# Patient Record
Sex: MALE | Race: BLACK OR AFRICAN AMERICAN | NOT HISPANIC OR LATINO | URBAN - METROPOLITAN AREA
[De-identification: names, ages, dates, MRNs, and addresses within clinical notes are randomized per-mention and may not be internally consistent; named-entity substitution may affect disease eponyms.]

---

## 2022-04-12 ENCOUNTER — INPATIENT (INPATIENT)
Facility: HOSPITAL | Age: 67
LOS: 4 days | Discharge: HOME | DRG: 871 | End: 2022-04-17
Attending: HOSPITALIST | Admitting: SURGERY
Payer: COMMERCIAL

## 2022-04-12 VITALS
HEART RATE: 141 BPM | RESPIRATION RATE: 24 BRPM | OXYGEN SATURATION: 99 % | HEIGHT: 63 IN | SYSTOLIC BLOOD PRESSURE: 88 MMHG | WEIGHT: 162.04 LBS | TEMPERATURE: 100 F | DIASTOLIC BLOOD PRESSURE: 60 MMHG

## 2022-04-12 DIAGNOSIS — K83.09 OTHER CHOLANGITIS: ICD-10-CM

## 2022-04-12 LAB
ALBUMIN SERPL ELPH-MCNC: 2.1 G/DL — LOW (ref 3.3–5)
ALBUMIN SERPL ELPH-MCNC: 2.4 G/DL — LOW (ref 3.3–5)
ALBUMIN SERPL ELPH-MCNC: 3 G/DL — LOW (ref 3.3–5)
ALP SERPL-CCNC: 124 U/L — HIGH (ref 40–120)
ALP SERPL-CCNC: 68 U/L — SIGNIFICANT CHANGE UP (ref 40–120)
ALP SERPL-CCNC: 69 U/L — SIGNIFICANT CHANGE UP (ref 40–120)
ALT FLD-CCNC: 18 U/L — SIGNIFICANT CHANGE UP (ref 10–45)
ALT FLD-CCNC: 23 U/L — SIGNIFICANT CHANGE UP (ref 10–45)
ALT FLD-CCNC: 23 U/L — SIGNIFICANT CHANGE UP (ref 10–45)
ANION GAP SERPL CALC-SCNC: 16 MMOL/L — SIGNIFICANT CHANGE UP (ref 5–17)
ANION GAP SERPL CALC-SCNC: 18 MMOL/L — HIGH (ref 5–17)
ANION GAP SERPL CALC-SCNC: 21 MMOL/L — HIGH (ref 5–17)
APPEARANCE UR: ABNORMAL
APTT BLD: 35.3 SEC — SIGNIFICANT CHANGE UP (ref 27.5–35.5)
APTT BLD: 38.1 SEC — HIGH (ref 27.5–35.5)
APTT BLD: 39 SEC — HIGH (ref 27.5–35.5)
AST SERPL-CCNC: 48 U/L — HIGH (ref 10–40)
AST SERPL-CCNC: 54 U/L — HIGH (ref 10–40)
AST SERPL-CCNC: 56 U/L — HIGH (ref 10–40)
B PERT DNA SPEC QL NAA+PROBE: SIGNIFICANT CHANGE UP
BACTERIA # UR AUTO: ABNORMAL
BASE EXCESS BLDV CALC-SCNC: -7.9 MMOL/L — LOW (ref -2–2)
BASE EXCESS BLDV CALC-SCNC: -8 MMOL/L — LOW (ref -2–2)
BASOPHILS # BLD AUTO: 0 K/UL — SIGNIFICANT CHANGE UP (ref 0–0.2)
BASOPHILS NFR BLD AUTO: 0 % — SIGNIFICANT CHANGE UP (ref 0–2)
BILIRUB DIRECT SERPL-MCNC: 1.4 MG/DL — HIGH (ref 0–0.3)
BILIRUB INDIRECT FLD-MCNC: 1.3 MG/DL — HIGH (ref 0.2–1)
BILIRUB SERPL-MCNC: 2.3 MG/DL — HIGH (ref 0.2–1.2)
BILIRUB SERPL-MCNC: 2.7 MG/DL — HIGH (ref 0.2–1.2)
BILIRUB SERPL-MCNC: 3.1 MG/DL — HIGH (ref 0.2–1.2)
BILIRUB UR-MCNC: ABNORMAL
BLD GP AB SCN SERPL QL: NEGATIVE — SIGNIFICANT CHANGE UP
BUN SERPL-MCNC: 12 MG/DL — SIGNIFICANT CHANGE UP (ref 7–23)
BUN SERPL-MCNC: 15 MG/DL — SIGNIFICANT CHANGE UP (ref 7–23)
BUN SERPL-MCNC: 18 MG/DL — SIGNIFICANT CHANGE UP (ref 7–23)
C PNEUM DNA SPEC QL NAA+PROBE: SIGNIFICANT CHANGE UP
CA-I SERPL-SCNC: 1.14 MMOL/L — LOW (ref 1.15–1.33)
CA-I SERPL-SCNC: 1.18 MMOL/L — SIGNIFICANT CHANGE UP (ref 1.15–1.33)
CALCIUM SERPL-MCNC: 8 MG/DL — LOW (ref 8.4–10.5)
CALCIUM SERPL-MCNC: 8.1 MG/DL — LOW (ref 8.4–10.5)
CALCIUM SERPL-MCNC: 8.9 MG/DL — SIGNIFICANT CHANGE UP (ref 8.4–10.5)
CHLORIDE BLDV-SCNC: 89 MMOL/L — LOW (ref 96–108)
CHLORIDE BLDV-SCNC: 92 MMOL/L — LOW (ref 96–108)
CHLORIDE SERPL-SCNC: 88 MMOL/L — LOW (ref 96–108)
CHLORIDE SERPL-SCNC: 91 MMOL/L — LOW (ref 96–108)
CHLORIDE SERPL-SCNC: 91 MMOL/L — LOW (ref 96–108)
CO2 BLDV-SCNC: 18 MMOL/L — LOW (ref 22–26)
CO2 BLDV-SCNC: 20 MMOL/L — LOW (ref 22–26)
CO2 SERPL-SCNC: 15 MMOL/L — LOW (ref 22–31)
CO2 SERPL-SCNC: 17 MMOL/L — LOW (ref 22–31)
CO2 SERPL-SCNC: 18 MMOL/L — LOW (ref 22–31)
COLOR SPEC: ABNORMAL
CREAT SERPL-MCNC: 1.73 MG/DL — HIGH (ref 0.5–1.3)
CREAT SERPL-MCNC: 1.8 MG/DL — HIGH (ref 0.5–1.3)
CREAT SERPL-MCNC: 2.12 MG/DL — HIGH (ref 0.5–1.3)
DIFF PNL FLD: ABNORMAL
EGFR: 34 ML/MIN/1.73M2 — LOW
EGFR: 42 ML/MIN/1.73M2 — LOW
EGFR: 44 ML/MIN/1.73M2 — LOW
EOSINOPHIL # BLD AUTO: 0 K/UL — SIGNIFICANT CHANGE UP (ref 0–0.5)
EOSINOPHIL NFR BLD AUTO: 0 % — SIGNIFICANT CHANGE UP (ref 0–6)
EPI CELLS # UR: 3 /HPF — SIGNIFICANT CHANGE UP
FLUAV H1 2009 PAND RNA SPEC QL NAA+PROBE: SIGNIFICANT CHANGE UP
FLUAV H1 RNA SPEC QL NAA+PROBE: SIGNIFICANT CHANGE UP
FLUAV H3 RNA SPEC QL NAA+PROBE: SIGNIFICANT CHANGE UP
FLUAV SUBTYP SPEC NAA+PROBE: SIGNIFICANT CHANGE UP
FLUBV RNA SPEC QL NAA+PROBE: SIGNIFICANT CHANGE UP
GAS PNL BLDA: SIGNIFICANT CHANGE UP
GAS PNL BLDV: 122 MMOL/L — LOW (ref 136–145)
GAS PNL BLDV: 122 MMOL/L — LOW (ref 136–145)
GAS PNL BLDV: SIGNIFICANT CHANGE UP
GLUCOSE BLDV-MCNC: 105 MG/DL — HIGH (ref 70–99)
GLUCOSE BLDV-MCNC: 98 MG/DL — SIGNIFICANT CHANGE UP (ref 70–99)
GLUCOSE SERPL-MCNC: 102 MG/DL — HIGH (ref 70–99)
GLUCOSE SERPL-MCNC: 114 MG/DL — HIGH (ref 70–99)
GLUCOSE SERPL-MCNC: 93 MG/DL — SIGNIFICANT CHANGE UP (ref 70–99)
GLUCOSE UR QL: NEGATIVE — SIGNIFICANT CHANGE UP
HADV DNA SPEC QL NAA+PROBE: SIGNIFICANT CHANGE UP
HAV IGM SER-ACNC: SIGNIFICANT CHANGE UP
HBV CORE IGM SER-ACNC: SIGNIFICANT CHANGE UP
HBV SURFACE AG SER-ACNC: SIGNIFICANT CHANGE UP
HCO3 BLDV-SCNC: 17 MMOL/L — LOW (ref 22–29)
HCO3 BLDV-SCNC: 18 MMOL/L — LOW (ref 22–29)
HCOV PNL SPEC NAA+PROBE: SIGNIFICANT CHANGE UP
HCT VFR BLD CALC: 30.1 % — LOW (ref 39–50)
HCT VFR BLD CALC: 40.9 % — SIGNIFICANT CHANGE UP (ref 39–50)
HCT VFR BLDA CALC: 34 % — LOW (ref 39–51)
HCT VFR BLDA CALC: 45 % — SIGNIFICANT CHANGE UP (ref 39–51)
HCV AB S/CO SERPL IA: 0.15 S/CO — SIGNIFICANT CHANGE UP (ref 0–0.99)
HCV AB SERPL-IMP: SIGNIFICANT CHANGE UP
HGB BLD CALC-MCNC: 11.2 G/DL — LOW (ref 12.6–17.4)
HGB BLD CALC-MCNC: 15 G/DL — SIGNIFICANT CHANGE UP (ref 12.6–17.4)
HGB BLD-MCNC: 10.7 G/DL — LOW (ref 13–17)
HGB BLD-MCNC: 14.3 G/DL — SIGNIFICANT CHANGE UP (ref 13–17)
HMPV RNA SPEC QL NAA+PROBE: SIGNIFICANT CHANGE UP
HOROWITZ INDEX BLDV+IHG-RTO: 21 — SIGNIFICANT CHANGE UP
HPIV1 RNA SPEC QL NAA+PROBE: SIGNIFICANT CHANGE UP
HPIV2 RNA SPEC QL NAA+PROBE: SIGNIFICANT CHANGE UP
HPIV3 RNA SPEC QL NAA+PROBE: SIGNIFICANT CHANGE UP
HPIV4 RNA SPEC QL NAA+PROBE: SIGNIFICANT CHANGE UP
HYALINE CASTS # UR AUTO: 64 /LPF — HIGH (ref 0–2)
INR BLD: 1.82 RATIO — HIGH (ref 0.88–1.16)
INR BLD: 1.97 RATIO — HIGH (ref 0.88–1.16)
INR BLD: 2.21 RATIO — HIGH (ref 0.88–1.16)
KETONES UR-MCNC: SIGNIFICANT CHANGE UP
LACTATE BLDV-MCNC: 10.4 MMOL/L — CRITICAL HIGH (ref 0.7–2)
LACTATE BLDV-MCNC: 7.8 MMOL/L — CRITICAL HIGH (ref 0.7–2)
LACTATE SERPL-SCNC: 9.4 MMOL/L — CRITICAL HIGH (ref 0.7–2)
LEUKOCYTE ESTERASE UR-ACNC: ABNORMAL
LYMPHOCYTES # BLD AUTO: 1.06 K/UL — SIGNIFICANT CHANGE UP (ref 1–3.3)
LYMPHOCYTES # BLD AUTO: 9 % — LOW (ref 13–44)
MAGNESIUM SERPL-MCNC: 1 MG/DL — CRITICAL LOW (ref 1.6–2.6)
MAGNESIUM SERPL-MCNC: 1.7 MG/DL — SIGNIFICANT CHANGE UP (ref 1.6–2.6)
MANUAL SMEAR VERIFICATION: SIGNIFICANT CHANGE UP
MCHC RBC-ENTMCNC: 35 GM/DL — SIGNIFICANT CHANGE UP (ref 32–36)
MCHC RBC-ENTMCNC: 35.5 GM/DL — SIGNIFICANT CHANGE UP (ref 32–36)
MCHC RBC-ENTMCNC: 35.5 PG — HIGH (ref 27–34)
MCHC RBC-ENTMCNC: 35.9 PG — HIGH (ref 27–34)
MCV RBC AUTO: 100 FL — SIGNIFICANT CHANGE UP (ref 80–100)
MCV RBC AUTO: 102.8 FL — HIGH (ref 80–100)
MONOCYTES # BLD AUTO: 0.35 K/UL — SIGNIFICANT CHANGE UP (ref 0–0.9)
MONOCYTES NFR BLD AUTO: 3 % — SIGNIFICANT CHANGE UP (ref 2–14)
MRSA PCR RESULT.: SIGNIFICANT CHANGE UP
NEUTROPHILS # BLD AUTO: 10.39 K/UL — HIGH (ref 1.8–7.4)
NEUTROPHILS NFR BLD AUTO: 79 % — HIGH (ref 43–77)
NEUTS BAND # BLD: 9 % — HIGH (ref 0–8)
NITRITE UR-MCNC: NEGATIVE — SIGNIFICANT CHANGE UP
NRBC # BLD: 0 /100 WBCS — SIGNIFICANT CHANGE UP (ref 0–0)
NRBC # BLD: 0 /100 — SIGNIFICANT CHANGE UP (ref 0–0)
PCO2 BLDV: 30 MMHG — LOW (ref 42–55)
PCO2 BLDV: 40 MMHG — LOW (ref 42–55)
PH BLDV: 7.27 — LOW (ref 7.32–7.43)
PH BLDV: 7.35 — SIGNIFICANT CHANGE UP (ref 7.32–7.43)
PH UR: 6 — SIGNIFICANT CHANGE UP (ref 5–8)
PHOSPHATE SERPL-MCNC: 3 MG/DL — SIGNIFICANT CHANGE UP (ref 2.5–4.5)
PHOSPHATE SERPL-MCNC: 4.4 MG/DL — SIGNIFICANT CHANGE UP (ref 2.5–4.5)
PLAT MORPH BLD: NORMAL — SIGNIFICANT CHANGE UP
PLATELET # BLD AUTO: 45 K/UL — LOW (ref 150–400)
PLATELET # BLD AUTO: 97 K/UL — LOW (ref 150–400)
PO2 BLDV: 23 MMHG — LOW (ref 25–45)
PO2 BLDV: 57 MMHG — HIGH (ref 25–45)
POTASSIUM BLDV-SCNC: 3.5 MMOL/L — SIGNIFICANT CHANGE UP (ref 3.5–5.1)
POTASSIUM BLDV-SCNC: 3.6 MMOL/L — SIGNIFICANT CHANGE UP (ref 3.5–5.1)
POTASSIUM SERPL-MCNC: 3.5 MMOL/L — SIGNIFICANT CHANGE UP (ref 3.5–5.3)
POTASSIUM SERPL-MCNC: 3.7 MMOL/L — SIGNIFICANT CHANGE UP (ref 3.5–5.3)
POTASSIUM SERPL-MCNC: 4.3 MMOL/L — SIGNIFICANT CHANGE UP (ref 3.5–5.3)
POTASSIUM SERPL-SCNC: 3.5 MMOL/L — SIGNIFICANT CHANGE UP (ref 3.5–5.3)
POTASSIUM SERPL-SCNC: 3.7 MMOL/L — SIGNIFICANT CHANGE UP (ref 3.5–5.3)
POTASSIUM SERPL-SCNC: 4.3 MMOL/L — SIGNIFICANT CHANGE UP (ref 3.5–5.3)
PROT SERPL-MCNC: 5.3 G/DL — LOW (ref 6–8.3)
PROT SERPL-MCNC: 5.8 G/DL — LOW (ref 6–8.3)
PROT SERPL-MCNC: 6.9 G/DL — SIGNIFICANT CHANGE UP (ref 6–8.3)
PROT UR-MCNC: ABNORMAL
PROTHROM AB SERPL-ACNC: 21.1 SEC — HIGH (ref 10.5–13.4)
PROTHROM AB SERPL-ACNC: 22.8 SEC — HIGH (ref 10.5–13.4)
PROTHROM AB SERPL-ACNC: 25.6 SEC — HIGH (ref 10.5–13.4)
RAPID RVP RESULT: SIGNIFICANT CHANGE UP
RBC # BLD: 3.01 M/UL — LOW (ref 4.2–5.8)
RBC # BLD: 3.98 M/UL — LOW (ref 4.2–5.8)
RBC # FLD: 13.8 % — SIGNIFICANT CHANGE UP (ref 10.3–14.5)
RBC # FLD: 13.9 % — SIGNIFICANT CHANGE UP (ref 10.3–14.5)
RBC BLD AUTO: SIGNIFICANT CHANGE UP
RBC CASTS # UR COMP ASSIST: 10 /HPF — HIGH (ref 0–4)
RH IG SCN BLD-IMP: POSITIVE — SIGNIFICANT CHANGE UP
RH IG SCN BLD-IMP: POSITIVE — SIGNIFICANT CHANGE UP
RSV RNA SPEC QL NAA+PROBE: SIGNIFICANT CHANGE UP
RV+EV RNA SPEC QL NAA+PROBE: SIGNIFICANT CHANGE UP
S AUREUS DNA NOSE QL NAA+PROBE: SIGNIFICANT CHANGE UP
SAO2 % BLDV: 24.2 % — LOW (ref 67–88)
SAO2 % BLDV: 88.5 % — HIGH (ref 67–88)
SARS-COV-2 RNA SPEC QL NAA+PROBE: SIGNIFICANT CHANGE UP
SODIUM SERPL-SCNC: 124 MMOL/L — LOW (ref 135–145)
SODIUM SERPL-SCNC: 124 MMOL/L — LOW (ref 135–145)
SODIUM SERPL-SCNC: 127 MMOL/L — LOW (ref 135–145)
SP GR SPEC: 1.02 — SIGNIFICANT CHANGE UP (ref 1.01–1.02)
UROBILINOGEN FLD QL: ABNORMAL
WBC # BLD: 11.81 K/UL — HIGH (ref 3.8–10.5)
WBC # BLD: 17.04 K/UL — HIGH (ref 3.8–10.5)
WBC # FLD AUTO: 11.81 K/UL — HIGH (ref 3.8–10.5)
WBC # FLD AUTO: 17.04 K/UL — HIGH (ref 3.8–10.5)
WBC UR QL: 163 /HPF — HIGH (ref 0–5)

## 2022-04-12 PROCEDURE — 93010 ELECTROCARDIOGRAM REPORT: CPT

## 2022-04-12 PROCEDURE — 93308 TTE F-UP OR LMTD: CPT | Mod: 26

## 2022-04-12 PROCEDURE — 99291 CRITICAL CARE FIRST HOUR: CPT

## 2022-04-12 PROCEDURE — 99285 EMERGENCY DEPT VISIT HI MDM: CPT

## 2022-04-12 PROCEDURE — 43274 ERCP DUCT STENT PLACEMENT: CPT | Mod: GC

## 2022-04-12 PROCEDURE — 99253 IP/OBS CNSLTJ NEW/EST LOW 45: CPT | Mod: GC,25

## 2022-04-12 PROCEDURE — 76705 ECHO EXAM OF ABDOMEN: CPT | Mod: 26

## 2022-04-12 PROCEDURE — 99223 1ST HOSP IP/OBS HIGH 75: CPT

## 2022-04-12 DEVICE — BLLN EXTRACT FUSION QUATRO 8.5 10 12 15MM: Type: IMPLANTABLE DEVICE | Status: FUNCTIONAL

## 2022-04-12 DEVICE — HYDRATOME 44: Type: IMPLANTABLE DEVICE | Status: FUNCTIONAL

## 2022-04-12 DEVICE — AUTOTOME CANNULATING SPHINCTEROTOME RX 44 20MM: Type: IMPLANTABLE DEVICE | Status: FUNCTIONAL

## 2022-04-12 DEVICE — IMPLANTABLE DEVICE: Type: IMPLANTABLE DEVICE | Status: FUNCTIONAL

## 2022-04-12 RX ORDER — PHENYLEPHRINE HYDROCHLORIDE 10 MG/ML
0.75 INJECTION INTRAVENOUS
Qty: 40 | Refills: 0 | Status: DISCONTINUED | OUTPATIENT
Start: 2022-04-12 | End: 2022-04-13

## 2022-04-12 RX ORDER — PROPOFOL 10 MG/ML
50 INJECTION, EMULSION INTRAVENOUS
Qty: 1000 | Refills: 0 | Status: DISCONTINUED | OUTPATIENT
Start: 2022-04-12 | End: 2022-04-13

## 2022-04-12 RX ORDER — VANCOMYCIN HCL 1 G
1000 VIAL (EA) INTRAVENOUS ONCE
Refills: 0 | Status: COMPLETED | OUTPATIENT
Start: 2022-04-12 | End: 2022-04-12

## 2022-04-12 RX ORDER — DEXMEDETOMIDINE HYDROCHLORIDE IN 0.9% SODIUM CHLORIDE 4 UG/ML
0.5 INJECTION INTRAVENOUS
Qty: 200 | Refills: 0 | Status: DISCONTINUED | OUTPATIENT
Start: 2022-04-12 | End: 2022-04-12

## 2022-04-12 RX ORDER — SODIUM CHLORIDE 9 MG/ML
1000 INJECTION, SOLUTION INTRAVENOUS
Refills: 0 | Status: DISCONTINUED | OUTPATIENT
Start: 2022-04-12 | End: 2022-04-13

## 2022-04-12 RX ORDER — ACETAMINOPHEN 500 MG
975 TABLET ORAL ONCE
Refills: 0 | Status: COMPLETED | OUTPATIENT
Start: 2022-04-12 | End: 2022-04-12

## 2022-04-12 RX ORDER — SODIUM CHLORIDE 9 MG/ML
2300 INJECTION, SOLUTION INTRAVENOUS ONCE
Refills: 0 | Status: COMPLETED | OUTPATIENT
Start: 2022-04-12 | End: 2022-04-12

## 2022-04-12 RX ORDER — MAGNESIUM SULFATE 500 MG/ML
2 VIAL (ML) INJECTION
Refills: 0 | Status: COMPLETED | OUTPATIENT
Start: 2022-04-12 | End: 2022-04-13

## 2022-04-12 RX ORDER — CHLORHEXIDINE GLUCONATE 213 G/1000ML
1 SOLUTION TOPICAL DAILY
Refills: 0 | Status: DISCONTINUED | OUTPATIENT
Start: 2022-04-12 | End: 2022-04-13

## 2022-04-12 RX ORDER — CHLORHEXIDINE GLUCONATE 213 G/1000ML
15 SOLUTION TOPICAL EVERY 12 HOURS
Refills: 0 | Status: DISCONTINUED | OUTPATIENT
Start: 2022-04-12 | End: 2022-04-13

## 2022-04-12 RX ORDER — SODIUM CHLORIDE 9 MG/ML
1000 INJECTION, SOLUTION INTRAVENOUS ONCE
Refills: 0 | Status: COMPLETED | OUTPATIENT
Start: 2022-04-12 | End: 2022-04-12

## 2022-04-12 RX ORDER — PIPERACILLIN AND TAZOBACTAM 4; .5 G/20ML; G/20ML
3.38 INJECTION, POWDER, LYOPHILIZED, FOR SOLUTION INTRAVENOUS ONCE
Refills: 0 | Status: COMPLETED | OUTPATIENT
Start: 2022-04-12 | End: 2022-04-12

## 2022-04-12 RX ORDER — PHYTONADIONE (VIT K1) 5 MG
10 TABLET ORAL ONCE
Refills: 0 | Status: COMPLETED | OUTPATIENT
Start: 2022-04-12 | End: 2022-04-12

## 2022-04-12 RX ORDER — PIPERACILLIN AND TAZOBACTAM 4; .5 G/20ML; G/20ML
3.38 INJECTION, POWDER, LYOPHILIZED, FOR SOLUTION INTRAVENOUS EVERY 8 HOURS
Refills: 0 | Status: DISCONTINUED | OUTPATIENT
Start: 2022-04-12 | End: 2022-04-14

## 2022-04-12 RX ADMIN — Medication 975 MILLIGRAM(S): at 12:19

## 2022-04-12 RX ADMIN — Medication 102 MILLIGRAM(S): at 17:00

## 2022-04-12 RX ADMIN — PIPERACILLIN AND TAZOBACTAM 200 GRAM(S): 4; .5 INJECTION, POWDER, LYOPHILIZED, FOR SOLUTION INTRAVENOUS at 12:19

## 2022-04-12 RX ADMIN — PIPERACILLIN AND TAZOBACTAM 25 GRAM(S): 4; .5 INJECTION, POWDER, LYOPHILIZED, FOR SOLUTION INTRAVENOUS at 23:36

## 2022-04-12 RX ADMIN — SODIUM CHLORIDE 125 MILLILITER(S): 9 INJECTION, SOLUTION INTRAVENOUS at 16:01

## 2022-04-12 RX ADMIN — SODIUM CHLORIDE 1000 MILLILITER(S): 9 INJECTION, SOLUTION INTRAVENOUS at 14:17

## 2022-04-12 RX ADMIN — Medication 25 GRAM(S): at 19:36

## 2022-04-12 RX ADMIN — SODIUM CHLORIDE 2300 MILLILITER(S): 9 INJECTION, SOLUTION INTRAVENOUS at 11:45

## 2022-04-12 RX ADMIN — SODIUM CHLORIDE 125 MILLILITER(S): 9 INJECTION, SOLUTION INTRAVENOUS at 17:01

## 2022-04-12 RX ADMIN — SODIUM CHLORIDE 1000 MILLILITER(S): 9 INJECTION, SOLUTION INTRAVENOUS at 13:32

## 2022-04-12 RX ADMIN — PROPOFOL 23.2 MICROGRAM(S)/KG/MIN: 10 INJECTION, EMULSION INTRAVENOUS at 22:32

## 2022-04-12 RX ADMIN — Medication 250 MILLIGRAM(S): at 13:31

## 2022-04-12 RX ADMIN — Medication 25 GRAM(S): at 22:32

## 2022-04-12 RX ADMIN — PHENYLEPHRINE HYDROCHLORIDE 21.7 MICROGRAM(S)/KG/MIN: 10 INJECTION INTRAVENOUS at 22:32

## 2022-04-12 RX ADMIN — PIPERACILLIN AND TAZOBACTAM 25 GRAM(S): 4; .5 INJECTION, POWDER, LYOPHILIZED, FOR SOLUTION INTRAVENOUS at 17:01

## 2022-04-12 RX ADMIN — SODIUM CHLORIDE 125 MILLILITER(S): 9 INJECTION, SOLUTION INTRAVENOUS at 22:32

## 2022-04-12 NOTE — PATIENT PROFILE ADULT - FALL HARM RISK - RISK INTERVENTIONS

## 2022-04-12 NOTE — ED ADULT NURSE REASSESSMENT NOTE - NS ED NURSE REASSESS COMMENT FT1
MD Jose Luis Farrell to confirm phytonadione IVPB order. [facility] with KELLIE SANDY .

## 2022-04-12 NOTE — H&P ADULT - NS ATTEND AMEND GEN_ALL_CORE FT
ATTENDING ATTESTATION  I have seen and examined this patient with the resident housestaff. I have reviewed all labs, imaging and reports. I have participated in formulating the plan, and have read and agree with the history, ROS, exam, assessment and plan as stated above.     For the past week has had malaise, fever and devlopement of jaundice at home.   In ED is hypotensive with lactate of 10, and epigastric pain with elevated tbili --> concern for septic shock due to colangitis.   Zosyn, LR boluses with lactate repeat, GI consult for ERCP, plan for admission to SICU.   No CT scan at this time due to hemodynamic instability.     Total time spent in the care of this patient today (excluding critical care & procedures): 75 min                Over 50% of the total time was spent on counseling and coordination of care.     Michelle Dimas M.D., M.S.  Division of Acute Care Surgery

## 2022-04-12 NOTE — ED ADULT NURSE REASSESSMENT NOTE - NS ED NURSE REASSESS COMMENT FT1
16F indwelling milian catheter inserted under sterile technique, pt tolerated procedure well, approx 35mL dark yellow urine drained, MD Law notified. Pt remains hypotensive in ED despite 3L LR boluses. MD aware, surgery at bedside.

## 2022-04-12 NOTE — ED ADULT NURSE REASSESSMENT NOTE - NS ED NURSE REASSESS COMMENT FT1
Spoke with RN Padmini Martinez from endoscopy for pending ERCP, pt added on by GI. Pending open endoscopy suite vs SICU bed. ext [phone].

## 2022-04-12 NOTE — ED PROVIDER NOTE - ATTENDING CONTRIBUTION TO CARE
Attending Statement (TONYA Law MD):    HPI: 67y/o M with h/o GERD, c/o upper abdominal pain (primarily on right side of abdomen) for the past 3 weeks, waxing/waning, but much worse since yesterday.  States had seen PCP, and had been prescribed omeprazole but reports this did not help symptoms. +fever today. few episodes of vomiting (Nb/Nb)    Review of Systems:  -General: +fever  -ENT: no congestion, no difficulty swallowing  -Pulmonary: no cough, no shortness of breath  -Cardiac: no chest pain, no palpitations  -Gastrointestinal: +abdominal pain, +nausea, +vomiting, and no diarrhea.  -Genitourinary: no blood or pain with urination  -Musculoskeletal: no back or neck pain  -Skin: no rashes  -Endocrine: No h/o diabetes  -Neurologic: No new weakness or numbness in extremities    All else negative unless otherwise specified elsewhere in this note.    PSH/PMH as noted above    On Physical Exam:  General: well appearing, in NAD, speaking clearly in full sentences and without difficulty; cooperative with exam  HEENT: anicteric, airway patent  Neck: no JVD  Cardiac: tachycardic, s1s2, no MGR  Lungs: CTABL  Abdomen: +RUQ tenderness + pruett's + hepatomegaly  : no bladder tenderness or distension  Skin: intact, no rash  Extremities: no peripheral edema, no gross deformities  Neuro: not appearing encephalopathic or meningitic, no rigidity/clonus    MDM: 67y/o M with h/o GERD, c/o upper abdominal pain (primarily on right side of abdomen) for the past 3 weeks, waxing/waning, but much worse since yesterday.  States had seen PCP, and had been prescribed omeprazole but reports this did not help symptoms. +fever today. Concern for cholecystitis/biliary source, vs hepatitis vs (less likely given no diarrhea/etc) colitis/diverticulitis; renal pathology less likely.  Will obtain screening labs: cbc (to evaluate for leukocytosis or anemia), CMP (to evaluate for electrolyte abnormalities or renal/liver dysfunction), lipase (to evaluate for pancreatitis) and pt/inr; send blood cultures; febrile/tachycardic, raising concern for sepsis: obtain vbg/lactate (screening for acidosis or elevated lactate level) and start empiric iv fluid / antibiotics.  Disposition pending review of labs/imaging, to be admitted. Please see above progress notes above for updates to medical decision making and the patient's clinical course.

## 2022-04-12 NOTE — CONSULT NOTE ADULT - SUBJECTIVE AND OBJECTIVE BOX
HISTORY OF PRESENT ILLNESS:  TESHA STEINER is a 66y Male with no significant PMH presented to ED c/o several weeks of abdominal pain that acutely worsened.  Patient states he thought he had food poisoning several weeks ago after vomiting and was started on omeprazole by his PMD.  The abdominal pain became acutely worse yesterday. He has also had urinary frequency and dysuria.  Patient was febrile to 100.4 upon arrival to the ED with HR 140s and BP in the 80s.  Lactate 10.4.  RUQ ultrasound showed acute cholecystitis, gallstones, sludge and CBD 1cm.   GI to do emergency ERCP.  SICU consulted for hemodynamic monitoring. Patient received 4.3L IVF in ED. At time of SICU evaluation, HR improved to 110s and BP in high 90s.  Patient admitted to abdominal pain, worse in RUQ; fever; dysuria.  Patient denied HA, CP, SOB, N/V, chills.     PAST MEDICAL HISTORY: No pertinent past medical history    PAST SURGICAL HISTORY: No significant past surgical history    FAMILY HISTORY:     SOCIAL HISTORY: , lives with wife    CODE STATUS: Full code     HOME MEDICATIONS: Omeprazole     ALLERGIES: No Known Allergies      VITAL SIGNS:  ICU Vital Signs Last 24 Hrs  T(C): 38 (12 Apr 2022 11:05), Max: 38 (12 Apr 2022 11:05)  T(F): 100.4 (12 Apr 2022 11:05), Max: 100.4 (12 Apr 2022 11:05)  HR: 111 (12 Apr 2022 13:00) (107 - 141)  BP: 92/58 (12 Apr 2022 13:00) (83/58 - 108/71)  BP(mean): 66 (12 Apr 2022 13:00) (63 - 81)  RR: 25 (12 Apr 2022 13:00) (21 - 27)  SpO2: 100% (12 Apr 2022 13:00) (98% - 100%)      NEURO  Exam: Awake, alert, in NAD.  A&O x 3. No focal deficits.       RESPIRATORY  Mechanical Ventilation: N/A  Exam: CTA B/L.       CARDIOVASCULAR  VBG - ( 12 Apr 2022 13:46 )  pH: 7.29  /  pCO2: 34    /  pO2: 37    / HCO3: 16    / Base Excess: -9.4  /  SaO2: 56.9   Lactate: 10.1   Exam: Tachycardic, regular rhythm. +S1, +S2  Cardiac Rhythm: Sinus tachycardia       GI/NUTRITION  Exam: Obese.  Soft.  +pain to palpation in RUQ.  No rebounding.    Diet: NPO       GENITOURINARY/RENAL  phytonadione  IVPB 10 milliGRAM(s) IV Intermittent once      Weight (kg): 73.5 (04-12 @ 11:05)  04-12    127<L>  |  88<L>  |  12  ----------------------------<  102<H>  3.7   |  18<L>  |  2.12<H>    Ca    8.9      12 Apr 2022 11:56    TPro  6.9  /  Alb  3.0<L>  /  TBili  3.1<H>  /  DBili  x   /  AST  54<H>  /  ALT  23  /  AlkPhos  124<H>  04-12    [ ] Tobin catheter, indication: N/A    HEMATOLOGIC  [ ] VTE Prophylaxis:                          14.3   11.81 )-----------( 97       ( 12 Apr 2022 11:56 )             40.9     PT/INR - ( 12 Apr 2022 11:56 )   PT: 22.8 sec;   INR: 1.97 ratio         PTT - ( 12 Apr 2022 11:56 )  PTT:35.3 sec  Transfusion: [ ] PRBC	[ ] Platelets	[ ] FFP	[ ] Cryoprecipitate      INFECTIOUS DISEASES    RECENT CULTURES:      ENDOCRINE    CAPILLARY BLOOD GLUCOSE      PATIENT CARE ACCESS DEVICES:  [x ] Peripheral IV  [ ] Central Venous Line	[ ] R	[ ] L	[ ] IJ	[ ] Fem	[ ] SC	Placed:   [ ] Arterial Line		[ ] R	[ ] L	[ ] Fem	[ ] Rad	[ ] Ax	Placed:   [ ] PICC:					[ ] Mediport  [ ] Urinary Catheter, Date Placed:   [x] Necessity of urinary, arterial, and venous catheters discussed    OTHER MEDICATIONS:     IMAGING STUDIES:   < from: POCUS ED Abdomen Limited (04.12.22 @ 13:12) >    INTERPRETATION:  Grayscale imaging of the right upper quadrant was   performed.  The gallbladder was visualized and scanned in longitudinal and transverse   planes. The gallbladder appears hydropic, and contains numerous small   stones and sludge.  The anterior gallbladder wall measured  0.6.cm.  The common bile duct measured 1.0.cm.  Gallstones were present.  Sludge was present.  Pericholecystic fluid was present.  Gallbladder wall edema was present.  Sonographic Mcallister's sign was present.    IMPRESSION:Findings concerning for acute cholecystitis and   choledocholithiasis.    < end of copied text >

## 2022-04-12 NOTE — ED ADULT NURSE NOTE - OBJECTIVE STATEMENT
65 y/o male with no reported PMH, presenting to ED for intermittent fevers/ chills x 3 weeks. pt describes some abdominal discomfort on and off, was prescribed omeprazole by PCP. pt reported having a recent abdominal US which found gallstones. Pt also reports some urinary symptoms including urgency, pain on urination, and hesitancy. Upon exam pt A&Ox3 gross neuro intact, no difficulty speaking in complete sentences, s1s2 heart sounds heard, pulses x 4, anne x4, abdomen soft nontender nondistended, skin intact, jaundice noted to sclera. Pt denies chest pain, sob, ha, n/v/d, abdominal pain,  hematuria. 18 gauge IVs placed to b/l a/c. Placed on CM, sinus tachy 120s. MD at bedside completing eval. pt provided with urinal and urine sample cups.

## 2022-04-12 NOTE — H&P ADULT - NSHPLABSRESULTS_GEN_ALL_CORE
Complete Blood Count + Automated Diff (04.12.22 @ 11:56)    WBC Count: 11.81 K/uL    RBC Count: 3.98 M/uL    Hemoglobin: 14.3 g/dL    Hematocrit: 40.9 %    Mean Cell Volume: 102.8 fl    Mean Cell Hemoglobin: 35.9 pg    Mean Cell Hemoglobin Conc: 35.0 gm/dL

## 2022-04-12 NOTE — ED PROVIDER NOTE - PROGRESS NOTE DETAILS
Ford, PGY3 - pt w/ fever, hypotension (improved after IVF), scleral icterus and elevated bili 3.1. surgery consulted for cholangitis. CT scan pending. GI fellow called no answer, will try again Attending note (Thanh): agree with above.  Concern after initial evaluation for cholangitis; fever, ? jaundice, scleral icterus and right upper quadrant pain/tenderness with ?hepatomegaly.  Less likely hepatitis given lack of significant transaminitis onlabs, but hyperbilirubinemia noted. POCUS shows markedly enlarged GB and findings concerning for CBD dilatation and cholecystitis, making cholangitis highly likely.  Have all ready treated with empiric antibiotics and iv fluids, BP improving; surgery consulted, and GI service consulted. Enrique, PGY3 - GI consulted for cholangitis, will come see pt CT scan read pending Attending note (Thanh): case discussed with GI and Surgery services, to be admitted to SICU, no need for additional imaging/CT at this time, to go for emergent ERCP.  Vitals currently still borderline HR improving; BP soft, if MAP dropping consistently below 65 will start levophed.  POCUS / cardiac performed, grossly normal cardiac function, LVOT VTI and variable IVC suggests still can tolerate IV fluid, continuing with 4th L iv fluid.

## 2022-04-12 NOTE — ED ADULT NURSE REASSESSMENT NOTE - NS ED NURSE REASSESS COMMENT FT1
MD Farrell ordered phytonadione IVPB on pt, MD Law notified, MD Law unaware of order/ need for ordered medication at this time. per MD Law, to hold medication for now, attempted to call MD Farrell on Teams, no answer, texted MD Farrell on Teams no answer. no consult note in by MD Farrell.

## 2022-04-12 NOTE — H&P ADULT - NSHPPHYSICALEXAM_GEN_ALL_CORE
PHYSICAL EXAM:    GENERAL: NAD, well-groomed, well-developed  HEAD:  Atraumatic, Normocephalic  EYES: EOMI, PERRLA, mild scleral icterus   HEART: Regular rate and rhythm; No murmurs, rubs, or gallops  RESPIRATORY: CTA B/L, No W/R/R  ABDOMEN: Soft, mildly tender to palpation in RUQ

## 2022-04-12 NOTE — PRE-ANESTHESIA EVALUATION ADULT - NSANTHPMHFT_GEN_ALL_CORE
As per Gastroenterology team, emergent procedure - proceed to OR to relieve obstruction.  Na 124, Lactate 7.8.

## 2022-04-12 NOTE — PRE PROCEDURE NOTE - PRE PROCEDURE EVALUATION
Attending Physician:   Dr. Stanley Goodson                         Procedure: ERCP    Indication for Procedure: cholangitis   ________________________________________________________  PAST MEDICAL & SURGICAL HISTORY:  No pertinent past medical history    No significant past surgical history      ALLERGIES:  No Known Allergies    HOME MEDICATIONS:    AICD/PPM: [ ] yes   [ ] no    PERTINENT LAB DATA:                        10.7   17.04 )-----------( 45       ( 12 Apr 2022 17:01 )             30.1     04-12    124<L>  |  91<L>  |  15  ----------------------------<  93  3.5   |  15<L>  |  1.80<H>    Ca    8.0<L>      12 Apr 2022 17:01  Phos  3.0     04-12  Mg     1.0     04-12    TPro  5.3<L>  /  Alb  2.1<L>  /  TBili  2.3<H>  /  DBili  x   /  AST  48<H>  /  ALT  18  /  AlkPhos  68  04-12    PT/INR - ( 12 Apr 2022 17:01 )   PT: 25.6 sec;   INR: 2.21 ratio         PTT - ( 12 Apr 2022 17:01 )  PTT:38.1 sec            PHYSICAL EXAMINATION:    Height (cm): 162.6  Weight (kg): 77.2  BMI (kg/m2): 29.2  BSA (m2): 1.83T(C): 36.9  HR: 99  BP: 94/59  RR: 18  SpO2: 93%    Constitutional: NAD  HEENT: PERRLA, EOMI,    Neck:  No JVD  Respiratory: CTAB/L  Cardiovascular: S1 and S2  Gastrointestinal: BS+, soft, NT/ND  Extremities: No peripheral edema  Neurological: A/O x 3, no focal deficits  Psychiatric: Normal mood, normal affect  Skin: No rashes    ASA Class: I [ ]  II [ ]  III [ ]  IV [ ]    COMMENTS:    The patient is a suitable candidate for the planned procedure unless box checked [ ]  No, explain:

## 2022-04-12 NOTE — H&P ADULT - HISTORY OF PRESENT ILLNESS
67 YO M no pertinent PMH presenting with 1 week of epigastric and RUQ pain. States that she went to his PCP and was told that he has food poisoning. Reports fevers of 101-102 over course of last week. Denies changed to BM, urination. Per wife, patient beginning to look more jaundiced over last few days and reporting increased fatigue- unable to make it into hospital without help. Reports episodes of emesis at beginning of last week. Last colonoscopy and endoscopy in 2019, both normal. Denies recent weight loss. Denies hx of gallstones. Reports no home medications, hx of surgery, or PMH.     Hypotensive SBP 80s-90s in ED. Lactate 10.4, s/p 3 L bolus lactate to 10.1. Tobin placed in ED with 10 cc return.

## 2022-04-12 NOTE — H&P ADULT - ASSESSMENT
67 YO M no pertinent PMH presenting with 1 week of epigastric and RUQ pain, with fevers and hypotension in ED concerning for cholangitis.     - obtain formal RUQ u/s  - ERCP by GI emergently, today PM  - SICU consulted for post procedure care and resuscitation   - IV abx  - s/p 3L fluid bolus, trend lactate 10.4-10.1  - monitor UOP  - monitor WBC curve  - admit to Dr. Michelle Dimas, [facility]     d/w Dr. Dimas, [facility]     [facility] Surgery, [id number]

## 2022-04-12 NOTE — ED PROVIDER NOTE - OBJECTIVE STATEMENT
67 y/o M PMH GERD recently started on omeprazole recently by PMD presents to ED c/o RUQ abdominal pain onset 3 weeks ago associated with nausea intermittently, pain much worse since yesterday, wife reports pt was so weak this morning that he could barely walk around the house and so brought to ED. +fever in the ED.

## 2022-04-12 NOTE — ED PROVIDER NOTE - CLINICAL SUMMARY MEDICAL DECISION MAKING FREE TEXT BOX
67 y/o M PMH GERD recently started on omeprazole recently by PMD presents to ED c/o RUQ abdominal pain onset 3 weeks ago associated with nausea intermittently, pain much worse since yesterday, wife reports pt was so weak this morning that he could barely walk around the house and so brought to ED. pt +SIRS criteria, hypotensive, tachycardic and febrile on arrival +mentating baseline. concern for cholangitis, cholecystitis, pna, gastritis, hepatitis pancreatitis. plan labs CTAP GI / surgery consult

## 2022-04-12 NOTE — CONSULT NOTE ADULT - ASSESSMENT
Impression:  # Severe sepsis ( fever, leukocytosis hypotension and tachycardia) highly suspicious for cholangitis ( RUQ pain/tenderness, dilated CBD, elevated liver enzymes)      Plan:  - Aggressive IVF resuscitation  - NPO  - Emergent ERCP today  - IV Abx      Tj Farrell MD  Gastroenterology Fellow  Pager: [phone]  Please call answering service [phone] / on-call GI fellow after 5pm and before 8am, and on weekends.

## 2022-04-12 NOTE — CONSULT NOTE ADULT - ASSESSMENT
TESHA STEINER is a 66y Male with no significant PMH presented to ED c/o several weeks of abdominal pain that acutely worsened.  Patient states he thought he had food poisoning several weeks ago after vomiting and was started on omeprazole by his PMD.  The abdominal pain became acutely worse yesterday. He has also had urinary frequency and dysuria.  Patient was febrile to 100.4 upon arrival to the ED with HR 140s and BP in the 80s.  Lactate 10.4.  RUQ ultrasound showed acute cholecystitis, gallstones, sludge and CBD 1cm.   Patient received 4.3L IVF in ED.  GI to do emergency ERCP.  SICU consulted for hemodynamic monitoring.     Neuro: pain control   - Tylenol prn     Resp:  - No acute issues  - Incentive spirometry     CV: Sepsis, vitals improved after IVF resuscitation   - Hemodynamic monitoring   - Trend lactate   - POCUS     GI: acute cholangitis  - GI to do emergency ERCP  - Trend LFTs  - NPO     /Renal: ROBBIE, hyponatremia (likely secondary to hypovolemia); +UA  - s/p 4.3L resuscitation in ED   - Trend BUN/Cr, Na  - Plasmalyte @ 125mL/hr   - Monitor UOP   - Likely UTI, f/u culture and continue Zosyn     ID: r/o cholangitis, possible UTI  - s/p Vanco and Zosyn in ED  - plan to continue Zosyn  - f/u blood and urine cultures  - trend WBC    Heme:  - Lovenox for VTE prophylaaxis post-ERCP  - Trend H/H    Endo:  - Monitor serum glucose     Lines:  - PIVs    Dispo: SICU care.  Full code.  Patient seen and case discussed with Dr. Barron.    Sheeba Vora PA-C #[id number]

## 2022-04-12 NOTE — ED PROVIDER NOTE - PRESENTATION SUGGESTIVE OF:
Call placed to patient to discuss results of CT CAP which showed:    IMPRESSION:      1. Since the most recent ultrasound comparison of 8-3-21, there has been no significant interval change in the solid component of the right adnexal mass as previously described. This does appear to have increased  in overall volume, however, when compared with the CT dated April 16, 2021.  2. There is no CT evidence of metastatic disease.  3. Interval resolution of the proximal right ureteral calculus and the majority of the nonobstructing right renal calculi. There is a single punctate nonobstructing right renal calculus present.  4. Postoperative changes of bilateral tubal ligations.  5. Collapsing left ovarian cyst/follicle.    She was pleased with these results. She reports that her daughter is a PCW (Patient Care Worker) and asked if there might be a way to obtain approval for her to be paid while helping her mother after surgery. I informed her that patients are typically able to go home after surgery without the assistance of a PCW but that I would check with our nursing staff to see if they had any additional information. Patient verbalized understanding. No further questions or concerns at this time.    Bacterial Etiology

## 2022-04-12 NOTE — ED PROVIDER NOTE - PHYSICAL EXAMINATION
Gen: well developed male conversant, appears tired   HEENT: NCAT, EOMI, no nasal discharge, mucous membranes dry +scleral icterus   CV: tachycardic +S1/S2, no M/R/G +distal pulses intact and equal b/l   Resp: CTAB, no W/R/R  GI: Abdomen soft non-distended +RUQ firm and tender to palpation   MSK: No open wounds, no bruising, no LE edema  Neuro: A&Ox3, following commands, moving all four extremities spontaneously  Psych: appropriate mood

## 2022-04-12 NOTE — CONSULT NOTE ADULT - SUBJECTIVE AND OBJECTIVE BOX
HPI:   65 y/o M PMH GERD recently started on omeprazole recently by PMD presents to ED c/o RUQ abdominal pain onset 3 weeks ago associated with nausea intermittently, pain much worse since yesterday, wife reports pt was so weak this morning that he could barely walk around the house and so brought to ED. +fever in the ED.    Patient has been having RUQ pain, intermittently for two weeks. He also reports fever, chills and weakness.    Allergies:  No Known Allergies    home medications: none    Hospital Medications:  phytonadione  IVPB 10 milliGRAM(s) IV Intermittent once      PMHX/PSHX:  No pertinent past medical history    No significant past surgical history        Family history:      Social History: no smoking    ROS:   General:  No fevers, chills or night sweats  ENT:  No sore throat or dysphagia  CV:  No pain or palpitations  Resp:  No dyspnea, cough or  wheezing  GI:  as above  Skin:  No rash or edema  Neuro: no weakness   Hematologic: no bleeding  Musculoskeletal: no muscle pain or join pain  Psych: no agitation     : no dysuria      PHYSICAL EXAM:   GENERAL:  NAD, Appears stated age  HEENT:  NC/AT,  conjunctivae clear and pink, sclera -anicteric  CHEST:  CTA B/L, Normal effort  HEART:  RRR S1/S2,  ABDOMEN:  Soft, RUQ-tender, non-distended,  no masses   EXTREMITIES:  No cyanosis or Edema  SKIN:  Warm & Dry. No rash or erythema  NEURO:  Alert, oriented, no focal deficit    Vital Signs:  Vital Signs Last 24 Hrs  T(C): 38 (2022 11:05), Max: 38 (2022 11:05)  T(F): 100.4 (2022 11:05), Max: 100.4 (2022 11:05)  HR: 111 (2022 13:00) (107 - 141)  BP: 92/58 (2022 13:00) (83/58 - 108/71)  BP(mean): 66 (2022 13:00) (63 - 81)  RR: 25 (2022 13:00) (21 - 27)  SpO2: 100% (2022 13:00) (98% - 100%)  Daily Height in cm: 160.02 (2022 11:05)    Daily     LABS:                        14.3   11.81 )-----------( 97       ( 2022 11:56 )             40.9     Mean Cell Volume: 102.8 fl (22 @ 11:56)        127<L>  |  88<L>  |  12  ----------------------------<  102<H>  3.7   |  18<L>  |  2.12<H>    Ca    8.9      2022 11:56    TPro  6.9  /  Alb  3.0<L>  /  TBili  3.1<H>  /  DBili  x   /  AST  54<H>  /  ALT  23  /  AlkPhos  124<H>      LIVER FUNCTIONS - ( 2022 11:56 )  Alb: 3.0 g/dL / Pro: 6.9 g/dL / ALK PHOS: 124 U/L / ALT: 23 U/L / AST: 54 U/L / GGT: x           PT/INR - ( 2022 11:56 )   PT: 22.8 sec;   INR: 1.97 ratio         PTT - ( 2022 11:56 )  PTT:35.3 sec  Urinalysis Basic - ( 2022 14:32 )    Color: Dark Yellow / Appearance: Turbid / S.021 / pH: x  Gluc: x / Ketone: Trace  / Bili: Small / Urobili: 2 mg/dL   Blood: x / Protein: 100 mg/dL / Nitrite: Negative   Leuk Esterase: Large / RBC: 10 /hpf /  /HPF   Sq Epi: x / Non Sq Epi: 3 /hpf / Bacteria: Many      Amylase Serum--      Lipase serum34       Ammonia--                          14.3   11.81 )-----------( 97       ( 2022 11:56 )             40.9     Imaging:    < from: POCUS ED Abdomen Limited (22 @ 13:12) >  Procedure was performed in the Emergency Department by a credentialed   Emergency Medicine Attending Physician    EXAM:  ER US ABDOMEN LTD      ORDER COMMENTS:      PROCEDURE DATE:  2022    FOCUSED ED ULTRASOUND REPORT          INTERPRETATION:  Grayscale imaging of the right upper quadrant was   performed.  The gallbladder was visualized and scanned in longitudinal and transverse   planes. The gallbladder appears hydropic, and contains numerous small   stones and sludge.  The anterior gallbladder wall measured  0.6.cm.  The common bile duct measured 1.0.cm.  Gallstones were present.  Sludge was present.  Pericholecystic fluid was present.  Gallbladder wall edema was present.  Sonographic Mcallister's sign was present.    IMPRESSION:Findings concerning for acute cholecystitis and   choledocholithiasis.    --- End of Report ---        < end of copied text >

## 2022-04-12 NOTE — CHART NOTE - NSCHARTNOTEFT_GEN_A_CORE
Patient with fever, RUQ abd pain, bilirubin 3/abnormal LFTs, improved hypotension/tachycardia/acute kidney injury after >4L IV fluids and antibiotics, worsening leukocytosis/thrombocytopenia/coagulopathy ,with ultrasound evidence of calcific cholecystitis and dilated common bile duct.  Clinical picture is consistent with acute cholecystitis/cholangitis with septic shock and DIC.   Although patient is not optimized from a hematologic and electrolyte standpoint, patient has best chance of clinical stabilization with emergent ERCP/biliary stent.

## 2022-04-12 NOTE — ED PROCEDURE NOTE - US CPT CODES
79615 US Chest (PTX, Pleural Effussion/CHF vs COPD)/10807 Echocardiography Transthoracic with Image 2D (Echo/FAST)

## 2022-04-13 DIAGNOSIS — N17.9 ACUTE KIDNEY FAILURE, UNSPECIFIED: ICD-10-CM

## 2022-04-13 DIAGNOSIS — K81.0 ACUTE CHOLECYSTITIS: ICD-10-CM

## 2022-04-13 DIAGNOSIS — A41.9 SEPSIS, UNSPECIFIED ORGANISM: ICD-10-CM

## 2022-04-13 DIAGNOSIS — N39.0 URINARY TRACT INFECTION, SITE NOT SPECIFIED: ICD-10-CM

## 2022-04-13 DIAGNOSIS — E87.1 HYPO-OSMOLALITY AND HYPONATREMIA: ICD-10-CM

## 2022-04-13 LAB
ALBUMIN SERPL ELPH-MCNC: 2.2 G/DL — LOW (ref 3.3–5)
ALBUMIN SERPL ELPH-MCNC: 2.6 G/DL — LOW (ref 3.3–5)
ALP SERPL-CCNC: 59 U/L — SIGNIFICANT CHANGE UP (ref 40–120)
ALP SERPL-CCNC: 70 U/L — SIGNIFICANT CHANGE UP (ref 40–120)
ALT FLD-CCNC: 24 U/L — SIGNIFICANT CHANGE UP (ref 10–45)
ALT FLD-CCNC: 26 U/L — SIGNIFICANT CHANGE UP (ref 10–45)
ANION GAP SERPL CALC-SCNC: 12 MMOL/L — SIGNIFICANT CHANGE UP (ref 5–17)
ANION GAP SERPL CALC-SCNC: 14 MMOL/L — SIGNIFICANT CHANGE UP (ref 5–17)
APTT BLD: 44.7 SEC — HIGH (ref 27.5–35.5)
AST SERPL-CCNC: 54 U/L — HIGH (ref 10–40)
AST SERPL-CCNC: 60 U/L — HIGH (ref 10–40)
BASE EXCESS BLDV CALC-SCNC: -3.6 MMOL/L — LOW (ref -2–2)
BASE EXCESS BLDV CALC-SCNC: -4.1 MMOL/L — LOW (ref -2–2)
BASE EXCESS BLDV CALC-SCNC: -4.9 MMOL/L — LOW (ref -2–2)
BILIRUB DIRECT SERPL-MCNC: 1.5 MG/DL — HIGH (ref 0–0.3)
BILIRUB DIRECT SERPL-MCNC: 1.6 MG/DL — HIGH (ref 0–0.3)
BILIRUB INDIRECT FLD-MCNC: 1.3 MG/DL — HIGH (ref 0.2–1)
BILIRUB INDIRECT FLD-MCNC: 1.6 MG/DL — HIGH (ref 0.2–1)
BILIRUB SERPL-MCNC: 2.8 MG/DL — HIGH (ref 0.2–1.2)
BILIRUB SERPL-MCNC: 3.2 MG/DL — HIGH (ref 0.2–1.2)
BUN SERPL-MCNC: 19 MG/DL — SIGNIFICANT CHANGE UP (ref 7–23)
BUN SERPL-MCNC: 24 MG/DL — HIGH (ref 7–23)
CA-I SERPL-SCNC: 1.14 MMOL/L — LOW (ref 1.15–1.33)
CA-I SERPL-SCNC: 1.21 MMOL/L — SIGNIFICANT CHANGE UP (ref 1.15–1.33)
CA-I SERPL-SCNC: 1.22 MMOL/L — SIGNIFICANT CHANGE UP (ref 1.15–1.33)
CALCIUM SERPL-MCNC: 8.1 MG/DL — LOW (ref 8.4–10.5)
CALCIUM SERPL-MCNC: 8.1 MG/DL — LOW (ref 8.4–10.5)
CHLORIDE BLDV-SCNC: 92 MMOL/L — LOW (ref 96–108)
CHLORIDE BLDV-SCNC: 93 MMOL/L — LOW (ref 96–108)
CHLORIDE BLDV-SCNC: 93 MMOL/L — LOW (ref 96–108)
CHLORIDE SERPL-SCNC: 91 MMOL/L — LOW (ref 96–108)
CHLORIDE SERPL-SCNC: 94 MMOL/L — LOW (ref 96–108)
CO2 BLDV-SCNC: 22 MMOL/L — SIGNIFICANT CHANGE UP (ref 22–26)
CO2 BLDV-SCNC: 24 MMOL/L — SIGNIFICANT CHANGE UP (ref 22–26)
CO2 BLDV-SCNC: 24 MMOL/L — SIGNIFICANT CHANGE UP (ref 22–26)
CO2 SERPL-SCNC: 18 MMOL/L — LOW (ref 22–31)
CO2 SERPL-SCNC: 19 MMOL/L — LOW (ref 22–31)
CREAT SERPL-MCNC: 1.67 MG/DL — HIGH (ref 0.5–1.3)
CREAT SERPL-MCNC: 1.7 MG/DL — HIGH (ref 0.5–1.3)
E COLI DNA BLD POS QL NAA+NON-PROBE: SIGNIFICANT CHANGE UP
EGFR: 44 ML/MIN/1.73M2 — LOW
EGFR: 45 ML/MIN/1.73M2 — LOW
GAS PNL BLDV: 120 MMOL/L — CRITICAL LOW (ref 136–145)
GAS PNL BLDV: 122 MMOL/L — LOW (ref 136–145)
GAS PNL BLDV: 123 MMOL/L — LOW (ref 136–145)
GAS PNL BLDV: SIGNIFICANT CHANGE UP
GLUCOSE BLDV-MCNC: 112 MG/DL — HIGH (ref 70–99)
GLUCOSE BLDV-MCNC: 113 MG/DL — HIGH (ref 70–99)
GLUCOSE BLDV-MCNC: 94 MG/DL — SIGNIFICANT CHANGE UP (ref 70–99)
GLUCOSE SERPL-MCNC: 111 MG/DL — HIGH (ref 70–99)
GLUCOSE SERPL-MCNC: 115 MG/DL — HIGH (ref 70–99)
GRAM STN FLD: SIGNIFICANT CHANGE UP
HCO3 BLDV-SCNC: 21 MMOL/L — LOW (ref 22–29)
HCO3 BLDV-SCNC: 23 MMOL/L — SIGNIFICANT CHANGE UP (ref 22–29)
HCO3 BLDV-SCNC: 23 MMOL/L — SIGNIFICANT CHANGE UP (ref 22–29)
HCT VFR BLD CALC: 30.7 % — LOW (ref 39–50)
HCT VFR BLD CALC: 31.9 % — LOW (ref 39–50)
HCT VFR BLDA CALC: 33 % — LOW (ref 39–51)
HCT VFR BLDA CALC: 35 % — LOW (ref 39–51)
HCT VFR BLDA CALC: 36 % — LOW (ref 39–51)
HGB BLD CALC-MCNC: 11.1 G/DL — LOW (ref 12.6–17.4)
HGB BLD CALC-MCNC: 11.6 G/DL — LOW (ref 12.6–17.4)
HGB BLD CALC-MCNC: 11.9 G/DL — LOW (ref 12.6–17.4)
HGB BLD-MCNC: 10.7 G/DL — LOW (ref 13–17)
HGB BLD-MCNC: 11.1 G/DL — LOW (ref 13–17)
HOROWITZ INDEX BLDV+IHG-RTO: 21 — SIGNIFICANT CHANGE UP
HOROWITZ INDEX BLDV+IHG-RTO: 24 — SIGNIFICANT CHANGE UP
HOROWITZ INDEX BLDV+IHG-RTO: 40 — SIGNIFICANT CHANGE UP
INR BLD: 1.84 RATIO — HIGH (ref 0.88–1.16)
LACTATE BLDV-MCNC: 3.9 MMOL/L — HIGH (ref 0.7–2)
LACTATE BLDV-MCNC: 4.1 MMOL/L — CRITICAL HIGH (ref 0.7–2)
LACTATE BLDV-MCNC: 5.1 MMOL/L — CRITICAL HIGH (ref 0.7–2)
MAGNESIUM SERPL-MCNC: 2.1 MG/DL — SIGNIFICANT CHANGE UP (ref 1.6–2.6)
MAGNESIUM SERPL-MCNC: 2.1 MG/DL — SIGNIFICANT CHANGE UP (ref 1.6–2.6)
MCHC RBC-ENTMCNC: 34.8 GM/DL — SIGNIFICANT CHANGE UP (ref 32–36)
MCHC RBC-ENTMCNC: 34.9 GM/DL — SIGNIFICANT CHANGE UP (ref 32–36)
MCHC RBC-ENTMCNC: 35.7 PG — HIGH (ref 27–34)
MCHC RBC-ENTMCNC: 35.8 PG — HIGH (ref 27–34)
MCV RBC AUTO: 102.6 FL — HIGH (ref 80–100)
MCV RBC AUTO: 102.7 FL — HIGH (ref 80–100)
METHOD TYPE: SIGNIFICANT CHANGE UP
NRBC # BLD: 0 /100 WBCS — SIGNIFICANT CHANGE UP (ref 0–0)
NRBC # BLD: 0 /100 WBCS — SIGNIFICANT CHANGE UP (ref 0–0)
PCO2 BLDV: 39 MMHG — LOW (ref 42–55)
PCO2 BLDV: 45 MMHG — SIGNIFICANT CHANGE UP (ref 42–55)
PCO2 BLDV: 50 MMHG — SIGNIFICANT CHANGE UP (ref 42–55)
PH BLDV: 7.27 — LOW (ref 7.32–7.43)
PH BLDV: 7.31 — LOW (ref 7.32–7.43)
PH BLDV: 7.33 — SIGNIFICANT CHANGE UP (ref 7.32–7.43)
PHOSPHATE SERPL-MCNC: 3.4 MG/DL — SIGNIFICANT CHANGE UP (ref 2.5–4.5)
PHOSPHATE SERPL-MCNC: 4.3 MG/DL — SIGNIFICANT CHANGE UP (ref 2.5–4.5)
PLATELET # BLD AUTO: 41 K/UL — LOW (ref 150–400)
PLATELET # BLD AUTO: 58 K/UL — LOW (ref 150–400)
PO2 BLDV: 35 MMHG — SIGNIFICANT CHANGE UP (ref 25–45)
PO2 BLDV: 38 MMHG — SIGNIFICANT CHANGE UP (ref 25–45)
PO2 BLDV: 54 MMHG — HIGH (ref 25–45)
POTASSIUM BLDV-SCNC: 4.6 MMOL/L — SIGNIFICANT CHANGE UP (ref 3.5–5.1)
POTASSIUM BLDV-SCNC: 4.6 MMOL/L — SIGNIFICANT CHANGE UP (ref 3.5–5.1)
POTASSIUM BLDV-SCNC: 4.9 MMOL/L — SIGNIFICANT CHANGE UP (ref 3.5–5.1)
POTASSIUM SERPL-MCNC: 4.5 MMOL/L — SIGNIFICANT CHANGE UP (ref 3.5–5.3)
POTASSIUM SERPL-MCNC: 4.7 MMOL/L — SIGNIFICANT CHANGE UP (ref 3.5–5.3)
POTASSIUM SERPL-SCNC: 4.5 MMOL/L — SIGNIFICANT CHANGE UP (ref 3.5–5.3)
POTASSIUM SERPL-SCNC: 4.7 MMOL/L — SIGNIFICANT CHANGE UP (ref 3.5–5.3)
PROT SERPL-MCNC: 5.8 G/DL — LOW (ref 6–8.3)
PROT SERPL-MCNC: 6.1 G/DL — SIGNIFICANT CHANGE UP (ref 6–8.3)
PROTHROM AB SERPL-ACNC: 21.3 SEC — HIGH (ref 10.5–13.4)
RBC # BLD: 2.99 M/UL — LOW (ref 4.2–5.8)
RBC # BLD: 3.11 M/UL — LOW (ref 4.2–5.8)
RBC # FLD: 14.2 % — SIGNIFICANT CHANGE UP (ref 10.3–14.5)
RBC # FLD: 14.4 % — SIGNIFICANT CHANGE UP (ref 10.3–14.5)
SAO2 % BLDV: 55.8 % — LOW (ref 67–88)
SAO2 % BLDV: 57.5 % — LOW (ref 67–88)
SAO2 % BLDV: 86.5 % — SIGNIFICANT CHANGE UP (ref 67–88)
SODIUM SERPL-SCNC: 123 MMOL/L — LOW (ref 135–145)
SODIUM SERPL-SCNC: 125 MMOL/L — LOW (ref 135–145)
SPECIMEN SOURCE: SIGNIFICANT CHANGE UP
SPECIMEN SOURCE: SIGNIFICANT CHANGE UP
WBC # BLD: 17.47 K/UL — HIGH (ref 3.8–10.5)
WBC # BLD: 20.76 K/UL — HIGH (ref 3.8–10.5)
WBC # FLD AUTO: 17.47 K/UL — HIGH (ref 3.8–10.5)
WBC # FLD AUTO: 20.76 K/UL — HIGH (ref 3.8–10.5)

## 2022-04-13 PROCEDURE — 99223 1ST HOSP IP/OBS HIGH 75: CPT

## 2022-04-13 PROCEDURE — 99232 SBSQ HOSP IP/OBS MODERATE 35: CPT | Mod: GC

## 2022-04-13 PROCEDURE — 74177 CT ABD & PELVIS W/CONTRAST: CPT | Mod: 26

## 2022-04-13 PROCEDURE — 99233 SBSQ HOSP IP/OBS HIGH 50: CPT

## 2022-04-13 RX ORDER — ACETAMINOPHEN 500 MG
1000 TABLET ORAL EVERY 6 HOURS
Refills: 0 | Status: DISCONTINUED | OUTPATIENT
Start: 2022-04-13 | End: 2022-04-14

## 2022-04-13 RX ORDER — SODIUM CHLORIDE 9 MG/ML
500 INJECTION INTRAMUSCULAR; INTRAVENOUS; SUBCUTANEOUS ONCE
Refills: 0 | Status: COMPLETED | OUTPATIENT
Start: 2022-04-13 | End: 2022-04-13

## 2022-04-13 RX ORDER — CALCIUM GLUCONATE 100 MG/ML
2 VIAL (ML) INTRAVENOUS ONCE
Refills: 0 | Status: COMPLETED | OUTPATIENT
Start: 2022-04-13 | End: 2022-04-13

## 2022-04-13 RX ORDER — SODIUM CHLORIDE 9 MG/ML
1000 INJECTION, SOLUTION INTRAVENOUS ONCE
Refills: 0 | Status: COMPLETED | OUTPATIENT
Start: 2022-04-13 | End: 2022-04-13

## 2022-04-13 RX ORDER — SODIUM CHLORIDE 9 MG/ML
500 INJECTION, SOLUTION INTRAVENOUS ONCE
Refills: 0 | Status: DISCONTINUED | OUTPATIENT
Start: 2022-04-13 | End: 2022-04-14

## 2022-04-13 RX ORDER — ENOXAPARIN SODIUM 100 MG/ML
40 INJECTION SUBCUTANEOUS EVERY 24 HOURS
Refills: 0 | Status: DISCONTINUED | OUTPATIENT
Start: 2022-04-13 | End: 2022-04-14

## 2022-04-13 RX ORDER — SODIUM CHLORIDE 9 MG/ML
1000 INJECTION INTRAMUSCULAR; INTRAVENOUS; SUBCUTANEOUS
Refills: 0 | Status: DISCONTINUED | OUTPATIENT
Start: 2022-04-13 | End: 2022-04-15

## 2022-04-13 RX ORDER — CHLORHEXIDINE GLUCONATE 213 G/1000ML
1 SOLUTION TOPICAL
Refills: 0 | Status: DISCONTINUED | OUTPATIENT
Start: 2022-04-13 | End: 2022-04-17

## 2022-04-13 RX ADMIN — Medication 1000 MILLIGRAM(S): at 03:47

## 2022-04-13 RX ADMIN — PIPERACILLIN AND TAZOBACTAM 25 GRAM(S): 4; .5 INJECTION, POWDER, LYOPHILIZED, FOR SOLUTION INTRAVENOUS at 06:33

## 2022-04-13 RX ADMIN — ENOXAPARIN SODIUM 40 MILLIGRAM(S): 100 INJECTION SUBCUTANEOUS at 11:29

## 2022-04-13 RX ADMIN — CHLORHEXIDINE GLUCONATE 1 APPLICATION(S): 213 SOLUTION TOPICAL at 05:43

## 2022-04-13 RX ADMIN — Medication 400 MILLIGRAM(S): at 03:32

## 2022-04-13 RX ADMIN — SODIUM CHLORIDE 125 MILLILITER(S): 9 INJECTION INTRAMUSCULAR; INTRAVENOUS; SUBCUTANEOUS at 10:57

## 2022-04-13 RX ADMIN — Medication 200 GRAM(S): at 05:49

## 2022-04-13 RX ADMIN — PIPERACILLIN AND TAZOBACTAM 25 GRAM(S): 4; .5 INJECTION, POWDER, LYOPHILIZED, FOR SOLUTION INTRAVENOUS at 16:52

## 2022-04-13 RX ADMIN — PIPERACILLIN AND TAZOBACTAM 25 GRAM(S): 4; .5 INJECTION, POWDER, LYOPHILIZED, FOR SOLUTION INTRAVENOUS at 22:00

## 2022-04-13 RX ADMIN — Medication 25 GRAM(S): at 00:21

## 2022-04-13 RX ADMIN — SODIUM CHLORIDE 1000 MILLILITER(S): 9 INJECTION, SOLUTION INTRAVENOUS at 10:57

## 2022-04-13 RX ADMIN — SODIUM CHLORIDE 500 MILLILITER(S): 9 INJECTION INTRAMUSCULAR; INTRAVENOUS; SUBCUTANEOUS at 05:42

## 2022-04-13 NOTE — CONSULT NOTE ADULT - ASSESSMENT
66M no pertinent PMH presenting with 1 week of epigastric/RUQ pain, fever, n/v. Found to be is septic shock 2/2 acute cholecystitis and cholangitis ; s/p emergent ERCP/biliary stent placement.

## 2022-04-13 NOTE — CONSULT NOTE ADULT - PROBLEM SELECTOR RECOMMENDATION 5
- Na 122 ; may be hypoosmolar in the setting of poor PO intake over the past week  - check serum osm, urine osm and urine Na  - do not correct by > 6 - 8 mEQ in 24 hrs  - monitor BMP q12

## 2022-04-13 NOTE — PROGRESS NOTE ADULT - ATTENDING COMMENTS
As above.    Pt seen/examined in the early afternoon on 4/13/22    Impression:    #1.  RUQ abd pain / resolved    #2.  Abnormal LFTs / mild and minimally improved    #3.  Dilated common bile duct to 1 cm not confirmed by ERCP, now normal by CT scan.  Gallbladder distended, possible cholecystitis    #4.  Blood and urine cultures : gram negative rods    #5.  Cirrhotic appearing liver on CT scan with evidence of portal hypertension (but no esophageal varices on EGD/ERCP)    Recommendations:    #1.  Continue antibiotics for Bacteremia (sources could be urinary/gallbladder, less likely cholangitis)  #2.  Follow CBC/CMP  #3.  Diet as tolerated.  #4.  Hepatology consult

## 2022-04-13 NOTE — PROGRESS NOTE ADULT - SUBJECTIVE AND OBJECTIVE BOX
Gastroenterology/Hepatology Progress Note      Interval Events:   - patient s/p ERCP yesterday, notable for biliary sludge, no pus, placement of plastic biliary stent   - ovenright patient extubated  - labs w/ rising bili and leukocytosis  - no abd pain this morning    Allergies:  No Known Allergies      Hospital Medications:  acetaminophen   IVPB .. 1000 milliGRAM(s) IV Intermittent every 6 hours PRN  chlorhexidine 2% Cloths 1 Application(s) Topical <User Schedule>  enoxaparin Injectable 40 milliGRAM(s) SubCutaneous every 24 hours  piperacillin/tazobactam IVPB.. 3.375 Gram(s) IV Intermittent every 8 hours  sodium chloride 0.9%. 1000 milliLiter(s) IV Continuous <Continuous>      ROS: 14 point ROS negative unless otherwise state in subjective    PHYSICAL EXAM:   Vital Signs:  Vital Signs Last 24 Hrs  T(C): 36.8 (2022 07:00), Max: 38 (2022 11:05)  T(F): 98.2 (2022 07:00), Max: 100.4 (2022 11:05)  HR: 85 (2022 07:30) (68 - 141)  BP: 102/65 (2022 07:30) (83/58 - 131/84)  BP(mean): 78 (2022 07:30) (63 - 102)  RR: 17 (2022 07:30) (12 - 28)  SpO2: 99% (2022 07:30) (93% - 100%)  Daily Height in cm: 162.56 (2022 19:33)    Daily     GENERAL:  No acute distress  HEENT:  NCAT, no scleral icterus  CHEST: no resp distress  HEART:  RRR  ABDOMEN:  Soft, non-tender, non-distended, normoactive bowel sounds, no masses  EXTREMITIES:  No cyanosis, clubbing, or edema  SKIN:  No rash/erythema/ecchymoses/petechiae/wounds/abscess/warm/dry  NEURO:  Alert and oriented x 3, no asterixis, no tremor    LABS:                        11.1   20.76 )-----------( 58       ( 2022 03:07 )             31.9     Mean Cell Volume: 102.6 fl (04-13-22 @ 03:07)    04-13    123<L>  |  91<L>  |  19  ----------------------------<  115<H>  4.7   |  18<L>  |  1.67<H>    Ca    8.1<L>      2022 03:07  Phos  4.3       Mg     2.1         TPro  6.1  /  Alb  2.6<L>  /  TBili  3.2<H>  /  DBili  1.6<H>  /  AST  60<H>  /  ALT  26  /  AlkPhos  70      LIVER FUNCTIONS - ( 2022 03:07 )  Alb: 2.6 g/dL / Pro: 6.1 g/dL / ALK PHOS: 70 U/L / ALT: 26 U/L / AST: 60 U/L / GGT: x           PT/INR - ( 2022 03:07 )   PT: 21.3 sec;   INR: 1.84 ratio         PTT - ( 2022 03:07 )  PTT:44.7 sec  Urinalysis Basic - ( 2022 14:32 )    Color: Dark Yellow / Appearance: Turbid / S.021 / pH: x  Gluc: x / Ketone: Trace  / Bili: Small / Urobili: 2 mg/dL   Blood: x / Protein: 100 mg/dL / Nitrite: Negative   Leuk Esterase: Large / RBC: 10 /hpf /  /HPF   Sq Epi: x / Non Sq Epi: 3 /hpf / Bacteria: Many      Amylase Serum--      Lipase serum34       Ammonia--        Imaging:  ERCP 22  Findings:       A standard esophagogastroduodenoscopy scope was used for the examination of the upper        gastrointestinal tract. The scope was passed under direct vision through the upper GI tract.        The examined esophagus was normal. Two small subepithelial nodules with no bleeding were        found in the gastric antrum. The duodenum to the second portion was normal. The  film of        the RUQ was normal. The esophagus was successfully intubated under direct vision without        detailed examination of the pharynx, larynx, and associated structures, and upper GI tract.        The major papilla was located partially under a duodenal fold. A 0.035 inch x 260 cm straight        Hydra Jagwire was passed into the biliary tree. The Rx44 sphincterotome was passed over the        guidewire and the bile duct was then deeply cannulated. Contrast was injected. I personally        interpreted the bile duct images. Ductal flow of contrast was adequate. Image quality was        excellent. Contrast extended to the hepatic ducts. The common bile duct was normal in size,        measuring up to 4 mm. The lower third of the main bile duct contained filling defect(s)        thought to be sludge. One 7 Fr by 5 cm plastic stent with a single external flap and a single        internal flap was placed into the common bile duct. Clear yellow bile flowed through the        stent. The stent was in good position. Final fluoroscopic images demonstrated no air in        unusual places.                                                                                                        Impression:  EGD:                       - Two subepithelial nodules found in gastric antrum                       ERCP:                       - Biliary sludge in a nondilated common bile duct. To ensure drainage, 7 Fr x                        5 cm plastic biliary stent was placed.                       - No obvious cholangitis. Differential diagnosis includes spontaneously                        passed/resolved choledocholithiasis or severe cholecystitis.  Recommendation:      - Return patient to ICU for ongoing care.                       - NPO today, may have meds.                       - Continue aggressive IV fluid resuscitation                       - Follow CBC/CMP/INR                       - Would perform additional imaging of the abdomen, such as CT scan of                        abdomen/pelvis with IV contrast and/or formal ultrasound of abdomen to                        further assess gallbladder and look for additional intraabdominal sources of                        sepsis.          - Surgery team followup for cholecystitis.                       - Followup in GI office in 2-4 weeks. Call [phone] to schedule.                       - Repeat ERCP in 4-6 weeks for stent removal and clearance of bile duct.

## 2022-04-13 NOTE — CONSULT NOTE ADULT - PROBLEM SELECTOR RECOMMENDATION 2
- likely biliary source as above  - bld cx with E. coli bacteremia  - f/up repeat bld cx  - c/w Zosyn  - pt with uptrending leukocytosis  - f/up CT abd/pelvis

## 2022-04-13 NOTE — CONSULT NOTE ADULT - PROBLEM SELECTOR RECOMMENDATION 4
Cr 2.1 on presentation ; likely ATN in setting of septic shock  Cr down to 1.67 today s/p aggressive IV fluid resuscitation  - c/t monitor BMP  - avoid nephrotoxins

## 2022-04-13 NOTE — PROGRESS NOTE ADULT - ASSESSMENT
66y Male with no significant PMH presented to ED c/o several weeks of abdominal pain that acutely worsened.  Patient states he thought he had food poisoning several weeks ago after vomiting and was started on omeprazole by his PMD.  The abdominal pain became acutely worse yesterday. He has also had urinary frequency and dysuria.  Patient was febrile to 100.4 upon arrival to the ED with HR 140s and BP in the 80s.  Lactate 10.4.  RUQ ultrasound showed acute cholecystitis, gallstones, sludge and CBD 1cm.   Patient received 4.3L IVF in ED.    -appreciate SICU care  -NPO/IVF  -trend labs  -f/u urine culture for +UA, on zosyn   -lvx/scds for VTE ppx  -pain control  -incentive spirometry  -recommend CT A/P w IV contrast       Trauma #[id number]

## 2022-04-13 NOTE — PROGRESS NOTE ADULT - ATTENDING COMMENTS
65 yo male with choledocholithiasis, acute cholecystitis. S/p ERCP, sphincterotomy, balloon sweep, stent.    - Multimodal pain management.  - 1L NC sat >90.  - Off pressors. Monitor hemodynamics.  - Lactate 4.5 from 10. Lactate at 10 am. Responding to resuscitation and source control.  - T lázaro 3.2. WBC 20.6, monitor.  - MIVF, fluid bolus if lactate up.  - E. coli bacteremia. On Zosyn, monitor cultures.  - 67 yo male with choledocholithiasis, acute cholecystitis. S/p ERCP, sphincterotomy, balloon sweep, stent.    - Multimodal pain management.  - 1L NC sat >90.  - Off pressors. Monitor hemodynamics.  - Lactate 4.5 from 10. Lactate at 10 am. Responding to resuscitation and source control.  - T lázaro 3.2. WBC 20.6, monitor.  - MIVF, fluid bolus if lactate up.  - E. coli bacteremia. On Zosyn, monitor cultures.  - CT CAP look for secondary sources of infection.

## 2022-04-13 NOTE — PROGRESS NOTE ADULT - SUBJECTIVE AND OBJECTIVE BOX
SURGERY DAILY PROGRESS NOTE:   SICU 24 HR EVENTS;   - ERCP: bile duct filling defect, stent placed in CBD. No signs of cholangitis  - Extubated    SUBJECTIVE/ROS: Patient seen and examined at bedside. Pain controlled. Denies nausea, vomiting, chest pain, shortness of breath         MEDICATIONS  (STANDING):  chlorhexidine 2% Cloths 1 Application(s) Topical <User Schedule>  enoxaparin Injectable 40 milliGRAM(s) SubCutaneous every 24 hours  piperacillin/tazobactam IVPB.. 3.375 Gram(s) IV Intermittent every 8 hours  sodium chloride 0.9%. 1000 milliLiter(s) (125 mL/Hr) IV Continuous <Continuous>    MEDICATIONS  (PRN):  acetaminophen   IVPB .. 1000 milliGRAM(s) IV Intermittent every 6 hours PRN Mild Pain (1 - 3)      OBJECTIVE:    Vital Signs Last 24 Hrs  T(C): 36.8 (2022 07:00), Max: 38 (2022 11:05)  T(F): 98.2 (2022 07:00), Max: 100.4 (2022 11:05)  HR: 85 (2022 07:30) (68 - 141)  BP: 102/65 (2022 07:30) (83/58 - 131/84)  BP(mean): 78 (2022 07:30) (63 - 102)  RR: 17 (2022 07:30) (12 - 28)  SpO2: 99% (2022 07:30) (93% - 100%)        I&O's Detail    2022 07:01  -  2022 07:00  --------------------------------------------------------  IN:    IV PiggyBack: 125 mL    IV PiggyBack: 325 mL    Lactated Ringers: 1875 mL    Phenylephrine: 89.8 mL    Plasma: 300 mL    Propofol: 115.9 mL    Sodium Chloride 0.9% Bolus: 500 mL  Total IN: 3330.7 mL    OUT:    Indwelling Catheter - Urethral (mL): 590 mL    Oral Fluid: 0 mL  Total OUT: 590 mL    Total NET: 2740.7 mL      2022 07:01  -  2022 08:31  --------------------------------------------------------  IN:    sodium chloride 0.9%: 250 mL  Total IN: 250 mL    OUT:    Indwelling Catheter - Urethral (mL): 40 mL  Total OUT: 40 mL    Total NET: 210 mL          Daily Height in cm: 162.56 (2022 19:33)    Daily     LABS:                        11.1   20.76 )-----------( 58       ( 2022 03:07 )             31.9     04-13    123<L>  |  91<L>  |  19  ----------------------------<  115<H>  4.7   |  18<L>  |  1.67<H>    Ca    8.1<L>      2022 03:07  Phos  4.3     04-13  Mg     2.1     04-13    TPro  6.1  /  Alb  2.6<L>  /  TBili  3.2<H>  /  DBili  1.6<H>  /  AST  60<H>  /  ALT  26  /  AlkPhos  70  04-13    PT/INR - ( 2022 03:07 )   PT: 21.3 sec;   INR: 1.84 ratio         PTT - ( 2022 03:07 )  PTT:44.7 sec  Urinalysis Basic - ( 2022 14:32 )    Color: Dark Yellow / Appearance: Turbid / S.021 / pH: x  Gluc: x / Ketone: Trace  / Bili: Small / Urobili: 2 mg/dL   Blood: x / Protein: 100 mg/dL / Nitrite: Negative   Leuk Esterase: Large / RBC: 10 /hpf /  /HPF   Sq Epi: x / Non Sq Epi: 3 /hpf / Bacteria: Many      Physical Exam  Gen: NAD, A&Ox3  Pulm: No respiratory distress, no subcostal retractions  CV: RRR, no JVD  Abd: Soft, TTP, ND

## 2022-04-13 NOTE — PROGRESS NOTE ADULT - ASSESSMENT
66y Male with no significant PMH presented to ED c/o several weeks of abdominal pain that acutely worsened.  Patient states he thought he had food poisoning several weeks ago after vomiting and was started on omeprazole by his PMD.  The abdominal pain became acutely worse yesterday. He has also had urinary frequency and dysuria.  Patient was febrile to 100.4 upon arrival to the ED with HR 140s and BP in the 80s.  Lactate 10.4.  RUQ ultrasound showed acute cholecystitis, gallstones, sludge and CBD 1cm.   Patient received 4.3L IVF in ED.  GI to do emergency ERCP.  SICU consulted for hemodynamic monitoring.     Neuro: pain control   - Tylenol prn     Resp:  - No acute issues  - Incentive spirometry     CV: Sepsis, vitals improved after IVF resuscitation   - Hemodynamic monitoring   - Trend lactate   - POCUS     GI: acute cholangitis  - GI to do emergency ERCP  - Trend LFTs  - NPO     /Renal: ROBBIE, hyponatremia (likely secondary to hypovolemia); +UA  - s/p 4.3L resuscitation in ED   - Trend BUN/Cr, Na  - LR @ 125mL/hr   - Monitor UOP   - Likely UTI, f/u culture and continue Zosyn     ID: r/o cholangitis, possible UTI  - Zosyn in ED  - f/u blood and urine cultures  - trend WBC    Heme:  - Lovenox for VTE prophylaxis  - Trend H/H    Endo:  - Monitor serum glucose     Lines:  - PIVs    Dispo: SICU care.  Full code.

## 2022-04-13 NOTE — PROGRESS NOTE ADULT - ASSESSMENT
Impression:  # Severe sepsis ( fever, leukocytosis hypotension and tachycardia) highly suspicious for cholangitis ( RUQ pain/tenderness, dilated CBD, elevated liver enzymes), s/p emergent ERCP w/ biliary sludge, however no obstruction or pus seen, raising possibility for alternate source of sepsis (such as cholecystitis). Now w/ improved abd pain and fever, however worsening liver enzymes today    Recommendations:    - Continue aggressive IV fluid resuscitation  - trend liver enzymes  - Would perform additional imaging of the abdomen, such as CT scan of  abdomen/pelvis with IV contrast and/or formal ultrasound of abdomen to  further assess gallbladder and look for additional intraabdominal sources of  sepsis.  - Surgery team followup for cholecystitis.  - Followup in GI office in 2-4 weeks. Call [phone] to schedule.   - Repeat ERCP in 4-6 weeks for stent removal and clearance of bile duct.    GI will continue to follow.     All recommendations are tentative until note is attested by attending.     Wilson Nichole, PGY5  Gastroenterology/Hepatology Fellow  Available on [employer] Teams  [phone] ([facility] Short Range Pager)  [phone] (Long Range Pager)    After 5pm, please contact the on-call GI fellow. [phone]

## 2022-04-13 NOTE — PROGRESS NOTE ADULT - SUBJECTIVE AND OBJECTIVE BOX
SICU Daily Progress Note  =====================================================  Interval/Overnight Events:     - ERCP: bile duct filling defect, stent placed in CBD. No signs of cholangitis  - Extubated!    HPI: 66y Male with no significant PMH presented to ED c/o several weeks of abdominal pain that acutely worsened.  Patient states he thought he had food poisoning several weeks ago after vomiting and was started on omeprazole by his PMD.  The abdominal pain became acutely worse yesterday. He has also had urinary frequency and dysuria.  Patient was febrile to 100.4 upon arrival to the ED with HR 140s and BP in the 80s.  Lactate 10.4.  RUQ ultrasound showed acute cholecystitis, gallstones, sludge and CBD 1cm.   GI to do emergency ERCP.  SICU consulted for hemodynamic monitoring. Patient received 4.3L IVF in ED. At time of SICU evaluation, HR improved to 110s and BP in high 90s.  Patient admitted to abdominal pain, worse in RUQ; fever; dysuria.  Patient denied HA, CP, SOB, N/V, chills.     Allergies:   No Known Allergies    MEDICATIONS:   --------------------------------------------------------------------------------------  Neurologic Medications  acetaminophen   IVPB .. 1000 milliGRAM(s) IV Intermittent every 6 hours PRN Mild Pain (1 - 3)    Respiratory Medications    Cardiovascular Medications    Gastrointestinal Medications  lactated ringers. 1000 milliLiter(s) IV Continuous <Continuous>    Genitourinary Medications    Hematologic/Oncologic Medications  enoxaparin Injectable 40 milliGRAM(s) SubCutaneous every 24 hours    Antimicrobial/Immunologic Medications  piperacillin/tazobactam IVPB.. 3.375 Gram(s) IV Intermittent every 8 hours    Endocrine/Metabolic Medications    Topical/Other Medications  chlorhexidine 2% Cloths 1 Application(s) Topical <User Schedule>    --------------------------------------------------------------------------------------  VITAL SIGNS, INS/OUTS (last 24 hours):  --------------------------------------------------------------------------------------  T(C): 35.3 (22 @ 03:00), Max: 38 (22 @ 11:05)  HR: 84 (22 @ 05:45) (68 - 141)  BP: 97/65 (22 @ 05:45) (83/58 - 131/84)  RR: 20 (22 @ 05:45) (12 - 28)  SpO2: 99% (22 @ 05:45) (93% - 100%)    22 @ 07:01  -  22 @ 05:55  --------------------------------------------------------  IN: 2455.7 mL / OUT: 520 mL / NET: 1935.7 mL    --------------------------------------------------------------------------------------  EXAM  NEUROLOGY  Exam: Normal, NAD, alert, oriented x3, no focal deficits.    HEENT  Exam: Normocephalic, atraumatic, EOMI.     RESPIRATORY  Exam: Normal expansion/effort.  Mechanical Ventilation: Mode: CPAP with PS, FiO2: 30, PEEP: 5, PS: 5, MAP: 7, PIP: 11    CARDIOVASCULAR  Exam: Regular rate and rhythm.       GI/NUTRITION  Exam: Abdomen soft, Non-tender, Non-distended.     VASCULAR  Exam: Extremities warm, pink, well-perfused.     MUSCULOSKELETAL  Exam: All extremities moving spontaneously without limitations.     SKIN  Exam: Good skin turgor, no skin breakdown.       LABS  --------------------------------------------------------------------------------------                        11.1   20.76 )-----------( 58       ( 2022 03:07 )             31.9   04-13    123<L>  |  91<L>  |  19  ----------------------------<  115<H>  4.7   |  18<L>  |  1.67<H>    Ca    8.1<L>      2022 03:07  Phos  4.3     04-13  Mg     2.1         TPro  6.1  /  Alb  2.6<L>  /  TBili  3.2<H>  /  DBili  1.6<H>  /  AST  60<H>  /  ALT  26  /  AlkPhos  70  04-13  ABG - ( 2022 22:13 )  pH, Arterial: 7.27  pH, Blood: x     /  pCO2: 41    /  pO2: 141   / HCO3: 19    / Base Excess: -7.7  /  SaO2: 99.8      Urinalysis Basic - ( 2022 14:32 )    Color: Dark Yellow / Appearance: Turbid / S.021 / pH: x  Gluc: x / Ketone: Trace  / Bili: Small / Urobili: 2 mg/dL   Blood: x / Protein: 100 mg/dL / Nitrite: Negative   Leuk Esterase: Large / RBC: 10 /hpf /  /HPF   Sq Epi: x / Non Sq Epi: 3 /hpf / Bacteria: Many    PT/INR - ( 2022 03:07 )   PT: 21.3 sec;   INR: 1.84 ratio    PTT - ( 2022 03:07 )  PTT:44.7 sec  --------------------------------------------------------------------------------------     SICU Daily Progress Note  =====================================================  Interval/Overnight Events:     - ERCP: bile duct filling defect, stent placed in CBD. No signs of cholangitis      HPI: 66y Male with no significant PMH presented to ED c/o several weeks of abdominal pain that acutely worsened.  Patient states he thought he had food poisoning several weeks ago after vomiting and was started on omeprazole by his PMD.  The abdominal pain became acutely worse yesterday. He has also had urinary frequency and dysuria.  Patient was febrile to 100.4 upon arrival to the ED with HR 140s and BP in the 80s.  Lactate 10.4.  RUQ ultrasound showed acute cholecystitis, gallstones, sludge and CBD 1cm.   GI to do emergency ERCP.  SICU consulted for hemodynamic monitoring. Patient received 4.3L IVF in ED. At time of SICU evaluation, HR improved to 110s and BP in high 90s.  Patient admitted to abdominal pain, worse in RUQ; fever; dysuria.  Patient denied HA, CP, SOB, N/V, chills.     Allergies:   No Known Allergies    MEDICATIONS:   --------------------------------------------------------------------------------------  Neurologic Medications  acetaminophen   IVPB .. 1000 milliGRAM(s) IV Intermittent every 6 hours PRN Mild Pain (1 - 3)    Respiratory Medications    Cardiovascular Medications    Gastrointestinal Medications  lactated ringers. 1000 milliLiter(s) IV Continuous <Continuous>    Genitourinary Medications    Hematologic/Oncologic Medications  enoxaparin Injectable 40 milliGRAM(s) SubCutaneous every 24 hours    Antimicrobial/Immunologic Medications  piperacillin/tazobactam IVPB.. 3.375 Gram(s) IV Intermittent every 8 hours    Endocrine/Metabolic Medications    Topical/Other Medications  chlorhexidine 2% Cloths 1 Application(s) Topical <User Schedule>    --------------------------------------------------------------------------------------  VITAL SIGNS, INS/OUTS (last 24 hours):  --------------------------------------------------------------------------------------  T(C): 35.3 (22 @ 03:00), Max: 38 (22 @ 11:05)  HR: 84 (22 @ 05:45) (68 - 141)  BP: 97/65 (22 @ 05:45) (83/58 - 131/84)  RR: 20 (22 @ 05:45) (12 - 28)  SpO2: 99% (22 @ 05:45) (93% - 100%)    22 @ 07:01  -  22 @ 05:55  --------------------------------------------------------  IN: 2455.7 mL / OUT: 520 mL / NET: 1935.7 mL    --------------------------------------------------------------------------------------  EXAM  NEUROLOGY  Exam: Normal, NAD, alert, oriented x3, no focal deficits.    HEENT  Exam: Normocephalic, atraumatic, EOMI.     RESPIRATORY  Exam: Normal expansion/effort.  Mechanical Ventilation: Mode: CPAP with PS, FiO2: 30, PEEP: 5, PS: 5, MAP: 7, PIP: 11    CARDIOVASCULAR  Exam: Regular rate and rhythm.       GI/NUTRITION  Exam: Abdomen soft, Non-tender, Non-distended.     VASCULAR  Exam: Extremities warm, pink, well-perfused.     MUSCULOSKELETAL  Exam: All extremities moving spontaneously without limitations.     SKIN  Exam: Good skin turgor, no skin breakdown.       LABS  --------------------------------------------------------------------------------------                        11.1   20.76 )-----------( 58       ( 2022 03:07 )             31.9   04-13    123<L>  |  91<L>  |  19  ----------------------------<  115<H>  4.7   |  18<L>  |  1.67<H>    Ca    8.1<L>      2022 03:07  Phos  4.3     -  Mg     2.1         TPro  6.1  /  Alb  2.6<L>  /  TBili  3.2<H>  /  DBili  1.6<H>  /  AST  60<H>  /  ALT  26  /  AlkPhos  70  04-13  ABG - ( 2022 22:13 )  pH, Arterial: 7.27  pH, Blood: x     /  pCO2: 41    /  pO2: 141   / HCO3: 19    / Base Excess: -7.7  /  SaO2: 99.8      Urinalysis Basic - ( 2022 14:32 )    Color: Dark Yellow / Appearance: Turbid / S.021 / pH: x  Gluc: x / Ketone: Trace  / Bili: Small / Urobili: 2 mg/dL   Blood: x / Protein: 100 mg/dL / Nitrite: Negative   Leuk Esterase: Large / RBC: 10 /hpf /  /HPF   Sq Epi: x / Non Sq Epi: 3 /hpf / Bacteria: Many    PT/INR - ( 2022 03:07 )   PT: 21.3 sec;   INR: 1.84 ratio    PTT - ( 2022 03:07 )  PTT:44.7 sec  --------------------------------------------------------------------------------------

## 2022-04-13 NOTE — CONSULT NOTE ADULT - SUBJECTIVE AND OBJECTIVE BOX
TRAUMA/ACUTE CARE SURGERY - HOSPITAL MEDICINE CO-MANAGEMENT INITIAL VISIT NOTE    CHIEF COMPLAINT: Patient is a 66y old  Male who presents with a chief complaint of Cholangitis (2022 08:31)      HPI: 66M no pertinent PMH presenting with 1 week of epigastric and RUQ pain.  Reports fevers of 101-102 over course of last week. Denies changes to BM, urination. Per wife, patient began to look more jaundiced over the last few days and reported increased fatigue. Also had episodes of emesis. Denies recent weight loss. Denies hx of gallstones. Reports no home medications or hx of surgery.  In the ED pt was noted to be hypotensive to SBP 80s with lactate of 10. He was given aggressive IV fluid resuscitation with minimal improvement in lactate level.  Labs on presentation notable for elevated LFT and bilirubin, marked leukocytosis, thrombocytopenia , coagulopathy, ROBBIE and hyponatremia.  ED POCUS Abd US with findings concerning for acute cholecystitis, choledocholithiasis and dilated CBD.   Given clinical picture consistent with acute cholecystitis/cholangitis with septic shock and DIC ; GI reccommended clinical stabilization with emergent ERCP/biliary stent. Pt was intubated and procedure was done.   He was subsequently admitted to SICU for further mgt and monitoring.  Pt currently denies RUQ pain ; feels better overall. Denies cp, sob.    Allergies    No Known Allergies    Intolerances        HOME MEDICATIONS: [ ] Reviewed  Home Medications:      MEDICATIONS  (STANDING):  chlorhexidine 2% Cloths 1 Application(s) Topical <User Schedule>  enoxaparin Injectable 40 milliGRAM(s) SubCutaneous every 24 hours  piperacillin/tazobactam IVPB.. 3.375 Gram(s) IV Intermittent every 8 hours  sodium chloride 0.9%. 1000 milliLiter(s) (125 mL/Hr) IV Continuous <Continuous>    MEDICATIONS  (PRN):  acetaminophen   IVPB .. 1000 milliGRAM(s) IV Intermittent every 6 hours PRN Mild Pain (1 - 3)      PAST MEDICAL & SURGICAL HISTORY:  No pertinent past medical history    No significant past surgical history    [ ] Reviewed     Functional Assessment: [ x] Independent  [ ] Assistance  [ ] Total care  [ ] Non-ambulatory    SOCIAL HISTORY:  Residence: [ ] [facility]  [ ] SNF  [x ] Community  Denies smoking/etoh/ivdu    FAMILY HISTORY:  [x ] No pertinent family history in first degree relatives     REVIEW OF SYSTEMS:    CONSTITUTIONAL: No fever, weight loss, or fatigue  RESPIRATORY: No cough, wheezing, chills or hemoptysis; No shortness of breath  CARDIOVASCULAR: No chest pain, palpitations, dizziness, or leg swelling  GASTROINTESTINAL: No abdominal or epigastric pain. No nausea, vomiting, or hematemesis; No diarrhea or constipation. No melena or hematochezia.  GENITOURINARY: No dysuria, frequency, hematuria, or incontinence  NEUROLOGICAL: No headaches, memory loss, loss of strength, numbness, or tremors  SKIN: No itching, burning, rashes, or lesions   ENDOCRINE: No heat or cold intolerance; No hair loss  MUSCULOSKELETAL: No muscle or back pain  PSYCHIATRIC: No depression, anxiety, mood swings, or difficulty sleeping  HEME/LYMPH: No easy bruising, or bleeding gums    [ x ] All other ROS negative      PHYSICAL EXAM:    Vital Signs Last 24 Hrs  T(C): 36.6 (2022 11:00), Max: 37 (2022 20:00)  T(F): 97.9 (2022 11:00), Max: 98.6 (2022 20:00)  HR: 92 (2022 13:00) (68 - 111)  BP: 110/75 (2022 13:00) (91/57 - 131/84)  BP(mean): 89 (2022 13:00) (63 - 102)  RR: 21 (2022 13:00) (12 - 28)  SpO2: 96% (2022 13:00) (93% - 100%)    GENERAL: NAD, well-groomed, well-developed  HEAD:  Atraumatic, Normocephalic  EYES: EOMI, PERRLA, conjunctiva and sclera clear  ENMT: Moist mucous membranes  NECK: Supple, No JVD  RESPIRATORY: Clear to auscultation bilaterally; No rales, rhonchi, wheezing, or rubs  CARDIOVASCULAR: Regular rate and rhythm; No murmurs, rubs, or gallops  GASTROINTESTINAL: Soft, Nontender, Nondistended; Bowel sounds present  GENITOURINARY: Not examined  EXTREMITIES:  2+ Peripheral Pulses, No clubbing, cyanosis, or edema  NERVOUS SYSTEM:  Alert & Oriented X3; Moving all 4 extremities; No gross sensory deficits  HEME/LYMPH: No lymphadenopathy noted  SKIN: No rashes or lesions; Incisions C/D/I    LABS:                        11.1   20.76 )-----------( 58       ( 2022 03:07 )             31.9     Hemoglobin: 11.1 g/dL ( @ 03:07)  Hemoglobin: 10.7 g/dL ( @ 17:01)  Hemoglobin: 14.3 g/dL ( @ 11:56)        123<L>  |  91<L>  |  19  ----------------------------<  115<H>  4.7   |  18<L>  |  1.67<H>    Ca    8.1<L>      2022 03:07  Phos  4.3       Mg     2.1         TPro  6.1  /  Alb  2.6<L>  /  TBili  3.2<H>  /  DBili  1.6<H>  /  AST  60<H>  /  ALT  26  /  AlkPhos  70      PT/INR - ( 2022 03:07 )   PT: 21.3 sec;   INR: 1.84 ratio         PTT - ( 2022 03:07 )  PTT:44.7 sec  Urinalysis Basic - ( 2022 14:32 )    Color: Dark Yellow / Appearance: Turbid / S.021 / pH: x  Gluc: x / Ketone: Trace  / Bili: Small / Urobili: 2 mg/dL   Blood: x / Protein: 100 mg/dL / Nitrite: Negative   Leuk Esterase: Large / RBC: 10 /hpf /  /HPF   Sq Epi: x / Non Sq Epi: 3 /hpf / Bacteria: Many      CAPILLARY BLOOD GLUCOSE          Culture - Urine (collected 2022 17:09)  Source: Clean Catch Clean Catch (Midstream)  Preliminary Report (2022 13:23):    50,000 - 99,000 CFU/mL Escherichia coli    Culture - Blood (collected 2022 14:38)  Source: .Blood Blood-Peripheral  Gram Stain (2022 02:41):    Growth in anaerobic bottle: Gram Negative Rods    Growth in aerobic bottle: Gram Negative Rods  Preliminary Report (2022 02:41):    Growth in anaerobic bottle: Gram Negative Rods    Growth in aerobic bottle: Gram Negative Rods    Culture - Blood (collected 2022 14:36)  Source: .Blood Blood-Peripheral  Gram Stain (2022 02:40):    Growth in aerobic bottle: Gram Negative Rods    Growth in anaerobic bottle: Gram Negative Rods  Preliminary Report (2022 02:40):    Growth in aerobic bottle: Gram Negative Rods    Growth in anaerobic bottle: Gram Negative Rods    ***Blood Panel PCR results on this specimen are available    approximately 3 hours after the Gram stain result.***    Gram stain, PCR, and/or culture results may not always    correspond due to difference in methodologies.    ************************************************************    This PCR assay was performed by multiplex PCR. This    Assay tests for 66 bacterial and resistance gene targets.    Please refer to the [facility] test directory    at [email or web address] for details.  Organism: Blood Culture PCR (2022 03:39)  Organism: Blood Culture PCR (2022 03:39)        RADIOLOGY & ADDITIONAL STUDIES:    Imaging:   Personally Reviewed:  [x ] YES               [x] Care Discussed with Consultants/Other Providers: Trauma Team               TRAUMA/ACUTE CARE SURGERY - HOSPITAL MEDICINE CO-MANAGEMENT INITIAL VISIT NOTE    CHIEF COMPLAINT: Patient is a 66y old  Male who presents with a chief complaint of Cholangitis (2022 08:31)      HPI: 66M no pertinent PMH presenting with 1 week of epigastric and RUQ pain.  Reports fevers of 101-102 over course of last week. Denies changes to BM, urination. Per wife, patient began to look more jaundiced over the last few days and reported increased fatigue. Also had episodes of emesis. Denies recent weight loss. Denies hx of gallstones. Reports no home medications or hx of surgery.  In the ED pt was noted to be hypotensive to SBP 80s with lactate of 10. He was given aggressive IV fluid resuscitation with minimal improvement in lactate level.  Labs on presentation notable for elevated LFT and bilirubin, marked leukocytosis, thrombocytopenia , coagulopathy, ROBBIE and hyponatremia.  ED POCUS Abd US with findings concerning for acute cholecystitis, choledocholithiasis and dilated CBD.   Given clinical picture consistent with acute cholecystitis/cholangitis with septic shock and DIC ; GI reccommended clinical stabilization with emergent ERCP/biliary stent. Pt was intubated and procedure was done.   He was subsequently admitted to SICU for further mgt and monitoring.  Pt currently denies RUQ pain ; feels better overall. Denies cp, sob.    Allergies    No Known Allergies    Intolerances        HOME MEDICATIONS: [ ] Reviewed  Home Medications:      MEDICATIONS  (STANDING):  chlorhexidine 2% Cloths 1 Application(s) Topical <User Schedule>  enoxaparin Injectable 40 milliGRAM(s) SubCutaneous every 24 hours  piperacillin/tazobactam IVPB.. 3.375 Gram(s) IV Intermittent every 8 hours  sodium chloride 0.9%. 1000 milliLiter(s) (125 mL/Hr) IV Continuous <Continuous>    MEDICATIONS  (PRN):  acetaminophen   IVPB .. 1000 milliGRAM(s) IV Intermittent every 6 hours PRN Mild Pain (1 - 3)      PAST MEDICAL & SURGICAL HISTORY:  No pertinent past medical history    No significant past surgical history    [ ] Reviewed     Functional Assessment: [ x] Independent  [ ] Assistance  [ ] Total care  [ ] Non-ambulatory    SOCIAL HISTORY:  Residence: [ ] [facility]  [ ] SNF  [x ] Community  Denies smoking/ivdu  Occasional EToh    FAMILY HISTORY:  [x ] No pertinent family history in first degree relatives     REVIEW OF SYSTEMS:    CONSTITUTIONAL: No fever, weight loss, or fatigue  RESPIRATORY: No cough, wheezing, chills or hemoptysis; No shortness of breath  CARDIOVASCULAR: No chest pain, palpitations, dizziness, or leg swelling  GASTROINTESTINAL: No abdominal or epigastric pain. No nausea, vomiting, or hematemesis; No diarrhea or constipation. No melena or hematochezia.  GENITOURINARY: No dysuria, frequency, hematuria, or incontinence  NEUROLOGICAL: No headaches, memory loss, loss of strength, numbness, or tremors  SKIN: No itching, burning, rashes, or lesions   ENDOCRINE: No heat or cold intolerance; No hair loss  MUSCULOSKELETAL: No muscle or back pain  PSYCHIATRIC: No depression, anxiety, mood swings, or difficulty sleeping  HEME/LYMPH: No easy bruising, or bleeding gums    [ x ] All other ROS negative      PHYSICAL EXAM:    Vital Signs Last 24 Hrs  T(C): 36.6 (2022 11:00), Max: 37 (2022 20:00)  T(F): 97.9 (2022 11:00), Max: 98.6 (2022 20:00)  HR: 92 (2022 13:00) (68 - 111)  BP: 110/75 (2022 13:00) (91/57 - 131/84)  BP(mean): 89 (2022 13:00) (63 - 102)  RR: 21 (2022 13:00) (12 - 28)  SpO2: 96% (2022 13:00) (93% - 100%)    GENERAL: NAD, well-groomed, well-developed  HEAD:  Atraumatic, Normocephalic  EYES: EOMI, PERRLA, conjunctiva and sclera clear  ENMT: Moist mucous membranes  NECK: Supple, No JVD  RESPIRATORY: Clear to auscultation bilaterally; No rales, rhonchi, wheezing, or rubs  CARDIOVASCULAR: Regular rate and rhythm; No murmurs, rubs, or gallops  GASTROINTESTINAL: Soft, Nontender, Nondistended; Bowel sounds present  GENITOURINARY: Not examined  EXTREMITIES:  2+ Peripheral Pulses, No clubbing, cyanosis, or edema  NERVOUS SYSTEM:  Alert & Oriented X3; Moving all 4 extremities; No gross sensory deficits  HEME/LYMPH: No lymphadenopathy noted  SKIN: No rashes or lesions; Incisions C/D/I    LABS:                        11.1   20.76 )-----------( 58       ( 2022 03:07 )             31.9     Hemoglobin: 11.1 g/dL ( @ 03:07)  Hemoglobin: 10.7 g/dL ( @ 17:01)  Hemoglobin: 14.3 g/dL ( @ 11:56)        123<L>  |  91<L>  |  19  ----------------------------<  115<H>  4.7   |  18<L>  |  1.67<H>    Ca    8.1<L>      2022 03:07  Phos  4.3       Mg     2.1         TPro  6.1  /  Alb  2.6<L>  /  TBili  3.2<H>  /  DBili  1.6<H>  /  AST  60<H>  /  ALT  26  /  AlkPhos  70      PT/INR - ( 2022 03:07 )   PT: 21.3 sec;   INR: 1.84 ratio         PTT - ( 2022 03:07 )  PTT:44.7 sec  Urinalysis Basic - ( 2022 14:32 )    Color: Dark Yellow / Appearance: Turbid / S.021 / pH: x  Gluc: x / Ketone: Trace  / Bili: Small / Urobili: 2 mg/dL   Blood: x / Protein: 100 mg/dL / Nitrite: Negative   Leuk Esterase: Large / RBC: 10 /hpf /  /HPF   Sq Epi: x / Non Sq Epi: 3 /hpf / Bacteria: Many      CAPILLARY BLOOD GLUCOSE          Culture - Urine (collected 2022 17:09)  Source: Clean Catch Clean Catch (Midstream)  Preliminary Report (2022 13:23):    50,000 - 99,000 CFU/mL Escherichia coli    Culture - Blood (collected 2022 14:38)  Source: .Blood Blood-Peripheral  Gram Stain (2022 02:41):    Growth in anaerobic bottle: Gram Negative Rods    Growth in aerobic bottle: Gram Negative Rods  Preliminary Report (2022 02:41):    Growth in anaerobic bottle: Gram Negative Rods    Growth in aerobic bottle: Gram Negative Rods    Culture - Blood (collected 2022 14:36)  Source: .Blood Blood-Peripheral  Gram Stain (2022 02:40):    Growth in aerobic bottle: Gram Negative Rods    Growth in anaerobic bottle: Gram Negative Rods  Preliminary Report (2022 02:40):    Growth in aerobic bottle: Gram Negative Rods    Growth in anaerobic bottle: Gram Negative Rods    ***Blood Panel PCR results on this specimen are available    approximately 3 hours after the Gram stain result.***    Gram stain, PCR, and/or culture results may not always    correspond due to difference in methodologies.    ************************************************************    This PCR assay was performed by multiplex PCR. This    Assay tests for 66 bacterial and resistance gene targets.    Please refer to the [facility] test directory    at [email or web address] for details.  Organism: Blood Culture PCR (2022 03:39)  Organism: Blood Culture PCR (2022 03:39)        RADIOLOGY & ADDITIONAL STUDIES:    Imaging:   Personally Reviewed:  [x ] YES               [x] Care Discussed with Consultants/Other Providers: Trauma Team

## 2022-04-13 NOTE — CONSULT NOTE ADULT - PROBLEM SELECTOR RECOMMENDATION 9
- s/p emergent ERCP/biliary stent placement.  - on Zosyn  - bld cx w/E.coli ; f/up sensitivities  - mgt per primary team

## 2022-04-13 NOTE — AIRWAY REMOVAL NOTE  ADULT & PEDS - ARTIFICAL AIRWAY REMOVAL COMMENTS
Written order for extubation verified. The patient was identified by full name and birth date compared to the identification band. Present during the procedure was RN Imtiaz

## 2022-04-14 DIAGNOSIS — K74.60 UNSPECIFIED CIRRHOSIS OF LIVER: ICD-10-CM

## 2022-04-14 LAB
-  AMIKACIN: SIGNIFICANT CHANGE UP
-  AMIKACIN: SIGNIFICANT CHANGE UP
-  AMOXICILLIN/CLAVULANIC ACID: SIGNIFICANT CHANGE UP
-  AMPICILLIN/SULBACTAM: SIGNIFICANT CHANGE UP
-  AMPICILLIN/SULBACTAM: SIGNIFICANT CHANGE UP
-  AMPICILLIN: SIGNIFICANT CHANGE UP
-  AMPICILLIN: SIGNIFICANT CHANGE UP
-  AZTREONAM: SIGNIFICANT CHANGE UP
-  AZTREONAM: SIGNIFICANT CHANGE UP
-  CEFAZOLIN: SIGNIFICANT CHANGE UP
-  CEFAZOLIN: SIGNIFICANT CHANGE UP
-  CEFEPIME: SIGNIFICANT CHANGE UP
-  CEFEPIME: SIGNIFICANT CHANGE UP
-  CEFOXITIN: SIGNIFICANT CHANGE UP
-  CEFOXITIN: SIGNIFICANT CHANGE UP
-  CEFTRIAXONE: SIGNIFICANT CHANGE UP
-  CEFTRIAXONE: SIGNIFICANT CHANGE UP
-  CIPROFLOXACIN: SIGNIFICANT CHANGE UP
-  CIPROFLOXACIN: SIGNIFICANT CHANGE UP
-  ERTAPENEM: SIGNIFICANT CHANGE UP
-  ERTAPENEM: SIGNIFICANT CHANGE UP
-  GENTAMICIN: SIGNIFICANT CHANGE UP
-  GENTAMICIN: SIGNIFICANT CHANGE UP
-  IMIPENEM: SIGNIFICANT CHANGE UP
-  IMIPENEM: SIGNIFICANT CHANGE UP
-  LEVOFLOXACIN: SIGNIFICANT CHANGE UP
-  LEVOFLOXACIN: SIGNIFICANT CHANGE UP
-  MEROPENEM: SIGNIFICANT CHANGE UP
-  MEROPENEM: SIGNIFICANT CHANGE UP
-  NITROFURANTOIN: SIGNIFICANT CHANGE UP
-  PIPERACILLIN/TAZOBACTAM: SIGNIFICANT CHANGE UP
-  PIPERACILLIN/TAZOBACTAM: SIGNIFICANT CHANGE UP
-  TIGECYCLINE: SIGNIFICANT CHANGE UP
-  TOBRAMYCIN: SIGNIFICANT CHANGE UP
-  TOBRAMYCIN: SIGNIFICANT CHANGE UP
-  TRIMETHOPRIM/SULFAMETHOXAZOLE: SIGNIFICANT CHANGE UP
-  TRIMETHOPRIM/SULFAMETHOXAZOLE: SIGNIFICANT CHANGE UP
ALBUMIN SERPL ELPH-MCNC: 2.1 G/DL — LOW (ref 3.3–5)
ALP SERPL-CCNC: 60 U/L — SIGNIFICANT CHANGE UP (ref 40–120)
ALT FLD-CCNC: 22 U/L — SIGNIFICANT CHANGE UP (ref 10–45)
ANION GAP SERPL CALC-SCNC: 9 MMOL/L — SIGNIFICANT CHANGE UP (ref 5–17)
APTT BLD: 41.2 SEC — HIGH (ref 27.5–35.5)
AST SERPL-CCNC: 41 U/L — HIGH (ref 10–40)
BILIRUB DIRECT SERPL-MCNC: 1.2 MG/DL — HIGH (ref 0–0.3)
BILIRUB INDIRECT FLD-MCNC: 1.1 MG/DL — HIGH (ref 0.2–1)
BILIRUB SERPL-MCNC: 2.3 MG/DL — HIGH (ref 0.2–1.2)
BUN SERPL-MCNC: 27 MG/DL — HIGH (ref 7–23)
CALCIUM SERPL-MCNC: 8 MG/DL — LOW (ref 8.4–10.5)
CHLORIDE SERPL-SCNC: 96 MMOL/L — SIGNIFICANT CHANGE UP (ref 96–108)
CO2 SERPL-SCNC: 20 MMOL/L — LOW (ref 22–31)
CREAT ?TM UR-MCNC: 105 MG/DL — SIGNIFICANT CHANGE UP
CREAT SERPL-MCNC: 1.48 MG/DL — HIGH (ref 0.5–1.3)
CULTURE RESULTS: SIGNIFICANT CHANGE UP
EGFR: 53 ML/MIN/1.73M2 — LOW
GAS PNL BLDV: SIGNIFICANT CHANGE UP
GLUCOSE SERPL-MCNC: 119 MG/DL — HIGH (ref 70–99)
HCT VFR BLD CALC: 30 % — LOW (ref 39–50)
HCV AB S/CO SERPL IA: 0.17 S/CO — SIGNIFICANT CHANGE UP (ref 0–0.99)
HCV AB SERPL-IMP: SIGNIFICANT CHANGE UP
HGB BLD-MCNC: 10.4 G/DL — LOW (ref 13–17)
INR BLD: 2.19 RATIO — HIGH (ref 0.88–1.16)
MAGNESIUM SERPL-MCNC: 2.1 MG/DL — SIGNIFICANT CHANGE UP (ref 1.6–2.6)
MCHC RBC-ENTMCNC: 34.7 GM/DL — SIGNIFICANT CHANGE UP (ref 32–36)
MCHC RBC-ENTMCNC: 34.9 PG — HIGH (ref 27–34)
MCV RBC AUTO: 100.7 FL — HIGH (ref 80–100)
METHOD TYPE: SIGNIFICANT CHANGE UP
METHOD TYPE: SIGNIFICANT CHANGE UP
NRBC # BLD: 0 /100 WBCS — SIGNIFICANT CHANGE UP (ref 0–0)
ORGANISM # SPEC MICROSCOPIC CNT: SIGNIFICANT CHANGE UP
OSMOLALITY SERPL: 270 MOSMOL/KG — LOW (ref 280–301)
PHOSPHATE SERPL-MCNC: 3 MG/DL — SIGNIFICANT CHANGE UP (ref 2.5–4.5)
PLATELET # BLD AUTO: 42 K/UL — LOW (ref 150–400)
POTASSIUM SERPL-MCNC: 4.3 MMOL/L — SIGNIFICANT CHANGE UP (ref 3.5–5.3)
POTASSIUM SERPL-SCNC: 4.3 MMOL/L — SIGNIFICANT CHANGE UP (ref 3.5–5.3)
PROCALCITONIN SERPL-MCNC: 55.15 NG/ML — HIGH (ref 0.02–0.1)
PROT SERPL-MCNC: 5.6 G/DL — LOW (ref 6–8.3)
PROTHROM AB SERPL-ACNC: 25.6 SEC — HIGH (ref 10.5–13.4)
RBC # BLD: 2.98 M/UL — LOW (ref 4.2–5.8)
RBC # FLD: 13.9 % — SIGNIFICANT CHANGE UP (ref 10.3–14.5)
SODIUM SERPL-SCNC: 125 MMOL/L — LOW (ref 135–145)
SODIUM UR-SCNC: 11 MMOL/L — SIGNIFICANT CHANGE UP
SPECIMEN SOURCE: SIGNIFICANT CHANGE UP
WBC # BLD: 16.73 K/UL — HIGH (ref 3.8–10.5)
WBC # FLD AUTO: 16.73 K/UL — HIGH (ref 3.8–10.5)

## 2022-04-14 PROCEDURE — 99232 SBSQ HOSP IP/OBS MODERATE 35: CPT

## 2022-04-14 PROCEDURE — 99233 SBSQ HOSP IP/OBS HIGH 50: CPT

## 2022-04-14 PROCEDURE — 99232 SBSQ HOSP IP/OBS MODERATE 35: CPT | Mod: GC

## 2022-04-14 RX ORDER — ACETAMINOPHEN 500 MG
500 TABLET ORAL EVERY 6 HOURS
Refills: 0 | Status: DISCONTINUED | OUTPATIENT
Start: 2022-04-14 | End: 2022-04-17

## 2022-04-14 RX ORDER — CEFTRIAXONE 500 MG/1
2000 INJECTION, POWDER, FOR SOLUTION INTRAMUSCULAR; INTRAVENOUS EVERY 24 HOURS
Refills: 0 | Status: DISCONTINUED | OUTPATIENT
Start: 2022-04-14 | End: 2022-04-15

## 2022-04-14 RX ADMIN — ENOXAPARIN SODIUM 40 MILLIGRAM(S): 100 INJECTION SUBCUTANEOUS at 11:05

## 2022-04-14 RX ADMIN — PIPERACILLIN AND TAZOBACTAM 25 GRAM(S): 4; .5 INJECTION, POWDER, LYOPHILIZED, FOR SOLUTION INTRAVENOUS at 15:01

## 2022-04-14 RX ADMIN — SODIUM CHLORIDE 50 MILLILITER(S): 9 INJECTION INTRAMUSCULAR; INTRAVENOUS; SUBCUTANEOUS at 15:01

## 2022-04-14 RX ADMIN — CHLORHEXIDINE GLUCONATE 1 APPLICATION(S): 213 SOLUTION TOPICAL at 03:00

## 2022-04-14 RX ADMIN — SODIUM CHLORIDE 100 MILLILITER(S): 9 INJECTION INTRAMUSCULAR; INTRAVENOUS; SUBCUTANEOUS at 21:45

## 2022-04-14 RX ADMIN — PIPERACILLIN AND TAZOBACTAM 25 GRAM(S): 4; .5 INJECTION, POWDER, LYOPHILIZED, FOR SOLUTION INTRAVENOUS at 07:02

## 2022-04-14 RX ADMIN — CEFTRIAXONE 100 MILLIGRAM(S): 500 INJECTION, POWDER, FOR SOLUTION INTRAMUSCULAR; INTRAVENOUS at 18:48

## 2022-04-14 NOTE — PROGRESS NOTE ADULT - SUBJECTIVE AND OBJECTIVE BOX
Patient is a 66y old  Male who presents with a chief complaint of Cholangitis (14 Apr 2022 11:39)      SUBJECTIVE / OVERNIGHT EVENTS:  Pt seen and examined. No acute events overnight. He denies CP, SOB, abd pain, fever/chills. Tolerating CLD.    MEDICATIONS  (STANDING):  chlorhexidine 2% Cloths 1 Application(s) Topical <User Schedule>  enoxaparin Injectable 40 milliGRAM(s) SubCutaneous every 24 hours  piperacillin/tazobactam IVPB.. 3.375 Gram(s) IV Intermittent every 8 hours  sodium chloride 0.9%. 1000 milliLiter(s) (50 mL/Hr) IV Continuous <Continuous>    MEDICATIONS  (PRN):  acetaminophen     Tablet .. 500 milliGRAM(s) Oral every 6 hours PRN Mild Pain (1 - 3)      Vital Signs Last 24 Hrs  T(C): 36.6 (14 Apr 2022 14:40), Max: 37.3 (13 Apr 2022 19:00)  T(F): 97.8 (14 Apr 2022 14:40), Max: 99.2 (13 Apr 2022 19:00)  HR: 82 (14 Apr 2022 14:40) (70 - 90)  BP: 123/74 (14 Apr 2022 14:40) (109/72 - 131/76)  BP(mean): 97 (14 Apr 2022 13:00) (82 - 97)  RR: 16 (14 Apr 2022 14:40) (12 - 23)  SpO2: 96% (14 Apr 2022 14:40) (95% - 98%)  CAPILLARY BLOOD GLUCOSE        I&O's Summary    13 Apr 2022 07:01  -  14 Apr 2022 07:00  --------------------------------------------------------  IN: 4425 mL / OUT: 1070 mL / NET: 3355 mL    14 Apr 2022 07:01  -  14 Apr 2022 15:20  --------------------------------------------------------  IN: 300 mL / OUT: 935 mL / NET: -635 mL        PHYSICAL EXAM:  GENERAL: NAD, afebrile  HEAD:  Atraumatic, Normocephalic  EYES: Econjunctiva and sclera clear  ENMT: Moist mucous membranes  NECK: Supple, No JVD  RESPIRATORY: Clear to auscultation bilaterally; No rales, rhonchi, wheezing, or rubs  CARDIOVASCULAR: Regular rate and rhythm; No murmurs, rubs, or gallops  GASTROINTESTINAL: Soft, Nontender, Nondistended; Bowel sounds present  EXTREMITIES:  2+ Peripheral Pulses, No clubbing, cyanosis, or edema  NERVOUS SYSTEM:  Alert & Oriented X3; Moving all 4 extremities; No gross sensory deficits  SKIN: No rashes or lesions    LABS:                        10.4   16.73 )-----------( 42       ( 14 Apr 2022 06:12 )             30.0     04-14    125<L>  |  96  |  27<H>  ----------------------------<  119<H>  4.3   |  20<L>  |  1.48<H>    Ca    8.0<L>      14 Apr 2022 06:12  Phos  3.0     04-14  Mg     2.1     04-14    TPro  5.6<L>  /  Alb  2.1<L>  /  TBili  2.3<H>  /  DBili  1.2<H>  /  AST  41<H>  /  ALT  22  /  AlkPhos  60  04-14    PT/INR - ( 14 Apr 2022 06:12 )   PT: 25.6 sec;   INR: 2.19 ratio         PTT - ( 14 Apr 2022 06:12 )  PTT:41.2 sec          RADIOLOGY & ADDITIONAL TESTS:    Imaging Personally Reviewed:    Cholelithiasis with suspicion of acute cholecystitis.    Hepatic cirrhosis with portal hypertension.

## 2022-04-14 NOTE — PROGRESS NOTE ADULT - ASSESSMENT
65 y/o male presenting septic from E. coli bacteremia secondary to UTI vs. acute cholecystitis vs. cholangitis s/p ERCP w/ CBD placement & drainage of sludge but no pus or stones c/b persistent elevated lactate w/ evidence of cirrhosis on CT scan    PLAN:    Neuro: no acute issues  - Pain control if needed w/ PRN acetaminophen    Resp: no acute issues  - Out of bed to chair, ambulate as tolerated, and incentive spirometry to prevent atelectasis    CV: hypotension secondary to sepsis  - Monitor vital signs    GI: elevated tbili w/ mild transaminitis w/ concern for acute cholecystitis vs. cholangitis  - CLD as tolerated; will advance diet as per primary team  - ERCP w/ no evidence of cholangitis but still w/ concern for acute cholecystitis so need to consider surgical vs. IR intervention    Renal: elevated Cr concerning for ROBBIE w/ unknown baseline Cr  - NS at 50 mL/hr but can IV lock if patient has adequate PO intake    Heme: no acute issues  - Lovenox for VTE prophylaxis    ID: E. coli bacteremia secondary to UTI vs. acute cholecystitis  - Monitor WBC, temperature, and procalcitonin  - Empiric antibiotics w/ Zosyn for E. coli bacteremia  - Urine culture on 4/12 w/ 50-99k CFU/mL of E. coli and blood cultures from 4/12 w/ E. coli; will f/u sensitivities    Endo: no acute issues  - Monitor glucose on BMP    Code Status: Full code    Disposition: Stable for transfer to floors    Mona Holden PA-C     [id number] 67 y/o male presenting septic from E. coli bacteremia secondary to UTI vs. acute cholecystitis vs. cholangitis s/p ERCP w/ CBD placement & drainage of sludge but no pus or stones c/b persistent elevated lactate w/ evidence of cirrhosis on CT scan    PLAN:    Neuro: no acute issues  - Pain control if needed w/ PRN acetaminophen    Resp: no acute issues  - Out of bed to chair, ambulate as tolerated, and incentive spirometry to prevent atelectasis    CV: hypotension secondary to sepsis (resolved)  - Monitor vital signs  - Appears clinically well but has delayed clearance of lactate likely secondary to cirrhosis instead of an infectious process    GI: elevated tbili w/ mild transaminitis w/ concern for acute cholecystitis vs. cholangitis, concern for cirrhosis  - CLD as tolerated; will advance diet as per primary team  - ERCP w/ no evidence of cholangitis but still w/ concern for acute cholecystitis so need to consider IR intervention  - Evidence of cirrhosis on CT scan w/ thrombocytopenia, elevated tbili, coagulopathy, and delayed clearance of lactate despite appearing well clinically so will obtain hepatology consult    Renal: elevated Cr concerning for ROBBIE w/ unknown baseline Cr, hyponatremia  - NS at 50 mL/hr but can IV lock if patient has adequate PO intake  - Hyponatremic but appears adequately resuscitated; will obtain urine electrolytes and serum osmolality    Heme: no acute issues  - Lovenox for VTE prophylaxis    ID: E. coli bacteremia secondary to UTI vs. acute cholecystitis  - Monitor WBC, temperature, and procalcitonin  - Empiric antibiotics w/ Zosyn for E. coli bacteremia  - Urine culture on 4/12 w/ 50-99k CFU/mL of E. coli and blood cultures from 4/12 w/ E. coli; will f/u sensitivities    Endo: no acute issues  - Monitor glucose on BMP    Code Status: Full code    Disposition: Stable for transfer to floors    Mona Holden PA-C     [id number]

## 2022-04-14 NOTE — PROGRESS NOTE ADULT - ASSESSMENT
Impression:  # Severe sepsis ( fever, leukocytosis hypotension and tachycardia) highly suspicious for cholangitis ( RUQ pain/tenderness, dilated CBD, elevated liver enzymes), s/p emergent ERCP w/ biliary sludge, however no obstruction or pus seen, raising possibility for alternate source of sepsis (such as cholecystitis). Now w/ improved abd pain and fever, and repeat CT (4/13/22) showing acute cholecystitis.   # hepatic cirrhosis w/ portal HTN - recommend outpatient hepatology follow up    Recommendations:  - trend liver enzymes  - Surgery team followup for cholecystitis.  - Followup in GI office in 2-4 weeks. Call [phone] to schedule.   - Repeat ERCP in 4-6 weeks for stent removal and clearance of bile duct.    All recommendations are tentative until note is attested by attending.     Wilson Nichole, PGY5  Gastroenterology/Hepatology Fellow  Available on [employer] Teams  [phone] ([employer] Short Range Pager)  [phone] (Long Range Pager)    After 5pm, please contact the on-call GI fellow. [phone]

## 2022-04-14 NOTE — PROGRESS NOTE ADULT - PROBLEM SELECTOR PLAN 1
- s/p emergent ERCP/biliary stent placement.  - on Zosyn  - bld cx w/E.coli ; f/up sensitivities  - mgt per primary team.

## 2022-04-14 NOTE — PROGRESS NOTE ADULT - ASSESSMENT
66y Male with no significant PMH presented to ED c/o several weeks of abdominal pain that acutely worsened.  Patient states he thought he had food poisoning several weeks ago after vomiting and was started on omeprazole by his PMD.  The abdominal pain became acutely worse yesterday. He has also had urinary frequency and dysuria.  Patient was febrile to 100.4 upon arrival to the ED with HR 140s and BP in the 80s.  Lactate 10.4.  RUQ ultrasound showed acute cholecystitis, gallstones, sludge and CBD 1cm.   Patient received 4.3L IVF in ED.      PLAN  -appreciate SICU care  -NPO/IVF  -trend labs  -f/u urine culture for +UA, on zosyn   -lvx/scds for VTE ppx  -pain control  -incentive spirometry  -recommend CT A/P w IV contrast       TRAUMA  [id number]  66y Male with no significant PMH presented to ED c/o several weeks of abdominal pain that acutely worsened.  Patient states he thought he had food poisoning several weeks ago after vomiting and was started on omeprazole by his PMD.  The abdominal pain became acutely worse yesterday. He has also had urinary frequency and dysuria.  Patient was febrile to 100.4 upon arrival to the ED with HR 140s and BP in the 80s.  Lactate 10.4.  RUQ ultrasound showed acute cholecystitis, gallstones, sludge and CBD 1cm.   Patient received 4.3L IVF in ED.      PLAN  - hepatology consult for new found cirrhosis   - appreciate SICU care  - CLD  - trend labs  - f/u urine culture for +UA, on zosyn   -lvx/scds for VTE ppx  -pain control  -incentive spirometry        ACS/Trauma  [id number]        TRAUMA  [id number]

## 2022-04-14 NOTE — PROGRESS NOTE ADULT - PROBLEM SELECTOR PLAN 4
Cr 2.1 on presentation ; likely ATN in setting of septic shock  Cr down to 1.48 today s/p aggressive IV fluid resuscitation  - c/t monitor BMP  - avoid nephrotoxins.

## 2022-04-14 NOTE — PROGRESS NOTE ADULT - SUBJECTIVE AND OBJECTIVE BOX
SURGERY PROGRESS NOTE  Hospital Day #2      SUBJECTIVE  Pt seen and examined at bedside. No complaints.    24 HOUR EVENTS:  - Lactate still elevated to 4.5 so given 1 L of LR w/ improvement in lactate to 3.9 but trended back up to 4.1 so given additional 500 mL of LR  - CT scan obtained to rule out other sources of sepsis, which demonstrated cholelithiasis w/ concern for acute cholecystitis and cirrhosis w/ portal HTN so no further resuscitation continued as lactate clearance likely delayed in the setting of cirrhosis  - Advanced to CLD and reduced NS to 50 mL/hr  - Hospitalist consulted & following      OBJECTIVE:    PHYSICAL EXAM   General Appearance: Appears well, NAD  Resp: Patent airway, non-labored breathing  CV: RRR  Abdomen: Soft, TTP, Nondistended      Vital Signs Last 24 Hrs  T(C): 37.3 (2022 19:00), Max: 37.3 (2022 19:00)  T(F): 99.2 (2022 19:00), Max: 99.2 (2022 19:00)  HR: 80 (2022 22:00) (75 - 92)  BP: 119/72 (2022 22:00) (94/61 - 119/72)  BP(mean): 91 (2022 22:00) (73 - 92)  RR: 23 (2022 22:00) (14 - 25)  SpO2: 98% (:00) (96% - 100%)    I's & O's    22 @ 07:22 @ 07:00  --------------------------------------------------------  IN:    IV PiggyBack: 125 mL    IV PiggyBack: 325 mL    Lactated Ringers: 1875 mL    Phenylephrine: 89.8 mL    Plasma: 300 mL    Propofol: 115.9 mL    Sodium Chloride 0.9% Bolus: 500 mL  Total IN: 3330.7 mL    OUT:    Indwelling Catheter - Urethral (mL): 590 mL    Oral Fluid: 0 mL  Total OUT: 590 mL    Total NET: 2740.7 mL      22 @ 07:01  -  22 @ 04:36  --------------------------------------------------------  IN:    IV PiggyBack: 150 mL    Lactated Ringers Bolus: 1500 mL    sodium chloride 0.9%: 1875 mL  Total IN: 3525 mL    OUT:    Indwelling Catheter - Urethral (mL): 740 mL  Total OUT: 740 mL    Total NET: 2785 mL      MEDICATIONS:  ·	DVT PROPHYLAXIS: enoxaparin Injectable 40 milliGRAM(s)  ·	ANTIBIOTICS: piperacillin/tazobactam IVPB.. 3.375 Gram(s)      LAB/STUDIES:                        10.7   17.47 )-----------( 41       ( 2022 16:20 )             30.7     125<L>  |  94<L>  |  24<H>  ----------------------------<  111<H>  4.5   |  19<L>  |  1.70<H>    Ca    8.1<L>      2022 16:20  Phos  3.4     04-13  Mg     2.1     04-13    TPro  5.8<L>  /  Alb  2.2<L>  /  TBili  2.8<H>  /  DBili  1.5<H>  /  AST  54<H>  /  ALT  24  /  AlkPhos  59  04-13    PT/INR - ( 2022 03:07 )   PT: 21.3 sec;   INR: 1.84 ratio    PTT - ( 2022 03:07 )  PTT:44.7 sec    LIVER FUNCTIONS - ( 2022 16:20 )  Alb: 2.2 g/dL / Pro: 5.8 g/dL / ALK PHOS: 59 U/L / ALT: 24 U/L / AST: 54 U/L / GGT: x           Urinalysis Basic - ( 2022 14:32 )    Color: Dark Yellow / Appearance: Turbid / S.021 / pH: x  Gluc: x / Ketone: Trace  / Bili: Small / Urobili: 2 mg/dL   Blood: x / Protein: 100 mg/dL / Nitrite: Negative   Leuk Esterase: Large / RBC: 10 /hpf /  /HPF   Sq Epi: x / Non Sq Epi: 3 /hpf / Bacteria: Many      ABG - ( 2022 22:13 )  pH, Arterial: 7.27  pH, Blood: x     /  pCO2: 41    /  pO2: 141   / HCO3: 19    / Base Excess: -7.7  /  SaO2: 99.8          Culture - Urine (collected 2022 17:09)  Source: Clean Catch Clean Catch (Midstream)  Preliminary Report (2022 13:23):    50,000 - 99,000 CFU/mL Escherichia coli    Culture - Blood (collected 2022 14:38)  Source: .Blood Blood-Peripheral  Gram Stain (2022 02:41):    Growth in anaerobic bottle: Gram Negative Rods    Growth in aerobic bottle: Gram Negative Rods  Preliminary Report (2022 20:42):    Growth in aerobic and anaerobic bottles: Escherichia coli    See previous culture [id number]    Culture - Blood (collected 2022 14:36)  Source: .Blood Blood-Peripheral  Gram Stain (2022 02:40):    Growth in aerobic bottle: Gram Negative Rods    Growth in anaerobic bottle: Gram Negative Rods  Preliminary Report (2022 20:39):    Growth in aerobic and anaerobic bottles: Escherichia coli    ***Blood Panel PCR results on this specimen are available    approximately 3 hours after the Gram stain result.***    Gram stain, PCR, and/or culture results may not always    correspond due to difference in methodologies.    ************************************************************    This PCR assay was performed by multiplex PCR. This    Assay tests for 66 bacterial and resistance gene targets.    Please refer to the [facility] test directory    at [email or web address] for details.  Organism: Blood Culture PCR (2022 03:39)  Organism: Blood Culture PCR (2022 03:39)     SURGERY PROGRESS NOTE  Hospital Day #2      SUBJECTIVE  Pt seen and examined at bedside. No complaints. Patient knew his cirrhosis which was told by his PCP and had an appointment with hepatologist last week which he did not go. Has a chronic alcohol history.     24 HOUR EVENTS:  - Lactate still elevated to 4.5 so given 1 L of LR w/ improvement in lactate to 3.9 but trended back up to 4.1 so given additional 500 mL of LR  - CT scan obtained to rule out other sources of sepsis, which demonstrated cholelithiasis w/ concern for acute cholecystitis and cirrhosis w/ portal HTN so no further resuscitation continued as lactate clearance likely delayed in the setting of cirrhosis  - Advanced to CLD and reduced NS to 50 mL/hr  - Hospitalist consulted & following      OBJECTIVE:    PHYSICAL EXAM   General Appearance: Appears well, NAD  Resp: Patent airway, non-labored breathing  CV: RRR  Abdomen: Soft, TTP, Nondistended      Vital Signs Last 24 Hrs  T(C): 37.3 (2022 19:00), Max: 37.3 (2022 19:00)  T(F): 99.2 (2022 19:00), Max: 99.2 (2022 19:00)  HR: 80 (2022 22:00) (75 - 92)  BP: 119/72 (2022 22:00) (94/61 - 119/72)  BP(mean): 91 (2022 22:00) (73 - 92)  RR: 23 (2022 22:00) (14 - 25)  SpO2: 98% (:00) (96% - 100%)    I's & O's    22 @ 07:01  -  22 @ 07:00  --------------------------------------------------------  IN:    IV PiggyBack: 125 mL    IV PiggyBack: 325 mL    Lactated Ringers: 1875 mL    Phenylephrine: 89.8 mL    Plasma: 300 mL    Propofol: 115.9 mL    Sodium Chloride 0.9% Bolus: 500 mL  Total IN: 3330.7 mL    OUT:    Indwelling Catheter - Urethral (mL): 590 mL    Oral Fluid: 0 mL  Total OUT: 590 mL    Total NET: 2740.7 mL      22 @ 07:01  -  22 @ 04:36  --------------------------------------------------------  IN:    IV PiggyBack: 150 mL    Lactated Ringers Bolus: 1500 mL    sodium chloride 0.9%: 1875 mL  Total IN: 3525 mL    OUT:    Indwelling Catheter - Urethral (mL): 740 mL  Total OUT: 740 mL    Total NET: 2785 mL      MEDICATIONS:  ·	DVT PROPHYLAXIS: enoxaparin Injectable 40 milliGRAM(s)  ·	ANTIBIOTICS: piperacillin/tazobactam IVPB.. 3.375 Gram(s)      LAB/STUDIES:                        10.7   17.47 )-----------( 41       ( 2022 16:20 )             30.7     125<L>  |  94<L>  |  24<H>  ----------------------------<  111<H>  4.5   |  19<L>  |  1.70<H>    Ca    8.1<L>      2022 16:20  Phos  3.4     04-13  Mg     2.1     04-13    TPro  5.8<L>  /  Alb  2.2<L>  /  TBili  2.8<H>  /  DBili  1.5<H>  /  AST  54<H>  /  ALT  24  /  AlkPhos  59  04-13    PT/INR - ( 2022 03:07 )   PT: 21.3 sec;   INR: 1.84 ratio    PTT - ( 2022 03:07 )  PTT:44.7 sec    LIVER FUNCTIONS - ( 2022 16:20 )  Alb: 2.2 g/dL / Pro: 5.8 g/dL / ALK PHOS: 59 U/L / ALT: 24 U/L / AST: 54 U/L / GGT: x           Urinalysis Basic - ( 2022 14:32 )    Color: Dark Yellow / Appearance: Turbid / S.021 / pH: x  Gluc: x / Ketone: Trace  / Bili: Small / Urobili: 2 mg/dL   Blood: x / Protein: 100 mg/dL / Nitrite: Negative   Leuk Esterase: Large / RBC: 10 /hpf /  /HPF   Sq Epi: x / Non Sq Epi: 3 /hpf / Bacteria: Many      ABG - ( 2022 22:13 )  pH, Arterial: 7.27  pH, Blood: x     /  pCO2: 41    /  pO2: 141   / HCO3: 19    / Base Excess: -7.7  /  SaO2: 99.8          Culture - Urine (collected 2022 17:09)  Source: Clean Catch Clean Catch (Midstream)  Preliminary Report (2022 13:23):    50,000 - 99,000 CFU/mL Escherichia coli    Culture - Blood (collected 2022 14:38)  Source: .Blood Blood-Peripheral  Gram Stain (2022 02:41):    Growth in anaerobic bottle: Gram Negative Rods    Growth in aerobic bottle: Gram Negative Rods  Preliminary Report (2022 20:42):    Growth in aerobic and anaerobic bottles: Escherichia coli    See previous culture [id number]    Culture - Blood (collected 2022 14:36)  Source: .Blood Blood-Peripheral  Gram Stain (2022 02:40):    Growth in aerobic bottle: Gram Negative Rods    Growth in anaerobic bottle: Gram Negative Rods  Preliminary Report (2022 20:39):    Growth in aerobic and anaerobic bottles: Escherichia coli    ***Blood Panel PCR results on this specimen are available    approximately 3 hours after the Gram stain result.***    Gram stain, PCR, and/or culture results may not always    correspond due to difference in methodologies.    ************************************************************    This PCR assay was performed by multiplex PCR. This    Assay tests for 66 bacterial and resistance gene targets.    Please refer to the [facility] test directory    at [email or web address] for details.  Organism: Blood Culture PCR (2022 03:39)  Organism: Blood Culture PCR (2022 03:39)

## 2022-04-14 NOTE — PROGRESS NOTE ADULT - SUBJECTIVE AND OBJECTIVE BOX
Gastroenterology/Hepatology Progress Note      Interval Events:   - no acute events overnight  - CT yesterday showing possible acute rodney  - no more abd pain  - improving liver enzymes; downtrending t bili 2.8->2.3    Allergies:  No Known Allergies      Hospital Medications:  acetaminophen     Tablet .. 500 milliGRAM(s) Oral every 6 hours PRN  chlorhexidine 2% Cloths 1 Application(s) Topical <User Schedule>  enoxaparin Injectable 40 milliGRAM(s) SubCutaneous every 24 hours  piperacillin/tazobactam IVPB.. 3.375 Gram(s) IV Intermittent every 8 hours  sodium chloride 0.9%. 1000 milliLiter(s) IV Continuous <Continuous>      ROS: 14 point ROS negative unless otherwise state in subjective    PHYSICAL EXAM:   Vital Signs:  Vital Signs Last 24 Hrs  T(C): 36.6 (2022 07:00), Max: 37.3 (2022 19:00)  T(F): 97.9 (2022 07:00), Max: 99.2 (2022 19:00)  HR: 82 (2022 09:00) (71 - 92)  BP: 118/81 (2022 09:00) (109/72 - 123/71)  BP(mean): 96 (2022 09:00) (82 - 96)  RR: 17 (2022 09:00) (12 - 25)  SpO2: 98% (2022 09:00) (95% - 99%)  Daily     Daily Weight in k.9 (2022 06:00)    GENERAL:  No acute distress  HEENT:  NCAT, no scleral icterus  CHEST: no resp distress  HEART:  RRR  ABDOMEN:  Soft, non-tender, non-distended, normoactive bowel sounds, no masses  EXTREMITIES:  No cyanosis, clubbing, or edema  SKIN:  No rash/erythema/ecchymoses/petechiae/wounds/abscess/warm/dry  NEURO:  Alert and oriented x 3, no asterixis, no tremor    LABS:                        10.4   16.73 )-----------( 42       ( 2022 06:12 )             30.0     Mean Cell Volume: 100.7 fl (04-14-22 @ 06:12)    04-14    125<L>  |  96  |  27<H>  ----------------------------<  119<H>  4.3   |  20<L>  |  1.48<H>    Ca    8.0<L>      2022 06:12  Phos  3.0     04-14  Mg     2.1     04-14    TPro  5.6<L>  /  Alb  2.1<L>  /  TBili  2.3<H>  /  DBili  1.2<H>  /  AST  41<H>  /  ALT  22  /  AlkPhos  60  04-14    LIVER FUNCTIONS - ( 2022 06:12 )  Alb: 2.1 g/dL / Pro: 5.6 g/dL / ALK PHOS: 60 U/L / ALT: 22 U/L / AST: 41 U/L / GGT: x           PT/INR - ( 2022 06:12 )   PT: 25.6 sec;   INR: 2.19 ratio         PTT - ( 2022 06:12 )  PTT:41.2 sec  Urinalysis Basic - ( 2022 14:32 )    Color: Dark Yellow / Appearance: Turbid / S.021 / pH: x  Gluc: x / Ketone: Trace  / Bili: Small / Urobili: 2 mg/dL   Blood: x / Protein: 100 mg/dL / Nitrite: Negative   Leuk Esterase: Large / RBC: 10 /hpf /  /HPF   Sq Epi: x / Non Sq Epi: 3 /hpf / Bacteria: Many            Imaging:  CT A/P 22  FINDINGS:  LOWER CHEST: Patchy opacities at both lung bases, likely atelectasis.    LIVER: Cirrhotic morphology. No liver mass.  BILE DUCTS: CBD stent in good position. No intrahepatic biliary ductal   dilatation.  GALLBLADDER: Mild distention. Small gallstones. Mild mural thickening and   heterogeneous enhancement.  SPLEEN: Within normal limits.  PANCREAS: Within normal limits.  ADRENALS: Within normal limits.  KIDNEYS/URETERS: Within normal limits.    BLADDER: Tobin catheter. Air within bladder lumen.  REPRODUCTIVE ORGANS: Prostate within normal limits.    BOWEL: No bowel obstruction. Colonic diverticula. Appendix is not   visualized. No evidence of inflammation in the pericecal region.  PERITONEUM: Mild ascites and mesentericedema.  VESSELS: Portal and hepatic veins are patent. Splenorenal shunt. Gastric   varices.  RETROPERITONEUM/LYMPH NODES: No lymphadenopathy.  ABDOMINAL WALL: Bilateral flank edema.  BONES: Within normal limits.    IMPRESSION:  Cholelithiasis with suspicion of acute cholecystitis.    Hepatic cirrhosis with portal hypertension.

## 2022-04-14 NOTE — PROGRESS NOTE ADULT - PROBLEM SELECTOR PLAN 2
- likely biliary source as above  - bld cx with E. coli bacteremia  - f/up repeat bld cx  - c/w Zosyn  - leukocytosis downtrending  - CT abd/pelvis showing acute cholecystitis, hepatic cirrhosis with portal htn

## 2022-04-14 NOTE — PROGRESS NOTE ADULT - PROBLEM SELECTOR PLAN 6
Na 125 ; may be hypoosmolar in the setting of poor PO intake over the past week  - may also be 2/2 cirrhosis  - check serum osm, urine osm and urine Na  - do not correct by > 6 - 8 mEQ in 24 hrs  - monitor BMP q12.

## 2022-04-14 NOTE — CHART NOTE - NSCHARTNOTEFT_GEN_A_CORE
Medicine HS Team 1 will assume care for the pt.    Hospital course:  66M no pertinent PMH presenting with 1 week of epigastric/RUQ pain, fever, n/v. Found to be in septic shock 2/2 acute cholecystitis and cholangitis requiring pressors ; s/p emergent ERCP/biliary stent placement on 4/12. Found to have E. coli bacteremia and E. Coli UTI, pan-sensitive. Started on IV Zosyn 4/12 with downtrending leukocytosis and lactate. Weaned off pressors and transferred to medicine for further management. Also with newly diagnosed cirrhosis given transaminitis (now downtrending) with CT a/p showing cirrhotic morphology, thrombocytopenia to Plt 42, INR 2.19, Na 125. Also with ROBBIE Cr 2.12 iso septic shock now downtrending to 1.48.    Will discontinue lovenox given thrombocytopenia. Medicine HS Team 1 will assume care for the pt.    Hospital course:  66M no pertinent PMH presenting with 1 week of epigastric/RUQ pain, fever, n/v. Found to be in septic shock 2/2 acute cholecystitis and cholangitis improved on fluid resuscitation alone; s/p emergent ERCP/biliary stent placement on 4/12. Found to have E. coli bacteremia and E. Coli UTI, pan-sensitive. Started on IV Zosyn 4/12 with downtrending leukocytosis and lactate. Transferred to medicine for further management. Also with newly diagnosed cirrhosis given transaminitis (now downtrending) with CT a/p showing cirrhotic morphology, thrombocytopenia to Plt 42, INR 2.19, Na 125. Also with ROBBIE Cr 2.12 iso septic shock now downtrending to 1.48.    Will discontinue lovenox given thrombocytopenia.

## 2022-04-14 NOTE — PROGRESS NOTE ADULT - ASSESSMENT
66M no pertinent PMH presenting with 1 week of epigastric/RUQ pain, fever, n/v. Found to be is septic shock 2/2 acute cholecystitis and cholangitis ; s/p emergent ERCP/biliary stent placement. 66M no pertinent PMH presenting with 1 week of epigastric/RUQ pain, fever, n/v. Found to be in septic shock 2/2 acute cholecystitis and cholangitis ; s/p emergent ERCP/biliary stent placement.

## 2022-04-14 NOTE — PROGRESS NOTE ADULT - ATTENDING COMMENTS
As above  Cholangitis/ cholecystitis with severe sepsis (but improving)  S/p ERCP with stent  Follow up surgical plan for CCY vs IR for PTC  Cont antibiotics    Thank you for this interesting consult.  Please call the advanced GI service with any questions or concerns.

## 2022-04-14 NOTE — PROGRESS NOTE ADULT - SUBJECTIVE AND OBJECTIVE BOX
HISTORY:  65 y/o male w/ no known PMHx who presented on 4/12 w/ several weeks of RUQ abdominal pain that acutely worsened the day prior to presentation. Additionally, patient had urinary frequency and dysuria. In the ED, patient was febrile, tachycardic, and hypotensive. Labs were significant for leukocytosis, coagulopathy, elevated Cr, elevated tbili, mild transaminitis, and lactic acidosis. RUQ ultrasound demonstrated cholelithiasis/gallbladder sludge w/ concern for acute cholecystitis and a 1 cm CBD. Patient's vitals improved w/ fluid resuscitation. He underwent ERCP emergently w/ CBD placement and drainage of sludge but no pus or stones were seen. Patient was left intubated at the end of the case but quickly extubated the same day. Blood cultures are positive for E. coli. He reports intermittent episodes of RUQ abdominal pain but otherwise denies headache, fevers, chills, dizziness, weakness, shortness of breath, chest pain, or nausea/vomiting.    24 HOUR EVENTS:  - Lactate still elevated to 4.5 so given 1 L of LR w/ improvement in lactate to 3.9 but trended back up to 4.1 so given additional 500 mL of LR  - CT scan obtained to rule out other sources of sepsis, which demonstrated cholelithiasis w/ concern for acute cholecystitis and cirrhosis w/ portal HTN so no further resuscitation continued as lactate clearance likely delayed in the setting of cirrhosis  - Advanced to CLD and reduced NS to 50 mL/hr  - Hospitalist consulted & following    NEURO  Exam: awake, alert, oriented x4, no acute distress, no acute focal deficits  Meds: acetaminophen     Tablet .. 500 milliGRAM(s) Oral every 6 hours PRN Mild Pain (1 - 3)    RESPIRATORY  RR: 23 (04-13-22 @ 22:00) (14 - 25)  SpO2: 98% (04-13-22 @ 22:00) (96% - 100%)  Exam: clear to auscultation bilaterally    CARDIOVASCULAR  HR: 80 (04-13-22 @ 22:00) (75 - 92)  BP: 119/72 (04-13-22 @ 22:00) (94/61 - 119/72)  BP(mean): 91 (04-13-22 @ 22:00) (73 - 92)  VBG - ( 13 Apr 2022 16:19 )  pH: 7.27  /  pCO2: 50    /  pO2: 35    / HCO3: 23    / Base Excess: -4.1  /  SaO2: 55.8  /  Lactate: 4.1    Exam: regular rate and rhythm, S1S2  Cardiac Rhythm: sinus  Perfusion    [x]Adequate    [ ]Inadequate  Mentation   [x]Normal       [ ]Reduced  Extremities  [x]Warm         [ ]Cool  Volume Status [ ]Hypervolemic [x]Euvolemic [ ]Hypovolemic    GI/NUTRITION  Exam: softly distended, mild RUQ tenderness to palpation  Diet: CLD    GENITOURINARY  I&O's Detail  04-13 @ 07:01  -  04-14 @ 02:48  --------------------------------------------------------  IN:    IV PiggyBack: 150 mL    Lactated Ringers Bolus: 1500 mL    sodium chloride 0.9%: 1875 mL  Total IN: 3525 mL    OUT:    Indwelling Catheter - Urethral (mL): 740 mL  Total OUT: 740 mL    Total NET: 2785 mL    125<L>  |  94<L>  |  24<H>  ----------------------------<  111<H>  4.5   |  19<L>  |  1.70<H>    Ca    8.1<L>      13 Apr 2022 16:20  Phos  3.4  Mg     2.1  TPro  5.8<L>  /  Alb  2.2<L>  /  TBili  2.8<H>  /  DBili  1.5<H>  /  AST  54<H>  /  ALT  24  /  AlkPhos  59    Meds: sodium chloride 0.9% infuse at 50 mL/hr    HEMATOLOGIC  Meds: enoxaparin Injectable 40 milliGRAM(s) SubCutaneous every 24 hours                        10.7   17.47 )-----------( 41       ( 13 Apr 2022 16:20 )             30.7     PT/INR - ( 13 Apr 2022 03:07 )   PT: 21.3 sec;   INR: 1.84 ratio    PTT - ( 13 Apr 2022 03:07 )  PTT:44.7 sec    INFECTIOUS DISEASES  T(C): 37.3 (04-13-22 @ 19:00), Max: 37.3 (04-13-22 @ 19:00)  WBC Count:  - 17.47 K/uL (04-13 @ 16:20)  - 20.76 K/uL (04-13 @ 03:07)    Recent Cultures:  - Specimen Source: Clean Catch Clean Catch (Midstream), 04-12 @ 17:09  Results: 50,000 - 99,000 CFU/mL Escherichia coli  Gram Stain: --  Organism: --  - Specimen Source: .Blood Blood-Peripheral, 04-12 @ 14:38  Results: Growth in aerobic and anaerobic bottles: Escherichia coli  Gram Stain: Growth in aerobic & anaerobic bottle: Gram Negative Rods  Organism: --  - Specimen Source: .Blood Blood-Peripheral, 04-12 @ 14:36  Results: Growth in aerobic and anaerobic bottles: Escherichia coli  Gram Stain: Growth in aerobic & anaerobic bottle: Gram Negative Rods  Organism: Blood Culture PCR    Meds: piperacillin/tazobactam IVPB.. 3.375 Gram(s) IV Intermittent every 8 hours    ENDOCRINE  Capillary Blood Glucose:  Meds:     ACCESS DEVICES:  [ ] Peripheral IV  [ ] Central Venous Line		[ ] R	[ ] L	[ ] IJ	[ ] Fem	[ ] SC	Placed:   [ ] Arterial Line			[ ] R	[ ] L	[ ] Fem	[ ] Rad	[ ] Ax	Placed:   [ ] PICC:					[ ] Mediport  [ ] Urinary Catheter, Date Placed:   [ ] Necessity of urinary, arterial, and venous catheters discussed    OTHER MEDICATIONS:  chlorhexidine 2% Cloths 1 Application(s) Topical <User Schedule>      IMAGING: HISTORY:  67 y/o male w/ no known PMHx who presented on 4/12 w/ several weeks of RUQ abdominal pain that acutely worsened the day prior to presentation. Additionally, patient had urinary frequency and dysuria. In the ED, patient was febrile, tachycardic, and hypotensive. Labs were significant for leukocytosis, coagulopathy, elevated Cr, elevated tbili, mild transaminitis, and lactic acidosis. RUQ ultrasound demonstrated cholelithiasis/gallbladder sludge w/ concern for acute cholecystitis and a 1 cm CBD. Patient's vitals improved w/ fluid resuscitation. He underwent ERCP emergently w/ CBD placement and drainage of sludge but no pus or stones were seen. Patient was left intubated at the end of the case but quickly extubated the same day. Blood cultures are positive for E. coli. He reports intermittent episodes of RUQ abdominal pain but otherwise denies headache, fevers, chills, dizziness, weakness, shortness of breath, chest pain, or nausea/vomiting.    24 HOUR EVENTS:  - Lactate still elevated to 4.5 so given 1 L of LR w/ improvement in lactate to 3.9 but trended back up to 4.1 so given additional 500 mL of LR  - CT scan obtained to rule out other sources of sepsis, which demonstrated cholelithiasis w/ concern for acute cholecystitis and cirrhosis w/ portal HTN so no further resuscitation continued as lactate clearance likely delayed in the setting of cirrhosis  - Advanced to CLD and reduced NS to 50 mL/hr  - Hospitalist consulted & following    NEURO  Exam: awake, alert, oriented x4, no acute distress, no acute focal deficits  Meds: acetaminophen     Tablet .. 500 milliGRAM(s) Oral every 6 hours PRN Mild Pain (1 - 3)    RESPIRATORY  RR: 23 (04-13-22 @ 22:00) (14 - 25)  SpO2: 98% (04-13-22 @ 22:00) (96% - 100%)  Exam: clear to auscultation bilaterally    CARDIOVASCULAR  HR: 80 (04-13-22 @ 22:00) (75 - 92)  BP: 119/72 (04-13-22 @ 22:00) (94/61 - 119/72)  BP(mean): 91 (04-13-22 @ 22:00) (73 - 92)  VBG - ( 13 Apr 2022 16:19 )  pH: 7.27  /  pCO2: 50    /  pO2: 35    / HCO3: 23    / Base Excess: -4.1  /  SaO2: 55.8  /  Lactate: 4.1    Exam: regular rate and rhythm, S1S2  Cardiac Rhythm: sinus  Perfusion    [x]Adequate    [ ]Inadequate  Mentation   [x]Normal       [ ]Reduced  Extremities  [x]Warm         [ ]Cool  Volume Status [ ]Hypervolemic [x]Euvolemic [ ]Hypovolemic    GI/NUTRITION  Exam: softly distended, mild RUQ tenderness to palpation  Diet: CLD    GENITOURINARY  I&O's Detail  04-13 @ 07:01  -  04-14 @ 02:48  --------------------------------------------------------  IN:    IV PiggyBack: 150 mL    Lactated Ringers Bolus: 1500 mL    sodium chloride 0.9%: 1875 mL  Total IN: 3525 mL    OUT:    Indwelling Catheter - Urethral (mL): 740 mL  Total OUT: 740 mL    Total NET: 2785 mL    125<L>  |  94<L>  |  24<H>  ----------------------------<  111<H>  4.5   |  19<L>  |  1.70<H>    Ca    8.1<L>      13 Apr 2022 16:20  Phos  3.4  Mg     2.1  TPro  5.8<L>  /  Alb  2.2<L>  /  TBili  2.8<H>  /  DBili  1.5<H>  /  AST  54<H>  /  ALT  24  /  AlkPhos  59    Meds: sodium chloride 0.9% infuse at 50 mL/hr    HEMATOLOGIC  Meds: enoxaparin Injectable 40 milliGRAM(s) SubCutaneous every 24 hours                        10.7   17.47 )-----------( 41       ( 13 Apr 2022 16:20 )             30.7     PT/INR - ( 13 Apr 2022 03:07 )   PT: 21.3 sec;   INR: 1.84 ratio    PTT - ( 13 Apr 2022 03:07 )  PTT:44.7 sec    INFECTIOUS DISEASES  T(C): 37.3 (04-13-22 @ 19:00), Max: 37.3 (04-13-22 @ 19:00)  WBC Count:  - 17.47 K/uL (04-13 @ 16:20)  - 20.76 K/uL (04-13 @ 03:07)    Recent Cultures:  - Specimen Source: Clean Catch Clean Catch (Midstream), 04-12 @ 17:09  Results: 50,000 - 99,000 CFU/mL Escherichia coli  Gram Stain: --  Organism: --  - Specimen Source: .Blood Blood-Peripheral, 04-12 @ 14:38  Results: Growth in aerobic and anaerobic bottles: Escherichia coli  Gram Stain: Growth in aerobic & anaerobic bottle: Gram Negative Rods  Organism: --  - Specimen Source: .Blood Blood-Peripheral, 04-12 @ 14:36  Results: Growth in aerobic and anaerobic bottles: Escherichia coli  Gram Stain: Growth in aerobic & anaerobic bottle: Gram Negative Rods  Organism: Blood Culture PCR    Meds: piperacillin/tazobactam IVPB.. 3.375 Gram(s) IV Intermittent every 8 hours    ENDOCRINE  Capillary Blood Glucose: 111 mg/dL (04-13-22 @ 16:20)    ACCESS DEVICES:  [x] Peripheral IV  [ ] Central Venous Line		[ ] R	[ ] L	[ ] IJ	[ ] Fem	[ ] SC	Placed:   [ ] Arterial Line			[ ] R	[ ] L	[ ] Fem	[ ] Rad	[ ] Ax	Placed:   [ ] PICC:					[ ] Mediport  [x] Urinary Catheter, Date Placed: 4/12  [x] Necessity of urinary, arterial, and venous catheters discussed    OTHER MEDICATIONS: chlorhexidine 2% Cloths 1 Application(s) Topical <User Schedule>    IMAGING:

## 2022-04-15 LAB
ALBUMIN SERPL ELPH-MCNC: 2 G/DL — LOW (ref 3.3–5)
ALP SERPL-CCNC: 83 U/L — SIGNIFICANT CHANGE UP (ref 40–120)
ALT FLD-CCNC: 23 U/L — SIGNIFICANT CHANGE UP (ref 10–45)
ANION GAP SERPL CALC-SCNC: 9 MMOL/L — SIGNIFICANT CHANGE UP (ref 5–17)
APTT BLD: 31.5 SEC — SIGNIFICANT CHANGE UP (ref 27.5–35.5)
AST SERPL-CCNC: 38 U/L — SIGNIFICANT CHANGE UP (ref 10–40)
BILIRUB SERPL-MCNC: 1.6 MG/DL — HIGH (ref 0.2–1.2)
BUN SERPL-MCNC: 25 MG/DL — HIGH (ref 7–23)
CALCIUM SERPL-MCNC: 8.3 MG/DL — LOW (ref 8.4–10.5)
CHLORIDE SERPL-SCNC: 102 MMOL/L — SIGNIFICANT CHANGE UP (ref 96–108)
CO2 SERPL-SCNC: 21 MMOL/L — LOW (ref 22–31)
CREAT SERPL-MCNC: 1.37 MG/DL — HIGH (ref 0.5–1.3)
EGFR: 58 ML/MIN/1.73M2 — LOW
FOLATE SERPL-MCNC: 2.4 NG/ML — LOW
GLUCOSE SERPL-MCNC: 93 MG/DL — SIGNIFICANT CHANGE UP (ref 70–99)
HCT VFR BLD CALC: 33.2 % — LOW (ref 39–50)
HGB BLD-MCNC: 11.5 G/DL — LOW (ref 13–17)
INR BLD: 1.55 RATIO — HIGH (ref 0.88–1.16)
LACTATE SERPL-SCNC: 1.6 MMOL/L — SIGNIFICANT CHANGE UP (ref 0.7–2)
MAGNESIUM SERPL-MCNC: 2.6 MG/DL — SIGNIFICANT CHANGE UP (ref 1.6–2.6)
MCHC RBC-ENTMCNC: 34.6 GM/DL — SIGNIFICANT CHANGE UP (ref 32–36)
MCHC RBC-ENTMCNC: 35.4 PG — HIGH (ref 27–34)
MCV RBC AUTO: 102.2 FL — HIGH (ref 80–100)
MELD SCORE WITH DIALYSIS: 28 POINTS — SIGNIFICANT CHANGE UP
MELD SCORE WITHOUT DIALYSIS: 20 POINTS — SIGNIFICANT CHANGE UP
NRBC # BLD: 0 /100 WBCS — SIGNIFICANT CHANGE UP (ref 0–0)
PHOSPHATE SERPL-MCNC: 2.1 MG/DL — LOW (ref 2.5–4.5)
PLATELET # BLD AUTO: 59 K/UL — LOW (ref 150–400)
POTASSIUM SERPL-MCNC: 4.6 MMOL/L — SIGNIFICANT CHANGE UP (ref 3.5–5.3)
POTASSIUM SERPL-SCNC: 4.6 MMOL/L — SIGNIFICANT CHANGE UP (ref 3.5–5.3)
PROT SERPL-MCNC: 5.6 G/DL — LOW (ref 6–8.3)
PROTHROM AB SERPL-ACNC: 17.9 SEC — HIGH (ref 10.5–13.4)
RBC # BLD: 3.25 M/UL — LOW (ref 4.2–5.8)
RBC # FLD: 13.7 % — SIGNIFICANT CHANGE UP (ref 10.3–14.5)
SODIUM SERPL-SCNC: 132 MMOL/L — LOW (ref 135–145)
VIT B12 SERPL-MCNC: 1333 PG/ML — HIGH (ref 232–1245)
WBC # BLD: 16.34 K/UL — HIGH (ref 3.8–10.5)
WBC # FLD AUTO: 16.34 K/UL — HIGH (ref 3.8–10.5)

## 2022-04-15 PROCEDURE — 99232 SBSQ HOSP IP/OBS MODERATE 35: CPT | Mod: GC

## 2022-04-15 PROCEDURE — 76705 ECHO EXAM OF ABDOMEN: CPT | Mod: 26

## 2022-04-15 PROCEDURE — 99223 1ST HOSP IP/OBS HIGH 75: CPT | Mod: GC

## 2022-04-15 RX ORDER — AMPICILLIN SODIUM AND SULBACTAM SODIUM 250; 125 MG/ML; MG/ML
3 INJECTION, POWDER, FOR SUSPENSION INTRAMUSCULAR; INTRAVENOUS EVERY 6 HOURS
Refills: 0 | Status: DISCONTINUED | OUTPATIENT
Start: 2022-04-15 | End: 2022-04-15

## 2022-04-15 RX ORDER — AMPICILLIN SODIUM AND SULBACTAM SODIUM 250; 125 MG/ML; MG/ML
INJECTION, POWDER, FOR SUSPENSION INTRAMUSCULAR; INTRAVENOUS
Refills: 0 | Status: DISCONTINUED | OUTPATIENT
Start: 2022-04-15 | End: 2022-04-15

## 2022-04-15 RX ORDER — FOLIC ACID 0.8 MG
1 TABLET ORAL DAILY
Refills: 0 | Status: DISCONTINUED | OUTPATIENT
Start: 2022-04-15 | End: 2022-04-17

## 2022-04-15 RX ORDER — AMPICILLIN SODIUM AND SULBACTAM SODIUM 250; 125 MG/ML; MG/ML
3 INJECTION, POWDER, FOR SUSPENSION INTRAMUSCULAR; INTRAVENOUS EVERY 6 HOURS
Refills: 0 | Status: DISCONTINUED | OUTPATIENT
Start: 2022-04-15 | End: 2022-04-17

## 2022-04-15 RX ORDER — AMPICILLIN SODIUM AND SULBACTAM SODIUM 250; 125 MG/ML; MG/ML
3 INJECTION, POWDER, FOR SUSPENSION INTRAMUSCULAR; INTRAVENOUS ONCE
Refills: 0 | Status: COMPLETED | OUTPATIENT
Start: 2022-04-15 | End: 2022-04-15

## 2022-04-15 RX ORDER — SODIUM,POTASSIUM PHOSPHATES 278-250MG
1 POWDER IN PACKET (EA) ORAL ONCE
Refills: 0 | Status: COMPLETED | OUTPATIENT
Start: 2022-04-15 | End: 2022-04-15

## 2022-04-15 RX ADMIN — Medication 1 PACKET(S): at 11:38

## 2022-04-15 RX ADMIN — Medication 1 MILLIGRAM(S): at 11:37

## 2022-04-15 RX ADMIN — Medication 500 MILLIGRAM(S): at 05:38

## 2022-04-15 RX ADMIN — AMPICILLIN SODIUM AND SULBACTAM SODIUM 200 GRAM(S): 250; 125 INJECTION, POWDER, FOR SUSPENSION INTRAMUSCULAR; INTRAVENOUS at 23:12

## 2022-04-15 RX ADMIN — Medication 500 MILLIGRAM(S): at 05:10

## 2022-04-15 RX ADMIN — AMPICILLIN SODIUM AND SULBACTAM SODIUM 200 GRAM(S): 250; 125 INJECTION, POWDER, FOR SUSPENSION INTRAMUSCULAR; INTRAVENOUS at 17:15

## 2022-04-15 RX ADMIN — AMPICILLIN SODIUM AND SULBACTAM SODIUM 100 GRAM(S): 250; 125 INJECTION, POWDER, FOR SUSPENSION INTRAMUSCULAR; INTRAVENOUS at 11:39

## 2022-04-15 NOTE — PROGRESS NOTE ADULT - SUBJECTIVE AND OBJECTIVE BOX
ACS SURGERY DAILY PROGRESS NOTE:       SUBJECTIVE/ROS: Patient feels well.  No events overnight. States pain controlled with analgesia.   Denies nausea, vomiting, chest pain, shortness of breath.  Tolerating liquid diet .         MEDICATIONS  (STANDING):  ampicillin/sulbactam  IVPB 3 Gram(s) IV Intermittent every 6 hours  chlorhexidine 2% Cloths 1 Application(s) Topical <User Schedule>  folic acid 1 milliGRAM(s) Oral daily    MEDICATIONS  (PRN):  acetaminophen     Tablet .. 500 milliGRAM(s) Oral every 6 hours PRN Mild Pain (1 - 3)      OBJECTIVE:    Vital Signs Last 24 Hrs  T(C): 36.6 (15 Apr 2022 11:04), Max: 36.8 (14 Apr 2022 17:55)  T(F): 97.8 (15 Apr 2022 11:04), Max: 98.3 (14 Apr 2022 17:55)  HR: 80 (15 Apr 2022 11:04) (63 - 82)  BP: 133/78 (15 Apr 2022 11:04) (123/74 - 138/76)  BP(mean): --  RR: 18 (15 Apr 2022 11:04) (16 - 18)  SpO2: 98% (15 Apr 2022 11:04) (96% - 98%)        I&O's Detail    14 Apr 2022 07:01  -  15 Apr 2022 07:00  --------------------------------------------------------  IN:    Oral Fluid: 480 mL    sodium chloride 0.9%: 1800 mL  Total IN: 2280 mL    OUT:    Indwelling Catheter - Urethral (mL): 2535 mL  Total OUT: 2535 mL    Total NET: -255 mL                              11.5   16.34 )-----------( 59       ( 15 Apr 2022 06:50 )             33.2     04-15    132<L>  |  102  |  25<H>  ----------------------------<  93  4.6   |  21<L>  |  1.37<H>    Ca    8.3<L>      15 Apr 2022 07:00  Phos  2.1     04-15  Mg     2.6     04-15    TPro  5.6<L>  /  Alb  2.0<L>  /  TBili  1.6<H>  /  DBili  x   /  AST  38  /  ALT  23  /  AlkPhos  83  04-15    PT/INR - ( 15 Apr 2022 07:00 )   PT: 17.9 sec;   INR: 1.55 ratio         PTT - ( 15 Apr 2022 07:12 )  PTT:31.5 sec      PHYSICAL EXAM   General Appearance: Appears well, NAD  Resp: Patent airway, non-labored breathing  CV: RRR  Abdomen: Soft, TTP, Nondistended

## 2022-04-15 NOTE — CONSULT NOTE ADULT - ATTENDING COMMENTS
As above  3 weeks RUQ pain, fevers, chills, weakness  Here with septic shock source presumed to be secondary to cholangitis   Elevated liver tests, ED US with dilated CBD and stones in duct  Also cholecystitis  Will plan for urgent ERCP today for biliary decompression after patient undergoes resuscitation by ED/ICU  Keep NPO  Cont abx    Thank you for this interesting consult.  Please call the advanced GI service with any questions or concerns.
new diagnosis etoh cirrhosis  appears to be first decompensation  told to not drink  will set up outpatient f/u

## 2022-04-15 NOTE — PROGRESS NOTE ADULT - ASSESSMENT
66M no pertinent PMH presenting with 1 week of epigastric/RUQ pain, fever, n/v. Found to be in septic shock 2/2 acute cholecystitis and cholangitis ; s/p emergent ERCP/biliary stent placement. 66M no pertinent PMH presenting with 1 week of epigastric/RUQ pain, fever, n/v. Found to be in septic shock with E. coli bacteremia 2/2 acute cholecystitis and UTI ; s/p emergent ERCP/biliary stent placement. Improving on IV unasyn.

## 2022-04-15 NOTE — CONSULT NOTE ADULT - ASSESSMENT
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 vs PARNELL   - MELDNa: 21 4/15/21  - EV: unknown; gastric varices and splenorenal shunt on CT 4/13/22  - Ascites: mild  - HCC: none CT 4/13/22  - SBP: none  - PSE: patient reporting episode of confusion just prior to admission  - Nonreactive: HBsAg, HBcAb, HCV Ab, HAV IgM    # ROBBIE, improving  # Severe sepsis c/b E coli bacteremia, UTI, acute cholecystitis - initially suspicious for cholangitis now s/p ERCP with biliary sludge w/o obstruction/pus, stent placed    Recommendations:  - Check HBsAb, HAV IgG  - Check MICHELLE, ASMA, AMA, A1AT, anti-LKM, ceruloplasmin  - Check ferritin, iron panel, B12 and folate levels  - Diagnostic para if able: cell count, cytology, protein, albumin, LDH, glucose levels  - Rest of care/Abx per primary/surgery  - Follow up outpatient Hepatology  - EGD for EV screening outpatient  - [place] provide patient with Hepatology Clinic outpatient follow up number to confirm appointment; [phone] ([facility]) or [phone] ([facility] at [address] [place] [address]).    *Preliminary note until signed by Attending.    Thank you for involving us in this patient's care.    Kary Tena MD  Gastroenterology/Hepatology Fellow, PGY-V    NON-URGENT CONSULTS:  Please email [email or web address]  [email or web address]  AT NIGHT AND ON WEEKENDS:  Contact on-call GI fellow via answering service ([phone]) from 5pm-8am and on weekends/holidays  MONDAY-FRIDAY 8AM-5PM:  Pager# [phone] ([facility])  GI Phone# [phone] ([facility])          TESHA STEINER is a 66y Male no pertinent PMH who initially presented with 1 week of epigastric/RUQ pain, fever, n/v. Found to be in septic shock 2/2 acute cholecystitis with suspicion for cholangitis s/p emergent ERCP/biliary stent placement on 4/12, with E. coli bacteremia and E. Coli UTI, on IV Zosyn 4/12 with downtrending leukocytosis and lactate; noted to have cirrhosis on CTAP with mild ascites, bili 1.6 otherwise liver chemistries are wnl, though previously elevated at roughly 2:1 AST/ALT ratio (54/23) with  and 3.1 bili on admission. Patient reports drinking about half a pint of vodka on the weekends for the past 25 years. Denies fhx of GI disease, malignancy or liver disease. Currently HDS with minimal RUQ abdominal pain, no jaundice, mild scleral icterus.    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 vs PARNELL   - MELDNa: 21 4/15/21  - EV: unknown; gastric varices and splenorenal shunt on CT 4/13/22  - Ascites: mild  - HCC: none CT 4/13/22  - SBP: none  - PSE: patient reporting episode of confusion just prior to admission  - Nonreactive: HBsAg, HBcAb, HCV Ab, HAV IgM    # ROBBIE, improving  # Severe sepsis c/b E coli bacteremia, UTI, acute cholecystitis - initially suspicious for cholangitis now s/p ERCP with biliary sludge w/o obstruction/pus, stent placed    Recommendations:  - Check HBsAb, HAV IgG  - Check MICHELLE, ASMA, AMA, A1AT, anti-LKM, ceruloplasmin  - Check ferritin, iron panel, B12 and folate levels  - Diagnostic para if able: cell count, cytology, protein, albumin, LDH, glucose levels  - Echo  - Rest of care/Abx per primary/surgery  - Follow up outpatient Hepatology  - EGD for EV screening outpatient  - [place] provide patient with Hepatology Clinic outpatient follow up number to confirm appointment; [phone] ([facility]) or [phone] ([facility] at [address] [place] [address]).    *Preliminary note until signed by Attending.    Thank you for involving us in this patient's care.    Kary Tena MD  Gastroenterology/Hepatology Fellow, PGY-V    NON-URGENT CONSULTS:  Please email [email or web address]  [email or web address]  AT NIGHT AND ON WEEKENDS:  Contact on-call GI fellow via answering service ([phone]) from 5pm-8am and on weekends/holidays  MONDAY-FRIDAY 8AM-5PM:  Pager# [phone] ([facility])  GI Phone# [phone] ([facility])          TESHA STEIENR is a 66y Male no pertinent PMH who initially presented with 1 week of epigastric/RUQ pain, fever, n/v. Found to be in septic shock 2/2 acute cholecystitis with suspicion for cholangitis s/p emergent ERCP/biliary stent placement on 4/12, with E. coli bacteremia and E. Coli UTI, on IV Zosyn 4/12 with downtrending leukocytosis and lactate; noted to have cirrhosis on CTAP with mild ascites, bili 1.6 otherwise liver chemistries are wnl, though previously elevated at roughly 2:1 AST/ALT ratio (54/23) with  and 3.1 bili on admission. Patient reports drinking about half a pint of vodka on the weekends for the past 25 years. Denies fhx of GI disease, malignancy or liver disease. Currently HDS with minimal RUQ abdominal pain, no jaundice, mild scleral icterus.    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 vs PARNELL   - MELDNa: 21 4/15/21  - EV: unknown; gastric varices and splenorenal shunt on CT 4/13/22  - Ascites: mild  - HCC: none CT 4/13/22  - SBP: none  - PSE: patient reporting episode of confusion just prior to admission  - Nonreactive: HBsAg, HBcAb, HCV Ab, HAV IgM    # ROBBIE, improving  # Severe sepsis c/b E coli bacteremia, UTI, acute cholecystitis - initially suspicious for cholangitis now s/p ERCP with biliary sludge w/o obstruction/pus, stent placed    Recommendations:  - Check HBsAb, HAV IgG  - Check MICHELLE, ASMA, AMA, A1AT, anti-LKM, ceruloplasmin  - Check ferritin, iron panel, B12 and folate levels  - Diagnostic para if able: cell count, cytology, protein, albumin, LDH, glucose levels  - Echo  - Rest of care/Abx per primary/surgery  - Follow up outpatient Hepatology  - EGD for EV screening outpatient  - [place] provide patient with Hepatology Clinic outpatient follow up number to confirm appointment; [phone] ([facility]) or [phone] ([facility] at [address] [facility]).    *Preliminary note until signed by Attending.    Thank you for involving us in this patient's care. Please reach out to Hepatology if any further questions or concerns. Signing off.    Kary Tena MD  Gastroenterology/Hepatology Fellow, PGY-V    NON-URGENT CONSULTS:  Please email [email or web address]  [email or web address]  AT NIGHT AND ON WEEKENDS:  Contact on-call GI fellow via answering service ([phone]) from 5pm-8am and on weekends/holidays  MONDAY-FRIDAY 8AM-5PM:  Pager# [phone] ([facility])  GI Phone# [phone] ([facility])

## 2022-04-15 NOTE — PROGRESS NOTE ADULT - PROBLEM SELECTOR PLAN 1
- s/p emergent ERCP/biliary stent placement.  - on Zosyn  - bld cx w/E.coli ; f/up sensitivities  - mgt per primary team. - likely biliary vs urinary source, BCx and UCx with E coli  - leukocytosis ~16 and lactate 1.1 downtrending  - CT abd/pelvis showing acute cholecystitis, hepatic cirrhosis with portal htn    Plan:  - started on Zosyn (4/12) then narrowed to Unasyn 4/15  -c/w Unasyn, plan for 10 day course and can dc  on Po augmentin (last day will be 4/21)

## 2022-04-15 NOTE — PROGRESS NOTE ADULT - ASSESSMENT
Impression:  # Severe sepsis ( fever, leukocytosis hypotension and tachycardia) highly suspicious for cholangitis ( RUQ pain/tenderness, dilated CBD, elevated liver enzymes), s/p emergent ERCP w/ biliary sludge, however no obstruction or pus seen, raising possibility for alternate source of sepsis (such as cholecystitis). Now w/ improved abd pain and fever, and repeat CT (4/13/22) showing acute cholecystitis.   # hepatic cirrhosis w/ portal HTN - recommend outpatient hepatology follow up    Recommendations:  - trend liver enzymes  - Surgery team followup for cholecystitis.  - Followup in GI office in 2-4 weeks. Call [phone] to schedule.   - Repeat ERCP in 4-6 weeks for stent removal and clearance of bile duct.    All recommendations are tentative until note is attested by attending.     Wilson Nichole, PGY5  Gastroenterology/Hepatology Fellow  Available on [employer] Teams  [phone] ([employer] Short Range Pager)  [phone] (Long Range Pager)    After 5pm, please contact the on-call GI fellow. [phone]

## 2022-04-15 NOTE — PROGRESS NOTE ADULT - ATTENDING COMMENTS
65 y/o male who found to have septic shock 2/2 UTI vs cholangitis s/p ERCP with stent placement, ROBBIE, hyponatremia and new dx of cirrhosis  - Cultures reviewed. E coli in blood is same organism as that found 65 y/o male who found to have septic shock 2/2 UTI vs cholangitis s/p ERCP with stent placement, ROBBIE, hyponatremia and new dx of cirrhosis. Septic shock now resolved  - Cultures reviewed. E coli in blood is same organism as that found in urine. To transition to Unasyn given better biliary penetration. Can transition to Augmentin on d/c to complete course  - Hypovolemic hyponatremia improving. Will d/c IVF. Trend BMP  - ROBBIE improving.   - Advance diet  - To consult Hepatology given new dx of cirrhosis

## 2022-04-15 NOTE — PROGRESS NOTE ADULT - PROBLEM SELECTOR PLAN 3
UA positive for UTI  - pt is asymptomatic  - on Zosyn as above  - f/up urine cx. E. coli UTI, +dysuria  -c/w Unasyn

## 2022-04-15 NOTE — CONSULT NOTE ADULT - SUBJECTIVE AND OBJECTIVE BOX
Chief Complaint:  Patient is a 66y old  Male who presents with a chief complaint of Cholangitis (15 Apr 2022 10:03)      HPI:TESHA STEINER is a 66y Male    PMHX/PSHX:  No pertinent past medical history    No significant past surgical history      Allergies:  No Known Allergies      Home Medications: reviewed  Hospital Medications:  acetaminophen     Tablet .. 500 milliGRAM(s) Oral every 6 hours PRN  ampicillin/sulbactam  IVPB      ampicillin/sulbactam  IVPB 3 Gram(s) IV Intermittent once  ampicillin/sulbactam  IVPB 3 Gram(s) IV Intermittent every 6 hours  chlorhexidine 2% Cloths 1 Application(s) Topical <User Schedule>  folic acid 1 milliGRAM(s) Oral daily  potassium phosphate / sodium phosphate Powder (PHOS-NaK) 1 Packet(s) Oral once      Social History:   Tob: Denies  EtOH: Denies  Illicit Drugs: Denies    Family history:    Denies family history of colon cancer/polyps, stomach cancer/polyps, pancreatic cancer/masses, liver cancer/disease, ovarian cancer and endometrial cancer.    ROS:   General:  No  fevers, chills, night sweats, fatigue  Eyes:  Good vision, no reported pain  ENT:  No sore throat, pain, runny nose  CV:  No pain, palpitations  Pulm:  No dyspnea, cough  GI:  See HPI, otherwise negative  :  No  incontinence, nocturia  Muscle:  No pain, weakness  Neuro:  No memory problems  Psych:  No insomnia, mood problems, depression  Endocrine:  No polyuria, polydipsia, cold/heat intolerance  Heme:  No petechiae, ecchymosis, easy bruisability  Skin:  No rash    PHYSICAL EXAM:   Vital Signs:  Vital Signs Last 24 Hrs  T(C): 36.4 (15 Apr 2022 05:26), Max: 36.8 (14 Apr 2022 17:55)  T(F): 97.6 (15 Apr 2022 05:26), Max: 98.3 (14 Apr 2022 17:55)  HR: 71 (15 Apr 2022 05:26) (63 - 82)  BP: 127/78 (15 Apr 2022 05:26) (120/67 - 138/76)  BP(mean): 97 (14 Apr 2022 13:00) (88 - 97)  RR: 18 (15 Apr 2022 05:26) (13 - 21)  SpO2: 98% (15 Apr 2022 05:26) (96% - 98%)  Daily     Daily     GENERAL: no acute distress  NEURO: alert  HEENT: anicteric sclera, no conjunctival pallor appreciated  CHEST: no respiratory distress, no accessory muscle use  CARDIAC: regular rate, rhythm  ABDOMEN: soft, non-tender, non-distended, no rebound or guarding  EXTREMITIES: warm, well perfused, no edema  SKIN: no lesions noted    LABS: reviewed                        11.5   16.34 )-----------( 59       ( 15 Apr 2022 06:50 )             33.2     04-15    132<L>  |  102  |  25<H>  ----------------------------<  93  4.6   |  21<L>  |  1.37<H>    Ca    8.3<L>      15 Apr 2022 07:00  Phos  2.1     04-15  Mg     2.6     04-15    TPro  5.6<L>  /  Alb  2.0<L>  /  TBili  1.6<H>  /  DBili  x   /  AST  38  /  ALT  23  /  AlkPhos  83  04-15    LIVER FUNCTIONS - ( 15 Apr 2022 07:00 )  Alb: 2.0 g/dL / Pro: 5.6 g/dL / ALK PHOS: 83 U/L / ALT: 23 U/L / AST: 38 U/L / GGT: x             Culture - Urine (collected 12 Apr 2022 17:09)  Source: Clean Catch Clean Catch (Midstream)  Final Report (14 Apr 2022 16:16):    50,000 - 99,000 CFU/mL Escherichia coli  Organism: Escherichia coli (14 Apr 2022 16:16)  Organism: Escherichia coli (14 Apr 2022 16:16)    Culture - Blood (collected 12 Apr 2022 14:38)  Source: .Blood Blood-Peripheral  Gram Stain (13 Apr 2022 02:41):    Growth in anaerobic bottle: Gram Negative Rods    Growth in aerobic bottle: Gram Negative Rods  Final Report (14 Apr 2022 17:29):    Growth in aerobic and anaerobic bottles: Escherichia coli    See previous culture [id number]    Culture - Blood (collected 12 Apr 2022 14:36)  Source: .Blood Blood-Peripheral  Gram Stain (13 Apr 2022 02:40):    Growth in aerobic bottle: Gram Negative Rods    Growth in anaerobic bottle: Gram Negative Rods  Final Report (14 Apr 2022 17:28):    Growth in aerobic and anaerobic bottles: Escherichia coli    ***Blood Panel PCR results on this specimen are available    approximately 3 hours after the Gram stain result.***    Gram stain, PCR, and/or culture results may not always    correspond due to difference in methodologies.    ************************************************************    This PCR assay was performed by multiplex PCR. This    Assay tests for 66 bacterial and resistance gene targets.    Please refer to the [facility] Labs test directory    at [email or web address] for details.  Organism: Blood Culture PCR  Escherichia coli (14 Apr 2022 17:28)  Organism: Escherichia coli (14 Apr 2022 17:28)  Organism: Blood Culture PCR (14 Apr 2022 17:28)        Diagnostic Studies: see sunrise for full report         Chief Complaint:  Patient is a 66y old  Male who presents with a chief complaint of Cholangitis (15 Apr 2022 10:03)      HPI:TESHA STEINER is a 66y Male no pertinent PMH who initially presented with 1 week of epigastric/RUQ pain, fever, n/v. Found to be in septic shock 2/2 acute cholecystitis with suspicion for cholangitis s/p emergent ERCP/biliary stent placement on 4/12, with E. coli bacteremia and E. Coli UTI, on IV Zosyn 4/12 with downtrending leukocytosis and lactate; noted to have cirrhosis on CTAP with mild ascites, bili 1.6 otherwise liver chemistries are wnl, though previously elevated at roughly 2:1 AST/ALT ratio (54/23) with  and 3.1 bili on admission. Patient reports drinking about half a pint of vodka on the weekends for the past 25 years. No IVDU. Denies fhx of GI disease, malignancy or liver disease. Currently HDS with minimal RUQ abdominal pain, no jaundice, mild scleral icterus.      PMHX/PSHX:  No pertinent past medical history    No significant past surgical history      Allergies:  No Known Allergies      Home Medications: reviewed  Hospital Medications:  acetaminophen     Tablet .. 500 milliGRAM(s) Oral every 6 hours PRN  ampicillin/sulbactam  IVPB      ampicillin/sulbactam  IVPB 3 Gram(s) IV Intermittent once  ampicillin/sulbactam  IVPB 3 Gram(s) IV Intermittent every 6 hours  chlorhexidine 2% Cloths 1 Application(s) Topical <User Schedule>  folic acid 1 milliGRAM(s) Oral daily  potassium phosphate / sodium phosphate Powder (PHOS-NaK) 1 Packet(s) Oral once      Social History:   Tob: Denies  EtOH: active  Illicit Drugs: Denies    Family history:    Denies family history of colon cancer/polyps, stomach cancer/polyps, pancreatic cancer/masses, liver cancer/disease, ovarian cancer and endometrial cancer.    ROS:   Complete and normal except as mentioned above.    PHYSICAL EXAM:   Vital Signs:  Vital Signs Last 24 Hrs  T(C): 36.4 (15 Apr 2022 05:26), Max: 36.8 (14 Apr 2022 17:55)  T(F): 97.6 (15 Apr 2022 05:26), Max: 98.3 (14 Apr 2022 17:55)  HR: 71 (15 Apr 2022 05:26) (63 - 82)  BP: 127/78 (15 Apr 2022 05:26) (120/67 - 138/76)  BP(mean): 97 (14 Apr 2022 13:00) (88 - 97)  RR: 18 (15 Apr 2022 05:26) (13 - 21)  SpO2: 98% (15 Apr 2022 05:26) (96% - 98%)  Daily     Daily     GENERAL: no acute distress  NEURO: alert  HEENT: scleral icterus  CHEST: no respiratory distress, no accessory muscle use  CARDIAC: regular rate,    ABDOMEN: soft, RUQ tender, non-distended, no rebound or guarding  EXTREMITIES: warm, well perfused   SKIN: no lesions noted    LABS: reviewed                        11.5   16.34 )-----------( 59       ( 15 Apr 2022 06:50 )             33.2     04-15    132<L>  |  102  |  25<H>  ----------------------------<  93  4.6   |  21<L>  |  1.37<H>    Ca    8.3<L>      15 Apr 2022 07:00  Phos  2.1     04-15  Mg     2.6     04-15    TPro  5.6<L>  /  Alb  2.0<L>  /  TBili  1.6<H>  /  DBili  x   /  AST  38  /  ALT  23  /  AlkPhos  83  04-15    LIVER FUNCTIONS - ( 15 Apr 2022 07:00 )  Alb: 2.0 g/dL / Pro: 5.6 g/dL / ALK PHOS: 83 U/L / ALT: 23 U/L / AST: 38 U/L / GGT: x             Culture - Urine (collected 12 Apr 2022 17:09)  Source: Clean Catch Clean Catch (Midstream)  Final Report (14 Apr 2022 16:16):    50,000 - 99,000 CFU/mL Escherichia coli  Organism: Escherichia coli (14 Apr 2022 16:16)  Organism: Escherichia coli (14 Apr 2022 16:16)    Culture - Blood (collected 12 Apr 2022 14:38)  Source: .Blood Blood-Peripheral  Gram Stain (13 Apr 2022 02:41):    Growth in anaerobic bottle: Gram Negative Rods    Growth in aerobic bottle: Gram Negative Rods  Final Report (14 Apr 2022 17:29):    Growth in aerobic and anaerobic bottles: Escherichia coli    See previous culture [id number]    Culture - Blood (collected 12 Apr 2022 14:36)  Source: .Blood Blood-Peripheral  Gram Stain (13 Apr 2022 02:40):    Growth in aerobic bottle: Gram Negative Rods    Growth in anaerobic bottle: Gram Negative Rods  Final Report (14 Apr 2022 17:28):    Growth in aerobic and anaerobic bottles: Escherichia coli    ***Blood Panel PCR results on this specimen are available    approximately 3 hours after the Gram stain result.***    Gram stain, PCR, and/or culture results may not always    correspond due to difference in methodologies.    ************************************************************    This PCR assay was performed by multiplex PCR. This    Assay tests for 66 bacterial and resistance gene targets.    Please refer to the [facility] Labs test directory    at [email or web address] for details.  Organism: Blood Culture PCR  Escherichia coli (14 Apr 2022 17:28)  Organism: Escherichia coli (14 Apr 2022 17:28)  Organism: Blood Culture PCR (14 Apr 2022 17:28)        Diagnostic Studies: see sunrise for full report

## 2022-04-15 NOTE — PROGRESS NOTE ADULT - SUBJECTIVE AND OBJECTIVE BOX
Gastroenterology/Hepatology Progress Note      Interval Events:   - no acute events overnight  - improved abd pain    Allergies:  No Known Allergies      Hospital Medications:  acetaminophen     Tablet .. 500 milliGRAM(s) Oral every 6 hours PRN  ampicillin/sulbactam  IVPB      ampicillin/sulbactam  IVPB 3 Gram(s) IV Intermittent once  ampicillin/sulbactam  IVPB 3 Gram(s) IV Intermittent every 6 hours  chlorhexidine 2% Cloths 1 Application(s) Topical <User Schedule>  folic acid 1 milliGRAM(s) Oral daily  potassium phosphate / sodium phosphate Powder (PHOS-NaK) 1 Packet(s) Oral once      ROS: 14 point ROS negative unless otherwise state in subjective    PHYSICAL EXAM:   Vital Signs:  Vital Signs Last 24 Hrs  T(C): 36.4 (15 Apr 2022 05:26), Max: 36.8 (14 Apr 2022 17:55)  T(F): 97.6 (15 Apr 2022 05:26), Max: 98.3 (14 Apr 2022 17:55)  HR: 71 (15 Apr 2022 05:26) (63 - 82)  BP: 127/78 (15 Apr 2022 05:26) (120/67 - 138/76)  BP(mean): 97 (14 Apr 2022 13:00) (88 - 97)  RR: 18 (15 Apr 2022 05:26) (13 - 21)  SpO2: 98% (15 Apr 2022 05:26) (96% - 98%)  Daily     Daily     GENERAL:  No acute distress  HEENT:  NCAT, no scleral icterus  CHEST: no resp distress  HEART:  RRR  ABDOMEN:  Soft, non-tender, non-distended, normoactive bowel sounds, no masses  EXTREMITIES:  No cyanosis, clubbing, or edema  SKIN:  No rash/erythema/ecchymoses/petechiae/wounds/abscess/warm/dry  NEURO:  Alert and oriented x 3, no asterixis, no tremor    LABS:                        11.5   16.34 )-----------( 59       ( 15 Apr 2022 06:50 )             33.2     Mean Cell Volume: 102.2 fl (04-15-22 @ 06:50)    04-15    132<L>  |  102  |  25<H>  ----------------------------<  93  4.6   |  21<L>  |  1.37<H>    Ca    8.3<L>      15 Apr 2022 07:00  Phos  2.1     04-15  Mg     2.6     04-15    TPro  5.6<L>  /  Alb  2.0<L>  /  TBili  1.6<H>  /  DBili  x   /  AST  38  /  ALT  23  /  AlkPhos  83  04-15    LIVER FUNCTIONS - ( 15 Apr 2022 07:00 )  Alb: 2.0 g/dL / Pro: 5.6 g/dL / ALK PHOS: 83 U/L / ALT: 23 U/L / AST: 38 U/L / GGT: x           PT/INR - ( 15 Apr 2022 07:00 )   PT: 17.9 sec;   INR: 1.55 ratio         PTT - ( 15 Apr 2022 07:12 )  PTT:31.5 sec          Imaging:           Gastroenterology/Hepatology Progress Note    Interval Events:   - no acute events overnight  - improved abd pain    Allergies:  No Known Allergies      Hospital Medications:  acetaminophen     Tablet .. 500 milliGRAM(s) Oral every 6 hours PRN  ampicillin/sulbactam  IVPB      ampicillin/sulbactam  IVPB 3 Gram(s) IV Intermittent once  ampicillin/sulbactam  IVPB 3 Gram(s) IV Intermittent every 6 hours  chlorhexidine 2% Cloths 1 Application(s) Topical <User Schedule>  folic acid 1 milliGRAM(s) Oral daily  potassium phosphate / sodium phosphate Powder (PHOS-NaK) 1 Packet(s) Oral once      ROS: 14 point ROS negative unless otherwise state in subjective    PHYSICAL EXAM:   Vital Signs:  Vital Signs Last 24 Hrs  T(C): 36.4 (15 Apr 2022 05:26), Max: 36.8 (14 Apr 2022 17:55)  T(F): 97.6 (15 Apr 2022 05:26), Max: 98.3 (14 Apr 2022 17:55)  HR: 71 (15 Apr 2022 05:26) (63 - 82)  BP: 127/78 (15 Apr 2022 05:26) (120/67 - 138/76)  BP(mean): 97 (14 Apr 2022 13:00) (88 - 97)  RR: 18 (15 Apr 2022 05:26) (13 - 21)  SpO2: 98% (15 Apr 2022 05:26) (96% - 98%)  Daily     Daily     GENERAL:  No acute distress  HEENT:  NCAT, no scleral icterus  CHEST: no resp distress  HEART:  RRR  ABDOMEN:  Soft, non-tender, non-distended, normoactive bowel sounds, no masses  EXTREMITIES:  No cyanosis, clubbing, or edema  SKIN:  No rash/erythema/ecchymoses/petechiae/wounds/abscess/warm/dry  NEURO:  Alert and oriented x 3, no asterixis, no tremor    LABS:                        11.5   16.34 )-----------( 59       ( 15 Apr 2022 06:50 )             33.2     Mean Cell Volume: 102.2 fl (04-15-22 @ 06:50)    04-15    132<L>  |  102  |  25<H>  ----------------------------<  93  4.6   |  21<L>  |  1.37<H>    Ca    8.3<L>      15 Apr 2022 07:00  Phos  2.1     04-15  Mg     2.6     04-15    TPro  5.6<L>  /  Alb  2.0<L>  /  TBili  1.6<H>  /  DBili  x   /  AST  38  /  ALT  23  /  AlkPhos  83  04-15    LIVER FUNCTIONS - ( 15 Apr 2022 07:00 )  Alb: 2.0 g/dL / Pro: 5.6 g/dL / ALK PHOS: 83 U/L / ALT: 23 U/L / AST: 38 U/L / GGT: x           PT/INR - ( 15 Apr 2022 07:00 )   PT: 17.9 sec;   INR: 1.55 ratio         PTT - ( 15 Apr 2022 07:12 )  PTT:31.5 sec          Imaging:

## 2022-04-15 NOTE — PROGRESS NOTE ADULT - ASSESSMENT
66y Male with no significant PMH presented to ED c/o several weeks of abdominal pain that acutely worsened.  Patient states he thought he had food poisoning several weeks ago after vomiting and was started on omeprazole by his PMD.  The abdominal pain became acutely worse yesterday. He has also had urinary frequency and dysuria.  Patient was febrile to 100.4 upon arrival to the ED with HR 140s and BP in the 80s.  Lactate 10.4.  RUQ ultrasound showed acute cholecystitis, gallstones, sludge and CBD 1cm.   Patient received 4.3L IVF in ED.      PLAN  - hepatology consult for new found cirrhosis   - appreciate SICU care  - advance diet as tolerated -- please page team if patient experience pain/nausea/vomiting with eating  - trend labs  -lvx/scds for VTE ppx  -pain control  -incentive spirometry        ACS/Trauma  [id number]

## 2022-04-15 NOTE — PROGRESS NOTE ADULT - PROBLEM SELECTOR PLAN 6
Na 125 ; may be hypoosmolar in the setting of poor PO intake over the past week  - may also be 2/2 cirrhosis  - check serum osm, urine osm and urine Na  - do not correct by > 6 - 8 mEQ in 24 hrs  - monitor BMP q12. Na improved to 132 from 125 after fluids, urine lytes showing pre-renal  - do not correct by > 6 - 8 mEQ in 24 hrs  - DC fluids

## 2022-04-15 NOTE — PROGRESS NOTE ADULT - ATTENDING COMMENTS
As above.    Pt seen/examined in the early evening of 4/15/22    Impression:    #1.  RUQ abd pain / resolved    #2.  Abnormal LFTs / mild and minimally improved    #3.  Dilated common bile duct to 1 cm not confirmed by ERCP, now normal by CT scan.  Gallbladder distended, possible cholecystitis    #4.  Blood and urine cultures : gram negative rods    #5.  Cirrhotic appearing liver on CT scan with evidence of portal hypertension (but no esophageal varices on EGD/ERCP)    Recommendations:    #1.  Continue antibiotics for Bacteremia (sources could be urinary/gallbladder, less likely cholangitis)  #2.  Follow CBC/CMP  #3.  Diet as tolerated.

## 2022-04-15 NOTE — PHARMACOTHERAPY INTERVENTION NOTE - COMMENTS
TESHA STEINER, 66y Male with E. coli bacteremia, likely secondary to biliary source (cholecystitis), initially was on zosyn, then switched to ceftriaxone monotherapy based on sensitivities.    Recommendation(s):  1) Would consider switching ceftriaxone to unasyn 3 grams IV Q6H, the E. coli was sensitive to unasyn and since concern for biliary tract infection, anaerobic coverage is likely warranted.    With kind regards,  Kiara WernerD  Infectious Diseases Clinical Pharmacist  .

## 2022-04-15 NOTE — PROGRESS NOTE ADULT - PROBLEM SELECTOR PLAN 2
- likely biliary source as above  - bld cx with E. coli bacteremia  - f/up repeat bld cx  - c/w Zosyn  - leukocytosis downtrending  - CT abd/pelvis showing acute cholecystitis, hepatic cirrhosis with portal htn GI, surgery consulted  - s/p emergent ERCP/biliary stent placement.  - managed nonoperatively, on IV Unasyn  -advanced to regular diet  - Followup in GI office in 2-4 weeks. Call [phone] to schedule.  [ ] Repeat ERCP in 4-6 weeks for stent removal and clearance of bile duct.

## 2022-04-15 NOTE — PROGRESS NOTE ADULT - SUBJECTIVE AND OBJECTIVE BOX
***************************************************************  Luz Liu, PGY1  Internal Medicine   pager: [facility]: [phone], [facility]: [phone]  ***************************************************************    TESHA STEINER  66y  MRN: [id number]    Patient is a 66y old  Male who presents with a chief complaint of Cholangitis (14 Apr 2022 11:39)      Subjective: no events ON. Denies fever, CP, SOB, abn pain, N/V, dysuria. Tolerating diet.      MEDICATIONS  (STANDING):  cefTRIAXone   IVPB 2000 milliGRAM(s) IV Intermittent every 24 hours  chlorhexidine 2% Cloths 1 Application(s) Topical <User Schedule>  potassium phosphate / sodium phosphate Powder (PHOS-NaK) 1 Packet(s) Oral once    MEDICATIONS  (PRN):  acetaminophen     Tablet .. 500 milliGRAM(s) Oral every 6 hours PRN Mild Pain (1 - 3)      Objective:    Vitals: Vital Signs Last 24 Hrs  T(C): 36.4 (04-15-22 @ 05:26), Max: 36.8 (04-14-22 @ 17:55)  T(F): 97.6 (04-15-22 @ 05:26), Max: 98.3 (04-14-22 @ 17:55)  HR: 71 (04-15-22 @ 05:26) (63 - 82)  BP: 127/78 (04-15-22 @ 05:26) (111/66 - 138/76)  BP(mean): 97 (04-14-22 @ 13:00) (84 - 97)  RR: 18 (04-15-22 @ 05:26) (13 - 21)  SpO2: 98% (04-15-22 @ 05:26) (96% - 98%)            I&O's Summary    14 Apr 2022 07:01  -  15 Apr 2022 07:00  --------------------------------------------------------  IN: 2280 mL / OUT: 2535 mL / NET: -255 mL        PHYSICAL EXAM:  GENERAL: NAD  HEAD:  Atraumatic, Normocephalic  EYES: EOMI, conjunctiva and sclera clear  CHEST/LUNG: Clear to percussion bilaterally; No rales, rhonchi, wheezing, or rubs  HEART: Regular rate and rhythm; No murmurs, rubs, or gallops  ABDOMEN: Soft, Nontender, Nondistended;   SKIN: No rashes or lesions  NERVOUS SYSTEM:  Alert & Oriented X3, no focal deficits    LABS:  04-15    132<L>  |  102  |  25<H>  ----------------------------<  93  4.6   |  21<L>  |  1.37<H>  04-14    125<L>  |  96  |  27<H>  ----------------------------<  119<H>  4.3   |  20<L>  |  1.48<H>  04-13    125<L>  |  94<L>  |  24<H>  ----------------------------<  111<H>  4.5   |  19<L>  |  1.70<H>    Ca    8.3<L>      15 Apr 2022 07:00  Ca    8.0<L>      14 Apr 2022 06:12  Ca    8.1<L>      13 Apr 2022 16:20  Phos  2.1     04-15  Mg     2.6     04-15    TPro  5.6<L>  /  Alb  2.0<L>  /  TBili  1.6<H>  /  DBili  x   /  AST  38  /  ALT  23  /  AlkPhos  83  04-15  TPro  5.6<L>  /  Alb  2.1<L>  /  TBili  2.3<H>  /  DBili  1.2<H>  /  AST  41<H>  /  ALT  22  /  AlkPhos  60  04-14  TPro  5.8<L>  /  Alb  2.2<L>  /  TBili  2.8<H>  /  DBili  1.5<H>  /  AST  54<H>  /  ALT  24  /  AlkPhos  59  04-13      PT/INR - ( 15 Apr 2022 07:00 )   PT: 17.9 sec;   INR: 1.55 ratio         PTT - ( 15 Apr 2022 07:12 )  PTT:31.5 sec                                        11.5   16.34 )-----------( 59       ( 15 Apr 2022 06:50 )             33.2                         10.4   16.73 )-----------( 42       ( 14 Apr 2022 06:12 )             30.0                         10.7   17.47 )-----------( 41       ( 13 Apr 2022 16:20 )             30.7     CAPILLARY BLOOD GLUCOSE          RADIOLOGY & ADDITIONAL TESTS:    Imaging Personally Reviewed:  [x ] YES  [ ] NO    Consultants involved in case:   Consultant(s) Notes Reviewed:  [ x] YES  [ ] NO:   Care Discussed with Consultants/Other Providers [x ] YES  [ ] NO         ***************************************************************  Luz Liu, PGY1  Internal Medicine   pager: [facility]: [phone], [facility]: [phone]  ***************************************************************    TESHA STEINER  66y  MRN: [id number]    Patient is a 66y old  Male who presents with a chief complaint of Cholangitis (14 Apr 2022 11:39)      Subjective: no events ON. No abdominal pain, still has some dysuria. Tolerated clear liquid diet well, having BMs.    MEDICATIONS  (STANDING):  cefTRIAXone   IVPB 2000 milliGRAM(s) IV Intermittent every 24 hours  chlorhexidine 2% Cloths 1 Application(s) Topical <User Schedule>  potassium phosphate / sodium phosphate Powder (PHOS-NaK) 1 Packet(s) Oral once    MEDICATIONS  (PRN):  acetaminophen     Tablet .. 500 milliGRAM(s) Oral every 6 hours PRN Mild Pain (1 - 3)      Objective:    Vitals: Vital Signs Last 24 Hrs  T(C): 36.4 (04-15-22 @ 05:26), Max: 36.8 (04-14-22 @ 17:55)  T(F): 97.6 (04-15-22 @ 05:26), Max: 98.3 (04-14-22 @ 17:55)  HR: 71 (04-15-22 @ 05:26) (63 - 82)  BP: 127/78 (04-15-22 @ 05:26) (111/66 - 138/76)  BP(mean): 97 (04-14-22 @ 13:00) (84 - 97)  RR: 18 (04-15-22 @ 05:26) (13 - 21)  SpO2: 98% (04-15-22 @ 05:26) (96% - 98%)            I&O's Summary    14 Apr 2022 07:01  -  15 Apr 2022 07:00  --------------------------------------------------------  IN: 2280 mL / OUT: 2535 mL / NET: -255 mL        PHYSICAL EXAM:  GENERAL: NAD  HEAD:  Atraumatic, Normocephalic  EYES: EOMI, conjunctiva and sclera clear  CHEST/LUNG: Clear to percussion bilaterally; No rales, rhonchi, wheezing, or rubs  HEART: Regular rate and rhythm; No murmurs, rubs, or gallops  ABDOMEN: Soft, Nontender, Nondistended;   SKIN: No rashes or lesions  NERVOUS SYSTEM:  Alert & Oriented X3, no focal deficits    LABS:  04-15    132<L>  |  102  |  25<H>  ----------------------------<  93  4.6   |  21<L>  |  1.37<H>  04-14    125<L>  |  96  |  27<H>  ----------------------------<  119<H>  4.3   |  20<L>  |  1.48<H>  04-13    125<L>  |  94<L>  |  24<H>  ----------------------------<  111<H>  4.5   |  19<L>  |  1.70<H>    Ca    8.3<L>      15 Apr 2022 07:00  Ca    8.0<L>      14 Apr 2022 06:12  Ca    8.1<L>      13 Apr 2022 16:20  Phos  2.1     04-15  Mg     2.6     04-15    TPro  5.6<L>  /  Alb  2.0<L>  /  TBili  1.6<H>  /  DBili  x   /  AST  38  /  ALT  23  /  AlkPhos  83  04-15  TPro  5.6<L>  /  Alb  2.1<L>  /  TBili  2.3<H>  /  DBili  1.2<H>  /  AST  41<H>  /  ALT  22  /  AlkPhos  60  04-14  TPro  5.8<L>  /  Alb  2.2<L>  /  TBili  2.8<H>  /  DBili  1.5<H>  /  AST  54<H>  /  ALT  24  /  AlkPhos  59  04-13      PT/INR - ( 15 Apr 2022 07:00 )   PT: 17.9 sec;   INR: 1.55 ratio         PTT - ( 15 Apr 2022 07:12 )  PTT:31.5 sec                                        11.5   16.34 )-----------( 59       ( 15 Apr 2022 06:50 )             33.2                         10.4   16.73 )-----------( 42       ( 14 Apr 2022 06:12 )             30.0                         10.7   17.47 )-----------( 41       ( 13 Apr 2022 16:20 )             30.7     CAPILLARY BLOOD GLUCOSE          RADIOLOGY & ADDITIONAL TESTS:    Imaging Personally Reviewed:  [x ] YES  [ ] NO    Consultants involved in case:   Consultant(s) Notes Reviewed:  [ x] YES  [ ] NO:   Care Discussed with Consultants/Other Providers [x ] YES  [ ] NO

## 2022-04-15 NOTE — PROGRESS NOTE ADULT - PROBLEM SELECTOR PLAN 4
Cr 2.1 on presentation ; likely ATN in setting of septic shock  Cr down to 1.48 today s/p aggressive IV fluid resuscitation  - c/t monitor BMP  - avoid nephrotoxins. Cr 2.1 on presentation ; likely ATN in setting of septic shock  Cr down to 1.37 today s/p aggressive IV fluid resuscitation  - c/t monitor BMP  - avoid nephrotoxins.

## 2022-04-16 ENCOUNTER — TRANSCRIPTION ENCOUNTER (OUTPATIENT)
Age: 67
End: 2022-04-16

## 2022-04-16 DIAGNOSIS — Z29.9 ENCOUNTER FOR PROPHYLACTIC MEASURES, UNSPECIFIED: ICD-10-CM

## 2022-04-16 LAB
A1AT SERPL-MCNC: 152 MG/DL — SIGNIFICANT CHANGE UP (ref 90–200)
ALBUMIN SERPL ELPH-MCNC: 2.1 G/DL — LOW (ref 3.3–5)
ALP SERPL-CCNC: 101 U/L — SIGNIFICANT CHANGE UP (ref 40–120)
ALT FLD-CCNC: 24 U/L — SIGNIFICANT CHANGE UP (ref 10–45)
ANION GAP SERPL CALC-SCNC: 9 MMOL/L — SIGNIFICANT CHANGE UP (ref 5–17)
AST SERPL-CCNC: 43 U/L — HIGH (ref 10–40)
BILIRUB SERPL-MCNC: 1.6 MG/DL — HIGH (ref 0.2–1.2)
BUN SERPL-MCNC: 20 MG/DL — SIGNIFICANT CHANGE UP (ref 7–23)
CALCIUM SERPL-MCNC: 8.4 MG/DL — SIGNIFICANT CHANGE UP (ref 8.4–10.5)
CERULOPLASMIN SERPL-MCNC: 19 MG/DL — SIGNIFICANT CHANGE UP (ref 15–30)
CHLORIDE SERPL-SCNC: 103 MMOL/L — SIGNIFICANT CHANGE UP (ref 96–108)
CO2 SERPL-SCNC: 22 MMOL/L — SIGNIFICANT CHANGE UP (ref 22–31)
CREAT SERPL-MCNC: 1.15 MG/DL — SIGNIFICANT CHANGE UP (ref 0.5–1.3)
EGFR: 71 ML/MIN/1.73M2 — SIGNIFICANT CHANGE UP
FERRITIN SERPL-MCNC: 1554 NG/ML — HIGH (ref 30–400)
GLUCOSE SERPL-MCNC: 88 MG/DL — SIGNIFICANT CHANGE UP (ref 70–99)
HCT VFR BLD CALC: 33.6 % — LOW (ref 39–50)
HGB BLD-MCNC: 11.8 G/DL — LOW (ref 13–17)
IRON SATN MFR SERPL: 125 UG/DL — SIGNIFICANT CHANGE UP (ref 45–165)
IRON SATN MFR SERPL: SIGNIFICANT CHANGE UP % (ref 16–55)
MAGNESIUM SERPL-MCNC: 2.4 MG/DL — SIGNIFICANT CHANGE UP (ref 1.6–2.6)
MCHC RBC-ENTMCNC: 35.1 GM/DL — SIGNIFICANT CHANGE UP (ref 32–36)
MCHC RBC-ENTMCNC: 35.3 PG — HIGH (ref 27–34)
MCV RBC AUTO: 100.6 FL — HIGH (ref 80–100)
NRBC # BLD: 0 /100 WBCS — SIGNIFICANT CHANGE UP (ref 0–0)
PHOSPHATE SERPL-MCNC: 1.9 MG/DL — LOW (ref 2.5–4.5)
PLATELET # BLD AUTO: 63 K/UL — LOW (ref 150–400)
POTASSIUM SERPL-MCNC: 4 MMOL/L — SIGNIFICANT CHANGE UP (ref 3.5–5.3)
POTASSIUM SERPL-SCNC: 4 MMOL/L — SIGNIFICANT CHANGE UP (ref 3.5–5.3)
PROT SERPL-MCNC: 5.6 G/DL — LOW (ref 6–8.3)
RBC # BLD: 3.34 M/UL — LOW (ref 4.2–5.8)
RBC # FLD: 14 % — SIGNIFICANT CHANGE UP (ref 10.3–14.5)
SODIUM SERPL-SCNC: 134 MMOL/L — LOW (ref 135–145)
TIBC SERPL-MCNC: SIGNIFICANT CHANGE UP UG/DL (ref 220–430)
UIBC SERPL-MCNC: <20 UG/DL — LOW (ref 110–370)
WBC # BLD: 13.79 K/UL — HIGH (ref 3.8–10.5)
WBC # FLD AUTO: 13.79 K/UL — HIGH (ref 3.8–10.5)

## 2022-04-16 PROCEDURE — 99233 SBSQ HOSP IP/OBS HIGH 50: CPT | Mod: GC

## 2022-04-16 RX ORDER — ENOXAPARIN SODIUM 100 MG/ML
40 INJECTION SUBCUTANEOUS EVERY 24 HOURS
Refills: 0 | Status: DISCONTINUED | OUTPATIENT
Start: 2022-04-16 | End: 2022-04-17

## 2022-04-16 RX ORDER — FOLIC ACID 0.8 MG
1 TABLET ORAL
Qty: 30 | Refills: 0
Start: 2022-04-16 | End: 2022-05-15

## 2022-04-16 RX ADMIN — AMPICILLIN SODIUM AND SULBACTAM SODIUM 200 GRAM(S): 250; 125 INJECTION, POWDER, FOR SUSPENSION INTRAMUSCULAR; INTRAVENOUS at 11:02

## 2022-04-16 RX ADMIN — Medication 1 MILLIGRAM(S): at 11:02

## 2022-04-16 RX ADMIN — AMPICILLIN SODIUM AND SULBACTAM SODIUM 200 GRAM(S): 250; 125 INJECTION, POWDER, FOR SUSPENSION INTRAMUSCULAR; INTRAVENOUS at 17:19

## 2022-04-16 RX ADMIN — ENOXAPARIN SODIUM 40 MILLIGRAM(S): 100 INJECTION SUBCUTANEOUS at 11:06

## 2022-04-16 RX ADMIN — AMPICILLIN SODIUM AND SULBACTAM SODIUM 200 GRAM(S): 250; 125 INJECTION, POWDER, FOR SUSPENSION INTRAMUSCULAR; INTRAVENOUS at 06:29

## 2022-04-16 NOTE — DISCHARGE NOTE PROVIDER - HOSPITAL COURSE
HPI:  65 YO M no pertinent PMH presenting with 1 week of epigastric and RUQ pain. States that she went to his PCP and was told that he has food poisoning. Reports fevers of 101-102 over course of last week. Denies changed to BM, urination. Per wife, patient beginning to look more jaundiced over last few days and reporting increased fatigue- unable to make it into hospital without help. Reports episodes of emesis at beginning of last week. Last colonoscopy and endoscopy in 2019, both normal. Denies recent weight loss. Denies hx of gallstones. Reports no home medications, hx of surgery, or PMH.     Hypotensive SBP 80s-90s in ED. Lactate 10.4, s/p 3 L bolus lactate to 10.1. Tobin placed in ED with 10 cc return.  (12 Apr 2022 15:31)    Hospital course:  Pt was found to be in septic shock with E. coli bacteremia 2/2 E. coli UTI and acute cholecystitis. GI and surgery were consulted. Pt underwent ERCP with stent placement. Pt was recommended for no inpatient surgical intervention. He was started on IV Zosyn 4/12 then narrowed to Unasyn on 4/15 after sensitivities resulted. Follow up blood cultures on 4/14 were NGTD. Pt was also found to have cirrhotic morphology on CT a/p with thrombocytopenia. US abdomen was with trace to mild ascites without a tappable pocket. Hepatology was recommended and recommended drawing hepatitis labs and outpatient followup. Pt was seen by PT and recommended for home without needs. Pt will discharge home with followup with hepatology, GI for repeat ERCP for stent removal in 6-8 weeks, and surgery for elective outpatient cholecystectomy. He will continue taking Po augmentin to complete a 10 day course of antibiotics from first day of clear blood cultures (4/14 - 4/23, expected). HPI:  67 YO M no pertinent PMH presenting with 1 week of epigastric and RUQ pain. States that she went to his PCP and was told that he has food poisoning. Reports fevers of 101-102 over course of last week. Denies changed to BM, urination. Per wife, patient beginning to look more jaundiced over last few days and reporting increased fatigue- unable to make it into hospital without help. Reports episodes of emesis at beginning of last week. Last colonoscopy and endoscopy in 2019, both normal. Denies recent weight loss. Denies hx of gallstones. Reports no home medications, hx of surgery, or PMH.     Hypotensive SBP 80s-90s in ED. Lactate 10.4, s/p 3 L bolus lactate to 10.1. Tobin placed in ED with 10 cc return.  (12 Apr 2022 15:31)    Hospital course:  Pt was found to be in septic shock with E. coli bacteremia 2/2 E. coli UTI and acute cholecystitis. GI and surgery were consulted. Pt underwent ERCP with stent placement. Pt was recommended for no inpatient surgical intervention. He was started on IV Zosyn 4/12 then narrowed to Unasyn on 4/15 after sensitivities resulted. Follow up blood cultures on 4/14 were NGTD. Pt was also found to have cirrhotic morphology on CT a/p with thrombocytopenia. US abdomen was with trace to mild ascites without a tappable pocket. Hepatology was recommended and recommended drawing hepatitis labs and outpatient followup. Pt was seen by PT and recommended for home without needs. Pt will discharge home with followup with hepatology, GI for repeat ERCP for stent removal in 6-8 weeks, and surgery for elective outpatient cholecystectomy. He will continue taking Po augmentin to complete a 10 day course of antibiotics from first day of clear blood cultures (4/14 - 4/23).

## 2022-04-16 NOTE — PHYSICAL THERAPY INITIAL EVALUATION ADULT - PERTINENT HX OF CURRENT PROBLEM, REHAB EVAL
66M no pertinent PMH presenting with 1 week of epigastric/RUQ pain, fever, n/v. Found to be in septic shock with E. coli bacteremia 2/2 acute cholecystitis and UTI ; s/p emergent ERCP/biliary stent placement. Improving on IV unasyn.

## 2022-04-16 NOTE — PROGRESS NOTE ADULT - PROBLEM SELECTOR PLAN 4
Cr 2.1 on presentation ; likely ATN in setting of septic shock  Cr down to 1.37 today s/p aggressive IV fluid resuscitation  - c/t monitor BMP  - avoid nephrotoxins. E. coli UTI, +dysuria  -c/w Unasyn, dc on augmentin as outpatient  -milian placed while in SICU given urinary retention  [ ] TOV 4/16

## 2022-04-16 NOTE — PROGRESS NOTE ADULT - PROBLEM SELECTOR PLAN 1
- likely biliary vs urinary source, BCx and UCx with E coli  - leukocytosis ~16 and lactate 1.1 downtrending  - CT abd/pelvis showing acute cholecystitis, hepatic cirrhosis with portal htn    Plan:  - started on Zosyn (4/12) then narrowed to Unasyn 4/15  -c/w Unasyn, plan for 10 day course and can dc on Po augmentin (last day will be 4/21) - likely biliary vs urinary source, BCx and UCx with E coli  - leukocytosis ~16 and lactate 1.1 downtrending  - CT abd/pelvis showing acute cholecystitis, hepatic cirrhosis with portal htn    Plan:  - started on Zosyn (4/12) then narrowed to Unasyn 4/15  -c/w Unasyn, plan for 10 day course from first day of clear cultures (4/14) and can dc on Po Augmentin (last day will be 4/23)

## 2022-04-16 NOTE — DISCHARGE NOTE PROVIDER - NSDCMRMEDTOKEN_GEN_ALL_CORE_FT
amoxicillin-clavulanate 875 mg-125 mg oral tablet: 1 tab(s) orally 2 times a day (last day 4/23)  folic acid 1 mg oral tablet: 1 tab(s) orally once a day

## 2022-04-16 NOTE — PROGRESS NOTE ADULT - SUBJECTIVE AND OBJECTIVE BOX
***************************************************************  Luz Liu, PGY1  Internal Medicine   pager: LIKATARINA: [phone], [facility]: [phone]  ***************************************************************    TESHA STEINER  66y  MRN: [id number]    Patient is a 66y old  Male who presents with a chief complaint of Cholangitis (15 Apr 2022 13:18)      Subjective: no events ON. Denies fever, CP, SOB, abn pain, N/V, dysuria. Tolerating diet.      MEDICATIONS  (STANDING):  ampicillin/sulbactam  IVPB 3 Gram(s) IV Intermittent every 6 hours  chlorhexidine 2% Cloths 1 Application(s) Topical <User Schedule>  folic acid 1 milliGRAM(s) Oral daily    MEDICATIONS  (PRN):  acetaminophen     Tablet .. 500 milliGRAM(s) Oral every 6 hours PRN Mild Pain (1 - 3)      Objective:    Vitals: Vital Signs Last 24 Hrs  T(C): 36.9 (04-16-22 @ 05:28), Max: 36.9 (04-16-22 @ 05:28)  T(F): 98.4 (04-16-22 @ 05:28), Max: 98.4 (04-16-22 @ 05:28)  HR: 65 (04-16-22 @ 05:28) (65 - 80)  BP: 140/85 (04-16-22 @ 05:28) (125/80 - 140/86)  BP(mean): --  RR: 18 (04-16-22 @ 05:28) (18 - 18)  SpO2: 97% (04-16-22 @ 05:28) (95% - 98%)            I&O's Summary    15 Apr 2022 07:01  -  16 Apr 2022 07:00  --------------------------------------------------------  IN: 720 mL / OUT: 2950 mL / NET: -2230 mL        PHYSICAL EXAM:  GENERAL: NAD  HEAD:  Atraumatic, Normocephalic  EYES: EOMI, conjunctiva and sclera clear  CHEST/LUNG: Clear to percussion bilaterally; No rales, rhonchi, wheezing, or rubs  HEART: Regular rate and rhythm; No murmurs, rubs, or gallops  ABDOMEN: Soft, Nontender, Nondistended;   SKIN: No rashes or lesions  NERVOUS SYSTEM:  Alert & Oriented X3, no focal deficits    LABS:  04-15    132<L>  |  102  |  25<H>  ----------------------------<  93  4.6   |  21<L>  |  1.37<H>  04-14    125<L>  |  96  |  27<H>  ----------------------------<  119<H>  4.3   |  20<L>  |  1.48<H>  04-13    125<L>  |  94<L>  |  24<H>  ----------------------------<  111<H>  4.5   |  19<L>  |  1.70<H>    Ca    8.3<L>      15 Apr 2022 07:00  Ca    8.0<L>      14 Apr 2022 06:12  Ca    8.1<L>      13 Apr 2022 16:20  Phos  2.1     04-15  Mg     2.6     04-15    TPro  5.6<L>  /  Alb  2.0<L>  /  TBili  1.6<H>  /  DBili  x   /  AST  38  /  ALT  23  /  AlkPhos  83  04-15  TPro  5.6<L>  /  Alb  2.1<L>  /  TBili  2.3<H>  /  DBili  1.2<H>  /  AST  41<H>  /  ALT  22  /  AlkPhos  60  04-14  TPro  5.8<L>  /  Alb  2.2<L>  /  TBili  2.8<H>  /  DBili  1.5<H>  /  AST  54<H>  /  ALT  24  /  AlkPhos  59  04-13      PT/INR - ( 15 Apr 2022 07:00 )   PT: 17.9 sec;   INR: 1.55 ratio         PTT - ( 15 Apr 2022 07:12 )  PTT:31.5 sec                                        11.8   13.79 )-----------( 63       ( 16 Apr 2022 07:33 )             33.6                         11.5   16.34 )-----------( 59       ( 15 Apr 2022 06:50 )             33.2                         10.4   16.73 )-----------( 42       ( 14 Apr 2022 06:12 )             30.0     CAPILLARY BLOOD GLUCOSE          RADIOLOGY & ADDITIONAL TESTS:    Imaging Personally Reviewed:  [x ] YES  [ ] NO    Consultants involved in case:   Consultant(s) Notes Reviewed:  [ x] YES  [ ] NO:   Care Discussed with Consultants/Other Providers [x ] YES  [ ] NO         ***************************************************************  Luz Liu, PGY1  Internal Medicine   pager: LIKATARINA: [phone], [facility]: [phone]  ***************************************************************    TESHA STEINER  66y  MRN: [id number]    Patient is a 66y old  Male who presents with a chief complaint of Cholangitis (15 Apr 2022 13:18)      Subjective: no events ON. Tolerating regular diet.  No more abdominal pain or dysuria. Has been walking independently. Tobin still in.     MEDICATIONS  (STANDING):  ampicillin/sulbactam  IVPB 3 Gram(s) IV Intermittent every 6 hours  chlorhexidine 2% Cloths 1 Application(s) Topical <User Schedule>  folic acid 1 milliGRAM(s) Oral daily    MEDICATIONS  (PRN):  acetaminophen     Tablet .. 500 milliGRAM(s) Oral every 6 hours PRN Mild Pain (1 - 3)      Objective:    Vitals: Vital Signs Last 24 Hrs  T(C): 36.9 (04-16-22 @ 05:28), Max: 36.9 (04-16-22 @ 05:28)  T(F): 98.4 (04-16-22 @ 05:28), Max: 98.4 (04-16-22 @ 05:28)  HR: 65 (04-16-22 @ 05:28) (65 - 80)  BP: 140/85 (04-16-22 @ 05:28) (125/80 - 140/86)  BP(mean): --  RR: 18 (04-16-22 @ 05:28) (18 - 18)  SpO2: 97% (04-16-22 @ 05:28) (95% - 98%)            I&O's Summary    15 Apr 2022 07:01  -  16 Apr 2022 07:00  --------------------------------------------------------  IN: 720 mL / OUT: 2950 mL / NET: -2230 mL        PHYSICAL EXAM:  GENERAL: NAD  HEAD:  Atraumatic, Normocephalic  EYES: EOMI, conjunctiva and sclera clear  CHEST/LUNG: Clear to percussion bilaterally; No rales, rhonchi, wheezing, or rubs  HEART: Regular rate and rhythm; No murmurs, rubs, or gallops  ABDOMEN: Soft, Nontender, Nondistended;   SKIN: No rashes or lesions  NERVOUS SYSTEM:  Alert & Oriented X3, no focal deficits    LABS:  04-15    132<L>  |  102  |  25<H>  ----------------------------<  93  4.6   |  21<L>  |  1.37<H>  04-14    125<L>  |  96  |  27<H>  ----------------------------<  119<H>  4.3   |  20<L>  |  1.48<H>  04-13    125<L>  |  94<L>  |  24<H>  ----------------------------<  111<H>  4.5   |  19<L>  |  1.70<H>    Ca    8.3<L>      15 Apr 2022 07:00  Ca    8.0<L>      14 Apr 2022 06:12  Ca    8.1<L>      13 Apr 2022 16:20  Phos  2.1     04-15  Mg     2.6     04-15    TPro  5.6<L>  /  Alb  2.0<L>  /  TBili  1.6<H>  /  DBili  x   /  AST  38  /  ALT  23  /  AlkPhos  83  04-15  TPro  5.6<L>  /  Alb  2.1<L>  /  TBili  2.3<H>  /  DBili  1.2<H>  /  AST  41<H>  /  ALT  22  /  AlkPhos  60  04-14  TPro  5.8<L>  /  Alb  2.2<L>  /  TBili  2.8<H>  /  DBili  1.5<H>  /  AST  54<H>  /  ALT  24  /  AlkPhos  59  04-13      PT/INR - ( 15 Apr 2022 07:00 )   PT: 17.9 sec;   INR: 1.55 ratio         PTT - ( 15 Apr 2022 07:12 )  PTT:31.5 sec                                        11.8   13.79 )-----------( 63       ( 16 Apr 2022 07:33 )             33.6                         11.5   16.34 )-----------( 59       ( 15 Apr 2022 06:50 )             33.2                         10.4   16.73 )-----------( 42       ( 14 Apr 2022 06:12 )             30.0     CAPILLARY BLOOD GLUCOSE          RADIOLOGY & ADDITIONAL TESTS:    Imaging Personally Reviewed:  [x ] YES  [ ] NO    Consultants involved in case:   Consultant(s) Notes Reviewed:  [ x] YES  [ ] NO:   Care Discussed with Consultants/Other Providers [x ] YES  [ ] NO

## 2022-04-16 NOTE — PROGRESS NOTE ADULT - PROBLEM SELECTOR PLAN 5
- found to have hepatic cirrhosis w/portal htn  - likely 2/2 etoh  - Hepatology consulted - found to have hepatic cirrhosis w/portal htn, likely some synthetic dysfunction given thrombocytopenia and elevated INR  - likely 2/2 etoh  - Hepatology consulted  - sent HBsAb, HAV IgG, MICHELLE, ASMA, AMA, A1AT, anti-LKM, ceruloplasmin, ferritin, iron panel, B12 and folate levels  - US abdomen with trace to mild ascites, not tappable   [ ] outpatient hepatology followup:  [phone] ([facility]) or [phone] ([facility] at [address] [place] [address]). ****resolved  -Cr down to 1.13 from ~2 after aggressive fluid repletion

## 2022-04-16 NOTE — PHYSICAL THERAPY INITIAL EVALUATION ADULT - THERAPY FREQUENCY, PT EVAL
Met with Preeti to discuss treatment change to Ramucirumab. Provided patient with [facility] printouts. Reviewed administration, side effects and ongoing symptom management by APPs in clinic. Provided phone numbers for triage and after hours care. Answered all questions to Preeti stated satisfaction and understanding.     2-3x/week

## 2022-04-16 NOTE — DISCHARGE NOTE PROVIDER - CARE PROVIDER_API CALL
Hepatology,   [phone] ([facility]) or [phone] ([facility] at [address] [place] [address]).  Phone: (   )    -  Fax: (   )    -  Follow Up Time: 2 weeks    GI,   Phone: [phone]  Fax: (   )    -  Follow Up Time: Routine    [facility] surgery,   Phone: [phone]  Fax: (   )    -  Follow Up Time:     TANISHA HARDIN  Internal Medicine  [address] [place] [address]  Phone: [phone]  Fax: [phone]  Established Patient  Follow Up Time: 2 weeks

## 2022-04-16 NOTE — PROGRESS NOTE ADULT - PROBLEM SELECTOR PLAN 2
GI, surgery consulted  - s/p emergent ERCP/biliary stent placement.  - managed nonoperatively, on IV Unasyn  -advanced to regular diet  - Followup in GI office in 2-4 weeks. Call [phone] to schedule.  [ ] Repeat ERCP in 4-6 weeks for stent removal and clearance of bile duct. GI, surgery consulted  - s/p emergent ERCP/biliary stent placement.  - managed nonoperatively, on IV Unasyn  -advanced to regular diet  - Per discussion with Surgery 4/16, no plan for inpatient cholecystectomy. Follow up in 2 weeks with surgery outpatient for elective cholecystectomy if desired.  - Followup in GI office in 2-4 weeks. Call [phone] to schedule.  [ ] Repeat ERCP in 4-6 weeks for stent removal and clearance of bile duct.

## 2022-04-16 NOTE — PROGRESS NOTE ADULT - ATTENDING COMMENTS
Patient seen and examined at bedside. No acute events overnight. Patient without GI symptoms and tolerating diet. LFTs are likely at his baseline. He wants us to help him fill out a form for work. Patient performing TOV today and tentatively has passed. He has cleared his bacteremia likely from cholangitis or cholecystitis and now on antibiotics and s/p ERCP and stent. He will need follow up with GI for stent removal in approximately 6 weeks. He will need general surgery in several weeks for possible cholecystectomy. He has newly found cirrhosis and thus far work up unremarkable (slightly elevated ferritin, but not at the level I would expect for hemochromatosis). Patient seen by hepatology and recommended for outpatient follow up. Likely discharge tomorrow.

## 2022-04-16 NOTE — DISCHARGE NOTE PROVIDER - NSDCCPCAREPLAN_GEN_ALL_CORE_FT
PRINCIPAL DISCHARGE DIAGNOSIS  Diagnosis: Septic shock  Assessment and Plan of Treatment: You came into the hospital because you were having severe abdominal pain and pain with urination. We found that you had a blood infection with a bacteria called E. coli, a urine infection, and gallbladder inflammation. You were seen by the GI doctor who did an ERCP procedure. We gave you antibiotics, and you improved. Please continue to take the antibiotic (augmentin) twice a day, last day will be April 23. Please see the GI doctor for repeat ERCP and stent removal in 6-8 weeks. Call [phone] to schedule. Please also see the general surgeons if you would like to have your gallbladder removed to prevent future episodes.      SECONDARY DISCHARGE DIAGNOSES  Diagnosis: Cirrhosis  Assessment and Plan of Treatment: We also found that you have new liver disease on your blood tests and imaging. Please stop drinking alcohol. Please follow up with the liver doctor.    Diagnosis: Folate deficiency  Assessment and Plan of Treatment: We found that the level of folate (a vitamin) in your blood was low. This can cause low blood counts. Please continue to take a folate supplement.

## 2022-04-16 NOTE — DISCHARGE NOTE PROVIDER - PROVIDER TOKENS
lvm for pt to call back for earlier appt with Dr Iesha Solis in [place] - availability for 8/7 for 8:15, 9:30 or 2:10 - please assist if available 
FREE:[LAST:[Hepatology],PHONE:[(   )    -],FAX:[(   )    -],ADDRESS:[[phone] ([facility]) or [phone] ([facility] at [address]).],FOLLOWUP:[2 weeks]],FREE:[LAST:[GI],PHONE:[phone]],FAX:[(   )    -],FOLLOWUP:[Routine]],FREE:[LAST:[[facility]],PHONE:[phone]],FAX:[(   )    -]],PROVIDER:[TOKEN:[[id number]],FOLLOWUP:[2 weeks],ESTABLISHEDPATIENT:[T]]

## 2022-04-16 NOTE — PROGRESS NOTE ADULT - PROBLEM SELECTOR PLAN 3
E. coli UTI, +dysuria  -c/w Unasyn E. coli UTI, +dysuria  -c/w Unasyn, dc on augmentin as outpatient  -milian placed while in SICU given urinary retention  [ ] TOV 4/16 - found to have hepatic cirrhosis w/portal htn, likely some synthetic dysfunction given thrombocytopenia and elevated INR  - likely 2/2 etoh  - Hepatology consulted  - sent HBsAb, HAV IgG, MICHELLE, ASMA, AMA, A1AT, anti-LKM, ceruloplasmin, ferritin, iron panel, B12 and folate levels  - US abdomen with trace to mild ascites, not tappable   [ ] outpatient hepatology followup:  [phone] ([facility]) or [phone] ([facility] at [address] [place] [address]).

## 2022-04-16 NOTE — PHYSICAL THERAPY INITIAL EVALUATION ADULT - ADDITIONAL COMMENTS
Pt lives with his wife in a pvt house w/ 10 steps to neg. Pt amb I PTA. Pt was independent w/ ADL's.

## 2022-04-16 NOTE — DISCHARGE NOTE PROVIDER - NSDCFUADDAPPT_GEN_ALL_CORE_FT
APPTS ARE READY TO BE MADE: [X ] YES    Best Family or Patient Contact (if needed):    Additional Information about above appointments (if needed):    1: GI (for ERCP and stent removal in 6-8 weeks, call [phone] to schedule)  2: Hepatology [phone] ([facility]) or [phone] ([facility] at [address] [place] [address]).  3: Surgery (for elective cholecystectomy)  4: Primary care doctor  Other comments or requests:    APPTS ARE READY TO BE MADE: [X ] YES    Best Family or Patient Contact (if needed):    Additional Information about above appointments (if needed):    1: GI (for ERCP and stent removal in 6-8 weeks, call [phone] to schedule)  2: Hepatology [phone] ([facility]) or [phone] ([facility] at [address] [place] [address]).  3: Surgery (for elective cholecystectomy)  4: Primary care doctor    Other comments or requests:   Patient was provided with information for Hepatology, GI, [facility] Surgery, and [facility], and was advised to call to schedule follow up within specified time frame. At this time patient declined scheduling assistance.

## 2022-04-16 NOTE — PROGRESS NOTE ADULT - PROBLEM SELECTOR PLAN 6
Na improved to 132 from 125 after fluids, urine lytes showing pre-renal  - do not correct by > 6 - 8 mEQ in 24 hrs  - DC fluids Na improved to 134 after fluids, urine lytes showing pre-renal  - do not correct by > 6 - 8 mEQ in 24 hrs  - DC fluids

## 2022-04-16 NOTE — DISCHARGE NOTE PROVIDER - NSDCCPTREATMENT_GEN_ALL_CORE_FT
PRINCIPAL PROCEDURE  Procedure: CT abdomen  Findings and Treatment: LOWER CHEST: Patchy opacities at both lung bases, likely atelectasis.  LIVER: Cirrhotic morphology. No liver mass.  BILE DUCTS: CBD stent in good position. No intrahepatic biliary ductal   dilatation.  GALLBLADDER: Mild distention. Small gallstones. Mild mural thickening and   heterogeneous enhancement.  SPLEEN: Within normal limits.  PANCREAS: Within normal limits.  ADRENALS: Within normal limits.  KIDNEYS/URETERS: Within normal limits.  BLADDER: Tobin catheter. Air within bladder lumen.  REPRODUCTIVE ORGANS: Prostate within normal limits.  BOWEL: No bowel obstruction. Colonic diverticula. Appendix is not   visualized. No evidence of inflammation in the pericecal region.  PERITONEUM: Mild ascites and mesentericedema.  VESSELS: Portal and hepatic veins are patent. Splenorenal shunt. Gastric   varices.  RETROPERITONEUM/LYMPH NODES: No lymphadenopathy.  ABDOMINAL WALL: Bilateral flank edema.  BONES: Within normal limits.  IMPRESSION:  Cholelithiasis with suspicion of acute cholecystitis.  Hepatic cirrhosis with portal hypertension.        SECONDARY PROCEDURE  Procedure: ERCP  Findings and Treatment: EGD:    - Two subepithelial nodules found in gastric antrum  ERCP:   - Biliary sludge in a nondilated common bile duct. To ensure drainage, 7 Fr x 5 cm plastic biliary stent was placed.  - No obvious cholangitis. Differential diagnosis includes spontaneously passed/resolved choledocholithiasis or severe cholecystitis.    Procedure: US abdomen limited  Findings and Treatment:   Cirrhotic liver partially redemonstrated. Trace to mild ascites of the 4   quadrants of the abdomen.

## 2022-04-16 NOTE — PROGRESS NOTE ADULT - PROBLEM SELECTOR PLAN 7
DVT ppx: lovenox  Diet: regular    PT consulted    Dispo: pending clinical improvement DVT ppx: lovenox  Diet: regular    PT consulted, recommending home without needs    Dispo: Home 4/17 after TOV

## 2022-04-17 ENCOUNTER — TRANSCRIPTION ENCOUNTER (OUTPATIENT)
Age: 67
End: 2022-04-17

## 2022-04-17 VITALS
RESPIRATION RATE: 20 BRPM | OXYGEN SATURATION: 95 % | HEART RATE: 87 BPM | TEMPERATURE: 99 F | SYSTOLIC BLOOD PRESSURE: 150 MMHG | DIASTOLIC BLOOD PRESSURE: 70 MMHG

## 2022-04-17 LAB
ALBUMIN SERPL ELPH-MCNC: 2.1 G/DL — LOW (ref 3.3–5)
ALP SERPL-CCNC: 106 U/L — SIGNIFICANT CHANGE UP (ref 40–120)
ALT FLD-CCNC: 23 U/L — SIGNIFICANT CHANGE UP (ref 10–45)
ANION GAP SERPL CALC-SCNC: 11 MMOL/L — SIGNIFICANT CHANGE UP (ref 5–17)
AST SERPL-CCNC: 46 U/L — HIGH (ref 10–40)
BILIRUB SERPL-MCNC: 1.8 MG/DL — HIGH (ref 0.2–1.2)
BUN SERPL-MCNC: 18 MG/DL — SIGNIFICANT CHANGE UP (ref 7–23)
CALCIUM SERPL-MCNC: 8.2 MG/DL — LOW (ref 8.4–10.5)
CHLORIDE SERPL-SCNC: 100 MMOL/L — SIGNIFICANT CHANGE UP (ref 96–108)
CO2 SERPL-SCNC: 22 MMOL/L — SIGNIFICANT CHANGE UP (ref 22–31)
CREAT SERPL-MCNC: 1.19 MG/DL — SIGNIFICANT CHANGE UP (ref 0.5–1.3)
EGFR: 68 ML/MIN/1.73M2 — SIGNIFICANT CHANGE UP
GLUCOSE SERPL-MCNC: 63 MG/DL — LOW (ref 70–99)
HCT VFR BLD CALC: 34 % — LOW (ref 39–50)
HGB BLD-MCNC: 11.7 G/DL — LOW (ref 13–17)
MAGNESIUM SERPL-MCNC: 2 MG/DL — SIGNIFICANT CHANGE UP (ref 1.6–2.6)
MCHC RBC-ENTMCNC: 34.4 GM/DL — SIGNIFICANT CHANGE UP (ref 32–36)
MCHC RBC-ENTMCNC: 34.6 PG — HIGH (ref 27–34)
MCV RBC AUTO: 100.6 FL — HIGH (ref 80–100)
NRBC # BLD: 0 /100 WBCS — SIGNIFICANT CHANGE UP (ref 0–0)
PHOSPHATE SERPL-MCNC: 2.3 MG/DL — LOW (ref 2.5–4.5)
PLATELET # BLD AUTO: 57 K/UL — LOW (ref 150–400)
POTASSIUM SERPL-MCNC: 3.4 MMOL/L — LOW (ref 3.5–5.3)
POTASSIUM SERPL-SCNC: 3.4 MMOL/L — LOW (ref 3.5–5.3)
PROT SERPL-MCNC: 5.6 G/DL — LOW (ref 6–8.3)
RBC # BLD: 3.38 M/UL — LOW (ref 4.2–5.8)
RBC # FLD: 14.3 % — SIGNIFICANT CHANGE UP (ref 10.3–14.5)
SODIUM SERPL-SCNC: 133 MMOL/L — LOW (ref 135–145)
WBC # BLD: 10.99 K/UL — HIGH (ref 3.8–10.5)
WBC # FLD AUTO: 10.99 K/UL — HIGH (ref 3.8–10.5)

## 2022-04-17 PROCEDURE — 99239 HOSP IP/OBS DSCHRG MGMT >30: CPT | Mod: GC

## 2022-04-17 RX ORDER — SODIUM,POTASSIUM PHOSPHATES 278-250MG
1 POWDER IN PACKET (EA) ORAL ONCE
Refills: 0 | Status: COMPLETED | OUTPATIENT
Start: 2022-04-17 | End: 2022-04-17

## 2022-04-17 RX ORDER — POTASSIUM CHLORIDE 20 MEQ
40 PACKET (EA) ORAL ONCE
Refills: 0 | Status: COMPLETED | OUTPATIENT
Start: 2022-04-17 | End: 2022-04-17

## 2022-04-17 RX ADMIN — AMPICILLIN SODIUM AND SULBACTAM SODIUM 200 GRAM(S): 250; 125 INJECTION, POWDER, FOR SUSPENSION INTRAMUSCULAR; INTRAVENOUS at 12:10

## 2022-04-17 RX ADMIN — Medication 40 MILLIEQUIVALENT(S): at 09:28

## 2022-04-17 RX ADMIN — Medication 1 PACKET(S): at 09:28

## 2022-04-17 RX ADMIN — Medication 1 MILLIGRAM(S): at 12:14

## 2022-04-17 RX ADMIN — AMPICILLIN SODIUM AND SULBACTAM SODIUM 200 GRAM(S): 250; 125 INJECTION, POWDER, FOR SUSPENSION INTRAMUSCULAR; INTRAVENOUS at 00:10

## 2022-04-17 RX ADMIN — AMPICILLIN SODIUM AND SULBACTAM SODIUM 200 GRAM(S): 250; 125 INJECTION, POWDER, FOR SUSPENSION INTRAMUSCULAR; INTRAVENOUS at 05:21

## 2022-04-17 RX ADMIN — CHLORHEXIDINE GLUCONATE 1 APPLICATION(S): 213 SOLUTION TOPICAL at 12:14

## 2022-04-17 RX ADMIN — ENOXAPARIN SODIUM 40 MILLIGRAM(S): 100 INJECTION SUBCUTANEOUS at 12:10

## 2022-04-17 NOTE — DISCHARGE NOTE NURSING/CASE MANAGEMENT/SOCIAL WORK - NSDCPEFALRISK_GEN_ALL_CORE
For information on Fall & Injury Prevention, visit: [email or web address]/[email or web address] OR  [email or web address]/[email or web address]-tips-[email or web address] OR  https://www.cdc.gov/steadi/patient.html

## 2022-04-17 NOTE — PROGRESS NOTE ADULT - SUBJECTIVE AND OBJECTIVE BOX
**********************************************  Esther Tellez, PGY-2  Internal Medicine   **********************************************     Patient is a 66y old  Male who presents with a chief complaint of Cholangitis (16 Apr 2022 10:58)    SUBJECTIVE / OVERNIGHT EVENTS: No acute events overnight. Patient examined at bedside this AM, with no subjective complaints. Denies CP, palpitations, SOB, n/v/d.     OBJECTIVE:  Vital Signs Last 24 Hrs  T(C): 36.6 (17 Apr 2022 09:46), Max: 37.3 (16 Apr 2022 20:48)  T(F): 97.8 (17 Apr 2022 09:46), Max: 99.1 (16 Apr 2022 20:48)  HR: 82 (17 Apr 2022 09:46) (82 - 100)  BP: 159/89 (17 Apr 2022 09:46) (126/77 - 159/89)  BP(mean): --  RR: 18 (17 Apr 2022 09:46) (18 - 18)  SpO2: 95% (17 Apr 2022 09:46) (93% - 96%)    I&O's Summary    16 Apr 2022 07:01  -  17 Apr 2022 07:00  --------------------------------------------------------  IN: 1460 mL / OUT: 1750 mL / NET: -290 mL    Physical Exam:     General: No acute distress, well-appearing    Eyes: PERRL, EOMI     ENT: MMM, no oropharyngeal lesions or erythema appreciated     Pulm: No increased WOB. CTAB. No wheezing.     CV: RRR. S1&S2+. No M/R/G appreciated.     Abdomen: +BS. Soft, NTND. No organomegaly.     MSK: Nml ROM    Extremities: No peripheral edema or cyanosis.     Neuro: A&Ox3, no focal deficits     Skin: Warm and dry. No visible rash.     Labs:  CAPILLARY BLOOD GLUCOSE                              11.7   10.99 )-----------( 57       ( 17 Apr 2022 07:30 )             34.0     04-17    133<L>  |  100  |  18  ----------------------------<  63<L>  3.4<L>   |  22  |  1.19    Ca    8.2<L>      17 Apr 2022 07:37  Phos  2.3     04-17  Mg     2.0     04-17    TPro  5.6<L>  /  Alb  2.1<L>  /  TBili  1.8<H>  /  DBili  x   /  AST  46<H>  /  ALT  23  /  AlkPhos  106  04-17            Culture - Blood (collected 14 Apr 2022 11:20)  Source: .Blood Blood-Peripheral  Preliminary Report (15 Apr 2022 12:01):    No growth to date.    Culture - Blood (collected 14 Apr 2022 11:16)  Source: .Blood Blood-Peripheral  Preliminary Report (15 Apr 2022 12:01):    No growth to date.        Imaging Personally Reviewed:      MEDICATIONS  (STANDING):  ampicillin/sulbactam  IVPB 3 Gram(s) IV Intermittent every 6 hours  chlorhexidine 2% Cloths 1 Application(s) Topical <User Schedule>  enoxaparin Injectable 40 milliGRAM(s) SubCutaneous every 24 hours  folic acid 1 milliGRAM(s) Oral daily    MEDICATIONS  (PRN):  acetaminophen     Tablet .. 500 milliGRAM(s) Oral every 6 hours PRN Mild Pain (1 - 3)

## 2022-04-17 NOTE — PROGRESS NOTE ADULT - PROBLEM SELECTOR PLAN 6
****resolved  Na improved to 134 after fluids, urine lytes showing pre-renal  - do not correct by > 6 - 8 mEQ in 24 hrs

## 2022-04-17 NOTE — PROGRESS NOTE ADULT - REASON FOR ADMISSION
Cholangitis

## 2022-04-17 NOTE — PROGRESS NOTE ADULT - PROBLEM SELECTOR PLAN 7
DVT ppx: lovenox  Diet: regular    PT consulted, recommending home without needs    Dispo: Home 4/17

## 2022-04-17 NOTE — PROGRESS NOTE ADULT - PROBLEM SELECTOR PLAN 1
- likely biliary vs urinary source, BCx and UCx with E coli  - leukocytosis ~16 and lactate 1.1 downtrending  - CT abd/pelvis showing acute cholecystitis, hepatic cirrhosis with portal htn    Plan:  - started on Zosyn (4/12) then narrowed to Unasyn 4/15  -c/w Unasyn, plan for 10 day course from first day of clear cultures (4/14) and can dc on Po Augmentin (last day will be 4/23)

## 2022-04-17 NOTE — PROGRESS NOTE ADULT - PROBLEM SELECTOR PLAN 3
- found to have hepatic cirrhosis w/portal htn, likely some synthetic dysfunction given thrombocytopenia and elevated INR  - likely 2/2 etoh  - Hepatology consulted  - sent HBsAb, HAV IgG, MICHELLE, ASMA, AMA, A1AT, anti-LKM, ceruloplasmin, ferritin, iron panel, B12 and folate levels  - US abdomen with trace to mild ascites, not tappable   [ ] outpatient hepatology followup:  [phone] (Faculty Practice in [facility]) or [phone] ([facility] at [address] [place] [address]).

## 2022-04-17 NOTE — PROGRESS NOTE ADULT - ATTENDING COMMENTS
Patient seen and examined at bedside. No acute events overnight. Patient tolerating diet without symptoms right now. Patient is stable for discharge and to finish antibiotic course 10 days after first cleared culture. He will need hepatology follow up for new cirrhosis. He will need surgery follow up for elective cholecystitis. He will need GI follow up for eventual stent removal. Patient understood the importance of these follow ups.     Discharge Time: 40 minutes

## 2022-04-17 NOTE — DISCHARGE NOTE NURSING/CASE MANAGEMENT/SOCIAL WORK - PATIENT PORTAL LINK FT
You can access the FollowMyHealth Patient Portal offered by [facility] by registering at the following website: [email or web address]. By joining [employer]’s FollowMyHealth portal, you will also be able to view your health information using other applications (apps) compatible with our system.

## 2022-04-17 NOTE — PROGRESS NOTE ADULT - PROVIDER SPECIALTY LIST ADULT
Hospitalist
Gastroenterology
SICU
Surgery
Gastroenterology
Trauma Surgery
Trauma Surgery
Gastroenterology
SICU
Internal Medicine

## 2022-04-17 NOTE — DISCHARGE NOTE NURSING/CASE MANAGEMENT/SOCIAL WORK - NSDCFUADDAPPT_GEN_ALL_CORE_FT
APPTS ARE READY TO BE MADE: [X ] YES    Best Family or Patient Contact (if needed):    Additional Information about above appointments (if needed):    1: GI (for ERCP and stent removal in 6-8 weeks, call [phone] to schedule)  2: Hepatology [phone] (Faculty Practice in [facility]) or [phone] ([facility] at [address] [place] [address]).  3: Surgery (for elective cholecystectomy)  4: Primary care doctor  Other comments or requests:

## 2022-04-17 NOTE — PROGRESS NOTE ADULT - PROBLEM SELECTOR PLAN 4
E. coli UTI, +dysuria  -c/w Unasyn, dc on augmentin as outpatient  -milian placed while in SICU given urinary retention - s/p TOV

## 2022-04-18 LAB
HAV IGG SER QL IA: REACTIVE
HBV SURFACE AB SER-ACNC: SIGNIFICANT CHANGE UP
LKM AB SER-ACNC: <20.1 UNITS — SIGNIFICANT CHANGE UP (ref 0–20)
MITOCHONDRIA AB SER-ACNC: SIGNIFICANT CHANGE UP
SMOOTH MUSCLE AB SER-ACNC: SIGNIFICANT CHANGE UP

## 2022-04-19 LAB
ANA PAT FLD IF-IMP: ABNORMAL
ANA TITR SER: ABNORMAL
CULTURE RESULTS: SIGNIFICANT CHANGE UP
CULTURE RESULTS: SIGNIFICANT CHANGE UP
SPECIMEN SOURCE: SIGNIFICANT CHANGE UP
SPECIMEN SOURCE: SIGNIFICANT CHANGE UP

## 2022-04-29 PROBLEM — Z00.00 ENCOUNTER FOR PREVENTIVE HEALTH EXAMINATION: Status: ACTIVE | Noted: 2022-04-29

## 2022-05-04 DIAGNOSIS — Z46.89 ENCOUNTER FOR FITTING AND ADJUSTMENT OF OTHER SPECIFIED DEVICES: ICD-10-CM

## 2022-05-11 ENCOUNTER — APPOINTMENT (OUTPATIENT)
Dept: HEPATOLOGY | Facility: CLINIC | Age: 67
End: 2022-05-11

## 2022-07-11 ENCOUNTER — OUTPATIENT (OUTPATIENT)
Dept: OUTPATIENT SERVICES | Facility: HOSPITAL | Age: 67
LOS: 1 days | End: 2022-07-11
Payer: COMMERCIAL

## 2022-07-11 VITALS
TEMPERATURE: 98 F | OXYGEN SATURATION: 98 % | WEIGHT: 162.92 LBS | SYSTOLIC BLOOD PRESSURE: 142 MMHG | HEIGHT: 65 IN | DIASTOLIC BLOOD PRESSURE: 85 MMHG | RESPIRATION RATE: 16 BRPM | HEART RATE: 83 BPM

## 2022-07-11 DIAGNOSIS — Z46.89 ENCOUNTER FOR FITTING AND ADJUSTMENT OF OTHER SPECIFIED DEVICES: ICD-10-CM

## 2022-07-11 DIAGNOSIS — Z98.890 OTHER SPECIFIED POSTPROCEDURAL STATES: Chronic | ICD-10-CM

## 2022-07-11 DIAGNOSIS — Z01.818 ENCOUNTER FOR OTHER PREPROCEDURAL EXAMINATION: ICD-10-CM

## 2022-07-11 LAB
ALBUMIN SERPL ELPH-MCNC: 3.1 G/DL — LOW (ref 3.3–5)
ALP SERPL-CCNC: 212 U/L — HIGH (ref 40–120)
ALT FLD-CCNC: 17 U/L — SIGNIFICANT CHANGE UP (ref 10–45)
ANION GAP SERPL CALC-SCNC: 9 MMOL/L — SIGNIFICANT CHANGE UP (ref 5–17)
AST SERPL-CCNC: 45 U/L — HIGH (ref 10–40)
BILIRUB SERPL-MCNC: 1.7 MG/DL — HIGH (ref 0.2–1.2)
BUN SERPL-MCNC: 6 MG/DL — LOW (ref 7–23)
CALCIUM SERPL-MCNC: 8.9 MG/DL — SIGNIFICANT CHANGE UP (ref 8.4–10.5)
CHLORIDE SERPL-SCNC: 105 MMOL/L — SIGNIFICANT CHANGE UP (ref 96–108)
CO2 SERPL-SCNC: 23 MMOL/L — SIGNIFICANT CHANGE UP (ref 22–31)
CREAT SERPL-MCNC: 0.72 MG/DL — SIGNIFICANT CHANGE UP (ref 0.5–1.3)
EGFR: 102 ML/MIN/1.73M2 — SIGNIFICANT CHANGE UP
GLUCOSE SERPL-MCNC: 156 MG/DL — HIGH (ref 70–99)
HCT VFR BLD CALC: 34.7 % — LOW (ref 39–50)
HGB BLD-MCNC: 11.8 G/DL — LOW (ref 13–17)
MCHC RBC-ENTMCNC: 33.3 PG — SIGNIFICANT CHANGE UP (ref 27–34)
MCHC RBC-ENTMCNC: 34 GM/DL — SIGNIFICANT CHANGE UP (ref 32–36)
MCV RBC AUTO: 98 FL — SIGNIFICANT CHANGE UP (ref 80–100)
NRBC # BLD: 0 /100 WBCS — SIGNIFICANT CHANGE UP (ref 0–0)
PLATELET # BLD AUTO: 64 K/UL — LOW (ref 150–400)
POTASSIUM SERPL-MCNC: 4 MMOL/L — SIGNIFICANT CHANGE UP (ref 3.5–5.3)
POTASSIUM SERPL-SCNC: 4 MMOL/L — SIGNIFICANT CHANGE UP (ref 3.5–5.3)
PROT SERPL-MCNC: 6.8 G/DL — SIGNIFICANT CHANGE UP (ref 6–8.3)
RBC # BLD: 3.54 M/UL — LOW (ref 4.2–5.8)
RBC # FLD: 14.5 % — SIGNIFICANT CHANGE UP (ref 10.3–14.5)
SODIUM SERPL-SCNC: 137 MMOL/L — SIGNIFICANT CHANGE UP (ref 135–145)
WBC # BLD: 4.12 K/UL — SIGNIFICANT CHANGE UP (ref 3.8–10.5)
WBC # FLD AUTO: 4.12 K/UL — SIGNIFICANT CHANGE UP (ref 3.8–10.5)

## 2022-07-11 PROCEDURE — 80053 COMPREHEN METABOLIC PANEL: CPT

## 2022-07-11 PROCEDURE — 85027 COMPLETE CBC AUTOMATED: CPT

## 2022-07-11 PROCEDURE — G0463: CPT

## 2022-07-11 NOTE — H&P PST ADULT - HISTORY OF PRESENT ILLNESS
65 YO M   ***Patient to schedule Covid test appointment at [place] location for 7/18/2022.     65 YO M no pertinent PMH presenting with 1 week of epigastric and RUQ pain. States that she went to his PCP and was told that he has food poisoning. Reports fevers of 101-102 over course of last week. Denies changed to BM, urination. Per wife, patient beginning to look more jaundiced over last few days and reporting increased fatigue- unable to make it into hospital without help. Reports episodes of emesis at beginning of last week. Last colonoscopy and endoscopy in 2019, both normal. Denies recent weight loss. Denies hx of gallstones. Reports no home medications, hx of surgery, or PMH.     Hypotensive SBP 80s-90s in ED. Lactate 10.4, s/p 3 L bolus lactate to 10.1. Tobin placed in ED with 10 cc return.    67 YO M with recent hospitalization 4/12-4/17/2022 @ [facility] for septic shock with E. coli bacteremia 2/2 E. coli UTI and acute cholecystitis s/p ERCP with stent placement, presents to PST for ERCP ANES 7/20/2022. Denies any palpitations, SOB, N/V, Covid symptoms (fever, chills, cough) or exposure.     ***Patient to schedule Covid test appointment at [place] location for 7/18/2022.

## 2022-07-11 NOTE — H&P PST ADULT - NSANTHOSAYNRD_GEN_A_CORE
No. JUANITO screening performed.  STOP BANG Legend: 0-2 = LOW Risk; 3-4 = INTERMEDIATE Risk; 5-8 = HIGH Risk

## 2022-07-11 NOTE — H&P PST ADULT - FALL HARM RISK - PT AGE POPULATION HIDDEN
Adult [EDC: ___] : EDC: [unfilled] [Gestational Age: ___] : Gestational Age: [unfilled] [Chronological Age: ___] : Chronological Age: [unfilled] [Corrected Age: ___] : Corrected Age: [unfilled] [Date of D/C: ___] : Date of D/C: [unfilled] [___Formula] : [unfilled] [___ ounces/feeding] : ~NATY smith/feeding [___ Times/day] : [unfilled] times/day [Every ___ hours] : every [unfilled] hours [_____ Times Per] : Stool frequency occurs [unfilled] times per  [Variable amount] : variable  [Hard] : hard [Soft] : soft [Developmental Pediatrics: ___] : Developmental Pediatrics: [unfilled] [Weight Gain Since Last Visit (oz/days) ___] : weight gain since last visit: [unfilled] (oz/days)  [Bloody] : not bloody [Mucousy] : no mucous [de-identified] : In car bed     - Rescreen  today [de-identified] : Follow with [facility] High Risk  [de-identified] : none [de-identified] : done [de-identified] : spits up after feeds [de-identified] : restless sleep between feeds

## 2022-07-11 NOTE — H&P PST ADULT - FUNCTIONAL ASSESSMENT - DAILY ACTIVITY PT AGE POP HIDDEN
[facility]     History and Physical - Hospitalist Service         Name: Ravi Shah    MRN: [id number]  YOB: 1971    Age: 48 year old  Date of Admission:  3/14/2020  Physician: Abi Kearney MD  Text Page        Assessment & Plan   Ravi Shah is a 48 year old male with PMH alcohol abuse with previous admissions for withdrawal who presents on 03/14/20  with alcohol withdrawal. Denies ever having seizure or requiring ICU cares due to etoh w/d.    Acute alcohol withdrawal:   - CIWA protocol with 10mg valium q1h prn  - aggressive IV rehydration  - seizure precautions  - thiamine, folate, MVI  - check potassium, mag in am  - zofran for nausea, supportive cares    Possible hematemesis  - no further emesis  - BP stable  - recheck Hgb in am. Check INR as well  - recheck Hgb sooner if further bleeding  - protonix IV BID until pt can tolerate po    Acute kidney injury, mild: likely 2/2 dehydration  - IV hydration  - recheck in am    Alcohol abuse disorder:  - pt did not follow up on recs for rehab from last admission  - he is interested in going to rehab and would like to discuss with chem dep  - chem dep consulted  - has completed a 2 week chem dep treatment in the past      Diet: Orders Placed This Encounter      Advance Diet as Tolerated: Clear Liquid Diet     DVT Prophylaxis: Low Risk/Ambulatory with no VTE prophylaxis indicated  Code Status: Full Code  Power Catheter: not present        Disposition Plan    Expected discharge: 2 - 3 days, recommended to prior living arrangement once mental status at baseline and stable hemodynamics, etoh w/d resolved, tolerating po.    Abi Kearney MD  03/14/2020 11:51 PM       Primary Care Physician   *Joe Sandra, [phone]    Chief Complaint   Alcohol withdrawal    History is obtained from the patient.  I also spoke with the ER provider about the history.       History of Present Illness   Ravi Shah is a 48 year old male who  presents with alcohol withdrawal. He has had several admissions this year for the same, most recently just earlier this month. He denies ever seizing with withdrawal. He had been sober since his last admission, but binge drank Thursday and Friday. Last drink was Friday late evening/early morning. He woke up the next morning with nausea and vomiting and has been unable to tolerate any po, even sips of water since the morning. Estimates he threw up about 40 times. Emesis did have small streaks of blood but no significant bleeding. C/o epigastric pain that is improving with hydration in the ED.    Previous EGD with Dr Mcrae showed gastric erosions but no varices.    Patient denies any other acute symptoms - fevers, chills, recent illness. He is self employed.    Past Medical History    Past Medical History:   Diagnosis Date     Anxiety      Back pain      Depressive disorder      Hypertension      Substance abuse (H)          Past Surgical History   Past Surgical History:   Procedure Laterality Date     ENT SURGERY       ESOPHAGOSCOPY, GASTROSCOPY, DUODENOSCOPY (EGD), COMBINED N/A 9/12/2019    Procedure: ESOPHAGOGASTRODUODENOSCOPY (EGD);  Surgeon: Scott Mcrae MD;  Location:  GI     NECK SURGERY          Prior to Admission Medications   Prior to Admission Medications   Prescriptions Last Dose Informant Patient Reported? Taking?   folic acid (FOLVITE) 1 MG tablet  Self No No   Sig: Take 1 tablet (1 mg) by mouth daily . Future refills by PCP Dr. Joe Sandra with phone number [phone].   gabapentin (NEURONTIN) 100 MG capsule   No No   Sig: Take 3 capsules (300 mg) by mouth 3 times daily for 3 days, THEN 1 capsule (100 mg) 3 times daily for 3 days.   metoprolol succinate ER (TOPROL-XL) 50 MG 24 hr tablet  Self Yes No   Sig: Take 50 mg by mouth daily    naltrexone (DEPADE/REVIA) 50 MG tablet   No No   Sig: Take 1 tablet (50 mg) by mouth daily   pantoprazole (PROTONIX) 40 MG EC tablet  Self Yes No   Sig:  Take 40 mg by mouth every morning   sertraline (ZOLOFT) 100 MG tablet   No No   Sig: Take 1 tablet (100 mg) by mouth every morning   tetrahydrozoline (VISINE) 0.05 % ophthalmic solution  Self Yes No   Sig: Place 1 drop into both eyes daily   vitamin B-Complex  Self Yes No   Sig: Take 1 tablet by mouth daily   vitamin B1 (THIAMINE) 100 MG tablet  Self No No   Sig: Take 1 tablet (100 mg) by mouth daily . Future refills by PCP Dr. Joe Sandra with phone number [phone].      Facility-Administered Medications: None     Allergies   Allergies   Allergen Reactions     Morphine Nausea and Vomiting       Social History   Social History     Tobacco Use     Smoking status: Former Smoker     Smokeless tobacco: Current User   Substance Use Topics     Alcohol use: Yes     Comment: daily, none since yesterday     Social History     Social History Narrative     Not on file       Family History   I have reviewed this patient's family history and updated it with pertinent information if needed.   Family History   Problem Relation Age of Onset     No Known Problems Maternal Grandmother      No Known Problems Maternal Grandfather      No Known Problems Paternal Grandmother      Substance Abuse Paternal Grandfather        Review of Systems   A Comprehensive greater than 10 system review of systems was carried out.  Pertinent positives and negatives are noted above.  Otherwise negative for contributory information.    Physical Exam   Temp: 96.9  F (36.1  C) Temp src: Temporal BP: (!) 181/130   Heart Rate: 143 Resp: 20 SpO2: 96 % O2 Device: None (Room air)    Vital Signs with Ranges  Temp:  [96.9  F (36.1  C)] 96.9  F (36.1  C)  Heart Rate:  [143] 143  Resp:  [20] 20  BP: (181)/(130) 181/130  SpO2:  [96 %] 96 %  185 lbs 0 oz    GEN:  Alert, oriented x 3, tremulous, tongue fasciculations, anxious  HEENT:  Normocephalic/atraumatic, no scleral icterus, dry mucous membranes  CV:  tachycardic  LUNGS:  Clear to auscultation bilaterally  without rales/rhonchi/wheezing/retractions.  Symmetric chest rise on inhalation noted.  ABD:  Active bowel sounds, soft, non-tender/non-distended.  No rebound/guarding/rigidity.  EXT:  No edema.  No cyanosis.  No joint synovitis noted.  SKIN:  Dry to touch, no exanthems noted in the visualized areas.  NEURO:  Symmetric muscle strength, sensation to touch grossly intact.  Tremulous. Moving all extremities.    Data   Data reviewed today:  I personally reviewed no images or EKG's today.    Lab Results   Component Value Date    WBC 11.6 (H) 03/14/2020    HGB 16.2 03/14/2020    HCT 48.6 03/14/2020     03/14/2020     03/14/2020    POTASSIUM 4.0 03/14/2020    CHLORIDE 96 03/14/2020    CO2 16 (L) 03/14/2020    BUN 16 03/14/2020    CR 1.12 03/14/2020     (H) 03/14/2020    TROPI <0.015 02/12/2019    AST 43 03/14/2020    ALT 54 03/14/2020    ALKPHOS 68 03/14/2020    BILITOTAL 2.1 (H) 03/14/2020    INR 1.06 03/14/2020          No results found for this or any previous visit (from the past 24 hour(s)).     Adult

## 2022-07-11 NOTE — H&P PST ADULT - NSALCOHOLUSECOMMENT_GEN_ALL_CORE_FT
Non alcoholic post 4/2022 hospitalization Non alcoholic beverages post 4/2022 hospitalization per pt

## 2022-07-11 NOTE — H&P PST ADULT - NSICDXPASTMEDICALHX_GEN_ALL_CORE_FT
PAST MEDICAL HISTORY:  2019 novel coronavirus disease (COVID-19) 12/24/2021  no hospitalization, no treatment    H/O acute cholangitis

## 2022-07-18 LAB — SARS-COV-2 N GENE NPH QL NAA+PROBE: NOT DETECTED

## 2022-07-20 ENCOUNTER — APPOINTMENT (OUTPATIENT)
Dept: GASTROENTEROLOGY | Facility: HOSPITAL | Age: 67
End: 2022-07-20

## 2022-07-20 ENCOUNTER — TRANSCRIPTION ENCOUNTER (OUTPATIENT)
Age: 67
End: 2022-07-20

## 2022-07-20 ENCOUNTER — OUTPATIENT (OUTPATIENT)
Dept: OUTPATIENT SERVICES | Facility: HOSPITAL | Age: 67
LOS: 1 days | End: 2022-07-20
Payer: COMMERCIAL

## 2022-07-20 VITALS
DIASTOLIC BLOOD PRESSURE: 75 MMHG | HEART RATE: 77 BPM | HEIGHT: 64 IN | SYSTOLIC BLOOD PRESSURE: 149 MMHG | WEIGHT: 162.04 LBS | TEMPERATURE: 99 F | RESPIRATION RATE: 12 BRPM | OXYGEN SATURATION: 98 %

## 2022-07-20 VITALS
RESPIRATION RATE: 14 BRPM | SYSTOLIC BLOOD PRESSURE: 119 MMHG | OXYGEN SATURATION: 94 % | HEART RATE: 64 BPM | DIASTOLIC BLOOD PRESSURE: 72 MMHG

## 2022-07-20 DIAGNOSIS — Z46.89 ENCOUNTER FOR FITTING AND ADJUSTMENT OF OTHER SPECIFIED DEVICES: ICD-10-CM

## 2022-07-20 DIAGNOSIS — Z98.890 OTHER SPECIFIED POSTPROCEDURAL STATES: Chronic | ICD-10-CM

## 2022-07-20 PROCEDURE — 74330 X-RAY BILE/PANC ENDOSCOPY: CPT

## 2022-07-20 PROCEDURE — 74328 X-RAY BILE DUCT ENDOSCOPY: CPT | Mod: 26

## 2022-07-20 PROCEDURE — 43275 ERCP REMOVE FORGN BODY DUCT: CPT

## 2022-07-20 PROCEDURE — 43262 ENDO CHOLANGIOPANCREATOGRAPH: CPT

## 2022-07-20 PROCEDURE — 43264 ERCP REMOVE DUCT CALCULI: CPT

## 2022-07-20 PROCEDURE — C1769: CPT

## 2022-07-20 DEVICE — HYDRATOME 44: Type: IMPLANTABLE DEVICE | Status: FUNCTIONAL

## 2022-07-20 DEVICE — CATH BLLN EXTRACT DIST GUIDE WIRE 15MM 3LUM: Type: IMPLANTABLE DEVICE | Status: FUNCTIONAL

## 2022-07-20 DEVICE — AUTOTOME CANNULATING SPHINCTEROTOME RX 44 20MM: Type: IMPLANTABLE DEVICE | Status: FUNCTIONAL

## 2022-07-20 DEVICE — BLLN EXTRACT FUSION QUATRO 8.5 10 12 15MM: Type: IMPLANTABLE DEVICE | Status: FUNCTIONAL

## 2022-07-20 NOTE — PRE PROCEDURE NOTE - PRE PROCEDURE EVALUATION
Attending Physician:   Hamzah                         Procedure:  EGD/ERCP    Indication for Procedure:  Biliary stent, history of cholangitis  ________________________________________________________  PAST MEDICAL & SURGICAL HISTORY:  H/O acute cholangitis      2019 novel coronavirus disease (COVID-19)  12/24/2021  no hospitalization, no treatment      S/P ERCP  4/2022      H/O colonoscopy        ALLERGIES:  No Known Allergies    HOME MEDICATIONS:  See medicine reconciliation form    AICD/PPM: [ ] yes   [x ] no    PERTINENT LAB DATA:  reviewed, plts 70's                      PHYSICAL EXAMINATION:    Height (cm): 162.6  Weight (kg): 73.5  BMI (kg/m2): 27.8  BSA (m2): 1.79T(C): 37.1  HR: 77  BP: 149/75  RR: 12  SpO2: 98%    Constitutional: NAD  HEENT: anicteric  Neck:  supple  Respiratory: CTAB/L  Cardiovascular: RRR  Gastrointestinal: , soft, NT/ND  Extremities: No peripheral edema  Neurological: No confusion  Psychiatric: Normal mood, normal affect  Skin: No jaundice    ASA Class: I [ ]  II [ ]  III [x ]  IV [ ]    COMMENTS:    The patient is a suitable candidate for the planned procedure unless box checked [ ]  No, explain:

## 2022-07-20 NOTE — ASU DISCHARGE PLAN (ADULT/PEDIATRIC) - NS MD DC FALL RISK RISK
For information on Fall & Injury Prevention, visit: [email or web address] OR  [email or web address] OR  https://www.cdc.gov/steadi/patient.html

## 2022-07-20 NOTE — ASU DISCHARGE PLAN (ADULT/PEDIATRIC) - ASU DC SPECIAL INSTRUCTIONSFT
Call the liver specialists at  [phone] to make an appointment for treatment of your liver disease (cirrhosis)

## 2022-07-20 NOTE — ASU PATIENT PROFILE, ADULT - FALL HARM RISK - UNIVERSAL INTERVENTIONS
Bed in lowest position, wheels locked, appropriate side rails in place/Call bell, personal items and telephone in reach/Instruct patient to call for assistance before getting out of bed or chair/Non-slip footwear when patient is out of bed/[place] to call system/Physically safe environment - no spills, clutter or unnecessary equipment/Purposeful Proactive Rounding/Room/bathroom lighting operational, light cord in reach

## 2022-07-26 ENCOUNTER — EMERGENCY (EMERGENCY)
Facility: HOSPITAL | Age: 67
LOS: 0 days | Discharge: HOME | End: 2022-07-26
Attending: EMERGENCY MEDICINE

## 2022-07-26 VITALS
TEMPERATURE: 98 F | OXYGEN SATURATION: 99 % | HEART RATE: 87 BPM | DIASTOLIC BLOOD PRESSURE: 80 MMHG | SYSTOLIC BLOOD PRESSURE: 150 MMHG | RESPIRATION RATE: 16 BRPM

## 2022-07-26 VITALS
HEIGHT: 64 IN | RESPIRATION RATE: 16 BRPM | OXYGEN SATURATION: 98 % | SYSTOLIC BLOOD PRESSURE: 171 MMHG | TEMPERATURE: 98 F | DIASTOLIC BLOOD PRESSURE: 91 MMHG | WEIGHT: 156.97 LBS | HEART RATE: 61 BPM

## 2022-07-26 DIAGNOSIS — Z87.19 PERSONAL HISTORY OF OTHER DISEASES OF THE DIGESTIVE SYSTEM: ICD-10-CM

## 2022-07-26 DIAGNOSIS — K80.20 CALCULUS OF GALLBLADDER WITHOUT CHOLECYSTITIS WITHOUT OBSTRUCTION: ICD-10-CM

## 2022-07-26 DIAGNOSIS — R10.9 UNSPECIFIED ABDOMINAL PAIN: ICD-10-CM

## 2022-07-26 DIAGNOSIS — Z98.890 OTHER SPECIFIED POSTPROCEDURAL STATES: ICD-10-CM

## 2022-07-26 DIAGNOSIS — Z98.890 OTHER SPECIFIED POSTPROCEDURAL STATES: Chronic | ICD-10-CM

## 2022-07-26 DIAGNOSIS — Z86.16 PERSONAL HISTORY OF COVID-19: ICD-10-CM

## 2022-07-26 DIAGNOSIS — Z20.822 CONTACT WITH AND (SUSPECTED) EXPOSURE TO COVID-19: ICD-10-CM

## 2022-07-26 PROBLEM — U07.1 COVID-19: Chronic | Status: ACTIVE | Noted: 2022-07-11

## 2022-07-26 LAB
ALBUMIN SERPL ELPH-MCNC: 2.8 G/DL — LOW (ref 3.3–5)
ALP SERPL-CCNC: 220 U/L — HIGH (ref 40–120)
ALT FLD-CCNC: 29 U/L — SIGNIFICANT CHANGE UP (ref 12–78)
ANION GAP SERPL CALC-SCNC: 6 MMOL/L — SIGNIFICANT CHANGE UP (ref 5–17)
AST SERPL-CCNC: 53 U/L — HIGH (ref 15–37)
BASOPHILS # BLD AUTO: 0.01 K/UL — SIGNIFICANT CHANGE UP (ref 0–0.2)
BASOPHILS NFR BLD AUTO: 0.2 % — SIGNIFICANT CHANGE UP (ref 0–2)
BILIRUB SERPL-MCNC: 2.1 MG/DL — HIGH (ref 0.2–1.2)
BUN SERPL-MCNC: 8 MG/DL — SIGNIFICANT CHANGE UP (ref 7–23)
CALCIUM SERPL-MCNC: 9 MG/DL — SIGNIFICANT CHANGE UP (ref 8.5–10.1)
CHLORIDE SERPL-SCNC: 99 MMOL/L — SIGNIFICANT CHANGE UP (ref 96–108)
CO2 SERPL-SCNC: 26 MMOL/L — SIGNIFICANT CHANGE UP (ref 22–31)
CREAT SERPL-MCNC: 0.74 MG/DL — SIGNIFICANT CHANGE UP (ref 0.5–1.3)
EGFR: 101 ML/MIN/1.73M2 — SIGNIFICANT CHANGE UP
EOSINOPHIL # BLD AUTO: 0.02 K/UL — SIGNIFICANT CHANGE UP (ref 0–0.5)
EOSINOPHIL NFR BLD AUTO: 0.4 % — SIGNIFICANT CHANGE UP (ref 0–6)
FLUAV AG NPH QL: SIGNIFICANT CHANGE UP
FLUBV AG NPH QL: SIGNIFICANT CHANGE UP
GLUCOSE SERPL-MCNC: 145 MG/DL — HIGH (ref 70–99)
HCT VFR BLD CALC: 40 % — SIGNIFICANT CHANGE UP (ref 39–50)
HGB BLD-MCNC: 14.3 G/DL — SIGNIFICANT CHANGE UP (ref 13–17)
IMM GRANULOCYTES NFR BLD AUTO: 0.4 % — SIGNIFICANT CHANGE UP (ref 0–1.5)
LIDOCAIN IGE QN: 138 U/L — SIGNIFICANT CHANGE UP (ref 73–393)
LYMPHOCYTES # BLD AUTO: 0.7 K/UL — LOW (ref 1–3.3)
LYMPHOCYTES # BLD AUTO: 13.4 % — SIGNIFICANT CHANGE UP (ref 13–44)
MAGNESIUM SERPL-MCNC: 2.1 MG/DL — SIGNIFICANT CHANGE UP (ref 1.6–2.6)
MCHC RBC-ENTMCNC: 33.7 PG — SIGNIFICANT CHANGE UP (ref 27–34)
MCHC RBC-ENTMCNC: 35.8 G/DL — SIGNIFICANT CHANGE UP (ref 32–36)
MCV RBC AUTO: 94.3 FL — SIGNIFICANT CHANGE UP (ref 80–100)
MONOCYTES # BLD AUTO: 0.49 K/UL — SIGNIFICANT CHANGE UP (ref 0–0.9)
MONOCYTES NFR BLD AUTO: 9.4 % — SIGNIFICANT CHANGE UP (ref 2–14)
NEUTROPHILS # BLD AUTO: 3.98 K/UL — SIGNIFICANT CHANGE UP (ref 1.8–7.4)
NEUTROPHILS NFR BLD AUTO: 76.2 % — SIGNIFICANT CHANGE UP (ref 43–77)
NRBC # BLD: 0 /100 WBCS — SIGNIFICANT CHANGE UP (ref 0–0)
PLATELET # BLD AUTO: 56 K/UL — LOW (ref 150–400)
POTASSIUM SERPL-MCNC: 3.8 MMOL/L — SIGNIFICANT CHANGE UP (ref 3.5–5.3)
POTASSIUM SERPL-SCNC: 3.8 MMOL/L — SIGNIFICANT CHANGE UP (ref 3.5–5.3)
PROT SERPL-MCNC: 7.8 GM/DL — SIGNIFICANT CHANGE UP (ref 6–8.3)
RBC # BLD: 4.24 M/UL — SIGNIFICANT CHANGE UP (ref 4.2–5.8)
RBC # FLD: 14.6 % — HIGH (ref 10.3–14.5)
SARS-COV-2 RNA SPEC QL NAA+PROBE: SIGNIFICANT CHANGE UP
SODIUM SERPL-SCNC: 131 MMOL/L — LOW (ref 135–145)
WBC # BLD: 5.22 K/UL — SIGNIFICANT CHANGE UP (ref 3.8–10.5)
WBC # FLD AUTO: 5.22 K/UL — SIGNIFICANT CHANGE UP (ref 3.8–10.5)

## 2022-07-26 PROCEDURE — 99285 EMERGENCY DEPT VISIT HI MDM: CPT

## 2022-07-26 PROCEDURE — 76705 ECHO EXAM OF ABDOMEN: CPT | Mod: 26

## 2022-07-26 RX ORDER — SODIUM CHLORIDE 9 MG/ML
1000 INJECTION INTRAMUSCULAR; INTRAVENOUS; SUBCUTANEOUS ONCE
Refills: 0 | Status: COMPLETED | OUTPATIENT
Start: 2022-07-26 | End: 2022-07-26

## 2022-07-26 RX ORDER — ACETAMINOPHEN 500 MG
1000 TABLET ORAL ONCE
Refills: 0 | Status: COMPLETED | OUTPATIENT
Start: 2022-07-26 | End: 2022-07-26

## 2022-07-26 RX ADMIN — Medication 400 MILLIGRAM(S): at 07:35

## 2022-07-26 RX ADMIN — SODIUM CHLORIDE 2000 MILLILITER(S): 9 INJECTION INTRAMUSCULAR; INTRAVENOUS; SUBCUTANEOUS at 07:35

## 2022-07-26 NOTE — ED PROVIDER NOTE - NSFOLLOWUPINSTRUCTIONS_ED_ALL_ED_FT
Your Ultrasound shows that you have Gallstones.     Advance activity as tolerated.  Continue all previously prescribed medications as directed unless otherwise instructed.     Follow up with your Surgeon in 48-72 hours- bring copies of your results.      Return to the ER for worsening or persistent symptoms, and/or ANY NEW OR CONCERNING SYMPTOMS. If you have issues obtaining follow up, please call: [phone] to obtain a doctor or specialist who takes your insurance in your area.  You may call [phone] to make an appointment with the internal medicine clinic.     Take Tylenol 650mg (Two 325 mg pills) every 4-6 hours as needed for pain

## 2022-07-26 NOTE — ED ADULT NURSE REASSESSMENT NOTE - NS ED NURSE REASSESS COMMENT FT1
Patient's case discussed with surgical team.  Instructions for follow up given.  Patient expresses understanding.

## 2022-07-26 NOTE — ED PROVIDER NOTE - PROGRESS NOTE DETAILS
NP Saeid: US results:Markedly distended gallbladder with stones. If there is concern for   cholecystitis recommend HIDA scan. No biliary dilatation.  Cirrhosis..    Patient hemodynamically stable. spoke with surgery PA who states that patient can follow-up with his surgeon outpatient and will likely need his gallbladder removed. discussed this with the patient who feels comfortable with the plan. stable for discharge.

## 2022-07-26 NOTE — ED PROVIDER NOTE - PHYSICAL EXAMINATION
Gen: Awake, Alert, uncomfortable   Head:  NC/AT  Eyes:  PERRL, EOMI, Conjunctiva pink, lids normal, no scleral icterus  ENT: OP clear, no exudates, moist mucus membranes  Neck: supple, nontender, no meningismus, no JVD, trachea midline  Cardiac/CV:  S1 S2, RRR, no M/G/R  Respiratory/Pulm:  CTAB, good air movement, normal resp effort, no wheezes/stridor/retractions/rales/rhonchi  Gastrointestinal/Abdomen:  Soft, tender RUQ, nondistended, +BS, no rebound/guarding  Back:  no CVAT, no MLT  Ext:  warm, well perfused, moving all extremities spontaneously, no peripheral edema, distal pulses intact  Skin: intact, no rash  Neuro:  AAOx3, sensation intact, motor 5/5 x 4 extremities, normal gait, speech clear

## 2022-07-26 NOTE — ED ADULT NURSE NOTE - OBJECTIVE STATEMENT
Patient c/o right sided abdominal pain.  Hx of gall stones.  Had stent placed in April and removed last week.  States pain onset last night, constant deep pain, without fever, chills, nausea, vomiting.

## 2022-07-26 NOTE — ED ADULT NURSE NOTE - NSIMPLEMENTINTERV_GEN_ALL_ED
Implemented All Universal Safety Interventions:  [place] to call system. Call bell, personal items and telephone within reach. Instruct patient to call for assistance. Room bathroom lighting operational. Non-slip footwear when patient is off stretcher. Physically safe environment: no spills, clutter or unnecessary equipment. Stretcher in lowest position, wheels locked, appropriate side rails in place.

## 2022-07-26 NOTE — ED PROVIDER NOTE - OBJECTIVE STATEMENT
65 y/o M pmhx gallstones with recent stent removal 1 week ago. he states that in april he was septic from the gallstones and had the stent placed at [facility]. patient states that last night around 7pm he felt sharp abdominal pain similar in nature to his prior pain. the pain waxed and waned all night. he took tylenol last night with minimal relief. denies chest pain, sob, n/v/d, fever, chills.

## 2022-07-26 NOTE — ED PROVIDER NOTE - CLINICAL SUMMARY MEDICAL DECISION MAKING FREE TEXT BOX
67 y/o M pmhx gallstones with recent stent removal 1 week ago. he states that in april he was septic from the gallstones and had the stent placed at [facility]. patient states that last night around 7pm he felt sharp abdominal pain similar in nature to his prior pain. the pain waxed and waned all night. he took tylenol last night with minimal relief. denies chest pain, sob, n/v/d, fever, chills. -- mild tenderness to RUQ negative murphys sign otherwise non-toxic appearing-- will check albs, lipase, US RUQ r/o stones

## 2022-07-26 NOTE — ED PROVIDER NOTE - ATTENDING APP SHARED VISIT CONTRIBUTION OF CARE
66 year old male with history of gallstones, came in with RUQ abd pain, no fever, no chills, no vomiting. Labs and US noted, pt improved, tolerating po, will dc to follow up with his surgeon.

## 2022-07-26 NOTE — ED PROVIDER NOTE - PATIENT PORTAL LINK FT
You can access the FollowMyHealth Patient Portal offered by [facility] by registering at the following website: [email or web address]. By joining [employer]’s FollowMyHealth portal, you will also be able to view your health information using other applications (apps) compatible with our system.

## 2022-08-07 ENCOUNTER — INPATIENT (INPATIENT)
Facility: HOSPITAL | Age: 67
LOS: 25 days | Discharge: HOME HEALTH | DRG: 871 | End: 2022-09-02
Attending: INTERNAL MEDICINE | Admitting: STUDENT IN AN ORGANIZED HEALTH CARE EDUCATION/TRAINING PROGRAM
Payer: COMMERCIAL

## 2022-08-07 DIAGNOSIS — Z98.890 OTHER SPECIFIED POSTPROCEDURAL STATES: Chronic | ICD-10-CM

## 2022-08-07 PROCEDURE — 93010 ELECTROCARDIOGRAM REPORT: CPT

## 2022-08-07 PROCEDURE — 99285 EMERGENCY DEPT VISIT HI MDM: CPT | Mod: 25

## 2022-08-08 VITALS
TEMPERATURE: 99 F | DIASTOLIC BLOOD PRESSURE: 73 MMHG | SYSTOLIC BLOOD PRESSURE: 108 MMHG | HEIGHT: 64 IN | HEART RATE: 106 BPM | RESPIRATION RATE: 18 BRPM | OXYGEN SATURATION: 95 % | WEIGHT: 160.06 LBS

## 2022-08-08 DIAGNOSIS — K81.0 ACUTE CHOLECYSTITIS: ICD-10-CM

## 2022-08-08 LAB
ALBUMIN SERPL ELPH-MCNC: 1.9 G/DL — LOW (ref 3.3–5)
ALBUMIN SERPL ELPH-MCNC: 2 G/DL — LOW (ref 3.3–5)
ALBUMIN SERPL ELPH-MCNC: 2.3 G/DL — LOW (ref 3.3–5)
ALBUMIN SERPL ELPH-MCNC: 2.3 G/DL — LOW (ref 3.3–5)
ALP SERPL-CCNC: 101 U/L — SIGNIFICANT CHANGE UP (ref 40–120)
ALP SERPL-CCNC: 113 U/L — SIGNIFICANT CHANGE UP (ref 40–120)
ALP SERPL-CCNC: 127 U/L — HIGH (ref 40–120)
ALP SERPL-CCNC: 95 U/L — SIGNIFICANT CHANGE UP (ref 40–120)
ALT FLD-CCNC: 18 U/L — SIGNIFICANT CHANGE UP (ref 10–45)
ALT FLD-CCNC: 20 U/L — SIGNIFICANT CHANGE UP (ref 10–45)
ALT FLD-CCNC: 22 U/L — SIGNIFICANT CHANGE UP (ref 10–45)
ALT FLD-CCNC: 22 U/L — SIGNIFICANT CHANGE UP (ref 10–45)
ANION GAP SERPL CALC-SCNC: 16 MMOL/L — SIGNIFICANT CHANGE UP (ref 5–17)
ANION GAP SERPL CALC-SCNC: 18 MMOL/L — HIGH (ref 5–17)
ANION GAP SERPL CALC-SCNC: 19 MMOL/L — HIGH (ref 5–17)
ANION GAP SERPL CALC-SCNC: 20 MMOL/L — HIGH (ref 5–17)
ANION GAP SERPL CALC-SCNC: 20 MMOL/L — HIGH (ref 5–17)
ANISOCYTOSIS BLD QL: SIGNIFICANT CHANGE UP
APPEARANCE UR: ABNORMAL
APTT BLD: 37.1 SEC — HIGH (ref 27.5–35.5)
APTT BLD: 37.2 SEC — HIGH (ref 27.5–35.5)
APTT BLD: 41.7 SEC — HIGH (ref 27.5–35.5)
APTT BLD: 50.9 SEC — HIGH (ref 27.5–35.5)
AST SERPL-CCNC: 35 U/L — SIGNIFICANT CHANGE UP (ref 10–40)
AST SERPL-CCNC: 38 U/L — SIGNIFICANT CHANGE UP (ref 10–40)
AST SERPL-CCNC: 40 U/L — SIGNIFICANT CHANGE UP (ref 10–40)
AST SERPL-CCNC: 42 U/L — HIGH (ref 10–40)
BACTERIA # UR AUTO: ABNORMAL
BASE EXCESS BLDV CALC-SCNC: -14.5 MMOL/L — LOW (ref -2–2)
BASE EXCESS BLDV CALC-SCNC: -4.8 MMOL/L — LOW (ref -2–2)
BASE EXCESS BLDV CALC-SCNC: 4.8 MMOL/L — HIGH (ref -2–2)
BASOPHILS # BLD AUTO: 0 K/UL — SIGNIFICANT CHANGE UP (ref 0–0.2)
BASOPHILS NFR BLD AUTO: 0 % — SIGNIFICANT CHANGE UP (ref 0–2)
BILIRUB DIRECT SERPL-MCNC: 1.5 MG/DL — HIGH (ref 0–0.3)
BILIRUB DIRECT SERPL-MCNC: 2.1 MG/DL — HIGH (ref 0–0.3)
BILIRUB DIRECT SERPL-MCNC: 2.2 MG/DL — HIGH (ref 0–0.3)
BILIRUB INDIRECT FLD-MCNC: 1.1 MG/DL — HIGH (ref 0.2–1)
BILIRUB INDIRECT FLD-MCNC: 1.2 MG/DL — HIGH (ref 0.2–1)
BILIRUB INDIRECT FLD-MCNC: 1.6 MG/DL — HIGH (ref 0.2–1)
BILIRUB SERPL-MCNC: 3.1 MG/DL — HIGH (ref 0.2–1.2)
BILIRUB SERPL-MCNC: 3.3 MG/DL — HIGH (ref 0.2–1.2)
BILIRUB SERPL-MCNC: 3.3 MG/DL — HIGH (ref 0.2–1.2)
BILIRUB SERPL-MCNC: 4.7 MG/DL — HIGH (ref 0.2–1.2)
BILIRUB UR-MCNC: ABNORMAL
BLD GP AB SCN SERPL QL: NEGATIVE — SIGNIFICANT CHANGE UP
BUN SERPL-MCNC: 49 MG/DL — HIGH (ref 7–23)
BUN SERPL-MCNC: 50 MG/DL — HIGH (ref 7–23)
BUN SERPL-MCNC: 50 MG/DL — HIGH (ref 7–23)
BUN SERPL-MCNC: 54 MG/DL — HIGH (ref 7–23)
BUN SERPL-MCNC: 57 MG/DL — HIGH (ref 7–23)
BURR CELLS BLD QL SMEAR: SIGNIFICANT CHANGE UP
CA-I SERPL-SCNC: 0.96 MMOL/L — LOW (ref 1.15–1.33)
CA-I SERPL-SCNC: 1.05 MMOL/L — LOW (ref 1.15–1.33)
CA-I SERPL-SCNC: 1.07 MMOL/L — LOW (ref 1.15–1.33)
CALCIUM SERPL-MCNC: 7.5 MG/DL — LOW (ref 8.4–10.5)
CALCIUM SERPL-MCNC: 7.6 MG/DL — LOW (ref 8.4–10.5)
CALCIUM SERPL-MCNC: 8 MG/DL — LOW (ref 8.4–10.5)
CALCIUM SERPL-MCNC: 8.4 MG/DL — SIGNIFICANT CHANGE UP (ref 8.4–10.5)
CALCIUM SERPL-MCNC: 8.5 MG/DL — SIGNIFICANT CHANGE UP (ref 8.4–10.5)
CHLORIDE BLDV-SCNC: 83 MMOL/L — LOW (ref 96–108)
CHLORIDE BLDV-SCNC: 90 MMOL/L — LOW (ref 96–108)
CHLORIDE BLDV-SCNC: 92 MMOL/L — LOW (ref 96–108)
CHLORIDE SERPL-SCNC: 84 MMOL/L — LOW (ref 96–108)
CHLORIDE SERPL-SCNC: 86 MMOL/L — LOW (ref 96–108)
CHLORIDE SERPL-SCNC: 87 MMOL/L — LOW (ref 96–108)
CHLORIDE SERPL-SCNC: 89 MMOL/L — LOW (ref 96–108)
CHLORIDE SERPL-SCNC: 90 MMOL/L — LOW (ref 96–108)
CHLORIDE UR-SCNC: <20 MMOL/L — SIGNIFICANT CHANGE UP
CK MB CFR SERPL CALC: 1.3 NG/ML — SIGNIFICANT CHANGE UP (ref 0–6.7)
CK SERPL-CCNC: 21 U/L — LOW (ref 30–200)
CO2 BLDV-SCNC: 14 MMOL/L — LOW (ref 22–26)
CO2 BLDV-SCNC: 22 MMOL/L — SIGNIFICANT CHANGE UP (ref 22–26)
CO2 BLDV-SCNC: 35 MMOL/L — HIGH (ref 22–26)
CO2 SERPL-SCNC: 13 MMOL/L — LOW (ref 22–31)
CO2 SERPL-SCNC: 14 MMOL/L — LOW (ref 22–31)
CO2 SERPL-SCNC: 17 MMOL/L — LOW (ref 22–31)
CO2 SERPL-SCNC: 18 MMOL/L — LOW (ref 22–31)
CO2 SERPL-SCNC: 22 MMOL/L — SIGNIFICANT CHANGE UP (ref 22–31)
COLOR SPEC: ABNORMAL
CREAT SERPL-MCNC: 1.95 MG/DL — HIGH (ref 0.5–1.3)
CREAT SERPL-MCNC: 2 MG/DL — HIGH (ref 0.5–1.3)
CREAT SERPL-MCNC: 2.1 MG/DL — HIGH (ref 0.5–1.3)
CREAT SERPL-MCNC: 2.39 MG/DL — HIGH (ref 0.5–1.3)
CREAT SERPL-MCNC: 2.45 MG/DL — HIGH (ref 0.5–1.3)
DACRYOCYTES BLD QL SMEAR: SLIGHT — SIGNIFICANT CHANGE UP
DIFF PNL FLD: NEGATIVE — SIGNIFICANT CHANGE UP
EGFR: 29 ML/MIN/1.73M2 — LOW
EGFR: 30 ML/MIN/1.73M2 — LOW
EGFR: 34 ML/MIN/1.73M2 — LOW
EGFR: 37 ML/MIN/1.73M2 — LOW
EGFR: 38 ML/MIN/1.73M2 — LOW
ELLIPTOCYTES BLD QL SMEAR: SLIGHT — SIGNIFICANT CHANGE UP
EOSINOPHIL # BLD AUTO: 0 K/UL — SIGNIFICANT CHANGE UP (ref 0–0.5)
EOSINOPHIL NFR BLD AUTO: 0 % — SIGNIFICANT CHANGE UP (ref 0–6)
EPI CELLS # UR: 3 /HPF — SIGNIFICANT CHANGE UP
GAS PNL BLDA: SIGNIFICANT CHANGE UP
GAS PNL BLDV: 118 MMOL/L — CRITICAL LOW (ref 136–145)
GAS PNL BLDV: 118 MMOL/L — CRITICAL LOW (ref 136–145)
GAS PNL BLDV: 122 MMOL/L — LOW (ref 136–145)
GAS PNL BLDV: SIGNIFICANT CHANGE UP
GIANT PLATELETS BLD QL SMEAR: PRESENT — SIGNIFICANT CHANGE UP
GLUCOSE BLDC GLUCOMTR-MCNC: 118 MG/DL — HIGH (ref 70–99)
GLUCOSE BLDV-MCNC: 105 MG/DL — HIGH (ref 70–99)
GLUCOSE BLDV-MCNC: 145 MG/DL — HIGH (ref 70–99)
GLUCOSE BLDV-MCNC: >660 MG/DL — CRITICAL HIGH (ref 70–99)
GLUCOSE SERPL-MCNC: 115 MG/DL — HIGH (ref 70–99)
GLUCOSE SERPL-MCNC: 152 MG/DL — HIGH (ref 70–99)
GLUCOSE SERPL-MCNC: 158 MG/DL — HIGH (ref 70–99)
GLUCOSE SERPL-MCNC: 474 MG/DL — CRITICAL HIGH (ref 70–99)
GLUCOSE SERPL-MCNC: 94 MG/DL — SIGNIFICANT CHANGE UP (ref 70–99)
GLUCOSE UR QL: NEGATIVE — SIGNIFICANT CHANGE UP
HCO3 BLDV-SCNC: 13 MMOL/L — LOW (ref 22–29)
HCO3 BLDV-SCNC: 20 MMOL/L — LOW (ref 22–29)
HCO3 BLDV-SCNC: 33 MMOL/L — HIGH (ref 22–29)
HCT VFR BLD CALC: 30.1 % — LOW (ref 39–50)
HCT VFR BLD CALC: 33.9 % — LOW (ref 39–50)
HCT VFR BLD CALC: 37.8 % — LOW (ref 39–50)
HCT VFR BLD CALC: 42.6 % — SIGNIFICANT CHANGE UP (ref 39–50)
HCT VFR BLDA CALC: 33 % — LOW (ref 39–51)
HCT VFR BLDA CALC: 34 % — LOW (ref 39–51)
HCT VFR BLDA CALC: 37 % — LOW (ref 39–51)
HGB BLD CALC-MCNC: 11.1 G/DL — LOW (ref 12.6–17.4)
HGB BLD CALC-MCNC: 11.4 G/DL — LOW (ref 12.6–17.4)
HGB BLD CALC-MCNC: 12.2 G/DL — LOW (ref 12.6–17.4)
HGB BLD-MCNC: 10.6 G/DL — LOW (ref 13–17)
HGB BLD-MCNC: 11.7 G/DL — LOW (ref 13–17)
HGB BLD-MCNC: 12.9 G/DL — LOW (ref 13–17)
HGB BLD-MCNC: 14.9 G/DL — SIGNIFICANT CHANGE UP (ref 13–17)
HOROWITZ INDEX BLDV+IHG-RTO: 21 — SIGNIFICANT CHANGE UP
HOROWITZ INDEX BLDV+IHG-RTO: 21 — SIGNIFICANT CHANGE UP
HYALINE CASTS # UR AUTO: 30 /LPF — HIGH (ref 0–2)
INR BLD: 1.92 RATIO — HIGH (ref 0.88–1.16)
INR BLD: 1.96 RATIO — HIGH (ref 0.88–1.16)
INR BLD: 2.71 RATIO — HIGH (ref 0.88–1.16)
INR BLD: 3.17 RATIO — HIGH (ref 0.88–1.16)
KETONES UR-MCNC: SIGNIFICANT CHANGE UP
LACTATE BLDV-MCNC: 5.1 MMOL/L — CRITICAL HIGH (ref 0.7–2)
LACTATE BLDV-MCNC: 6.5 MMOL/L — CRITICAL HIGH (ref 0.7–2)
LACTATE BLDV-MCNC: 9.4 MMOL/L — CRITICAL HIGH (ref 0.7–2)
LACTATE SERPL-SCNC: 7.6 MMOL/L — CRITICAL HIGH (ref 0.7–2)
LACTATE SERPL-SCNC: 9.8 MMOL/L — CRITICAL HIGH (ref 0.7–2)
LEUKOCYTE ESTERASE UR-ACNC: ABNORMAL
LIDOCAIN IGE QN: 19 U/L — SIGNIFICANT CHANGE UP (ref 7–60)
LYMPHOCYTES # BLD AUTO: 0.13 K/UL — LOW (ref 1–3.3)
LYMPHOCYTES # BLD AUTO: 0.9 % — LOW (ref 13–44)
MACROCYTES BLD QL: SIGNIFICANT CHANGE UP
MAGNESIUM SERPL-MCNC: 1.8 MG/DL — SIGNIFICANT CHANGE UP (ref 1.6–2.6)
MAGNESIUM SERPL-MCNC: 1.9 MG/DL — SIGNIFICANT CHANGE UP (ref 1.6–2.6)
MAGNESIUM SERPL-MCNC: 1.9 MG/DL — SIGNIFICANT CHANGE UP (ref 1.6–2.6)
MANUAL SMEAR VERIFICATION: SIGNIFICANT CHANGE UP
MCHC RBC-ENTMCNC: 32.9 PG — SIGNIFICANT CHANGE UP (ref 27–34)
MCHC RBC-ENTMCNC: 33.1 PG — SIGNIFICANT CHANGE UP (ref 27–34)
MCHC RBC-ENTMCNC: 33.1 PG — SIGNIFICANT CHANGE UP (ref 27–34)
MCHC RBC-ENTMCNC: 33.2 PG — SIGNIFICANT CHANGE UP (ref 27–34)
MCHC RBC-ENTMCNC: 34.1 GM/DL — SIGNIFICANT CHANGE UP (ref 32–36)
MCHC RBC-ENTMCNC: 34.5 GM/DL — SIGNIFICANT CHANGE UP (ref 32–36)
MCHC RBC-ENTMCNC: 35 GM/DL — SIGNIFICANT CHANGE UP (ref 32–36)
MCHC RBC-ENTMCNC: 35.2 GM/DL — SIGNIFICANT CHANGE UP (ref 32–36)
MCV RBC AUTO: 94 FL — SIGNIFICANT CHANGE UP (ref 80–100)
MCV RBC AUTO: 94.4 FL — SIGNIFICANT CHANGE UP (ref 80–100)
MCV RBC AUTO: 96 FL — SIGNIFICANT CHANGE UP (ref 80–100)
MCV RBC AUTO: 96.9 FL — SIGNIFICANT CHANGE UP (ref 80–100)
METAMYELOCYTES # FLD: 1.7 % — HIGH (ref 0–0)
MONOCYTES # BLD AUTO: 0.75 K/UL — SIGNIFICANT CHANGE UP (ref 0–0.9)
MONOCYTES NFR BLD AUTO: 5.2 % — SIGNIFICANT CHANGE UP (ref 2–14)
NEUTROPHILS # BLD AUTO: 13.15 K/UL — HIGH (ref 1.8–7.4)
NEUTROPHILS NFR BLD AUTO: 84.3 % — HIGH (ref 43–77)
NEUTS BAND # BLD: 7 % — SIGNIFICANT CHANGE UP (ref 0–8)
NITRITE UR-MCNC: NEGATIVE — SIGNIFICANT CHANGE UP
NRBC # BLD: 0 /100 WBCS — SIGNIFICANT CHANGE UP (ref 0–0)
OSMOLALITY UR: 335 MOS/KG — SIGNIFICANT CHANGE UP (ref 300–900)
OVALOCYTES BLD QL SMEAR: SLIGHT — SIGNIFICANT CHANGE UP
PCO2 BLDV: 36 MMHG — LOW (ref 42–55)
PCO2 BLDV: 38 MMHG — LOW (ref 42–55)
PCO2 BLDV: 65 MMHG — HIGH (ref 42–55)
PH BLDV: 7.17 — CRITICAL LOW (ref 7.32–7.43)
PH BLDV: 7.31 — LOW (ref 7.32–7.43)
PH BLDV: 7.34 — SIGNIFICANT CHANGE UP (ref 7.32–7.43)
PH UR: 5.5 — SIGNIFICANT CHANGE UP (ref 5–8)
PHOSPHATE SERPL-MCNC: 5.2 MG/DL — HIGH (ref 2.5–4.5)
PHOSPHATE SERPL-MCNC: 5.2 MG/DL — HIGH (ref 2.5–4.5)
PHOSPHATE SERPL-MCNC: 5.3 MG/DL — HIGH (ref 2.5–4.5)
PLAT MORPH BLD: NORMAL — SIGNIFICANT CHANGE UP
PLATELET # BLD AUTO: 168 K/UL — SIGNIFICANT CHANGE UP (ref 150–400)
PLATELET # BLD AUTO: 207 K/UL — SIGNIFICANT CHANGE UP (ref 150–400)
PLATELET # BLD AUTO: 241 K/UL — SIGNIFICANT CHANGE UP (ref 150–400)
PLATELET # BLD AUTO: 273 K/UL — SIGNIFICANT CHANGE UP (ref 150–400)
PO2 BLDV: 35 MMHG — SIGNIFICANT CHANGE UP (ref 25–45)
PO2 BLDV: 43 MMHG — SIGNIFICANT CHANGE UP (ref 25–45)
PO2 BLDV: 53 MMHG — HIGH (ref 25–45)
POIKILOCYTOSIS BLD QL AUTO: SIGNIFICANT CHANGE UP
POLYCHROMASIA BLD QL SMEAR: SLIGHT — SIGNIFICANT CHANGE UP
POTASSIUM BLDV-SCNC: 4.1 MMOL/L — SIGNIFICANT CHANGE UP (ref 3.5–5.1)
POTASSIUM BLDV-SCNC: 5.2 MMOL/L — HIGH (ref 3.5–5.1)
POTASSIUM BLDV-SCNC: 5.6 MMOL/L — HIGH (ref 3.5–5.1)
POTASSIUM SERPL-MCNC: 4.7 MMOL/L — SIGNIFICANT CHANGE UP (ref 3.5–5.3)
POTASSIUM SERPL-MCNC: 4.9 MMOL/L — SIGNIFICANT CHANGE UP (ref 3.5–5.3)
POTASSIUM SERPL-MCNC: 5.1 MMOL/L — SIGNIFICANT CHANGE UP (ref 3.5–5.3)
POTASSIUM SERPL-MCNC: 5.2 MMOL/L — SIGNIFICANT CHANGE UP (ref 3.5–5.3)
POTASSIUM SERPL-MCNC: 6.1 MMOL/L — HIGH (ref 3.5–5.3)
POTASSIUM SERPL-SCNC: 4.7 MMOL/L — SIGNIFICANT CHANGE UP (ref 3.5–5.3)
POTASSIUM SERPL-SCNC: 4.9 MMOL/L — SIGNIFICANT CHANGE UP (ref 3.5–5.3)
POTASSIUM SERPL-SCNC: 5.1 MMOL/L — SIGNIFICANT CHANGE UP (ref 3.5–5.3)
POTASSIUM SERPL-SCNC: 5.2 MMOL/L — SIGNIFICANT CHANGE UP (ref 3.5–5.3)
POTASSIUM SERPL-SCNC: 6.1 MMOL/L — HIGH (ref 3.5–5.3)
PROCALCITONIN SERPL-MCNC: 36.01 NG/ML — HIGH (ref 0.02–0.1)
PROT SERPL-MCNC: 5.4 G/DL — LOW (ref 6–8.3)
PROT SERPL-MCNC: 5.4 G/DL — LOW (ref 6–8.3)
PROT SERPL-MCNC: 5.8 G/DL — LOW (ref 6–8.3)
PROT SERPL-MCNC: 6.8 G/DL — SIGNIFICANT CHANGE UP (ref 6–8.3)
PROT UR-MCNC: ABNORMAL
PROTHROM AB SERPL-ACNC: 22.2 SEC — HIGH (ref 10.5–13.4)
PROTHROM AB SERPL-ACNC: 22.7 SEC — HIGH (ref 10.5–13.4)
PROTHROM AB SERPL-ACNC: 31.8 SEC — HIGH (ref 10.5–13.4)
PROTHROM AB SERPL-ACNC: 36.9 SEC — HIGH (ref 10.5–13.4)
RAPIDTEG MAXIMUM AMPLITUDE: 61.1 MM — SIGNIFICANT CHANGE UP (ref 52–70)
RBC # BLD: 3.19 M/UL — LOW (ref 4.2–5.8)
RBC # BLD: 3.53 M/UL — LOW (ref 4.2–5.8)
RBC # BLD: 3.9 M/UL — LOW (ref 4.2–5.8)
RBC # BLD: 4.53 M/UL — SIGNIFICANT CHANGE UP (ref 4.2–5.8)
RBC # FLD: 14.5 % — SIGNIFICANT CHANGE UP (ref 10.3–14.5)
RBC # FLD: 14.6 % — HIGH (ref 10.3–14.5)
RBC # FLD: 14.6 % — HIGH (ref 10.3–14.5)
RBC # FLD: 14.8 % — HIGH (ref 10.3–14.5)
RBC BLD AUTO: ABNORMAL
RBC CASTS # UR COMP ASSIST: 2 /HPF — SIGNIFICANT CHANGE UP (ref 0–4)
RH IG SCN BLD-IMP: POSITIVE — SIGNIFICANT CHANGE UP
SAO2 % BLDV: 51.6 % — LOW (ref 67–88)
SAO2 % BLDV: 72.2 % — SIGNIFICANT CHANGE UP (ref 67–88)
SAO2 % BLDV: 79 % — SIGNIFICANT CHANGE UP (ref 67–88)
SARS-COV-2 RNA SPEC QL NAA+PROBE: SIGNIFICANT CHANGE UP
SODIUM SERPL-SCNC: 120 MMOL/L — CRITICAL LOW (ref 135–145)
SODIUM SERPL-SCNC: 123 MMOL/L — LOW (ref 135–145)
SODIUM SERPL-SCNC: 124 MMOL/L — LOW (ref 135–145)
SODIUM UR-SCNC: 9 MMOL/L — SIGNIFICANT CHANGE UP
SP GR SPEC: >1.05 (ref 1.01–1.02)
TEG FUNCTIONAL FIBRINOGEN: 18.3 MM — SIGNIFICANT CHANGE UP (ref 15–32)
TEG LY30 (LYSIS): 1.3 % — SIGNIFICANT CHANGE UP (ref 0–2.6)
TEG REACTION TIME: 7.3 MIN — SIGNIFICANT CHANGE UP (ref 4.6–9.1)
TROPONIN T, HIGH SENSITIVITY RESULT: 10 NG/L — SIGNIFICANT CHANGE UP (ref 0–51)
TROPONIN T, HIGH SENSITIVITY RESULT: 12 NG/L — SIGNIFICANT CHANGE UP (ref 0–51)
UROBILINOGEN FLD QL: NEGATIVE — SIGNIFICANT CHANGE UP
VARIANT LYMPHS # BLD: 0.9 % — SIGNIFICANT CHANGE UP (ref 0–6)
WBC # BLD: 14.4 K/UL — HIGH (ref 3.8–10.5)
WBC # BLD: 16.23 K/UL — HIGH (ref 3.8–10.5)
WBC # BLD: 17.38 K/UL — HIGH (ref 3.8–10.5)
WBC # BLD: 19.92 K/UL — HIGH (ref 3.8–10.5)
WBC # FLD AUTO: 14.4 K/UL — HIGH (ref 3.8–10.5)
WBC # FLD AUTO: 16.23 K/UL — HIGH (ref 3.8–10.5)
WBC # FLD AUTO: 17.38 K/UL — HIGH (ref 3.8–10.5)
WBC # FLD AUTO: 19.92 K/UL — HIGH (ref 3.8–10.5)
WBC UR QL: 18 /HPF — HIGH (ref 0–5)

## 2022-08-08 PROCEDURE — 99222 1ST HOSP IP/OBS MODERATE 55: CPT

## 2022-08-08 PROCEDURE — 93306 TTE W/DOPPLER COMPLETE: CPT | Mod: 26

## 2022-08-08 PROCEDURE — 51702 INSERT TEMP BLADDER CATH: CPT

## 2022-08-08 PROCEDURE — 99291 CRITICAL CARE FIRST HOUR: CPT

## 2022-08-08 PROCEDURE — 71045 X-RAY EXAM CHEST 1 VIEW: CPT | Mod: 26

## 2022-08-08 PROCEDURE — 99254 IP/OBS CNSLTJ NEW/EST MOD 60: CPT | Mod: GC

## 2022-08-08 PROCEDURE — 74177 CT ABD & PELVIS W/CONTRAST: CPT | Mod: 26,MA

## 2022-08-08 PROCEDURE — 99255 IP/OBS CONSLTJ NEW/EST HI 80: CPT | Mod: GC

## 2022-08-08 RX ORDER — PHYTONADIONE (VIT K1) 5 MG
10 TABLET ORAL ONCE
Refills: 0 | Status: COMPLETED | OUTPATIENT
Start: 2022-08-08 | End: 2022-08-08

## 2022-08-08 RX ORDER — ALBUMIN HUMAN 25 %
250 VIAL (ML) INTRAVENOUS ONCE
Refills: 0 | Status: COMPLETED | OUTPATIENT
Start: 2022-08-08 | End: 2022-08-08

## 2022-08-08 RX ORDER — SODIUM ZIRCONIUM CYCLOSILICATE 10 G/10G
10 POWDER, FOR SUSPENSION ORAL ONCE
Refills: 0 | Status: COMPLETED | OUTPATIENT
Start: 2022-08-08 | End: 2022-08-08

## 2022-08-08 RX ORDER — ALBUTEROL 90 UG/1
10 AEROSOL, METERED ORAL ONCE
Refills: 0 | Status: COMPLETED | OUTPATIENT
Start: 2022-08-08 | End: 2022-08-08

## 2022-08-08 RX ORDER — SODIUM CHLORIDE 9 MG/ML
1000 INJECTION INTRAMUSCULAR; INTRAVENOUS; SUBCUTANEOUS
Refills: 0 | Status: DISCONTINUED | OUTPATIENT
Start: 2022-08-08 | End: 2022-08-08

## 2022-08-08 RX ORDER — CHLORHEXIDINE GLUCONATE 213 G/1000ML
1 SOLUTION TOPICAL
Refills: 0 | Status: DISCONTINUED | OUTPATIENT
Start: 2022-08-09 | End: 2022-09-02

## 2022-08-08 RX ORDER — PIPERACILLIN AND TAZOBACTAM 4; .5 G/20ML; G/20ML
3.38 INJECTION, POWDER, LYOPHILIZED, FOR SOLUTION INTRAVENOUS EVERY 8 HOURS
Refills: 0 | Status: DISCONTINUED | OUTPATIENT
Start: 2022-08-08 | End: 2022-08-09

## 2022-08-08 RX ORDER — LIDOCAINE HCL 20 MG/ML
10 VIAL (ML) INJECTION ONCE
Refills: 0 | Status: DISCONTINUED | OUTPATIENT
Start: 2022-08-08 | End: 2022-08-08

## 2022-08-08 RX ORDER — SODIUM BICARBONATE 1 MEQ/ML
0.21 SYRINGE (ML) INTRAVENOUS
Qty: 150 | Refills: 0 | Status: DISCONTINUED | OUTPATIENT
Start: 2022-08-08 | End: 2022-08-09

## 2022-08-08 RX ORDER — ACETAMINOPHEN 500 MG
650 TABLET ORAL ONCE
Refills: 0 | Status: COMPLETED | OUTPATIENT
Start: 2022-08-08 | End: 2022-08-08

## 2022-08-08 RX ORDER — CALCIUM GLUCONATE 100 MG/ML
2 VIAL (ML) INTRAVENOUS ONCE
Refills: 0 | Status: COMPLETED | OUTPATIENT
Start: 2022-08-08 | End: 2022-08-08

## 2022-08-08 RX ORDER — ALBUMIN HUMAN 25 %
100 VIAL (ML) INTRAVENOUS ONCE
Refills: 0 | Status: COMPLETED | OUTPATIENT
Start: 2022-08-08 | End: 2022-08-08

## 2022-08-08 RX ORDER — IBUPROFEN 200 MG
400 TABLET ORAL ONCE
Refills: 0 | Status: COMPLETED | OUTPATIENT
Start: 2022-08-08 | End: 2022-08-08

## 2022-08-08 RX ORDER — ALBUMIN HUMAN 25 %
50 VIAL (ML) INTRAVENOUS EVERY 6 HOURS
Refills: 0 | Status: COMPLETED | OUTPATIENT
Start: 2022-08-08 | End: 2022-08-09

## 2022-08-08 RX ORDER — MORPHINE SULFATE 50 MG/1
4 CAPSULE, EXTENDED RELEASE ORAL ONCE
Refills: 0 | Status: DISCONTINUED | OUTPATIENT
Start: 2022-08-08 | End: 2022-08-08

## 2022-08-08 RX ORDER — INSULIN HUMAN 100 [IU]/ML
10 INJECTION, SOLUTION SUBCUTANEOUS ONCE
Refills: 0 | Status: COMPLETED | OUTPATIENT
Start: 2022-08-08 | End: 2022-08-08

## 2022-08-08 RX ORDER — PIPERACILLIN AND TAZOBACTAM 4; .5 G/20ML; G/20ML
3.38 INJECTION, POWDER, LYOPHILIZED, FOR SOLUTION INTRAVENOUS ONCE
Refills: 0 | Status: COMPLETED | OUTPATIENT
Start: 2022-08-08 | End: 2022-08-08

## 2022-08-08 RX ORDER — SODIUM CHLORIDE 9 MG/ML
1000 INJECTION INTRAMUSCULAR; INTRAVENOUS; SUBCUTANEOUS ONCE
Refills: 0 | Status: COMPLETED | OUTPATIENT
Start: 2022-08-08 | End: 2022-08-08

## 2022-08-08 RX ORDER — DEXTROSE 50 % IN WATER 50 %
50 SYRINGE (ML) INTRAVENOUS ONCE
Refills: 0 | Status: COMPLETED | OUTPATIENT
Start: 2022-08-08 | End: 2022-08-08

## 2022-08-08 RX ORDER — CEFTRIAXONE 500 MG/1
2000 INJECTION, POWDER, FOR SOLUTION INTRAMUSCULAR; INTRAVENOUS ONCE
Refills: 0 | Status: COMPLETED | OUTPATIENT
Start: 2022-08-08 | End: 2022-08-08

## 2022-08-08 RX ORDER — SODIUM CHLORIDE 9 MG/ML
1000 INJECTION INTRAMUSCULAR; INTRAVENOUS; SUBCUTANEOUS ONCE
Refills: 0 | Status: DISCONTINUED | OUTPATIENT
Start: 2022-08-08 | End: 2022-08-08

## 2022-08-08 RX ADMIN — PIPERACILLIN AND TAZOBACTAM 200 GRAM(S): 4; .5 INJECTION, POWDER, LYOPHILIZED, FOR SOLUTION INTRAVENOUS at 05:11

## 2022-08-08 RX ADMIN — INSULIN HUMAN 10 UNIT(S): 100 INJECTION, SOLUTION SUBCUTANEOUS at 04:33

## 2022-08-08 RX ADMIN — Medication 200 GRAM(S): at 23:32

## 2022-08-08 RX ADMIN — Medication 50 MILLILITER(S): at 10:48

## 2022-08-08 RX ADMIN — PIPERACILLIN AND TAZOBACTAM 25 GRAM(S): 4; .5 INJECTION, POWDER, LYOPHILIZED, FOR SOLUTION INTRAVENOUS at 18:07

## 2022-08-08 RX ADMIN — SODIUM CHLORIDE 1000 MILLILITER(S): 9 INJECTION INTRAMUSCULAR; INTRAVENOUS; SUBCUTANEOUS at 05:44

## 2022-08-08 RX ADMIN — Medication 100 MEQ/KG/HR: at 10:15

## 2022-08-08 RX ADMIN — Medication 50 MILLILITER(S): at 04:32

## 2022-08-08 RX ADMIN — Medication 650 MILLIGRAM(S): at 02:30

## 2022-08-08 RX ADMIN — Medication 50 MILLILITER(S): at 18:28

## 2022-08-08 RX ADMIN — MORPHINE SULFATE 4 MILLIGRAM(S): 50 CAPSULE, EXTENDED RELEASE ORAL at 05:44

## 2022-08-08 RX ADMIN — Medication 400 MILLIGRAM(S): at 02:30

## 2022-08-08 RX ADMIN — ALBUTEROL 10 MILLIGRAM(S): 90 AEROSOL, METERED ORAL at 04:30

## 2022-08-08 RX ADMIN — Medication 1500 MILLILITER(S): at 23:31

## 2022-08-08 RX ADMIN — Medication 100 MEQ/KG/HR: at 20:26

## 2022-08-08 RX ADMIN — MORPHINE SULFATE 4 MILLIGRAM(S): 50 CAPSULE, EXTENDED RELEASE ORAL at 06:06

## 2022-08-08 RX ADMIN — CEFTRIAXONE 100 MILLIGRAM(S): 500 INJECTION, POWDER, FOR SOLUTION INTRAMUSCULAR; INTRAVENOUS at 04:32

## 2022-08-08 RX ADMIN — Medication 102 MILLIGRAM(S): at 10:16

## 2022-08-08 RX ADMIN — SODIUM ZIRCONIUM CYCLOSILICATE 10 GRAM(S): 10 POWDER, FOR SUSPENSION ORAL at 04:31

## 2022-08-08 RX ADMIN — Medication 200 GRAM(S): at 09:30

## 2022-08-08 RX ADMIN — Medication 400 MILLIGRAM(S): at 01:08

## 2022-08-08 RX ADMIN — PIPERACILLIN AND TAZOBACTAM 25 GRAM(S): 4; .5 INJECTION, POWDER, LYOPHILIZED, FOR SOLUTION INTRAVENOUS at 10:37

## 2022-08-08 RX ADMIN — SODIUM CHLORIDE 1000 MILLILITER(S): 9 INJECTION INTRAMUSCULAR; INTRAVENOUS; SUBCUTANEOUS at 04:31

## 2022-08-08 RX ADMIN — Medication 650 MILLIGRAM(S): at 01:08

## 2022-08-08 RX ADMIN — Medication 1500 MILLILITER(S): at 23:44

## 2022-08-08 NOTE — CONSULT NOTE ADULT - SUBJECTIVE AND OBJECTIVE BOX
Vascular & Interventional Radiology Brief Consult Note    Evaluate for Procedure: Cholecystostomy tube and paracentesis.    HPI: 67yo M with history of recently diagnosed cirrhosis (MELD 30) and cholangitis s/p ERCP with stent placement (4/2022) s/p stent removal on 7/20 presenting for one week of RUQ abdominal pain with clinical exam, labs and radiographic findings meeting [place] Criteria grade III for severe acute cholangitis. CT imaging demonstrating wall thickened GB with pericholecystic fluid and cystic duct stone.    Allergies:   Medications (Abx/Cardiac/Anticoagulation/Blood Products)  cefTRIAXone   IVPB: 100 mL/Hr IV Intermittent (08-08 @ 04:32)  piperacillin/tazobactam IVPB...: 200 mL/Hr IV Intermittent (08-08 @ 05:11)    Data:  162.6  72.6  T(C): 36.8  HR: 116  BP: 107/58  RR: 18  SpO2: 92%    -WBC 19.92 / HgB 12.9 / Hct 37.8 / Plt 241  -Na 123 / Cl 90 / BUN 50 / Glucose 115  -K 4.9 / CO2 13 / Cr 2.00  -ALT -- / Alk Phos -- / T.Bili --  -INR2.71    Imaging: Reviewed    Assessment:    66M with cholangitis s/p ERCP and stent placement s/p stent removal now presents with 1 week of RUQ pain and findings concerning for acute cholecystitis. Increased ascites with peritoneal enhancement concerning for SBP. Elevated INR and signs of hypoperfusion (Lactate 9.8). High risk of bleeding given vessels anterior to the GB on CT and elevated INR.     Plan:  - Please correct INR as best as possible with goal < 2.5. FFP vs Cryo / Kcentra.  - Please place order for IR Procedure, approving attending Dr. Adan  - hold therapeutic and prophylactic anticoagulants  - maintain active type and screen x 2  - Can attempt procedures at bedside when patient optimized.   Vascular & Interventional Radiology Brief Consult Note    Evaluate for Procedure: Cholecystostomy tube and paracentesis.    HPI: 65yo M with history of recently diagnosed cirrhosis (MELD 30) and cholangitis s/p ERCP with stent placement (4/2022) s/p stent removal on 7/20 presenting for one week of RUQ abdominal pain with clinical exam, labs and radiographic findings meeting [place] Criteria grade III for severe acute cholangitis. CT imaging demonstrating wall thickened GB with pericholecystic fluid and cystic duct stone.    Allergies:   Medications (Abx/Cardiac/Anticoagulation/Blood Products)  cefTRIAXone   IVPB: 100 mL/Hr IV Intermittent (08-08 @ 04:32)  piperacillin/tazobactam IVPB...: 200 mL/Hr IV Intermittent (08-08 @ 05:11)    Data:  162.6  72.6  T(C): 36.8  HR: 116  BP: 107/58  RR: 18  SpO2: 92%    -WBC 19.92 / HgB 12.9 / Hct 37.8 / Plt 241  -Na 123 / Cl 90 / BUN 50 / Glucose 115  -K 4.9 / CO2 13 / Cr 2.00  -ALT -- / Alk Phos -- / T.Bili --  -INR2.71    Imaging: Reviewed    Assessment:    66M with cholangitis s/p ERCP and stent placement s/p stent removal now presents with 1 week of RUQ pain and findings concerning for acute cholecystitis. Increased ascites with peritoneal enhancement concerning for SBP. Elevated INR and signs of hypoperfusion (Lactate 9.8). High risk of bleeding given vessels anterior to the GB on CT and elevated INR.     Plan:  - Please correct INR as best as possible with goal < 2.5. FFP / Kcentra.  - Please place order for IR Procedure, approving attending Dr. Adan  - hold therapeutic and prophylactic anticoagulants  - maintain active type and screen x 2  - Can attempt procedures at bedside when patient optimized.

## 2022-08-08 NOTE — CONSULT NOTE ADULT - SUBJECTIVE AND OBJECTIVE BOX
HPI:  67yo M with decompensated cirrhosis and cholangitis s/p ERCP with stent placement (4/2022) s/p stent removal on 7/20 (along sphincterotomy and stone extraction) presenting for one week of RUQ abdominal pain associated with new abdominal distension and decreased appetite. Denies nausea or vomiting, fevers or chills. Endorses regular normal bowel movements. Of note, patient presented to the ED on 7/26 for the pain where he had an US performed which showed a distended gallbladder with stones. CBD 5mm. Patient was discharged home. Patient currently in the ICU, with work up revealing CT with distended gallbladder with stones in the cystic duct concerning for chronic cholecystitis, interval increase in ascites, diffuse peritoneal enhancement concerning for possible SBP. Bile duct within normal limits, [facility] called for further recs and concern for ascending cholangitis.       Allergies:  No Known Allergies        Hospital Medications:  albumin human 25% IVPB 100 milliLiter(s) IV Intermittent once  calcium gluconate IVPB 2 Gram(s) IV Intermittent once  phytonadione  IVPB 10 milliGRAM(s) IV Intermittent once  piperacillin/tazobactam IVPB.. 3.375 Gram(s) IV Intermittent every 8 hours  sodium bicarbonate  Infusion 0.207 mEq/kG/Hr IV Continuous <Continuous>      PMHX/PSHX:  No pertinent past medical history    H/O acute cholangitis    2019 novel coronavirus disease (COVID-19)    No significant past surgical history    S/P ERCP    H/O colonoscopy        Family history:      Social History: no smoking    ROS:   General:  No fevers, chills or night sweats  ENT:  No sore throat or dysphagia  CV:  No pain or palpitations  Resp:  No dyspnea, cough or  wheezing  GI:  as above  Skin:  No rash or edema  Neuro: no weakness   Hematologic: no bleeding  Musculoskeletal: no muscle pain or join pain  Psych: no agitation     : no dysuria      PHYSICAL EXAM:   GENERAL:  NAD, Appears stated age  HEENT:  NC/AT,  conjunctivae clear and pink, sclera -anicteric  CHEST:  CTA B/L, Normal effort  HEART:  RRR S1/S2,  ABDOMEN:  Soft, distended, tender to palpation in RUQ  EXTREMITIES:  No cyanosis or Edema  SKIN:  Warm & Dry. No rash or erythema  NEURO:  Alert, oriented, no focal deficit, no asterixis     Vital Signs:  Vital Signs Last 24 Hrs  T(C): 36.8 (08 Aug 2022 08:30), Max: 37.1 (08 Aug 2022 00:00)  T(F): 98.2 (08 Aug 2022 08:30), Max: 98.7 (08 Aug 2022 00:00)  HR: 116 (08 Aug 2022 09:00) (106 - 124)  BP: 107/58 (08 Aug 2022 09:00) (104/63 - 137/71)  BP(mean): 78 (08 Aug 2022 09:00) (78 - 94)  RR: 18 (08 Aug 2022 09:00) (18 - 24)  SpO2: 92% (08 Aug 2022 09:00) (92% - 95%)    Parameters below as of 08 Aug 2022 08:30  Patient On (Oxygen Delivery Method): room air      Daily Height in cm: 162.56 (08 Aug 2022 00:00)    Daily     LABS:                        12.9   19.92 )-----------( 241      ( 08 Aug 2022 08:45 )             37.8     Mean Cell Volume: 96.9 fl (08-08-22 @ 08:45)    08-08    123<L>  |  90<L>  |  50<H>  ----------------------------<  115<H>  4.9   |  13<L>  |  2.00<H>    Ca    7.5<L>      08 Aug 2022 07:14    TPro  6.8  /  Alb  2.3<L>  /  TBili  4.7<H>  /  DBili  x   /  AST  38  /  ALT  22  /  AlkPhos  127<H>  08-08    LIVER FUNCTIONS - ( 08 Aug 2022 01:02 )  Alb: 2.3 g/dL / Pro: 6.8 g/dL / ALK PHOS: 127 U/L / ALT: 22 U/L / AST: 38 U/L / GGT: x           PT/INR - ( 08 Aug 2022 07:14 )   PT: 31.8 sec;   INR: 2.71 ratio         PTT - ( 08 Aug 2022 07:14 )  PTT:41.7 sec    Amylase Serum--      Lipase serum19       Ammonia--                          12.9   19.92 )-----------( 241      ( 08 Aug 2022 08:45 )             37.8                         14.9   14.40 )-----------( 273      ( 08 Aug 2022 01:02 )             42.6     Imaging:  < from: CT Abdomen and Pelvis w/ IV Cont (08.08.22 @ 01:25) >  LIVER: Cirrhosis.  BILE DUCTS: Interval removal of CBD stent. No biliary dilatation. No   radiodense gallstone in the common duct or common bile duct.    GALLBLADDER: Moderately distended gallbladder with cholelithiasis and   gallbladder wall thickening.  There is a 4 mm stone in the cystic duct.  SPLEEN: Within normal limits.  PANCREAS: Within normal limits.  ADRENALS: Within normal limits.  KIDNEYS/URETERS: Within normal limits.    BLADDER: Within normal limits.  REPRODUCTIVE ORGANS: Prostate within normal limits.    BOWEL: No bowel obstruction. Appendix is not visualized. No evidence of   inflammation in the pericecal region. Wall thickening of the small bowel   loops and large bowel loops.  PERITONEUM: Moderate volume ascites, including moderate fluid in the   subhepatic space. Interval development of diffuse peritoneal   hyperenhancement. Correlate clinically for spontaneous arterial   peritonitis.  VESSELS: Atherosclerotic changes.  RETROPERITONEUM/LYMPH NODES: No lymphadenopathy.  ABDOMINAL WALL: Within normal limits.  BONES: Within normal limits.    IMPRESSION:    Compared to the recent CT of April 13, 2022, interval increase in   abdominopelvic ascites, from mild to moderate. Moderate fluid in the   subhepatic space. Diffuse peritoneal enhancement. Correlate clinically   for spontaneous bacterial peritonitis (SBP).    Interval removal of biliary stent.  Mildly distended gallbladder with   cholelithiasis and gallbladder wall thickening. A 4 cm stone in this   cystic duct. Findings suggest chronic cholecystitis. Recommend right   upper quadrant ultrasound or HIDA scan for complete evaluation for acute   cholecystitis.    Small and large bowel wall thickening suggesting hepatic enterocolopathy.  Cirrhosis with evidence of portal hypertension. Placement that in   cirrhotic patients, annual surveillance MRI is recommended to evaluate   for hepatocellular carcinoma.    Bilateral lower lobe subsegmental atelectasis.    < end of copied text >

## 2022-08-08 NOTE — ED PROCEDURE NOTE - NS_EDPROVIDERDISPOUSERTYPE_ED_A_ED
2235 Placed call to ED to verify pt ready for  by ICU RN    2245 Bedside report received from Yasmin DILLON. Pt transported with all belongings via gurney, on the monitor to [address]. Pts mother along for transportation.     2255 Pt arrived to [address] and transferred to [place] ICU bed. CHG bath provided. Attached to in room monitor. Assessment completed. 2 RN skin check completed.     Weight: 79.6kg via bed scale  Temp: 98.6f temporal   Attending Attestation (For Attendings USE Only)...

## 2022-08-08 NOTE — CONSULT NOTE ADULT - SUBJECTIVE AND OBJECTIVE BOX
HISTORY OF PRESENT ILLNESS:  TESHA STEINER is a 66y Male with history of recently diagnosed cirrhosis and cholangitis s/p ERCP with stent placement (4/2022) s/p stent removal on 7/20/22 presenting for one week of RUQ abdominal pain. States that one week after his stent was removed, he started experiencing RUQ pain, associated with decreased appetite. Denies nausea or vomiting, fevers or chills. Endorses regular normal bowel movements. Presented to the ED on 7/26 for the pain where he had an US performed which showed a distended gallbladder with stones. CBD 5mm. Patient was discharged home.    In the ED, patient tachycardic to 120s, normotensive, satting 94% on RA.  CT showed a distended gallbladder with stones in the cystic duct and gallbladder wall thickening, suggesting chronic cholecystitis; cirrhosis with portal hypertension, moderate ascites with diffuse peritoneal enhancement concerning for SBP, small and large bowel thickening suggesting hepatic enterocolopathy. In ED, pt noted to have ROBBIE with hyponatremia and hyperkalemia which was shifted with D50 and insulin, and given 10g Lokelma. He received 2L NS bolus for tachycardia and elevated lactate to 9, and severe metabolic acidosis/  He was transferred to SICU for further management.          PAST MEDICAL HISTORY: Cirrhosis  H/O acute cholangitis  2019 novel coronavirus disease (COVID-19)      PAST SURGICAL HISTORY: S/P ERCP  H/O colonoscopy    FAMILY HISTORY: non-contributory    SOCIAL HISTORY :Denies tobacco use, drug use. Pt states he used to drink alcohol socially on weekends, however had not drank since his diagnosis of cirrhosis in April.    CODE STATUS: Full code    HOME MEDICATIONS: * No Current Medications as of 11-Jul-2022 12:08 documented in Structured Notes      ALLERGIES: No Known Allergies      VITAL SIGNS:  ICU Vital Signs Last 24 Hrs  T(C): 36.8 (08 Aug 2022 08:30), Max: 37.1 (08 Aug 2022 00:00)  T(F): 98.2 (08 Aug 2022 08:30), Max: 98.7 (08 Aug 2022 00:00)  HR: 120 (08 Aug 2022 10:00) (106 - 124)  BP: 113/65 (08 Aug 2022 10:00) (104/63 - 137/71)  BP(mean): 83 (08 Aug 2022 10:00) (78 - 94)  RR: 26 (08 Aug 2022 10:00) (18 - 26)  SpO2: 95% (08 Aug 2022 10:00) (92% - 95%)    O2 Parameters below as of 08 Aug 2022 08:30  Patient On (Oxygen Delivery Method): room air      NEURO  Exam: awake, alert and oriented x3  Meds: x    RESPIRATORY  ABG - ( 08 Aug 2022 08:35 )  pH: 7.25  /  pCO2: 24    /  pO2: 75    / HCO3: 10    / Base Excess: -14.9 /  SaO2: 95.1    Blood Gas Arterial, Lactate: 9.1 mmol/L (08.08.22 @ 08:35)    Exam: diminished at bases. clear to auscultation bilaterally  Meds: x    CARDIOVASCULAR  VBG - ( 08 Aug 2022 07:13 )  pH: 7.17  /  pCO2: 36    /  pO2: 53    / HCO3: 13    / Base Excess: -14.5 /  SaO2: 79.0   Lactate: 9.4      Exam: regular rhythm, tachycardic  Cardiac Rhythm: sinus tachycardia  Meds: x    GI/NUTRITION  Exam: distended, tender to palpation in RUQ, no rebound tenderness, no guarding  Diet: NPO  Meds: x    GENITOURINARY/RENAL  Meds:sodium bicarbonate  Infusion 0.207 mEq/kG/Hr IV Continuous <Continuous>      08-08 @ 07:01  -  08-08 @ 11:54  --------------------------------------------------------  IN:    IV PiggyBack: 100 mL    IV PiggyBack: 25 mL    IV PiggyBack: 100 mL    Sodium Bicarbonate: 100 mL    sodium chloride 0.9%: 125 mL  Total IN: 450 mL    OUT:    Indwelling Catheter - Urethral (mL): 215 mL  Total OUT: 215 mL    Total NET: 235 mL        Weight (kg): 72.6 (08-08 @ 00:00)  08-08    123<L>  |  90<L>  |  50<H>  ----------------------------<  115<H>  4.9   |  13<L>  |  2.00<H>    Ca    7.5<L>      08 Aug 2022 07:14    TPro  6.8  /  Alb  2.3<L>  /  TBili  4.7<H>  /  DBili  x   /  AST  38  /  ALT  22  /  AlkPhos  127<H>  08-08    [ x] Tobin catheter, indication: urine output monitoring in critically ill patient    HEMATOLOGIC  [n/a ] VTE Prophylaxis:                          12.9   19.92 )-----------( 241      ( 08 Aug 2022 08:45 )             37.8     PT/INR - ( 08 Aug 2022 07:14 )   PT: 31.8 sec;   INR: 2.71 ratio         PTT - ( 08 Aug 2022 07:14 )  PTT:41.7 sec  Transfusion: [ ] PRBC	[ ] Platelets	[ ] FFP	[ ] Cryoprecipitate      INFECTIOUS DISEASES  Meds:piperacillin/tazobactam IVPB.. 3.375 Gram(s) IV Intermittent every 8 hours    RECENT CULTURES: x      ENDOCRINE  Meds: x  CAPILLARY BLOOD GLUCOSE  POCT Blood Glucose.: 130 mg/dL (08 Aug 2022 05:18)  POCT Blood Glucose.: 89 mg/dL (08 Aug 2022 04:21)      PATIENT CARE ACCESS DEVICES:  [x ] Peripheral IV  [ ] Central Venous Line	[ ] R	[ ] L	[ ] IJ	[ ] Fem	[ ] SC	Placed:   [ ] Arterial Line		[ ] R	[ ] L	[ ] Fem	[ ] Rad	[ ] Ax	Placed:   [ ] PICC:					[ ] Mediport  [ x] Urinary Catheter, Date Placed: 8/8  [x] Necessity of urinary, arterial, and venous catheters discussed    OTHER MEDICATIONS:  x    IMAGING STUDIES: < from: CT Abdomen and Pelvis w/ IV Cont (08.08.22 @ 01:25) >    IMPRESSION:    Compared to the recent CT of April 13, 2022, interval increase in   abdominopelvic ascites, from mild to moderate. Moderate fluid in the   subhepatic space. Diffuse peritoneal enhancement. Correlate clinically   for spontaneous bacterial peritonitis (SBP).    Interval removal of biliary stent.  Mildly distended gallbladder with   cholelithiasis and gallbladder wall thickening. A 4 cm stone in this   cystic duct. Findings suggest chronic cholecystitis. Recommend right   upper quadrant ultrasound or HIDA scan for complete evaluation for acute   cholecystitis.    Small and large bowel wall thickening suggesting hepatic enterocolopathy.  Cirrhosis with evidence of portal hypertension. Placement that in   cirrhotic patients, annual surveillance MRI is recommended to evaluate   for hepatocellular carcinoma.    Bilateral lower lobe subsegmental atelectasis.

## 2022-08-08 NOTE — PATIENT PROFILE ADULT - FALL HARM RISK - UNIVERSAL INTERVENTIONS
Bed in lowest position, wheels locked, appropriate side rails in place/Call bell, personal items and telephone in reach/Instruct patient to call for assistance before getting out of bed or chair/Non-slip footwear when patient is out of bed/[place] to call system/Physically safe environment - no spills, clutter or unnecessary equipment/Purposeful Proactive Rounding/Room/bathroom lighting operational, light cord in reach

## 2022-08-08 NOTE — ED ADULT NURSE REASSESSMENT NOTE - NS ED NURSE REASSESS COMMENT FT1
Pt resting is reports no pain or discomfort. Reviewed plan of care. VS documented. IV intact. Will continue to monitor.

## 2022-08-08 NOTE — CONSULT NOTE ADULT - NS ATTEND AMEND GEN_ALL_CORE FT
66yMale with cirrhosis and recent cholangitis s/p ERCP stent (4/22) and stent removal (7/20/22) and concern for SBP, cholecystitis cholangitis again    Awake and alert, will check ammonia level. Pain control with opoid  Saturating well  Tachycardia sec to SIRS, not on pressure  NPO for now. PPI  IR contacted for perc cholecystostomy tube placement, sampling of ascitic fluid  Change IVF to bicarb drip sec to severe metabolic acidosis. Monitor hyponatremia  Monitor Lactic acidosis  ROBBIE, work up for hepato renal syndrome  Correct severe coagulopathy with Vit K, FFP. TEG sent for further guidance  Monitor and correct hyperglycemia  Hepatology/Intervention GI/IR consulted

## 2022-08-08 NOTE — H&P ADULT - NSHPPHYSICALEXAM_GEN_ALL_CORE
VITALS:  Vital Signs Last 24 Hrs  T(C): 37.1 (08 Aug 2022 06:00), Max: 37.1 (08 Aug 2022 00:00)  T(F): 98.7 (08 Aug 2022 06:00), Max: 98.7 (08 Aug 2022 00:00)  HR: 120 (08 Aug 2022 06:00) (106 - 120)  BP: 137/71 (08 Aug 2022 06:00) (108/73 - 137/71)  BP(mean): 89 (08 Aug 2022 06:00) (89 - 89)  RR: 22 (08 Aug 2022 06:00) (18 - 22)  SpO2: 95% (08 Aug 2022 06:00) (95% - 95%)    Parameters below as of 08 Aug 2022 06:00  Patient On (Oxygen Delivery Method): room air        PHYSICAL EXAM:  Gen: No acute distress, jaundiced  Resp: no increased WOB or wheezing  Abd: Soft, moderately distended, moderate RUQ tenderness, no rebound, no guarding.  Ext: Warm and well-perfused

## 2022-08-08 NOTE — ED PROVIDER NOTE - PHYSICAL EXAMINATION
CONSTITUTIONAL: Well-developed; well-nourished  SKIN: warm, dry  HEAD: Normocephalic; atraumatic.  EYES: no conjunctival injection. PERRL.   ENT: No nasal discharge; airway clear.  NECK: Supple; good ROM  CARD: S1, S2 normal; no murmurs, gallops, or rubs. Regular rate and rhythm.   RESP: No wheezes, rales or rhonchi. Good air movement bilaterally.   ABD: Distended, diffusely tender though more TTP at RUQ   EXT: No cyanosis or edema.   NEURO: Alert, oriented, grossly unremarkable  PSYCH: Cooperative, appropriate.

## 2022-08-08 NOTE — ED ADULT NURSE NOTE - NSIMPLEMENTINTERV_GEN_ALL_ED
Implemented All Universal Safety Interventions:  [place] to call system. Call bell, personal items and telephone within reach. Instruct patient to call for assistance. Room bathroom lighting operational. Non-slip footwear when patient is off stretcher. Physically safe environment: no spills, clutter or unnecessary equipment. Stretcher in lowest position, wheels locked, appropriate side rails in place.

## 2022-08-08 NOTE — CONSULT NOTE ADULT - ASSESSMENT
67yo M with decompensated cirrhosis and cholangitis s/p ERCP with stent placement (4/2022) s/p stent removal on 7/20 (along sphincterotomy and stone extraction) presenting for one week of RUQ abdominal pain associated with new abdominal distension and decreased appetite. Patient currently in the ICU, with work up revealing CT with distended gallbladder with stones in the cystic duct concerning for chronic cholecystitis, interval increase in ascites, diffuse peritoneal enhancement concerning for possible SBP. Bile duct within normal limits, [facility] called for further recs and concern for ascending cholangitis.     #Decompensated Cirrhosis, thought to be ETOH vs PARNELL  #Hx of cholangitis stent placement (4/2022) s/p stent removal on 7/20 (along sphincterotomy and stone extraction)       67yo M with decompensated cirrhosis and cholangitis s/p ERCP with stent placement (4/2022) s/p stent removal on 7/20 (along sphincterotomy and stone extraction) presenting for one week of RUQ abdominal pain associated with new abdominal distension and decreased appetite. Patient currently in the ICU, with work up revealing CT with distended gallbladder with stones in the cystic duct concerning for chronic cholecystitis, interval increase in ascites, diffuse peritoneal enhancement concerning for possible SBP. Bile duct within normal limits, [facility] called for further recs and concern for ascending cholangitis.     #Decompensated Cirrhosis, thought to be ETOH vs PARNELL  #Hx of cholangitis stent placement (4/2022) s/p stent removal on 7/20 (along sphincterotomy and stone extraction)  #distended gallbladder with stones in the cystic duct concerning for chronic cholecystitis  -    #diffuse peritoneal enhancement concerning for possible SBP       67yo M with decompensated cirrhosis and cholangitis s/p ERCP with stent placement (4/2022) s/p stent removal on 7/20 (along sphincterotomy and stone extraction) presenting for one week of RUQ abdominal pain associated with new abdominal distension and decreased appetite. Patient currently in the ICU, with work up revealing CT with distended gallbladder with stones in the cystic duct concerning for chronic cholecystitis, interval increase in ascites, diffuse peritoneal enhancement concerning for possible SBP. Bile duct within normal limits, [facility] called for further recs and concern for ascending cholangitis.     #Decompensated Cirrhosis, thought to be ETOH vs PARNELL  #Hx of cholangitis stent placement (4/2022) s/p stent removal on 7/20 (along sphincterotomy and stone extraction)  #distended gallbladder with stones in the cystic duct concerning for chronic cholecystitis  Suspect patient worsening Bilirubin in setting of combination of decompensated cirrhosis and Cholecystitis as noted on imaging. Given bile ducts none dilated on CT scan and recent ERCP couple of weeks ago with sphincterotomy + stone extraction, less concerned for ascending Cholangitis as cause of patient presentation.     #Hyponatremia  #ROBBIE stage 2    #diffuse peritoneal enhancement concerning for possible SBP    Recommendations:  -Agree with IR consult for evaluation of Perc rodney  -Diagnostic paracentesis with IR if good pocket available given concern for peritonitis on imaging.   -Continue broad spectrum abx  -Trend liver enzymes and INR  -Follow up with Hepatology recs  -Follow up MRCP    GI will continue to monitor     Recommendations preliminary until signed by attending.     Mario Olsen MD  Gastroenterology/Hepatology Fellow  1st option: [phone] (text or call), ONLY available from 7:00 am to 5:00 pm.   **Contact on-call GI fellow via answering service ([phone]) from 5pm-7am AND on weekends/holidays**  2nd option: Available via [employer] Teams  3rd option: Pager: [phone]

## 2022-08-08 NOTE — ED ADULT NURSE NOTE - OBJECTIVE STATEMENT
67 y/o male PMHx gallstones, cholangitis arrives to [facility] ED by EMS from home with c/o abdominal pain. Pt states had gallstones that went septic in april, had stent placed, stent removed one week prior to being [facility] for constant worsening RUQ pain[facility]. Patient with GI f/u 8/11/22 but unable to wait until appointment due to severe pain. Patient is A&Ox4. Respirations spontaneous and unlabored. Denies SOB, dyspnea, cough, chest pain, palpitations. RUQ abdominal pain, TTP. No n/v/d. Denies urinary symptoms. Denies fever/chills. No sick contacts. Skin is warm/dry and normal for race. Ambulates independently at baseline.

## 2022-08-08 NOTE — ED PROVIDER NOTE - CLINICAL SUMMARY MEDICAL DECISION MAKING FREE TEXT BOX
Patient is a 67 yo M with PMH gallstones s/p stent 4/2022 removed 7/2022 presenting with RUQ pain consistent with prior biliary symptoms, concerning for new acute cholelithiasis/cholecystitis vs more distal stone representing cholangitis or gallstone pancreatitis. Will eval with CT abd pelvis and labs. Abdominal distension concerning for ascites with prior cirrhosis. Currently tachycardic, afebrile, possibly due to pain vs. infection.

## 2022-08-08 NOTE — H&P ADULT - TIME BILLING
I saw and evaluated patient and agree with above note  tachycardic on presentation  primary focus is to evaluate for ascending cholangitis and will contact G.I. team regarding ERCP  severe underlying liver disease - coordinating care to fully evaluate  arranged for admission to SICU under ACS service

## 2022-08-08 NOTE — ED PROVIDER NOTE - IV ALTEPLASE ADMIN OUTSIDE HIDDEN
Infusion Nursing Note:  Odalys Nathan presents today for Taxol, Magnesium and Potassium.    Patient seen by provider today: No   present during visit today: Not Applicable.    Note: Has c/o fatigue and some indigestion.  Indicates it seems like things are slow to digest.  This has improved slightly since starting on treatment.  Potassium and Magnesium level from today low.  Replaced IV Magnesium and oral potassium.    Intravenous Access:  Lab draw site port, Needle type port, Gauge 20 3/4in.  Labs drawn without difficulty.  Implanted Port.    Treatment Conditions:  Lab Results   Component Value Date    HGB 11.8 04/17/2019     Lab Results   Component Value Date    WBC 4.5 04/17/2019      Lab Results   Component Value Date    ANEU 1.9 04/17/2019     Lab Results   Component Value Date     04/17/2019      Results reviewed, labs MET treatment parameters, ok to proceed with treatment.      Post Infusion Assessment:  Patient tolerated infusion without incident.  Blood return noted pre and post infusion.  Site patent and intact, free from redness, edema or discomfort.  No evidence of extravasations.    Discharge Plan:   Discharge instructions reviewed with: Patient.  Patient discharged in stable condition accompanied by: .  Departure Mode: Ambulatory.  Next infusion scheduled for 4/23/19.    ARNOLD GILLETTE RN                       show

## 2022-08-08 NOTE — ED PROVIDER NOTE - ATTENDING CONTRIBUTION TO CARE
I, Corinna Hercules, performed a history and physical exam of the patient and discussed their management with the resident and /or advanced care provider. I reviewed the resident and /or ACP's note and agree with the documented findings and plan of care. I was present and available for all procedures.

## 2022-08-08 NOTE — ED PROVIDER NOTE - OBJECTIVE STATEMENT
Patient is a 67 yo M with PMH gallstones s/p stent 4/2022 removed 7/2022 presenting with RUQ pain. Pain is similar in quality to prior biliary colic pain, began 1 week ago and was initially intermittent, now constant, non radiating, worse with deep inspiration. Espouses constipation and dark urine in this time. Decreased appetite, tolerates fluids not solids. Denies fevers, chills, bloody stool, urine changes, SOB, CP. After discharge following biliary stent removal, planned for follow up with surgery for cholecystectomy in late August.

## 2022-08-08 NOTE — ED PROVIDER NOTE - PROGRESS NOTE DETAILS
Luis Felipe North MD PGY1: Labs with hyponatremia, hyperkalemia, hypochloremia, ROBBIE. Will address with IVF and insulin/d50/lokelma/albuterol. ROBBIE likely prerenal given ratio BUN/Cr >20 and worsening ascities on CT. New Leukocytosis. CT with cirrhosis and ascites, peritoneal enhancement, 4mm stone in cystic duct and gallbladder wall thickening consistent with acute cholecystitis. Given tenderness and ascities, though no fever, concern for SBP vs. infected stone as source of leukocytosis and tachycardia. Will perform diagnostic paracentesis, will need admission for treatment cholecystitis. Luis Felipe North MD PGY1: Labs with hyponatremia, hyperkalemia, hypochloremia, ROBBIE. Will address with IVF and insulin/d50/lokelma/albuterol. ROBBIE likely prerenal given ratio BUN/Cr >20 and worsening ascites on CT. New Leukocytosis. CT with cirrhosis and ascites, peritoneal enhancement, 4mm stone in cystic duct and gallbladder wall thickening consistent with acute cholecystitis. Given tenderness and ascites, though no fever, concern for SBP vs. infected stone as source of leukocytosis and tachycardia. Will perform diagnostic paracentesis, will need admission for treatment cholecystitis. Corinna Hercules MD (Attending Physician):    66 year old male with PMHx of gallstones s/p stent placement on 4/2/22 with removal on 7/2/22 presents for worsening RUQ abdominal pain. Denies fever, chills, urinary complaints, chest pain, SOB. He has tenderness to palpation of RUQ with (-)pruett's sign. VSS. Concern for ascending cholangitis, cholecystitis, cholelithiasis, choledocholithiasis, colitis, reflux. Plan: CT abd, cardiac workup, labs       CT significant for signs of cholecystitis. WBC elevated and +ascites seen. On bedside ultrasound, there is ascitic fluid about 1-1.5cm in depth with bowel in the way and therefore unable to safely remove fluid. GIven antibiotics. Although SBP is possible, likely cholecystitis based on CT findings. Will discuss case with Dr. Goodson, surgeon, since patient scheduled for appointment with him on 8/11/22 for possible cholecystectomy.     Plan to admit for cholecystitis Luis Felipe North MD PGY1: Attempted diagnostic paracentesis, but unable to find suitable ascites pocket on US. Consulted surgery for findings c/w acute cholecystitis on CT with stone in cystic duct and leukocytosis concerning for infected stone, agreed to see patient. Will admit patient to medicine for further workup and management. Corinna Hercules MD (Attending Physician):    66 year old male with PMHx of gallstones s/p stent placement on 4/2/22 with removal on 7/2/22 presents for worsening RUQ abdominal pain. Denies fever, chills, urinary complaints, chest pain, SOB. He has tenderness to palpation of RUQ with (-)pruett's sign. VSS. Concern for ascending cholangitis, cholecystitis, cholelithiasis, choledocholithiasis, colitis, reflux. Plan: CT abd, cardiac workup, labs       CT significant for signs of cholecystitis. WBC elevated and +ascites seen. On bedside ultrasound, there is ascitic fluid about 0.5 - 1cm in depth with bowel in the way and therefore unable to safely remove fluid. Given antibiotics. Although SBP is possible, likely cholecystitis based on CT findings. Will discuss case with Dr. Goodson, surgeon, since patient scheduled for appointment with him on 8/11/22 for possible cholecystectomy.     Plan to admit for cholecystitis Luis Felipe North MD PGY1: Patient with tachycardia to 120s, complains of pain. Ordered 2nd liter of NS and morphine for pain control. Admitted to medicine with Lety Cruz Luis Felipe North MD PGY1: surgery reached out, is going to admit to SICU instead of medicine. Advised patient should not go to [facility], nursing made aware.

## 2022-08-08 NOTE — PROCEDURE NOTE - ADDITIONAL PROCEDURE DETAILS
Called by primary team for difficult milian placement. Milian indication for strict I&Os. RN attempts although meeting resistance distally. Patient was prepped and draped with sterile technique. 14F silicone milian inserted down to the hub with immediate return of dark yellow urine. ~250cc urine drained.  Balloon inflated with 10cc sterile water and milian was secured to leg. Patient tolerated procedure well. Milian management per primary team

## 2022-08-08 NOTE — ED PROVIDER NOTE - NS ED ROS FT
CONSTITUTIONAL - No fever, No diaphoresis  SKIN - No rash  HEMATOLOGIC - No abnormal bleeding or bruising  EYES - No eye pain, No blurred vision  ENT - No change in hearing, No sore throat, No neck pain, No rhinorrhea  RESPIRATORY - No shortness of breath, No cough  CARDIAC -No chest pain, No palpitations  GI - as per HPI  - No dysuria, no frequency, no hematuria.   MUSCULOSKELETAL - No joint pain, No swelling, No back pain  NEUROLOGIC - No numbness, No focal weakness, No headache, No dizziness

## 2022-08-08 NOTE — CONSULT NOTE ADULT - ASSESSMENT
ASSESSMENT: 66yMale with cirrhosis and recent cholangitis s/p ERCP stent (4/22) and stent removal (7/20/22) and concern for SBP, cholecystitis    PLAN:   Neurologic: acute pain  - Dilaudid as needed after abdominal exam    Respiratory: atelectasis  - CXR  - Incentive spirometry, chest PT    Cardiovascular: sinus tachycardia likely 2/2 sepsis  - Fluid resuscitate as needed  - Trend lactate    Gastrointestinal/Nutrition: cirrhosis (MELD 34), concern for cholecystitis and/or SBP   - NPO  - GI - not concerned for cholangitis due to no dilation of bile ducts  - IR consult for perc rodney and diagnostic paracentesis  - Hepatology and transplant consults placed    Genitourinary/Renal: ROBBIE and hyperkalemia s/p shifting& Lokelma, urethral stricture, severe metabolic acidosis  - Urology for Tobin placement, strict I&Os  - 150 mEq bicarb in D5 @ 100ml/hr  - q4 BMP to check K+  - 100ml 25% albumin    Hematologic: coagulopathy of liver failure/ sepsis  - Send type & screen x 2  - INR 2.71 --> will give 10mg IV vitamin K and 2u FFP  - Send TEG  - Hold chemical VTE ppx  - Venodynes    Infectious Disease: sepsis  - Blood cultures sent  - +UA, send Urine culture  - Empiric Zosyn    Endocrine: no acute issues  - Monitor glucose on BMP    Disposition: SICU

## 2022-08-08 NOTE — CONSULT NOTE ADULT - ASSESSMENT
67 yo M with decompensated cirrhosis and cholangitis s/p ERCP with stent placement (4/2022) s/p stent removal on 7/20 (along sphincterotomy and stone extraction) presenting for one week of RUQ abdominal pain associated with new abdominal distension and decreased appetite.     Impression  #Cirrhosis  EV: normal esophagus on ERCP from 4/2022  Ascites: increase in ascites from mild to moderate on current imaging, concern for peritonitis. Likely 2/2 in setting of recent ERCP for stent removal  HE: none currently  #Cholecystitis: Pt septic w/ evidence of cholecystitis on imaging, currently on zosyn    Recommendations  - c/w zosyn  - IR for diagnostic paracentesis given peritonitis on CT imaging  - please trend CMP and INR  - follow up advanced GI team recommendations     All recommendations are tentative until note is attested by attending.     Deanna Guadarrama, PGY5  Gastroenterology/Hepatology Fellow  Available on [employer] Teams  [phone] ([employer] Short Range Pager)  [phone] (Long Range Pager)    After 5pm, please contact the on-call GI fellow. [phone] 65 yo M with decompensated cirrhosis and cholangitis s/p ERCP with stent placement (4/2022) s/p stent removal on 7/20 (along sphincterotomy and stone extraction) presenting for one week of RUQ abdominal pain associated with new abdominal distension and decreased appetite.     Impression  #Cirrhosis  EV: normal esophagus on ERCP from 4/2022  Ascites: increase in ascites from mild to moderate on current imaging, concern for peritonitis. Likely 2/2 in setting of recent ERCP for stent removal  HE: none currently  #Cholecystitis: Pt septic w/ evidence of cholecystitis on imaging, currently on zosyn    Recommendations  - c/w norepinephrine for hemodynamic support and possible HRS, continue IV albumin  - may need initiation of RRT if worsening  - c/w zosyn  - IR for diagnostic paracentesis given peritonitis on CT imaging  - agree with percutaneous cholecystostomy tube given severe sepsis likely secondary to acute cholecystitis and need for source control  - please trend CMP and INR  - follow up advanced GI team recommendations re: possible biliary decompression though no biliary dilatation on US or CT    All recommendations are tentative until note is attested by attending.     Deanna Guadarrama, PGY5  Gastroenterology/Hepatology Fellow  Available on [employer] Teams  [phone] ([employer] Short Range Pager)  [phone] (Long Range Pager)    After 5pm, please contact the on-call GI fellow. [phone]

## 2022-08-08 NOTE — H&P ADULT - ASSESSMENT
67yo M with history of recently diagnosed cirrhosis (MELD 30) and cholangitis s/p ERCP with stent placement (4/2022) s/p stent removal on 7/20 presenting for one week of RUQ abdominal pain with clinical exam, labs and radiographic findings meeting [place] Criteria grade III for severe acute cholangitis.    PLAN:  - Transfer from medicine to Acute Care Surgery under Dr. Bermudez and transfer to SICU  - NPO/IVF  - Zosyn  - GI consulted, will see this AM. Recommend obtaining MRCP.  - Blood cultures to be collected (though received zosyn in ED)  - Trend electrolytes, f/u potassium    Seen and discussed with Dr. Bermudez. Discussed transfer with medicine Team 1.    JESSICA Bravo, PGY3  Acute Care Surgery [id number]

## 2022-08-08 NOTE — H&P ADULT - HISTORY OF PRESENT ILLNESS
65yo M with history of recently diagnosed cirrhosis (MELD 30) and cholangitis s/p ERCP with stent placement (4/2022) s/p stent removal on 7/20 presenting for one week of RUQ abdominal pain. States that one week after his stent was removed, he started experiencing RUQ pain, associated with decreased appetite. Denies nausea or vomiting, fevers or chills. Endorses regular normal bowel movements. Presented to the ED on 7/26 for the pain where he had an US performed which showed a distended gallbladder with stones. CBD 5mm. Patient was discharged home.    In the ED, patient tachycardic to 120s, normotensive. Satting 94% on RA. Labs showed WBC 14, Na 120, K 6.1, HCO3 14, Cr 1.95, tbili 4.7, Alk phos 127, ALT/AST 38/22. CT showed a distended gallbladder with stones in the cystic duct, cirrhosis, ascites, small and large bowel thickening.

## 2022-08-08 NOTE — H&P ADULT - NSHPLABSRESULTS_GEN_ALL_CORE
LABS:                          14.9   14.40 )-----------( 273      ( 08 Aug 2022 01:02 )             42.6       08-08    120<LL>  |  86<L>  |  49<H>  ----------------------------<  94  6.1<H>   |  14<L>  |  1.95<H>    Ca    8.5      08 Aug 2022 01:02    TPro  6.8  /  Alb  2.3<L>  /  TBili  4.7<H>  /  DBili  x   /  AST  38  /  ALT  22  /  AlkPhos  127<H>  08-08          CAPILLARY BLOOD GLUCOSE  POCT Blood Glucose.: 130 mg/dL (08 Aug 2022 05:18)    _______________________________________  RADIOLOGY & ADDITIONAL STUDIES:    IMPRESSION:    Compared to the recent CT of April 13, 2022, interval increase in   abdominopelvic ascites, from mild to moderate. Moderate fluid in the   subhepatic space. Diffuse peritoneal enhancement. Correlate clinically   for spontaneous bacterial peritonitis (SBP).    Interval removal of biliary stent.  Mildly distended gallbladder with   cholelithiasis and gallbladder wall thickening. A 4 cm stone in this   cystic duct. Findings suggest chronic cholecystitis. Recommend right   upper quadrant ultrasound or HIDA scan for complete evaluation for acute   cholecystitis.    Small and large bowel wall thickening suggesting hepatic enterocolopathy.  Cirrhosis with evidence of portal hypertension. Placement that in   cirrhotic patients, annual surveillance MRI is recommended to evaluate   for hepatocellular carcinoma.    Bilateral lower lobe subsegmental atelectasis.

## 2022-08-09 ENCOUNTER — RESULT REVIEW (OUTPATIENT)
Age: 67
End: 2022-08-09

## 2022-08-09 DIAGNOSIS — E87.1 HYPO-OSMOLALITY AND HYPONATREMIA: ICD-10-CM

## 2022-08-09 DIAGNOSIS — E87.2 ACIDOSIS: ICD-10-CM

## 2022-08-09 DIAGNOSIS — N17.9 ACUTE KIDNEY FAILURE, UNSPECIFIED: ICD-10-CM

## 2022-08-09 LAB
ALBUMIN FLD-MCNC: 1.1 G/DL — SIGNIFICANT CHANGE UP
ALBUMIN SERPL ELPH-MCNC: 2.8 G/DL — LOW (ref 3.3–5)
ALBUMIN SERPL ELPH-MCNC: 2.8 G/DL — LOW (ref 3.3–5)
ALBUMIN SERPL ELPH-MCNC: 3 G/DL — LOW (ref 3.3–5)
ALBUMIN SERPL ELPH-MCNC: 3.2 G/DL — LOW (ref 3.3–5)
ALP SERPL-CCNC: 76 U/L — SIGNIFICANT CHANGE UP (ref 40–120)
ALP SERPL-CCNC: 80 U/L — SIGNIFICANT CHANGE UP (ref 40–120)
ALP SERPL-CCNC: 81 U/L — SIGNIFICANT CHANGE UP (ref 40–120)
ALP SERPL-CCNC: 95 U/L — SIGNIFICANT CHANGE UP (ref 40–120)
ALT FLD-CCNC: 18 U/L — SIGNIFICANT CHANGE UP (ref 10–45)
ALT FLD-CCNC: 20 U/L — SIGNIFICANT CHANGE UP (ref 10–45)
ALT FLD-CCNC: 21 U/L — SIGNIFICANT CHANGE UP (ref 10–45)
ALT FLD-CCNC: 22 U/L — SIGNIFICANT CHANGE UP (ref 10–45)
AMMONIA BLD-MCNC: 44 UMOL/L — SIGNIFICANT CHANGE UP (ref 11–55)
ANION GAP SERPL CALC-SCNC: 12 MMOL/L — SIGNIFICANT CHANGE UP (ref 5–17)
ANION GAP SERPL CALC-SCNC: 14 MMOL/L — SIGNIFICANT CHANGE UP (ref 5–17)
ANION GAP SERPL CALC-SCNC: 15 MMOL/L — SIGNIFICANT CHANGE UP (ref 5–17)
ANION GAP SERPL CALC-SCNC: 15 MMOL/L — SIGNIFICANT CHANGE UP (ref 5–17)
APTT BLD: 39.2 SEC — HIGH (ref 27.5–35.5)
APTT BLD: 42.6 SEC — HIGH (ref 27.5–35.5)
APTT BLD: 43 SEC — HIGH (ref 27.5–35.5)
APTT BLD: 46.6 SEC — HIGH (ref 27.5–35.5)
AST SERPL-CCNC: 29 U/L — SIGNIFICANT CHANGE UP (ref 10–40)
AST SERPL-CCNC: 30 U/L — SIGNIFICANT CHANGE UP (ref 10–40)
AST SERPL-CCNC: 33 U/L — SIGNIFICANT CHANGE UP (ref 10–40)
AST SERPL-CCNC: 37 U/L — SIGNIFICANT CHANGE UP (ref 10–40)
B PERT IGG+IGM PNL SER: ABNORMAL
BASE EXCESS BLDV CALC-SCNC: -0.4 MMOL/L — SIGNIFICANT CHANGE UP (ref -2–2)
BASE EXCESS BLDV CALC-SCNC: -1.5 MMOL/L — SIGNIFICANT CHANGE UP (ref -2–2)
BASE EXCESS BLDV CALC-SCNC: 0.5 MMOL/L — SIGNIFICANT CHANGE UP (ref -2–2)
BASE EXCESS BLDV CALC-SCNC: 4.4 MMOL/L — HIGH (ref -2–2)
BILIRUB DIRECT SERPL-MCNC: 1.8 MG/DL — HIGH (ref 0–0.3)
BILIRUB DIRECT SERPL-MCNC: 1.9 MG/DL — HIGH (ref 0–0.3)
BILIRUB INDIRECT FLD-MCNC: 1.1 MG/DL — HIGH (ref 0.2–1)
BILIRUB INDIRECT FLD-MCNC: 1.1 MG/DL — HIGH (ref 0.2–1)
BILIRUB INDIRECT FLD-MCNC: 1.2 MG/DL — HIGH (ref 0.2–1)
BILIRUB INDIRECT FLD-MCNC: 1.3 MG/DL — HIGH (ref 0.2–1)
BILIRUB SERPL-MCNC: 2.9 MG/DL — HIGH (ref 0.2–1.2)
BILIRUB SERPL-MCNC: 2.9 MG/DL — HIGH (ref 0.2–1.2)
BILIRUB SERPL-MCNC: 3.1 MG/DL — HIGH (ref 0.2–1.2)
BILIRUB SERPL-MCNC: 3.1 MG/DL — HIGH (ref 0.2–1.2)
BLOOD GAS VENOUS - CREATININE: SIGNIFICANT CHANGE UP MG/DL (ref 0.5–1.3)
BUN SERPL-MCNC: 60 MG/DL — HIGH (ref 7–23)
BUN SERPL-MCNC: 60 MG/DL — HIGH (ref 7–23)
BUN SERPL-MCNC: 65 MG/DL — HIGH (ref 7–23)
BUN SERPL-MCNC: 66 MG/DL — HIGH (ref 7–23)
CA-I SERPL-SCNC: 1.08 MMOL/L — LOW (ref 1.15–1.33)
CA-I SERPL-SCNC: 1.14 MMOL/L — LOW (ref 1.15–1.33)
CA-I SERPL-SCNC: 1.14 MMOL/L — LOW (ref 1.15–1.33)
CA-I SERPL-SCNC: 1.19 MMOL/L — SIGNIFICANT CHANGE UP (ref 1.15–1.33)
CALCIUM SERPL-MCNC: 7.9 MG/DL — LOW (ref 8.4–10.5)
CALCIUM SERPL-MCNC: 8.5 MG/DL — SIGNIFICANT CHANGE UP (ref 8.4–10.5)
CALCIUM SERPL-MCNC: 8.6 MG/DL — SIGNIFICANT CHANGE UP (ref 8.4–10.5)
CALCIUM SERPL-MCNC: 8.6 MG/DL — SIGNIFICANT CHANGE UP (ref 8.4–10.5)
CHLORIDE BLDV-SCNC: 87 MMOL/L — LOW (ref 96–108)
CHLORIDE BLDV-SCNC: 88 MMOL/L — LOW (ref 96–108)
CHLORIDE BLDV-SCNC: 89 MMOL/L — LOW (ref 96–108)
CHLORIDE BLDV-SCNC: 90 MMOL/L — LOW (ref 96–108)
CHLORIDE SERPL-SCNC: 86 MMOL/L — LOW (ref 96–108)
CHLORIDE SERPL-SCNC: 88 MMOL/L — LOW (ref 96–108)
CO2 BLDV-SCNC: 25 MMOL/L — SIGNIFICANT CHANGE UP (ref 22–26)
CO2 BLDV-SCNC: 26 MMOL/L — SIGNIFICANT CHANGE UP (ref 22–26)
CO2 BLDV-SCNC: 27 MMOL/L — HIGH (ref 22–26)
CO2 BLDV-SCNC: 31 MMOL/L — HIGH (ref 22–26)
CO2 SERPL-SCNC: 22 MMOL/L — SIGNIFICANT CHANGE UP (ref 22–31)
CO2 SERPL-SCNC: 23 MMOL/L — SIGNIFICANT CHANGE UP (ref 22–31)
CO2 SERPL-SCNC: 24 MMOL/L — SIGNIFICANT CHANGE UP (ref 22–31)
CO2 SERPL-SCNC: 27 MMOL/L — SIGNIFICANT CHANGE UP (ref 22–31)
COLOR FLD: YELLOW — SIGNIFICANT CHANGE UP
CREAT ?TM UR-MCNC: 155 MG/DL — SIGNIFICANT CHANGE UP
CREAT SERPL-MCNC: 2.81 MG/DL — HIGH (ref 0.5–1.3)
CREAT SERPL-MCNC: 3 MG/DL — HIGH (ref 0.5–1.3)
CREAT SERPL-MCNC: 3.51 MG/DL — HIGH (ref 0.5–1.3)
CREAT SERPL-MCNC: 3.78 MG/DL — HIGH (ref 0.5–1.3)
EGFR: 17 ML/MIN/1.73M2 — LOW
EGFR: 19 ML/MIN/1.73M2 — LOW
EGFR: 22 ML/MIN/1.73M2 — LOW
EGFR: 24 ML/MIN/1.73M2 — LOW
EOSINOPHIL # FLD: 1 % — SIGNIFICANT CHANGE UP
FLUID INTAKE SUBSTANCE CLASS: SIGNIFICANT CHANGE UP
GAS PNL BLDV: 120 MMOL/L — CRITICAL LOW (ref 136–145)
GAS PNL BLDV: 121 MMOL/L — LOW (ref 136–145)
GAS PNL BLDV: 121 MMOL/L — LOW (ref 136–145)
GAS PNL BLDV: 122 MMOL/L — LOW (ref 136–145)
GAS PNL BLDV: SIGNIFICANT CHANGE UP
GLUCOSE BLDC GLUCOMTR-MCNC: 106 MG/DL — HIGH (ref 70–99)
GLUCOSE BLDV-MCNC: 121 MG/DL — HIGH (ref 70–99)
GLUCOSE BLDV-MCNC: 247 MG/DL — HIGH (ref 70–99)
GLUCOSE BLDV-MCNC: 66 MG/DL — LOW (ref 70–99)
GLUCOSE BLDV-MCNC: 87 MG/DL — SIGNIFICANT CHANGE UP (ref 70–99)
GLUCOSE FLD-MCNC: <6 MG/DL — SIGNIFICANT CHANGE UP
GLUCOSE SERPL-MCNC: 130 MG/DL — HIGH (ref 70–99)
GLUCOSE SERPL-MCNC: 260 MG/DL — HIGH (ref 70–99)
GLUCOSE SERPL-MCNC: 71 MG/DL — SIGNIFICANT CHANGE UP (ref 70–99)
GLUCOSE SERPL-MCNC: 92 MG/DL — SIGNIFICANT CHANGE UP (ref 70–99)
HCO3 BLDV-SCNC: 24 MMOL/L — SIGNIFICANT CHANGE UP (ref 22–29)
HCO3 BLDV-SCNC: 25 MMOL/L — SIGNIFICANT CHANGE UP (ref 22–29)
HCO3 BLDV-SCNC: 25 MMOL/L — SIGNIFICANT CHANGE UP (ref 22–29)
HCO3 BLDV-SCNC: 29 MMOL/L — SIGNIFICANT CHANGE UP (ref 22–29)
HCT VFR BLD CALC: 25.6 % — LOW (ref 39–50)
HCT VFR BLD CALC: 26.6 % — LOW (ref 39–50)
HCT VFR BLD CALC: 27.2 % — LOW (ref 39–50)
HCT VFR BLD CALC: 28.3 % — LOW (ref 39–50)
HCT VFR BLDA CALC: 29 % — LOW (ref 39–51)
HCT VFR BLDA CALC: 29 % — LOW (ref 39–51)
HCT VFR BLDA CALC: 30 % — LOW (ref 39–51)
HCT VFR BLDA CALC: 31 % — LOW (ref 39–51)
HGB BLD CALC-MCNC: 10 G/DL — LOW (ref 12.6–17.4)
HGB BLD CALC-MCNC: 10.3 G/DL — LOW (ref 12.6–17.4)
HGB BLD CALC-MCNC: 9.6 G/DL — LOW (ref 12.6–17.4)
HGB BLD CALC-MCNC: 9.7 G/DL — LOW (ref 12.6–17.4)
HGB BLD-MCNC: 10 G/DL — LOW (ref 13–17)
HGB BLD-MCNC: 9.2 G/DL — LOW (ref 13–17)
HGB BLD-MCNC: 9.2 G/DL — LOW (ref 13–17)
HGB BLD-MCNC: 9.8 G/DL — LOW (ref 13–17)
HOROWITZ INDEX BLDV+IHG-RTO: 21 — SIGNIFICANT CHANGE UP
INR BLD: 2.05 RATIO — HIGH (ref 0.88–1.16)
INR BLD: 2.06 RATIO — HIGH (ref 0.88–1.16)
INR BLD: 2.24 RATIO — HIGH (ref 0.88–1.16)
INR BLD: 2.34 RATIO — HIGH (ref 0.88–1.16)
LACTATE BLDV-MCNC: 3 MMOL/L — HIGH (ref 0.7–2)
LACTATE BLDV-MCNC: 3.2 MMOL/L — HIGH (ref 0.7–2)
LACTATE BLDV-MCNC: 3.3 MMOL/L — HIGH (ref 0.7–2)
LACTATE BLDV-MCNC: 3.8 MMOL/L — HIGH (ref 0.7–2)
LDH SERPL L TO P-CCNC: 7299 U/L — SIGNIFICANT CHANGE UP
LYMPHOCYTES # FLD: 8 % — SIGNIFICANT CHANGE UP
MAGNESIUM SERPL-MCNC: 1.9 MG/DL — SIGNIFICANT CHANGE UP (ref 1.6–2.6)
MAGNESIUM SERPL-MCNC: 1.9 MG/DL — SIGNIFICANT CHANGE UP (ref 1.6–2.6)
MAGNESIUM SERPL-MCNC: 2.4 MG/DL — SIGNIFICANT CHANGE UP (ref 1.6–2.6)
MAGNESIUM SERPL-MCNC: 2.4 MG/DL — SIGNIFICANT CHANGE UP (ref 1.6–2.6)
MCHC RBC-ENTMCNC: 32.4 PG — SIGNIFICANT CHANGE UP (ref 27–34)
MCHC RBC-ENTMCNC: 32.5 PG — SIGNIFICANT CHANGE UP (ref 27–34)
MCHC RBC-ENTMCNC: 32.9 PG — SIGNIFICANT CHANGE UP (ref 27–34)
MCHC RBC-ENTMCNC: 33.3 PG — SIGNIFICANT CHANGE UP (ref 27–34)
MCHC RBC-ENTMCNC: 34.6 GM/DL — SIGNIFICANT CHANGE UP (ref 32–36)
MCHC RBC-ENTMCNC: 35.3 GM/DL — SIGNIFICANT CHANGE UP (ref 32–36)
MCHC RBC-ENTMCNC: 35.9 GM/DL — SIGNIFICANT CHANGE UP (ref 32–36)
MCHC RBC-ENTMCNC: 36 GM/DL — SIGNIFICANT CHANGE UP (ref 32–36)
MCV RBC AUTO: 91.4 FL — SIGNIFICANT CHANGE UP (ref 80–100)
MCV RBC AUTO: 91.6 FL — SIGNIFICANT CHANGE UP (ref 80–100)
MCV RBC AUTO: 92.5 FL — SIGNIFICANT CHANGE UP (ref 80–100)
MCV RBC AUTO: 94 FL — SIGNIFICANT CHANGE UP (ref 80–100)
MONOS+MACROS # FLD: 29 % — SIGNIFICANT CHANGE UP
NEUTROPHILS-BODY FLUID: 62 % — SIGNIFICANT CHANGE UP
NRBC # BLD: 0 /100 WBCS — SIGNIFICANT CHANGE UP (ref 0–0)
OTHER CELLS CSF MANUAL: 11.2 ML/DL — LOW (ref 18–22)
PCO2 BLDV: 41 MMHG — LOW (ref 42–55)
PCO2 BLDV: 41 MMHG — LOW (ref 42–55)
PCO2 BLDV: 42 MMHG — SIGNIFICANT CHANGE UP (ref 42–55)
PCO2 BLDV: 44 MMHG — SIGNIFICANT CHANGE UP (ref 42–55)
PH BLDV: 7.37 — SIGNIFICANT CHANGE UP (ref 7.32–7.43)
PH BLDV: 7.38 — SIGNIFICANT CHANGE UP (ref 7.32–7.43)
PH BLDV: 7.4 — SIGNIFICANT CHANGE UP (ref 7.32–7.43)
PH BLDV: 7.43 — SIGNIFICANT CHANGE UP (ref 7.32–7.43)
PHOSPHATE SERPL-MCNC: 4.2 MG/DL — SIGNIFICANT CHANGE UP (ref 2.5–4.5)
PHOSPHATE SERPL-MCNC: 4.2 MG/DL — SIGNIFICANT CHANGE UP (ref 2.5–4.5)
PHOSPHATE SERPL-MCNC: 4.5 MG/DL — SIGNIFICANT CHANGE UP (ref 2.5–4.5)
PHOSPHATE SERPL-MCNC: 4.7 MG/DL — HIGH (ref 2.5–4.5)
PLATELET # BLD AUTO: 101 K/UL — LOW (ref 150–400)
PLATELET # BLD AUTO: 105 K/UL — LOW (ref 150–400)
PLATELET # BLD AUTO: 129 K/UL — LOW (ref 150–400)
PLATELET # BLD AUTO: 144 K/UL — LOW (ref 150–400)
PO2 BLDV: 43 MMHG — SIGNIFICANT CHANGE UP (ref 25–45)
PO2 BLDV: 45 MMHG — SIGNIFICANT CHANGE UP (ref 25–45)
PO2 BLDV: 46 MMHG — HIGH (ref 25–45)
PO2 BLDV: 53 MMHG — HIGH (ref 25–45)
POTASSIUM BLDV-SCNC: 4.3 MMOL/L — SIGNIFICANT CHANGE UP (ref 3.5–5.1)
POTASSIUM BLDV-SCNC: 4.4 MMOL/L — SIGNIFICANT CHANGE UP (ref 3.5–5.1)
POTASSIUM BLDV-SCNC: 4.7 MMOL/L — SIGNIFICANT CHANGE UP (ref 3.5–5.1)
POTASSIUM BLDV-SCNC: 5.1 MMOL/L — SIGNIFICANT CHANGE UP (ref 3.5–5.1)
POTASSIUM SERPL-MCNC: 4.4 MMOL/L — SIGNIFICANT CHANGE UP (ref 3.5–5.3)
POTASSIUM SERPL-MCNC: 4.5 MMOL/L — SIGNIFICANT CHANGE UP (ref 3.5–5.3)
POTASSIUM SERPL-MCNC: 4.9 MMOL/L — SIGNIFICANT CHANGE UP (ref 3.5–5.3)
POTASSIUM SERPL-MCNC: 5.2 MMOL/L — SIGNIFICANT CHANGE UP (ref 3.5–5.3)
POTASSIUM SERPL-SCNC: 4.4 MMOL/L — SIGNIFICANT CHANGE UP (ref 3.5–5.3)
POTASSIUM SERPL-SCNC: 4.5 MMOL/L — SIGNIFICANT CHANGE UP (ref 3.5–5.3)
POTASSIUM SERPL-SCNC: 4.9 MMOL/L — SIGNIFICANT CHANGE UP (ref 3.5–5.3)
POTASSIUM SERPL-SCNC: 5.2 MMOL/L — SIGNIFICANT CHANGE UP (ref 3.5–5.3)
PROT ?TM UR-MCNC: 124 MG/DL — HIGH (ref 0–12)
PROT FLD-MCNC: 2.8 G/DL — SIGNIFICANT CHANGE UP
PROT SERPL-MCNC: 5.6 G/DL — LOW (ref 6–8.3)
PROT SERPL-MCNC: 5.7 G/DL — LOW (ref 6–8.3)
PROT SERPL-MCNC: 5.7 G/DL — LOW (ref 6–8.3)
PROT SERPL-MCNC: 5.8 G/DL — LOW (ref 6–8.3)
PROT/CREAT UR-RTO: 0.8 RATIO — HIGH (ref 0–0.2)
PROTHROM AB SERPL-ACNC: 23.8 SEC — HIGH (ref 10.5–13.4)
PROTHROM AB SERPL-ACNC: 24.1 SEC — HIGH (ref 10.5–13.4)
PROTHROM AB SERPL-ACNC: 26 SEC — HIGH (ref 10.5–13.4)
PROTHROM AB SERPL-ACNC: 27.4 SEC — HIGH (ref 10.5–13.4)
RBC # BLD: 2.8 M/UL — LOW (ref 4.2–5.8)
RBC # BLD: 2.83 M/UL — LOW (ref 4.2–5.8)
RBC # BLD: 2.94 M/UL — LOW (ref 4.2–5.8)
RBC # BLD: 3.09 M/UL — LOW (ref 4.2–5.8)
RBC # FLD: 14.6 % — HIGH (ref 10.3–14.5)
RBC # FLD: 14.6 % — HIGH (ref 10.3–14.5)
RBC # FLD: 14.8 % — HIGH (ref 10.3–14.5)
RBC # FLD: 14.8 % — HIGH (ref 10.3–14.5)
RCV VOL RI: HIGH /UL (ref 0–0)
SAO2 % BLDV: 70.4 % — SIGNIFICANT CHANGE UP (ref 67–88)
SAO2 % BLDV: 72.4 % — SIGNIFICANT CHANGE UP (ref 67–88)
SAO2 % BLDV: 77.7 % — SIGNIFICANT CHANGE UP (ref 67–88)
SAO2 % BLDV: 88.1 % — HIGH (ref 67–88)
SODIUM SERPL-SCNC: 124 MMOL/L — LOW (ref 135–145)
SODIUM SERPL-SCNC: 124 MMOL/L — LOW (ref 135–145)
SODIUM SERPL-SCNC: 125 MMOL/L — LOW (ref 135–145)
SODIUM SERPL-SCNC: 125 MMOL/L — LOW (ref 135–145)
TOTAL NUCLEATED CELL COUNT, BODY FLUID: SIGNIFICANT CHANGE UP /UL
TUBE TYPE: SIGNIFICANT CHANGE UP
WBC # BLD: 12.87 K/UL — HIGH (ref 3.8–10.5)
WBC # BLD: 14.9 K/UL — HIGH (ref 3.8–10.5)
WBC # BLD: 16.28 K/UL — HIGH (ref 3.8–10.5)
WBC # BLD: 18.21 K/UL — HIGH (ref 3.8–10.5)
WBC # FLD AUTO: 12.87 K/UL — HIGH (ref 3.8–10.5)
WBC # FLD AUTO: 14.9 K/UL — HIGH (ref 3.8–10.5)
WBC # FLD AUTO: 16.28 K/UL — HIGH (ref 3.8–10.5)
WBC # FLD AUTO: 18.21 K/UL — HIGH (ref 3.8–10.5)

## 2022-08-09 PROCEDURE — 99255 IP/OBS CONSLTJ NEW/EST HI 80: CPT

## 2022-08-09 PROCEDURE — 99233 SBSQ HOSP IP/OBS HIGH 50: CPT

## 2022-08-09 PROCEDURE — 99223 1ST HOSP IP/OBS HIGH 75: CPT | Mod: GC

## 2022-08-09 PROCEDURE — 88112 CYTOPATH CELL ENHANCE TECH: CPT | Mod: 26

## 2022-08-09 PROCEDURE — 88305 TISSUE EXAM BY PATHOLOGIST: CPT | Mod: 26

## 2022-08-09 PROCEDURE — 99232 SBSQ HOSP IP/OBS MODERATE 35: CPT | Mod: GC

## 2022-08-09 PROCEDURE — 76770 US EXAM ABDO BACK WALL COMP: CPT | Mod: 26

## 2022-08-09 PROCEDURE — 49083 ABD PARACENTESIS W/IMAGING: CPT

## 2022-08-09 PROCEDURE — 47490 INCISION OF GALLBLADDER: CPT

## 2022-08-09 RX ORDER — BUMETANIDE 0.25 MG/ML
0.5 INJECTION INTRAMUSCULAR; INTRAVENOUS
Qty: 20 | Refills: 0 | Status: DISCONTINUED | OUTPATIENT
Start: 2022-08-09 | End: 2022-08-09

## 2022-08-09 RX ORDER — CALCIUM GLUCONATE 100 MG/ML
2 VIAL (ML) INTRAVENOUS ONCE
Refills: 0 | Status: COMPLETED | OUTPATIENT
Start: 2022-08-09 | End: 2022-08-09

## 2022-08-09 RX ORDER — MAGNESIUM SULFATE 500 MG/ML
2 VIAL (ML) INJECTION ONCE
Refills: 0 | Status: COMPLETED | OUTPATIENT
Start: 2022-08-09 | End: 2022-08-09

## 2022-08-09 RX ORDER — SODIUM CHLORIDE 9 MG/ML
1000 INJECTION INTRAMUSCULAR; INTRAVENOUS; SUBCUTANEOUS
Refills: 0 | Status: DISCONTINUED | OUTPATIENT
Start: 2022-08-09 | End: 2022-08-09

## 2022-08-09 RX ORDER — PIPERACILLIN AND TAZOBACTAM 4; .5 G/20ML; G/20ML
3.38 INJECTION, POWDER, LYOPHILIZED, FOR SOLUTION INTRAVENOUS EVERY 12 HOURS
Refills: 0 | Status: DISCONTINUED | OUTPATIENT
Start: 2022-08-09 | End: 2022-08-16

## 2022-08-09 RX ORDER — MAGNESIUM SULFATE 500 MG/ML
2 VIAL (ML) INJECTION ONCE
Refills: 0 | Status: DISCONTINUED | OUTPATIENT
Start: 2022-08-09 | End: 2022-08-09

## 2022-08-09 RX ORDER — ALBUMIN HUMAN 25 %
250 VIAL (ML) INTRAVENOUS ONCE
Refills: 0 | Status: COMPLETED | OUTPATIENT
Start: 2022-08-09 | End: 2022-08-09

## 2022-08-09 RX ORDER — ALBUMIN HUMAN 25 %
100 VIAL (ML) INTRAVENOUS EVERY 8 HOURS
Refills: 0 | Status: COMPLETED | OUTPATIENT
Start: 2022-08-09 | End: 2022-08-11

## 2022-08-09 RX ORDER — ALBUMIN HUMAN 25 %
250 VIAL (ML) INTRAVENOUS ONCE
Refills: 0 | Status: DISCONTINUED | OUTPATIENT
Start: 2022-08-09 | End: 2022-08-09

## 2022-08-09 RX ORDER — INSULIN LISPRO 100/ML
VIAL (ML) SUBCUTANEOUS EVERY 4 HOURS
Refills: 0 | Status: DISCONTINUED | OUTPATIENT
Start: 2022-08-09 | End: 2022-08-09

## 2022-08-09 RX ORDER — BUMETANIDE 0.25 MG/ML
2 INJECTION INTRAMUSCULAR; INTRAVENOUS ONCE
Refills: 0 | Status: COMPLETED | OUTPATIENT
Start: 2022-08-09 | End: 2022-08-09

## 2022-08-09 RX ORDER — SODIUM CHLORIDE 5 G/100ML
1000 INJECTION, SOLUTION INTRAVENOUS
Refills: 0 | Status: DISCONTINUED | OUTPATIENT
Start: 2022-08-09 | End: 2022-08-09

## 2022-08-09 RX ORDER — SODIUM CHLORIDE 9 MG/ML
1000 INJECTION INTRAMUSCULAR; INTRAVENOUS; SUBCUTANEOUS
Refills: 0 | Status: DISCONTINUED | OUTPATIENT
Start: 2022-08-09 | End: 2022-08-10

## 2022-08-09 RX ORDER — ACETAMINOPHEN 500 MG
1000 TABLET ORAL ONCE
Refills: 0 | Status: COMPLETED | OUTPATIENT
Start: 2022-08-09 | End: 2022-08-09

## 2022-08-09 RX ORDER — ALBUMIN HUMAN 25 %
250 VIAL (ML) INTRAVENOUS EVERY 6 HOURS
Refills: 0 | Status: DISCONTINUED | OUTPATIENT
Start: 2022-08-09 | End: 2022-08-09

## 2022-08-09 RX ORDER — PHYTONADIONE (VIT K1) 5 MG
10 TABLET ORAL ONCE
Refills: 0 | Status: COMPLETED | OUTPATIENT
Start: 2022-08-09 | End: 2022-08-09

## 2022-08-09 RX ADMIN — PIPERACILLIN AND TAZOBACTAM 25 GRAM(S): 4; .5 INJECTION, POWDER, LYOPHILIZED, FOR SOLUTION INTRAVENOUS at 02:05

## 2022-08-09 RX ADMIN — Medication 200 GRAM(S): at 06:53

## 2022-08-09 RX ADMIN — Medication 50 MILLILITER(S): at 23:53

## 2022-08-09 RX ADMIN — Medication 25 GRAM(S): at 05:59

## 2022-08-09 RX ADMIN — Medication 102 MILLIGRAM(S): at 09:53

## 2022-08-09 RX ADMIN — PIPERACILLIN AND TAZOBACTAM 25 GRAM(S): 4; .5 INJECTION, POWDER, LYOPHILIZED, FOR SOLUTION INTRAVENOUS at 09:53

## 2022-08-09 RX ADMIN — Medication 1500 MILLILITER(S): at 04:08

## 2022-08-09 RX ADMIN — Medication 50 MILLILITER(S): at 12:26

## 2022-08-09 RX ADMIN — PIPERACILLIN AND TAZOBACTAM 25 GRAM(S): 4; .5 INJECTION, POWDER, LYOPHILIZED, FOR SOLUTION INTRAVENOUS at 21:10

## 2022-08-09 RX ADMIN — Medication 50 MILLILITER(S): at 00:22

## 2022-08-09 RX ADMIN — Medication 50 MILLILITER(S): at 05:08

## 2022-08-09 RX ADMIN — CHLORHEXIDINE GLUCONATE 1 APPLICATION(S): 213 SOLUTION TOPICAL at 05:08

## 2022-08-09 RX ADMIN — SODIUM CHLORIDE 30 MILLILITER(S): 9 INJECTION INTRAMUSCULAR; INTRAVENOUS; SUBCUTANEOUS at 21:10

## 2022-08-09 RX ADMIN — Medication 1000 MILLIGRAM(S): at 21:40

## 2022-08-09 RX ADMIN — Medication 400 MILLIGRAM(S): at 21:10

## 2022-08-09 RX ADMIN — SODIUM CHLORIDE 50 MILLILITER(S): 9 INJECTION INTRAMUSCULAR; INTRAVENOUS; SUBCUTANEOUS at 09:53

## 2022-08-09 RX ADMIN — BUMETANIDE 2 MILLIGRAM(S): 0.25 INJECTION INTRAMUSCULAR; INTRAVENOUS at 14:43

## 2022-08-09 NOTE — PHYSICAL THERAPY INITIAL EVALUATION ADULT - GAIT TRAINING, PT EVAL
GOAL: Patient will ambulate 300 feet independently with least restrictive assistive device in 2 weeks.

## 2022-08-09 NOTE — PROGRESS NOTE ADULT - ASSESSMENT
Assessment:  · Assessment	  65yo M with history of recently diagnosed cirrhosis (MELD 30) and cholangitis s/p ERCP with stent placement (4/2022) s/p stent removal on 7/20 presenting for one week of RUQ abdominal pain with clinical exam, labs and radiographic findings meeting [place] Criteria grade III for severe acute cholangitis.    PLAN:  -MRCP obtained  - NPO/IVF  - Zosyn  -IR consulted for perc rodney and diagnostic paracentesis   - Trend electrolytes, f/u sodium    -f/u blood and urine cultures   -continue milian placed by    -care and management per SICU     acs [id number]

## 2022-08-09 NOTE — CONSULT NOTE ADULT - SUBJECTIVE AND OBJECTIVE BOX
Patient is a 66y old  Male who presents with a chief complaint of Ascending cholangitis (09 Aug 2022 07:41)    HPI:  65 y/o M with history of recently diagnosed cirrhosis (MELD 30) and cholangitis s/p ERCP with stent placement (4/2022) s/p stent removal on 7/20 presenting for one week of RUQ abdominal pain. States that one week after his stent was removed, he started experiencing RUQ pain, associated with decreased appetite. Denies nausea or vomiting, fevers or chills. Endorses regular normal bowel movements. Presented to the ED on 7/26 for the pain where he had an US performed which showed a distended gallbladder with stones. CBD 5mm. Patient was discharged home.  In the ED, patient tachycardic to 120s, normotensive. Satting 94% on RA. Labs showed WBC 14, Na 120, K 6.1, HCO3 14, Cr 1.95, tbili 4.7, Alk phos 127, ALT/AST 38/22. CT showed a distended gallbladder with stones in the cystic duct, cirrhosis, ascites, small and large bowel thickening.  (08 Aug 2022 06:20)    ID consulted for abx management in setting of cholecystitis.     REVIEW OF SYSTEMS  [  ] ROS unobtainable because:    [  ] All other systems negative except as noted below    Constitutional:  [ ] fever [ ] chills  [ ] weight loss  [ ]night sweat  [ ]poor appetite/PO intake [ ]fatigue   Skin:  [ ] rash [ ] phlebitis	  Eyes: [ ] icterus [ ] pain  [ ] discharge	  ENMT: [ ] sore throat  [ ] thrush [ ] ulcers [ ] exudates [ ]anosmia  Respiratory: [ ] dyspnea [ ] hemoptysis [ ] cough [ ] sputum	  Cardiovascular:  [ ] chest pain [ ] palpitations [ ] edema	  Gastrointestinal:  [ ] nausea [ ] vomiting [ ] diarrhea [ ] constipation [ ] pain	  Genitourinary:  [ ] dysuria [ ] frequency [ ] hematuria [ ] discharge [ ] flank pain  [ ] incontinence  Musculoskeletal:  [ ] myalgias [ ] arthralgias [ ] arthritis  [ ] back pain  Neurological:  [ ] headache [ ] weakness [ ] seizures  [ ] confusion/altered mental status    prior hospital charts reviewed [V]  primary team notes reviewed [V]  other consultant notes reviewed [V]    PAST MEDICAL & SURGICAL HISTORY:  H/O acute cholangitis  2019 novel coronavirus disease (COVID-19), 12/24/2021 no hospitalization, no treatment  S/P ERCP 4/2022  H/O colonoscopy    FAMILY HISTORY:    SOCIAL HISTORY:    Allergies  No Known Allergies    ANTIMICROBIALS:  piperacillin/tazobactam IVPB.. 3.375 every 12 hours    ANTIMICROBIALS (past 90 days):   MEDICATIONS  (STANDING):  cefTRIAXone   IVPB   100 mL/Hr IV Intermittent (08-08-22 @ 04:32)    piperacillin/tazobactam IVPB..   25 mL/Hr IV Intermittent (08-09-22 @ 09:53)    piperacillin/tazobactam IVPB..   25 mL/Hr IV Intermittent (08-09-22 @ 02:05)   25 mL/Hr IV Intermittent (08-08-22 @ 18:07)   25 mL/Hr IV Intermittent (08-08-22 @ 10:37)    piperacillin/tazobactam IVPB...   200 mL/Hr IV Intermittent (08-08-22 @ 05:11)    MEDICATIONS  (STANDING):  insulin lispro (ADMELOG) corrective regimen sliding scale  every 4 hours      VITALS:  Vital Signs Last 24 Hrs  T(F): 96.8 (08-09-22 @ 11:00), Max: 98.7 (08-08-22 @ 00:00)  Vital Signs Last 24 Hrs  HR: 93 (08-09-22 @ 11:00) (87 - 109)  BP: 98/58 (08-09-22 @ 11:00) (86/51 - 125/59)  RR: 13 (08-09-22 @ 11:00)  SpO2: 93% (08-09-22 @ 11:00) (91% - 96%)  Wt(kg): --    PHYSICAL EXAM:      Labs:                        9.2    12.87 )-----------( 105      ( 09 Aug 2022 06:03 )             25.6     08-09    125<L>  |  86<L>  |  60<H>  ----------------------------<  260<H>  4.4   |  27  |  3.00<H>    Ca    7.9<L>      09 Aug 2022 06:03  Phos  4.2     08-09  Mg     1.9     08-09    TPro  5.6<L>  /  Alb  2.8<L>  /  TBili  2.9<H>  /  DBili  1.8<H>  /  AST  29  /  ALT  18  /  AlkPhos  76  08-09    WBC Trend:  WBC Count: 12.87 (08-09-22 @ 06:03)  WBC Count: 14.90 (08-09-22 @ 00:28)  WBC Count: 16.23 (08-08-22 @ 18:14)  WBC Count: 17.38 (08-08-22 @ 12:30)    Auto Neutrophil #: 13.15 K/uL (08-08-22 @ 01:02)  Band Neutrophils %: 7.0 % (08-08-22 @ 01:02)  Auto Neutrophil #: 3.98 K/uL (07-26-22 @ 07:30)  Auto Neutrophil #: 10.39 K/uL (04-12-22 @ 11:56)  Band Neutrophils %: 9.0 % (04-12-22 @ 11:56)    Auto Eosinophil %: 0.0 % (08-08-22 @ 01:02)    Urinalysis Basic - ( 08 Aug 2022 10:19 )    Color: Dark Yellow / Appearance: Slightly Turbid / SG: >1.050 / pH: x  Gluc: x / Ketone: Trace  / Bili: Small / Urobili: Negative   Blood: x / Protein: 30 mg/dL / Nitrite: Negative   Leuk Esterase: Small / RBC: 2 /hpf / WBC 18 /HPF   Sq Epi: x / Non Sq Epi: 3 /hpf / Bacteria: Many    MICROBIOLOGY:  MRSA PCR Result.: NotDete (04-12-22 @ 16:38)    Culture - Blood (collected 14 Apr 2022 11:20)  Source: .Blood Blood-Peripheral  Final Report:    No Growth Final    Culture - Blood (collected 14 Apr 2022 11:16)  Source: .Blood Blood-Peripheral  Final Report:    No Growth Final    Culture - Urine (collected 12 Apr 2022 17:09)  Source: Clean Catch Clean Catch (Midstream)  Final Report:    50,000 - 99,000 CFU/mL Escherichia coli  Organism: Escherichia coli  Organism: Escherichia coli    Sensitivities:      -  Amikacin: S <=16      -  Amoxicillin/Clavulanic Acid: S <=8/4      -  Ampicillin: S <=8 These ampicillin results predict results for amoxicillin      -  Ampicillin/Sulbactam: S <=4/2 Enterobacter, Klebsiella aerogenes, Citrobacter, and Serratia may develop resistance during prolonged therapy (3-4 days)      -  Aztreonam: S <=4      -  Cefazolin: S <=2 (MIC_CL_COM_ENTERIC_CEFAZU) For uncomplicated UTI with K. pneumoniae, E. coli, or P. mirablis: ELANA <=16 is sensitive and ELANA >=32 is resistant. This also predicts results for oral agents cefaclor, cefdinir, cefpodoxime, cefprozil, cefuroxime axetil, cephalexin and locarbef for uncomplicated UTI. Note that some isolates may be susceptible to these agents while testing resistant to cefazolin.      -  Cefepime: S <=2      -  Cefoxitin: S <=8      -  Ceftriaxone: S <=1 Enterobacter, Klebsiella aerogenes, Citrobacter, and Serratia may develop resistance during prolonged therapy      -  Ciprofloxacin: R >2      -  Ertapenem: S <=0.5      -  Gentamicin: S <=2      -  Imipenem: S <=1      -  Levofloxacin: R >4      -  Meropenem: S <=1      -  Nitrofurantoin: S <=32 Should not be used to treat pyelonephritis      -  Piperacillin/Tazobactam: S <=8      -  Tigecycline: S <=2      -  Tobramycin: S <=2      -  Trimethoprim/Sulfamethoxazole: S <=0.5/9.5      Method Type: ELANA    Culture - Blood (collected 12 Apr 2022 14:38)  Source: .Blood Blood-Peripheral  Final Report:    Growth in aerobic and anaerobic bottles: Escherichia coli    See previous culture [id number]    Culture - Blood (collected 12 Apr 2022 14:36)  Source: .Blood Blood-Peripheral  Final Report:    Growth in aerobic and anaerobic bottles: Escherichia coli    ***Blood Panel PCR results on this specimen are available    approximately 3 hours after the Gram stain result.***    Gram stain, PCR, and/or culture results may not always    correspond due to difference in methodologies.    ************************************************************    This PCR assay was performed by multiplex PCR. This    Assay tests for 66 bacterial and resistance gene targets.    Please refer to the [facility] test directory    at [email or web address] for details.  Organism: Blood Culture PCR  Escherichia coli  Organism: Escherichia coli    Sensitivities:      -  Amikacin: S <=16      -  Ampicillin: S <=8 These ampicillin results predict results for amoxicillin      -  Ampicillin/Sulbactam: S <=4/2 Enterobacter, Klebsiella aerogenes, Citrobacter, and Serratia may develop resistance during prolonged therapy (3-4 days)      -  Aztreonam: S <=4      -  Cefazolin: S <=2 Enterobacter, Klebsiella aerogenes, Citrobacter, and Serratia may develop resistance during prolonged therapy (3-4 days)      -  Cefepime: S <=2      -  Cefoxitin: S <=8      -  Ceftriaxone: S <=1 Enterobacter, Klebsiella aerogenes, Citrobacter, and Serratia may develop resistance during prolonged therapy      -  Ciprofloxacin: R >2      -  Ertapenem: S <=0.5      -  Gentamicin: S <=2      -  Imipenem: S <=1      -  Levofloxacin: R >4      -  Meropenem: S <=1      -  Piperacillin/Tazobactam: S <=8      -  Tobramycin: S <=2      -  Trimethoprim/Sulfamethoxazole: S <=0.5/9.5      Method Type: ELANA  Organism: Blood Culture PCR    Sensitivities:      -  Escherichia coli: Detec      Method Type: PCR    COVID-19 PCR: NotDetec (08-08-22 @ 01:02)    RADIOLOGY:  imaging below personally reviewed    < from: CT Abdomen and Pelvis w/ IV Cont (08.08.22 @ 01:25) >  IMPRESSION:    Compared to the recent CT of April 13, 2022, interval increase in   abdominopelvic ascites, from mild to moderate. Moderate fluid in the   subhepatic space. Diffuse peritoneal enhancement. Correlate clinically   for spontaneous bacterial peritonitis (SBP).    Interval removal of biliary stent.  Mildly distended gallbladder with   cholelithiasis and gallbladder wall thickening. A 4 cm stone in this   cystic duct. Findings suggest chronic cholecystitis. Recommend right   upper quadrant ultrasound or HIDA scan for complete evaluation for acute   cholecystitis.    Small and large bowel wall thickening suggesting hepatic enterocolopathy.  Cirrhosis with evidence of portal hypertension. Placement that in   cirrhotic patients, annual surveillance MRI is recommended to evaluate   for hepatocellular carcinoma.    Bilateral lower lobe subsegmental atelectasis.    < end of copied text >   Patient is a 66y old  Male who presents with a chief complaint of Ascending cholangitis (09 Aug 2022 07:41)    HPI:  65 y/o M with history of recently diagnosed cirrhosis (MELD 30) and cholangitis s/p ERCP with stent placement (4/2022) s/p stent removal on 7/20 presenting for one week of RUQ abdominal pain. States that one week after his stent was removed, he started experiencing RUQ pain, associated with decreased appetite. Denies nausea or vomiting, fevers or chills. Endorses regular normal bowel movements. Presented to the ED on 7/26 for the pain where he had an US performed which showed a distended gallbladder with stones. CBD 5mm. Patient was discharged home. In the ED, patient tachycardic to 120s, normotensive. Satting 94% on RA. Labs showed WBC 14, Na 120, K 6.1, HCO3 14, Cr 1.95, tbili 4.7, Alk phos 127, ALT/AST 38/22. CT showed a distended gallbladder with stones in the cystic duct, cirrhosis, ascites, small and large bowel thickening.  (08 Aug 2022 06:20)    ID consulted for abx management in setting of cholecystitis.     REVIEW OF SYSTEMS  [  ] ROS unobtainable because:    [ x ] All other systems negative except as noted below    Constitutional:  [ ] fever [ ] chills  [ ] weight loss  [ ]night sweat  [ ]poor appetite/PO intake [ ]fatigue   Skin:  [ ] rash [ ] phlebitis	  Eyes: [ ] icterus [ ] pain  [ ] discharge	  ENMT: [ ] sore throat  [ ] thrush [ ] ulcers [ ] exudates [ ]anosmia  Respiratory: [ ] dyspnea [ ] hemoptysis [ ] cough [ ] sputum	  Cardiovascular:  [ ] chest pain [ ] palpitations [ ] edema	  Gastrointestinal:  [ ] nausea [ ] vomiting [ ] diarrhea [ ] constipation [ ] pain	  Genitourinary:  [ ] dysuria [ ] frequency [ ] hematuria [ ] discharge [ ] flank pain  [ ] incontinence  Musculoskeletal:  [ ] myalgias [ ] arthralgias [ ] arthritis  [ ] back pain  Neurological:  [ ] headache [ ] weakness [ ] seizures  [ ] confusion/altered mental status    prior hospital charts reviewed [V]  primary team notes reviewed [V]  other consultant notes reviewed [V]    PAST MEDICAL & SURGICAL HISTORY:  H/O acute cholangitis  2019 novel coronavirus disease (COVID-19), 12/24/2021 no hospitalization, no treatment  S/P ERCP 4/2022  H/O colonoscopy    FAMILY HISTORY:  No pertinent family history in first degree relatives    SOCIAL HISTORY:  History of etoh use    Allergies  No Known Allergies    ANTIMICROBIALS:  piperacillin/tazobactam IVPB.. 3.375 every 12 hours    ANTIMICROBIALS (past 90 days):   MEDICATIONS  (STANDING):  cefTRIAXone   IVPB   100 mL/Hr IV Intermittent (08-08-22 @ 04:32)    piperacillin/tazobactam IVPB..   25 mL/Hr IV Intermittent (08-09-22 @ 09:53)    piperacillin/tazobactam IVPB..   25 mL/Hr IV Intermittent (08-09-22 @ 02:05)   25 mL/Hr IV Intermittent (08-08-22 @ 18:07)   25 mL/Hr IV Intermittent (08-08-22 @ 10:37)    piperacillin/tazobactam IVPB...   200 mL/Hr IV Intermittent (08-08-22 @ 05:11)    MEDICATIONS  (STANDING):  insulin lispro (ADMELOG) corrective regimen sliding scale  every 4 hours      VITALS:  Vital Signs Last 24 Hrs  T(F): 96.8 (08-09-22 @ 11:00), Max: 98.7 (08-08-22 @ 00:00)  Vital Signs Last 24 Hrs  HR: 93 (08-09-22 @ 11:00) (87 - 109)  BP: 98/58 (08-09-22 @ 11:00) (86/51 - 125/59)  RR: 13 (08-09-22 @ 11:00)  SpO2: 93% (08-09-22 @ 11:00) (91% - 96%)  Wt(kg): --    PHYSICAL EXAM:  Constitutional: non-toxic, no distress  HEAD/EYES: +mild scleral icterus  ENT:  supple, no mucositis  Cardiovascular:   +S1, S2  Respiratory:  clear BS bilaterally  GI:  distended, +RUQ tenderness  :  no milian  Musculoskeletal:  no joint swelling  Neurologic:  awake and alert, BAGLEY, no asterixes  Skin:  no rash, no ulcers  Heme/Onc:   no cervical lymphadenopathy  Psychiatric:  calm, cooperative     Labs:                        9.2    12.87 )-----------( 105      ( 09 Aug 2022 06:03 )             25.6     08-09    125<L>  |  86<L>  |  60<H>  ----------------------------<  260<H>  4.4   |  27  |  3.00<H>    Ca    7.9<L>      09 Aug 2022 06:03  Phos  4.2     08-09  Mg     1.9     08-09    TPro  5.6<L>  /  Alb  2.8<L>  /  TBili  2.9<H>  /  DBili  1.8<H>  /  AST  29  /  ALT  18  /  AlkPhos  76  08-09    WBC Trend:  WBC Count: 12.87 (08-09-22 @ 06:03)  WBC Count: 14.90 (08-09-22 @ 00:28)  WBC Count: 16.23 (08-08-22 @ 18:14)  WBC Count: 17.38 (08-08-22 @ 12:30)    Auto Neutrophil #: 13.15 K/uL (08-08-22 @ 01:02)  Band Neutrophils %: 7.0 % (08-08-22 @ 01:02)  Auto Neutrophil #: 3.98 K/uL (07-26-22 @ 07:30)  Auto Neutrophil #: 10.39 K/uL (04-12-22 @ 11:56)  Band Neutrophils %: 9.0 % (04-12-22 @ 11:56)    Auto Eosinophil %: 0.0 % (08-08-22 @ 01:02)    Urinalysis Basic - ( 08 Aug 2022 10:19 )    Color: Dark Yellow / Appearance: Slightly Turbid / SG: >1.050 / pH: x  Gluc: x / Ketone: Trace  / Bili: Small / Urobili: Negative   Blood: x / Protein: 30 mg/dL / Nitrite: Negative   Leuk Esterase: Small / RBC: 2 /hpf / WBC 18 /HPF   Sq Epi: x / Non Sq Epi: 3 /hpf / Bacteria: Many    MICROBIOLOGY:  MRSA PCR Result.: Bradley (04-12-22 @ 16:38)    Culture - Blood (collected 14 Apr 2022 11:20)  Source: .Blood Blood-Peripheral  Final Report:    No Growth Final    Culture - Blood (collected 14 Apr 2022 11:16)  Source: .Blood Blood-Peripheral  Final Report:    No Growth Final    Culture - Urine (collected 12 Apr 2022 17:09)  Source: Clean Catch Clean Catch (Midstream)  Final Report:    50,000 - 99,000 CFU/mL Escherichia coli  Organism: Escherichia coli  Organism: Escherichia coli    Sensitivities:      -  Amikacin: S <=16      -  Amoxicillin/Clavulanic Acid: S <=8/4      -  Ampicillin: S <=8 These ampicillin results predict results for amoxicillin      -  Ampicillin/Sulbactam: S <=4/2 Enterobacter, Klebsiella aerogenes, Citrobacter, and Serratia may develop resistance during prolonged therapy (3-4 days)      -  Aztreonam: S <=4      -  Cefazolin: S <=2 (MIC_CL_COM_ENTERIC_CEFAZU) For uncomplicated UTI with K. pneumoniae, E. coli, or P. mirablis: ELANA <=16 is sensitive and ELANA >=32 is resistant. This also predicts results for oral agents cefaclor, cefdinir, cefpodoxime, cefprozil, cefuroxime axetil, cephalexin and locarbef for uncomplicated UTI. Note that some isolates may be susceptible to these agents while testing resistant to cefazolin.      -  Cefepime: S <=2      -  Cefoxitin: S <=8      -  Ceftriaxone: S <=1 Enterobacter, Klebsiella aerogenes, Citrobacter, and Serratia may develop resistance during prolonged therapy      -  Ciprofloxacin: R >2      -  Ertapenem: S <=0.5      -  Gentamicin: S <=2      -  Imipenem: S <=1      -  Levofloxacin: R >4      -  Meropenem: S <=1      -  Nitrofurantoin: S <=32 Should not be used to treat pyelonephritis      -  Piperacillin/Tazobactam: S <=8      -  Tigecycline: S <=2      -  Tobramycin: S <=2      -  Trimethoprim/Sulfamethoxazole: S <=0.5/9.5      Method Type: ELANA    Culture - Blood (collected 12 Apr 2022 14:38)  Source: .Blood Blood-Peripheral  Final Report:    Growth in aerobic and anaerobic bottles: Escherichia coli    See previous culture [id number]    Culture - Blood (collected 12 Apr 2022 14:36)  Source: .Blood Blood-Peripheral  Final Report:    Growth in aerobic and anaerobic bottles: Escherichia coli    ***Blood Panel PCR results on this specimen are available    approximately 3 hours after the Gram stain result.***    Gram stain, PCR, and/or culture results may not always    correspond due to difference in methodologies.    ************************************************************    This PCR assay was performed by multiplex PCR. This    Assay tests for 66 bacterial and resistance gene targets.    Please refer to the [facility] Labs test directory    at [email or web address] for details.  Organism: Blood Culture PCR  Escherichia coli  Organism: Escherichia coli    Sensitivities:      -  Amikacin: S <=16      -  Ampicillin: S <=8 These ampicillin results predict results for amoxicillin      -  Ampicillin/Sulbactam: S <=4/2 Enterobacter, Klebsiella aerogenes, Citrobacter, and Serratia may develop resistance during prolonged therapy (3-4 days)      -  Aztreonam: S <=4      -  Cefazolin: S <=2 Enterobacter, Klebsiella aerogenes, Citrobacter, and Serratia may develop resistance during prolonged therapy (3-4 days)      -  Cefepime: S <=2      -  Cefoxitin: S <=8      -  Ceftriaxone: S <=1 Enterobacter, Klebsiella aerogenes, Citrobacter, and Serratia may develop resistance during prolonged therapy      -  Ciprofloxacin: R >2      -  Ertapenem: S <=0.5      -  Gentamicin: S <=2      -  Imipenem: S <=1      -  Levofloxacin: R >4      -  Meropenem: S <=1      -  Piperacillin/Tazobactam: S <=8      -  Tobramycin: S <=2      -  Trimethoprim/Sulfamethoxazole: S <=0.5/9.5      Method Type: ELANA  Organism: Blood Culture PCR    Sensitivities:      -  Escherichia coli: Detec      Method Type: PCR    COVID-19 PCR: NotDetec (08-08-22 @ 01:02)    RADIOLOGY:  imaging below personally reviewed    < from: CT Abdomen and Pelvis w/ IV Cont (08.08.22 @ 01:25) >  IMPRESSION:    Compared to the recent CT of April 13, 2022, interval increase in   abdominopelvic ascites, from mild to moderate. Moderate fluid in the   subhepatic space. Diffuse peritoneal enhancement. Correlate clinically   for spontaneous bacterial peritonitis (SBP).    Interval removal of biliary stent.  Mildly distended gallbladder with   cholelithiasis and gallbladder wall thickening. A 4 cm stone in this   cystic duct. Findings suggest chronic cholecystitis. Recommend right   upper quadrant ultrasound or HIDA scan for complete evaluation for acute   cholecystitis.    Small and large bowel wall thickening suggesting hepatic enterocolopathy.  Cirrhosis with evidence of portal hypertension. Placement that in   cirrhotic patients, annual surveillance MRI is recommended to evaluate   for hepatocellular carcinoma.    Bilateral lower lobe subsegmental atelectasis.    < end of copied text >

## 2022-08-09 NOTE — PROGRESS NOTE ADULT - ATTENDING COMMENTS
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 No biliary dilation on US or CT to suggest recurrent cholangitis and hyperbilirubinemia has partially improved, also suggesting against cholangitis or recurrent choledocholithiasis. Imaging consistent with acute cholecystitis as well as possible peritonitis. S/p ceftriaxone (8/8) and now continuing Zosyn (8/8- ) as per Transplant ID recommendations. Appreciate Nephrology input re: ROBBIE and agree with IV albumin and pressor support as needed given possible HRS versus ATN. He may need initiation of RRT if worsening. S/p 300 mL paracentesis today by IR with cell count pending but with ascitic fluid chemistries including very high LDH concerning for likely secondary peritonitis and possibly gallbladder perforation. He may need repeat CT for re-evaluation pending his clinical course. S/p percutaneous cholecystostomy today by IR. Mentating normally. Current MELD-Na 34 (ABO O). He is a potential liver transplant candidate and was seen at bedside today in conjunction with the Transplant Surgery team. We anticipate initiating a liver transplant evaluation this admission once his sepsis is controlled. His plan of care was also discussed with the SICU team.    Laron Harper M.D., Ph.D.  Transplant Hepatology

## 2022-08-09 NOTE — OCCUPATIONAL THERAPY INITIAL EVALUATION ADULT - PERTINENT HX OF CURRENT PROBLEM, REHAB EVAL
66y Male with cirrhosis and recent cholangitis s/p ERCP stent (4/22) and stent removal (7/20/22) and concern for SBP, cholecystitis. CT Abdomen/Pelvis 8/8: Compared to the recent CT of April 13, 2022, interval increase in abdominopelvic ascites, from mild to moderate. Moderate fluid in the subhepatic space. Diffuse peritoneal enhancement. Correlate clinically for spontaneous bacterial peritonitis (SBP). Interval removal of biliary stent.  Mildly distended gallbladder with cholelithiasis and gallbladder wall thickening. A 4 cm stone in this cystic duct. Findings suggest chronic cholecystitis. Recommend right upper quadrant ultrasound or HIDA scan for complete evaluation for acute cholecystitis. Small and large bowel wall thickening suggesting hepatic enterocolopathy. Cirrhosis with evidence of portal hypertension. Placement that in cirrhotic patients, annual surveillance MRI is recommended to evaluate for hepatocellular carcinoma. Bilateral lower lobe subsegmental atelectasis.

## 2022-08-09 NOTE — CONSULT NOTE ADULT - ASSESSMENT
67 y/o M PMHx cholangitis/cholecystitis (s/p ERCP w/ biliary stent placement 04/2022) and recently diagnosed etOH cirrhosis, presents with RUQ pain following biliary stent removal on 7/20/22. Found to have distended GB with wall thickening and stone in cystic duct, concerning for cholecystitis. Admitted to SICU for monitoring, pending perc rodney by IR.     Afebrile, HD stable  WBC 13K today    Impression:  #Cholecystitis - clinical findings and CT compatible with cholecystitis. No CBD dilation on CT to suggest cholangitis  #Leukocytosis - in setting of above  #Ascites -  r/o SBP     Recs:  - c/w Zosyn 3.375G IV q12H for cholecystitis   - monitor renal function, will adjust abx dosing as needed  - f/u blood cultures  - pending IR perc rodney  - pending paracentesis to r/o SBP  - trend leukocytosis      Oliver Knight MD  Infectious Disease Fellow  Available on [employer] Teams  Before 9AM or after 5PM: [phone]

## 2022-08-09 NOTE — CONSULT NOTE ADULT - PROBLEM SELECTOR RECOMMENDATION 2
Hypo-osmolar Hyponatremia ADH mediated likely from liver failure & cirrhosis Hypo-osmolar Hyponatremia ADH mediated likely from liver failure & fluid retention from ROBBIE Hypo-osmolar Hyponatremia ADH mediated likely from liver failure & fluid retention from ROBBIE      Pt. with chronic asymptomatic hyponatremia. SNa at baseline is ~123-133 since April. SNa 137 on 7/11 (one time). SNa 120 on arrival & SNa 125 today. Nina 9, UOsm 335, SOsm low at 270.  Continue with free water restrict to <1L/day for now. Continue Iv albumin. Avoid over-correction by >6mEQ in 24 hours. Liberalize salt intake. Increase PO solute (protein) intake. Avoid isotonic or hypotonic fluids. Monitor SNa. Hypo-osmolar Hyponatremia ADH mediated likely from liver failure & fluid retention from ROBBIE      Pt. with chronic asymptomatic hyponatremia. SNa at baseline is ~123-133 since April. SNa 137 on 7/11 (one time). SNa 120 on arrival & SNa 125 today. Nina 9, UOsm 335, SOsm low at 270.  Continue with free water restrict to <1L/day for now.  If SNa goes down further can do a trial of HTS with IV bumex. Avoid over-correction by >6mEQ in 24 hours. Liberalize salt intake. Increase PO solute (protein) intake. Avoid isotonic or hypotonic fluids. Monitor SNa.

## 2022-08-09 NOTE — PROGRESS NOTE ADULT - ASSESSMENT
67 yo M with decompensated cirrhosis and cholangitis s/p ERCP with stent placement (4/2022) s/p stent removal on 7/20 (along sphincterotomy and stone extraction) presenting for one week of RUQ abdominal pain associated with new abdominal distension and decreased appetite.     #Septic shock 2/2 acute cholecystitis vs ascending cholangitis  #ROBBIE: urine sodium 9, likely indicating prerenal ROBBIE vs less likely HRS  #History of cholangitis s/p biliary stent placement April 2022 and removal 7/20/2022  #Decompensated ALD/PARNELL cirrhosis c/b ascites  EV: normal esophagus on ERCP from 4/2022  Ascites: increase in ascites from mild to moderate on current imaging  HE: none currently    Recommendations:  -trend clinical symptoms, exam findings, vital signs, CBC, CMP, INR  -weaned off pressors  -diagnostic and therapeutic paracentesis when able; no diuretics at this time given septic shock and ROBBIE  -agree with percutaneous cholecystostomy for source control given lack of endoscopic options per discussion with advanced endoscopy team  -antibiotics and albumin for intravascular repletion   -appreciate recommendations from transplant surgery team    Note incomplete until finalized by attending signature/attestation.    Fadi Churchill  GI/Hepatology Fellow    MONDAY-FRIDAY 8AM-5PM:  Pager# [phone] ([facility]) or [phone] ([facility])    NON-URGENT CONSULTS:  Please email [email or web address]  AT NIGHT AND ON WEEKENDS:  Contact on-call GI fellow via answering service ([phone]) from 5pm-8am and on weekends/holidays         65 yo M with decompensated cirrhosis and cholangitis s/p ERCP with stent placement (4/2022) s/p stent removal on 7/20 (along sphincterotomy and stone extraction) presenting for one week of RUQ abdominal pain associated with new abdominal distension and decreased appetite.     #Septic shock 2/2 acute cholecystitis vs ascending cholangitis  #ROBBIE: urine sodium 9, likely indicating prerenal ROBBIE vs less likely HRS  #History of cholangitis s/p biliary stent placement April 2022 and removal 7/20/2022  #Decompensated ALD/PARNELL cirrhosis c/b ascites  EV: normal esophagus on ERCP from 4/2022  Ascites: increase in ascites from mild to moderate on current imaging  HE: none currently    Recommendations:  -trend clinical symptoms, exam findings, vital signs, CBC, CMP, INR  -weaned off pressors  -diagnostic and therapeutic paracentesis when able; no diuretics at this time given septic shock and ROBBIE  -agree with percutaneous cholecystostomy for source control given lack of endoscopic options at least in next 48h per discussion with advanced endoscopy team  -antibiotics and albumin for intravascular repletion   -appreciate recommendations from transplant surgery team    Note incomplete until finalized by attending signature/attestation.    Fadi Churchill  GI/Hepatology Fellow    MONDAY-FRIDAY 8AM-5PM:  Pager# [phone] ([facility]) or [phone] ([facility])    NON-URGENT CONSULTS:  Please email [email or web address] OR [email or web address]  AT NIGHT AND ON WEEKENDS:  Contact on-call GI fellow via answering service ([phone]) from 5pm-8am and on weekends/holidays

## 2022-08-09 NOTE — CONSULT NOTE ADULT - ASSESSMENT
66y Male with history of recently diagnosed cirrhosis and cholangitis s/p ERCP with stent placement (4/2022) s/p stent removal on 7/20/22 presenting for one week of RUQ abdominal pain found to have chronic cholecystitis; cirrhosis with portal hypertension, moderate ascites with diffuse peritoneal enhancement concerning for SBP, small and large bowel thickening suggesting hepatic enterocolopathy. Nephrology called for ROBBIE, severe metabolic acidosis, hyponatremia and hyperkalemia.

## 2022-08-09 NOTE — PROGRESS NOTE ADULT - ASSESSMENT
ASSESSMENT: 66yMale with cirrhosis and recent cholangitis s/p ERCP stent (4/22) and stent removal (7/20/22) and concern for SBP, cholecystitis    PLAN:   Neurologic: AAO3, acute pain  - Dilaudid as needed after abdominal exam  - Follow up ammonia levels in the AM    Respiratory: atelectasis  - CXR  - Incentive spirometry, chest PT    Cardiovascular: sinus tachycardia likely 2/2 sepsis  - Fluid resuscitate as needed  - Trend lactate; Lactate improving form onset of 9->3.3    Gastrointestinal/Nutrition: cirrhosis (MELD 34), concern for cholecystitis and/or SBP   - NPO  - GI - not concerned for cholangitis due to no dilation of bile ducts  - IR consult for perc rodney and diagnostic paracentesis. NPO for intervention in the AM.  - Hepatology and transplant consults placed    Genitourinary/Renal: ROBBIE and hyperkalemia s/p shifting& Lokelma, urethral stricture, severe metabolic acidosis  - Urology for Tobin placement, strict I&Os-> Anuric despite albumin administration. Will POCUS->  - 150 mEq bicarb in D5 @ 100ml/hr for hyponatremia  - q4 BMP to check K+  - 100ml 25% albumin    Hematologic: coagulopathy of liver failure/ sepsis  - Send type & screen x 2  - INR 2.71 --> will give 10mg IV vitamin K and 2u FFP  - Send TEG-> WNL  - Hold chemical VTE ppx  - Venodynes    Infectious Disease: sepsis  - Blood cultures sent  - +UA, send Urine culture  - Empiric Zosyn  - Will send paracentesis cultures s/p intervention to assess for SBP.    Endocrine: no acute issues  - Monitor glucose on BMP    Disposition: SICU

## 2022-08-09 NOTE — PHYSICAL THERAPY INITIAL EVALUATION ADULT - PERTINENT HX OF CURRENT PROBLEM, REHAB EVAL
66yoM PMH recently diagnosed cirrhosis (MELD 30) and cholangitis s/p ERCP with stent placement (4/2022) s/p stent removal on 7/20 presenting for one week of RUQ abdominal pain. States that one week after his stent was removed, he started experiencing RUQ pain, associated with decreased appetite. Presented to the ED on 7/26 for the pain where he had an US performed which showed a distended gallbladder with stones. CBD 5mm. In the ED, patient tachycardic to 120s, normotensive. Satting 94% on RA. Labs showed WBC 14, Na 120, K 6.1, HCO3 14, Cr 1.95, tbili 4.7, Alk phos 127, ALT/AST 38/22. CT showed a distended gallbladder with stones in the cystic duct, cirrhosis, ascites, small and large bowel thickening.

## 2022-08-09 NOTE — CONSULT NOTE ADULT - PROBLEM SELECTOR RECOMMENDATION 9
ROBBIE likely ATN from severe sepsis & hypotension vs HRS. Contrast administration likely contributed     Baseline SCr is 0.7 on 7/26. SCr on arrival is 1.9 on 8/8 & trending up to 3 today.  SBP in 90's since 8/8. Of note, pt had an episode of ROBBIE back in April attributed to septic shock with E. coli bacteremia 2/2 acute cholecystitis and UTI when SCr peaked to 2.2 & trended down to 1.1 on discharge 4/17/22 with IVF & treating sepsis.  cc in 24 hrs with UOP dropping to 5-15 cc/hr in the last few hours.  Anuric despite albumin administration & 2L NS. Now on 150 mEq bicarb in D5 for metab acidosis.     UA with high sp gravity (from IV contrast), 30 mg /dl of proteinuria, pyuria, +LE. Check Ucx & spot urine TP/CR. Urine electrolytes suggesting pre renal etiology such as HRS. Check Renal US when stable. Agree with IV albumin for now. Recommend possible octreotide & midodrine/vasopressin for splanchnic vasoconstriction.  Will need to consider HD if renal failure continues to worsen. Monitor labs and urine output. Avoid NSAIDs, ACEI/ARBS, RCA and nephrotoxins. Dose medications as per eGFR. ROBBIE likely ATN from severe sepsis & hypotension vs HRS. Contrast administration likely contributed     Baseline SCr is 0.7 on 7/26. SCr on arrival is 1.9 on 8/8 & trending up to 3 today.  SBP in 90's since 8/8. Of note, pt had an episode of ROBBIE back in April attributed to septic shock with E. coli bacteremia 2/2 acute cholecystitis and UTI when SCr peaked to 2.2 & trended down to 1.1 on discharge 4/17/22 with IVF & treating sepsis.  cc in 24 hrs with UOP dropping to 5-15 cc/hr in the last few hours.  Anuric despite albumin administration & 2L NS. Now on 150 mEq bicarb in D5 for metab acidosis.     UA with high sp gravity (from IV contrast), 30 mg /dl of proteinuria, pyuria, +LE. Check Ucx & spot urine TP/CR. Urine electrolytes suggesting pre renal etiology such as HRS. Check Renal US when stable. Await paracentesis by IR. Agree with IV albumin for now. Recommend possible octreotide & midodrine/vasopressin for splanchnic vasoconstriction, although cannot give vaso incase of tachycardia.  Will need to consider HD if renal failure continues to worsen. Monitor labs and urine output. Avoid NSAIDs, ACEI/ARBS, RCA and nephrotoxins. Dose medications as per eGFR.

## 2022-08-09 NOTE — CONSULT NOTE ADULT - PROBLEM SELECTOR RECOMMENDATION 3
likely from lactic acidosis & renal failure Initially with AGMA likely from lactic acidosis & renal failure    Anion gap lower due to hypoalbuminemia, initially elevated  Lactic acid trending down. Suggest to continue crystalloids, trend lactate & treat sepsis   ROBBIE management as above  Now pH 7.43 & serum bicarb is 27, currently with primary metabolic alkalosis due to bicarb supplementation.  Would hold bicarb gtt        ###INCOMPLETE####    If you have any questions, please feel free to contact me  Brunilda Manning  Nephrology Fellow  Pager NS: [phone]/ [facility]: [phone]    (After 5 pm or on weekends please page the on-call fellow, can check [email or web address] for schedule. Login is amy augustine, schedule under [facility] medicine, psych, derm) Initially with AGMA likely from lactic acidosis & renal failure    Anion gap lower that it should be due to hypoalbuminemia  Lactic acid trending down. Suggest to continue crystalloids, trend lactate & treat sepsis   ROBBIE management as above  Now pH 7.43 & serum bicarb is 27, currently with primary metabolic alkalosis due to bicarb supplementation.  Would hold bicarb gtt        ###INCOMPLETE####    If you have any questions, please feel free to contact me  Brunilda Manning  Nephrology Fellow  Pager NS: [phone]/ [facility]: [phone]    (After 5 pm or on weekends please page the on-call fellow, can check [email or web address] for schedule. Login is amy augustine, schedule under [facility] medicine, psych, derm) Initially with AGMA likely from lactic acidosis & renal failure    Anion gap lower that it should be due to hypoalbuminemia  Lactic acid trending down. Suggest to continue crystalloids, trend lactate & treat sepsis   ROBBIE management as above  Now pH 7.43 & serum bicarb is 27, currently with primary metabolic alkalosis due to bicarb supplementation.  Would hold bicarb gtt          If you have any questions, please feel free to contact me  Brunilda Manning  Nephrology Fellow  Pager NS: [phone]/ [facility]: [phone]    (After 5 pm or on weekends please page the on-call fellow, can check [email or web address] for schedule. Login is amy augustine, schedule under [facility] medicine, psych, derm)

## 2022-08-09 NOTE — PROGRESS NOTE ADULT - SUBJECTIVE AND OBJECTIVE BOX
ACS DAILY PROGRESS NOTE:       SUBJECTIVE/ROS: Patient seen and examined at bedside. States he feels ok. +milian.          MEDICATIONS  (STANDING):  albumin human 25% IVPB 50 milliLiter(s) IV Intermittent every 6 hours  chlorhexidine 2% Cloths 1 Application(s) Topical <User Schedule>  piperacillin/tazobactam IVPB.. 3.375 Gram(s) IV Intermittent every 8 hours  sodium bicarbonate  Infusion 0.207 mEq/kG/Hr (100 mL/Hr) IV Continuous <Continuous>    MEDICATIONS  (PRN):      OBJECTIVE:    Vital Signs Last 24 Hrs  T(C): 36.3 (08 Aug 2022 23:00), Max: 36.8 (08 Aug 2022 08:30)  T(F): 97.3 (08 Aug 2022 23:00), Max: 98.2 (08 Aug 2022 08:30)  HR: 101 (09 Aug 2022 07:00) (87 - 124)  BP: 107/62 (09 Aug 2022 07:00) (86/51 - 125/59)  BP(mean): 76 (09 Aug 2022 07:00) (63 - 94)  RR: 22 (09 Aug 2022 07:00) (13 - 35)  SpO2: 91% (09 Aug 2022 07:00) (91% - 96%)    Parameters below as of 09 Aug 2022 03:00  Patient On (Oxygen Delivery Method): room air            I&O's Detail    08 Aug 2022 07:01  -  09 Aug 2022 07:00  --------------------------------------------------------  IN:    IV PiggyBack: 200 mL    IV PiggyBack: 950 mL    IV PiggyBack: 350 mL    Plasma: 600 mL    Sodium Bicarbonate: 2200 mL    sodium chloride 0.9%: 125 mL  Total IN: 4425 mL    OUT:    Indwelling Catheter - Urethral (mL): 350 mL  Total OUT: 350 mL    Total NET: 4075 mL          Daily     Daily     LABS:                        9.2    12.87 )-----------( 105      ( 09 Aug 2022 06:03 )             25.6     08-09    125<L>  |  86<L>  |  60<H>  ----------------------------<  260<H>  4.4   |  27  |  3.00<H>    Ca    7.9<L>      09 Aug 2022 06:03  Phos  4.2     08-09  Mg     1.9     08-09    TPro  5.6<L>  /  Alb  2.8<L>  /  TBili  2.9<H>  /  DBili  1.8<H>  /  AST  29  /  ALT  18  /  AlkPhos  76  08-09    PT/INR - ( 09 Aug 2022 06:03 )   PT: 26.0 sec;   INR: 2.24 ratio         PTT - ( 09 Aug 2022 06:03 )  PTT:42.6 sec  Urinalysis Basic - ( 08 Aug 2022 10:19 )    Color: Dark Yellow / Appearance: Slightly Turbid / SG: >1.050 / pH: x  Gluc: x / Ketone: Trace  / Bili: Small / Urobili: Negative   Blood: x / Protein: 30 mg/dL / Nitrite: Negative   Leuk Esterase: Small / RBC: 2 /hpf / WBC 18 /HPF   Sq Epi: x / Non Sq Epi: 3 /hpf / Bacteria: Many      PHYSICAL EXAM:  Gen: No acute distress, jaundiced  Resp: no increased WOB or wheezing  Abd: Soft, moderately distended, moderate RUQ tenderness, no rebound, no guarding.  Ext: Warm and well-perfused

## 2022-08-09 NOTE — CONSULT NOTE ADULT - ATTENDING COMMENTS
Cirrhotic patient presenting with cholecystitis  Agree with Zosyn for coverage  Agree with plans for per rodney drainage  Would pursue paracentesis if/when able  Will tailor antimicrobials to culture data from blood and perc rodney cultures    I will continue to follow. Please feel free to contact me with any further questions.    Bladimir Nix M.D.  [facility] Division of Infectious Disease  8AM-5PM Monday - Friday: Available on [employer] Teams  After Hours and Holidays (or if no response on [employer] Teams): Please contact the Infectious Diseases Office at [phone]    The above assessment and plan were discussed with 8ICU Team
65 yo M with decompensated cirrhosis possibly secondary to ALD/PARNELL (with last reported alcohol in 4/2022) and history of cholangitis s/p ERCP with stent placement (4/2022) with subsequent repeat ERCP with stent removal, sphincterotomy, and balloon sweep removal of sludge/stones (7/20/22), admitted with severe sepsis with associated oligoanuric ROBBIE and lactic acidosis. No biliary dilation on US or CT to suggest recurrent cholangitis and uptrending hyperbilirubinemia likely secondary to acute cholecystitis based on imaging findings including gallbladder wall thickening and 4 mm cystic duct stone. Planned for percutaneous cholecystostomy tube placement for source control by IR, discussed with SICU team today. Differential diagnosis for his sepsis also includes peritonitis and recommend diagnostic paracentesis as soon as possible. Continue broad-spectrum antibiotics as per SICU team and Transplant ID (Dr. Nix) will consult tomorrow. ROBBIE may be secondary to ATN but low urine Na also concerning for pre-renal azotemia versus HRS and recommend continuing IV albumin and norepinephrine. Mentating normally. Current MELD-Na 32. He is a potential liver transplant candidate but will defer initiating transplant evaluation this admission until his sepsis is controlled.
Mr Lobo is a 66 year old man w/ decompensated cirrhosis w/ cholangitis in 4/2022 s/p ERCP w/ stent placement (removed 7/20/2022) presenting with RUQ abdominal pain and abdominal distention. Pt found to have a distended GB w/ a stone in the cystic duct concerning for cholecystitis.  Bile ducts appear normal without biliary ductal dilation but increased bilirubin.  Suspect his current presentation is likely secondary to acute cholecystitis and if he is not improving with antibiotics would consider percutaneous drainage of the gallbladder considering he is not stable for an ERCP at this moment.
66y Male with history of recently diagnosed cirrhosis and cholangitis s/p ERCP with stent placement (4/2022) s/p stent removal on 7/20/22 presenting for one week of RUQ abdominal pain found to have chronic cholecystitis; cirrhosis with portal hypertension, moderate ascites with diffuse peritoneal enhancement concerning for SBP, small and large bowel thickening suggesting hepatic enterocolopathy. Nephrology called for ROBBIE, severe metabolic acidosis, hyponatremia and hyperkalemia.     1. ARF--likely ATN multifactorial including sepsis, radiocontrast.  Persistently elevated lactate although improving.  OLIGURIC.  Consent obtained from pt fully oriented and conversant.  If unable to consent at time RRT required we will review with wife.  Overall volume restrict.  Colloid> crystalloid  2.  Lactic acidosis--may benefit from low dose pressor vasopressin vs phenylephrine given tachycardia.  Echo reviewed and is appropriately hyperdynamic but NO reduced EF.    3.  Hyponatremia--minimal free water clearance with 1.  If trends <120 would initiate 3% @30cc/hr.  for 10 hrs with goal no >6mEq/24hrsremoved  4.  Ascites--for IR.  Continue albumin infusion and increase if >2L     discussed with team, intensivist    Cristhian Jacob MD  contact via teams

## 2022-08-09 NOTE — PROGRESS NOTE ADULT - SUBJECTIVE AND OBJECTIVE BOX
HISTORY:  TESHA STEINER is a 66y Male with history of recently diagnosed cirrhosis and cholangitis s/p ERCP with stent placement (4/2022) s/p stent removal on 7/20/22 presenting for one week of RUQ abdominal pain. States that one week after his stent was removed, he started experiencing RUQ pain, associated with decreased appetite. Denies nausea or vomiting, fevers or chills. Endorses regular normal bowel movements. Presented to the ED on 7/26 for the pain where he had an US performed which showed a distended gallbladder with stones. CBD 5mm. Patient was discharged home.    In the ED, patient tachycardic to 120s, normotensive, satting 94% on RA.  CT showed a distended gallbladder with stones in the cystic duct and gallbladder wall thickening, suggesting chronic cholecystitis; cirrhosis with portal hypertension, moderate ascites with diffuse peritoneal enhancement concerning for SBP, small and large bowel thickening suggesting hepatic enterocolopathy. In ED, pt noted to have ROBBIE with hyponatremia and hyperkalemia which was shifted with D50 and insulin, and given 10g Lokelma. He received 2L NS bolus for tachycardia and elevated lactate to 9, and severe metabolic acidosis/  He was transferred to SICU for further management.    24 HOUR EVENTS:  - Poor urine output ON.       NEURO  Exam: AAO3  Meds:     RESPIRATORY  RR: 24 (08-09-22 @ 03:00) (13 - 35)  SpO2: 94% (08-09-22 @ 03:00) (92% - 96%)  Wt(kg): --  Exam:  Mechanical Ventilation:   ABG - ( 08 Aug 2022 15:59 )  pH: x     /  pCO2: x     /  pO2: x     / HCO3: x     / Base Excess: x     /  SaO2: x       Lactate: 7.6              Meds:     CARDIOVASCULAR  HR: 87 (08-09-22 @ 03:00) (87 - 124)  BP: 106/60 (08-09-22 @ 03:00) (86/51 - 137/71)  BP(mean): 79 (08-09-22 @ 03:00) (63 - 94)  ABP: --  ABP(mean): --  Wt(kg): --  CVP(cm H2O): --  VBG - ( 09 Aug 2022 00:15 )  pH: 7.37  /  pCO2: 41    /  pO2: 43    / HCO3: 24    / Base Excess: -1.5  /  SaO2: 70.4   Lactate: 3.3              Lactate, Blood: 7.6 mmol/L (08-08 @ 15:59)    Exam: RRR, well perfused  Perfusion     [x]Adequate   [ ]Inadequate  Mentation   [x]Normal       [ ]Reduced  Extremities  [x]Warm         [ ]Cool  Volume Status [ ]Hypervolemic [x]Euvolemic [ ]Hypovolemic  Meds:     GI/NUTRITION  Exam: soft, mild ttp over RUQ  Diet: NPO  Meds:     GENITOURINARY  I&O's Detail    08-08 @ 07:01  -  08-09 @ 03:07  --------------------------------------------------------  IN:    IV PiggyBack: 225 mL    IV PiggyBack: 200 mL    IV PiggyBack: 700 mL    Plasma: 600 mL    Sodium Bicarbonate: 1800 mL    sodium chloride 0.9%: 125 mL  Total IN: 3650 mL    OUT:    Indwelling Catheter - Urethral (mL): 325 mL  Total OUT: 325 mL    Total NET: 3325 mL          08-09    124<L>  |  88<L>  |  60<H>  ----------------------------<  130<H>  4.5   |  22  |  2.81<H>    Ca    8.5      09 Aug 2022 00:28  Phos  4.7     08-09  Mg     1.9     08-09    TPro  5.7<L>  /  Alb  2.8<L>  /  TBili  2.9<H>  /  DBili  1.8<H>  /  AST  30  /  ALT  21  /  AlkPhos  80  08-09    Meds: albumin human 25% IVPB 50 milliLiter(s) IV Intermittent every 6 hours  magnesium sulfate  IVPB 2 Gram(s) IV Intermittent once  sodium bicarbonate  Infusion 0.207 mEq/kG/Hr IV Continuous <Continuous>      HEMATOLOGIC  Meds:                         9.8    14.90 )-----------( 129      ( 09 Aug 2022 00:28 )             27.2     PT/INR - ( 09 Aug 2022 00:28 )   PT: 23.8 sec;   INR: 2.05 ratio         PTT - ( 09 Aug 2022 00:28 )  PTT:39.2 sec    INFECTIOUS DISEASES  T(C): 36.3 (08-08-22 @ 23:00), Max: 37.1 (08-08-22 @ 06:00)  Wt(kg): --  WBC Count: 14.90 K/uL (08-09 @ 00:28)  WBC Count: 16.23 K/uL (08-08 @ 18:14)  WBC Count: 17.38 K/uL (08-08 @ 12:30)  WBC Count: 19.92 K/uL (08-08 @ 08:45)    Recent Cultures:    Meds: piperacillin/tazobactam IVPB.. 3.375 Gram(s) IV Intermittent every 8 hours      ENDOCRINE  Capillary Blood Glucose    Meds:     ACCESS DEVICES:  [x] Peripheral IV  [ ] Central Venous Line		[ ] R	[ ] L	[ ] IJ	[ ] Fem	[ ] SC	Placed:   [ ] Arterial Line			[ ] R	[ ] L	[ ] Fem	[ ] Rad	[ ] Ax	Placed:   [ ] PICC:					[ ] Mediport  [ ] Urinary Catheter, Date Placed:   [x] Necessity of urinary, arterial, and venous catheters discussed    OTHER MEDICATIONS:  chlorhexidine 2% Cloths 1 Application(s) Topical <User Schedule>      IMAGING:

## 2022-08-09 NOTE — PROGRESS NOTE ADULT - SUBJECTIVE AND OBJECTIVE BOX
Interval Events:   Resuscitated in the SICU, weaned off pressors, patient without complaints this morning.    ROS:   12 point review of systems performed and negative except otherwise noted in HPI.    Hospital Medications:  albumin human 25% IVPB 50 milliLiter(s) IV Intermittent every 6 hours  chlorhexidine 2% Cloths 1 Application(s) Topical <User Schedule>  insulin lispro (ADMELOG) corrective regimen sliding scale   SubCutaneous every 4 hours  piperacillin/tazobactam IVPB.. 3.375 Gram(s) IV Intermittent every 12 hours  sodium chloride 0.9%. 1000 milliLiter(s) (50 mL/Hr) IV Continuous <Continuous>    MAR over past 24 hours:    albumin human  5% IVPB   1500 mL/Hr IV Intermittent (08-08-22 @ 23:31)    albumin human  5% IVPB   1500 mL/Hr IV Intermittent (08-08-22 @ 23:44)    albumin human  5% IVPB   1500 mL/Hr IV Intermittent (08-09-22 @ 04:08)    albumin human 25% IVPB   50 mL/Hr IV Intermittent (08-09-22 @ 05:08)   50 mL/Hr IV Intermittent (08-09-22 @ 00:22)   50 mL/Hr IV Intermittent (08-08-22 @ 18:28)    calcium gluconate IVPB   200 mL/Hr IV Intermittent (08-08-22 @ 23:32)    calcium gluconate IVPB   200 mL/Hr IV Intermittent (08-09-22 @ 06:53)    chlorhexidine 2% Cloths   1 Application(s) Topical (08-09-22 @ 05:08)    magnesium sulfate  IVPB   25 mL/Hr IV Intermittent (08-09-22 @ 05:59)    phytonadione  IVPB   102 mL/Hr IV Intermittent (08-09-22 @ 09:53)    piperacillin/tazobactam IVPB..   25 mL/Hr IV Intermittent (08-09-22 @ 09:53)    piperacillin/tazobactam IVPB..   25 mL/Hr IV Intermittent (08-09-22 @ 02:05)   25 mL/Hr IV Intermittent (08-08-22 @ 18:07)    sodium chloride 0.9%.   50 mL/Hr IV Continuous (08-09-22 @ 09:24)      Home Medications:  Last Order Reconciliation Date: 08-08-22 @ 06:30 (Admission Reconciliation)    PHYSICAL EXAM:   Vital Signs last 24 hours:  T(F): 96.8 (08-09-22 @ 11:00), Max: 98.2 (08-08-22 @ 12:00)  HR: 93 (08-09-22 @ 11:00) (87 - 109)  BP: 98/58 (08-09-22 @ 11:00) (86/51 - 125/59)  BP(mean): 72 (08-09-22 @ 11:00) (63 - 89)  ABP: --  ABP(mean): --  RR: 13 (08-09-22 @ 11:00) (13 - 35)  SpO2: 93% (08-09-22 @ 11:00) (91% - 96%)    I&Os:    08-08-22 @ 07:01  -  08-09-22 @ 07:00  --------------------------------------------------------  IN:    IV PiggyBack: 200 mL    IV PiggyBack: 950 mL    IV PiggyBack: 350 mL    Plasma: 600 mL    Sodium Bicarbonate: 2200 mL    sodium chloride 0.9%: 125 mL  Total IN: 4425 mL    OUT:    Indwelling Catheter - Urethral (mL): 350 mL  Total OUT: 350 mL    Total NET: 4075 mL      08-09-22 @ 07:01  -  08-09-22 @ 11:52  --------------------------------------------------------  IN:    IV PiggyBack: 25 mL    IV PiggyBack: 100 mL    Sodium Bicarbonate: 200 mL    sodium chloride 0.9%: 100 mL  Total IN: 425 mL    OUT:    Indwelling Catheter - Urethral (mL): 15 mL  Total OUT: 15 mL    Total NET: 410 mL        BMI (kg/m2): 27.5 (08-08-22 @ 00:00)  GENERAL: no acute distress  NEURO: alert  HEENT: NCAT, no conjunctival pallor appreciated  CHEST: no respiratory distress, no accessory muscle use  CARDIAC: regular rate, +S1/S2  ABDOMEN: soft, distended, nontender, no rebound or guarding  EXTREMITIES: warm, well perfused, peripheral edema present  SKIN: no lesions noted    DIAGNOSTICS:  WBC      Hg       PLT      Na       K        CO2     BUN      Cr       ALT      AST      TB       ALP  12.87    9.2      105      125      4.4      27       60       3.00     18       29       2.9      76       08-09-22 @ 06:03  14.90    9.8      129      124      4.5      22       60       2.81     21       30       2.9      80       08-09-22 @ 00:28  16.23    10.6     168      123      5.1      18       57       2.39     22       42       3.1      95       08-08-22 @ 18:14  ------   ------   ------   123      5.2      17       54       2.45     ------   ------   ------   ------   08-08-22 @ 15:59  17.38    11.7     207      124      4.7      22       50       2.10     20       35       3.3      101      08-08-22 @ 12:30    PT             INR            MELDwith  MELDw/o  26.0           2.24           --             --             08-09-22 @ 06:03  23.8           2.05           --             --             08-09-22 @ 00:28  22.7           1.96           --             --             08-08-22 @ 18:14  22.2           1.92           --             --             08-08-22 @ 15:59  36.9           3.17           --             --             08-08-22 @ 12:30

## 2022-08-09 NOTE — CONSULT NOTE ADULT - ASSESSMENT
67 yo M with decompensated ALD/PARNELL cirrhosis (last ETOH use 4/20/22) and cholangitis s/p ERCP with stent placement (4/2022) s/p stent removal on 7/20 (along with sphincterotomy and stone extraction) presenting for one week of RUQ abdominal pain associated with new abdominal distension and decreased appetite 2/2 biliary sepsis, requiring SICU admission.    Consulted by Hepatology/SICU team for management of cholecystitis.      Decompensated PARNELL Cirrhosis c/b Biliary Sepsis with associated ROBBIE   -agree with IR percutaneous cholecystostomy tube.  Pt high risk for further decompensation and mortality from operative intervention with a MELD of 32.  Discussed plan of care at length with patient.  Will defer initiating liver transplant evaluation until sepsis has resolved.  -continue excellent SICU/Hepatology care

## 2022-08-09 NOTE — PROGRESS NOTE ADULT - ATTENDING COMMENTS
66yMale with cirrhosis and recent cholangitis s/p ERCP stent (4/22) and stent removal (7/20/22) and concern for SBP, cholecystitis cholangitis again    Awake and alert, ammonia level not high. Pain control with opoid  Saturating well  Tachycardia sec to SIRS resolved  NPO for now. PPI  S/p correction of coagulopathy with FFP Vit K, TEG grossly wnl  Possible perc cholecystostomy tube placement, sampling of ascitic fluid by IR today  Metabolic acidosis resolved, DC bicarb drip to NS at lower rate  Mild improvement in Lactic acidosis  Worsening of ROBBIE, oliguria: will try one dose Lasix. likely nwill need HD  Hyperglycemia should improved with IVF without dextrose

## 2022-08-09 NOTE — CONSULT NOTE ADULT - SUBJECTIVE AND OBJECTIVE BOX
[facility] DIVISION OF KIDNEY DISEASES AND HYPERTENSION -- [phone]  -- INITIAL CONSULT NOTE  --------------------------------------------------------------------------------  HPI: 66y Male with history of recently diagnosed cirrhosis and cholangitis s/p ERCP with stent placement (4/2022) s/p stent removal on 7/20/22 presenting for one week of RUQ abdominal pain. CT showed a distended gallbladder with stones in the cystic duct and gallbladder wall thickening, suggesting chronic cholecystitis; cirrhosis with portal hypertension, moderate ascites with diffuse peritoneal enhancement concerning for SBP, small and large bowel thickening suggesting hepatic enterocolopathy. Nephrology called for ROBBIE, severe metabolic acidosis, hyponatremia and hyperkalemia.  K was shifted with D50 and insulin, and given 10g Lokelma. He received 2L NS bolus for tachycardia and elevated lactate to 9.  He was transferred to SICU for further management. IR consult for perc rodney and diagnostic paracentesis.  Anuric despite albumin administration & 2L NS. Now on 150 mEq bicarb in D5. Baseline SCr is 0.7 on 7/26. SCr on arrival is 1.9 on 8/8 & trending up to 3 today.  SBP in 90's since 8/8. Of note, pt had an episode of ROBBIE back in April attributed to septic shock with E. coli bacteremia 2/2 acute cholecystitis and UTI when SCr peaked to 2.2 & trended down to 1.1 on discharge 4/17/22 with IVF & treating sepsis.  cc in 24 hrs with UOP dropping to 5-15 cc/hr in the last few hours.             PAST HISTORY  --------------------------------------------------------------------------------  PAST MEDICAL & SURGICAL HISTORY:  H/O acute cholangitis      2019 novel coronavirus disease (COVID-19)  12/24/2021  no hospitalization, no treatment      S/P ERCP  4/2022      H/O colonoscopy        FAMILY HISTORY:    PAST SOCIAL HISTORY:    ALLERGIES & MEDICATIONS  --------------------------------------------------------------------------------  Allergies    No Known Allergies    Intolerances      Standing Inpatient Medications  albumin human 25% IVPB 50 milliLiter(s) IV Intermittent every 6 hours  chlorhexidine 2% Cloths 1 Application(s) Topical <User Schedule>  insulin lispro (ADMELOG) corrective regimen sliding scale   SubCutaneous every 4 hours  piperacillin/tazobactam IVPB.. 3.375 Gram(s) IV Intermittent every 12 hours  sodium chloride 0.9%. 1000 milliLiter(s) IV Continuous <Continuous>    PRN Inpatient Medications      REVIEW OF SYSTEMS  --------------------------------------------------------------------------------  Gen: No chills  Respiratory: No dyspnea, cough  CV: No chest pain  GI: No abdominal pain, diarrhea,  nausea, vomiting  : No increased frequency, dysuria, hematuria  MSK:  no edema  Neuro: No dizziness/lightheadedness    All other systems were reviewed and are negative, except as noted.    VITALS/PHYSICAL EXAM  --------------------------------------------------------------------------------  T(C): 36 (08-09-22 @ 11:00), Max: 36.8 (08-08-22 @ 12:00)  HR: 93 (08-09-22 @ 11:00) (87 - 109)  BP: 98/58 (08-09-22 @ 11:00) (86/51 - 125/59)  RR: 13 (08-09-22 @ 11:00) (13 - 35)  SpO2: 93% (08-09-22 @ 11:00) (91% - 96%)  Wt(kg): --  Height (cm): 162.6 (08-08-22 @ 00:00)  Weight (kg): 72.6 (08-08-22 @ 00:00)  BMI (kg/m2): 27.5 (08-08-22 @ 00:00)  BSA (m2): 1.78 (08-08-22 @ 00:00)      08-08-22 @ 07:01  -  08-09-22 @ 07:00  --------------------------------------------------------  IN: 4425 mL / OUT: 350 mL / NET: 4075 mL    08-09-22 @ 07:01  -  08-09-22 @ 11:38  --------------------------------------------------------  IN: 425 mL / OUT: 15 mL / NET: 410 mL      Physical Exam:  	Gen: elderly, NAD  	HEENT: Anicteric, MMM, no JVD  	Pulm: CTA B/L  	CV: S1S2, no rub  	Abd: Soft, +BS, NT, ND           No presacral edema  	Ext: No LE edema B/L, no asterixis  	Neuro: Awake, alert  	Skin: Warm and dry  	Vascular access:    LABS/STUDIES  --------------------------------------------------------------------------------              9.2    12.87 >-----------<  105      [08-09-22 @ 06:03]              25.6     125  |  86  |  60  ----------------------------<  260      [08-09-22 @ 06:03]  4.4   |  27  |  3.00        Ca     7.9     [08-09-22 @ 06:03]      Mg     1.9     [08-09-22 @ 06:03]      Phos  4.2     [08-09-22 @ 06:03]    TPro  5.6  /  Alb  2.8  /  TBili  2.9  /  DBili  1.8  /  AST  29  /  ALT  18  /  AlkPhos  76  [08-09-22 @ 06:03]    PT/INR: PT 26.0 , INR 2.24       [08-09-22 @ 06:03]  PTT: 42.6       [08-09-22 @ 06:03]    CK 21      [08-08-22 @ 07:14]    Creatinine Trend:  SCr 3.00 [08-09 @ 06:03]  SCr 2.81 [08-09 @ 00:28]  SCr 2.39 [08-08 @ 18:14]  SCr 2.45 [08-08 @ 15:59]  SCr 2.10 [08-08 @ 12:30]    Urinalysis - [08-08-22 @ 10:19]      Color Dark Yellow / Appearance Slightly Turbid / SG >1.050 / pH 5.5      Gluc Negative / Ketone Trace  / Bili Small / Urobili Negative       Blood Negative / Protein 30 mg/dL / Leuk Est Small / Nitrite Negative      RBC 2 / WBC 18 / Hyaline 30 / Gran  / Sq Epi  / Non Sq Epi 3 / Bacteria Many    Urine Sodium 9      [08-08-22 @ 10:19]  Urine Chloride <20      [08-08-22 @ 10:19]  Urine Osmolality 335      [08-08-22 @ 10:19]    Iron 125, TIBC Unable to calculate Test Repeated, %sat Unable to calculate Test Repeated      [04-16-22 @ 16:00]  Ferritin 1554      [04-16-22 @ 10:17]    HBsAb Nonreact      [04-18-22 @ 05:17]  HBsAg Nonreact      [04-12-22 @ 18:27]  HCV 0.17, Nonreact      [04-14-22 @ 09:48]    MICHELLE: titer 1:160, pattern Speckled      [04-16-22 @ 09:05]   [facility] DIVISION OF KIDNEY DISEASES AND HYPERTENSION -- [phone]  -- INITIAL CONSULT NOTE  --------------------------------------------------------------------------------  HPI: 66y Male with history of recently diagnosed cirrhosis and cholangitis with septic shock with E. coli bacteremia 2/2 acute cholecystitis  s/p ERCP with stent placement (4/2022) s/p stent removal on 7/20/22 presenting for one week of RUQ abdominal pain. CT showed a distended gallbladder with stones in the cystic duct and gallbladder wall thickening, suggesting chronic cholecystitis; cirrhosis with portal hypertension, moderate ascites with diffuse peritoneal enhancement concerning for SBP, small and large bowel thickening suggesting hepatic enterocolopathy. Nephrology called for ROBBIE, severe metabolic acidosis, hyponatremia and hyperkalemia.  K was shifted with D50 and insulin, and given 10g Lokelma. He received 2L NS bolus for tachycardia and elevated lactate to 9.  He was transferred to SICU for further management. IR consult for perc rodney and diagnostic paracentesis.  Anuric despite albumin administration & 2L NS. Now on 150 mEq bicarb in D5. Baseline SCr is 0.7 on 7/26. SCr on arrival is 1.9 on 8/8 & trending up to 3 today.  SBP in 90's since 8/8. Of note, pt had an episode of ROBBIE back in April attributed to septic shock with E. coli bacteremia 2/2 acute cholecystitis and UTI when SCr peaked to 2.2 & trended down to 1.1 on discharge 4/17/22 with IVF & treating sepsis.  cc in 24 hrs with UOP dropping to 5-15 cc/hr in the last few hours.             PAST HISTORY  --------------------------------------------------------------------------------  PAST MEDICAL & SURGICAL HISTORY:  H/O acute cholangitis      2019 novel coronavirus disease (COVID-19)  12/24/2021  no hospitalization, no treatment      S/P ERCP  4/2022      H/O colonoscopy        FAMILY HISTORY:    PAST SOCIAL HISTORY:    ALLERGIES & MEDICATIONS  --------------------------------------------------------------------------------  Allergies    No Known Allergies    Intolerances      Standing Inpatient Medications  albumin human 25% IVPB 50 milliLiter(s) IV Intermittent every 6 hours  chlorhexidine 2% Cloths 1 Application(s) Topical <User Schedule>  insulin lispro (ADMELOG) corrective regimen sliding scale   SubCutaneous every 4 hours  piperacillin/tazobactam IVPB.. 3.375 Gram(s) IV Intermittent every 12 hours  sodium chloride 0.9%. 1000 milliLiter(s) IV Continuous <Continuous>    PRN Inpatient Medications      REVIEW OF SYSTEMS  --------------------------------------------------------------------------------  Gen: No chills  Respiratory: No dyspnea, cough  CV: No chest pain  GI: No abdominal pain, diarrhea,  nausea, vomiting  : No increased frequency, dysuria, hematuria  MSK:  no edema  Neuro: No dizziness/lightheadedness    All other systems were reviewed and are negative, except as noted.    VITALS/PHYSICAL EXAM  --------------------------------------------------------------------------------  T(C): 36 (08-09-22 @ 11:00), Max: 36.8 (08-08-22 @ 12:00)  HR: 93 (08-09-22 @ 11:00) (87 - 109)  BP: 98/58 (08-09-22 @ 11:00) (86/51 - 125/59)  RR: 13 (08-09-22 @ 11:00) (13 - 35)  SpO2: 93% (08-09-22 @ 11:00) (91% - 96%)  Wt(kg): --  Height (cm): 162.6 (08-08-22 @ 00:00)  Weight (kg): 72.6 (08-08-22 @ 00:00)  BMI (kg/m2): 27.5 (08-08-22 @ 00:00)  BSA (m2): 1.78 (08-08-22 @ 00:00)      08-08-22 @ 07:01  -  08-09-22 @ 07:00  --------------------------------------------------------  IN: 4425 mL / OUT: 350 mL / NET: 4075 mL    08-09-22 @ 07:01  -  08-09-22 @ 11:38  --------------------------------------------------------  IN: 425 mL / OUT: 15 mL / NET: 410 mL      Physical Exam:  	Gen: elderly, NAD  	HEENT: Anicteric, MMM, no JVD  	Pulm: CTA B/L  	CV: S1S2, no rub  	Abd: Soft, +BS, NT, ND           No presacral edema  	Ext: No LE edema B/L, no asterixis  	Neuro: Awake, alert  	Skin: Warm and dry  	Vascular access:    LABS/STUDIES  --------------------------------------------------------------------------------              9.2    12.87 >-----------<  105      [08-09-22 @ 06:03]              25.6     125  |  86  |  60  ----------------------------<  260      [08-09-22 @ 06:03]  4.4   |  27  |  3.00        Ca     7.9     [08-09-22 @ 06:03]      Mg     1.9     [08-09-22 @ 06:03]      Phos  4.2     [08-09-22 @ 06:03]    TPro  5.6  /  Alb  2.8  /  TBili  2.9  /  DBili  1.8  /  AST  29  /  ALT  18  /  AlkPhos  76  [08-09-22 @ 06:03]    PT/INR: PT 26.0 , INR 2.24       [08-09-22 @ 06:03]  PTT: 42.6       [08-09-22 @ 06:03]    CK 21      [08-08-22 @ 07:14]    Creatinine Trend:  SCr 3.00 [08-09 @ 06:03]  SCr 2.81 [08-09 @ 00:28]  SCr 2.39 [08-08 @ 18:14]  SCr 2.45 [08-08 @ 15:59]  SCr 2.10 [08-08 @ 12:30]    Urinalysis - [08-08-22 @ 10:19]      Color Dark Yellow / Appearance Slightly Turbid / SG >1.050 / pH 5.5      Gluc Negative / Ketone Trace  / Bili Small / Urobili Negative       Blood Negative / Protein 30 mg/dL / Leuk Est Small / Nitrite Negative      RBC 2 / WBC 18 / Hyaline 30 / Gran  / Sq Epi  / Non Sq Epi 3 / Bacteria Many    Urine Sodium 9      [08-08-22 @ 10:19]  Urine Chloride <20      [08-08-22 @ 10:19]  Urine Osmolality 335      [08-08-22 @ 10:19]    Iron 125, TIBC Unable to calculate Test Repeated, %sat Unable to calculate Test Repeated      [04-16-22 @ 16:00]  Ferritin 1554      [04-16-22 @ 10:17]    HBsAb Nonreact      [04-18-22 @ 05:17]  HBsAg Nonreact      [04-12-22 @ 18:27]  HCV 0.17, Nonreact      [04-14-22 @ 09:48]    MICHELLE: titer 1:160, pattern Speckled      [04-16-22 @ 09:05]   [facility] DIVISION OF KIDNEY DISEASES AND HYPERTENSION -- [phone]  -- INITIAL CONSULT NOTE  --------------------------------------------------------------------------------  HPI: 66y Male with history of recently diagnosed cirrhosis and cholangitis with septic shock with E. coli bacteremia 2/2 acute cholecystitis  s/p ERCP with stent placement (4/2022) s/p stent removal on 7/20/22 presenting for one week of RUQ abdominal pain. CT showed a distended gallbladder with stones in the cystic duct and gallbladder wall thickening, suggesting chronic cholecystitis; cirrhosis with portal hypertension, moderate ascites with diffuse peritoneal enhancement concerning for SBP, small and large bowel thickening suggesting hepatic enterocolopathy. Nephrology called for ROBBIE, severe metabolic acidosis, hyponatremia and hyperkalemia.  K was shifted with D50 and insulin, and given 10g Lokelma. He received 2L NS bolus for tachycardia and elevated lactate to 9.  He was transferred to SICU for further management. IR consult for perc rodney and diagnostic paracentesis.  Anuric despite albumin administration & 2L NS. Now on 150 mEq bicarb in D5. Baseline SCr is 0.7 on 7/26. SCr on arrival is 1.9 on 8/8 & trending up to 3 today.  SBP in 90's since 8/8. Of note, pt had an episode of ROBBIE back in April attributed to septic shock with E. coli bacteremia 2/2 acute cholecystitis and UTI when SCr peaked to 2.2 & trended down to 1.1 on discharge 4/17/22 with IVF & treating sepsis.  cc in 24 hrs with UOP dropping to 5-15 cc/hr in the last few hours. c/o RUQ pain. No sob, light headedness/dizziness, difficulty breathing/cough, n/v, diarrhea, dysuria or hematuria              PAST HISTORY  --------------------------------------------------------------------------------  PAST MEDICAL & SURGICAL HISTORY:  H/O acute cholangitis      2019 novel coronavirus disease (COVID-19)  12/24/2021  no hospitalization, no treatment      S/P ERCP  4/2022      H/O colonoscopy        FAMILY HISTORY:    PAST SOCIAL HISTORY:    ALLERGIES & MEDICATIONS  --------------------------------------------------------------------------------  Allergies    No Known Allergies    Intolerances      Standing Inpatient Medications  albumin human 25% IVPB 50 milliLiter(s) IV Intermittent every 6 hours  chlorhexidine 2% Cloths 1 Application(s) Topical <User Schedule>  insulin lispro (ADMELOG) corrective regimen sliding scale   SubCutaneous every 4 hours  piperacillin/tazobactam IVPB.. 3.375 Gram(s) IV Intermittent every 12 hours  sodium chloride 0.9%. 1000 milliLiter(s) IV Continuous <Continuous>    PRN Inpatient Medications      REVIEW OF SYSTEMS  --------------------------------------------------------------------------------      All other systems were reviewed and are negative, except as noted.    VITALS/PHYSICAL EXAM  --------------------------------------------------------------------------------  T(C): 36 (08-09-22 @ 11:00), Max: 36.8 (08-08-22 @ 12:00)  HR: 93 (08-09-22 @ 11:00) (87 - 109)  BP: 98/58 (08-09-22 @ 11:00) (86/51 - 125/59)  RR: 13 (08-09-22 @ 11:00) (13 - 35)  SpO2: 93% (08-09-22 @ 11:00) (91% - 96%)  Wt(kg): --  Height (cm): 162.6 (08-08-22 @ 00:00)  Weight (kg): 72.6 (08-08-22 @ 00:00)  BMI (kg/m2): 27.5 (08-08-22 @ 00:00)  BSA (m2): 1.78 (08-08-22 @ 00:00)      08-08-22 @ 07:01  -  08-09-22 @ 07:00  --------------------------------------------------------  IN: 4425 mL / OUT: 350 mL / NET: 4075 mL    08-09-22 @ 07:01  -  08-09-22 @ 11:38  --------------------------------------------------------  IN: 425 mL / OUT: 15 mL / NET: 410 mL      Physical Exam:  	Gen: NAD  	HEENT: Anicteric, MMM, no JVD  	Pulm: CTA B/L  	CV: S1S2, no rub  	Abd: Soft, +BS, RUQ tenderness, +fluid wave          No presacral edema  	Ext: No LE edema B/L, no asterixis  	Neuro: Awake, alert  	Skin: Warm and dry  	Vascular access:              +Tobin with minimal urine    LABS/STUDIES  --------------------------------------------------------------------------------              9.2    12.87 >-----------<  105      [08-09-22 @ 06:03]              25.6     125  |  86  |  60  ----------------------------<  260      [08-09-22 @ 06:03]  4.4   |  27  |  3.00        Ca     7.9     [08-09-22 @ 06:03]      Mg     1.9     [08-09-22 @ 06:03]      Phos  4.2     [08-09-22 @ 06:03]    TPro  5.6  /  Alb  2.8  /  TBili  2.9  /  DBili  1.8  /  AST  29  /  ALT  18  /  AlkPhos  76  [08-09-22 @ 06:03]    PT/INR: PT 26.0 , INR 2.24       [08-09-22 @ 06:03]  PTT: 42.6       [08-09-22 @ 06:03]    CK 21      [08-08-22 @ 07:14]    Creatinine Trend:  SCr 3.00 [08-09 @ 06:03]  SCr 2.81 [08-09 @ 00:28]  SCr 2.39 [08-08 @ 18:14]  SCr 2.45 [08-08 @ 15:59]  SCr 2.10 [08-08 @ 12:30]    Urinalysis - [08-08-22 @ 10:19]      Color Dark Yellow / Appearance Slightly Turbid / SG >1.050 / pH 5.5      Gluc Negative / Ketone Trace  / Bili Small / Urobili Negative       Blood Negative / Protein 30 mg/dL / Leuk Est Small / Nitrite Negative      RBC 2 / WBC 18 / Hyaline 30 / Gran  / Sq Epi  / Non Sq Epi 3 / Bacteria Many    Urine Sodium 9      [08-08-22 @ 10:19]  Urine Chloride <20      [08-08-22 @ 10:19]  Urine Osmolality 335      [08-08-22 @ 10:19]    Iron 125, TIBC Unable to calculate Test Repeated, %sat Unable to calculate Test Repeated      [04-16-22 @ 16:00]  Ferritin 1554      [04-16-22 @ 10:17]    HBsAb Nonreact      [04-18-22 @ 05:17]  HBsAg Nonreact      [04-12-22 @ 18:27]  HCV 0.17, Nonreact      [04-14-22 @ 09:48]    MICHELLE: titer 1:160, pattern Speckled      [04-16-22 @ 09:05]

## 2022-08-09 NOTE — PHYSICAL THERAPY INITIAL EVALUATION ADULT - ADDITIONAL COMMENTS
Pt lives in a private home  Pt performed ADL/IADLs  Ambulates  Owns DME: Pt lives in a private home with wife with 5 steps to enter, bedroom/bathroom on first floor. Pt performed ADL/IADLs independently. Ambulates with no assistive device.

## 2022-08-09 NOTE — CONSULT NOTE ADULT - SUBJECTIVE AND OBJECTIVE BOX
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    Consulted by Hepatology/SICU team for management of cholecystitis.      Imaging reviewed:  cholecystitis with 4mm cystic duct stone.  Has been stabilized.  Seen this AM, feeling better. Vague RUQ abdominal pain.      MEDICATIONS  (STANDING):  acetaminophen   IVPB .. 1000 milliGRAM(s) IV Intermittent once  chlorhexidine 2% Cloths 1 Application(s) Topical <User Schedule>  piperacillin/tazobactam IVPB.. 3.375 Gram(s) IV Intermittent every 12 hours  sodium chloride 0.9%. 1000 milliLiter(s) (30 mL/Hr) IV Continuous <Continuous>    MEDICATIONS  (PRN):      PAST MEDICAL & SURGICAL HISTORY:  H/O acute cholangitis      2019 novel coronavirus disease (COVID-19)  12/24/2021  no hospitalization, no treatment      S/P ERCP  4/2022      H/O colonoscopy          Vital Signs Last 24 Hrs  T(C): 36 (09 Aug 2022 19:00), Max: 36.3 (08 Aug 2022 23:00)  T(F): 96.8 (09 Aug 2022 19:00), Max: 97.3 (08 Aug 2022 23:00)  HR: 89 (09 Aug 2022 20:00) (87 - 104)  BP: 100/59 (09 Aug 2022 20:00) (86/51 - 122/67)  BP(mean): 76 (09 Aug 2022 20:00) (63 - 89)  RR: 13 (09 Aug 2022 20:00) (12 - 25)  SpO2: 95% (09 Aug 2022 20:00) (91% - 95%)    Parameters below as of 09 Aug 2022 19:00  Patient On (Oxygen Delivery Method): room air        I&O's Summary    08 Aug 2022 07:01  -  09 Aug 2022 07:00  --------------------------------------------------------  IN: 4425 mL / OUT: 350 mL / NET: 4075 mL    09 Aug 2022 07:01  -  09 Aug 2022 20:58  --------------------------------------------------------  IN: 820 mL / OUT: 45 mL / NET: 775 mL                              9.2    16.28 )-----------( 101      ( 09 Aug 2022 18:49 )             26.6     08-09    124<L>  |  86<L>  |  66<H>  ----------------------------<  71  5.2   |  23  |  3.78<H>    Ca    8.6      09 Aug 2022 18:49  Phos  4.5     08-09  Mg     2.4     08-09    TPro  5.8<L>  /  Alb  3.2<L>  /  TBili  3.1<H>  /  DBili  1.8<H>  /  AST  33  /  ALT  20  /  AlkPhos  81  08-09        Review of systems  Gen: No weight changes, fatigue, fevers/chills, weakness  Skin: No rashes  Head/Eyes/Ears/Mouth: No headache; Normal hearing; Normal vision w/o blurriness; No sinus pain/discomfort, sore throat  Respiratory: No dyspnea, cough, wheezing, hemoptysis  CV: No chest pain, PND, orthopnea  GI: C/O mild abdominal pain. no diarrhea, constipation, nausea, vomiting, melena, hematochezia  : No increased frequency, dysuria, hematuria, nocturia  MSK: No joint pain/swelling; no back pain; no edema  Neuro: No dizziness/lightheadedness, weakness, seizures, numbness, tingling  Heme: No easy bruising or bleeding  Endo: No heat/cold intolerance  Psych: No significant nervousness, anxiety, stress, depression  All other systems were reviewed and are negative, except as noted.      PHYSICAL EXAM:  Constitutional: Well developed / well nourished  Eyes: anicteric, PERRLA  ENMT: nc/at, no thrush  Neck: supple  Respiratory: CTA B/L  Cardiovascular: RRR  Gastrointestinal: Soft abdomen, distended, vague RUQ TTP  Genitourinary: Urinary catheter in place, anuric  Extremities: SCD's in place and working bilaterally  Vascular: Palpable dp pulses bilaterally.   Neurological: A&O x3  Skin: no rashes, ulcerations, lesions  Musculoskeletal: Moving all extremities  Psychiatric: Responsive

## 2022-08-10 LAB
-  AMIKACIN: SIGNIFICANT CHANGE UP
-  AMOXICILLIN/CLAVULANIC ACID: SIGNIFICANT CHANGE UP
-  AMPICILLIN/SULBACTAM: SIGNIFICANT CHANGE UP
-  AMPICILLIN: SIGNIFICANT CHANGE UP
-  AZTREONAM: SIGNIFICANT CHANGE UP
-  CEFAZOLIN: SIGNIFICANT CHANGE UP
-  CEFEPIME: SIGNIFICANT CHANGE UP
-  CEFOXITIN: SIGNIFICANT CHANGE UP
-  CEFTRIAXONE: SIGNIFICANT CHANGE UP
-  CIPROFLOXACIN: SIGNIFICANT CHANGE UP
-  ERTAPENEM: SIGNIFICANT CHANGE UP
-  GENTAMICIN: SIGNIFICANT CHANGE UP
-  IMIPENEM: SIGNIFICANT CHANGE UP
-  LEVOFLOXACIN: SIGNIFICANT CHANGE UP
-  MEROPENEM: SIGNIFICANT CHANGE UP
-  NITROFURANTOIN: SIGNIFICANT CHANGE UP
-  PIPERACILLIN/TAZOBACTAM: SIGNIFICANT CHANGE UP
-  TIGECYCLINE: SIGNIFICANT CHANGE UP
-  TOBRAMYCIN: SIGNIFICANT CHANGE UP
-  TRIMETHOPRIM/SULFAMETHOXAZOLE: SIGNIFICANT CHANGE UP
ALBUMIN SERPL ELPH-MCNC: 3 G/DL — LOW (ref 3.3–5)
ALBUMIN SERPL ELPH-MCNC: 3.4 G/DL — SIGNIFICANT CHANGE UP (ref 3.3–5)
ALBUMIN SERPL ELPH-MCNC: 3.5 G/DL — SIGNIFICANT CHANGE UP (ref 3.3–5)
ALBUMIN SERPL ELPH-MCNC: 3.6 G/DL — SIGNIFICANT CHANGE UP (ref 3.3–5)
ALBUMIN SERPL ELPH-MCNC: 3.6 G/DL — SIGNIFICANT CHANGE UP (ref 3.3–5)
ALP SERPL-CCNC: 101 U/L — SIGNIFICANT CHANGE UP (ref 40–120)
ALP SERPL-CCNC: 104 U/L — SIGNIFICANT CHANGE UP (ref 40–120)
ALP SERPL-CCNC: 108 U/L — SIGNIFICANT CHANGE UP (ref 40–120)
ALP SERPL-CCNC: 112 U/L — SIGNIFICANT CHANGE UP (ref 40–120)
ALP SERPL-CCNC: 83 U/L — SIGNIFICANT CHANGE UP (ref 40–120)
ALT FLD-CCNC: 18 U/L — SIGNIFICANT CHANGE UP (ref 10–45)
ALT FLD-CCNC: 19 U/L — SIGNIFICANT CHANGE UP (ref 10–45)
ALT FLD-CCNC: 19 U/L — SIGNIFICANT CHANGE UP (ref 10–45)
ALT FLD-CCNC: 21 U/L — SIGNIFICANT CHANGE UP (ref 10–45)
ALT FLD-CCNC: 22 U/L — SIGNIFICANT CHANGE UP (ref 10–45)
AMMONIA BLD-MCNC: 34 UMOL/L — SIGNIFICANT CHANGE UP (ref 11–55)
AMMONIA BLD-MCNC: 37 UMOL/L — SIGNIFICANT CHANGE UP (ref 11–55)
ANION GAP SERPL CALC-SCNC: 14 MMOL/L — SIGNIFICANT CHANGE UP (ref 5–17)
ANION GAP SERPL CALC-SCNC: 15 MMOL/L — SIGNIFICANT CHANGE UP (ref 5–17)
ANION GAP SERPL CALC-SCNC: 15 MMOL/L — SIGNIFICANT CHANGE UP (ref 5–17)
ANION GAP SERPL CALC-SCNC: 16 MMOL/L — SIGNIFICANT CHANGE UP (ref 5–17)
ANION GAP SERPL CALC-SCNC: 16 MMOL/L — SIGNIFICANT CHANGE UP (ref 5–17)
APTT BLD: 46.5 SEC — HIGH (ref 27.5–35.5)
APTT BLD: 46.7 SEC — HIGH (ref 27.5–35.5)
APTT BLD: 47.3 SEC — HIGH (ref 27.5–35.5)
APTT BLD: 49.2 SEC — HIGH (ref 27.5–35.5)
APTT BLD: 49.4 SEC — HIGH (ref 27.5–35.5)
AST SERPL-CCNC: 33 U/L — SIGNIFICANT CHANGE UP (ref 10–40)
AST SERPL-CCNC: 36 U/L — SIGNIFICANT CHANGE UP (ref 10–40)
AST SERPL-CCNC: 36 U/L — SIGNIFICANT CHANGE UP (ref 10–40)
AST SERPL-CCNC: 39 U/L — SIGNIFICANT CHANGE UP (ref 10–40)
AST SERPL-CCNC: 40 U/L — SIGNIFICANT CHANGE UP (ref 10–40)
BASE EXCESS BLDV CALC-SCNC: -0.1 MMOL/L — SIGNIFICANT CHANGE UP (ref -2–2)
BASE EXCESS BLDV CALC-SCNC: -0.7 MMOL/L — SIGNIFICANT CHANGE UP (ref -2–2)
BASE EXCESS BLDV CALC-SCNC: -2.2 MMOL/L — LOW (ref -2–2)
BASE EXCESS BLDV CALC-SCNC: -2.9 MMOL/L — LOW (ref -2–2)
BASE EXCESS BLDV CALC-SCNC: -3.4 MMOL/L — LOW (ref -2–2)
BILIRUB DIRECT SERPL-MCNC: 2 MG/DL — HIGH (ref 0–0.3)
BILIRUB DIRECT SERPL-MCNC: 2.1 MG/DL — HIGH (ref 0–0.3)
BILIRUB DIRECT SERPL-MCNC: 2.2 MG/DL — HIGH (ref 0–0.3)
BILIRUB DIRECT SERPL-MCNC: 2.3 MG/DL — HIGH (ref 0–0.3)
BILIRUB DIRECT SERPL-MCNC: 2.4 MG/DL — HIGH (ref 0–0.3)
BILIRUB INDIRECT FLD-MCNC: 1.1 MG/DL — HIGH (ref 0.2–1)
BILIRUB INDIRECT FLD-MCNC: 1.2 MG/DL — HIGH (ref 0.2–1)
BILIRUB INDIRECT FLD-MCNC: 1.5 MG/DL — HIGH (ref 0.2–1)
BILIRUB INDIRECT FLD-MCNC: 1.6 MG/DL — HIGH (ref 0.2–1)
BILIRUB INDIRECT FLD-MCNC: 1.6 MG/DL — HIGH (ref 0.2–1)
BILIRUB SERPL-MCNC: 3.1 MG/DL — HIGH (ref 0.2–1.2)
BILIRUB SERPL-MCNC: 3.4 MG/DL — HIGH (ref 0.2–1.2)
BILIRUB SERPL-MCNC: 3.6 MG/DL — HIGH (ref 0.2–1.2)
BILIRUB SERPL-MCNC: 3.9 MG/DL — HIGH (ref 0.2–1.2)
BILIRUB SERPL-MCNC: 4 MG/DL — HIGH (ref 0.2–1.2)
BUN SERPL-MCNC: 65 MG/DL — HIGH (ref 7–23)
BUN SERPL-MCNC: 73 MG/DL — HIGH (ref 7–23)
BUN SERPL-MCNC: 73 MG/DL — HIGH (ref 7–23)
BUN SERPL-MCNC: 76 MG/DL — HIGH (ref 7–23)
BUN SERPL-MCNC: 77 MG/DL — HIGH (ref 7–23)
CA-I SERPL-SCNC: 1.05 MMOL/L — LOW (ref 1.15–1.33)
CA-I SERPL-SCNC: 1.15 MMOL/L — SIGNIFICANT CHANGE UP (ref 1.15–1.33)
CA-I SERPL-SCNC: 1.19 MMOL/L — SIGNIFICANT CHANGE UP (ref 1.15–1.33)
CALCIUM SERPL-MCNC: 7.8 MG/DL — LOW (ref 8.4–10.5)
CALCIUM SERPL-MCNC: 8.7 MG/DL — SIGNIFICANT CHANGE UP (ref 8.4–10.5)
CALCIUM SERPL-MCNC: 8.8 MG/DL — SIGNIFICANT CHANGE UP (ref 8.4–10.5)
CALCIUM SERPL-MCNC: 8.9 MG/DL — SIGNIFICANT CHANGE UP (ref 8.4–10.5)
CALCIUM SERPL-MCNC: 8.9 MG/DL — SIGNIFICANT CHANGE UP (ref 8.4–10.5)
CHLORIDE BLDV-SCNC: 87 MMOL/L — LOW (ref 96–108)
CHLORIDE BLDV-SCNC: 88 MMOL/L — LOW (ref 96–108)
CHLORIDE BLDV-SCNC: 88 MMOL/L — LOW (ref 96–108)
CHLORIDE BLDV-SCNC: 90 MMOL/L — LOW (ref 96–108)
CHLORIDE BLDV-SCNC: 95 MMOL/L — LOW (ref 96–108)
CHLORIDE SERPL-SCNC: 83 MMOL/L — LOW (ref 96–108)
CHLORIDE SERPL-SCNC: 85 MMOL/L — LOW (ref 96–108)
CHLORIDE SERPL-SCNC: 86 MMOL/L — LOW (ref 96–108)
CHLORIDE SERPL-SCNC: 86 MMOL/L — LOW (ref 96–108)
CHLORIDE SERPL-SCNC: 90 MMOL/L — LOW (ref 96–108)
CHLORIDE UR-SCNC: 80 MMOL/L — SIGNIFICANT CHANGE UP
CO2 BLDV-SCNC: 22 MMOL/L — SIGNIFICANT CHANGE UP (ref 22–26)
CO2 BLDV-SCNC: 25 MMOL/L — SIGNIFICANT CHANGE UP (ref 22–26)
CO2 BLDV-SCNC: 25 MMOL/L — SIGNIFICANT CHANGE UP (ref 22–26)
CO2 BLDV-SCNC: 26 MMOL/L — SIGNIFICANT CHANGE UP (ref 22–26)
CO2 BLDV-SCNC: 28 MMOL/L — HIGH (ref 22–26)
CO2 SERPL-SCNC: 22 MMOL/L — SIGNIFICANT CHANGE UP (ref 22–31)
CO2 SERPL-SCNC: 22 MMOL/L — SIGNIFICANT CHANGE UP (ref 22–31)
CO2 SERPL-SCNC: 23 MMOL/L — SIGNIFICANT CHANGE UP (ref 22–31)
CO2 SERPL-SCNC: 23 MMOL/L — SIGNIFICANT CHANGE UP (ref 22–31)
CO2 SERPL-SCNC: 25 MMOL/L — SIGNIFICANT CHANGE UP (ref 22–31)
CREAT ?TM UR-MCNC: 69 MG/DL — SIGNIFICANT CHANGE UP
CREAT SERPL-MCNC: 3.81 MG/DL — HIGH (ref 0.5–1.3)
CREAT SERPL-MCNC: 4.43 MG/DL — HIGH (ref 0.5–1.3)
CREAT SERPL-MCNC: 4.69 MG/DL — HIGH (ref 0.5–1.3)
CREAT SERPL-MCNC: 4.75 MG/DL — HIGH (ref 0.5–1.3)
CREAT SERPL-MCNC: 4.92 MG/DL — HIGH (ref 0.5–1.3)
CULTURE RESULTS: SIGNIFICANT CHANGE UP
EGFR: 12 ML/MIN/1.73M2 — LOW
EGFR: 13 ML/MIN/1.73M2 — LOW
EGFR: 13 ML/MIN/1.73M2 — LOW
EGFR: 14 ML/MIN/1.73M2 — LOW
EGFR: 17 ML/MIN/1.73M2 — LOW
GAS PNL BLDV: 119 MMOL/L — CRITICAL LOW (ref 136–145)
GAS PNL BLDV: 120 MMOL/L — CRITICAL LOW (ref 136–145)
GAS PNL BLDV: 121 MMOL/L — LOW (ref 136–145)
GAS PNL BLDV: 122 MMOL/L — LOW (ref 136–145)
GAS PNL BLDV: 124 MMOL/L — LOW (ref 136–145)
GAS PNL BLDV: SIGNIFICANT CHANGE UP
GLUCOSE BLDC GLUCOMTR-MCNC: 144 MG/DL — HIGH (ref 70–99)
GLUCOSE BLDC GLUCOMTR-MCNC: 150 MG/DL — HIGH (ref 70–99)
GLUCOSE BLDC GLUCOMTR-MCNC: 177 MG/DL — HIGH (ref 70–99)
GLUCOSE BLDC GLUCOMTR-MCNC: 197 MG/DL — HIGH (ref 70–99)
GLUCOSE BLDC GLUCOMTR-MCNC: 55 MG/DL — LOW (ref 70–99)
GLUCOSE BLDV-MCNC: 139 MG/DL — HIGH (ref 70–99)
GLUCOSE BLDV-MCNC: 162 MG/DL — HIGH (ref 70–99)
GLUCOSE BLDV-MCNC: 164 MG/DL — HIGH (ref 70–99)
GLUCOSE BLDV-MCNC: 57 MG/DL — LOW (ref 70–99)
GLUCOSE BLDV-MCNC: 61 MG/DL — LOW (ref 70–99)
GLUCOSE SERPL-MCNC: 154 MG/DL — HIGH (ref 70–99)
GLUCOSE SERPL-MCNC: 171 MG/DL — HIGH (ref 70–99)
GLUCOSE SERPL-MCNC: 174 MG/DL — HIGH (ref 70–99)
GLUCOSE SERPL-MCNC: 60 MG/DL — LOW (ref 70–99)
GLUCOSE SERPL-MCNC: 66 MG/DL — LOW (ref 70–99)
GRAM STN FLD: SIGNIFICANT CHANGE UP
GRAM STN FLD: SIGNIFICANT CHANGE UP
HCO3 BLDV-SCNC: 21 MMOL/L — LOW (ref 22–29)
HCO3 BLDV-SCNC: 24 MMOL/L — SIGNIFICANT CHANGE UP (ref 22–29)
HCO3 BLDV-SCNC: 24 MMOL/L — SIGNIFICANT CHANGE UP (ref 22–29)
HCO3 BLDV-SCNC: 25 MMOL/L — SIGNIFICANT CHANGE UP (ref 22–29)
HCO3 BLDV-SCNC: 27 MMOL/L — SIGNIFICANT CHANGE UP (ref 22–29)
HCT VFR BLD CALC: 26.3 % — LOW (ref 39–50)
HCT VFR BLD CALC: 27.2 % — LOW (ref 39–50)
HCT VFR BLD CALC: 27.7 % — LOW (ref 39–50)
HCT VFR BLD CALC: 28.7 % — LOW (ref 39–50)
HCT VFR BLD CALC: 29 % — LOW (ref 39–50)
HCT VFR BLDA CALC: 27 % — LOW (ref 39–51)
HCT VFR BLDA CALC: 29 % — LOW (ref 39–51)
HCT VFR BLDA CALC: 30 % — LOW (ref 39–51)
HCT VFR BLDA CALC: 31 % — LOW (ref 39–51)
HCT VFR BLDA CALC: 31 % — LOW (ref 39–51)
HGB BLD CALC-MCNC: 10.1 G/DL — LOW (ref 12.6–17.4)
HGB BLD CALC-MCNC: 10.3 G/DL — LOW (ref 12.6–17.4)
HGB BLD CALC-MCNC: 10.4 G/DL — LOW (ref 12.6–17.4)
HGB BLD CALC-MCNC: 9.1 G/DL — LOW (ref 12.6–17.4)
HGB BLD CALC-MCNC: 9.7 G/DL — LOW (ref 12.6–17.4)
HGB BLD-MCNC: 10.1 G/DL — LOW (ref 13–17)
HGB BLD-MCNC: 9.4 G/DL — LOW (ref 13–17)
HGB BLD-MCNC: 9.5 G/DL — LOW (ref 13–17)
HGB BLD-MCNC: 9.7 G/DL — LOW (ref 13–17)
HGB BLD-MCNC: 9.8 G/DL — LOW (ref 13–17)
HOROWITZ INDEX BLDV+IHG-RTO: 21 — SIGNIFICANT CHANGE UP
HOROWITZ INDEX BLDV+IHG-RTO: 21 — SIGNIFICANT CHANGE UP
HOROWITZ INDEX BLDV+IHG-RTO: 24 — SIGNIFICANT CHANGE UP
HOROWITZ INDEX BLDV+IHG-RTO: 32 — SIGNIFICANT CHANGE UP
INR BLD: 2.17 RATIO — HIGH (ref 0.88–1.16)
INR BLD: 2.22 RATIO — HIGH (ref 0.88–1.16)
INR BLD: 2.22 RATIO — HIGH (ref 0.88–1.16)
INR BLD: 2.23 RATIO — HIGH (ref 0.88–1.16)
INR BLD: 2.25 RATIO — HIGH (ref 0.88–1.16)
LACTATE BLDV-MCNC: 2.1 MMOL/L — HIGH (ref 0.7–2)
LACTATE BLDV-MCNC: 2.5 MMOL/L — HIGH (ref 0.7–2)
LACTATE BLDV-MCNC: 2.6 MMOL/L — HIGH (ref 0.7–2)
LACTATE BLDV-MCNC: 2.7 MMOL/L — HIGH (ref 0.7–2)
LACTATE BLDV-MCNC: 3.4 MMOL/L — HIGH (ref 0.7–2)
MAGNESIUM SERPL-MCNC: 2.2 MG/DL — SIGNIFICANT CHANGE UP (ref 1.6–2.6)
MAGNESIUM SERPL-MCNC: 2.5 MG/DL — SIGNIFICANT CHANGE UP (ref 1.6–2.6)
MAGNESIUM SERPL-MCNC: 2.6 MG/DL — SIGNIFICANT CHANGE UP (ref 1.6–2.6)
MCHC RBC-ENTMCNC: 32.3 PG — SIGNIFICANT CHANGE UP (ref 27–34)
MCHC RBC-ENTMCNC: 32.8 PG — SIGNIFICANT CHANGE UP (ref 27–34)
MCHC RBC-ENTMCNC: 33.1 PG — SIGNIFICANT CHANGE UP (ref 27–34)
MCHC RBC-ENTMCNC: 33.3 PG — SIGNIFICANT CHANGE UP (ref 27–34)
MCHC RBC-ENTMCNC: 33.5 PG — SIGNIFICANT CHANGE UP (ref 27–34)
MCHC RBC-ENTMCNC: 34.1 GM/DL — SIGNIFICANT CHANGE UP (ref 32–36)
MCHC RBC-ENTMCNC: 34.8 GM/DL — SIGNIFICANT CHANGE UP (ref 32–36)
MCHC RBC-ENTMCNC: 34.9 GM/DL — SIGNIFICANT CHANGE UP (ref 32–36)
MCHC RBC-ENTMCNC: 35 GM/DL — SIGNIFICANT CHANGE UP (ref 32–36)
MCHC RBC-ENTMCNC: 35.7 GM/DL — SIGNIFICANT CHANGE UP (ref 32–36)
MCV RBC AUTO: 92.6 FL — SIGNIFICANT CHANGE UP (ref 80–100)
MCV RBC AUTO: 93.6 FL — SIGNIFICANT CHANGE UP (ref 80–100)
MCV RBC AUTO: 94.7 FL — SIGNIFICANT CHANGE UP (ref 80–100)
MCV RBC AUTO: 95.7 FL — SIGNIFICANT CHANGE UP (ref 80–100)
MCV RBC AUTO: 95.8 FL — SIGNIFICANT CHANGE UP (ref 80–100)
METHOD TYPE: SIGNIFICANT CHANGE UP
NRBC # BLD: 0 /100 WBCS — SIGNIFICANT CHANGE UP (ref 0–0)
ORGANISM # SPEC MICROSCOPIC CNT: SIGNIFICANT CHANGE UP
ORGANISM # SPEC MICROSCOPIC CNT: SIGNIFICANT CHANGE UP
OSMOLALITY UR: 295 MOS/KG — LOW (ref 300–900)
PCO2 BLDV: 36 MMHG — LOW (ref 42–55)
PCO2 BLDV: 40 MMHG — LOW (ref 42–55)
PCO2 BLDV: 48 MMHG — SIGNIFICANT CHANGE UP (ref 42–55)
PCO2 BLDV: 53 MMHG — SIGNIFICANT CHANGE UP (ref 42–55)
PCO2 BLDV: 53 MMHG — SIGNIFICANT CHANGE UP (ref 42–55)
PH BLDV: 7.28 — LOW (ref 7.32–7.43)
PH BLDV: 7.3 — LOW (ref 7.32–7.43)
PH BLDV: 7.31 — LOW (ref 7.32–7.43)
PH BLDV: 7.38 — SIGNIFICANT CHANGE UP (ref 7.32–7.43)
PH BLDV: 7.39 — SIGNIFICANT CHANGE UP (ref 7.32–7.43)
PHOSPHATE SERPL-MCNC: 4.4 MG/DL — SIGNIFICANT CHANGE UP (ref 2.5–4.5)
PHOSPHATE SERPL-MCNC: 5.5 MG/DL — HIGH (ref 2.5–4.5)
PHOSPHATE SERPL-MCNC: 5.5 MG/DL — HIGH (ref 2.5–4.5)
PHOSPHATE SERPL-MCNC: 5.8 MG/DL — HIGH (ref 2.5–4.5)
PHOSPHATE SERPL-MCNC: 6.1 MG/DL — HIGH (ref 2.5–4.5)
PLATELET # BLD AUTO: 103 K/UL — LOW (ref 150–400)
PLATELET # BLD AUTO: 112 K/UL — LOW (ref 150–400)
PLATELET # BLD AUTO: 115 K/UL — LOW (ref 150–400)
PLATELET # BLD AUTO: 124 K/UL — LOW (ref 150–400)
PLATELET # BLD AUTO: 132 K/UL — LOW (ref 150–400)
PO2 BLDV: 27 MMHG — SIGNIFICANT CHANGE UP (ref 25–45)
PO2 BLDV: 41 MMHG — SIGNIFICANT CHANGE UP (ref 25–45)
PO2 BLDV: 46 MMHG — HIGH (ref 25–45)
PO2 BLDV: 48 MMHG — HIGH (ref 25–45)
PO2 BLDV: 52 MMHG — HIGH (ref 25–45)
POTASSIUM BLDV-SCNC: 4.5 MMOL/L — SIGNIFICANT CHANGE UP (ref 3.5–5.1)
POTASSIUM BLDV-SCNC: 5 MMOL/L — SIGNIFICANT CHANGE UP (ref 3.5–5.1)
POTASSIUM BLDV-SCNC: 5.3 MMOL/L — HIGH (ref 3.5–5.1)
POTASSIUM BLDV-SCNC: 5.5 MMOL/L — HIGH (ref 3.5–5.1)
POTASSIUM BLDV-SCNC: 5.7 MMOL/L — HIGH (ref 3.5–5.1)
POTASSIUM SERPL-MCNC: 4.9 MMOL/L — SIGNIFICANT CHANGE UP (ref 3.5–5.3)
POTASSIUM SERPL-MCNC: 5.2 MMOL/L — SIGNIFICANT CHANGE UP (ref 3.5–5.3)
POTASSIUM SERPL-MCNC: 5.4 MMOL/L — HIGH (ref 3.5–5.3)
POTASSIUM SERPL-SCNC: 4.9 MMOL/L — SIGNIFICANT CHANGE UP (ref 3.5–5.3)
POTASSIUM SERPL-SCNC: 5.2 MMOL/L — SIGNIFICANT CHANGE UP (ref 3.5–5.3)
POTASSIUM SERPL-SCNC: 5.4 MMOL/L — HIGH (ref 3.5–5.3)
POTASSIUM UR-SCNC: 33 MMOL/L — SIGNIFICANT CHANGE UP
PROT SERPL-MCNC: 5.3 G/DL — LOW (ref 6–8.3)
PROT SERPL-MCNC: 5.9 G/DL — LOW (ref 6–8.3)
PROT SERPL-MCNC: 6.2 G/DL — SIGNIFICANT CHANGE UP (ref 6–8.3)
PROT SERPL-MCNC: 6.2 G/DL — SIGNIFICANT CHANGE UP (ref 6–8.3)
PROT SERPL-MCNC: 6.4 G/DL — SIGNIFICANT CHANGE UP (ref 6–8.3)
PROTHROM AB SERPL-ACNC: 25.1 SEC — HIGH (ref 10.5–13.4)
PROTHROM AB SERPL-ACNC: 25.7 SEC — HIGH (ref 10.5–13.4)
PROTHROM AB SERPL-ACNC: 25.9 SEC — HIGH (ref 10.5–13.4)
PROTHROM AB SERPL-ACNC: 26 SEC — HIGH (ref 10.5–13.4)
PROTHROM AB SERPL-ACNC: 26.1 SEC — HIGH (ref 10.5–13.4)
RBC # BLD: 2.84 M/UL — LOW (ref 4.2–5.8)
RBC # BLD: 2.84 M/UL — LOW (ref 4.2–5.8)
RBC # BLD: 2.96 M/UL — LOW (ref 4.2–5.8)
RBC # BLD: 3.03 M/UL — LOW (ref 4.2–5.8)
RBC # BLD: 3.03 M/UL — LOW (ref 4.2–5.8)
RBC # FLD: 14.8 % — HIGH (ref 10.3–14.5)
RBC # FLD: 14.8 % — HIGH (ref 10.3–14.5)
RBC # FLD: 14.9 % — HIGH (ref 10.3–14.5)
RBC # FLD: 14.9 % — HIGH (ref 10.3–14.5)
RBC # FLD: 15 % — HIGH (ref 10.3–14.5)
SAO2 % BLDV: 43.1 % — LOW (ref 67–88)
SAO2 % BLDV: 67.3 % — SIGNIFICANT CHANGE UP (ref 67–88)
SAO2 % BLDV: 73.1 % — SIGNIFICANT CHANGE UP (ref 67–88)
SAO2 % BLDV: 75.7 % — SIGNIFICANT CHANGE UP (ref 67–88)
SAO2 % BLDV: 78.3 % — SIGNIFICANT CHANGE UP (ref 67–88)
SODIUM SERPL-SCNC: 122 MMOL/L — LOW (ref 135–145)
SODIUM SERPL-SCNC: 123 MMOL/L — LOW (ref 135–145)
SODIUM SERPL-SCNC: 124 MMOL/L — LOW (ref 135–145)
SODIUM SERPL-SCNC: 125 MMOL/L — LOW (ref 135–145)
SODIUM SERPL-SCNC: 127 MMOL/L — LOW (ref 135–145)
SODIUM UR-SCNC: 92 MMOL/L — SIGNIFICANT CHANGE UP
SPECIMEN SOURCE: SIGNIFICANT CHANGE UP
UUN UR-MCNC: 62 MG/DL — SIGNIFICANT CHANGE UP
WBC # BLD: 19.74 K/UL — HIGH (ref 3.8–10.5)
WBC # BLD: 24.17 K/UL — HIGH (ref 3.8–10.5)
WBC # BLD: 25.14 K/UL — HIGH (ref 3.8–10.5)
WBC # BLD: 26.21 K/UL — HIGH (ref 3.8–10.5)
WBC # BLD: 26.32 K/UL — HIGH (ref 3.8–10.5)
WBC # FLD AUTO: 19.74 K/UL — HIGH (ref 3.8–10.5)
WBC # FLD AUTO: 24.17 K/UL — HIGH (ref 3.8–10.5)
WBC # FLD AUTO: 25.14 K/UL — HIGH (ref 3.8–10.5)
WBC # FLD AUTO: 26.21 K/UL — HIGH (ref 3.8–10.5)
WBC # FLD AUTO: 26.32 K/UL — HIGH (ref 3.8–10.5)

## 2022-08-10 PROCEDURE — 74176 CT ABD & PELVIS W/O CONTRAST: CPT | Mod: 26

## 2022-08-10 PROCEDURE — 99232 SBSQ HOSP IP/OBS MODERATE 35: CPT | Mod: GC

## 2022-08-10 PROCEDURE — 99232 SBSQ HOSP IP/OBS MODERATE 35: CPT

## 2022-08-10 PROCEDURE — 71045 X-RAY EXAM CHEST 1 VIEW: CPT | Mod: 26

## 2022-08-10 PROCEDURE — 76705 ECHO EXAM OF ABDOMEN: CPT | Mod: 26

## 2022-08-10 PROCEDURE — 99291 CRITICAL CARE FIRST HOUR: CPT

## 2022-08-10 PROCEDURE — 99233 SBSQ HOSP IP/OBS HIGH 50: CPT | Mod: GC

## 2022-08-10 RX ORDER — HYDROMORPHONE HYDROCHLORIDE 2 MG/ML
0.5 INJECTION INTRAMUSCULAR; INTRAVENOUS; SUBCUTANEOUS ONCE
Refills: 0 | Status: DISCONTINUED | OUTPATIENT
Start: 2022-08-10 | End: 2022-08-10

## 2022-08-10 RX ORDER — BUMETANIDE 0.25 MG/ML
0.5 INJECTION INTRAMUSCULAR; INTRAVENOUS
Qty: 20 | Refills: 0 | Status: DISCONTINUED | OUTPATIENT
Start: 2022-08-10 | End: 2022-08-11

## 2022-08-10 RX ORDER — INSULIN HUMAN 100 [IU]/ML
10 INJECTION, SOLUTION SUBCUTANEOUS ONCE
Refills: 0 | Status: DISCONTINUED | OUTPATIENT
Start: 2022-08-10 | End: 2022-08-10

## 2022-08-10 RX ORDER — SODIUM ZIRCONIUM CYCLOSILICATE 10 G/10G
10 POWDER, FOR SUSPENSION ORAL ONCE
Refills: 0 | Status: COMPLETED | OUTPATIENT
Start: 2022-08-10 | End: 2022-08-10

## 2022-08-10 RX ORDER — VANCOMYCIN HCL 1 G
1000 VIAL (EA) INTRAVENOUS EVERY 12 HOURS
Refills: 0 | Status: DISCONTINUED | OUTPATIENT
Start: 2022-08-10 | End: 2022-08-10

## 2022-08-10 RX ORDER — DEXTROSE 50 % IN WATER 50 %
25 SYRINGE (ML) INTRAVENOUS ONCE
Refills: 0 | Status: COMPLETED | OUTPATIENT
Start: 2022-08-10 | End: 2022-08-10

## 2022-08-10 RX ORDER — MIDODRINE HYDROCHLORIDE 2.5 MG/1
10 TABLET ORAL
Refills: 0 | Status: DISCONTINUED | OUTPATIENT
Start: 2022-08-10 | End: 2022-08-12

## 2022-08-10 RX ORDER — SODIUM BICARBONATE 1 MEQ/ML
50 SYRINGE (ML) INTRAVENOUS ONCE
Refills: 0 | Status: COMPLETED | OUTPATIENT
Start: 2022-08-10 | End: 2022-08-10

## 2022-08-10 RX ORDER — VANCOMYCIN HCL 1 G
1000 VIAL (EA) INTRAVENOUS ONCE
Refills: 0 | Status: DISCONTINUED | OUTPATIENT
Start: 2022-08-10 | End: 2022-08-10

## 2022-08-10 RX ORDER — OCTREOTIDE ACETATE 200 UG/ML
100 INJECTION, SOLUTION INTRAVENOUS; SUBCUTANEOUS EVERY 8 HOURS
Refills: 0 | Status: DISCONTINUED | OUTPATIENT
Start: 2022-08-10 | End: 2022-08-20

## 2022-08-10 RX ORDER — POLYETHYLENE GLYCOL 3350 17 G/17G
17 POWDER, FOR SOLUTION ORAL DAILY
Refills: 0 | Status: DISCONTINUED | OUTPATIENT
Start: 2022-08-10 | End: 2022-08-11

## 2022-08-10 RX ORDER — SENNA PLUS 8.6 MG/1
2 TABLET ORAL AT BEDTIME
Refills: 0 | Status: DISCONTINUED | OUTPATIENT
Start: 2022-08-10 | End: 2022-09-02

## 2022-08-10 RX ORDER — BUMETANIDE 0.25 MG/ML
0.5 INJECTION INTRAMUSCULAR; INTRAVENOUS
Qty: 20 | Refills: 0 | Status: DISCONTINUED | OUTPATIENT
Start: 2022-08-10 | End: 2022-08-10

## 2022-08-10 RX ORDER — CALCIUM GLUCONATE 100 MG/ML
2 VIAL (ML) INTRAVENOUS ONCE
Refills: 0 | Status: COMPLETED | OUTPATIENT
Start: 2022-08-10 | End: 2022-08-10

## 2022-08-10 RX ORDER — SODIUM CHLORIDE 9 MG/ML
1000 INJECTION, SOLUTION INTRAVENOUS
Refills: 0 | Status: DISCONTINUED | OUTPATIENT
Start: 2022-08-10 | End: 2022-08-10

## 2022-08-10 RX ORDER — VANCOMYCIN HCL 1 G
1000 VIAL (EA) INTRAVENOUS ONCE
Refills: 0 | Status: COMPLETED | OUTPATIENT
Start: 2022-08-10 | End: 2022-08-10

## 2022-08-10 RX ORDER — CALCIUM GLUCONATE 100 MG/ML
1 VIAL (ML) INTRAVENOUS ONCE
Refills: 0 | Status: COMPLETED | OUTPATIENT
Start: 2022-08-10 | End: 2022-08-10

## 2022-08-10 RX ORDER — BUMETANIDE 0.25 MG/ML
2 INJECTION INTRAMUSCULAR; INTRAVENOUS ONCE
Refills: 0 | Status: COMPLETED | OUTPATIENT
Start: 2022-08-10 | End: 2022-08-10

## 2022-08-10 RX ORDER — DEXTROSE 50 % IN WATER 50 %
50 SYRINGE (ML) INTRAVENOUS ONCE
Refills: 0 | Status: DISCONTINUED | OUTPATIENT
Start: 2022-08-10 | End: 2022-08-10

## 2022-08-10 RX ADMIN — SODIUM CHLORIDE 30 MILLILITER(S): 9 INJECTION INTRAMUSCULAR; INTRAVENOUS; SUBCUTANEOUS at 07:34

## 2022-08-10 RX ADMIN — BUMETANIDE 2 MILLIGRAM(S): 0.25 INJECTION INTRAMUSCULAR; INTRAVENOUS at 10:03

## 2022-08-10 RX ADMIN — BUMETANIDE 2.5 MG/HR: 0.25 INJECTION INTRAMUSCULAR; INTRAVENOUS at 11:10

## 2022-08-10 RX ADMIN — PIPERACILLIN AND TAZOBACTAM 25 GRAM(S): 4; .5 INJECTION, POWDER, LYOPHILIZED, FOR SOLUTION INTRAVENOUS at 22:24

## 2022-08-10 RX ADMIN — OCTREOTIDE ACETATE 100 MICROGRAM(S): 200 INJECTION, SOLUTION INTRAVENOUS; SUBCUTANEOUS at 17:00

## 2022-08-10 RX ADMIN — Medication 50 MILLILITER(S): at 23:59

## 2022-08-10 RX ADMIN — Medication 250 MILLIGRAM(S): at 10:06

## 2022-08-10 RX ADMIN — HYDROMORPHONE HYDROCHLORIDE 0.5 MILLIGRAM(S): 2 INJECTION INTRAMUSCULAR; INTRAVENOUS; SUBCUTANEOUS at 10:45

## 2022-08-10 RX ADMIN — MIDODRINE HYDROCHLORIDE 10 MILLIGRAM(S): 2.5 TABLET ORAL at 14:31

## 2022-08-10 RX ADMIN — Medication 50 MILLIEQUIVALENT(S): at 09:41

## 2022-08-10 RX ADMIN — OCTREOTIDE ACETATE 100 MICROGRAM(S): 200 INJECTION, SOLUTION INTRAVENOUS; SUBCUTANEOUS at 10:16

## 2022-08-10 RX ADMIN — SODIUM ZIRCONIUM CYCLOSILICATE 10 GRAM(S): 10 POWDER, FOR SUSPENSION ORAL at 10:19

## 2022-08-10 RX ADMIN — Medication 25 GRAM(S): at 07:34

## 2022-08-10 RX ADMIN — SODIUM ZIRCONIUM CYCLOSILICATE 10 GRAM(S): 10 POWDER, FOR SUSPENSION ORAL at 16:42

## 2022-08-10 RX ADMIN — Medication 50 MILLILITER(S): at 15:05

## 2022-08-10 RX ADMIN — Medication 200 GRAM(S): at 02:17

## 2022-08-10 RX ADMIN — MIDODRINE HYDROCHLORIDE 10 MILLIGRAM(S): 2.5 TABLET ORAL at 10:09

## 2022-08-10 RX ADMIN — CHLORHEXIDINE GLUCONATE 1 APPLICATION(S): 213 SOLUTION TOPICAL at 07:03

## 2022-08-10 RX ADMIN — SENNA PLUS 2 TABLET(S): 8.6 TABLET ORAL at 22:24

## 2022-08-10 RX ADMIN — BUMETANIDE 2.5 MG/HR: 0.25 INJECTION INTRAMUSCULAR; INTRAVENOUS at 10:52

## 2022-08-10 RX ADMIN — MIDODRINE HYDROCHLORIDE 10 MILLIGRAM(S): 2.5 TABLET ORAL at 22:24

## 2022-08-10 RX ADMIN — HYDROMORPHONE HYDROCHLORIDE 0.5 MILLIGRAM(S): 2 INJECTION INTRAMUSCULAR; INTRAVENOUS; SUBCUTANEOUS at 10:30

## 2022-08-10 RX ADMIN — Medication 100 GRAM(S): at 10:04

## 2022-08-10 RX ADMIN — PIPERACILLIN AND TAZOBACTAM 25 GRAM(S): 4; .5 INJECTION, POWDER, LYOPHILIZED, FOR SOLUTION INTRAVENOUS at 10:10

## 2022-08-10 RX ADMIN — Medication 50 MILLILITER(S): at 07:46

## 2022-08-10 NOTE — PROGRESS NOTE ADULT - ASSESSMENT
67 y/o M PMHx cholangitis/cholecystitis (s/p ERCP w/ biliary stent placement 04/2022) and recently diagnosed etOH cirrhosis, presents with RUQ pain following biliary stent removal on 7/20/22. Found to have distended GB with wall thickening and stone in cystic duct, concerning for cholecystitis. Admitted to SICU for monitoring, s/p perc rodney and paracentesis by IR on 8/9. Paracentesis results consistent with SBP. Course now c/b ARF.     Impression:  #Cholecystitis - clinical findings and CT compatible with cholecystitis. No CBD dilation on CT to suggest cholangitis  #SBP - ascitic fluid consistent with SBP  #Leukocytosis - in setting of above    Recs:  - c/w Zosyn 3.375G IV q12H for cholecystitis   - monitor renal function, will adjust abx dosing as needed  - f/u bile culture, ascites culture, blood cultures  - trend leukocytosis      Oliver Knight MD  Infectious Disease Fellow  Available on [employer] Teams  Before 9AM or after 5PM: [phone]  67 y/o M PMHx cholangitis/cholecystitis (s/p ERCP w/ biliary stent placement 04/2022) and recently diagnosed etOH cirrhosis, presents with RUQ pain following biliary stent removal on 7/20/22. Found to have distended GB with wall thickening and stone in cystic duct, concerning for cholecystitis. Admitted to SICU for monitoring, s/p perc rodney and paracentesis by IR on 8/9. Paracentesis results consistent with SBP. Course now c/b ARF.     Impression:  #Peritonitis   #Cholecystitis - clinical findings and CT compatible with cholecystitis. No CBD dilation on CT to suggest cholangitis  #SBP - ascitic fluid consistent with SBP  #Leukocytosis - in setting of above    Recs:  - c/w Zosyn 3.375G IV q12H for cholecystitis   - monitor renal function, will adjust abx dosing as needed  - f/u bile culture, ascites culture, blood cultures  - trend leukocytosis      Oliver Knight MD  Infectious Disease Fellow  Available on [employer] Teams  Before 9AM or after 5PM: [phone]

## 2022-08-10 NOTE — PROGRESS NOTE ADULT - SUBJECTIVE AND OBJECTIVE BOX
[facility] Division of Kidney Diseases & Hypertension  FOLLOW UP NOTE  [phone]--------------------------------------------------------------------------------  Chief Complaint:Acute cholecystitis      HPI: 66y Male with history of recently diagnosed cirrhosis and cholangitis with septic shock with E. coli bacteremia 2/2 acute cholecystitis  s/p ERCP with stent placement (4/2022) s/p stent removal on 7/20/22 presenting for one week of RUQ abdominal pain. CT showed a distended gallbladder with stones in the cystic duct and gallbladder wall thickening, suggesting chronic cholecystitis; cirrhosis with portal hypertension, moderate ascites with diffuse peritoneal enhancement concerning for SBP, small and large bowel thickening suggesting hepatic enterocolopathy. Nephrology called for ROBBIE, severe metabolic acidosis, hyponatremia and hyperkalemia.  K was shifted with D50 and insulin, and given 10g Lokelma. He received 2L NS bolus for tachycardia and elevated lactate to 9.  He was transferred to SICU for further management. IR consult for perc rodney and diagnostic paracentesis.  Anuric despite albumin administration & 2L NS. Now on 150 mEq bicarb in D5. Baseline SCr is 0.7 on 7/26. SCr on arrival is 1.9 on 8/8 & trending up to 3 today.  SBP in 90's since 8/8. Of note, pt had an episode of ROBBIE back in April attributed to septic shock with E. coli bacteremia 2/2 acute cholecystitis and UTI when SCr peaked to 2.2 & trended down to 1.1 on discharge 4/17/22 with IVF & treating sepsis.  cc in 24 hrs with UOP dropping to 5-15 cc/hr in the last few hours. c/o RUQ pain. No sob, light headedness/dizziness, difficulty breathing/cough, n/v, diarrhea, dysuria or hematuria      Pt seen & examined. IR performed percutaneous cholecystectomy and paracentesis with purulent fluid drained. IV albumin & bumex but with minor response. UOP 75cc in 24 hrs. Started on bumex gtt 0.5 mg/hr. Offers no uremic symptoms.           PAST HISTORY  --------------------------------------------------------------------------------  No significant changes to PMH, PSH, FHx, SHx, unless otherwise noted    ALLERGIES & MEDICATIONS  --------------------------------------------------------------------------------  Allergies    No Known Allergies    Intolerances      Standing Inpatient Medications  albumin human 25% IVPB 100 milliLiter(s) IV Intermittent every 8 hours  buMETAnide Infusion 0.5 mG/Hr IV Continuous <Continuous>  buMETAnide Injectable 2 milliGRAM(s) IV Push once  calcium gluconate IVPB 1 Gram(s) IV Intermittent once  chlorhexidine 2% Cloths 1 Application(s) Topical <User Schedule>  dextrose 10% + sodium chloride 0.9%. 1000 milliLiter(s) IV Continuous <Continuous>  midodrine. 10 milliGRAM(s) Oral <User Schedule>  octreotide  Injectable 100 MICROGram(s) IV Push every 8 hours  piperacillin/tazobactam IVPB.. 3.375 Gram(s) IV Intermittent every 12 hours  sodium bicarbonate  Injectable 50 milliEquivalent(s) IV Push once  sodium zirconium cyclosilicate 10 Gram(s) Oral once  vancomycin  IVPB 1000 milliGRAM(s) IV Intermittent every 12 hours    PRN Inpatient Medications      REVIEW OF SYSTEMS  --------------------------------------------------------------------------------  Gen: No chills  Respiratory: No dyspnea, cough  CV: No chest pain  GI: + abdominal pain, diarrhea,  nausea, vomiting  : No increased frequency, dysuria, hematuria  MSK:  no edema  Neuro: No dizziness/lightheadedness      All other systems were reviewed and are negative, except as noted.    VITALS/PHYSICAL EXAM  --------------------------------------------------------------------------------  T(C): 36.1 (08-10-22 @ 07:00), Max: 36.7 (08-10-22 @ 03:00)  HR: 99 (08-10-22 @ 09:00) (82 - 104)  BP: 111/63 (08-10-22 @ 09:00) (97/53 - 119/59)  RR: 22 (08-10-22 @ 09:00) (12 - 25)  SpO2: 95% (08-10-22 @ 09:00) (91% - 95%)  Wt(kg): --  Height (cm): 157.5 (08-09-22 @ 14:59)  Weight (kg): 72.6 (08-09-22 @ 14:59)  BMI (kg/m2): 29.3 (08-09-22 @ 14:59)  BSA (m2): 1.74 (08-09-22 @ 14:59)      08-09-22 @ 07:01  -  08-10-22 @ 07:00  --------------------------------------------------------  IN: 1660 mL / OUT: 130 mL / NET: 1530 mL    08-10-22 @ 07:01  -  08-10-22 @ 09:24  --------------------------------------------------------  IN: 160 mL / OUT: 5 mL / NET: 155 mL      Physical Exam:  	Gen: NAD  	HEENT: Anicteric, MMM, no JVD  	Pulm: CTA B/L  	CV: S1S2, no rub  	Abd: Soft, +BS, RUQ drain with SS fluid,  +fluid wave          No presacral edema  	Ext: No LE edema B/L, no asterixis  	Neuro: Awake, alert  	Skin: Warm and dry  	Vascular access:              +Tobin with minimal urine        LABS/STUDIES  --------------------------------------------------------------------------------              9.8    26.21 >-----------<  132      [08-10-22 @ 07:29]              28.7     124  |  86  |  73  ----------------------------<  60      [08-10-22 @ 07:25]  5.4   |  22  |  4.43        Ca     8.9     [08-10-22 @ 07:25]      Mg     2.5     [08-10-22 @ 07:25]      Phos  5.5     [08-10-22 @ 07:25]    TPro  6.2  /  Alb  3.4  /  TBili  4.0  /  DBili  2.4  /  AST  40  /  ALT  19  /  AlkPhos  104  [08-10-22 @ 07:25]    PT/INR: PT 25.1 , INR 2.17       [08-10-22 @ 07:29]  PTT: 46.7       [08-10-22 @ 07:29]      Creatinine Trend:  SCr 4.43 [08-10 @ 07:25]  SCr 3.81 [08-10 @ 01:18]  SCr 3.78 [08-09 @ 18:49]  SCr 3.51 [08-09 @ 13:16]  SCr 3.00 [08-09 @ 06:03]    Urinalysis - [08-08-22 @ 10:19]      Color Dark Yellow / Appearance Slightly Turbid / SG >1.050 / pH 5.5      Gluc Negative / Ketone Trace  / Bili Small / Urobili Negative       Blood Negative / Protein 30 mg/dL / Leuk Est Small / Nitrite Negative      RBC 2 / WBC 18 / Hyaline 30 / Gran  / Sq Epi  / Non Sq Epi 3 / Bacteria Many    Urine Creatinine 155      [08-09-22 @ 18:53]  Urine Protein 124      [08-09-22 @ 18:53]  Urine Sodium 9      [08-08-22 @ 10:19]  Urine Chloride <20      [08-08-22 @ 10:19]  Urine Osmolality 335      [08-08-22 @ 10:19]

## 2022-08-10 NOTE — PROGRESS NOTE ADULT - ATTENDING COMMENTS
66y Male with history of recently diagnosed cirrhosis and cholangitis s/p ERCP with stent placement (4/2022) s/p stent removal on 7/20/22 presenting for one week of RUQ abdominal pain found to have chronic cholecystitis; cirrhosis with portal hypertension, moderate ascites with diffuse peritoneal enhancement concerning for SBP, small and large bowel thickening suggesting hepatic enterocolopathy. Nephrology called for ROBBIE, severe metabolic acidosis, hyponatremia and hyperkalemia. Remains oliguric, so SOB, no uremic symptoms.  OOB in chaire alert and conversant today    1. ARF--likely ATN multifactorial including sepsis, radiocontrast.  Persistently elevated lactate although improving.  OLIGURIC.  Consent obtained from pt fully oriented and conversant.  If unable to consent at time RRT required we will review with wife.  Overall volume restrict.  Colloid> crystalloid.  He has NO absolute renal replacement rx.  If needed would probably opt for CRRT>HD but improved hemodynamics may over time change this calculsus.  Agree with diuretic trial.  Absolute RRT indfications include respiratory distress severe enough to require intubation  2.  Lactic acidosis--hemodynamic optimization, no aggressive volume repletion.  Echo reviewed and is appropriately hyperdynamic but NO reduced EF.    3.  Hyponatremia--minimal free water clearance with 1. LOOP diuretic, modest 3% repletion If trends <120 would initiate 3% @30cc/hr.  for 10 hrs with goal no >6mEq/24hrsremoved  4.  Ascites--for IR.  Continue albumin infusion and increase if >2L     discussed with team, intensivist    Cristhian Jacob MD  contact via teams

## 2022-08-10 NOTE — PROGRESS NOTE ADULT - SUBJECTIVE AND OBJECTIVE BOX
SURGERY DAILY PROGRESS NOTE    --------------- OVERNIGHT ---------------  - During the day IR performed percutaneous cholecystectomy and paracentesis.   - Poor urine output despite albumin doses in the day. Received a dose on bumex and mildly responded. ON given more albumin.   - Received Vitamin K during the day.   - Started on CLD ON    --------------- SUBJECTIVE ---------------    - Denies CP, SOB, n/v, abdomina pain   - Patient seen and examined at bedside with surgical team.      --------------- OBJECTIVE ---------------    -----VITALS-----  T(C): 36.4, Max: 36.4 (08-09)  HR: 86 (82 - 104)  BP: 108/56 (97/53 - 119/59)  RR: 15 (12 - 25)  SpO2: 95% (91% - 95%) room air        -----PHYSICAL EXAM-----  GENERAL: NAD, lying in bed   NEURO: AOx3, awake alert appropriate  HEENT: NCAT, trachea midline  PULM: Respirations non-labored  ABD: Soft, non-tender, non-distended, no peritonitis/rebound tenderness  EXT: Warm, well perfused    -----DRAINS-----  GRICELDA:      Chest Tube:      NG Tube:       -----INs & OUTs-----    08-08 - 08-09  --------------------------------------------------------  IN:    IV PiggyBack: 200 mL    IV PiggyBack: 950 mL    IV PiggyBack: 350 mL    Plasma: 600 mL    Sodium Bicarbonate: 2200 mL    sodium chloride 0.9%: 125 mL  Total IN: 4425 mL    OUT:    Indwelling Catheter - Urethral (mL): 350 mL  Total OUT: 350 mL      08-09  -  08-10  --------------------------------------------------------  IN:    IV PiggyBack: 100 mL    IV PiggyBack: 200 mL    IV PiggyBack: 150 mL    IV PiggyBack: 200 mL    Sodium Bicarbonate: 200 mL    sodium chloride 0.9%: 250 mL    sodium chloride 0.9%: 360 mL  Total IN: 1460 mL    OUT:    Bumetanide: 0 mL    Indwelling Catheter - Urethral (mL): 75 mL    sodium chloride 2%: 0 mL  Total OUT: 75 mL          -----MEDICATIONS-----  STANDING  albumin human 25% IVPB 100 milliLiter(s) IV Intermittent every 8 hours  chlorhexidine 2% Cloths 1 Application(s) Topical <User Schedule>  piperacillin/tazobactam IVPB.. 3.375 Gram(s) IV Intermittent every 12 hours  sodium chloride 0.9%. 1000 milliLiter(s) (30 mL/Hr) IV Continuous <Continuous>    PRN      -----LABS-----  127<L>  |  90<L>  |  65<H>  ----------------------------<  66<L>    (08-10)  4.9   |  22  |  3.81<H>            Ca    7.8<L>      08-10  Mg    2.2  Phos  4.4          PT: 26.0 sec     08-10  aPTT: 47.3 sec   INR: 2.22 ratio             Culture - Body Fluid with Gram Stain (collected 08-09)  Source: Bile Bile Fluid  Gram Stain (08-10):    polymorphonuclear leukocytes seen    Gram Negative Rods seen    Gram positive cocci in pairs seen    by cytocentrifuge        --------------- ASSESSMENT ---------------  66M PMH cirrhosis (MELD 30), cholangitis s/p ERCP with stent placement (4/2022) s/p stent removal on 7/20 presenting for one week of RUQ abdominal pain with clinical exam, labs and radiographic findings meeting [place] Criteria grade III for severe acute cholangitis. s/p perc rodney and diagnostic paracentesis    --------------- PLAN ---------------  - NPO/IVF  - Zosyn  - Trend electrolytes, f/u sodium    -f/u blood and urine cultures   -continue milian placed by    -care and management per SICU     ACS/Trauma Surgery  [id number]   SURGERY DAILY PROGRESS NOTE    --------------- OVERNIGHT ---------------  - During the day IR performed percutaneous cholecystectomy and paracentesis.   - Poor urine output despite albumin doses in the day. Received a dose on bumex and mildly responded. ON given more albumin.   - Received Vitamin K during the day.   - Started on CLD ON    --------------- SUBJECTIVE ---------------    - Denies CP, SOB, n/v, abdomina pain   - Patient seen and examined at bedside with surgical team.      --------------- OBJECTIVE ---------------    -----VITALS-----  T(C): 36.4, Max: 36.4 (08-09)  HR: 86 (82 - 104)  BP: 108/56 (97/53 - 119/59)  RR: 15 (12 - 25)  SpO2: 95% (91% - 95%) room air        -----PHYSICAL EXAM-----  GENERAL: NAD, lying in bed   NEURO: AOx3, awake alert appropriate  HEENT: NCAT, trachea midline  PULM: Respirations non-labored  ABD: Soft, non-tender, non-distended, no peritonitis/rebound tenderness, R perc rodney (bile producing)  EXT: Warm, well perfused    -----DRAINS-----  GRICELDA:      Chest Tube:      NG Tube:       -----INs & OUTs-----    08-08 - 08-09  --------------------------------------------------------  IN:    IV PiggyBack: 200 mL    IV PiggyBack: 950 mL    IV PiggyBack: 350 mL    Plasma: 600 mL    Sodium Bicarbonate: 2200 mL    sodium chloride 0.9%: 125 mL  Total IN: 4425 mL    OUT:    Indwelling Catheter - Urethral (mL): 350 mL  Total OUT: 350 mL      08-09  -  08-10  --------------------------------------------------------  IN:    IV PiggyBack: 100 mL    IV PiggyBack: 200 mL    IV PiggyBack: 150 mL    IV PiggyBack: 200 mL    Sodium Bicarbonate: 200 mL    sodium chloride 0.9%: 250 mL    sodium chloride 0.9%: 360 mL  Total IN: 1460 mL    OUT:    Bumetanide: 0 mL    Indwelling Catheter - Urethral (mL): 75 mL    sodium chloride 2%: 0 mL  Total OUT: 75 mL          -----MEDICATIONS-----  STANDING  albumin human 25% IVPB 100 milliLiter(s) IV Intermittent every 8 hours  chlorhexidine 2% Cloths 1 Application(s) Topical <User Schedule>  piperacillin/tazobactam IVPB.. 3.375 Gram(s) IV Intermittent every 12 hours  sodium chloride 0.9%. 1000 milliLiter(s) (30 mL/Hr) IV Continuous <Continuous>    PRN      -----LABS-----  127<L>  |  90<L>  |  65<H>  ----------------------------<  66<L>    (08-10)  4.9   |  22  |  3.81<H>            Ca    7.8<L>      08-10  Mg    2.2  Phos  4.4          PT: 26.0 sec     08-10  aPTT: 47.3 sec   INR: 2.22 ratio             Culture - Body Fluid with Gram Stain (collected 08-09)  Source: Bile Bile Fluid  Gram Stain (08-10):    polymorphonuclear leukocytes seen    Gram Negative Rods seen    Gram positive cocci in pairs seen    by cytocentrifuge        --------------- ASSESSMENT ---------------  66M PMH cirrhosis (MELD 30), cholangitis s/p ERCP with stent placement (4/2022) s/p stent removal on 7/20 presenting for one week of RUQ abdominal pain with clinical exam, labs and radiographic findings meeting [place] Criteria grade III for severe acute cholangitis. s/p perc rodney and diagnostic paracentesis    --------------- PLAN ---------------  - NPO/IVF  - Zosyn  - Trend electrolytes, f/u sodium    -f/u blood and urine cultures   -continue milian placed by    -care and management per SICU     ACS/Trauma Surgery  [id number]

## 2022-08-10 NOTE — PROGRESS NOTE ADULT - ASSESSMENT
66y Male pt with cirrhosis and recent cholangitis s/p ERCP stent (4/22) and stent removal (7/20/22) and concern for SBP, cholecystitis. Underwent Percutaneous cholecystectomy and paracentesis by IR. Purulent drainage from perc rodney and paracentesis. Patient with persistent poor urine output since SICU stay    PLAN:   Neurologic: AAO3  - Tylenol as needed for pain  - Follow up ammonia levels in the AM    Respiratory: atelectasis  - CXR  - Incentive spirometry, chest PT    Cardiovascular: sinus tachycardia likely 2/2 sepsis  - Fluid resuscitate as needed  - Trend lactate; Lactate improving    Gastrointestinal/Nutrition: cirrhosis (MELD 34), concern for cholecystitis and/or SBP   - CLD  - GI - not concerned for cholangitis due to no dilation of bile ducts  - IR performed perc rodney and diagnostic paracentesis. Found with purulent drainage from perc rodney and paracentesis. Sent to lab for cultures. Ascites most likely exudative.   - Hepatology and transplant consults placed    Genitourinary/Renal: ROBBIE and hyperkalemia s/p shifting& Lokelma, urethral stricture, severe metabolic acidosis. Most likely pre renal ROBBIE from sepsis vs hepatorenal  - Urology for Tobin placement, strict I&Os-> persistent poor output and worsening prerenal robbie. ON given more albumin.    - q4 BMP to check K+  - Renal US performed, unremarkable      Hematologic: coagulopathy of liver failure/ sepsis  - Send type & screen x 2  - Worsening INR in the day. Received a dose of vitamin k.   - Send TEG-> WNL  - Hold chemical VTE ppx  - Venodynes    Infectious Disease: sepsis  - Blood cultures sent  - +UA, send Urine culture  - Empiric Zosyn  - Will send paracentesis cultures s/p intervention to assess for SBP.    Endocrine: no acute issues  - Monitor glucose on BMP    Disposition: SICU   66y Male pt with cirrhosis and recent cholangitis s/p ERCP stent (4/22) and stent removal (7/20/22) and concern for SBP, cholecystitis. Underwent Percutaneous cholecystectomy and paracentesis by IR. Purulent drainage from perc rodney and paracentesis. Patient with persistent poor urine output since SICU stay    PLAN:   Neurologic: AAO3  - PRN pain control    Respiratory: atelectasis  - CXR  - Incentive spirometry, chest PT    Cardiovascular: sinus tachycardia likely 2/2 sepsis  - Fluid resuscitate as needed  - Trend lactate; Lactate improving    Gastrointestinal/Nutrition: cirrhosis (MELD 34), concern for cholecystitis and/or SBP   - CLD  - GI - not concerned for cholangitis due to no dilation of bile ducts  - IR performed perc rodney and diagnostic paracentesis. Found with purulent drainage from perc rodney and paracentesis. Sent to lab for cultures. Ascites most likely exudative.   - Hepatology and transplant consults placed  -CT A/P non contrast to evaluate for possible gallbladder perforation    Genitourinary/Renal: ROBBIE and hyperkalemia s/p shifting& Lokelma, urethral stricture, severe metabolic acidosis. Most likely pre renal ROBBIE from sepsis vs hepatorenal  -started on D510 w/ 2% NS at 30cc/hr  - Urology for Tobin placement, strict I&Os-> persistent poor output and worsening prerenal robbie. ON given more albumin.    -2mg Bumex IV push; start Bumex gtt 0.5mg/hour for 10hours  -start 100 mg octreotide Q8, 10mg midrodine Q8  -urine lytes to eval FeNa for prerenal vs intrarenal failure  - q4 BMP to check K+  -one dose lokelma for hyperkalemia   - Renal US performed, unremarkable      Hematologic: coagulopathy of liver failure/ sepsis  - Send type & screen x 2  - Worsening INR in the day. Received a dose of vitamin k.   - Send TEG-> WNL  - Hold chemical VTE ppx  - Venodynes    Infectious Disease: sepsis  - Blood cultures sent  - +UA, send Urine culture  - Empiric Zosyn  -one dose vancomycin   - Will send paracentesis cultures s/p intervention to assess for SBP.    Endocrine: no acute issues  - Monitor glucose on BMP    Disposition: SICU

## 2022-08-10 NOTE — PROGRESS NOTE ADULT - PROBLEM SELECTOR PLAN 1
ROBBIE likely ATN from severe sepsis & hypotension vs HRS. Contrast administration likely contributed     Baseline SCr is 0.7 on 7/26. SCr on arrival is 1.9 on 8/8 & trending up to 3 today.  SBP in 90's since 8/8. Of note, pt had an episode of ROBBIE back in April attributed to septic shock with E. coli bacteremia 2/2 acute cholecystitis and UTI when SCr peaked to 2.2 & trended down to 1.1 on discharge 4/17/22 with IVF & treating sepsis. UOP dropped from 350cc to 100cc in 24 hrs with UOP dropping to 5 cc/hr in the last few hours.  Anuric despite albumin & bumex IVP administration.      UA with high sp gravity (from IV contrast), 30 mg /dl of proteinuria, pyuria, +LE. Check Ucx & spot urine TP/CR. Urine electrolytes suggesting pre renal etiology such as HRS. Check Renal US when stable. Await paracentesis by IR. Agree with IV albumin for now. Recommend possible octreotide & midodrine/vasopressin for splanchnic vasoconstriction, although cannot give vaso incase of tachycardia.  Will need to consider HD if renal failure continues to worsen. Monitor labs and urine output. Avoid NSAIDs, ACEI/ARBS, RCA and nephrotoxins. Dose medications as per eGFR. ROBBIE likely ATN from severe sepsis & hypotension vs HRS. Contrast administration likely contributed     Baseline SCr is 0.7 on 7/26. SCr on arrival is 1.9 on 8/8 & trending up to 3 today.  SBP in 90's since 8/8. Of note, pt had an episode of ROBBIE back in April attributed to septic shock with E. coli bacteremia 2/2 acute cholecystitis and UTI when SCr peaked to 2.2 & trended down to 1.1 on discharge 4/17/22 with IVF & treating sepsis. UOP dropped from 350cc to 100cc in 24 hrs with UOP dropping to 5 cc/hr in the last few hours.  Anuric despite albumin & bumex IVP administration. Now started on bumex gtt 0.5 mg/hr    s/p percutaneous cholecystectomy and paracentesis. f/u cultures. UA with high sp gravity (from IV contrast), 30 mg /dl of proteinuria, pyuria, +LE. Check Ucx. spot urine TP/CR 0.8 g/g non nephrotic. Urine electrolytes suggesting pre renal etiology such as seen in volume depletion vs HRS. Check Renal US when stable.  Agree with IV albumin & diuretics for now. Recommend possible octreotide & midodrine/vasopressin for splanchnic vasoconstriction, although cannot give vaso incase of tachycardia. Hyperkalemia & hyponatremia 2/2 worsening kidney function.  Currently remains anuric. Will start CVVHDF today if hyperkalemia worsens. Lokelma & bumex gtt ordered by SICU. Consent obtained. Abx per primary team. Monitor labs and urine output. Avoid NSAIDs, ACEI/ARBS, RCA and nephrotoxins. Dose medications as per eGFR.

## 2022-08-10 NOTE — PROGRESS NOTE ADULT - PROBLEM SELECTOR PLAN 3
Initially with AGMA likely from lactic acidosis & renal failure    Anion gap lower that it should be due to hypoalbuminemia  Lactic acid trending down. Suggest to continue crystalloids, trend lactate & treat sepsis   ROBBIE management as above  Now pH 7.43 & serum bicarb is 27, currently with primary metabolic alkalosis due to bicarb supplementation.  Would hold bicarb gtt          If you have any questions, please feel free to contact me  Brunilda Manning  Nephrology Fellow  Pager [place]: [phone]/ [facility]: [phone]    (After 5 pm or on weekends please page the on-call fellow, can check [email or web address] for schedule. Login is amy augustine, schedule under [facility] medicine, psych, derm). likely from lactic acidosis & renal failure    Anion gap is lower than it should be due to hypoalbuminemia  Lactic acid trending down. Suggest to continue colloids, trend lactate & treat sepsis   ROBBIE management as above            If you have any questions, please feel free to contact me  Brunilda Manning  Nephrology Fellow  Pager NS: [phone]/ [facility]: [phone]    (After 5 pm or on weekends please page the on-call fellow, can check [email or web address] for schedule. Login is amy augustine, schedule under [facility] medicine, psych, derm).

## 2022-08-10 NOTE — PROGRESS NOTE ADULT - ASSESSMENT
66y Male s/p perc rodney and paracentesis 8/9, doing well    -f/u cultures  -monitor vitals  -c/w abx  -care per SICU    Thang Gomez MD  PGY-V, Interventional Radiology    For EMERGENT inquiries/questions:  [facility]-[phone]  [facility]-p.[phone] ([phone])    Available on [employer] TEAMS for all non-urgent questions  Non-emergent consults: Please place a sunrise order "Consult-Interventional Radiology" with an appropriate callback number.    For questions about scheduling during appropriate work hours, call IR :  [facility]: [phone]  [facility]: [phone]    For outpatient IR booking:  [facility]: [phone]  [facility]: [phone]

## 2022-08-10 NOTE — PROGRESS NOTE ADULT - SUBJECTIVE AND OBJECTIVE BOX
HISTORY:  TESHA STEINER is a 66y Male with history of recently diagnosed cirrhosis and cholangitis s/p ERCP with stent placement (4/2022) s/p stent removal on 7/20/22 presenting for one week of RUQ abdominal pain. States that one week after his stent was removed, he started experiencing RUQ pain, associated with decreased appetite. Denies nausea or vomiting, fevers or chills. Endorses regular normal bowel movements. Presented to the ED on 7/26 for the pain where he had an US performed which showed a distended gallbladder with stones. CBD 5mm. Patient was discharged home.    In the ED, patient tachycardic to 120s, normotensive, satting 94% on RA.  CT showed a distended gallbladder with stones in the cystic duct and gallbladder wall thickening, suggesting chronic cholecystitis; cirrhosis with portal hypertension, moderate ascites with diffuse peritoneal enhancement concerning for SBP, small and large bowel thickening suggesting hepatic enterocolopathy. In ED, pt noted to have ROBBIE with hyponatremia and hyperkalemia which was shifted with D50 and insulin, and given 10g Lokelma. He received 2L NS bolus for tachycardia and elevated lactate to 9, and severe metabolic acidosis/  He was transferred to SICU for further management.    24 HOUR EVENTS:  - During the day IR performed percutaneous cholecystectomy and paracentesis.   - Poor urine output despite albumin doses in the day. Received a dose on bumex and mildly responded. ON given more albumin.   - Received Vitamin K during the day.   - Started on CLD ON    NEURO  Exam: AAO3  Meds:     RESPIRATORY  RR: 25 (08-10-22 @ 02:00) (12 - 25)  SpO2: 93% (08-10-22 @ 02:00) (91% - 95%)  Wt(kg): --  Exam:  Mechanical Ventilation:   ABG - ( 08 Aug 2022 15:59 )  pH: x     /  pCO2: x     /  pO2: x     / HCO3: x     / Base Excess: x     /  SaO2: x       Lactate: 7.6              Meds:     CARDIOVASCULAR  HR: 82 (08-10-22 @ 02:00) (82 - 104)  BP: 100/59 (08-10-22 @ 02:00) (97/53 - 122/67)  BP(mean): 76 (08-10-22 @ 02:00) (70 - 89)  ABP: --  ABP(mean): --  Wt(kg): --  CVP(cm H2O): --  VBG - ( 10 Aug 2022 00:52 )  pH: 7.38  /  pCO2: 36    /  pO2: 46    / HCO3: 21    / Base Excess: -3.4  /  SaO2: 75.7   Lactate: 2.6                Exam:   Cardiac Rhythm: RRR  Perfusion     []Adequate   [x]Inadequate  Mentation   [x]Normal       [ ]Reduced  Extremities  [x]Warm         [ ]Cool  Volume Status [ ]Hypervolemic [ ]Euvolemic [x]Hypovolemic  Meds:     GI/NUTRITION  Exam: soft, nonttp. perc tube in place  Diet: CLD  Meds:     GENITOURINARY  I&O's Detail    08-08 @ 07:01  -  08-09 @ 07:00  --------------------------------------------------------  IN:    IV PiggyBack: 200 mL    IV PiggyBack: 950 mL    IV PiggyBack: 350 mL    Plasma: 600 mL    Sodium Bicarbonate: 2200 mL    sodium chloride 0.9%: 125 mL  Total IN: 4425 mL    OUT:    Indwelling Catheter - Urethral (mL): 350 mL  Total OUT: 350 mL    Total NET: 4075 mL      08-09 @ 07:01  -  08-10 @ 03:43  --------------------------------------------------------  IN:    IV PiggyBack: 100 mL    IV PiggyBack: 100 mL    IV PiggyBack: 150 mL    IV PiggyBack: 200 mL    Sodium Bicarbonate: 200 mL    sodium chloride 0.9%: 300 mL    sodium chloride 0.9%: 250 mL  Total IN: 1300 mL    OUT:    Bumetanide: 0 mL    Indwelling Catheter - Urethral (mL): 75 mL    sodium chloride 2%: 0 mL  Total OUT: 75 mL    Total NET: 1225 mL        Weight (kg): 72.6 (08-09 @ 14:59)  08-10    127<L>  |  90<L>  |  65<H>  ----------------------------<  66<L>  4.9   |  22  |  3.81<H>    Ca    7.8<L>      10 Aug 2022 01:18  Phos  4.4     08-10  Mg     2.2     08-10    TPro  5.3<L>  /  Alb  3.0<L>  /  TBili  3.1<H>  /  DBili  2.0<H>  /  AST  33  /  ALT  18  /  AlkPhos  83  08-10    Meds: albumin human 25% IVPB 100 milliLiter(s) IV Intermittent every 8 hours  sodium chloride 0.9%. 1000 milliLiter(s) IV Continuous <Continuous>      HEMATOLOGIC  Meds:                         9.4    19.74 )-----------( 112      ( 10 Aug 2022 01:18 )             26.3     PT/INR - ( 10 Aug 2022 01:18 )   PT: 26.0 sec;   INR: 2.22 ratio         PTT - ( 10 Aug 2022 01:18 )  PTT:47.3 sec    INFECTIOUS DISEASES  T(C): 36.4 (08-09-22 @ 23:00), Max: 36.4 (08-09-22 @ 23:00)  Wt(kg): --  WBC Count: 19.74 K/uL (08-10 @ 01:18)  WBC Count: 16.28 K/uL (08-09 @ 18:49)  WBC Count: 18.21 K/uL (08-09 @ 13:16)  WBC Count: 12.87 K/uL (08-09 @ 06:03)    Recent Cultures:  Specimen Source: Bile Bile Fluid, 08-09 @ 19:01; Results --; Gram Stain:   polymorphonuclear leukocytes seen  Gram Negative Rods seen  Gram positive cocci in pairs seen  by cytocentrifuge; Organism: --  Specimen Source: Catheterized Catheterized, 08-08 @ 12:30; Results   10,000 - 49,000 CFU/mL Escherichia coli; Gram Stain: --; Organism: --  Specimen Source: .Blood Blood-Peripheral, 08-08 @ 07:31; Results   No growth to date.; Gram Stain: --; Organism: --  Specimen Source: .Blood Blood-Peripheral, 08-08 @ 07:15; Results   No growth to date.; Gram Stain: --; Organism: --    Meds: piperacillin/tazobactam IVPB.. 3.375 Gram(s) IV Intermittent every 12 hours      ENDOCRINE  Capillary Blood Glucose    Meds:     ACCESS DEVICES:  [x] Peripheral IV  [ ] Central Venous Line		[ ] R	[ ] L	[ ] IJ	[ ] Fem	[ ] SC	Placed:   [ ] Arterial Line			[ ] R	[ ] L	[ ] Fem	[ ] Rad	[ ] Ax	Placed:   [ ] PICC:					[ ] Mediport  [ ] Urinary Catheter, Date Placed:   [x] Necessity of urinary, arterial, and venous catheters discussed    OTHER MEDICATIONS:  chlorhexidine 2% Cloths 1 Application(s) Topical <User Schedule>

## 2022-08-10 NOTE — PROGRESS NOTE ADULT - ASSESSMENT
66 year old male with decompensated cirrhosis and cholangitis s/p ERCP with stent placement (4/2022) s/p stent removal on 7/20 (along sphincterotomy and stone extraction) presenting for one week of RUQ abdominal pain associated with new abdominal distension and decreased appetite.     #Septic shock 2/2 acute cholecystitis vs ascending cholangitis  #ROBBIE: urine sodium 9, likely indicating prerenal ROBBIE vs less likely HRS  #History of cholangitis s/p biliary stent placement April 2022 and removal 7/20/2022, now s/p perc rodney tube  #Decompensated ALD/PARNELL cirrhosis c/b ascites  EV: normal esophagus on ERCP from 4/2022  Ascites: increase in ascites from mild to moderate on current imaging  SBP: paracentesis 8/9/2022 c/w peritonitis given 99K PMNs however elevated LDH more indicative of secondary bacterial peritonitis  HE: none currently    Recommendations:  -trend clinical symptoms, exam findings, vital signs, CBC, CMP, INR  -weaned off pressors  -s/p percutaneous cholecystostomy as above  -likely secondary bacterial peritonitis based on paracentesis and clinical findings, appreciate recommendations from transplant surgery team  -would favor ATN as likely etiology, however given worsening ROBBIE not unreasonable to initiate midodrine/albumin/octreotide for empiric treatment for HRS-ROBBIE  -would recommend PPI daily for stress prophylaxis    Note incomplete until finalized by attending signature/attestation.    Fadi Churchill  GI/Hepatology Fellow    MONDAY-FRIDAY 8AM-5PM:  Pager# [phone] ([facility]) or [phone] ([facility])    NON-URGENT CONSULTS:  Please email [email or web address]  AT NIGHT AND ON WEEKENDS:  Contact on-call GI fellow via answering service ([phone]) from 5pm-8am and on weekends/holidays

## 2022-08-10 NOTE — PROGRESS NOTE ADULT - ATTENDING COMMENTS
67 yo M with decompensated cirrhosis possibly secondary to ALD/PARNELL (with last reported alcohol in 4/2022) and history of cholangitis s/p ERCP with stent placement (4/2022) with subsequent repeat ERCP with stent removal, sphincterotomy, and balloon sweep removal of sludge/stones (7/20/22), admitted with severe sepsis with associated oliguric ROBBIE and lactic acidosis secondary to acute cholecystitis and peritonitis, now s/p percutaneous cholecystostomy tube placement (8/9) with culture now growing E. coli. Ascitic fluid studies from paracentesis (8/9) most consistent with secondary peritonitis with concern for possible perforated viscus (likely gallbladder) given markedly elevated ascitic fluid LDH. S/p ceftriaxone (8/8) and continuing Zosyn (8/8- ) as per Transplant ID recommendations. Appreciate Nephrology input re: ROBBIE (ATN vs HRS) and hyponatremia. He may need initiation of RRT if worsening but no acute indication today. Mentating normally. Current MELD-Na 35 (ABO O). We will plan to initiate liver transplant evaluation.    Laron Harper M.D., Ph.D.  Transplant Hepatology

## 2022-08-10 NOTE — PROGRESS NOTE ADULT - ATTENDING COMMENTS
Cirrhotic patient presenting with cholecystitis  s/p Perc Ordney Tube and Paracentesis  Paracentesis cell counts suggestive of Peritonitis  Cultures thus far with E coli on Perc Rodney Drainage and Ascites drainage  Agree with Zochristenn for coverage  Will tailor antimicrobials to culture data from blood and perc rodney cultures    I will continue to follow. Please feel free to contact me with any further questions.    Bladimir Nix M.D.  [facility] Division of Infectious Disease  8AM-5PM Monday - Friday: Available on [employer] Teams  After Hours and Holidays (or if no response on Microsoft Teams): Please contact the Infectious Diseases Office at [phone]

## 2022-08-10 NOTE — PROGRESS NOTE ADULT - ATTENDING COMMENTS
66yMale with cirrhosis and recent cholangitis s/p ERCP stent (4/22) and stent removal (7/20/22) and concern for SBP, cholecystitis cholangitis again    Awake and alert  Saturating well on RA, CXR not in overt fluid overload  Tachycardia sec to SIRS resolved  Change diet to renal clears without K  S/P perc cholecystostomy tube placement, ascitic fluid grew GPC in chains possible VRE. SAAg 2.5. Pt on Zosyn. Will give one dose Vanco and follow specification sensitivity  Complex electrolytes imbalance with worsening hyponatremia and hypoglycemia: fluid changed to 2% saline with Dextrose 10%. Serial electrolytes monitoring  Shift hyperkalemia again,  Worsening of ROBBIE, hepatorenal syndrome. Will start Octreotide and Midodrine.  Bumex push and drip for aneuria. Pt is heading towards RRT  Mild improvement in Lactic acidosis  Stable coagulopathy, monitor Hb    Condition critical, prognosis guarded

## 2022-08-10 NOTE — PROGRESS NOTE ADULT - SUBJECTIVE AND OBJECTIVE BOX
Follow Up:  cholecystitis    Interval History/ROS:  Remains anuric  S/p perc rodney and paracentesis yesterday  Ascites fluid with 160k cells (62% neutrophils)    Allergies  No Known Allergies    ANTIMICROBIALS:  piperacillin/tazobactam IVPB.. 3.375 every 12 hours      OTHER MEDS:  MEDICATIONS  (STANDING):  buMETAnide Infusion 0.5 <Continuous>  midodrine. 10 <User Schedule>  octreotide  Injectable 100 every 8 hours      Vital Signs Last 24 Hrs  T(C): 36.1 (10 Aug 2022 11:00), Max: 36.7 (10 Aug 2022 03:00)  T(F): 97 (10 Aug 2022 11:00), Max: 98 (10 Aug 2022 03:00)  HR: 104 (10 Aug 2022 11:00) (82 - 104)  BP: 113/59 (10 Aug 2022 11:00) (97/53 - 119/59)  BP(mean): 80 (10 Aug 2022 11:00) (70 - 83)  RR: 19 (10 Aug 2022 11:00) (12 - 25)  SpO2: 98% (10 Aug 2022 11:00) (91% - 98%)    Parameters below as of 10 Aug 2022 10:00  Patient On (Oxygen Delivery Method): nasal cannula  O2 Flow (L/min): 2      PHYSICAL EXAM:  Constitutional: non-toxic, no distress  HEAD/EYES: anicteric, no conjunctival injection  ENT:  supple, no thrush  Cardiovascular:   +S1, S2  Respiratory:  +BS bilaterally  GI:  RUQ tenderness, distended abdomen  :  no CVA tenderness  Neurologic: awake and alert  Skin:   no erythema, no phlebitis                            9.8    26.21 )-----------( 132      ( 10 Aug 2022 07:29 )             28.7       08-10    124<L>  |  86<L>  |  73<H>  ----------------------------<  60<L>  5.4<H>   |  22  |  4.43<H>    Ca    8.9      10 Aug 2022 07:25  Phos  5.5     08-10  Mg     2.5     08-10    TPro  6.2  /  Alb  3.4  /  TBili  4.0<H>  /  DBili  2.4<H>  /  AST  40  /  ALT  19  /  AlkPhos  104  08-10    MICROBIOLOGY:  Culture - Body Fluid with Gram Stain (collected 09 Aug 2022 19:01)  Source: Bile Bile Fluid  Gram Stain (10 Aug 2022 01:31):    polymorphonuclear leukocytes seen    Gram Negative Rods seen    Gram positive cocci in pairs seen    by cytocentrifuge    Culture - Fungal, Body Fluid (collected 09 Aug 2022 19:01)  Source: Peritoneal Peritoneal Fluid  Preliminary Report (10 Aug 2022 06:49):    Testing in progress    Culture - Body Fluid with Gram Stain (collected 09 Aug 2022 19:01)  Source: Peritoneal Peritoneal Fluid  Gram Stain (10 Aug 2022 05:58):    polymorphonuclear leukocytes seen    Moderate Gram positive cocci in pairs seen    Limited volume of specimen received, Unable to centrifuge/concentrate    specimen. Culture results may be compromised.    Culture - Urine (collected 08 Aug 2022 12:30)  Source: Catheterized Catheterized  Preliminary Report (09 Aug 2022 14:05):    10,000 - 49,000 CFU/mL Escherichia coli    Culture - Blood (collected 08 Aug 2022 07:31)  Source: .Blood Blood-Peripheral  Preliminary Report (09 Aug 2022 12:01):    No growth to date.    Culture - Blood (collected 08 Aug 2022 07:15)  Source: .Blood Blood-Peripheral  Preliminary Report (09 Aug 2022 12:01):    No growth to date.    RADIOLOGY:  < from: US Kidney and Bladder (08.09.22 @ 15:37) >  IMPRESSION:  No evidence of hydronephrosis.  < end of copied text >    < from: CT Abdomen and Pelvis w/ IV Cont (08.08.22 @ 01:25) >  IMPRESSION:  Compared to the recent CT of April 13, 2022, interval increase in   abdominopelvic ascites, from mild to moderate. Moderate fluid in the   subhepatic space. Diffuse peritoneal enhancement. Correlate clinically   for spontaneous bacterial peritonitis (SBP).  Interval removal of biliary stent.  Mildly distended gallbladder with   cholelithiasis and gallbladder wall thickening. A 4 cm stone in this   cystic duct. Findings suggest chronic cholecystitis. Recommend right   upper quadrant ultrasound or HIDA scan for complete evaluation for acute   cholecystitis.  Small and large bowel wall thickening suggesting hepatic enterocolopathy.  Cirrhosis with evidence of portal hypertension. Placement that in   cirrhotic patients, annual surveillance MRI is recommended to evaluate   for hepatocellular carcinoma.  Bilateral lower lobe subsegmental atelectasis.  < end of copied text >   Follow Up:  cholecystitis, peritonitis    Interval History/ROS:  Remains anuric  S/p perc rodney and paracentesis yesterday  Ascites fluid with 160k cells (62% neutrophils)    Allergies  No Known Allergies    ANTIMICROBIALS:  piperacillin/tazobactam IVPB.. 3.375 every 12 hours      OTHER MEDS:  MEDICATIONS  (STANDING):  buMETAnide Infusion 0.5 <Continuous>  midodrine. 10 <User Schedule>  octreotide  Injectable 100 every 8 hours      Vital Signs Last 24 Hrs  T(C): 36.1 (10 Aug 2022 11:00), Max: 36.7 (10 Aug 2022 03:00)  T(F): 97 (10 Aug 2022 11:00), Max: 98 (10 Aug 2022 03:00)  HR: 104 (10 Aug 2022 11:00) (82 - 104)  BP: 113/59 (10 Aug 2022 11:00) (97/53 - 119/59)  BP(mean): 80 (10 Aug 2022 11:00) (70 - 83)  RR: 19 (10 Aug 2022 11:00) (12 - 25)  SpO2: 98% (10 Aug 2022 11:00) (91% - 98%)    Parameters below as of 10 Aug 2022 10:00  Patient On (Oxygen Delivery Method): nasal cannula  O2 Flow (L/min): 2      PHYSICAL EXAM:  Constitutional: non-toxic, no distress  HEAD/EYES: anicteric, no conjunctival injection  ENT:  supple, no thrush  Cardiovascular:   +S1, S2  Respiratory:  +BS bilaterally  GI:  RUQ tenderness, distended abdomen  :  no CVA tenderness  Neurologic: awake and alert  Skin:   no erythema, no phlebitis                            9.8    26.21 )-----------( 132      ( 10 Aug 2022 07:29 )             28.7       08-10    124<L>  |  86<L>  |  73<H>  ----------------------------<  60<L>  5.4<H>   |  22  |  4.43<H>    Ca    8.9      10 Aug 2022 07:25  Phos  5.5     08-10  Mg     2.5     08-10    TPro  6.2  /  Alb  3.4  /  TBili  4.0<H>  /  DBili  2.4<H>  /  AST  40  /  ALT  19  /  AlkPhos  104  08-10    MICROBIOLOGY:  Culture - Body Fluid with Gram Stain (collected 09 Aug 2022 19:01)  Source: Bile Bile Fluid  Gram Stain (10 Aug 2022 01:31):    polymorphonuclear leukocytes seen    Gram Negative Rods seen    Gram positive cocci in pairs seen    by cytocentrifuge    Culture - Fungal, Body Fluid (collected 09 Aug 2022 19:01)  Source: Peritoneal Peritoneal Fluid  Preliminary Report (10 Aug 2022 06:49):    Testing in progress    Culture - Body Fluid with Gram Stain (collected 09 Aug 2022 19:01)  Source: Peritoneal Peritoneal Fluid  Gram Stain (10 Aug 2022 05:58):    polymorphonuclear leukocytes seen    Moderate Gram positive cocci in pairs seen    Limited volume of specimen received, Unable to centrifuge/concentrate    specimen. Culture results may be compromised.    Culture - Urine (collected 08 Aug 2022 12:30)  Source: Catheterized Catheterized  Preliminary Report (09 Aug 2022 14:05):    10,000 - 49,000 CFU/mL Escherichia coli    Culture - Blood (collected 08 Aug 2022 07:31)  Source: .Blood Blood-Peripheral  Preliminary Report (09 Aug 2022 12:01):    No growth to date.    Culture - Blood (collected 08 Aug 2022 07:15)  Source: .Blood Blood-Peripheral  Preliminary Report (09 Aug 2022 12:01):    No growth to date.    RADIOLOGY:    <The imaging below has been reviewed and visualized by me independently. Findings as detailed in report below>    < from: US Kidney and Bladder (08.09.22 @ 15:37) >  IMPRESSION:  No evidence of hydronephrosis.  < end of copied text >    < from: CT Abdomen and Pelvis w/ IV Cont (08.08.22 @ 01:25) >  IMPRESSION:  Compared to the recent CT of April 13, 2022, interval increase in   abdominopelvic ascites, from mild to moderate. Moderate fluid in the   subhepatic space. Diffuse peritoneal enhancement. Correlate clinically   for spontaneous bacterial peritonitis (SBP).  Interval removal of biliary stent.  Mildly distended gallbladder with   cholelithiasis and gallbladder wall thickening. A 4 cm stone in this   cystic duct. Findings suggest chronic cholecystitis. Recommend right   upper quadrant ultrasound or HIDA scan for complete evaluation for acute   cholecystitis.  Small and large bowel wall thickening suggesting hepatic enterocolopathy.  Cirrhosis with evidence of portal hypertension. Placement that in   cirrhotic patients, annual surveillance MRI is recommended to evaluate   for hepatocellular carcinoma.  Bilateral lower lobe subsegmental atelectasis.  < end of copied text >

## 2022-08-10 NOTE — PROGRESS NOTE ADULT - SUBJECTIVE AND OBJECTIVE BOX
Interventional Radiology Progress Note    S: 66y Male with history of recently diagnosed cirrhosis (MELD 30) and cholangitis s/p ERCP with stent placement (4/2022) s/p stent removal on 7/20 presenting for one week of RUQ abdominal pain with clinical exam, labs and radiographic findings meeting [place] Criteria grade III for severe acute cholangitis. Patient is now s/p image guided paracentesis and image guided percutaneous cholecystostomy tube placement 8/9. patient seen and examined at bedside. patient reports feeling well without complaints. patient remains HD stable.     O:  Vital Signs Last 24 Hrs  T(C): 36.1 (10 Aug 2022 07:00), Max: 36.7 (10 Aug 2022 03:00)  T(F): 97 (10 Aug 2022 07:00), Max: 98 (10 Aug 2022 03:00)  HR: 99 (10 Aug 2022 08:00) (82 - 104)  BP: 116/62 (10 Aug 2022 08:00) (97/53 - 119/59)  BP(mean): 83 (10 Aug 2022 08:00) (70 - 83)  RR: 19 (10 Aug 2022 08:00) (12 - 25)  SpO2: 95% (10 Aug 2022 08:00) (91% - 95%)    Parameters below as of 10 Aug 2022 07:00  Patient On (Oxygen Delivery Method): room air        I&O's Detail    09 Aug 2022 07:01  -  10 Aug 2022 07:00  --------------------------------------------------------  IN:    Albumin 25%  -  50 mL: 50 mL    IV PiggyBack: 100 mL    IV PiggyBack: 150 mL    IV PiggyBack: 200 mL    IV PiggyBack: 200 mL    Oral Fluid: 120 mL    Sodium Bicarbonate: 200 mL    sodium chloride 0.9%: 390 mL    sodium chloride 0.9%: 250 mL  Total IN: 1660 mL    OUT:    Bumetanide: 0 mL    Indwelling Catheter - Urethral (mL): 100 mL    sodium chloride 2%: 0 mL  Total OUT: 100 mL    Total NET: 1560 mL      10 Aug 2022 07:01  -  10 Aug 2022 08:46  --------------------------------------------------------  IN:    Albumin 25%  -  50 mL: 50 mL    sodium chloride 0.9%: 30 mL  Total IN: 80 mL    OUT:    Indwelling Catheter - Urethral (mL): 5 mL    T-Tube (mL): 30 mL  Total OUT: 35 mL    Total NET: 45 mL          MEDICATIONS  (STANDING):  albumin human 25% IVPB 100 milliLiter(s) IV Intermittent every 8 hours  chlorhexidine 2% Cloths 1 Application(s) Topical <User Schedule>  piperacillin/tazobactam IVPB.. 3.375 Gram(s) IV Intermittent every 12 hours  sodium chloride 0.9%. 1000 milliLiter(s) (30 mL/Hr) IV Continuous <Continuous>    MEDICATIONS  (PRN):                            9.8    26.21 )-----------( 132      ( 10 Aug 2022 07:29 )             28.7       08-10    124<L>  |  86<L>  |  73<H>  ----------------------------<  60<L>  5.4<H>   |  22  |  4.43<H>    Ca    8.9      10 Aug 2022 07:25  Phos  5.5     08-10  Mg     2.5     08-10    TPro  6.2  /  Alb  3.4  /  TBili  4.0<H>  /  DBili  2.4<H>  /  AST  40  /  ALT  19  /  AlkPhos  104  08-10      PHYSICAL EXAM:  Gen: NAD, AAOx3  Chest: non-labored breathing  Abd: RLQ paracentesis puncture site with minimal strike through on dressing, no TTP, perc rodney with biliosanguinous output, no TTP

## 2022-08-10 NOTE — PROGRESS NOTE ADULT - PROBLEM SELECTOR PLAN 2
Hypo-osmolar Hyponatremia ADH mediated likely from liver failure & fluid retention from ROBBIE      Pt. with chronic asymptomatic hyponatremia. SNa at baseline is ~123-133 since April. SNa 137 on 7/11 (one time). SNa 120 on arrival & SNa 125 today. Nina 9, UOsm 335, SOsm low at 270.  Continue with free water restrict to <1L/day for now.  If SNa goes down further can do a trial of HTS with IV bumex. Avoid over-correction by >6mEQ in 24 hours. Liberalize salt intake. Increase PO solute (protein) intake. Avoid isotonic or hypotonic fluids. Monitor SNa. Hypo-osmolar Hyponatremia ADH mediated likely from liver failure & fluid retention from ROBBIE      Pt. with chronic asymptomatic hyponatremia. SNa at baseline is ~123-133 since April. SNa 137 on 7/11 (one time). SNa 120 on arrival; trended up to 127 on 10/10 today & SNa dropping to 124 now. Nina 9, UOsm 335, SOsm low at 270.  Continue with free water restrict to <1L/day for now.  Loops for now.  Will start CVVHDF today if diuretic challenge fails. If trends <120 would initiate 3% @30cc/hr. for 10 hrs with goal no >4-6mEq/24hrs

## 2022-08-10 NOTE — PROGRESS NOTE ADULT - SUBJECTIVE AND OBJECTIVE BOX
Interval Events:   No acute events overnight however worsening ROBBIE and paracentesis yesterday c/w peritonitis.    ROS:   12 point review of systems performed and negative except otherwise noted in HPI.    Hospital Medications:  albumin human 25% IVPB 100 milliLiter(s) IV Intermittent every 8 hours  buMETAnide Infusion 0.5 mG/Hr (2.5 mL/Hr) IV Continuous <Continuous>  chlorhexidine 2% Cloths 1 Application(s) Topical <User Schedule>  Dextrose 10 % + 2% Sodium Chloride 1000 milliLiter(s) 1000 milliLiter(s) (30 mL/Hr) IV Continuous <Continuous>  midodrine. 10 milliGRAM(s) Oral <User Schedule>  octreotide  Injectable 100 MICROGram(s) IV Push every 8 hours  piperacillin/tazobactam IVPB.. 3.375 Gram(s) IV Intermittent every 12 hours    MAR over past 24 hours:    acetaminophen   IVPB ..   400 mL/Hr IV Intermittent (08-09-22 @ 21:10)    albumin human 25% IVPB   50 mL/Hr IV Intermittent (08-10-22 @ 07:46)   50 mL/Hr IV Intermittent (08-09-22 @ 23:53)    albumin human 25% IVPB   50 mL/Hr IV Intermittent (08-09-22 @ 12:26)    buMETAnide Infusion   2.5 mL/Hr IV Continuous (08-10-22 @ 09:11)    buMETAnide Infusion   2.5 mL/Hr IV Continuous (08-10-22 @ 11:02)    buMETAnide Injectable   2 milliGRAM(s) IV Push (08-10-22 @ 10:03)    buMETAnide Injectable   2 milliGRAM(s) IV Push (08-09-22 @ 14:43)    calcium gluconate IVPB   200 mL/Hr IV Intermittent (08-10-22 @ 02:17)    calcium gluconate IVPB   100 mL/Hr IV Intermittent (08-10-22 @ 10:04)    chlorhexidine 2% Cloths   1 Application(s) Topical (08-10-22 @ 07:03)    Dextrose 10 % + 2% Sodium Chloride 1000 milliLiter(s)   30 mL/Hr IV Continuous (08-10-22 @ 09:30)    dextrose 50% Injectable   25 Gram(s) IV Push (08-10-22 @ 07:34)    HYDROmorphone  Injectable   0.5 milliGRAM(s) IV Push (08-10-22 @ 10:30)    midodrine.   10 milliGRAM(s) Oral (08-10-22 @ 10:09)    octreotide  Injectable   100 MICROGram(s) IV Push (08-10-22 @ 10:16)    piperacillin/tazobactam IVPB..   25 mL/Hr IV Intermittent (08-10-22 @ 10:10)   25 mL/Hr IV Intermittent (08-09-22 @ 21:10)    sodium bicarbonate  Injectable   50 milliEquivalent(s) IV Push (08-10-22 @ 09:41)    sodium chloride 0.9%.   30 mL/Hr IV Continuous (08-09-22 @ 21:11)   30 mL/Hr IV Continuous (08-09-22 @ 15:03)    sodium zirconium cyclosilicate   10 Gram(s) Oral (08-10-22 @ 10:19)    vancomycin  IVPB   250 mL/Hr IV Intermittent (08-10-22 @ 10:06)      Home Medications:  Last Order Reconciliation Date: 08-08-22 @ 06:30 (Admission Reconciliation)    PHYSICAL EXAM:   Vital Signs last 24 hours:  T(F): 97 (08-10-22 @ 11:00), Max: 98 (08-10-22 @ 03:00)  HR: 104 (08-10-22 @ 11:00) (82 - 104)  BP: 113/59 (08-10-22 @ 11:00) (97/53 - 119/59)  BP(mean): 80 (08-10-22 @ 11:00) (70 - 83)  ABP: --  ABP(mean): --  RR: 19 (08-10-22 @ 11:00) (12 - 25)  SpO2: 98% (08-10-22 @ 11:00) (92% - 98%)    I&Os:    08-09-22 @ 07:01  -  08-10-22 @ 07:00  --------------------------------------------------------  IN:    Albumin 25%  -  50 mL: 50 mL    IV PiggyBack: 100 mL    IV PiggyBack: 150 mL    IV PiggyBack: 200 mL    IV PiggyBack: 200 mL    Oral Fluid: 120 mL    Sodium Bicarbonate: 200 mL    sodium chloride 0.9%: 250 mL    sodium chloride 0.9%: 390 mL  Total IN: 1660 mL    OUT:    Bumetanide: 0 mL    Indwelling Catheter - Urethral (mL): 100 mL    sodium chloride 2%: 0 mL    T-Tube (mL): 30 mL  Total OUT: 130 mL    Total NET: 1530 mL      08-10-22 @ 07:01  -  08-10-22 @ 12:09  --------------------------------------------------------  IN:    Albumin 25%  -  50 mL: 50 mL    freetext medication - Infusion: 60 mL    IV PiggyBack: 250 mL    IV PiggyBack: 50 mL    IV PiggyBack: 75 mL    Oral Fluid: 250 mL    sodium chloride 0.9%: 60 mL  Total IN: 795 mL    OUT:    Indwelling Catheter - Urethral (mL): 13 mL    T-Tube (mL): 50 mL  Total OUT: 63 mL    Total NET: 732 mL        BMI (kg/m2): 29.3 (08-09-22 @ 14:59)  GENERAL: no acute distress  NEURO: alert  HEENT: NCAT, no conjunctival pallor appreciated  CHEST: no respiratory distress, no accessory muscle use  CARDIAC: regular rate, +S1/S2  ABDOMEN: soft, nontender, no rebound or guarding  EXTREMITIES: warm, well perfused  SKIN: no lesions noted    DIAGNOSTICS:  WBC      Hg       PLT      Na       K        CO2     BUN      Cr       ALT      AST      TB       ALP  26.21    9.8      132      ------   ------   ------   ------   ------   ------   ------   ------   ------   08-10-22 @ 07:29  ------   ------   ------   124      5.4      22       73       4.43     19       40       4.0      104      08-10-22 @ 07:25  19.74    9.4      112      127      4.9      22       65       3.81     18       33       3.1      83       08-10-22 @ 01:18  16.28    9.2      101      124      5.2      23       66       3.78     20       33       3.1      81       08-09-22 @ 18:49  18.21    10.0     144      125      4.9      24       65       3.51     22       37       3.1      95       08-09-22 @ 13:16    PT             INR            MELDwith  MELDw/o  25.1           2.17           --             --             08-10-22 @ 07:29  26.0           2.22           --             --             08-10-22 @ 01:18  27.4           2.34           --             --             08-09-22 @ 18:49  24.1           2.06           --             --             08-09-22 @ 13:16  26.0           2.24           --             --             08-09-22 @ 06:03   Interval Events:   No acute events overnight however worsening ROBBIE and paracentesis yesterday c/w peritonitis. Off pressors. Oliguric with only ~100 mL urine output/24h.     ROS:   12 point review of systems performed and negative except otherwise noted in HPI.    Hospital Medications:  albumin human 25% IVPB 100 milliLiter(s) IV Intermittent every 8 hours  buMETAnide Infusion 0.5 mG/Hr (2.5 mL/Hr) IV Continuous <Continuous>  chlorhexidine 2% Cloths 1 Application(s) Topical <User Schedule>  Dextrose 10 % + 2% Sodium Chloride 1000 milliLiter(s) 1000 milliLiter(s) (30 mL/Hr) IV Continuous <Continuous>  midodrine. 10 milliGRAM(s) Oral <User Schedule>  octreotide  Injectable 100 MICROGram(s) IV Push every 8 hours  piperacillin/tazobactam IVPB.. 3.375 Gram(s) IV Intermittent every 12 hours    MAR over past 24 hours:    acetaminophen   IVPB ..   400 mL/Hr IV Intermittent (08-09-22 @ 21:10)    albumin human 25% IVPB   50 mL/Hr IV Intermittent (08-10-22 @ 07:46)   50 mL/Hr IV Intermittent (08-09-22 @ 23:53)    albumin human 25% IVPB   50 mL/Hr IV Intermittent (08-09-22 @ 12:26)    buMETAnide Infusion   2.5 mL/Hr IV Continuous (08-10-22 @ 09:11)    buMETAnide Infusion   2.5 mL/Hr IV Continuous (08-10-22 @ 11:02)    buMETAnide Injectable   2 milliGRAM(s) IV Push (08-10-22 @ 10:03)    buMETAnide Injectable   2 milliGRAM(s) IV Push (08-09-22 @ 14:43)    calcium gluconate IVPB   200 mL/Hr IV Intermittent (08-10-22 @ 02:17)    calcium gluconate IVPB   100 mL/Hr IV Intermittent (08-10-22 @ 10:04)    chlorhexidine 2% Cloths   1 Application(s) Topical (08-10-22 @ 07:03)    Dextrose 10 % + 2% Sodium Chloride 1000 milliLiter(s)   30 mL/Hr IV Continuous (08-10-22 @ 09:30)    dextrose 50% Injectable   25 Gram(s) IV Push (08-10-22 @ 07:34)    HYDROmorphone  Injectable   0.5 milliGRAM(s) IV Push (08-10-22 @ 10:30)    midodrine.   10 milliGRAM(s) Oral (08-10-22 @ 10:09)    octreotide  Injectable   100 MICROGram(s) IV Push (08-10-22 @ 10:16)    piperacillin/tazobactam IVPB..   25 mL/Hr IV Intermittent (08-10-22 @ 10:10)   25 mL/Hr IV Intermittent (08-09-22 @ 21:10)    sodium bicarbonate  Injectable   50 milliEquivalent(s) IV Push (08-10-22 @ 09:41)    sodium chloride 0.9%.   30 mL/Hr IV Continuous (08-09-22 @ 21:11)   30 mL/Hr IV Continuous (08-09-22 @ 15:03)    sodium zirconium cyclosilicate   10 Gram(s) Oral (08-10-22 @ 10:19)    vancomycin  IVPB   250 mL/Hr IV Intermittent (08-10-22 @ 10:06)      Home Medications:  Last Order Reconciliation Date: 08-08-22 @ 06:30 (Admission Reconciliation)    PHYSICAL EXAM:   Vital Signs last 24 hours:  T(F): 97 (08-10-22 @ 11:00), Max: 98 (08-10-22 @ 03:00)  HR: 104 (08-10-22 @ 11:00) (82 - 104)  BP: 113/59 (08-10-22 @ 11:00) (97/53 - 119/59)  BP(mean): 80 (08-10-22 @ 11:00) (70 - 83)  ABP: --  ABP(mean): --  RR: 19 (08-10-22 @ 11:00) (12 - 25)  SpO2: 98% (08-10-22 @ 11:00) (92% - 98%)    I&Os:    08-09-22 @ 07:01  -  08-10-22 @ 07:00  --------------------------------------------------------  IN:    Albumin 25%  -  50 mL: 50 mL    IV PiggyBack: 100 mL    IV PiggyBack: 150 mL    IV PiggyBack: 200 mL    IV PiggyBack: 200 mL    Oral Fluid: 120 mL    Sodium Bicarbonate: 200 mL    sodium chloride 0.9%: 250 mL    sodium chloride 0.9%: 390 mL  Total IN: 1660 mL    OUT:    Bumetanide: 0 mL    Indwelling Catheter - Urethral (mL): 100 mL    sodium chloride 2%: 0 mL    T-Tube (mL): 30 mL  Total OUT: 130 mL    Total NET: 1530 mL      08-10-22 @ 07:01  -  08-10-22 @ 12:09  --------------------------------------------------------  IN:    Albumin 25%  -  50 mL: 50 mL    freetext medication - Infusion: 60 mL    IV PiggyBack: 250 mL    IV PiggyBack: 50 mL    IV PiggyBack: 75 mL    Oral Fluid: 250 mL    sodium chloride 0.9%: 60 mL  Total IN: 795 mL    OUT:    Indwelling Catheter - Urethral (mL): 13 mL    T-Tube (mL): 50 mL  Total OUT: 63 mL    Total NET: 732 mL        BMI (kg/m2): 29.3 (08-09-22 @ 14:59)  GENERAL: no acute distress  NEURO: alert, oriented x3, no asterixis  HEENT: +sclera icteric  CHEST: no respiratory distress, no accessory muscle use, CTAB  CARDIAC: regular rate, +S1/S2  ABDOMEN: +BS, +distended, soft, +TTP diffusely worst in RUQ, +rebound tenderness, no guarding, cholecystostomy drain output sanguinous  EXTREMITIES: warm, well perfused  SKIN: +jaundiced    DIAGNOSTICS:  WBC      Hg       PLT      Na       K        CO2     BUN      Cr       ALT      AST      TB       ALP  26.21    9.8      132      ------   ------   ------   ------   ------   ------   ------   ------   ------   08-10-22 @ 07:29  ------   ------   ------   124      5.4      22       73       4.43     19       40       4.0      104      08-10-22 @ 07:25  19.74    9.4      112      127      4.9      22       65       3.81     18       33       3.1      83       08-10-22 @ 01:18  16.28    9.2      101      124      5.2      23       66       3.78     20       33       3.1      81       08-09-22 @ 18:49  18.21    10.0     144      125      4.9      24       65       3.51     22       37       3.1      95       08-09-22 @ 13:16    PT             INR            MELDwith  MELDw/o  25.1           2.17           --             --             08-10-22 @ 07:29  26.0           2.22           --             --             08-10-22 @ 01:18  27.4           2.34           --             --             08-09-22 @ 18:49  24.1           2.06           --             --             08-09-22 @ 13:16  26.0           2.24           --             --             08-09-22 @ 06:03

## 2022-08-11 ENCOUNTER — APPOINTMENT (OUTPATIENT)
Dept: HEPATOLOGY | Facility: CLINIC | Age: 67
End: 2022-08-11

## 2022-08-11 LAB
-  AMIKACIN: SIGNIFICANT CHANGE UP
-  AMIKACIN: SIGNIFICANT CHANGE UP
-  AMOXICILLIN/CLAVULANIC ACID: SIGNIFICANT CHANGE UP
-  AMOXICILLIN/CLAVULANIC ACID: SIGNIFICANT CHANGE UP
-  AMPICILLIN/SULBACTAM: SIGNIFICANT CHANGE UP
-  AMPICILLIN/SULBACTAM: SIGNIFICANT CHANGE UP
-  AMPICILLIN: SIGNIFICANT CHANGE UP
-  AMPICILLIN: SIGNIFICANT CHANGE UP
-  AZTREONAM: SIGNIFICANT CHANGE UP
-  AZTREONAM: SIGNIFICANT CHANGE UP
-  CEFAZOLIN: SIGNIFICANT CHANGE UP
-  CEFAZOLIN: SIGNIFICANT CHANGE UP
-  CEFEPIME: SIGNIFICANT CHANGE UP
-  CEFEPIME: SIGNIFICANT CHANGE UP
-  CEFOXITIN: SIGNIFICANT CHANGE UP
-  CEFOXITIN: SIGNIFICANT CHANGE UP
-  CEFTRIAXONE: SIGNIFICANT CHANGE UP
-  CEFTRIAXONE: SIGNIFICANT CHANGE UP
-  CIPROFLOXACIN: SIGNIFICANT CHANGE UP
-  CIPROFLOXACIN: SIGNIFICANT CHANGE UP
-  ERTAPENEM: SIGNIFICANT CHANGE UP
-  ERTAPENEM: SIGNIFICANT CHANGE UP
-  GENTAMICIN: SIGNIFICANT CHANGE UP
-  GENTAMICIN: SIGNIFICANT CHANGE UP
-  IMIPENEM: SIGNIFICANT CHANGE UP
-  IMIPENEM: SIGNIFICANT CHANGE UP
-  LEVOFLOXACIN: SIGNIFICANT CHANGE UP
-  LEVOFLOXACIN: SIGNIFICANT CHANGE UP
-  MEROPENEM: SIGNIFICANT CHANGE UP
-  MEROPENEM: SIGNIFICANT CHANGE UP
-  PIPERACILLIN/TAZOBACTAM: SIGNIFICANT CHANGE UP
-  PIPERACILLIN/TAZOBACTAM: SIGNIFICANT CHANGE UP
-  TOBRAMYCIN: SIGNIFICANT CHANGE UP
-  TOBRAMYCIN: SIGNIFICANT CHANGE UP
-  TRIMETHOPRIM/SULFAMETHOXAZOLE: SIGNIFICANT CHANGE UP
-  TRIMETHOPRIM/SULFAMETHOXAZOLE: SIGNIFICANT CHANGE UP
ALBUMIN SERPL ELPH-MCNC: 3.5 G/DL — SIGNIFICANT CHANGE UP (ref 3.3–5)
ALBUMIN SERPL ELPH-MCNC: 3.6 G/DL — SIGNIFICANT CHANGE UP (ref 3.3–5)
ALBUMIN SERPL ELPH-MCNC: 3.8 G/DL — SIGNIFICANT CHANGE UP (ref 3.3–5)
ALP SERPL-CCNC: 104 U/L — SIGNIFICANT CHANGE UP (ref 40–120)
ALP SERPL-CCNC: 106 U/L — SIGNIFICANT CHANGE UP (ref 40–120)
ALP SERPL-CCNC: 106 U/L — SIGNIFICANT CHANGE UP (ref 40–120)
ALT FLD-CCNC: 19 U/L — SIGNIFICANT CHANGE UP (ref 10–45)
ALT FLD-CCNC: 19 U/L — SIGNIFICANT CHANGE UP (ref 10–45)
ALT FLD-CCNC: 20 U/L — SIGNIFICANT CHANGE UP (ref 10–45)
AMMONIA BLD-MCNC: 49 UMOL/L — SIGNIFICANT CHANGE UP (ref 11–55)
ANION GAP SERPL CALC-SCNC: 16 MMOL/L — SIGNIFICANT CHANGE UP (ref 5–17)
ANION GAP SERPL CALC-SCNC: 17 MMOL/L — SIGNIFICANT CHANGE UP (ref 5–17)
ANION GAP SERPL CALC-SCNC: 17 MMOL/L — SIGNIFICANT CHANGE UP (ref 5–17)
APTT BLD: 45.5 SEC — HIGH (ref 27.5–35.5)
APTT BLD: 49.7 SEC — HIGH (ref 27.5–35.5)
APTT BLD: 51.5 SEC — HIGH (ref 27.5–35.5)
AST SERPL-CCNC: 34 U/L — SIGNIFICANT CHANGE UP (ref 10–40)
AST SERPL-CCNC: 34 U/L — SIGNIFICANT CHANGE UP (ref 10–40)
AST SERPL-CCNC: 35 U/L — SIGNIFICANT CHANGE UP (ref 10–40)
BASE EXCESS BLDV CALC-SCNC: -1.3 MMOL/L — SIGNIFICANT CHANGE UP (ref -2–2)
BASE EXCESS BLDV CALC-SCNC: -2.3 MMOL/L — LOW (ref -2–2)
BASE EXCESS BLDV CALC-SCNC: -2.3 MMOL/L — LOW (ref -2–2)
BASE EXCESS BLDV CALC-SCNC: 0.1 MMOL/L — SIGNIFICANT CHANGE UP (ref -2–2)
BILIRUB DIRECT SERPL-MCNC: 1.8 MG/DL — HIGH (ref 0–0.3)
BILIRUB DIRECT SERPL-MCNC: 1.9 MG/DL — HIGH (ref 0–0.3)
BILIRUB DIRECT SERPL-MCNC: 1.9 MG/DL — HIGH (ref 0–0.3)
BILIRUB INDIRECT FLD-MCNC: 1.4 MG/DL — HIGH (ref 0.2–1)
BILIRUB INDIRECT FLD-MCNC: 1.4 MG/DL — HIGH (ref 0.2–1)
BILIRUB INDIRECT FLD-MCNC: 1.5 MG/DL — HIGH (ref 0.2–1)
BILIRUB SERPL-MCNC: 3.2 MG/DL — HIGH (ref 0.2–1.2)
BILIRUB SERPL-MCNC: 3.3 MG/DL — HIGH (ref 0.2–1.2)
BILIRUB SERPL-MCNC: 3.4 MG/DL — HIGH (ref 0.2–1.2)
BUN SERPL-MCNC: 76 MG/DL — HIGH (ref 7–23)
BUN SERPL-MCNC: 77 MG/DL — HIGH (ref 7–23)
BUN SERPL-MCNC: 82 MG/DL — HIGH (ref 7–23)
CA-I SERPL-SCNC: 1.17 MMOL/L — SIGNIFICANT CHANGE UP (ref 1.15–1.33)
CA-I SERPL-SCNC: 1.17 MMOL/L — SIGNIFICANT CHANGE UP (ref 1.15–1.33)
CA-I SERPL-SCNC: 1.18 MMOL/L — SIGNIFICANT CHANGE UP (ref 1.15–1.33)
CA-I SERPL-SCNC: 1.19 MMOL/L — SIGNIFICANT CHANGE UP (ref 1.15–1.33)
CALCIUM SERPL-MCNC: 8.6 MG/DL — SIGNIFICANT CHANGE UP (ref 8.4–10.5)
CALCIUM SERPL-MCNC: 8.7 MG/DL — SIGNIFICANT CHANGE UP (ref 8.4–10.5)
CALCIUM SERPL-MCNC: 9 MG/DL — SIGNIFICANT CHANGE UP (ref 8.4–10.5)
CHLORIDE BLDV-SCNC: 90 MMOL/L — LOW (ref 96–108)
CHLORIDE BLDV-SCNC: 90 MMOL/L — LOW (ref 96–108)
CHLORIDE BLDV-SCNC: 91 MMOL/L — LOW (ref 96–108)
CHLORIDE BLDV-SCNC: 93 MMOL/L — LOW (ref 96–108)
CHLORIDE SERPL-SCNC: 86 MMOL/L — LOW (ref 96–108)
CHLORIDE SERPL-SCNC: 87 MMOL/L — LOW (ref 96–108)
CHLORIDE SERPL-SCNC: 88 MMOL/L — LOW (ref 96–108)
CO2 BLDV-SCNC: 25 MMOL/L — SIGNIFICANT CHANGE UP (ref 22–26)
CO2 BLDV-SCNC: 25 MMOL/L — SIGNIFICANT CHANGE UP (ref 22–26)
CO2 BLDV-SCNC: 26 MMOL/L — SIGNIFICANT CHANGE UP (ref 22–26)
CO2 BLDV-SCNC: 27 MMOL/L — HIGH (ref 22–26)
CO2 SERPL-SCNC: 22 MMOL/L — SIGNIFICANT CHANGE UP (ref 22–31)
CREAT SERPL-MCNC: 5.11 MG/DL — HIGH (ref 0.5–1.3)
CREAT SERPL-MCNC: 5.13 MG/DL — HIGH (ref 0.5–1.3)
CREAT SERPL-MCNC: 5.13 MG/DL — HIGH (ref 0.5–1.3)
EGFR: 12 ML/MIN/1.73M2 — LOW
GAS PNL BLDV: 122 MMOL/L — LOW (ref 136–145)
GAS PNL BLDV: 122 MMOL/L — LOW (ref 136–145)
GAS PNL BLDV: 124 MMOL/L — LOW (ref 136–145)
GAS PNL BLDV: 127 MMOL/L — LOW (ref 136–145)
GAS PNL BLDV: SIGNIFICANT CHANGE UP
GLUCOSE BLDV-MCNC: 130 MG/DL — HIGH (ref 70–99)
GLUCOSE BLDV-MCNC: 130 MG/DL — HIGH (ref 70–99)
GLUCOSE BLDV-MCNC: 135 MG/DL — HIGH (ref 70–99)
GLUCOSE BLDV-MCNC: 136 MG/DL — HIGH (ref 70–99)
GLUCOSE SERPL-MCNC: 141 MG/DL — HIGH (ref 70–99)
GLUCOSE SERPL-MCNC: 143 MG/DL — HIGH (ref 70–99)
GLUCOSE SERPL-MCNC: 146 MG/DL — HIGH (ref 70–99)
HCO3 BLDV-SCNC: 24 MMOL/L — SIGNIFICANT CHANGE UP (ref 22–29)
HCO3 BLDV-SCNC: 24 MMOL/L — SIGNIFICANT CHANGE UP (ref 22–29)
HCO3 BLDV-SCNC: 25 MMOL/L — SIGNIFICANT CHANGE UP (ref 22–29)
HCO3 BLDV-SCNC: 26 MMOL/L — SIGNIFICANT CHANGE UP (ref 22–29)
HCT VFR BLD CALC: 26.9 % — LOW (ref 39–50)
HCT VFR BLD CALC: 27.7 % — LOW (ref 39–50)
HCT VFR BLD CALC: 27.8 % — LOW (ref 39–50)
HCT VFR BLDA CALC: 26 % — LOW (ref 39–51)
HCT VFR BLDA CALC: 29 % — LOW (ref 39–51)
HCT VFR BLDA CALC: 30 % — LOW (ref 39–51)
HCT VFR BLDA CALC: 33 % — LOW (ref 39–51)
HGB BLD CALC-MCNC: 10.9 G/DL — LOW (ref 12.6–17.4)
HGB BLD CALC-MCNC: 8.8 G/DL — LOW (ref 12.6–17.4)
HGB BLD CALC-MCNC: 9.7 G/DL — LOW (ref 12.6–17.4)
HGB BLD CALC-MCNC: 9.9 G/DL — LOW (ref 12.6–17.4)
HGB BLD-MCNC: 9.3 G/DL — LOW (ref 13–17)
HGB BLD-MCNC: 9.6 G/DL — LOW (ref 13–17)
HGB BLD-MCNC: 9.6 G/DL — LOW (ref 13–17)
HOROWITZ INDEX BLDV+IHG-RTO: 24 — SIGNIFICANT CHANGE UP
INR BLD: 2.09 RATIO — HIGH (ref 0.88–1.16)
INR BLD: 2.26 RATIO — HIGH (ref 0.88–1.16)
INR BLD: 2.33 RATIO — HIGH (ref 0.88–1.16)
LACTATE BLDV-MCNC: 2 MMOL/L — SIGNIFICANT CHANGE UP (ref 0.7–2)
LACTATE BLDV-MCNC: 2 MMOL/L — SIGNIFICANT CHANGE UP (ref 0.7–2)
LACTATE BLDV-MCNC: 2.3 MMOL/L — HIGH (ref 0.7–2)
LACTATE BLDV-MCNC: 2.5 MMOL/L — HIGH (ref 0.7–2)
MAGNESIUM SERPL-MCNC: 2.5 MG/DL — SIGNIFICANT CHANGE UP (ref 1.6–2.6)
MAGNESIUM SERPL-MCNC: 2.6 MG/DL — SIGNIFICANT CHANGE UP (ref 1.6–2.6)
MAGNESIUM SERPL-MCNC: 2.6 MG/DL — SIGNIFICANT CHANGE UP (ref 1.6–2.6)
MCHC RBC-ENTMCNC: 32.5 PG — SIGNIFICANT CHANGE UP (ref 27–34)
MCHC RBC-ENTMCNC: 32.8 PG — SIGNIFICANT CHANGE UP (ref 27–34)
MCHC RBC-ENTMCNC: 32.9 PG — SIGNIFICANT CHANGE UP (ref 27–34)
MCHC RBC-ENTMCNC: 34.5 GM/DL — SIGNIFICANT CHANGE UP (ref 32–36)
MCHC RBC-ENTMCNC: 34.6 GM/DL — SIGNIFICANT CHANGE UP (ref 32–36)
MCHC RBC-ENTMCNC: 34.7 GM/DL — SIGNIFICANT CHANGE UP (ref 32–36)
MCV RBC AUTO: 94.2 FL — SIGNIFICANT CHANGE UP (ref 80–100)
MCV RBC AUTO: 94.5 FL — SIGNIFICANT CHANGE UP (ref 80–100)
MCV RBC AUTO: 95.1 FL — SIGNIFICANT CHANGE UP (ref 80–100)
METHOD TYPE: SIGNIFICANT CHANGE UP
METHOD TYPE: SIGNIFICANT CHANGE UP
NRBC # BLD: 0 /100 WBCS — SIGNIFICANT CHANGE UP (ref 0–0)
PCO2 BLDV: 45 MMHG — SIGNIFICANT CHANGE UP (ref 42–55)
PCO2 BLDV: 45 MMHG — SIGNIFICANT CHANGE UP (ref 42–55)
PCO2 BLDV: 47 MMHG — SIGNIFICANT CHANGE UP (ref 42–55)
PCO2 BLDV: 47 MMHG — SIGNIFICANT CHANGE UP (ref 42–55)
PH BLDV: 7.33 — SIGNIFICANT CHANGE UP (ref 7.32–7.43)
PH BLDV: 7.35 — SIGNIFICANT CHANGE UP (ref 7.32–7.43)
PHOSPHATE SERPL-MCNC: 5.6 MG/DL — HIGH (ref 2.5–4.5)
PHOSPHATE SERPL-MCNC: 5.9 MG/DL — HIGH (ref 2.5–4.5)
PHOSPHATE SERPL-MCNC: 6 MG/DL — HIGH (ref 2.5–4.5)
PLATELET # BLD AUTO: 103 K/UL — LOW (ref 150–400)
PLATELET # BLD AUTO: 112 K/UL — LOW (ref 150–400)
PLATELET # BLD AUTO: 95 K/UL — LOW (ref 150–400)
PO2 BLDV: 40 MMHG — SIGNIFICANT CHANGE UP (ref 25–45)
PO2 BLDV: 53 MMHG — HIGH (ref 25–45)
PO2 BLDV: 54 MMHG — HIGH (ref 25–45)
PO2 BLDV: 57 MMHG — HIGH (ref 25–45)
POTASSIUM BLDV-SCNC: 4.3 MMOL/L — SIGNIFICANT CHANGE UP (ref 3.5–5.1)
POTASSIUM BLDV-SCNC: 4.5 MMOL/L — SIGNIFICANT CHANGE UP (ref 3.5–5.1)
POTASSIUM BLDV-SCNC: 4.8 MMOL/L — SIGNIFICANT CHANGE UP (ref 3.5–5.1)
POTASSIUM BLDV-SCNC: 4.8 MMOL/L — SIGNIFICANT CHANGE UP (ref 3.5–5.1)
POTASSIUM SERPL-MCNC: 4.6 MMOL/L — SIGNIFICANT CHANGE UP (ref 3.5–5.3)
POTASSIUM SERPL-MCNC: 4.9 MMOL/L — SIGNIFICANT CHANGE UP (ref 3.5–5.3)
POTASSIUM SERPL-MCNC: 4.9 MMOL/L — SIGNIFICANT CHANGE UP (ref 3.5–5.3)
POTASSIUM SERPL-SCNC: 4.6 MMOL/L — SIGNIFICANT CHANGE UP (ref 3.5–5.3)
POTASSIUM SERPL-SCNC: 4.9 MMOL/L — SIGNIFICANT CHANGE UP (ref 3.5–5.3)
POTASSIUM SERPL-SCNC: 4.9 MMOL/L — SIGNIFICANT CHANGE UP (ref 3.5–5.3)
PROT SERPL-MCNC: 6.1 G/DL — SIGNIFICANT CHANGE UP (ref 6–8.3)
PROT SERPL-MCNC: 6.2 G/DL — SIGNIFICANT CHANGE UP (ref 6–8.3)
PROT SERPL-MCNC: 6.3 G/DL — SIGNIFICANT CHANGE UP (ref 6–8.3)
PROTHROM AB SERPL-ACNC: 24.4 SEC — HIGH (ref 10.5–13.4)
PROTHROM AB SERPL-ACNC: 26.2 SEC — HIGH (ref 10.5–13.4)
PROTHROM AB SERPL-ACNC: 27.3 SEC — HIGH (ref 10.5–13.4)
RBC # BLD: 2.83 M/UL — LOW (ref 4.2–5.8)
RBC # BLD: 2.93 M/UL — LOW (ref 4.2–5.8)
RBC # BLD: 2.95 M/UL — LOW (ref 4.2–5.8)
RBC # FLD: 14.9 % — HIGH (ref 10.3–14.5)
RBC # FLD: 15 % — HIGH (ref 10.3–14.5)
RBC # FLD: 15.1 % — HIGH (ref 10.3–14.5)
SAO2 % BLDV: 65.5 % — LOW (ref 67–88)
SAO2 % BLDV: 82.6 % — SIGNIFICANT CHANGE UP (ref 67–88)
SAO2 % BLDV: 85 % — SIGNIFICANT CHANGE UP (ref 67–88)
SAO2 % BLDV: 88.4 % — HIGH (ref 67–88)
SODIUM SERPL-SCNC: 125 MMOL/L — LOW (ref 135–145)
SODIUM SERPL-SCNC: 125 MMOL/L — LOW (ref 135–145)
SODIUM SERPL-SCNC: 127 MMOL/L — LOW (ref 135–145)
WBC # BLD: 24.26 K/UL — HIGH (ref 3.8–10.5)
WBC # BLD: 24.93 K/UL — HIGH (ref 3.8–10.5)
WBC # BLD: 27.54 K/UL — HIGH (ref 3.8–10.5)
WBC # FLD AUTO: 24.26 K/UL — HIGH (ref 3.8–10.5)
WBC # FLD AUTO: 24.93 K/UL — HIGH (ref 3.8–10.5)
WBC # FLD AUTO: 27.54 K/UL — HIGH (ref 3.8–10.5)

## 2022-08-11 PROCEDURE — 99232 SBSQ HOSP IP/OBS MODERATE 35: CPT | Mod: GC

## 2022-08-11 PROCEDURE — 36556 INSERT NON-TUNNEL CV CATH: CPT

## 2022-08-11 PROCEDURE — G0452: CPT | Mod: 26

## 2022-08-11 PROCEDURE — 99231 SBSQ HOSP IP/OBS SF/LOW 25: CPT

## 2022-08-11 PROCEDURE — 99233 SBSQ HOSP IP/OBS HIGH 50: CPT | Mod: 25

## 2022-08-11 PROCEDURE — 99232 SBSQ HOSP IP/OBS MODERATE 35: CPT

## 2022-08-11 PROCEDURE — 71045 X-RAY EXAM CHEST 1 VIEW: CPT | Mod: 26

## 2022-08-11 PROCEDURE — 99233 SBSQ HOSP IP/OBS HIGH 50: CPT | Mod: GC

## 2022-08-11 RX ORDER — DESMOPRESSIN ACETATE 0.1 MG/1
20 TABLET ORAL ONCE
Refills: 0 | Status: COMPLETED | OUTPATIENT
Start: 2022-08-11 | End: 2022-08-11

## 2022-08-11 RX ORDER — HEPARIN SODIUM 5000 [USP'U]/ML
5000 INJECTION INTRAVENOUS; SUBCUTANEOUS ONCE
Refills: 0 | Status: COMPLETED | OUTPATIENT
Start: 2022-08-11 | End: 2022-08-11

## 2022-08-11 RX ORDER — DESMOPRESSIN ACETATE 0.1 MG/1
0.2 TABLET ORAL ONCE
Refills: 0 | Status: DISCONTINUED | OUTPATIENT
Start: 2022-08-11 | End: 2022-08-11

## 2022-08-11 RX ORDER — POLYETHYLENE GLYCOL 3350 17 G/17G
17 POWDER, FOR SOLUTION ORAL
Refills: 0 | Status: DISCONTINUED | OUTPATIENT
Start: 2022-08-11 | End: 2022-09-02

## 2022-08-11 RX ORDER — SODIUM CHLORIDE 9 MG/ML
2 INJECTION INTRAMUSCULAR; INTRAVENOUS; SUBCUTANEOUS EVERY 8 HOURS
Refills: 0 | Status: DISCONTINUED | OUTPATIENT
Start: 2022-08-11 | End: 2022-08-13

## 2022-08-11 RX ADMIN — Medication 1 APPLICATION(S): at 17:30

## 2022-08-11 RX ADMIN — MIDODRINE HYDROCHLORIDE 10 MILLIGRAM(S): 2.5 TABLET ORAL at 04:12

## 2022-08-11 RX ADMIN — OCTREOTIDE ACETATE 100 MICROGRAM(S): 200 INJECTION, SOLUTION INTRAVENOUS; SUBCUTANEOUS at 09:45

## 2022-08-11 RX ADMIN — DESMOPRESSIN ACETATE 220 MICROGRAM(S): 0.1 TABLET ORAL at 14:50

## 2022-08-11 RX ADMIN — MIDODRINE HYDROCHLORIDE 10 MILLIGRAM(S): 2.5 TABLET ORAL at 21:29

## 2022-08-11 RX ADMIN — OCTREOTIDE ACETATE 100 MICROGRAM(S): 200 INJECTION, SOLUTION INTRAVENOUS; SUBCUTANEOUS at 02:25

## 2022-08-11 RX ADMIN — Medication 50 MILLILITER(S): at 16:03

## 2022-08-11 RX ADMIN — SENNA PLUS 2 TABLET(S): 8.6 TABLET ORAL at 21:30

## 2022-08-11 RX ADMIN — HEPARIN SODIUM 5000 UNIT(S): 5000 INJECTION INTRAVENOUS; SUBCUTANEOUS at 13:25

## 2022-08-11 RX ADMIN — MIDODRINE HYDROCHLORIDE 10 MILLIGRAM(S): 2.5 TABLET ORAL at 16:03

## 2022-08-11 RX ADMIN — CHLORHEXIDINE GLUCONATE 1 APPLICATION(S): 213 SOLUTION TOPICAL at 05:47

## 2022-08-11 RX ADMIN — Medication 50 MILLILITER(S): at 07:45

## 2022-08-11 RX ADMIN — PIPERACILLIN AND TAZOBACTAM 25 GRAM(S): 4; .5 INJECTION, POWDER, LYOPHILIZED, FOR SOLUTION INTRAVENOUS at 21:29

## 2022-08-11 RX ADMIN — MIDODRINE HYDROCHLORIDE 10 MILLIGRAM(S): 2.5 TABLET ORAL at 08:51

## 2022-08-11 RX ADMIN — SODIUM CHLORIDE 2 GRAM(S): 9 INJECTION INTRAMUSCULAR; INTRAVENOUS; SUBCUTANEOUS at 13:21

## 2022-08-11 RX ADMIN — SODIUM CHLORIDE 2 GRAM(S): 9 INJECTION INTRAMUSCULAR; INTRAVENOUS; SUBCUTANEOUS at 21:30

## 2022-08-11 RX ADMIN — OCTREOTIDE ACETATE 100 MICROGRAM(S): 200 INJECTION, SOLUTION INTRAVENOUS; SUBCUTANEOUS at 17:40

## 2022-08-11 RX ADMIN — POLYETHYLENE GLYCOL 3350 17 GRAM(S): 17 POWDER, FOR SOLUTION ORAL at 17:39

## 2022-08-11 RX ADMIN — PIPERACILLIN AND TAZOBACTAM 25 GRAM(S): 4; .5 INJECTION, POWDER, LYOPHILIZED, FOR SOLUTION INTRAVENOUS at 09:45

## 2022-08-11 NOTE — DIETITIAN INITIAL EVALUATION ADULT - PHYSCIAL ASSESSMENT
Stable weight per HIE:  73.5kg (4/12/22), 73.9kg (7/12/22), 71.2kg (7/26/22)  IBW: 53.5 kg Stable weight per HIE:  73.5kg (4/12/22), 73.9kg (7/12/22), 71.2kg (7/26/22)  IBW: 53.5 kg/well nourished

## 2022-08-11 NOTE — DIETITIAN INITIAL EVALUATION ADULT - DIET TYPE
no concentrated phosphorus/renal replacement pts:no protein restr,no conc K & phos, low sodium no concentrated potassium/renal replacement pts:no protein restr,no conc K & phos, low sodium

## 2022-08-11 NOTE — PROGRESS NOTE ADULT - ATTENDING COMMENTS
66yMale with cirrhosis and recent cholangitis s/p ERCP stent (4/22) and stent removal (7/20/22) and concern for SBP, cholecystitis cholangitis again    Awake and alert  Saturating well on RA, CXR not in overt fluid overload  Hemodynamically stable  Advance diet to renal without K  S/P perc cholecystostomy tube placement, ascitic fluid and perc nephrostomy tube grew E coli sensitive to Zosyn. Ascitic fluid also grew GPC  in chains possible VRE. SAAg 2.5. Received one dose Vanco. Awaiting sensitivity  Aneuria did not respond to Bumex drip. Will need RRT  IVF with  Dextrose 10% for hypoglycemia. Serial electrolytes monitoring  Hyperkalemia shifted  Worsening of ROBBIE, hepatorenal syndrome on Octreotide and Midodrine.   Lactic acidosis decreased to 2.3  Stable coagulopathy, Hb stable  IR contacted for ascitic fluid drainage

## 2022-08-11 NOTE — PROGRESS NOTE ADULT - PROBLEM SELECTOR PLAN 3
likely from lactic acidosis & renal failure      Lactic acid trending down with source control.   ROBBIE management as above            If you have any questions, please feel free to contact me  Brunilda Manning  Nephrology Fellow  Pager [place]: [phone]/ [facility]: [phone]    (After 5 pm or on weekends please page the on-call fellow, can check [email or web address] for schedule. Login is amy augustine, schedule under [facility] medicine, psych, derm).

## 2022-08-11 NOTE — PROGRESS NOTE ADULT - ASSESSMENT
--------------- ASSESSMENT ---------------  66M PMH cirrhosis (MELD 30), cholangitis s/p ERCP with stent placement (4/2022) s/p stent removal on 7/20 presenting for one week of RUQ abdominal pain with clinical exam, labs and radiographic findings meeting [place] Criteria grade III for severe acute cholangitis. s/p perc rodney and diagnostic paracentesis    --------------- PLAN ---------------  - NPO/IVF  - Zosyn  - Trend electrolytes, f/u sodium    -f/u blood and urine cultures   -continue milian placed by    -care and management per SICU     ACS/Trauma Surgery  [id number]   --------------- ASSESSMENT ---------------  66M PMH cirrhosis (MELD 30), cholangitis s/p ERCP with stent placement (4/2022) s/p stent removal on 7/20 presenting for one week of RUQ abdominal pain with clinical exam, labs and radiographic findings meeting [place] Criteria grade III for severe acute cholangitis. s/p perc rodney and diagnostic paracentesis    --------------- PLAN ---------------  - NPO/IVF  - Zosyn  - On D10 +2%NS: Trend electrolytes, f/u sodium   - f/u blood and urine cultures   - continue milian placed by  (monitor output)  - care and management per SICU     ACS/Trauma Surgery  [id number]   --------------- ASSESSMENT ---------------  66M PMH cirrhosis (MELD 30), cholangitis s/p ERCP with stent placement (4/2022) s/p stent removal on 7/20 presenting for one week of RUQ abdominal pain with clinical exam, labs and radiographic findings meeting [place] Criteria grade III for severe acute cholangitis. s/p perc rodney and diagnostic paracentesis    --------------- PLAN ---------------  - NPO/IVF  - Zosyn  - On D10 +2%NS: Trend electrolytes, f/u sodium   - f/u blood cultures (no growth to date)  - continue milian placed by  (monitor output)  - care and management per SICU     ACS/Trauma Surgery  [id number]

## 2022-08-11 NOTE — DIETITIAN INITIAL EVALUATION ADULT - PERTINENT MEDS FT
MEDICATIONS  (STANDING):  albumin human 25% IVPB 100 milliLiter(s) IV Intermittent every 8 hours  buMETAnide Infusion 0.5 mG/Hr (2.5 mL/Hr) IV Continuous <Continuous>  chlorhexidine 2% Cloths 1 Application(s) Topical <User Schedule>  Dextrose 10 % + 2% Sodium Chloride 1000 milliLiter(s) 1000 milliLiter(s) (30 mL/Hr) IV Continuous <Continuous>  midodrine. 10 milliGRAM(s) Oral <User Schedule>  octreotide  Injectable 100 MICROGram(s) IV Push every 8 hours  piperacillin/tazobactam IVPB.. 3.375 Gram(s) IV Intermittent every 12 hours  polyethylene glycol 3350 17 Gram(s) Oral daily  senna 2 Tablet(s) Oral at bedtime

## 2022-08-11 NOTE — PROGRESS NOTE ADULT - ASSESSMENT
ASSESSMENT:   Pt is a 65 y/o Male with a PMH Cirrhosis and recent cholangitis s/p ERCP stent (4/22) and stent removal (7/20/22). Pt presented on 8/8 with increasing RUQ pain. CT Abd/Pelvis reported distended gallbladder with stones in the cystic duct, gallbladder wall thickening, suggesting chronic cholecystitis. Pt had a percutaneous cholecystostomy tube placed on 8/09 along with a paracentesis yielding 300cc of purulent fluid. Pt admitted to SICU for further hemodynamic monitoring and management.     PLAN:   Neurologic: A, O x3  - Pain Control with Dilaudid as needed after abdominal exam    Respiratory: Atelectasis  - Maintain SpO2 > 92%  - Encourage OOB, Incentive spirometry, and chest PT  - AM CXR    Cardiovascular: Sinus tachycardia resolved   - Maintain MAP > 65   - Start Midodrine for increased MAP in setting of ROBBIE    Gastrointestinal/Nutrition: cirrhosis (MELD 34), cholecystitis s/p per rodney tube and diagnostic paracentesis (8/09)   - Tolerating Clear Liquid Diet   - CT Abd/ Pelvis w/ Iv contrast ( 8/08)- distended gallbladder with stones in the cystic duct, gallbladder wall thickening, suggesting chronic cholecystitis  - GI not concerned for cholangitis due to negative for lázaro duct dilation  - Hepatology and transplant consulted   - started Bowel Regimen with Miralax and Senna     Genitourinary/Renal: ROBBIE and hyperkalemia s/p shifting& Lokelma, urethral stricture, severe metabolic acidosis  - Tobin placed by Urology 2/2 uretheral stricture, Continue to monitor strict I&Os  - Start D10 + 2%NS @ 30cc  - Continue 100ml 25% albumin q8 hr for 48 hours until 8/11     Hematologic: coagulopathy of liver failure/ sepsis  - Send type & screen x 2  - INR 2.71 --> will give 10mg IV vitamin K and 2u FFP  - Send TEG  - Hold chemical VTE ppx  - Venodynes    Infectious Disease: sepsis  - Blood cultures sent  - +UA, send Urine culture  - Empiric Zosyn    Endocrine: no acute issues  - Monitor glucose on BMP    Disposition: SICU   ASSESSMENT:   Pt is a 67 y/o Male with a PMH Cirrhosis and recent cholangitis s/p ERCP stent (4/22) and stent removal (7/20/22). Pt presented on 8/8 with increasing RUQ pain. CT Abd/Pelvis reported distended gallbladder with stones in the cystic duct, gallbladder wall thickening, suggesting chronic cholecystitis. Pt had a percutaneous cholecystostomy tube placed on 8/09 along with a paracentesis yielding 300cc of purulent fluid. Pt admitted to SICU for further hemodynamic monitoring and management.     PLAN:   Neurologic: A, O x3  - Pain Control with Dilaudid as needed after abdominal exam    Respiratory: Atelectasis  - Maintain SpO2 > 92%  - Encourage OOB, Incentive spirometry, and chest PT  - AM CXR    Cardiovascular: Sinus tachycardia resolved   - Maintain MAP > 65   - Start Midodrine for increased MAP in setting of ROBBIE    Gastrointestinal/Nutrition: cirrhosis (MELD 34), cholecystitis s/p per rodney tube and diagnostic paracentesis (8/09)   - Tolerating Clear Liquid Diet   - CT Abd/ Pelvis w/ Iv contrast ( 8/08)- distended gallbladder with stones in the cystic duct, gallbladder wall thickening, suggesting chronic cholecystitis  - GI not concerned for cholangitis due to negative for bile duct dilation  - started Bowel Regimen with Miralax and Senna   - Start Octreotide 100mg q8hr   - Repeat CT Abd/Pelvis limited study; ordered RUQ Sono to eval for Gallbladder perforation    - Transplant hepatology consulted, reccs appreciated     Genitourinary/Renal: ROBBIE and hyperkalemia s/p shifting& Lokelma, urethral stricture, Hyponatremia with severe metabolic acidosis  - Tobin placed by Urology 2/2 urethral stricture, Continue to monitor strict I&Os  - Start D10 + 2%NS @ 30cc/hr   - Continue 100ml 25% albumin q8 hr for 48 hours until 8/11   - Worsening oliguria, s/p Bumex 2mg IVP and started on Bumex gtt for 10 hours without response   -  Eval for CRRT vs intermittent HD if necessary, Nephrology following reccs appreciated     Hematologic: coagulopathy of liver failure/ sepsis  - Monitor CBC and Coags   - SCDs for mechanical VTE ppx   - Hold chemical VTE ppx    Infectious Disease: sepsis  - F/u BCx ( 8/9) NGTD   - UCx ( 8/8) + for E. Coli , Bile Cx ( 8/9) + E. Coli   - Continue Zosyn ( 8/9 -?) and Vanco by level ( 8/10 - ?)      Endocrine: no acute issues  - Monitor glucose on BMP    Code Status: Full Code   Disposition: SICU   ASSESSMENT:   Pt is a 65 y/o Male with a PMH Cirrhosis and recent cholangitis s/p ERCP stent (4/22) and stent removal (7/20/22). Pt presented on 8/8 with increasing RUQ pain. CT Abd/Pelvis reported distended gallbladder with stones in the cystic duct, gallbladder wall thickening, suggesting chronic cholecystitis. Pt had a percutaneous cholecystostomy tube placed on 8/09 along with a paracentesis yielding 300cc of purulent fluid. Pt admitted to SICU for further hemodynamic monitoring and management.     PLAN:   Neurologic: A, O x3  - Pain Control with Dilaudid as needed after abdominal exam    Respiratory: Atelectasis  - Maintain SpO2 > 92%  - Encourage OOB, Incentive spirometry, and chest PT  - AM CXR    Cardiovascular: Sinus tachycardia resolved   - Maintain MAP > 65   - C/w Midodrine for increased MAP in setting of ROBBIE    Gastrointestinal/Nutrition: cirrhosis (MELD 34), cholecystitis s/p per rodney tube and diagnostic paracentesis (8/09)   - Advance to renal diet  - CT Abd/ Pelvis w/ Iv contrast ( 8/08)- distended gallbladder with stones in the cystic duct, gallbladder wall thickening, suggesting chronic cholecystitis  - GI not concerned for cholangitis due to negative for bile duct dilation  - started Bowel Regimen with Miralax and Senna   - Start Octreotide 100mg q8hr   - Repeat CT Abd/Pelvis limited study; ordered RUQ Sono to eval for Gallbladder perforation    - Transplant hepatology consulted, reccs appreciated     Genitourinary/Renal: ROBBIE and hyperkalemia s/p shifting& Lokelma, urethral stricture, Hyponatremia with severe metabolic acidosis  - Tobin placed by Urology 2/2 urethral stricture, Continue to monitor strict I&Os  - Start D10 + 2%NS @ 30cc/hr   - Continue 100ml 25% albumin q8 hr for 48 hours until 8/11   - Worsening oliguria, s/p Bumex 2mg IVP and started on Bumex gtt for 10 hours without response   -  Eval for CRRT vs intermittent HD if necessary, Nephrology following reccs appreciated   - Salt tabs    Hematologic: coagulopathy of liver failure/ sepsis  - Monitor CBC and Coags   - SCDs for mechanical VTE ppx   - Hold chemical VTE ppx    Infectious Disease: sepsis  - F/u BCx ( 8/9) NGTD   - UCx ( 8/8) + for E. Coli , Bile Cx ( 8/9) + E. Coli   - Continue Zosyn ( 8/9 -?) and Vanco by level ( 8/10 - ?)      Endocrine: no acute issues  - Monitor glucose on BMP    Code Status: Full Code   Disposition: SICU

## 2022-08-11 NOTE — PROGRESS NOTE ADULT - ATTENDING COMMENTS
66y Male with history of recently diagnosed cirrhosis and cholangitis s/p ERCP with stent placement (4/2022) s/p stent removal on 7/20/22 presenting for one week of RUQ abdominal pain found to have chronic cholecystitis; cirrhosis with portal hypertension, moderate ascites with diffuse peritoneal enhancement concerning for SBP, small and large bowel thickening suggesting hepatic enterocolopathy. Nephrology called for ROBBIE, severe metabolic acidosis, hyponatremia and hyperkalemia. Remains oliguric, so SOB, BUT uremic symptoms with tremor.  OOB in chair alert and conversant today, discussed with wife at bedside    1. ARF--likely ATN multifactorial including sepsis, radiocontrast.  Persistently elevated lactate although improving.  OLIGURIC.  Consent obtained from patient.  NOW WITH UREMIC NEUROPATHY  and will initiate HD today.  Consentable still AND wife in agreement.    2.  Lactic acidosis--hemodynamic optimization, no aggressive volume repletion.  MODEST UF ON HD.  Echo reviewed and is appropriately hyperdynamic but NO reduced EF.    3.  Hyponatremia--minimal free water clearance with 1. HD dialysate will --> gradual improvement.    4.  Ascites--for IR.  Continue albumin infusion and increase if >2L HD uF may decrease overall overload    discussed with team, intensivist    Cristhian Jacob MD  contact via teams

## 2022-08-11 NOTE — DIETITIAN INITIAL EVALUATION ADULT - OTHER INFO
Nutrition Status:  - Decompensated ALD/PARNELL cirrhosis c/b ascites. MELD 30  - Lokelma given for hyperkalemia; potassium restricted diet ordered  - D10 with 2% NaCl @ 30 ml/hr for hypoglycemia and hyponatremia  - Worsening ROBBIE per chart. Bumex ordered for oliguria. Per chart " Eval for CRRT vs intermittent HD if necessary"  - s/p paracentesis Nutrition Status:  - Decompensated ALD/PARNELL cirrhosis c/b ascites. MELD 30  - Septic shock 2/2 acute cholecystitis vs ascending cholangitis  - s/p paracentesis 8/9/2022, c/w peritonitis  - Lokelma given for hyperkalemia; potassium restricted diet ordered  - D10 with 2% NaCl @ 30 ml/hr for hypoglycemia and hyponatremia  - Worsening ROBBIE per chart. Bumex ordered for oliguria. HD initiated 8/11.  - s/p paracentesis Nutrition Status:  - Decompensated ALD/PARNELL cirrhosis c/b ascites. MELD 30  - Septic shock 2/2 acute cholecystitis vs ascending cholangitis  - s/p paracentesis 8/9/2022, c/w peritonitis  - Lokelma given for hyperkalemia; potassium restricted diet ordered  - D10 with 2% NaCl @ 30 ml/hr for hypoglycemia and hyponatremia  - Worsening ROBBIE per chart. Bumex ordered for oliguria. HD ordered 8/11.  - s/p paracentesis 8/9/2022 c/w peritonitis

## 2022-08-11 NOTE — PROGRESS NOTE ADULT - PROBLEM SELECTOR PLAN 2
Hypo-osmolar Hyponatremia ADH mediated likely from liver failure & fluid retention from ROBBIE      Pt. with chronic asymptomatic hyponatremia. SNa at baseline is ~123-133 since April. SNa 137 on 7/11 (one time). SNa 120 on arrival; trended up to 127 on 10/10 today & SNa dropping to 124 now. Nina 9, UOsm 335, SOsm low at 270.  Continue with free water restrict to <1L/day for now.  HTS with Loops given with not much improvement as UOP not increasing. Failed diuretic challenge, possible HD with UF today.

## 2022-08-11 NOTE — DIETITIAN INITIAL EVALUATION ADULT - NSFNSGIIOFT_GEN_A_CORE
08-10-22 @ 07:01  -  08-11-22 @ 07:00  --------------------------------------------------------  OUT:    T-Tube (mL): 85 mL  Total OUT: 85 mL    Total NET: -85 mL    Last BM: none noted  Bowel Regimen: Miralax, senna   Octreotide ordered

## 2022-08-11 NOTE — DIETITIAN INITIAL EVALUATION ADULT - REASON
Unable to obtain consent for Nutrition Focused Physical Exam at this time; overt signs obtained: moderate depletion of temples

## 2022-08-11 NOTE — PROGRESS NOTE ADULT - SUBJECTIVE AND OBJECTIVE BOX
Interventional Radiology Follow-Up Note.     Patient seen and examined @ bedside between 8-9a     66y Male with history of recently diagnosed cirrhosis (MELD 30) and cholangitis s/p ERCP with stent placement (4/2022) s/p stent removal on 7/20 presenting for one week of RUQ abdominal pain with clinical exam, labs and radiographic findings meeting [place] Criteria grade III for severe acute cholangitis. Patient is now s/p image guided paracentesis and image guided percutaneous cholecystostomy tube placement 8/9.        Medication:     buMETAnide Infusion: (08-10)  buMETAnide Infusion: (08-10)  buMETAnide Injectable: (08-09)  buMETAnide Injectable: (08-10)  midodrine.: (08-11)  piperacillin/tazobactam IVPB..: (08-11)  vancomycin  IVPB: (08-10)    Vitals:   T(F): 97, Max: 97.8 (03:00)  HR: 90  BP: 133/73  RR: 20  SpO2: 97%    PHYSICAL EXAM:  Gen: NAD, AAOx3  Chest: non-labored breathing  Abd: RLQ paracentesis puncture site with minimal strike through on dressing, no TTP, perc rodney with biliosanguinous output, no TTP          24 hr output 85cc      LABS:  Na: 125 (08-11 @ 10:27), 125 (08-11 @ 04:14), 125 (08-10 @ 22:57), 123 (08-10 @ 16:30), 122 (08-10 @ 12:54), 124 (08-10 @ 07:25), 127 (08-10 @ 01:18), 124 (08-09 @ 18:49), 125 (08-09 @ 13:16), 125 (08-09 @ 06:03), 124 (08-09 @ 00:28), 123 (08-08 @ 18:14), 123 (08-08 @ 15:59), 124 (08-08 @ 12:30)  K: 4.9 (08-11 @ 10:27), 4.9 (08-11 @ 04:14), 5.4 (08-10 @ 22:57), 5.4 (08-10 @ 16:30), 5.2 (08-10 @ 12:54), 5.4 (08-10 @ 07:25), 4.9 (08-10 @ 01:18), 5.2 (08-09 @ 18:49), 4.9 (08-09 @ 13:16), 4.4 (08-09 @ 06:03), 4.5 (08-09 @ 00:28), 5.1 (08-08 @ 18:14), 5.2 (08-08 @ 15:59), 4.7 (08-08 @ 12:30)  Cl: 87 (08-11 @ 10:27), 86 (08-11 @ 04:14), 86 (08-10 @ 22:57), 85 (08-10 @ 16:30), 83 (08-10 @ 12:54), 86 (08-10 @ 07:25), 90 (08-10 @ 01:18), 86 (08-09 @ 18:49), 86 (08-09 @ 13:16), 86 (08-09 @ 06:03), 88 (08-09 @ 00:28), 89 (08-08 @ 18:14), 87 (08-08 @ 15:59), 84 (08-08 @ 12:30)  CO2: 22 (08-11 @ 10:27), 22 (08-11 @ 04:14), 25 (08-10 @ 22:57), 23 (08-10 @ 16:30), 23 (08-10 @ 12:54), 22 (08-10 @ 07:25), 22 (08-10 @ 01:18), 23 (08-09 @ 18:49), 24 (08-09 @ 13:16), 27 (08-09 @ 06:03), 22 (08-09 @ 00:28), 18 (08-08 @ 18:14), 17 (08-08 @ 15:59), 22 (08-08 @ 12:30)  BUN: 77 (08-11 @ 10:27), 76 (08-11 @ 04:14), 77 (08-10 @ 22:57), 76 (08-10 @ 16:30), 73 (08-10 @ 12:54), 73 (08-10 @ 07:25), 65 (08-10 @ 01:18), 66 (08-09 @ 18:49), 65 (08-09 @ 13:16), 60 (08-09 @ 06:03), 60 (08-09 @ 00:28), 57 (08-08 @ 18:14), 54 (08-08 @ 15:59), 50 (08-08 @ 12:30)  Cr: 5.13 (08-11 @ 10:27), 5.11 (08-11 @ 04:14), 4.92 (08-10 @ 22:57), 4.75 (08-10 @ 16:30), 4.69 (08-10 @ 12:54), 4.43 (08-10 @ 07:25), 3.81 (08-10 @ 01:18), 3.78 (08-09 @ 18:49), 3.51 (08-09 @ 13:16), 3.00 (08-09 @ 06:03), 2.81 (08-09 @ 00:28), 2.39 (08-08 @ 18:14), 2.45 (08-08 @ 15:59), 2.10 (08-08 @ 12:30)  Glu: 141(08-11 @ 10:27), 143(08-11 @ 04:14), 154(08-10 @ 22:57), 171(08-10 @ 16:30), 174(08-10 @ 12:54), 60(08-10 @ 07:25), 66(08-10 @ 01:18), 71(08-09 @ 18:49), 92(08-09 @ 13:16), 260(08-09 @ 06:03), 130(08-09 @ 00:28), 152(08-08 @ 18:14), 158(08-08 @ 15:59), 474(08-08 @ 12:30)    Hgb: 9.6 (08-11 @ 10:27), 9.6 (08-11 @ 04:14), 10.1 (08-10 @ 22:57), 9.5 (08-10 @ 16:30), 9.7 (08-10 @ 12:54), 9.8 (08-10 @ 07:29), 9.4 (08-10 @ 01:18), 9.2 (08-09 @ 18:49), 10.0 (08-09 @ 13:16), 9.2 (08-09 @ 06:03), 9.8 (08-09 @ 00:28), 10.6 (08-08 @ 18:14), 11.7 (08-08 @ 12:30)  Hct: 27.7 (08-11 @ 10:27), 27.8 (08-11 @ 04:14), 29.0 (08-10 @ 22:57), 27.2 (08-10 @ 16:30), 27.7 (08-10 @ 12:54), 28.7 (08-10 @ 07:29), 26.3 (08-10 @ 01:18), 26.6 (08-09 @ 18:49), 28.3 (08-09 @ 13:16), 25.6 (08-09 @ 06:03), 27.2 (08-09 @ 00:28), 30.1 (08-08 @ 18:14), 33.9 (08-08 @ 12:30)  WBC: 24.93 (08-11 @ 10:27), 24.26 (08-11 @ 04:14), 24.17 (08-10 @ 22:57), 25.14 (08-10 @ 16:30), 26.32 (08-10 @ 12:54), 26.21 (08-10 @ 07:29), 19.74 (08-10 @ 01:18), 16.28 (08-09 @ 18:49), 18.21 (08-09 @ 13:16), 12.87 (08-09 @ 06:03), 14.90 (08-09 @ 00:28), 16.23 (08-08 @ 18:14), 17.38 (08-08 @ 12:30)  Plt: 103 (08-11 @ 10:27), 112 (08-11 @ 04:14), 115 (08-10 @ 22:57), 103 (08-10 @ 16:30), 124 (08-10 @ 12:54), 132 (08-10 @ 07:29), 112 (08-10 @ 01:18), 101 (08-09 @ 18:49), 144 (08-09 @ 13:16), 105 (08-09 @ 06:03), 129 (08-09 @ 00:28), 168 (08-08 @ 18:14), 207 (08-08 @ 12:30)    INR: 2.33 08-11-22 @ 10:27, 2.26 08-11-22 @ 04:14, 2.25 08-10-22 @ 22:57, 2.22 08-10-22 @ 16:30, 2.23 08-10-22 @ 12:54, 2.17 08-10-22 @ 07:29, 2.22 08-10-22 @ 01:18, 2.34 08-09-22 @ 18:49, 2.06 08-09-22 @ 13:16, 2.24 08-09-22 @ 06:03, 2.05 08-09-22 @ 00:28, 1.96 08-08-22 @ 18:14, 1.92 08-08-22 @ 15:59, 3.17 08-08-22 @ 12:30  PTT: 49.7 08-11-22 @ 10:27, 51.5 08-11-22 @ 04:14, 49.2 08-10-22 @ 22:57, 49.4 08-10-22 @ 16:30, 46.5 08-10-22 @ 12:54, 46.7 08-10-22 @ 07:29, 47.3 08-10-22 @ 01:18, 46.6 08-09-22 @ 18:49, 43.0 08-09-22 @ 13:16, 42.6 08-09-22 @ 06:03, 39.2 08-09-22 @ 00:28, 37.1 08-08-22 @ 18:14, 37.2 08-08-22 @ 15:59, 50.9 08-08-22 @ 12:30          LIVER FUNCTIONS - ( 11 Aug 2022 10:27 )  Alb: 3.8 g/dL / Pro: 6.3 g/dL / ALK PHOS: 106 U/L / ALT: 19 U/L / AST: 34 U/L / GGT: x               Preliminary Report:    Numerous Escherichia coli      Bilirubin Total, Serum: 3.3 mg/dL (08-11-22 @ 10:27)  Bilirubin Total, Serum: 3.4 mg/dL (08-11-22 @ 04:14)  Bilirubin Total, Serum: 3.6 mg/dL (08-10-22 @ 22:57)  Bilirubin Total, Serum: 3.4 mg/dL (08-10-22 @ 16:30)  Bilirubin Total, Serum: 3.9 mg/dL (08-10-22 @ 12:54)    Aspartate Aminotransferase (AST/SGOT): 34 U/L (08-11-22 @ 10:27)  Alanine Aminotransferase (ALT/SGPT): 19 U/L (08-11-22 @ 10:27)  Aspartate Aminotransferase (AST/SGOT): 34 U/L (08-11-22 @ 04:14)  Alanine Aminotransferase (ALT/SGPT): 19 U/L (08-11-22 @ 04:14)  Aspartate Aminotransferase (AST/SGOT): 36 U/L (08-10-22 @ 22:57)  Alanine Aminotransferase (ALT/SGPT): 21 U/L (08-10-22 @ 22:57)  Aspartate Aminotransferase (AST/SGOT): 36 U/L (08-10-22 @ 16:30)  Alanine Aminotransferase (ALT/SGPT): 19 U/L (08-10-22 @ 16:30)  Aspartate Aminotransferase (AST/SGOT): 39 U/L (08-10-22 @ 12:54)  Alanine Aminotransferase (ALT/SGPT): 22 U/L (08-10-22 @ 12:54)  Aspartate Aminotransferase (AST/SGOT): 40 U/L (08-10-22 @ 07:25)  Alanine Aminotransferase (ALT/SGPT): 19 U/L (08-10-22 @ 07:25)  Aspartate Aminotransferase (AST/SGOT): 33 U/L (08-10-22 @ 01:18)  Alanine Aminotransferase (ALT/SGPT): 18 U/L (08-10-22 @ 01:18)  Aspartate Aminotransferase (AST/SGOT): 33 U/L (08-09-22 @ 18:49)  Alanine Aminotransferase (ALT/SGPT): 20 U/L (08-09-22 @ 18:49)  Aspartate Aminotransferase (AST/SGOT): 37 U/L (08-09-22 @ 13:16)  Alanine Aminotransferase (ALT/SGPT): 22 U/L (08-09-22 @ 13:16)      Bilirubin Direct, Serum: 1.9 mg/dL *H* (08-11-22 @ 10:27)  Bilirubin Total, Serum: 3.3 mg/dL *H* (08-11-22 @ 10:27)  Bilirubin Direct, Serum: 1.9 mg/dL *H* (08-11-22 @ 04:14)  Bilirubin Total, Serum: 3.4 mg/dL *H* (08-11-22 @ 04:14)  Bilirubin Direct, Serum: 2.1 mg/dL *H* (08-10-22 @ 22:57)  Bilirubin Total, Serum: 3.6 mg/dL *H* (08-10-22 @ 22:57)  Bilirubin Direct, Serum: 2.2 mg/dL *H* (08-10-22 @ 16:30)  Bilirubin Total, Serum: 3.4 mg/dL *H* (08-10-22 @ 16:30)  Bilirubin Direct, Serum: 2.3 mg/dL *H* (08-10-22 @ 12:54)  Bilirubin Total, Serum: 3.9 mg/dL *H* (08-10-22 @ 12:54)  Bilirubin Direct, Serum: 2.4 mg/dL *H* (08-10-22 @ 07:25)  Bilirubin Total, Serum: 4.0 mg/dL *H* (08-10-22 @ 07:25)  Bilirubin Direct, Serum: 2.0 mg/dL *H* (08-10-22 @ 01:18)  Bilirubin Total, Serum: 3.1 mg/dL *H* (08-10-22 @ 01:18)  Bilirubin Direct, Serum: 1.8 mg/dL *H* (08-09-22 @ 18:49)  Bilirubin Total, Serum: 3.1 mg/dL *H* (08-09-22 @ 18:49)  Bilirubin Direct, Serum: 1.9 mg/dL *H* (08-09-22 @ 13:16)  Bilirubin Total, Serum: 3.1 mg/dL *H* (08-09-22 @ 13:16)  Bilirubin Direct, Serum: 1.8 mg/dL *H* (08-09-22 @ 06:03)  Bilirubin Total, Serum: 2.9 mg/dL *H* (08-09-22 @ 06:03)  Bilirubin Direct, Serum: 1.8 mg/dL *H* (08-09-22 @ 00:28)  Bilirubin Total, Serum: 2.9 mg/dL *H* (08-09-22 @ 00:28)  Bilirubin Direct, Serum: 1.5 mg/dL *H* (08-08-22 @ 18:14)  Bilirubin Total, Serum: 3.1 mg/dL *H* (08-08-22 @ 18:14)  Bilirubin Direct, Serum: 2.1 mg/dL *H* (08-08-22 @ 12:30)  Bilirubin Total, Serum: 3.3 mg/dL *H* (08-08-22 @ 12:30)  Bilirubin Direct, Serum: 2.2 mg/dL *H* (08-08-22 @ 07:14)  Bilirubin Total, Serum: 3.3 mg/dL *H* (08-08-22 @ 07:14)  Bilirubin Total, Serum: 4.7 mg/dL *H* (08-08-22 @ 01:02)      Culture - Body Fluid with Gram Stain (collected 08-09-22 @ 19:01)  Source: Bile Bile Fluid  Gram Stain (08-10-22 @ 01:31):    polymorphonuclear leukocytes seen    Gram Negative Rods seen    Gram positive cocci in pairs seen    by cytocentrifuge  Preliminary Report (08-10-22 @ 19:35):    Numerous Escherichia coli    Culture - Body Fluid with Gram Stain (collected 08-09-22 @ 19:01)  Source: Peritoneal Peritoneal Fluid  Gram Stain (08-10-22 @ 05:58):    polymorphonuclear leukocytes seen    Moderate Gram positive cocci in pairs seen    Limited volume of specimen received, Unable to centrifuge/concentrate    specimen. Culture results may be compromised.  Preliminary Report (08-10-22 @ 19:55):    Few Escherichia coli          Assessment/Plan:  66y Male s/p image guided paracentesis and image guided percutaneous cholecystostomy tube placement 8/9 w/Dr. Moses.          - IR consulted for para on 8/11. +gpc's in ascitic fluid tested on 8/9.  Team now requesting dx and tx para for further workup.   - IR will tentatively plan for para on 8/11 vs 8/12.   - please place IR procedure order for dx and tx para under Dr. Moses   - Flush perc rodney drain with 5cc NS daily forward only; DO NOT aspirate  - Change dressing q3 days or when dressing is saturated.  - Continue global management per primary team  - If the patient is d/c home with drainage catheter, pt can make an appointment with IR by calling the IR booking office at [phone]; recommend IR follow in 8weeks for tube evaluation.  - Pt will benefit from VNS service to help with drainage catheter care; Pt should continue same drainage catheter care as an outpatient.  - Greater than 50% of the encounter was spent counseling and/ or coordination of care on drain care and follow up  - Please call IR at [phone] or [phone] with any questions, concerns, or issues regarding above.        Also available on TEAMS

## 2022-08-11 NOTE — DIETITIAN INITIAL EVALUATION ADULT - NS FNS DIET ORDER
Diet, Clear Liquid:   For patients receiving Renal Replacement - No Protein Restr, No Conc K, No Conc Phos, Low Sodium (RENAL)  No Concentrated Potassium  No Concentrated Phosphorus (08-10-22 @ 09:23)

## 2022-08-11 NOTE — PROGRESS NOTE ADULT - PROBLEM SELECTOR PLAN 1
ROBBIE likely ATN from severe sepsis (from cholecystitis & E.coli UTI) causing hypotension vs HRS. Contrast administration likely contributed     Baseline SCr is 0.7 on 7/26. SCr on arrival is 1.9 on 8/8 & trending up to 5.1 today.  Hypotensive to 90's since 8/8 but BP now improving on IV albumin.  Pt remains anuric with  cc in 24 hrs with UOP yet low at 10-25 cc/hr in the last few hours. Bumex gtt was stopped on 8/10. Pt was started on 2% HTS for hyponatremia and now on salt tabs 2 gm tid. Started Octreotide 100 q8 hr &  Midodrine 10 mg qid      UA with high sp gravity (from IV contrast), 30 mg /dl of proteinuria, pyuria, +LE. Check Ucx. spot urine TP/CR 0.8 g/g non nephrotic. Urine electrolytes suggesting pre renal etiology such as seen in volume depletion vs HRS. Check Renal US when stable.   Continue octreotide & midodrine for splanchnic vasoconstriction & to optimize hemodynamics.  Hyperkalemia & hyponatremia 2/2 worsening kidney function.  Currently remains anuric. Pt failed diuretic challenge & now with LE edema along with asterixes on exam. Would discontinue IV albumin. Would likely need to initiate HD today. Consent obtained. Abx per primary team. Monitor labs and urine output. Avoid NSAIDs, ACEI/ARBS, RCA and nephrotoxins. Dose medications as per eGFR.

## 2022-08-11 NOTE — PROGRESS NOTE ADULT - ATTENDING COMMENTS
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 coli and Streptococcus anginosis. Ascitic fluid studies from paracentesis (8/9) most consistent with secondary peritonitis with concern for possible perforated viscus (likely gallbladder) given markedly elevated ascitic fluid LDH. S/p ceftriaxone (8/8) and continuing Zosyn (8/8- ) as per Transplant ID recommendations. Recommend repeat diagnostic and therapeutic paracentesis to alleviate abdominal distension and to assess for interval response to his antibiotic therapy in terms of the peritonitis. Initiated on hemodialysis today given lack of response to IV albumin, HRS medications, and Bumex drip challenge. He has also been receiving 2% saline for hyponatremia as per Nephrology. He continues to have normal mentation. Liver transplant evaluation initiated today though anticipate needing more extended antibiotic treatment prior to his having ID clearance to proceed with transplant. Current MELD-Na 36 (ABO O).    Laron Harper M.D., Ph.D.  Transplant Hepatology

## 2022-08-11 NOTE — DIETITIAN INITIAL EVALUATION ADULT - PERTINENT LABORATORY DATA
08-11    125<L>  |  86<L>  |  76<H>  ----------------------------<  143<H>  4.9   |  22  |  5.11<H>    Ca    8.6      11 Aug 2022 04:14  Phos  5.9     08-11  Mg     2.5     08-11    TPro  6.1  /  Alb  3.5  /  TBili  3.4<H>  /  DBili  1.9<H>  /  AST  34  /  ALT  19  /  AlkPhos  104  08-11  POCT Blood Glucose.: 150 mg/dL (08-10-22 @ 22:51)

## 2022-08-11 NOTE — DIETITIAN INITIAL EVALUATION ADULT - ORAL INTAKE PTA/DIET HISTORY
[pt interview pending]  Allergies: NKFA  Per wife, pt typically eats well with no dietary restrictions. Wife endorses 2 days of poor appetite and reduced po intake PTA.  Allergies: NKFA

## 2022-08-11 NOTE — DIETITIAN INITIAL EVALUATION ADULT - REASON FOR ADMISSION
Acute cholecystitis     Acute cholecystitis    Per chart: "66 year old male with decompensated cirrhosis and cholangitis s/p ERCP with stent placement (4/2022) s/p stent removal on 7/20 (along sphincterotomy and stone extraction) presenting for one week of RUQ abdominal pain associated with new abdominal distension and decreased appetite."    Per Transplant: "will launch evaluation for liver transplantation"

## 2022-08-11 NOTE — PROGRESS NOTE ADULT - SUBJECTIVE AND OBJECTIVE BOX
SICU Daily Progress Note  =====================================================  Interval/Overnight Events:     - Worsening oliguria, s/p Bumex 2mg IVP and started on Bumex gtt for 10 hours   - Pt given Lokelma x2 for Hyperkalemia 5.4   - Hypoglycemic to 55 and Hyponatremic 123, Started on D10 +2% NS @30cc/hr  - Started Octreotide 100 q8 hr    - Start on Bowel Regimen with Miralax and Senna   - Urine Cx ( 8/8) reported positive for E. Coli   - CT abdomen/Pelvis performed to evaluate for Gallbladder perf, limited study   - Ordered RUQ US to evaluate for Gallbladder perf due to limited study   - Started Midodrine to increase MAP in setting of ROBBIE     HPI:  Mr. Lobo is a 66y Male with history of recently diagnosed cirrhosis and cholangitis s/p ERCP with stent placement (4/2022) with stent removal on 7/20/22. Pt presented on 7/26 with complaints of RUQ pain for one week along with decreased appetite. While in ED, US performed reporting a distended gallbladder with stones and CBD measuring 5mm. Pt denied nausea, vomiting, fevers or chills and was discharged home.    Pt returned to [facility] on 8/08/22 presenting with tachycardic 's, normotensive, with an SpO2 94% on RA.  CT Abd/ Pelvis with IV contrast ( 8/08) showed a distended gallbladder with stones in the cystic duct, gallbladder wall thickening, suggesting chronic cholecystitis; cirrhosis with portal hypertension, moderate ascites with diffuse peritoneal enhancement concerning for SBP, small and large bowel thickening suggesting hepatic enterocolopathy. Pt had a percutaneous cholecystostomy tube placed on 8/09 along with a paracentesis yielding 300cc of purulent fluid. Pt admitted to SICU for further hemodynamic monitoring and management.       Allergies: No Known Allergies      MEDICATIONS:   --------------------------------------------------------------------------------------  Neurologic Medications    Respiratory Medications    Cardiovascular Medications  buMETAnide Infusion 0.5 mG/Hr IV Continuous <Continuous>  midodrine. 10 milliGRAM(s) Oral <User Schedule>    Gastrointestinal Medications  albumin human 25% IVPB 100 milliLiter(s) IV Intermittent every 8 hours  polyethylene glycol 3350 17 Gram(s) Oral daily  senna 2 Tablet(s) Oral at bedtime    Genitourinary Medications    Hematologic/Oncologic Medications    Antimicrobial/Immunologic Medications  piperacillin/tazobactam IVPB.. 3.375 Gram(s) IV Intermittent every 12 hours    Endocrine/Metabolic Medications  octreotide  Injectable 100 MICROGram(s) IV Push every 8 hours    Topical/Other Medications  chlorhexidine 2% Cloths 1 Application(s) Topical <User Schedule>  Dextrose 10 % + 2% Sodium Chloride 1000 milliLiter(s) 1000 milliLiter(s) IV Continuous <Continuous>    --------------------------------------------------------------------------------------    VITAL SIGNS, INS/OUTS (last 24 hours):  --------------------------------------------------------------------------------------  T(C): 36.6 (08-11-22 @ 03:00), Max: 36.6 (08-11-22 @ 03:00)  HR: 81 (08-11-22 @ 05:00) (79 - 104)  BP: 117/58 (08-11-22 @ 05:00) (102/56 - 137/67)  RR: 13 (08-11-22 @ 05:00) (11 - 22)  SpO2: 97% (08-11-22 @ 05:00) (93% - 100%)    08-09-22 @ 07:01  -  08-10-22 @ 07:00  --------------------------------------------------------  IN: 1660 mL / OUT: 130 mL / NET: 1530 mL    08-10-22 @ 07:01  -  08-11-22 @ 05:40  --------------------------------------------------------  IN: 2222.5 mL / OUT: 258 mL / NET: 1964.5 mL      --------------------------------------------------------------------------------------    EXAM  GENERAL:   NAD, well-groomed, well-developed  HEAD:    Atraumatic, Normocephalic  EYES:   EOMI, 3mm PERRLA, conjunctiva and sclera clear  ENMT:   No oropharyngeal exudates, erythema or lesions,  Moist mucous membranes  NECK:   Supple, no cervical lymphadenopathy, no JVD  NERVOUS SYSTEM:   Alert & Oriented X3, CN II-XII intact, Moves all extremities  ; Upper extremities  5/5; Lower extremities 5/5, full sensation to light touch   CHEST/LUNG:    Breath sounds bilaterally without crackles, rhonchi, wheezes, rubs  HEART:   cardiac monitor NSR   ; S1/S2 without murmurs, without rubs, or gallops.  ABDOMEN:   Soft, Nontender, Distended, + Fluid Wave; Bowel sounds present, Bladder non distended, non palpable. T tube in place with minimal serosanguinous output   EXTREMITIES:   Pulses palpable radial pulses 2+ bilat, DP/PT 1+/1+ bilat, without clubbing, cyanosis. Digits warm to touch with good cap refill < 3 secs  SKIN:   warm, dry, intact, normal color, no rash or abnormal lesions, without palpable nodes    LABS  --------------------------------------------------------------------------------------                        9.6    24.26 )-----------( 112      ( 11 Aug 2022 04:14 )             27.8   08-11    125<L>  |  86<L>  |  76<H>  ----------------------------<  143<H>  4.9   |  22  |  5.11<H>    Ca    8.6      11 Aug 2022 04:14  Phos  5.9     08-11  Mg     2.5     08-11    TPro  6.1  /  Alb  3.5  /  TBili  3.4<H>  /  DBili  1.9<H>  /  AST  34  /  ALT  19  /  AlkPhos  104  08-11  PT/INR - ( 11 Aug 2022 04:14 )   PT: 26.2 sec;   INR: 2.26 ratio         PTT - ( 11 Aug 2022 04:14 )  PTT:51.5 sec  --------------------------------------------------------------------------------------     SICU Daily Progress Note  =====================================================  Interval/Overnight Events:     - Dc'd Bumex gtt  - Pt given 1U FFP  - Right Shiley placed  - Renal diet  - Salt tabs  - IR consult for paracentesis        HPI:  Mr. Lobo is a 66y Male with history of recently diagnosed cirrhosis and cholangitis s/p ERCP with stent placement (4/2022) with stent removal on 7/20/22. Pt presented on 7/26 with complaints of RUQ pain for one week along with decreased appetite. While in ED, US performed reporting a distended gallbladder with stones and CBD measuring 5mm. Pt denied nausea, vomiting, fevers or chills and was discharged home.    Pt returned to [facility] on 8/08/22 presenting with tachycardic 's, normotensive, with an SpO2 94% on RA.  CT Abd/ Pelvis with IV contrast ( 8/08) showed a distended gallbladder with stones in the cystic duct, gallbladder wall thickening, suggesting chronic cholecystitis; cirrhosis with portal hypertension, moderate ascites with diffuse peritoneal enhancement concerning for SBP, small and large bowel thickening suggesting hepatic enterocolopathy. Pt had a percutaneous cholecystostomy tube placed on 8/09 along with a paracentesis yielding 300cc of purulent fluid. Pt admitted to SICU for further hemodynamic monitoring and management.       Allergies: No Known Allergies      MEDICATIONS:   --------------------------------------------------------------------------------------  Neurologic Medications    Respiratory Medications    Cardiovascular Medications  buMETAnide Infusion 0.5 mG/Hr IV Continuous <Continuous>  midodrine. 10 milliGRAM(s) Oral <User Schedule>    Gastrointestinal Medications  albumin human 25% IVPB 100 milliLiter(s) IV Intermittent every 8 hours  polyethylene glycol 3350 17 Gram(s) Oral daily  senna 2 Tablet(s) Oral at bedtime    Genitourinary Medications    Hematologic/Oncologic Medications    Antimicrobial/Immunologic Medications  piperacillin/tazobactam IVPB.. 3.375 Gram(s) IV Intermittent every 12 hours    Endocrine/Metabolic Medications  octreotide  Injectable 100 MICROGram(s) IV Push every 8 hours    Topical/Other Medications  chlorhexidine 2% Cloths 1 Application(s) Topical <User Schedule>  Dextrose 10 % + 2% Sodium Chloride 1000 milliLiter(s) 1000 milliLiter(s) IV Continuous <Continuous>    --------------------------------------------------------------------------------------    VITAL SIGNS, INS/OUTS (last 24 hours):  --------------------------------------------------------------------------------------  T(C): 36.6 (08-11-22 @ 03:00), Max: 36.6 (08-11-22 @ 03:00)  HR: 81 (08-11-22 @ 05:00) (79 - 104)  BP: 117/58 (08-11-22 @ 05:00) (102/56 - 137/67)  RR: 13 (08-11-22 @ 05:00) (11 - 22)  SpO2: 97% (08-11-22 @ 05:00) (93% - 100%)    08-09-22 @ 07:01  -  08-10-22 @ 07:00  --------------------------------------------------------  IN: 1660 mL / OUT: 130 mL / NET: 1530 mL    08-10-22 @ 07:01  -  08-11-22 @ 05:40  --------------------------------------------------------  IN: 2222.5 mL / OUT: 258 mL / NET: 1964.5 mL      --------------------------------------------------------------------------------------    EXAM  GENERAL:   NAD, well-groomed, well-developed  HEAD:    Atraumatic, Normocephalic  EYES:   EOMI, 3mm PERRLA, conjunctiva and sclera clear  ENMT:   No oropharyngeal exudates, erythema or lesions,  Moist mucous membranes  NECK:   Supple, no cervical lymphadenopathy, no JVD  NERVOUS SYSTEM:   Alert & Oriented X3, CN II-XII intact, Moves all extremities  ; Upper extremities  5/5; Lower extremities 5/5, full sensation to light touch   CHEST/LUNG:    Breath sounds bilaterally without crackles, rhonchi, wheezes, rubs  HEART:   cardiac monitor NSR   ; S1/S2 without murmurs, without rubs, or gallops.  ABDOMEN:   Soft, Nontender, Distended, + Fluid Wave; Bowel sounds present, Bladder non distended, non palpable. T tube in place with minimal serosanguinous output   EXTREMITIES:   Pulses palpable radial pulses 2+ bilat, DP/PT 1+/1+ bilat, without clubbing, cyanosis. Digits warm to touch with good cap refill < 3 secs  SKIN:   warm, dry, intact, normal color, no rash or abnormal lesions, without palpable nodes    LABS  --------------------------------------------------------------------------------------                        9.6    24.26 )-----------( 112      ( 11 Aug 2022 04:14 )             27.8   08-11    125<L>  |  86<L>  |  76<H>  ----------------------------<  143<H>  4.9   |  22  |  5.11<H>    Ca    8.6      11 Aug 2022 04:14  Phos  5.9     08-11  Mg     2.5     08-11    TPro  6.1  /  Alb  3.5  /  TBili  3.4<H>  /  DBili  1.9<H>  /  AST  34  /  ALT  19  /  AlkPhos  104  08-11  PT/INR - ( 11 Aug 2022 04:14 )   PT: 26.2 sec;   INR: 2.26 ratio         PTT - ( 11 Aug 2022 04:14 )  PTT:51.5 sec  --------------------------------------------------------------------------------------

## 2022-08-11 NOTE — PROGRESS NOTE ADULT - SUBJECTIVE AND OBJECTIVE BOX
Follow Up:      Interval History:    REVIEW OF SYSTEMS  [  ] ROS unobtainable because:    [  ] All other systems negative except as noted below    Constitutional:  [ ] fever [ ] chills  [ ] weight loss  [ ] weakness  Skin:  [ ] rash [ ] phlebitis	  Eyes: [ ] icterus [ ] pain  [ ] discharge	  ENMT: [ ] sore throat  [ ] thrush [ ] ulcers [ ] exudates  Respiratory: [ ] dyspnea [ ] hemoptysis [ ] cough [ ] sputum	  Cardiovascular:  [ ] chest pain [ ] palpitations [ ] edema	  Gastrointestinal:  [ ] nausea [ ] vomiting [ ] diarrhea [ ] constipation [ ] pain	  Genitourinary:  [ ] dysuria [ ] frequency [ ] hematuria [ ] discharge [ ] flank pain  [ ] incontinence  Musculoskeletal:  [ ] myalgias [ ] arthralgias [ ] arthritis  [ ] back pain  Neurological:  [ ] headache [ ] seizures  [ ] confusion/altered mental status    Allergies  No Known Allergies        ANTIMICROBIALS:  piperacillin/tazobactam IVPB.. 3.375 every 12 hours      OTHER MEDS:  MEDICATIONS  (STANDING):  midodrine. 10 <User Schedule>  octreotide  Injectable 100 every 8 hours  polyethylene glycol 3350 17 two times a day  senna 2 at bedtime      Vital Signs Last 24 Hrs  T(C): 36.1 (11 Aug 2022 11:00), Max: 36.6 (11 Aug 2022 03:00)  T(F): 97 (11 Aug 2022 11:00), Max: 97.8 (11 Aug 2022 03:00)  HR: 82 (11 Aug 2022 15:00) (77 - 95)  BP: 128/69 (11 Aug 2022 15:00) (115/55 - 146/72)  BP(mean): 93 (11 Aug 2022 15:00) (77 - 100)  RR: 16 (11 Aug 2022 15:00) (11 - 20)  SpO2: 97% (11 Aug 2022 15:00) (95% - 100%)    Parameters below as of 11 Aug 2022 11:51  Patient On (Oxygen Delivery Method): nasal cannula  O2 Flow (L/min): 1      PHYSICAL EXAMINATION:  General: Alert and Awake, NAD  HEENT: PERRL, EOMI  Neck: Supple  Cardiac: RRR, No M/R/G  Resp: CTAB, No Wh/Rh/Ra  Abdomen: NBS, NT/ND, No HSM, No rigidity or guarding  MSK: No LE edema. No Calf tenderness  : No milian  Skin: No rashes or lesions. Skin is warm and dry to the touch.   Neuro: Alert and Awake. CN 2-12 Grossly intact. Moves all four extremities spontaneously.  Psych: Calm, Pleasant, Cooperative                          9.6    24.93 )-----------( 103      ( 11 Aug 2022 10:27 )             27.7       08-11    125<L>  |  87<L>  |  77<H>  ----------------------------<  141<H>  4.9   |  22  |  5.13<H>    Ca    8.7      11 Aug 2022 10:27  Phos  5.6     08-11  Mg     2.6     08-11    TPro  6.3  /  Alb  3.8  /  TBili  3.3<H>  /  DBili  1.9<H>  /  AST  34  /  ALT  19  /  AlkPhos  106  08-11          MICROBIOLOGY:  v  Peritoneal Peritoneal Fluid  08-09-22   Few Escherichia coli  --    polymorphonuclear leukocytes seen  Moderate Gram positive cocci in pairs seen  Limited volume of specimen received, Unable to centrifuge/concentrate  specimen. Culture results may be compromised.      Catheterized Catheterized  08-08-22   10,000 - 49,000 CFU/mL Escherichia coli  --  Escherichia coli      .Blood Blood-Peripheral  08-08-22   No growth to date.  --  --      .Blood Blood-Peripheral  08-08-22   No growth to date.  --  --                RADIOLOGY:    <The imaging below has been reviewed and visualized by me independently. Findings as detailed in report below> Follow Up:  Cholecystitis, Peritonitis    Interval History: afebrile. tentatively planned for HD.     REVIEW OF SYSTEMS  [  ] ROS unobtainable because:    [  x] All other systems negative except as noted below    Constitutional:  [ ] fever [ ] chills  [ ] weight loss  [ ] weakness  Skin:  [ ] rash [ ] phlebitis	  Eyes: [ ] icterus [ ] pain  [ ] discharge	  ENMT: [ ] sore throat  [ ] thrush [ ] ulcers [ ] exudates  Respiratory: [ ] dyspnea [ ] hemoptysis [ ] cough [ ] sputum	  Cardiovascular:  [ ] chest pain [ ] palpitations [ ] edema	  Gastrointestinal:  [ ] nausea [ ] vomiting [ ] diarrhea [ ] constipation [x ] pain	  Genitourinary:  [ ] dysuria [ ] frequency [ ] hematuria [ ] discharge [ ] flank pain  [ ] incontinence  Musculoskeletal:  [ ] myalgias [ ] arthralgias [ ] arthritis  [ ] back pain  Neurological:  [ ] headache [ ] seizures  [ ] confusion/altered mental status    Allergies  No Known Allergies        ANTIMICROBIALS:  piperacillin/tazobactam IVPB.. 3.375 every 12 hours      OTHER MEDS:  MEDICATIONS  (STANDING):  midodrine. 10 <User Schedule>  octreotide  Injectable 100 every 8 hours  polyethylene glycol 3350 17 two times a day  senna 2 at bedtime      Vital Signs Last 24 Hrs  T(C): 36.1 (11 Aug 2022 11:00), Max: 36.6 (11 Aug 2022 03:00)  T(F): 97 (11 Aug 2022 11:00), Max: 97.8 (11 Aug 2022 03:00)  HR: 82 (11 Aug 2022 15:00) (77 - 95)  BP: 128/69 (11 Aug 2022 15:00) (115/55 - 146/72)  BP(mean): 93 (11 Aug 2022 15:00) (77 - 100)  RR: 16 (11 Aug 2022 15:00) (11 - 20)  SpO2: 97% (11 Aug 2022 15:00) (95% - 100%)    Parameters below as of 11 Aug 2022 11:51  Patient On (Oxygen Delivery Method): nasal cannula  O2 Flow (L/min): 1      PHYSICAL EXAMINATION:  General: Alert and Awake, NAD  Cardiac: RRR, No M/R/G  Resp: CTAB, No Wh/Rh/Ra  Abdomen: +Hepatobiliary Drain, Distended, No HSM, No rigidity or guarding  MSK: No LE edema. No Calf tenderness  Skin: No rashes or lesions. Skin is warm and dry to the touch.   Neuro: Alert and Awake. CN 2-12 Grossly intact. Moves all four extremities spontaneously.  Psych: Calm, Pleasant, Cooperative                          9.6    24.93 )-----------( 103      ( 11 Aug 2022 10:27 )             27.7       08-11    125<L>  |  87<L>  |  77<H>  ----------------------------<  141<H>  4.9   |  22  |  5.13<H>    Ca    8.7      11 Aug 2022 10:27  Phos  5.6     08-11  Mg     2.6     08-11    TPro  6.3  /  Alb  3.8  /  TBili  3.3<H>  /  DBili  1.9<H>  /  AST  34  /  ALT  19  /  AlkPhos  106  08-11          MICROBIOLOGY:    Peritoneal Peritoneal Fluid  08-09-22   Few Escherichia coli  --    polymorphonuclear leukocytes seen  Moderate Gram positive cocci in pairs seen  Limited volume of specimen received, Unable to centrifuge/concentrate  specimen. Culture results may be compromised.      Catheterized Catheterized  08-08-22   10,000 - 49,000 CFU/mL Escherichia coli  --  Escherichia coli      .Blood Blood-Peripheral  08-08-22   No growth to date.  --  --      .Blood Blood-Peripheral  08-08-22   No growth to date.  --  --    RADIOLOGY:    <The imaging below has been reviewed and visualized by me independently. Findings as detailed in report below>    < from: Xray Chest 1 View- PORTABLE-Urgent (Xray Chest 1 View- PORTABLE-Urgent .) (08.11.22 @ 13:28) >  IMPRESSION:  Right-sided dialysis catheter terminates in the lower SVC.    < end of copied text >

## 2022-08-11 NOTE — PROGRESS NOTE ADULT - SUBJECTIVE AND OBJECTIVE BOX
[facility] Division of Kidney Diseases & Hypertension  FOLLOW UP NOTE  [phone]--------------------------------------------------------------------------------  Chief Complaint:Acute cholecystitis        HPI: 66y Male with history of recently diagnosed cirrhosis and cholangitis with septic shock with E. coli bacteremia 2/2 acute cholecystitis  s/p ERCP with stent placement (4/2022) s/p stent removal on 7/20/22 presenting for one week of RUQ abdominal pain found to have acute cholecystitis & SBP. Nephrology called for ROBBIE, severe metabolic acidosis, hyponatremia and hyperkalemia. s/p perc rodney and diagnostic paracentesis with purulent material drained.  Baseline SCr is 0.7 on 7/26. SCr on arrival is 1.9 on 8/8 & trending up to 3 today.  Of note, pt had an episode of ROBBIE back in April attributed to septic shock with E. coli bacteremia 2/2 acute cholecystitis and UTI when SCr peaked to 2.2 & trended down to 1.1 on discharge 4/17/22 with IVF & treating sepsis. Hypotensive to 90's since 8/8 but BP now improving on IV albumin.  Pt remains anuric with  cc in 24 hrs with UOP yet low at 10-25 cc/hr in the last few hours. Bumex gtt was stopped on 8/10. Pt was started on 2% HTS for hyponatremia and now on salt tabs 2 gm tid          Pt seen & examined. c/o RUQ pain & poor appetite. No sob, light headedness/dizziness, difficulty breathing/cough, n/v, diarrhea, dysuria or hematuria. BP now improving on IV albumin.  Pt remains anuric with  cc in 24 hrs with UOP yet low at 10-25 cc/hr in the last few hours. Bumex gtt was stopped on 8/10. Pt was started on 2% HTS for hyponatremia and now on salt tabs 2 gm tid. Pt given Lokelma x2 for Hyperkalemia 5.4. Started Octreotide 100 q8 hr &  Midodrine 10 mg qid                PAST HISTORY  --------------------------------------------------------------------------------  No significant changes to PMH, PSH, FHx, SHx, unless otherwise noted    ALLERGIES & MEDICATIONS  --------------------------------------------------------------------------------  Allergies    No Known Allergies    Intolerances      Standing Inpatient Medications  albumin human 25% IVPB 100 milliLiter(s) IV Intermittent every 8 hours  chlorhexidine 2% Cloths 1 Application(s) Topical <User Schedule>  Dextrose 10 % + 2% Sodium Chloride 1000 milliLiter(s) 1000 milliLiter(s) IV Continuous <Continuous>  midodrine. 10 milliGRAM(s) Oral <User Schedule>  octreotide  Injectable 100 MICROGram(s) IV Push every 8 hours  piperacillin/tazobactam IVPB.. 3.375 Gram(s) IV Intermittent every 12 hours  polyethylene glycol 3350 17 Gram(s) Oral two times a day  senna 2 Tablet(s) Oral at bedtime  sodium chloride 2 Gram(s) Oral every 8 hours    PRN Inpatient Medications      REVIEW OF SYSTEMS  --------------------------------------------------------------------------------        All other systems were reviewed and are negative, except as noted.    VITALS/PHYSICAL EXAM  --------------------------------------------------------------------------------  T(C): 36.1 (08-11-22 @ 07:00), Max: 36.6 (08-11-22 @ 03:00)  HR: 90 (08-11-22 @ 10:00) (79 - 104)  BP: 142/71 (08-11-22 @ 10:00) (107/64 - 142/71)  RR: 16 (08-11-22 @ 10:00) (11 - 22)  SpO2: 97% (08-11-22 @ 10:00) (95% - 100%)  Wt(kg): --  Height (cm): 157.5 (08-09-22 @ 14:59)  Weight (kg): 72.6 (08-09-22 @ 14:59)  BMI (kg/m2): 29.3 (08-09-22 @ 14:59)  BSA (m2): 1.74 (08-09-22 @ 14:59)      08-10-22 @ 07:01  -  08-11-22 @ 07:00  --------------------------------------------------------  IN: 2332.5 mL / OUT: 273 mL / NET: 2059.5 mL    08-11-22 @ 07:01  -  08-11-22 @ 10:14  --------------------------------------------------------  IN: 340 mL / OUT: 57 mL / NET: 283 mL      Physical Exam:  	Gen: NAD  	HEENT: Anicteric, MMM, no JVD  	Pulm: CTA B/L  	CV: S1S2, no rub  	Abd: Soft, +BS, RUQ drain with SS fluid,  +fluid wave          No presacral edema  	Ext: ++ LE edema B/L, + asterixis  	Neuro: Awake, alert  	Skin: Warm and dry  	Vascular access:              +Tobin with minimal urine        LABS/STUDIES  --------------------------------------------------------------------------------              9.6    24.26 >-----------<  112      [08-11-22 @ 04:14]              27.8     125  |  86  |  76  ----------------------------<  143      [08-11-22 @ 04:14]  4.9   |  22  |  5.11        Ca     8.6     [08-11-22 @ 04:14]      Mg     2.5     [08-11-22 @ 04:14]      Phos  5.9     [08-11-22 @ 04:14]    TPro  6.1  /  Alb  3.5  /  TBili  3.4  /  DBili  1.9  /  AST  34  /  ALT  19  /  AlkPhos  104  [08-11-22 @ 04:14]    PT/INR: PT 26.2 , INR 2.26       [08-11-22 @ 04:14]  PTT: 51.5       [08-11-22 @ 04:14]      Creatinine Trend:  SCr 5.11 [08-11 @ 04:14]  SCr 4.92 [08-10 @ 22:57]  SCr 4.75 [08-10 @ 16:30]  SCr 4.69 [08-10 @ 12:54]  SCr 4.43 [08-10 @ 07:25]    Urinalysis - [08-08-22 @ 10:19]      Color Dark Yellow / Appearance Slightly Turbid / SG >1.050 / pH 5.5      Gluc Negative / Ketone Trace  / Bili Small / Urobili Negative       Blood Negative / Protein 30 mg/dL / Leuk Est Small / Nitrite Negative      RBC 2 / WBC 18 / Hyaline 30 / Gran  / Sq Epi  / Non Sq Epi 3 / Bacteria Many    Urine Creatinine 69      [08-10-22 @ 11:44]  Urine Protein 124      [08-09-22 @ 18:53]  Urine Sodium 92      [08-10-22 @ 11:44]  Urine Urea Nitrogen 62      [08-10-22 @ 12:04]  Urine Potassium 33      [08-10-22 @ 11:44]  Urine Chloride 80      [08-10-22 @ 11:44]  Urine Osmolality 295      [08-10-22 @ 11:44]

## 2022-08-11 NOTE — PROGRESS NOTE ADULT - ASSESSMENT
65 y/o M PMHx cholangitis/cholecystitis (s/p ERCP w/ biliary stent placement 04/2022) and recently diagnosed etOH cirrhosis, presents with RUQ pain following biliary stent removal on 7/20/22. Found to have distended GB with wall thickening and stone in cystic duct, concerning for cholecystitis. Admitted to SICU for monitoring, s/p perc rodney and paracentesis by IR on 8/9. Paracentesis results consistent with SBP. Course now c/b ARF.     s/p Perc Rodney Tube and Paracentesis  Paracentesis cell counts suggestive of Peritonitis  Cultures thus far with E coli on Perc Rodney Drainage and Ascites drainage  Agree with Zosyn for coverage  Will tailor antimicrobials to culture data from blood and perc rodney cultures    #Peritonitis, Cholecystitis, SBP, Leukocytosis   --Continue Zosyn 3.375G IV q12H for cholecystitis   --Continue to follow CBC with diff  --Continue to follow temperature curve  --Follow up on preliminary blood cultures  --Follow up on preliminary bile culture, ascites cultures    I will continue to follow. Please feel free to contact me with any further questions.    Bladimir Nix M.D.  [facility] Division of Infectious Disease  8AM-5PM Monday - Friday: Available on [employer] Teams  After Hours and Holidays (or if no response on [employer] Teams): Please contact the Infectious Diseases Office at [phone]    The above assessment and plan were discussed with Dr Kennedy Toledo

## 2022-08-11 NOTE — PROGRESS NOTE ADULT - ASSESSMENT
66 year old male with decompensated cirrhosis and cholangitis s/p ERCP with stent placement (4/2022) s/p stent removal on 7/20 (along sphincterotomy and stone extraction) presenting for one week of RUQ abdominal pain associated with new abdominal distension and decreased appetite.     #Septic shock 2/2 acute cholecystitis vs ascending cholangitis  #ROBBIE, worsening: initial urine sodium 9, likely indicating prerenal ROBBIE vs less likely HRS given initial presentation as above  #History of cholangitis s/p biliary stent placement April 2022 and removal 7/20/2022, now s/p perc rodney tube  #Decompensated ALD/PARNELL cirrhosis c/b ascites  EV: normal esophagus on ERCP from 4/2022  Ascites: increase in ascites from mild to moderate on current imaging  SBP: paracentesis 8/9/2022 c/w peritonitis given 99K PMNs however elevated LDH more indicative of secondary bacterial peritonitis  HE: none currently    Recommendations:  -trend clinical symptoms, exam findings, vital signs, CBC, CMP, INR  -weaned off pressors  -s/p percutaneous cholecystostomy as above  -likely secondary bacterial peritonitis based on paracentesis and clinical findings, CT A/P negative for perforation however performed without contrast 2/2 renal function  -would favor ATN as likely etiology, however given worsening ROBBIE continue midodrine/albumin/octreotide for empiric treatment for HRS-ROBBIE  -likely will need initiation of hemodialysis  -repeat paracentesis  -will launch evaluation for liver transplantation  -PPI daily for stress prophylaxis    Note incomplete until finalized by attending signature/attestation.    Fadi Churchill  GI/Hepatology Fellow    MONDAY-FRIDAY 8AM-5PM:  Pager# [phone] ([facility]) or [phone] ([facility])    NON-URGENT CONSULTS:  Please email [email or web address]  AT NIGHT AND ON WEEKENDS:  Contact on-call GI fellow via answering service ([phone]) from 5pm-8am and on weekends/holidays

## 2022-08-11 NOTE — PROGRESS NOTE ADULT - SUBJECTIVE AND OBJECTIVE BOX
Trauma Surgery Progress Note  Patient is a 66y old  Male who presents with a chief complaint of Ascending cholangitis (10 Aug 2022 12:09)      INTERVAL EVENTS: No acute events overnight.  SUBJECTIVE: Patient seen and examined at bedside with surgical team, patient without complaints. Denies fever, chills, CP, SOB nausea, vomiting, abdominal pain.    REVIEW OF SYSTEMS:  Constitutional: No fevers or chills. No malaise or weakness.  EENT: No vision changes. No ear pain. No nasal congestion or rhinitis. No throat pain or dysphagia.  Respiratory: No cough, wheezing, or SOB. No hemoptysis.  Cardiovascular: No chest pain or palpitations.  Gastrointestinal: No abdominal pain. No nausea, vomiting, diarrhea or constipation. No hematochezia. No melena.  Genitourinary: No dysuria, hematuria, or frequency.  Neurologic: No numbness or tingling. No weakness.  Skin: No rashes or pruritus.     OBJECTIVE:    Vital Signs Last 24 Hrs  T(C): 36.6 (11 Aug 2022 03:00), Max: 36.6 (11 Aug 2022 03:00)  T(F): 97.8 (11 Aug 2022 03:00), Max: 97.8 (11 Aug 2022 03:00)  HR: 87 (11 Aug 2022 03:00) (79 - 104)  BP: 131/62 (11 Aug 2022 03:00) (102/56 - 137/67)  BP(mean): 89 (11 Aug 2022 03:00) (74 - 95)  RR: 12 (11 Aug 2022 03:00) (11 - 22)  SpO2: 97% (11 Aug 2022 03:00) (93% - 100%)    Parameters below as of 11 Aug 2022 03:00  Patient On (Oxygen Delivery Method): nasal cannula  O2 Flow (L/min): 1  I&O's Detail    09 Aug 2022 07:01  -  10 Aug 2022 07:00  --------------------------------------------------------  IN:    Albumin 25%  -  50 mL: 50 mL    IV PiggyBack: 100 mL    IV PiggyBack: 150 mL    IV PiggyBack: 200 mL    IV PiggyBack: 200 mL    Oral Fluid: 120 mL    Sodium Bicarbonate: 200 mL    sodium chloride 0.9%: 250 mL    sodium chloride 0.9%: 390 mL  Total IN: 1660 mL    OUT:    Bumetanide: 0 mL    Indwelling Catheter - Urethral (mL): 100 mL    sodium chloride 2%: 0 mL    T-Tube (mL): 30 mL  Total OUT: 130 mL    Total NET: 1530 mL      10 Aug 2022 07:01  -  11 Aug 2022 03:34  --------------------------------------------------------  IN:    Albumin 25%  -  50 mL: 250 mL    Bumetanide: 27.5 mL    freetext medication - Infusion: 540 mL    IV PiggyBack: 250 mL    IV PiggyBack: 50 mL    IV PiggyBack: 200 mL    Oral Fluid: 760 mL    sodium chloride 0.9%: 60 mL  Total IN: 2137.5 mL    OUT:    Indwelling Catheter - Urethral (mL): 138 mL    T-Tube (mL): 55 mL  Total OUT: 193 mL    Total NET: 1944.5 mL      MEDICATIONS  (STANDING):  albumin human 25% IVPB 100 milliLiter(s) IV Intermittent every 8 hours  buMETAnide Infusion 0.5 mG/Hr (2.5 mL/Hr) IV Continuous <Continuous>  chlorhexidine 2% Cloths 1 Application(s) Topical <User Schedule>  Dextrose 10 % + 2% Sodium Chloride 1000 milliLiter(s) 1000 milliLiter(s) (30 mL/Hr) IV Continuous <Continuous>  midodrine. 10 milliGRAM(s) Oral <User Schedule>  octreotide  Injectable 100 MICROGram(s) IV Push every 8 hours  piperacillin/tazobactam IVPB.. 3.375 Gram(s) IV Intermittent every 12 hours  polyethylene glycol 3350 17 Gram(s) Oral daily  senna 2 Tablet(s) Oral at bedtime    MEDICATIONS  (PRN):      PHYSICAL EXAM:  Constitutional: A&Ox3, NAD  Respiratory: Unlabored breathing  Abdomen: Soft, non-tender, non-distended, no peritonitis/rebound tenderness, R perc rodney (bile producing)  Extremities: WWP, BAGLEY spontaneously    LABS:                        10.1   24.17 )-----------( 115      ( 10 Aug 2022 22:57 )             29.0     08-10    125<L>  |  86<L>  |  77<H>  ----------------------------<  154<H>  5.4<H>   |  25  |  4.92<H>    Ca    8.8      10 Aug 2022 22:57  Phos  6.1     08-10  Mg     2.5     08-10    TPro  6.2  /  Alb  3.5  /  TBili  3.6<H>  /  DBili  2.1<H>  /  AST  36  /  ALT  21  /  AlkPhos  112  08-10    PT/INR - ( 10 Aug 2022 22:57 )   PT: 26.1 sec;   INR: 2.25 ratio         PTT - ( 10 Aug 2022 22:57 )  PTT:49.2 sec  LIVER FUNCTIONS - ( 10 Aug 2022 22:57 )  Alb: 3.5 g/dL / Pro: 6.2 g/dL / ALK PHOS: 112 U/L / ALT: 21 U/L / AST: 36 U/L / GGT: x                 IMAGING:     Trauma Surgery Progress Note  Patient is a 66y old  Male who presents with a chief complaint of Ascending cholangitis (10 Aug 2022 12:09)      INTERVAL EVENTS: No acute events overnight.  SUBJECTIVE: Patient seen and examined at bedside with surgical team, patient without complaints. Denies fever, chills, CP, SOB nausea, vomiting, abdominal pain.    REVIEW OF SYSTEMS:  Constitutional: No fevers or chills. No malaise or weakness.  EENT: No vision changes. No ear pain. No nasal congestion or rhinitis. No throat pain or dysphagia.  Respiratory: No cough, wheezing, or SOB. No hemoptysis.  Cardiovascular: No chest pain or palpitations.  Gastrointestinal: No abdominal pain. No nausea, vomiting, diarrhea or constipation. No hematochezia. No melena.  Genitourinary: No dysuria, hematuria, or frequency.  Neurologic: No numbness or tingling. No weakness.  Skin: No rashes or pruritus.     OBJECTIVE:    Vital Signs Last 24 Hrs  T(C): 36.6 (11 Aug 2022 03:00), Max: 36.6 (11 Aug 2022 03:00)  T(F): 97.8 (11 Aug 2022 03:00), Max: 97.8 (11 Aug 2022 03:00)  HR: 87 (11 Aug 2022 03:00) (79 - 104)  BP: 131/62 (11 Aug 2022 03:00) (102/56 - 137/67)  BP(mean): 89 (11 Aug 2022 03:00) (74 - 95)  RR: 12 (11 Aug 2022 03:00) (11 - 22)  SpO2: 97% (11 Aug 2022 03:00) (93% - 100%)    Parameters below as of 11 Aug 2022 03:00  Patient On (Oxygen Delivery Method): nasal cannula  O2 Flow (L/min): 1  I&O's Detail    09 Aug 2022 07:01  -  10 Aug 2022 07:00  --------------------------------------------------------  IN:    Albumin 25%  -  50 mL: 50 mL    IV PiggyBack: 100 mL    IV PiggyBack: 150 mL    IV PiggyBack: 200 mL    IV PiggyBack: 200 mL    Oral Fluid: 120 mL    Sodium Bicarbonate: 200 mL    sodium chloride 0.9%: 250 mL    sodium chloride 0.9%: 390 mL  Total IN: 1660 mL    OUT:    Bumetanide: 0 mL    Indwelling Catheter - Urethral (mL): 100 mL    sodium chloride 2%: 0 mL    T-Tube (mL): 30 mL  Total OUT: 130 mL    Total NET: 1530 mL      10 Aug 2022 07:01  -  11 Aug 2022 03:34  --------------------------------------------------------  IN:    Albumin 25%  -  50 mL: 250 mL    Bumetanide: 27.5 mL    freetext medication - Infusion: 540 mL    IV PiggyBack: 250 mL    IV PiggyBack: 50 mL    IV PiggyBack: 200 mL    Oral Fluid: 760 mL    sodium chloride 0.9%: 60 mL  Total IN: 2137.5 mL    OUT:    Indwelling Catheter - Urethral (mL): 138 mL    T-Tube (mL): 55 mL  Total OUT: 193 mL    Total NET: 1944.5 mL      MEDICATIONS  (STANDING):  albumin human 25% IVPB 100 milliLiter(s) IV Intermittent every 8 hours  buMETAnide Infusion 0.5 mG/Hr (2.5 mL/Hr) IV Continuous <Continuous>  chlorhexidine 2% Cloths 1 Application(s) Topical <User Schedule>  Dextrose 10 % + 2% Sodium Chloride 1000 milliLiter(s) 1000 milliLiter(s) (30 mL/Hr) IV Continuous <Continuous>  midodrine. 10 milliGRAM(s) Oral <User Schedule>  octreotide  Injectable 100 MICROGram(s) IV Push every 8 hours  piperacillin/tazobactam IVPB.. 3.375 Gram(s) IV Intermittent every 12 hours  polyethylene glycol 3350 17 Gram(s) Oral daily  senna 2 Tablet(s) Oral at bedtime    MEDICATIONS  (PRN):      PHYSICAL EXAM:  Constitutional: A&Ox3, NAD  Respiratory: Unlabored breathing  Abdomen: Soft, non-tender, non-distended, no peritonitis/rebound tenderness, R perc rodney producing serosanguinous fluid  : Bladder nondistended, Tobin in place  Extremities: WWP, BAGLEY spontaneously, no edema, nontender to palpation    LABS:                        10.1   24.17 )-----------( 115      ( 10 Aug 2022 22:57 )             29.0     08-10    125<L>  |  86<L>  |  77<H>  ----------------------------<  154<H>  5.4<H>   |  25  |  4.92<H>    Ca    8.8      10 Aug 2022 22:57  Phos  6.1     08-10  Mg     2.5     08-10    TPro  6.2  /  Alb  3.5  /  TBili  3.6<H>  /  DBili  2.1<H>  /  AST  36  /  ALT  21  /  AlkPhos  112  08-10    PT/INR - ( 10 Aug 2022 22:57 )   PT: 26.1 sec;   INR: 2.25 ratio         PTT - ( 10 Aug 2022 22:57 )  PTT:49.2 sec  LIVER FUNCTIONS - ( 10 Aug 2022 22:57 )  Alb: 3.5 g/dL / Pro: 6.2 g/dL / ALK PHOS: 112 U/L / ALT: 21 U/L / AST: 36 U/L / GGT: x                 IMAGING:

## 2022-08-11 NOTE — DIETITIAN INITIAL EVALUATION ADULT - ENERGY INTAKE
Inadequate diet order (NPO/clear liquids) x 4 days.  Per team, pt requires a diet with NO potassium. RD will communicate with diet office to restrict diet appropriately. Inadequate diet order (NPO/clear liquids) x 4 days; advanced to Renal diet today (8/11).  Per team, pt requires a diet with NO potassium. RD will communicate with diet office to restrict diet appropriately. Inadequate diet order (NPO/clear liquids) x 4 days; advanced to Renal diet today (8/11).  Lunch tray observed. Pt consumed 3 bites of chicken salad sandwich.  Per team, pt requires a diet with NO potassium. RD will communicate with diet office to restrict diet appropriately.

## 2022-08-11 NOTE — PROGRESS NOTE ADULT - SUBJECTIVE AND OBJECTIVE BOX
Interval Events:   No acute events overnight.  Vitals stable off pressors.  Patient endorses swelling and decreased urine output.    ROS:   12 point review of systems performed and negative except otherwise noted in HPI.    Hospital Medications:  albumin human 25% IVPB 100 milliLiter(s) IV Intermittent every 8 hours  chlorhexidine 2% Cloths 1 Application(s) Topical <User Schedule>  Dextrose 10 % + 2% Sodium Chloride 1000 milliLiter(s) 1000 milliLiter(s) (30 mL/Hr) IV Continuous <Continuous>  midodrine. 10 milliGRAM(s) Oral <User Schedule>  octreotide  Injectable 100 MICROGram(s) IV Push every 8 hours  piperacillin/tazobactam IVPB.. 3.375 Gram(s) IV Intermittent every 12 hours  polyethylene glycol 3350 17 Gram(s) Oral two times a day  senna 2 Tablet(s) Oral at bedtime  sodium chloride 2 Gram(s) Oral every 8 hours    MAR over past 24 hours:    albumin human 25% IVPB   50 mL/Hr IV Intermittent (08-11-22 @ 07:45)   50 mL/Hr IV Intermittent (08-10-22 @ 23:59)   50 mL/Hr IV Intermittent (08-10-22 @ 15:05)    chlorhexidine 2% Cloths   1 Application(s) Topical (08-11-22 @ 05:47)    midodrine.   10 milliGRAM(s) Oral (08-11-22 @ 08:51)   10 milliGRAM(s) Oral (08-11-22 @ 04:12)   10 milliGRAM(s) Oral (08-10-22 @ 22:24)   10 milliGRAM(s) Oral (08-10-22 @ 14:31)    octreotide  Injectable   100 MICROGram(s) IV Push (08-11-22 @ 09:45)   100 MICROGram(s) IV Push (08-11-22 @ 02:25)   100 MICROGram(s) IV Push (08-10-22 @ 17:00)    piperacillin/tazobactam IVPB..   25 mL/Hr IV Intermittent (08-11-22 @ 09:45)   25 mL/Hr IV Intermittent (08-10-22 @ 22:24)    senna   2 Tablet(s) Oral (08-10-22 @ 22:24)    sodium zirconium cyclosilicate   10 Gram(s) Oral (08-10-22 @ 16:42)      Home Medications:  Last Order Reconciliation Date: 08-08-22 @ 06:30 (Admission Reconciliation)    PHYSICAL EXAM:   Vital Signs last 24 hours:  T(F): 97 (08-11-22 @ 11:00), Max: 97.8 (08-11-22 @ 03:00)  HR: 84 (08-11-22 @ 12:00) (79 - 96)  BP: 134/65 (08-11-22 @ 12:00) (115/55 - 146/72)  BP(mean): 92 (08-11-22 @ 12:00) (77 - 100)  ABP: --  ABP(mean): --  RR: 14 (08-11-22 @ 12:00) (11 - 22)  SpO2: 98% (08-11-22 @ 12:00) (95% - 100%)    I&Os:    08-10-22 @ 07:01  -  08-11-22 @ 07:00  --------------------------------------------------------  IN:    Albumin 25%  -  50 mL: 300 mL    Bumetanide: 27.5 mL    freetext medication - Infusion: 660 mL    IV PiggyBack: 225 mL    IV PiggyBack: 250 mL    IV PiggyBack: 50 mL    Oral Fluid: 760 mL    sodium chloride 0.9%: 60 mL  Total IN: 2332.5 mL    OUT:    Indwelling Catheter - Urethral (mL): 188 mL    T-Tube (mL): 85 mL  Total OUT: 273 mL    Total NET: 2059.5 mL      08-11-22 @ 07:01  -  08-11-22 @ 12:09  --------------------------------------------------------  IN:    Albumin 25%  -  50 mL: 50 mL    freetext medication - Infusion: 150 mL    IV PiggyBack: 100 mL    Oral Fluid: 200 mL  Total IN: 500 mL    OUT:    Bumetanide: 0 mL    Indwelling Catheter - Urethral (mL): 107 mL  Total OUT: 107 mL    Total NET: 393 mL        BMI (kg/m2): 29.3 (08-09-22 @ 14:59)  GENERAL: no acute distress  NEURO: alert  HEENT: NCAT, no conjunctival pallor appreciated  CHEST: no respiratory distress, no accessory muscle use  CARDIAC: regular rate, +S1/S2  ABDOMEN: soft, distended, nontender, no rebound or guarding  EXTREMITIES: warm, well perfused, peripheral edema present  SKIN: no lesions noted    DIAGNOSTICS:  WBC      Hg       PLT      Na       K        CO2     BUN      Cr       ALT      AST      TB       ALP  24.93    9.6      103      125      4.9      22       77       5.13     19       34       3.3      106      08-11-22 @ 10:27  24.26    9.6      112      125      4.9      22       76       5.11     19       34       3.4      104      08-11-22 @ 04:14  24.17    10.1     115      125      5.4      25       77       4.92     21       36       3.6      112      08-10-22 @ 22:57  25.14    9.5      103      123      5.4      23       76       4.75     19       36       3.4      101      08-10-22 @ 16:30  26.32    9.7      124      122      5.2      23       73       4.69     22       39       3.9      108      08-10-22 @ 12:54    PT             INR            MELDwith  MELDw/o  27.3           2.33           --             --             08-11-22 @ 10:27  26.2           2.26           --             --             08-11-22 @ 04:14  26.1           2.25           --             --             08-10-22 @ 22:57  25.7           2.22           --             --             08-10-22 @ 16:30  25.9           2.23           --             --             08-10-22 @ 12:54   Interval Events:   No acute events overnight.  Vitals stable off pressors.  Patient endorses swelling and decreased urine output.    ROS:   12 point review of systems performed and negative except otherwise noted in HPI.    Hospital Medications:  albumin human 25% IVPB 100 milliLiter(s) IV Intermittent every 8 hours  chlorhexidine 2% Cloths 1 Application(s) Topical <User Schedule>  Dextrose 10 % + 2% Sodium Chloride 1000 milliLiter(s) 1000 milliLiter(s) (30 mL/Hr) IV Continuous <Continuous>  midodrine. 10 milliGRAM(s) Oral <User Schedule>  octreotide  Injectable 100 MICROGram(s) IV Push every 8 hours  piperacillin/tazobactam IVPB.. 3.375 Gram(s) IV Intermittent every 12 hours  polyethylene glycol 3350 17 Gram(s) Oral two times a day  senna 2 Tablet(s) Oral at bedtime  sodium chloride 2 Gram(s) Oral every 8 hours    MAR over past 24 hours:    albumin human 25% IVPB   50 mL/Hr IV Intermittent (08-11-22 @ 07:45)   50 mL/Hr IV Intermittent (08-10-22 @ 23:59)   50 mL/Hr IV Intermittent (08-10-22 @ 15:05)    chlorhexidine 2% Cloths   1 Application(s) Topical (08-11-22 @ 05:47)    midodrine.   10 milliGRAM(s) Oral (08-11-22 @ 08:51)   10 milliGRAM(s) Oral (08-11-22 @ 04:12)   10 milliGRAM(s) Oral (08-10-22 @ 22:24)   10 milliGRAM(s) Oral (08-10-22 @ 14:31)    octreotide  Injectable   100 MICROGram(s) IV Push (08-11-22 @ 09:45)   100 MICROGram(s) IV Push (08-11-22 @ 02:25)   100 MICROGram(s) IV Push (08-10-22 @ 17:00)    piperacillin/tazobactam IVPB..   25 mL/Hr IV Intermittent (08-11-22 @ 09:45)   25 mL/Hr IV Intermittent (08-10-22 @ 22:24)    senna   2 Tablet(s) Oral (08-10-22 @ 22:24)    sodium zirconium cyclosilicate   10 Gram(s) Oral (08-10-22 @ 16:42)      Home Medications:  Last Order Reconciliation Date: 08-08-22 @ 06:30 (Admission Reconciliation)    PHYSICAL EXAM:   Vital Signs last 24 hours:  T(F): 97 (08-11-22 @ 11:00), Max: 97.8 (08-11-22 @ 03:00)  HR: 84 (08-11-22 @ 12:00) (79 - 96)  BP: 134/65 (08-11-22 @ 12:00) (115/55 - 146/72)  BP(mean): 92 (08-11-22 @ 12:00) (77 - 100)  ABP: --  ABP(mean): --  RR: 14 (08-11-22 @ 12:00) (11 - 22)  SpO2: 98% (08-11-22 @ 12:00) (95% - 100%)    I&Os:    08-10-22 @ 07:01  -  08-11-22 @ 07:00  --------------------------------------------------------  IN:    Albumin 25%  -  50 mL: 300 mL    Bumetanide: 27.5 mL    freetext medication - Infusion: 660 mL    IV PiggyBack: 225 mL    IV PiggyBack: 250 mL    IV PiggyBack: 50 mL    Oral Fluid: 760 mL    sodium chloride 0.9%: 60 mL  Total IN: 2332.5 mL    OUT:    Indwelling Catheter - Urethral (mL): 188 mL    T-Tube (mL): 85 mL  Total OUT: 273 mL    Total NET: 2059.5 mL      08-11-22 @ 07:01  -  08-11-22 @ 12:09  --------------------------------------------------------  IN:    Albumin 25%  -  50 mL: 50 mL    freetext medication - Infusion: 150 mL    IV PiggyBack: 100 mL    Oral Fluid: 200 mL  Total IN: 500 mL    OUT:    Bumetanide: 0 mL    Indwelling Catheter - Urethral (mL): 107 mL  Total OUT: 107 mL    Total NET: 393 mL        BMI (kg/m2): 29.3 (08-09-22 @ 14:59)  GENERAL: no acute distress  NEURO: alert, oriented x3, no asterixis  HEENT: NCAT, no conjunctival pallor appreciated, +sclera icteric  CHEST: no respiratory distress, no accessory muscle use, CTAB  CARDIAC: regular rate, +S1/S2  ABDOMEN: soft, distended, +diffusely tender with rebound tenderness, no guarding, +cholecystostomy tube  EXTREMITIES: warm, well perfused, +peripheral edema present  SKIN: no lesions noted, +jaundiced    DIAGNOSTICS:  WBC      Hg       PLT      Na       K        CO2     BUN      Cr       ALT      AST      TB       ALP  24.93    9.6      103      125      4.9      22       77       5.13     19       34       3.3      106      08-11-22 @ 10:27  24.26    9.6      112      125      4.9      22       76       5.11     19       34       3.4      104      08-11-22 @ 04:14  24.17    10.1     115      125      5.4      25       77       4.92     21       36       3.6      112      08-10-22 @ 22:57  25.14    9.5      103      123      5.4      23       76       4.75     19       36       3.4      101      08-10-22 @ 16:30  26.32    9.7      124      122      5.2      23       73       4.69     22       39       3.9      108      08-10-22 @ 12:54    PT             INR            MELDwith  MELDw/o  27.3           2.33           --             --             08-11-22 @ 10:27  26.2           2.26           --             --             08-11-22 @ 04:14  26.1           2.25           --             --             08-10-22 @ 22:57  25.7           2.22           --             --             08-10-22 @ 16:30  25.9           2.23           --             --             08-10-22 @ 12:54

## 2022-08-11 NOTE — DIETITIAN INITIAL EVALUATION ADULT - REASON INDICATOR FOR ASSESSMENT
Nutrition Assessment warranted for length of stay on SICU  Information obtained from: medical record, communication with team.  Nutrition Assessment warranted for length of stay on SICU  Information obtained from: wife at bedside, medical record, communication with team.   Pt sleeping soundly at time of visit.

## 2022-08-12 LAB
A1C WITH ESTIMATED AVERAGE GLUCOSE RESULT: 4.9 % — SIGNIFICANT CHANGE UP (ref 4–5.6)
ALBUMIN FLD-MCNC: 1.7 G/DL — SIGNIFICANT CHANGE UP
ALBUMIN SERPL ELPH-MCNC: 2.9 G/DL — LOW (ref 3.3–5)
ALBUMIN SERPL ELPH-MCNC: 3.5 G/DL — SIGNIFICANT CHANGE UP (ref 3.3–5)
ALBUMIN SERPL ELPH-MCNC: 3.5 G/DL — SIGNIFICANT CHANGE UP (ref 3.3–5)
ALBUMIN SERPL ELPH-MCNC: 3.7 G/DL — SIGNIFICANT CHANGE UP (ref 3.3–5)
ALP SERPL-CCNC: 104 U/L — SIGNIFICANT CHANGE UP (ref 40–120)
ALP SERPL-CCNC: 107 U/L — SIGNIFICANT CHANGE UP (ref 40–120)
ALP SERPL-CCNC: 122 U/L — HIGH (ref 40–120)
ALP SERPL-CCNC: 96 U/L — SIGNIFICANT CHANGE UP (ref 40–120)
ALT FLD-CCNC: 18 U/L — SIGNIFICANT CHANGE UP (ref 10–45)
ALT FLD-CCNC: 18 U/L — SIGNIFICANT CHANGE UP (ref 10–45)
ALT FLD-CCNC: 20 U/L — SIGNIFICANT CHANGE UP (ref 10–45)
ALT FLD-CCNC: 20 U/L — SIGNIFICANT CHANGE UP (ref 10–45)
AMMONIA BLD-MCNC: 38 UMOL/L — SIGNIFICANT CHANGE UP (ref 11–55)
AMMONIA BLD-MCNC: 38 UMOL/L — SIGNIFICANT CHANGE UP (ref 11–55)
AMMONIA BLD-MCNC: 45 UMOL/L — SIGNIFICANT CHANGE UP (ref 11–55)
ANION GAP SERPL CALC-SCNC: 14 MMOL/L — SIGNIFICANT CHANGE UP (ref 5–17)
ANION GAP SERPL CALC-SCNC: 15 MMOL/L — SIGNIFICANT CHANGE UP (ref 5–17)
APAP SERPL-MCNC: <15 UG/ML — SIGNIFICANT CHANGE UP (ref 10–30)
APCR PPP: 2.47 RATIO — SIGNIFICANT CHANGE UP
APTT BLD: 38.8 SEC — HIGH (ref 27.5–35.5)
APTT BLD: 42.2 SEC — HIGH (ref 27.5–35.5)
APTT BLD: 46.2 SEC — HIGH (ref 27.5–35.5)
AST SERPL-CCNC: 31 U/L — SIGNIFICANT CHANGE UP (ref 10–40)
AST SERPL-CCNC: 31 U/L — SIGNIFICANT CHANGE UP (ref 10–40)
AST SERPL-CCNC: 32 U/L — SIGNIFICANT CHANGE UP (ref 10–40)
AST SERPL-CCNC: 32 U/L — SIGNIFICANT CHANGE UP (ref 10–40)
AT III ACT/NOR PPP CHRO: 20 % — LOW (ref 85–135)
AT III AG PPP IA-MCNC: 4 MG/DL — LOW (ref 19–31)
B PERT IGG+IGM PNL SER: ABNORMAL
BASE EXCESS BLDA CALC-SCNC: -0.4 MMOL/L — SIGNIFICANT CHANGE UP (ref -2–3)
BILIRUB DIRECT SERPL-MCNC: 1.5 MG/DL — HIGH (ref 0–0.3)
BILIRUB DIRECT SERPL-MCNC: 1.7 MG/DL — HIGH (ref 0–0.3)
BILIRUB INDIRECT FLD-MCNC: 1.4 MG/DL — HIGH (ref 0.2–1)
BILIRUB INDIRECT FLD-MCNC: 1.5 MG/DL — HIGH (ref 0.2–1)
BILIRUB INDIRECT FLD-MCNC: 1.7 MG/DL — HIGH (ref 0.2–1)
BILIRUB SERPL-MCNC: 2.9 MG/DL — HIGH (ref 0.2–1.2)
BILIRUB SERPL-MCNC: 3.2 MG/DL — HIGH (ref 0.2–1.2)
BILIRUB SERPL-MCNC: 3.2 MG/DL — HIGH (ref 0.2–1.2)
BILIRUB SERPL-MCNC: 3.4 MG/DL — HIGH (ref 0.2–1.2)
BUN SERPL-MCNC: 45 MG/DL — HIGH (ref 7–23)
BUN SERPL-MCNC: 62 MG/DL — HIGH (ref 7–23)
BUN SERPL-MCNC: 62 MG/DL — HIGH (ref 7–23)
BUN SERPL-MCNC: 68 MG/DL — HIGH (ref 7–23)
CALCIUM SERPL-MCNC: 8.6 MG/DL — SIGNIFICANT CHANGE UP (ref 8.4–10.5)
CALCIUM SERPL-MCNC: 8.7 MG/DL — SIGNIFICANT CHANGE UP (ref 8.4–10.5)
CALCIUM SERPL-MCNC: 8.7 MG/DL — SIGNIFICANT CHANGE UP (ref 8.4–10.5)
CALCIUM SERPL-MCNC: 8.8 MG/DL — SIGNIFICANT CHANGE UP (ref 8.4–10.5)
CEA SERPL-MCNC: 0.9 NG/ML — SIGNIFICANT CHANGE UP (ref 0–3.8)
CERULOPLASMIN SERPL-MCNC: 12 MG/DL — LOW (ref 15–30)
CHLORIDE SERPL-SCNC: 93 MMOL/L — LOW (ref 96–108)
CHLORIDE SERPL-SCNC: 93 MMOL/L — LOW (ref 96–108)
CHLORIDE SERPL-SCNC: 94 MMOL/L — LOW (ref 96–108)
CHLORIDE SERPL-SCNC: 96 MMOL/L — SIGNIFICANT CHANGE UP (ref 96–108)
CHOLEST SERPL-MCNC: 41 MG/DL — SIGNIFICANT CHANGE UP
CMV DNA CSF QL NAA+PROBE: SIGNIFICANT CHANGE UP
CMV DNA SPEC NAA+PROBE-LOG#: ABNORMAL LOG10IU/ML
CMV IGG FLD QL: 4 U/ML — HIGH
CMV IGG SERPL-IMP: POSITIVE
CO2 BLDA-SCNC: 26 MMOL/L — HIGH (ref 19–24)
CO2 SERPL-SCNC: 23 MMOL/L — SIGNIFICANT CHANGE UP (ref 22–31)
CO2 SERPL-SCNC: 24 MMOL/L — SIGNIFICANT CHANGE UP (ref 22–31)
COLOR FLD: YELLOW — SIGNIFICANT CHANGE UP
COVID-19 NUCLEOCAPSID GAM AB INTERP: POSITIVE
COVID-19 NUCLEOCAPSID TOTAL GAM ANTIBODY RESULT: 3.15 INDEX — HIGH
COVID-19 SPIKE DOMAIN AB INTERP: POSITIVE
COVID-19 SPIKE DOMAIN ANTIBODY RESULT: >250 U/ML — HIGH
CREAT SERPL-MCNC: 2.99 MG/DL — HIGH (ref 0.5–1.3)
CREAT SERPL-MCNC: 4.17 MG/DL — HIGH (ref 0.5–1.3)
CREAT SERPL-MCNC: 4.28 MG/DL — HIGH (ref 0.5–1.3)
CREAT SERPL-MCNC: 4.28 MG/DL — HIGH (ref 0.5–1.3)
EBV DNA SERPL NAA+PROBE-ACNC: ABNORMAL IU/ML
EBV EA AB SER IA-ACNC: <5 U/ML — SIGNIFICANT CHANGE UP
EBV EA AB TITR SER IF: POSITIVE
EBV EA IGG SER-ACNC: NEGATIVE — SIGNIFICANT CHANGE UP
EBV NA IGG SER IA-ACNC: >600 U/ML — HIGH
EBV PATRN SPEC IB-IMP: SIGNIFICANT CHANGE UP
EBV VCA IGG AVIDITY SER QL IA: POSITIVE
EBV VCA IGM SER IA-ACNC: 117 U/ML — HIGH
EBV VCA IGM SER IA-ACNC: <10 U/ML — SIGNIFICANT CHANGE UP
EBV VCA IGM TITR FLD: NEGATIVE — SIGNIFICANT CHANGE UP
EBVPCR LOG: ABNORMAL LOG10IU/ML
EGFR: 15 ML/MIN/1.73M2 — LOW
EGFR: 23 ML/MIN/1.73M2 — LOW
ESTIMATED AVERAGE GLUCOSE: 94 MG/DL — SIGNIFICANT CHANGE UP (ref 68–114)
ETHANOL SERPL-MCNC: <10 MG/DL — SIGNIFICANT CHANGE UP (ref 0–10)
FERRITIN SERPL-MCNC: 728 NG/ML — HIGH (ref 30–400)
FIBRINOGEN PPP-MCNC: 286 MG/DL — LOW (ref 330–520)
FLUID INTAKE SUBSTANCE CLASS: SIGNIFICANT CHANGE UP
FOLATE SERPL-MCNC: 7.6 NG/ML — SIGNIFICANT CHANGE UP
GAS PNL BLDA: SIGNIFICANT CHANGE UP
GGT SERPL-CCNC: 29 U/L — SIGNIFICANT CHANGE UP (ref 9–50)
GLUCOSE BLDC GLUCOMTR-MCNC: 99 MG/DL — SIGNIFICANT CHANGE UP (ref 70–99)
GLUCOSE FLD-MCNC: 15 MG/DL — SIGNIFICANT CHANGE UP
GLUCOSE SERPL-MCNC: 137 MG/DL — HIGH (ref 70–99)
GLUCOSE SERPL-MCNC: 141 MG/DL — HIGH (ref 70–99)
GLUCOSE SERPL-MCNC: 144 MG/DL — HIGH (ref 70–99)
GLUCOSE SERPL-MCNC: 144 MG/DL — HIGH (ref 70–99)
GRAM STN FLD: SIGNIFICANT CHANGE UP
HAPTOGLOB SERPL-MCNC: 58 MG/DL — SIGNIFICANT CHANGE UP (ref 34–200)
HAV IGG SER QL IA: REACTIVE
HBV CORE AB SER-ACNC: SIGNIFICANT CHANGE UP
HBV DNA # SERPL NAA+PROBE: SIGNIFICANT CHANGE UP
HBV DNA SERPL NAA+PROBE-LOG#: SIGNIFICANT CHANGE UP LOGIU/ML
HBV SURFACE AB SER-ACNC: <3 MIU/ML — LOW
HBV SURFACE AB SER-ACNC: SIGNIFICANT CHANGE UP
HBV SURFACE AG SER-ACNC: SIGNIFICANT CHANGE UP
HCG SERPL-ACNC: <2 MIU/ML — HIGH
HCO3 BLDA-SCNC: 25 MMOL/L — SIGNIFICANT CHANGE UP (ref 21–28)
HCT VFR BLD CALC: 23.7 % — LOW (ref 39–50)
HCT VFR BLD CALC: 25.2 % — LOW (ref 39–50)
HCT VFR BLD CALC: 25.2 % — LOW (ref 39–50)
HCT VFR BLD CALC: 25.9 % — LOW (ref 39–50)
HCV AB S/CO SERPL IA: 0.15 S/CO — SIGNIFICANT CHANGE UP (ref 0–0.99)
HCV AB SERPL-IMP: SIGNIFICANT CHANGE UP
HCV RNA FLD QL NAA+PROBE: SIGNIFICANT CHANGE UP
HCV RNA SPEC QL PROBE+SIG AMP: SIGNIFICANT CHANGE UP
HCYS SERPL-MCNC: 30.1 UMOL/L — HIGH
HDLC SERPL-MCNC: 9 MG/DL — LOW
HGB BLD-MCNC: 8.1 G/DL — LOW (ref 13–17)
HGB BLD-MCNC: 8.5 G/DL — LOW (ref 13–17)
HGB BLD-MCNC: 8.8 G/DL — LOW (ref 13–17)
HGB BLD-MCNC: 8.9 G/DL — LOW (ref 13–17)
HIV 1 & 2 AB SERPL IA.RAPID: SIGNIFICANT CHANGE UP
HIV 1+2 AB+HIV1 P24 AG SERPL QL IA: SIGNIFICANT CHANGE UP
HSV1 IGG SER-ACNC: 26.6 INDEX — HIGH
HSV1 IGG SER-ACNC: 26.6 INDEX — HIGH
HSV1 IGG SERPL QL IA: POSITIVE
HSV1 IGG SERPL QL IA: POSITIVE
HSV2 IGG FLD-ACNC: 9.88 INDEX — HIGH
HSV2 IGG FLD-ACNC: 9.88 INDEX — HIGH
HSV2 IGG SERPL QL IA: POSITIVE
HSV2 IGG SERPL QL IA: POSITIVE
IGA FLD-MCNC: 327 MG/DL — SIGNIFICANT CHANGE UP (ref 84–499)
IGG FLD-MCNC: 1227 MG/DL — SIGNIFICANT CHANGE UP (ref 610–1660)
IGM SERPL-MCNC: 120 MG/DL — SIGNIFICANT CHANGE UP (ref 35–242)
INR BLD: 2.24 RATIO — HIGH (ref 0.88–1.16)
INR BLD: 2.25 RATIO — HIGH (ref 0.88–1.16)
INR BLD: 2.46 RATIO — HIGH (ref 0.88–1.16)
IRON SATN MFR SERPL: 32 UG/DL — LOW (ref 45–165)
IRON SATN MFR SERPL: 33 UG/DL — LOW (ref 45–165)
IRON SATN MFR SERPL: 34 % — SIGNIFICANT CHANGE UP (ref 16–55)
IRON SATN MFR SERPL: 34 % — SIGNIFICANT CHANGE UP (ref 16–55)
LACTATE SERPL-SCNC: 2.7 MMOL/L — HIGH (ref 0.7–2)
LDH SERPL L TO P-CCNC: 3233 U/L — SIGNIFICANT CHANGE UP
LIPID PNL WITH DIRECT LDL SERPL: 22 MG/DL — SIGNIFICANT CHANGE UP
LYMPHOCYTES # FLD: 1 % — SIGNIFICANT CHANGE UP
MAGNESIUM SERPL-MCNC: 2.1 MG/DL — SIGNIFICANT CHANGE UP (ref 1.6–2.6)
MAGNESIUM SERPL-MCNC: 2.3 MG/DL — SIGNIFICANT CHANGE UP (ref 1.6–2.6)
MAGNESIUM SERPL-MCNC: 2.4 MG/DL — SIGNIFICANT CHANGE UP (ref 1.6–2.6)
MCHC RBC-ENTMCNC: 32.4 PG — SIGNIFICANT CHANGE UP (ref 27–34)
MCHC RBC-ENTMCNC: 32.5 PG — SIGNIFICANT CHANGE UP (ref 27–34)
MCHC RBC-ENTMCNC: 32.6 PG — SIGNIFICANT CHANGE UP (ref 27–34)
MCHC RBC-ENTMCNC: 33.5 PG — SIGNIFICANT CHANGE UP (ref 27–34)
MCHC RBC-ENTMCNC: 33.7 GM/DL — SIGNIFICANT CHANGE UP (ref 32–36)
MCHC RBC-ENTMCNC: 34 GM/DL — SIGNIFICANT CHANGE UP (ref 32–36)
MCHC RBC-ENTMCNC: 34.2 GM/DL — SIGNIFICANT CHANGE UP (ref 32–36)
MCHC RBC-ENTMCNC: 35.3 GM/DL — SIGNIFICANT CHANGE UP (ref 32–36)
MCV RBC AUTO: 94.7 FL — SIGNIFICANT CHANGE UP (ref 80–100)
MCV RBC AUTO: 95.2 FL — SIGNIFICANT CHANGE UP (ref 80–100)
MCV RBC AUTO: 95.2 FL — SIGNIFICANT CHANGE UP (ref 80–100)
MCV RBC AUTO: 96.6 FL — SIGNIFICANT CHANGE UP (ref 80–100)
MEV IGG SER-ACNC: >300 AU/ML — SIGNIFICANT CHANGE UP
MEV IGG+IGM SER-IMP: POSITIVE — SIGNIFICANT CHANGE UP
MONOS+MACROS # FLD: 2 % — SIGNIFICANT CHANGE UP
NEUTROPHILS-BODY FLUID: 97 % — SIGNIFICANT CHANGE UP
NON HDL CHOLESTEROL: 32 MG/DL — SIGNIFICANT CHANGE UP
NON-GYNECOLOGICAL CYTOLOGY STUDY: SIGNIFICANT CHANGE UP
NRBC # BLD: 0 /100 WBCS — SIGNIFICANT CHANGE UP (ref 0–0)
PCO2 BLDA: 42 MMHG — SIGNIFICANT CHANGE UP (ref 35–48)
PH BLDA: 7.38 — SIGNIFICANT CHANGE UP (ref 7.35–7.45)
PHOSPHATE SERPL-MCNC: 4.8 MG/DL — HIGH (ref 2.5–4.5)
PHOSPHATE SERPL-MCNC: 5.5 MG/DL — HIGH (ref 2.5–4.5)
PHOSPHATE SERPL-MCNC: 5.7 MG/DL — HIGH (ref 2.5–4.5)
PLATELET # BLD AUTO: 49 K/UL — LOW (ref 150–400)
PLATELET # BLD AUTO: 59 K/UL — LOW (ref 150–400)
PLATELET # BLD AUTO: 72 K/UL — LOW (ref 150–400)
PLATELET # BLD AUTO: 83 K/UL — LOW (ref 150–400)
PO2 BLDA: 141 MMHG — HIGH (ref 83–108)
POTASSIUM SERPL-MCNC: 3.7 MMOL/L — SIGNIFICANT CHANGE UP (ref 3.5–5.3)
POTASSIUM SERPL-MCNC: 4.2 MMOL/L — SIGNIFICANT CHANGE UP (ref 3.5–5.3)
POTASSIUM SERPL-MCNC: 4.4 MMOL/L — SIGNIFICANT CHANGE UP (ref 3.5–5.3)
POTASSIUM SERPL-MCNC: 4.6 MMOL/L — SIGNIFICANT CHANGE UP (ref 3.5–5.3)
POTASSIUM SERPL-SCNC: 3.7 MMOL/L — SIGNIFICANT CHANGE UP (ref 3.5–5.3)
POTASSIUM SERPL-SCNC: 4.2 MMOL/L — SIGNIFICANT CHANGE UP (ref 3.5–5.3)
POTASSIUM SERPL-SCNC: 4.4 MMOL/L — SIGNIFICANT CHANGE UP (ref 3.5–5.3)
POTASSIUM SERPL-SCNC: 4.6 MMOL/L — SIGNIFICANT CHANGE UP (ref 3.5–5.3)
PROT C ACT/NOR PPP: 15 % — LOW (ref 74–150)
PROT FLD-MCNC: 3.5 G/DL — SIGNIFICANT CHANGE UP
PROT SERPL-MCNC: 5.4 G/DL — LOW (ref 6–8.3)
PROT SERPL-MCNC: 5.9 G/DL — LOW (ref 6–8.3)
PROT SERPL-MCNC: 6.2 G/DL — SIGNIFICANT CHANGE UP (ref 6–8.3)
PROT SERPL-MCNC: 6.2 G/DL — SIGNIFICANT CHANGE UP (ref 6–8.3)
PROTHROM AB SERPL-ACNC: 26 SEC — HIGH (ref 10.5–13.4)
PROTHROM AB SERPL-ACNC: 26.3 SEC — HIGH (ref 10.5–13.4)
PROTHROM AB SERPL-ACNC: 28.6 SEC — HIGH (ref 10.5–13.4)
PSA SERPL-MCNC: 2.21 NG/ML — SIGNIFICANT CHANGE UP (ref 0–4)
RBC # BLD: 2.49 M/UL — LOW (ref 4.2–5.8)
RBC # BLD: 2.61 M/UL — LOW (ref 4.2–5.8)
RBC # BLD: 2.66 M/UL — LOW (ref 4.2–5.8)
RBC # BLD: 2.72 M/UL — LOW (ref 4.2–5.8)
RBC # FLD: 15 % — HIGH (ref 10.3–14.5)
RBC # FLD: 15.1 % — HIGH (ref 10.3–14.5)
RBC # FLD: 15.3 % — HIGH (ref 10.3–14.5)
RBC # FLD: 15.3 % — HIGH (ref 10.3–14.5)
RCV VOL RI: 4000 /UL — HIGH (ref 0–0)
RUBV IGG SER-ACNC: 9.4 INDEX — SIGNIFICANT CHANGE UP
RUBV IGG SER-IMP: POSITIVE — SIGNIFICANT CHANGE UP
SALICYLATES SERPL-MCNC: <2 MG/DL — LOW (ref 15–30)
SAO2 % BLDA: 100 % — HIGH (ref 94–98)
SARS-COV-2 IGG+IGM SERPL QL IA: 3.15 INDEX — HIGH
SARS-COV-2 IGG+IGM SERPL QL IA: >250 U/ML — HIGH
SARS-COV-2 IGG+IGM SERPL QL IA: POSITIVE
SARS-COV-2 IGG+IGM SERPL QL IA: POSITIVE
SODIUM SERPL-SCNC: 130 MMOL/L — LOW (ref 135–145)
SODIUM SERPL-SCNC: 131 MMOL/L — LOW (ref 135–145)
SODIUM SERPL-SCNC: 133 MMOL/L — LOW (ref 135–145)
SODIUM SERPL-SCNC: 134 MMOL/L — LOW (ref 135–145)
SPECIMEN SOURCE: SIGNIFICANT CHANGE UP
T4 FREE SERPL-MCNC: 0.8 NG/DL — LOW (ref 0.9–1.8)
TIBC SERPL-MCNC: 93 UG/DL — LOW (ref 220–430)
TIBC SERPL-MCNC: 96 UG/DL — LOW (ref 220–430)
TOTAL NUCLEATED CELL COUNT, BODY FLUID: 8344 /UL — SIGNIFICANT CHANGE UP
TRIGL SERPL-MCNC: 52 MG/DL — SIGNIFICANT CHANGE UP
TSH SERPL-MCNC: 0.15 UIU/ML — LOW (ref 0.27–4.2)
TUBE TYPE: SIGNIFICANT CHANGE UP
UIBC SERPL-MCNC: 62 UG/DL — LOW (ref 110–370)
UIBC SERPL-MCNC: 63 UG/DL — LOW (ref 110–370)
URATE SERPL-MCNC: 7 MG/DL — SIGNIFICANT CHANGE UP (ref 3.4–8.8)
VIT B12 SERPL-MCNC: >2000 PG/ML — HIGH (ref 232–1245)
VZV IGG FLD QL IA: 1520 INDEX — SIGNIFICANT CHANGE UP
VZV IGG FLD QL IA: POSITIVE — SIGNIFICANT CHANGE UP
WBC # BLD: 20.71 K/UL — HIGH (ref 3.8–10.5)
WBC # BLD: 22.19 K/UL — HIGH (ref 3.8–10.5)
WBC # BLD: 25.61 K/UL — HIGH (ref 3.8–10.5)
WBC # BLD: 25.84 K/UL — HIGH (ref 3.8–10.5)
WBC # FLD AUTO: 20.71 K/UL — HIGH (ref 3.8–10.5)
WBC # FLD AUTO: 22.19 K/UL — HIGH (ref 3.8–10.5)
WBC # FLD AUTO: 25.61 K/UL — HIGH (ref 3.8–10.5)
WBC # FLD AUTO: 25.84 K/UL — HIGH (ref 3.8–10.5)

## 2022-08-12 PROCEDURE — 99233 SBSQ HOSP IP/OBS HIGH 50: CPT | Mod: GC

## 2022-08-12 PROCEDURE — 99232 SBSQ HOSP IP/OBS MODERATE 35: CPT

## 2022-08-12 PROCEDURE — 49083 ABD PARACENTESIS W/IMAGING: CPT

## 2022-08-12 PROCEDURE — 71045 X-RAY EXAM CHEST 1 VIEW: CPT | Mod: 26

## 2022-08-12 PROCEDURE — 99232 SBSQ HOSP IP/OBS MODERATE 35: CPT | Mod: GC

## 2022-08-12 PROCEDURE — 99233 SBSQ HOSP IP/OBS HIGH 50: CPT | Mod: 25

## 2022-08-12 PROCEDURE — 49465 FLUORO EXAM OF G/COLON TUBE: CPT

## 2022-08-12 RX ORDER — MIDODRINE HYDROCHLORIDE 2.5 MG/1
10 TABLET ORAL EVERY 8 HOURS
Refills: 0 | Status: DISCONTINUED | OUTPATIENT
Start: 2022-08-12 | End: 2022-08-17

## 2022-08-12 RX ORDER — ALBUMIN HUMAN 25 %
50 VIAL (ML) INTRAVENOUS EVERY 6 HOURS
Refills: 0 | Status: DISCONTINUED | OUTPATIENT
Start: 2022-08-12 | End: 2022-08-13

## 2022-08-12 RX ORDER — LIDOCAINE HCL 20 MG/ML
10 VIAL (ML) INJECTION ONCE
Refills: 0 | Status: COMPLETED | OUTPATIENT
Start: 2022-08-12 | End: 2022-08-12

## 2022-08-12 RX ADMIN — SODIUM CHLORIDE 2 GRAM(S): 9 INJECTION INTRAMUSCULAR; INTRAVENOUS; SUBCUTANEOUS at 21:10

## 2022-08-12 RX ADMIN — OCTREOTIDE ACETATE 100 MICROGRAM(S): 200 INJECTION, SOLUTION INTRAVENOUS; SUBCUTANEOUS at 09:07

## 2022-08-12 RX ADMIN — OCTREOTIDE ACETATE 100 MICROGRAM(S): 200 INJECTION, SOLUTION INTRAVENOUS; SUBCUTANEOUS at 02:04

## 2022-08-12 RX ADMIN — MIDODRINE HYDROCHLORIDE 10 MILLIGRAM(S): 2.5 TABLET ORAL at 04:44

## 2022-08-12 RX ADMIN — POLYETHYLENE GLYCOL 3350 17 GRAM(S): 17 POWDER, FOR SOLUTION ORAL at 17:40

## 2022-08-12 RX ADMIN — Medication 5 MILLIGRAM(S): at 11:46

## 2022-08-12 RX ADMIN — SENNA PLUS 2 TABLET(S): 8.6 TABLET ORAL at 21:10

## 2022-08-12 RX ADMIN — OCTREOTIDE ACETATE 100 MICROGRAM(S): 200 INJECTION, SOLUTION INTRAVENOUS; SUBCUTANEOUS at 17:41

## 2022-08-12 RX ADMIN — PIPERACILLIN AND TAZOBACTAM 25 GRAM(S): 4; .5 INJECTION, POWDER, LYOPHILIZED, FOR SOLUTION INTRAVENOUS at 21:11

## 2022-08-12 RX ADMIN — CHLORHEXIDINE GLUCONATE 1 APPLICATION(S): 213 SOLUTION TOPICAL at 05:04

## 2022-08-12 RX ADMIN — MIDODRINE HYDROCHLORIDE 10 MILLIGRAM(S): 2.5 TABLET ORAL at 21:10

## 2022-08-12 RX ADMIN — MIDODRINE HYDROCHLORIDE 10 MILLIGRAM(S): 2.5 TABLET ORAL at 15:05

## 2022-08-12 RX ADMIN — Medication 50 MILLILITER(S): at 23:58

## 2022-08-12 RX ADMIN — SODIUM CHLORIDE 2 GRAM(S): 9 INJECTION INTRAMUSCULAR; INTRAVENOUS; SUBCUTANEOUS at 05:04

## 2022-08-12 RX ADMIN — POLYETHYLENE GLYCOL 3350 17 GRAM(S): 17 POWDER, FOR SOLUTION ORAL at 05:04

## 2022-08-12 RX ADMIN — Medication 10 MILLILITER(S): at 08:28

## 2022-08-12 RX ADMIN — PIPERACILLIN AND TAZOBACTAM 25 GRAM(S): 4; .5 INJECTION, POWDER, LYOPHILIZED, FOR SOLUTION INTRAVENOUS at 09:06

## 2022-08-12 RX ADMIN — SODIUM CHLORIDE 2 GRAM(S): 9 INJECTION INTRAMUSCULAR; INTRAVENOUS; SUBCUTANEOUS at 15:05

## 2022-08-12 NOTE — PROGRESS NOTE ADULT - ASSESSMENT
65 y/o M PMHx cholangitis/cholecystitis (s/p ERCP w/ biliary stent placement 04/2022) and recently diagnosed etOH cirrhosis, presents with RUQ pain following biliary stent removal on 7/20/22. Found to have distended GB with wall thickening and stone in cystic duct, concerning for cholecystitis. Admitted to SICU for monitoring, s/p perc rodney and paracentesis by IR on 8/9. Paracentesis results consistent with SBP. Course now c/b ARF.     s/p Perc Rodney Tube and Paracentesis  Paracentesis cell counts suggestive of Peritonitis  Cultures thus far with E coli and Streptococcus on Perc Rodney Drainage and Ascites drainage  Concerning with cholecystitis with associated perforation    #Peritonitis, Cholecystitis, SBP, Leukocytosis   --Would deescalate Zosyn to Unasyn 3g IV Q12H (based on E coli susceptibilities)  --Agree with plans for repeat paracentesis  --Continue to follow CBC with diff  --Continue to follow temperature curve  --Follow up on preliminary blood cultures  --Follow up on preliminary bile culture, ascites cultures    #Pre-Liver Transplant Evaluation  --ID clearance for OLT pending resolution of peritonitis and quantiferon gold  --Follow up on Quantiferon Gold  --Follow up on Syphilis Screen  --Follow up on Toxoplasma IgG  --Follow up on Strongyloides Ab    I will be away over this upcoming weekend. Please contact the Infectious Diseases Office with any further questions or concerns.     Bladimir Nix M.D.  [facility] Division of Infectious Disease  8AM-5PM Monday - Friday: Available on [employer] Teams  After Hours and Holidays (or if no response on [employer] Teams): Please contact the Infectious Diseases Office at [phone]     The above assessment and plan were discussed with Dr Barron and Bruna, transplant surgery PA

## 2022-08-12 NOTE — PROCEDURE NOTE - NSPROCDETAILS_GEN_ALL_CORE
guidewire recovered/lumen(s) aspirated and flushed/sterile dressing applied/sterile technique, catheter placed/ultrasound guidance with use of sterile gel and probe cove
location identified, sterile technique used, catheter introduced, fluid drained/Seldinger technique/sterile dressing applied

## 2022-08-12 NOTE — CHART NOTE - NSCHARTNOTEFT_GEN_A_CORE
Gender: [ x ] Male   [  ] Female    Dominant hand  strength:        attempt 1: 22 kg       attempt 2: 24 kg       attempt 3: 29 kg    Standing balance:       Side: 10 sec       SemiTandem: 10 sec       Tandem: 10 sec    5x sit<>stand: 0 sec    Liver Frailty Index Score:  5.1    [  x ] Frail  [   ] Pre-frail   [   ] Robust    Percentile: 93

## 2022-08-12 NOTE — PROGRESS NOTE ADULT - ASSESSMENT
Pt is a 67 y/o Male with a PMH Cirrhosis and recent cholangitis s/p ERCP stent (4/22) and stent removal (7/20/22). Pt presented on 8/8 with increasing RUQ pain. CT Abd/Pelvis reported distended gallbladder with stones in the cystic duct, gallbladder wall thickening, suggesting chronic cholecystitis. Pt had a percutaneous cholecystostomy tube placed on 8/09 along with a paracentesis yielding 300cc of purulent fluid. Pt admitted to SICU for further hemodynamic monitoring and management.     PLAN:   Neurologic: A, O x3  - Pain Control with Dilaudid as needed after abdominal exam    Respiratory: Atelectasis  - Maintain SpO2 > 92%  - Currently on NC @1L  - Encourage OOB, Incentive spirometry, and chest PT  - AM CXR    Cardiovascular: Sinus tachycardia resolved   - Maintain MAP > 65   - C/w Midodrine for increased MAP in setting of ROBBIE    Gastrointestinal/Nutrition: cirrhosis (MELD 34), cholecystitis s/p per rodney tube and diagnostic paracentesis (8/09)   - Advance to renal diet  - CT Abd/ Pelvis w/ Iv contrast ( 8/08)- distended gallbladder with stones in the cystic duct, gallbladder wall thickening, suggesting chronic cholecystitis  - GI not concerned for cholangitis due to negative for bile duct dilation  - No bowel movements since admission. Increased patient's bowel regimen. Will give lactulose in the day if no response.  - Start Octreotide 100mg q8hr   - Repeat CT Abd/Pelvis limited study; ordered RUQ Sono to eval for Gallbladder perforation->was found unremarkable  - Transplant hepatology consulted, reccs appreciated     Genitourinary/Renal: ROBBIE and hyperkalemia s/p shifting& Lokelma, urethral stricture, Hyponatremia with severe metabolic acidosis.   - Tobin placed by Urology 2/2 urethral stricture, Continue to monitor strict I&Os  - Start D10 + 2%NS @ 30cc/hr   - Worsening oliguria, s/p Bumex 2mg IVP and started on Bumex gtt for 10 hours without response   - Shiley placed and started on HD today. Removed 1L in the day.  - Salt tabs    Hematologic: coagulopathy of liver failure/ sepsis  - Monitor CBC and Coags   - Coagulopathy 2/2 uremia/sepsis s/p FFP and DDAVP for blood oozing around shiley. Quickclot used twice ON.   - SCDs for mechanical VTE ppx   - Hold chemical VTE ppx    Infectious Disease: sepsis  - F/u BCx ( 8/9) NGTD   - UCx ( 8/8) + for E. Coli , Bile Cx ( 8/9) + E. Coli & Streptococcus anginosus  - Continue Zosyn ( 8/9 -?).   - Grew Streptococcus anginosus in bile culture. Will Dc Sandra.    Endocrine: no acute issues  - Monitor glucose on BMP    Code Status: Full Code   Disposition: SICU Pt is a 67 y/o Male with a PMH Cirrhosis and recent cholangitis s/p ERCP stent (4/22) and stent removal (7/20/22). Pt presented on 8/8 with increasing RUQ pain. CT Abd/Pelvis reported distended gallbladder with stones in the cystic duct, gallbladder wall thickening, suggesting chronic cholecystitis. Pt had a percutaneous cholecystostomy tube placed on 8/09 along with a paracentesis yielding 300cc of purulent fluid. Pt admitted to SICU for further hemodynamic monitoring and management.     PLAN:   Neurologic: A, O x3  - Pain Control with Dilaudid as needed after abdominal exam    Respiratory: Atelectasis  - Maintain SpO2 > 92%  - Currently on NC @1L  - Encourage OOB, Incentive spirometry, and chest PT  - AM CXR    Cardiovascular: Sinus tachycardia resolved   - Maintain MAP > 65   - C/w Midodrine for increased MAP in setting of ROBBIE    Gastrointestinal/Nutrition: cirrhosis (MELD 34), cholecystitis s/p per rodney tube and diagnostic paracentesis (8/09)   - Advance to renal diet  - CT Abd/ Pelvis w/ Iv contrast ( 8/08)- distended gallbladder with stones in the cystic duct, gallbladder wall thickening, suggesting chronic cholecystitis  - GI not concerned for cholangitis due to negative for bile duct dilation  - No bowel movements since admission. Increased patient's bowel regimen. Will give lactulose in the day if no response.  - Start Octreotide 100mg q8hr   - Repeat CT Abd/Pelvis limited study; ordered RUQ Sono to eval for Gallbladder perforation->was found unremarkable  - Transplant hepatology consulted, reccs appreciated   -perc rodney tube check and paracentesis today    Genitourinary/Renal: ROBBIE and hyperkalemia s/p shifting& Lokelma, urethral stricture, Hyponatremia with severe metabolic acidosis.   - Tobin placed by Urology 2/2 urethral stricture, Continue to monitor strict I&Os  - d/c D10 + 2%NS @ 30cc/hr   - Worsening oliguria, s/p Bumex 2mg IVP and started on Bumex gtt for 10 hours without response   - HD today .   - Salt tabs    Hematologic: coagulopathy of liver failure/ sepsis  - Monitor CBC and Coags   - Coagulopathy 2/2 uremia/sepsis s/p FFP and DDAVP for blood oozing around shiley. Quickclot used twice ON.   - SCDs for mechanical VTE ppx   - Hold chemical VTE ppx    Infectious Disease: sepsis  - F/u BCx ( 8/9) NGTD   - UCx ( 8/8) + for E. Coli , Bile Cx ( 8/9) + E. Coli & Streptococcus anginosus  - Continue Zosyn ( 8/9 -?).   - Grew Streptococcus anginosus in bile culture. Will Dc Sandra.    Endocrine: no acute issues  - Monitor glucose on BMP    Code Status: Full Code   Disposition: SICU

## 2022-08-12 NOTE — PROGRESS NOTE ADULT - SUBJECTIVE AND OBJECTIVE BOX
Interval Events:   Hemodialysis yesterday, no paracentesis performed yet, remains distended.    ROS:   12 point review of systems performed and negative except otherwise noted in HPI.    Hospital Medications:  bisacodyl 5 milliGRAM(s) Oral every 12 hours PRN  chlorhexidine 2% Cloths 1 Application(s) Topical <User Schedule>  midodrine. 10 milliGRAM(s) Oral <User Schedule>  octreotide  Injectable 100 MICROGram(s) IV Push every 8 hours  piperacillin/tazobactam IVPB.. 3.375 Gram(s) IV Intermittent every 12 hours  polyethylene glycol 3350 17 Gram(s) Oral two times a day  senna 2 Tablet(s) Oral at bedtime  sodium chloride 2 Gram(s) Oral every 8 hours    MAR over past 24 hours:    albumin human 25% IVPB   50 mL/Hr IV Intermittent (08-11-22 @ 16:03)    chlorhexidine 2% Cloths   1 Application(s) Topical (08-12-22 @ 05:04)    desmopressin IVPB   220 mL/Hr IV Intermittent (08-11-22 @ 14:50)    heparin   Injectable   5000 Unit(s) IV Push (08-11-22 @ 13:25)    lidocaine 1% (Preservative-free) Injectable   10 milliLiter(s) Local Injection (08-12-22 @ 08:28)    midodrine.   10 milliGRAM(s) Oral (08-12-22 @ 04:44)   10 milliGRAM(s) Oral (08-11-22 @ 21:29)   10 milliGRAM(s) Oral (08-11-22 @ 16:03)    octreotide  Injectable   100 MICROGram(s) IV Push (08-12-22 @ 09:07)   100 MICROGram(s) IV Push (08-12-22 @ 02:04)   100 MICROGram(s) IV Push (08-11-22 @ 17:40)    piperacillin/tazobactam IVPB..   25 mL/Hr IV Intermittent (08-12-22 @ 09:06)   25 mL/Hr IV Intermittent (08-11-22 @ 21:29)    polyethylene glycol 3350   17 Gram(s) Oral (08-12-22 @ 05:04)   17 Gram(s) Oral (08-11-22 @ 17:39)    senna   2 Tablet(s) Oral (08-11-22 @ 21:30)    silver nitrate Applicator   1 Application(s) Topical (08-11-22 @ 17:30)    sodium chloride   2 Gram(s) Oral (08-12-22 @ 05:04)   2 Gram(s) Oral (08-11-22 @ 21:30)   2 Gram(s) Oral (08-11-22 @ 13:21)      Home Medications:  Last Order Reconciliation Date: 08-08-22 @ 06:30 (Admission Reconciliation)    PHYSICAL EXAM:   Vital Signs last 24 hours:  T(F): 97.3 (08-12-22 @ 07:00), Max: 98.2 (08-11-22 @ 18:05)  HR: 84 (08-12-22 @ 10:00) (72 - 98)  BP: 142/71 (08-12-22 @ 10:00) (90/55 - 156/76)  BP(mean): 99 (08-12-22 @ 10:00) (69 - 109)  ABP: --  ABP(mean): --  RR: 13 (08-12-22 @ 10:00) (12 - 26)  SpO2: 97% (08-12-22 @ 10:00) (94% - 100%)    I&Os:    08-11-22 @ 07:01  -  08-12-22 @ 07:00  --------------------------------------------------------  IN:    Albumin 25%  -  50 mL: 150 mL    freetext medication - Infusion: 720 mL    IV PiggyBack: 50 mL    IV PiggyBack: 200 mL    Oral Fluid: 300 mL    Other (mL): 700 mL    Plasma: 300 mL  Total IN: 2420 mL    OUT:    Bumetanide: 0 mL    Indwelling Catheter - Urethral (mL): 733 mL    Other (mL): 1700 mL    T-Tube (mL): 90 mL  Total OUT: 2523 mL    Total NET: -103 mL      08-12-22 @ 07:01  -  08-12-22 @ 11:13  --------------------------------------------------------  IN:    IV PiggyBack: 50 mL  Total IN: 50 mL    OUT:    Indwelling Catheter - Urethral (mL): 240 mL  Total OUT: 240 mL    Total NET: -190 mL        BMI (kg/m2): 29.3 (08-09-22 @ 14:59)  GENERAL: no acute distress  NEURO: alert  HEENT: NCAT, no conjunctival pallor appreciated  CHEST: no respiratory distress, no accessory muscle use  CARDIAC: regular rate, +S1/S2  ABDOMEN: soft, distended, perc rodney tube present, no rebound or guarding  EXTREMITIES: warm, well perfused  SKIN: no lesions noted    DIAGNOSTICS:  WBC      Hg       PLT      Na       K        CO2     BUN      Cr       ALT      AST      TB       ALP  ------   ------   ------   130      4.4      23       62       4.17     18       31       3.2      107      08-12-22 @ 00:32  ------   ------   ------   131      4.6      24       62       4.28     20       32       3.4      122      08-11-22 @ 23:59  25.61    8.9      83       ------   ------   ------   ------   ------   ------   ------   ------   ------   08-11-22 @ 23:58  27.54    9.3      95       127      4.6      22       82       5.13     20       35       3.2      106      08-11-22 @ 17:12  24.93    9.6      103      125      4.9      22       77       5.13     19       34       3.3      106      08-11-22 @ 10:27    PT             INR            MELDwith  MELDw/o  26.3           2.25           --             --             08-12-22 @ 00:04  24.4           2.09           --             --             08-11-22 @ 17:12  27.3           2.33           --             --             08-11-22 @ 10:27  26.2           2.26           --             --             08-11-22 @ 04:14  26.1           2.25           --             --             08-10-22 @ 22:57

## 2022-08-12 NOTE — PROGRESS NOTE ADULT - SUBJECTIVE AND OBJECTIVE BOX
SURGERY DAILY PROGRESS NOTE    --------------- 24 HOUR EVENTS ---------------  - Shiley placed during the day for HD. Received HD and had 1L removed.   - Received FFP and DDAVP for shiley placement. However has been oozing intermittently from site.   - May give lactulose in the day if no BM ON. Went up on his bowel regiment  - Placed Arrow ON  - IR to do paracentesis in the AM  - RUQ US performed and found unremarkable  - Grew Streptococcus anginosus in bile fluid. Will hold on the Vancomycin  - Giorgi PT/PTT/INR after HD->coagulopathic. Continues to intermittently ooze from shiley s/p quickclot x2     --------------- SUBJECTIVE ---------------    - Denies CP, SOB, n/v, abdomina pain   - Patient seen and examined at bedside with surgical team.      --------------- OBJECTIVE ---------------    -----VITALS-----  T(C): 36.7, Max: 36.8 (08-11)  HR: 90 (72 - 97)  BP: 153/76 (90/55 - 156/76)  RR: 18 (12 - 26)  SpO2: 96% (94% - 100%) nasal cannula 1      -----PHYSICAL EXAM-----  GENERAL: NAD, lying in bed   NEURO: AOx3, awake alert appropriate  HEENT: NCAT, trachea midline  PULM: Respirations non-labored  ABD: Soft, non-tender, non-distended, no peritonitis/rebound tenderness  EXT: Warm, well perfused    -----DRAINS-----       -----INs & OUTs-----    08-10  -  08-11  --------------------------------------------------------  IN:    Albumin 25%  -  50 mL: 300 mL    Bumetanide: 27.5 mL    freetext medication - Infusion: 660 mL    IV PiggyBack: 225 mL    IV PiggyBack: 250 mL    IV PiggyBack: 50 mL    Oral Fluid: 760 mL    sodium chloride 0.9%: 60 mL  Total IN: 2332.5 mL    OUT:    Indwelling Catheter - Urethral (mL): 188 mL    T-Tube (mL): 85 mL  Total OUT: 273 mL      08-11  -  08-12  --------------------------------------------------------  IN:    Albumin 25%  -  50 mL: 150 mL    freetext medication - Infusion: 660 mL    IV PiggyBack: 50 mL    IV PiggyBack: 200 mL    Oral Fluid: 300 mL    Other (mL): 700 mL    Plasma: 300 mL  Total IN: 2360 mL    OUT:    Bumetanide: 0 mL    Indwelling Catheter - Urethral (mL): 583 mL    Other (mL): 1700 mL    T-Tube (mL): 60 mL  Total OUT: 2343 mL          -----MEDICATIONS-----  STANDING  chlorhexidine 2% Cloths 1 Application(s) Topical <User Schedule>  Dextrose 10 % + 2% Sodium Chloride 1000 milliLiter(s) 1000 milliLiter(s) (30 mL/Hr) IV Continuous <Continuous>  midodrine. 10 milliGRAM(s) Oral <User Schedule>  octreotide  Injectable 100 MICROGram(s) IV Push every 8 hours  piperacillin/tazobactam IVPB.. 3.375 Gram(s) IV Intermittent every 12 hours  polyethylene glycol 3350 17 Gram(s) Oral two times a day  senna 2 Tablet(s) Oral at bedtime  sodium chloride 2 Gram(s) Oral every 8 hours    PRN      -----LABS-----  130<L>  |  93<L>  |  62<H>  ----------------------------<  141<H>    (08-12)  4.4   |  23  |  4.17<H>            Ca    8.7      08-12  Mg    x  Phos  x          PT: 26.3 sec     08-12  aPTT: 46.2 sec   INR: 2.25 ratio               --------------- ASSESSMENT ---------------  66M PMH cirrhosis (MELD 30), cholangitis s/p ERCP with stent placement (4/2022) s/p stent removal on 7/20 presenting for one week of RUQ abdominal pain with clinical exam, labs and radiographic findings meeting [place] Criteria grade III for severe acute cholangitis. s/p perc rodney and diagnostic paracentesis  --------------- PLAN ---------------  - Zosyn  - On D10 +2%NS: Trend electrolytes, f/u sodium   - Bile fluid: +Streptococcus anginosus   - continue milian placed by  (monitor output)  - care and management per SICU     DIET: Regular, renal    ACS/Trauma Surgery  [id number]

## 2022-08-12 NOTE — PROGRESS NOTE ADULT - PROBLEM SELECTOR PLAN 2
Hypo-osmolar Hyponatremia ADH mediated likely from liver failure & fluid retention from ROBBIE      Pt. with chronic asymptomatic hyponatremia. SNa at baseline is ~123-133 since April. SNa 137 on 7/11 (one time). SNa 120 on arrival; trended up to 127 on 10/10 today & SNa dropping to 124 now. Nina 9, UOsm 335, SOsm low at 270.  Continue with free water restrict to <1L/day for now.  HTS with Loops given with not much improvement as UOP not increasing. Failed diuretic challenge, now on HD. Would discontinue 2% HTS & salt tabs 2 gm tid as hyponatremia will improve with improving UOP & UF during HD.

## 2022-08-12 NOTE — PROGRESS NOTE ADULT - SUBJECTIVE AND OBJECTIVE BOX
This patient is being evaluated for a liver transplant. PMH: cirrhosis and cholangitis s/p ERCP w/ stent placement 4/22 and removed 7/20/22.     No known allergies.    MEDICATIONS  (STANDING):  chlorhexidine 2% Cloths 1 Application(s) Topical <User Schedule>  midodrine. 10 milliGRAM(s) Oral <User Schedule>  octreotide  Injectable 100 MICROGram(s) IV Push every 8 hours  piperacillin/tazobactam IVPB.. 3.375 Gram(s) IV Intermittent every 12 hours  polyethylene glycol 3350 17 Gram(s) Oral two times a day  senna 2 Tablet(s) Oral at bedtime  sodium chloride 2 Gram(s) Oral every 8 hours    The patient has no pharmacologic contraindications to transplant and is cleared.

## 2022-08-12 NOTE — PROGRESS NOTE ADULT - PROBLEM SELECTOR PLAN 3
likely from lactic acidosis & renal failure      Lactic acid trending up once again. Source control per SICU  ROBBIE management as above        If you have any questions, please feel free to contact me  Brunilda Manning  Nephrology Fellow  Pager NS: [phone]/ [facility]: [phone]    (After 5 pm or on weekends please page the on-call fellow, can check [email or web address] for schedule. Login is amy augustine, schedule under [facility] medicine, psych, derm).

## 2022-08-12 NOTE — CHART NOTE - NSCHARTNOTEFT_GEN_A_CORE
Nutrition Follow Up Note  Patient seen for: nutrition follow up on SICU and Liver Transplant Evaluation; consent signed 22  Nutrition Consult for Assessment/Education received and appreciated.     Chart reviewed, events noted.  "67 y/o Male with a PMH Cirrhosis and recent cholangitis s/p ERCP stent () and stent removal (22). Pt presented on  with increasing RUQ pain. CT Abd/Pelvis reported distended gallbladder with stones in the cystic duct, gallbladder wall thickening, suggesting chronic cholecystitis. Pt had a percutaneous cholecystostomy tube placed on  along with a paracentesis yielding 300cc of purulent fluid. Pt admitted to SICU for further hemodynamic monitoring and management."    Source: [] Patient       [x] EMR        [] RN        [] Family at bedside       [X] Other: Pt OOB to chair, asleep. Spouse not present.  Full interview conducted with wife yesterday () at time of Initial Nutrition Assessment.  [transplant evaluation with wife - pending]    Diet Order:   Diet, NPO:   NPO for Procedure/Test     NPO Start Date: 12-Aug-2022,   NPO Start Time: 08:00 (22)  Diet, Renal Restrictions:   For patients receiving Renal Replacement - No Protein Restr, No Conc K, No Conc Phos, Low Sodium (22)    - Is current order appropriate/adequate? [X] Yes. All high potassium foods removed from menu, per team request    PO intake:  currently NPO for procedure  Inadequate diet order (NPO/clear liquids) x 4 days in-house; advanced to Renal diet .  Lunch tray observed yesterday (). Pt consumed 3 bites of chicken salad sandwich.    Nutrition-related concerns:  - Recently diagnosed cirrhosis, MELD 34. Cholecystitis s/p per rodney tube and diagnostic paracentesis ()   - Renal: ROBBIE and hyperkalemia s/p shifting & Lokelma. HD initiated ; 1L removed  - Hyponatremia and severe metabolic acidosis. Worsening oliguria, s/p Bumex. Salt tabs added, 2g q8 hrs  - Pt received D10 with 2% NaCl @ 30 ml/hr on , for hypoglycemia and hyponatremia  - Paracentesis 2022, c/w peritonitis. Plan for paracentesis today ()    LIVER TRANSPLANT EVALUATION:  [pending interview/education with wife]  - Per discussion with wife yesterday (), pt typically eats well with no dietary restrictions.   - Wife reported 2 days of poor appetite and reduced po intake PTA.      GI:    Last BM: none.   Bowel Regimen: Miralax, senna   Bowel regimen increased ; may need to add Lactulose, per team  Octreotide ordered    Weights:   Daily Weight in k.7 (), 84 (), 85 (), 86.1 (), 81.8 (08-10)  Weight gain noted in-house, likely secondary to fluid shifts.  Stable weight history per HIE: 73.5kg (22), 73.9kg (22), 71.2kg (22)  DOSING Weight (kg): 72.6 (22) *used for calculations  BMI: 29.3 kg/m2 (22)  IBW: 53.5 kg    Nutritionally Pertinent MEDICATIONS  (STANDING):  midodrine.  octreotide  Injectable  piperacillin/tazobactam IVPB..  polyethylene glycol 3350  senna  sodium chloride    Pertinent Labs:  @ 00:32: Na 130<L>, BUN 62<H>, Cr 4.17<H>, <H>, K+ 4.4, Alk Phos 107, ALT/SGPT 18, AST/SGOT 31   @ 23:59: Na 131<L>, BUN 62<H>, Cr 4.28<H>, <H>, K+ 4.6, Phos 5.5<H>, Mg 2.4, Alk Phos 122<H>, ALT/SGPT 20, AST/SGOT 32,    @ 17:12: Na 127<L>, BUN 82<H>, Cr 5.13<H>, <H>, K+ 4.6, Phos 6.0<H>, Mg 2.6, Alk Phos 106, ALT/SGPT 20, AST/SGOT 35,    @ 10:27: Na 125<L>, BUN 77<H>, Cr 5.13<H>, <H>, K+ 4.9, Phos 5.6<H>, Mg 2.6, Alk Phos 106, ALT/SGPT 19, AST/SGOT 34,     A1C with Estimated Average Glucose Result: 4.9 % (22 @ 00:35)    Skin per nursing documentation: no pressure injuries documented  Edema: 2+ left/right hand, left/right foot  s/p 1L removed via HD     Estimated Needs: based on dosing wt 72.6kg, with consideration for BMI 29.3  Energy: 4523-3374 calories (25-30 kcal/kg)  Protein: 80-94 grams (1.1-1.3 gm/kg)    Previous Nutrition Diagnosis: Inadequate Protein-Energy Intake  Nutrition Diagnosis is: [X] ongoing/escalated below; addressed with therapeutic diet    New Nutrition Diagnosis: Malnutrition, moderate, in context of acute illness, related to inability to meet nutrition needs in setting of decompensated cirrhosis and acute cholecystitis, as evidenced by pt meeting <75% of nutrition needs x 7 days, moderate fluid accumulation     New Nutrition Diagnosis: Food and Nutrition Related Knowledge Deficit related to limited prior education on post-transplant nutrition therapy and food safety guidelines as evidenced by pt signed consent for Liver Transplant Evaluation 22    Nutrition Care Plan:  [X] In Progress  [] Achieved  [] Not applicable    Nutrition Interventions:     Education Provided: [transplant education with wife - pending]       Recommendations:         [X] Continue current diet order: Renal; all high potassium foods removed from menu            [X] Add oral nutrition supplement: High Protein Gelatin, High Protein Apple Juice (low potassium supplements only)     [X] Add micronutrient supplementation: Nephro-rupa     [] Other:     Monitoring and Evaluation:   Continue to monitor nutritional intake, tolerance to diet prescription, weights, labs, skin integrity      RD remains available upon request and will follow up per protocol  Marnie Richter, MS RD CDN [facility] (pager [phone]) Nutrition Follow Up Note  Patient seen for: nutrition follow up on SICU and Liver Transplant Evaluation; consent signed 8/11/22  Nutrition Consult for Assessment/Education received and appreciated.     Chart reviewed, events noted.  "67 y/o Male with a PMH Cirrhosis and recent cholangitis s/p ERCP stent (4/22) and stent removal (7/20/22). Pt presented on 8/8 with increasing RUQ pain. CT Abd/Pelvis reported distended gallbladder with stones in the cystic duct, gallbladder wall thickening, suggesting chronic cholecystitis. Pt had a percutaneous cholecystostomy tube placed on 8/09 along with a paracentesis yielding 300cc of purulent fluid. Pt admitted to SICU for further hemodynamic monitoring and management."    Source: [] Patient       [x] EMR        [] RN        [] Family at bedside       [X] Other: Pt OOB to chair, asleep. Spouse not present.  Full interview conducted with wife yesterday (8/11) at time of Initial Nutrition Assessment.  [transplant evaluation with wife - pending]    Diet Order:   Diet, NPO:   NPO for Procedure/Test     NPO Start Date: 12-Aug-2022,   NPO Start Time: 08:00 (08-12-22)  Diet, Renal Restrictions:   For patients receiving Renal Replacement - No Protein Restr, No Conc K, No Conc Phos, Low Sodium (08-11-22)    - Is current order appropriate/adequate? [X] Yes. All high potassium foods removed from menu, per team request    PO intake:  currently NPO for procedure  Inadequate diet order (NPO/clear liquids) x 4 days in-house; advanced to Renal diet 8/11.  Lunch tray observed yesterday (8/11). Pt consumed 3 bites of chicken salad sandwich.    Nutrition-related concerns:  - Recently diagnosed cirrhosis, MELD 34. Cholecystitis s/p per rodney tube and diagnostic paracentesis (8/09)   - Renal: ROBBIE and hyperkalemia s/p shifting & Lokelma. HD initiated 8/11; 1L removed  - Hyponatremia and severe metabolic acidosis. Worsening oliguria, s/p Bumex. Salt tabs added, 2g q8 hrs  - Pt received D10 with 2% NaCl @ 30 ml/hr on 8/11, for hypoglycemia and hyponatremia  - Paracentesis 8/9/2022, c/w peritonitis. Plan for paracentesis today (8/12)    LIVER TRANSPLANT EVALUATION:  [pending interview/education with wife]  - Per discussion with wife yesterday (8/11), pt typically eats well with no dietary restrictions.   - Wife reported 2 days of poor appetite and reduced po intake PTA.  - Stable weight history per HIE: 73.5kg (4/12/22), 73.9kg (7/12/22), 71.2kg (7/26/22), 72.6kg (8/9/22)    GI:    Last BM: none.   Bowel Regimen: Miralax, senna   Bowel regimen increased 8/11; may need to add Lactulose, per team  Octreotide ordered    Weights:   Daily Weight in kG*: 81.7 (08-12), 84 (08-11), 85 (08-11), 86.1 (08-11), 81.8 (08-10)  *Weight gain noted in-house, likely secondary to fluid shifts.  DOSING Weight (kg): 72.6 (08-09-22) *consistent with UBW used for calculations  BMI: 29.3 kg/m2 (08-09-22)  IBW: 53.5 kg    Nutritionally Pertinent MEDICATIONS  (STANDING):  midodrine.  octreotide  Injectable  piperacillin/tazobactam IVPB..  polyethylene glycol 3350  senna  sodium chloride    Pertinent Labs: 08-12 @ 00:32: Na 130<L>, BUN 62<H>, Cr 4.17<H>, <H>, K+ 4.4, Alk Phos 107, ALT/SGPT 18, AST/SGOT 31  08-11 @ 23:59: Na 131<L>, BUN 62<H>, Cr 4.28<H>, <H>, K+ 4.6, Phos 5.5<H>, Mg 2.4, Alk Phos 122<H>, ALT/SGPT 20, AST/SGOT 32,   08-11 @ 17:12: Na 127<L>, BUN 82<H>, Cr 5.13<H>, <H>, K+ 4.6, Phos 6.0<H>, Mg 2.6, Alk Phos 106, ALT/SGPT 20, AST/SGOT 35,   08-11 @ 10:27: Na 125<L>, BUN 77<H>, Cr 5.13<H>, <H>, K+ 4.9, Phos 5.6<H>, Mg 2.6, Alk Phos 106, ALT/SGPT 19, AST/SGOT 34,     A1C with Estimated Average Glucose Result: 4.9 % (08-12-22 @ 00:35)    Skin per nursing documentation: no pressure injuries documented  Edema: 2+ left/right hand, left/right foot  s/p 1L removed via HD 8/11    Estimated Needs: based on dosing wt 72.6kg, with consideration for BMI 29.3  Energy: 5511-2604 calories (25-30 kcal/kg)  Protein: 80-94 grams (1.1-1.3 gm/kg)    Previous Nutrition Diagnosis: Inadequate Protein-Energy Intake  Nutrition Diagnosis is: [X] ongoing/escalated below; addressed with therapeutic diet    New Nutrition Diagnosis: Malnutrition, moderate, in context of acute illness, related to inability to meet nutrition needs in setting of decompensated cirrhosis and acute cholecystitis, as evidenced by pt meeting <75% of nutrition needs x 7 days, moderate fluid accumulation     New Nutrition Diagnosis: Food and Nutrition Related Knowledge Deficit related to limited prior education on post-transplant nutrition therapy and food safety guidelines as evidenced by pt signed consent for Liver Transplant Evaluation 8/11/22    Nutrition Care Plan:  [X] In Progress  [] Achieved  [] Not applicable    Nutrition Interventions:     Education Provided: [transplant education with wife - pending]       Recommendations:         [X] Continue current diet order: Renal; all high potassium foods removed from menu            [X] Add oral nutrition supplement: High Protein Gelatin, High Protein Apple Juice (low potassium supplements only)     [X] Add micronutrient supplementation: Nephro-rupa     [] Other:     Monitoring and Evaluation:   Continue to monitor nutritional intake, tolerance to diet prescription, weights, labs, skin integrity      RD remains available upon request and will follow up per protocol  Marnie Richter, MS RD CDN [facility] (pager [phone]) Nutrition Follow Up Note  Patient seen for: nutrition follow up on SICU and Liver Transplant Evaluation; consent signed 8/11/22  Nutrition Consult for Assessment/Education received and appreciated.     Chart reviewed, events noted.  "65 y/o Male with a PMH Cirrhosis and recent cholangitis s/p ERCP stent (4/22) and stent removal (7/20/22). Pt presented on 8/8 with increasing RUQ pain. CT Abd/Pelvis reported distended gallbladder with stones in the cystic duct, gallbladder wall thickening, suggesting chronic cholecystitis. Pt had a percutaneous cholecystostomy tube placed on 8/09 along with a paracentesis yielding 300cc of purulent fluid. Pt admitted to SICU for further hemodynamic monitoring and management."    Source: [] Patient       [x] EMR        [] RN        [] Family at bedside       [X] Other: Pt OOB to chair, asleep. Spouse not present.  Full interview conducted with wife yesterday (8/11) at time of Initial Nutrition Assessment.  [transplant evaluation with wife - pending]    Diet Order:   Diet, NPO:   NPO for Procedure/Test     NPO Start Date: 12-Aug-2022,   NPO Start Time: 08:00 (08-12-22)  Diet, Renal Restrictions:   For patients receiving Renal Replacement - No Protein Restr, No Conc K, No Conc Phos, Low Sodium (08-11-22)    - Is current order appropriate/adequate? [X] Yes. All high potassium foods removed from menu, per team request    PO intake:  currently NPO for procedure  Inadequate diet order (NPO/clear liquids) x 4 days in-house; advanced to Renal diet 8/11.  Lunch tray observed yesterday (8/11). Pt consumed 3 bites of chicken salad sandwich.    Nutrition-related concerns:  - Recently diagnosed cirrhosis, MELD 34. Cholecystitis s/p per rodney tube and diagnostic paracentesis (8/09)   - Renal: ROBBIE and hyperkalemia s/p shifting & Lokelma. HD initiated 8/11; 1L removed  - Hyponatremia and severe metabolic acidosis. Worsening oliguria, s/p Bumex. Salt tabs added, 2g q8 hrs  - Pt received D10 with 2% NaCl @ 30 ml/hr on 8/11, for hypoglycemia and hyponatremia  - Paracentesis 8/9/2022, c/w peritonitis. Plan for paracentesis today (8/12)    LIVER TRANSPLANT EVALUATION:  [pending interview/education with wife]  - Per discussion with wife yesterday (8/11), pt typically eats well with no dietary restrictions.   - Wife reported 2 days of poor appetite and reduced po intake PTA.  - Stable weight history per HIE: 73.5kg (4/12/22), 73.9kg (7/12/22), 71.2kg (7/26/22), 72.6kg (8/9/22)  - Pt with no nutrition contraindication to transplant.    GI:    Last BM: none.   Bowel Regimen: Miralax, senna   Bowel regimen increased 8/11; may need to add Lactulose, per team  Octreotide ordered    Weights:   Daily Weight in kG*: 81.7 (08-12), 84 (08-11), 85 (08-11), 86.1 (08-11), 81.8 (08-10)  *Weight gain noted in-house, likely secondary to fluid shifts.  DOSING Weight (kg): 72.6 (08-09-22) *consistent with UBW used for calculations  BMI: 29.3 kg/m2 (08-09-22)  IBW: 53.5 kg    Nutritionally Pertinent MEDICATIONS  (STANDING):  midodrine.  octreotide  Injectable  piperacillin/tazobactam IVPB..  polyethylene glycol 3350  senna  sodium chloride    Pertinent Labs: 08-12 @ 00:32: Na 130<L>, BUN 62<H>, Cr 4.17<H>, <H>, K+ 4.4, Alk Phos 107, ALT/SGPT 18, AST/SGOT 31  08-11 @ 23:59: Na 131<L>, BUN 62<H>, Cr 4.28<H>, <H>, K+ 4.6, Phos 5.5<H>, Mg 2.4, Alk Phos 122<H>, ALT/SGPT 20, AST/SGOT 32,   08-11 @ 17:12: Na 127<L>, BUN 82<H>, Cr 5.13<H>, <H>, K+ 4.6, Phos 6.0<H>, Mg 2.6, Alk Phos 106, ALT/SGPT 20, AST/SGOT 35,   08-11 @ 10:27: Na 125<L>, BUN 77<H>, Cr 5.13<H>, <H>, K+ 4.9, Phos 5.6<H>, Mg 2.6, Alk Phos 106, ALT/SGPT 19, AST/SGOT 34,     A1C with Estimated Average Glucose Result: 4.9 % (08-12-22 @ 00:35)    Skin per nursing documentation: no pressure injuries documented  Edema: 2+ left/right hand, left/right foot  s/p 1L removed via HD 8/11    Estimated Needs: based on dosing wt 72.6kg, with consideration for BMI 29.3  Energy: 3080-5497 calories (25-30 kcal/kg)  Protein: 80-94 grams (1.1-1.3 gm/kg)    Previous Nutrition Diagnosis: Inadequate Protein-Energy Intake  Nutrition Diagnosis is: [X] ongoing/escalated below; addressed with therapeutic diet    New Nutrition Diagnosis: Malnutrition, moderate, in context of acute illness, related to inability to meet nutrition needs in setting of decompensated cirrhosis and acute cholecystitis, as evidenced by pt meeting <75% of nutrition needs x 7 days, moderate fluid accumulation     New Nutrition Diagnosis: Food and Nutrition Related Knowledge Deficit related to limited prior education on post-transplant nutrition therapy and food safety guidelines as evidenced by pt signed consent for Liver Transplant Evaluation 8/11/22    Nutrition Care Plan:  [X] In Progress  [] Achieved  [] Not applicable    Nutrition Interventions:     Education Provided: [transplant education with wife - pending]       Recommendations:         [X] Continue current diet order: Renal; all high potassium foods removed from menu            [X] Add oral nutrition supplement: High Protein Gelatin, High Protein Apple Juice (low potassium supplements only)     [X] Add micronutrient supplementation: Nephro-rupa     [X] Other: Pt with no nutrition contraindication to transplant. Evaluation/assessment communicated with outpatient Transplant RDs and Transplant Hepatology Team.    Monitoring and Evaluation:   Continue to monitor nutritional intake, tolerance to diet prescription, weights, labs, skin integrity      RD remains available upon request and will follow up per protocol  Marnie Richter, MS RD CDN [facility] (pager [phone]) Nutrition Follow Up Note  Patient seen for: nutrition follow up on SICU and Liver Transplant Evaluation; consent signed 8/11/22  Nutrition Consult for Assessment/Education received and appreciated.     Chart reviewed, events noted.  "65 y/o Male with a PMH Cirrhosis and recent cholangitis s/p ERCP stent (4/22) and stent removal (7/20/22). Pt presented on 8/8 with increasing RUQ pain. CT Abd/Pelvis reported distended gallbladder with stones in the cystic duct, gallbladder wall thickening, suggesting chronic cholecystitis. Pt had a percutaneous cholecystostomy tube placed on 8/09 along with a paracentesis yielding 300cc of purulent fluid. Pt admitted to SICU for further hemodynamic monitoring and management."    Source: [] Patient       [x] EMR        [] RN        [X] Family at bedside       [X] Other: Pt sleeping s/p HD    Diet, Renal Restrictions:   For patients receiving Renal Replacement - No Protein Restr, No Conc K, No Conc Phos, Low Sodium  1000mL Fluid Restriction ([id number]) (08-12-22 @ 09:34) [Active]  Diet, NPO:   NPO for Procedure/Test     NPO Start Date: 12-Aug-2022,   NPO Start Time: 08:00 (08-12-22 @ 07:50) [Active]    - Is current order appropriate/adequate? [X] Yes. All high potassium foods removed from menu, per team request    PO intake:  currently NPO for procedure  Inadequate diet order (NPO/clear liquids) x 4 days in-house; advanced to Renal diet 8/11.  Lunch tray observed yesterday (8/11). Pt consumed 3 bites of chicken salad sandwich.   Wife reports no po intake today in setting of HD and procedures.    Nutrition-related concerns:  - Recently diagnosed cirrhosis, MELD 34. Cholecystitis s/p per rodney tube and diagnostic paracentesis (8/09)   - Renal: ROBBIE and hyperkalemia s/p shifting & Lokelma. HD initiated 8/11; 1L removed  - Hyponatremia and severe metabolic acidosis. Worsening oliguria, s/p Bumex. Salt tabs added, 2g q8 hrs  - Pt received D10 with 2% NaCl @ 30 ml/hr on 8/11, for hypoglycemia and hyponatremia  - Paracentesis 8/9/2022, c/w peritonitis. Plan for paracentesis today (8/12)    LIVER TRANSPLANT EVALUATION:   - Per discussion with wife yesterday (8/11), pt typically eats well with no dietary restrictions.   - Wife reported 2 days of poor appetite and reduced po intake PTA.  - Stable weight history per HIE: 73.5kg (4/12/22), 73.9kg (7/12/22), 71.2kg (7/26/22), 72.6kg (8/9/22)  Education:  - Reviewed post transplant nutrition therapy and food safety guidelines for transplant recipients.   - Reviewed recommendations to avoid grapefruit, pomegranate and star fruit while taking immunosuppressant medication.   - Reviewed recommendations for moderate intake of sodium and carbohydrates with transplant medications.   - Family was receptive and expressed understanding.   - Provided nutrition handout: Food Safety Nutrition Therapy  *Pt with no nutrition contraindication to transplant.    GI:    Last BM: none.   Bowel Regimen: Miralax, senna   Bowel regimen increased 8/11; may need to add Lactulose, per team  Octreotide ordered    Weights:   Daily Weight in kG*: 81.7 (08-12), 84 (08-11), 85 (08-11), 86.1 (08-11), 81.8 (08-10)  *Weight gain noted in-house, likely secondary to fluid shifts.  DOSING Weight (kg): 72.6 (08-09-22) *consistent with UBW used for calculations  BMI: 29.3 kg/m2 (08-09-22)  IBW: 53.5 kg    Nutritionally Pertinent MEDICATIONS  (STANDING):  midodrine.  octreotide  Injectable  piperacillin/tazobactam IVPB..  polyethylene glycol 3350  senna  sodium chloride    Pertinent Labs: 08-12 @ 00:32: Na 130<L>, BUN 62<H>, Cr 4.17<H>, <H>, K+ 4.4, Alk Phos 107, ALT/SGPT 18, AST/SGOT 31  08-11 @ 23:59: Na 131<L>, BUN 62<H>, Cr 4.28<H>, <H>, K+ 4.6, Phos 5.5<H>, Mg 2.4, Alk Phos 122<H>, ALT/SGPT 20, AST/SGOT 32,   08-11 @ 17:12: Na 127<L>, BUN 82<H>, Cr 5.13<H>, <H>, K+ 4.6, Phos 6.0<H>, Mg 2.6, Alk Phos 106, ALT/SGPT 20, AST/SGOT 35,   08-11 @ 10:27: Na 125<L>, BUN 77<H>, Cr 5.13<H>, <H>, K+ 4.9, Phos 5.6<H>, Mg 2.6, Alk Phos 106, ALT/SGPT 19, AST/SGOT 34,     A1C with Estimated Average Glucose Result: 4.9 % (08-12-22 @ 00:35)    Skin per nursing documentation: no pressure injuries documented  Edema: 2+ left/right hand, left/right foot  s/p 1L removed via HD 8/11    Estimated Needs: based on dosing wt 72.6kg, with consideration for BMI 29.3  Energy: 0746-4857 calories (25-30 kcal/kg)  Protein: 80-94 grams (1.1-1.3 gm/kg)    Previous Nutrition Diagnosis: Inadequate Protein-Energy Intake  Nutrition Diagnosis is: [X] ongoing/escalated below; addressed with therapeutic diet    New Nutrition Diagnosis: Malnutrition, moderate, in context of acute illness, related to inability to meet nutrition needs in setting of decompensated cirrhosis and acute cholecystitis, as evidenced by pt meeting <75% of nutrition needs x 7 days, moderate fluid accumulation     New Nutrition Diagnosis: Food and Nutrition Related Knowledge Deficit related to limited prior education on post-transplant nutrition therapy and food safety guidelines as evidenced by pt signed consent for Liver Transplant Evaluation 8/11/22    Nutrition Care Plan:  [X] In Progress  [] Achieved  [] Not applicable    Nutrition Interventions:     Education Provided: transplant nutrition education provided to wife at bedside. Reviewed 1L fluid restriction, and potassium restriction.       Recommendations:         [X] Continue current diet order: Renal; all high potassium foods removed from menu            [X] Add oral nutrition supplement: High Protein Gelatin, High Protein Apple Juice (low potassium supplements only)     [X] Add micronutrient supplementation: Nephro-rupa     [X] Other: Pt with no nutrition contraindication to transplant. Evaluation/assessment communicated with outpatient Transplant RDs and Transplant Hepatology Team.    Monitoring and Evaluation:   Continue to monitor nutritional intake, tolerance to diet prescription, weights, labs, skin integrity      RD remains available upon request and will follow up per protocol  Marnie Richter, MS KIMBERLY CDN [facility] (pager [phone]) Nutrition Follow Up Note  Patient seen for: nutrition follow up on SICU and Liver Transplant Evaluation; consent signed 8/11/22  Nutrition Consult for Assessment/Education received and appreciated.     Chart reviewed, events noted.  "65 y/o Male with a PMH Cirrhosis and recent cholangitis s/p ERCP stent (4/22) and stent removal (7/20/22). Pt presented on 8/8 with increasing RUQ pain. CT Abd/Pelvis reported distended gallbladder with stones in the cystic duct, gallbladder wall thickening, suggesting chronic cholecystitis. Pt had a percutaneous cholecystostomy tube placed on 8/09 along with a paracentesis yielding 300cc of purulent fluid. Pt admitted to SICU for further hemodynamic monitoring and management."    Source: [] Patient       [x] EMR        [] RN        [X] Family at bedside       [X] Other: Pt sleeping s/p HD    Diet, Renal Restrictions:   For patients receiving Renal Replacement - No Protein Restr, No Conc K, No Conc Phos, Low Sodium  1000mL Fluid Restriction ([id number]) (08-12-22 @ 09:34) [Active]  Diet, NPO:   NPO for Procedure/Test     NPO Start Date: 12-Aug-2022,   NPO Start Time: 08:00 (08-12-22 @ 07:50) [Active]    - Is current order appropriate/adequate? [X] Yes. All high potassium foods removed from menu, per team request    PO intake:  currently NPO for procedure  Inadequate diet order (NPO/clear liquids) x 4 days in-house; advanced to Renal diet 8/11.  Lunch tray observed yesterday (8/11). Pt consumed 3 bites of chicken salad sandwich.   Wife reports no po intake today in setting of HD and procedures.    Nutrition-related concerns:  - Recently diagnosed cirrhosis, MELD 34. Cholecystitis s/p per rodney tube and diagnostic paracentesis (8/09)   - Renal: ROBBIE and hyperkalemia s/p shifting & Lokelma. HD initiated 8/11; 1L removed  - Hyponatremia and severe metabolic acidosis. Worsening oliguria, s/p Bumex. Salt tabs added, 2g q8 hrs  - Pt received D10 with 2% NaCl @ 30 ml/hr on 8/11, for hypoglycemia and hyponatremia  - Paracentesis 8/9/2022, c/w peritonitis. Plan for paracentesis today (8/12)    LIVER TRANSPLANT EVALUATION:   - Per discussion with wife yesterday (8/11), pt typically eats well with no dietary restrictions.   - Wife reported 2 days of poor appetite and reduced po intake PTA.  - Stable weight history per HIE: 73.5kg (4/12/22), 73.9kg (7/12/22), 71.2kg (7/26/22), 72.6kg (8/9/22)  - FRAILTY Score per PT assessment (8/12): 5.1 = FRAIL  Education:  - Reviewed post transplant nutrition therapy and food safety guidelines for transplant recipients.   - Reviewed recommendations to avoid grapefruit, pomegranate and star fruit while taking immunosuppressant medication.   - Reviewed recommendations for moderate intake of sodium and carbohydrates with transplant medications.   - Family was receptive and expressed understanding.   - Provided nutrition handout: Food Safety Nutrition Therapy  *Pt with no nutrition contraindication to transplant.    GI:    Last BM: none.   Bowel Regimen: Miralax, senna   Bowel regimen increased 8/11; may need to add Lactulose, per team  Octreotide ordered    Weights:   Daily Weight in kG*: 81.7 (08-12), 84 (08-11), 85 (08-11), 86.1 (08-11), 81.8 (08-10)  *Weight gain noted in-house, likely secondary to fluid shifts.  DOSING Weight (kg): 72.6 (08-09-22) *consistent with UBW used for calculations  BMI: 29.3 kg/m2 (08-09-22)  IBW: 53.5 kg    Nutritionally Pertinent MEDICATIONS  (STANDING):  midodrine.  octreotide  Injectable  piperacillin/tazobactam IVPB..  polyethylene glycol 3350  senna  sodium chloride    Pertinent Labs: 08-12 @ 00:32: Na 130<L>, BUN 62<H>, Cr 4.17<H>, <H>, K+ 4.4, Alk Phos 107, ALT/SGPT 18, AST/SGOT 31  08-11 @ 23:59: Na 131<L>, BUN 62<H>, Cr 4.28<H>, <H>, K+ 4.6, Phos 5.5<H>, Mg 2.4, Alk Phos 122<H>, ALT/SGPT 20, AST/SGOT 32,   08-11 @ 17:12: Na 127<L>, BUN 82<H>, Cr 5.13<H>, <H>, K+ 4.6, Phos 6.0<H>, Mg 2.6, Alk Phos 106, ALT/SGPT 20, AST/SGOT 35,   08-11 @ 10:27: Na 125<L>, BUN 77<H>, Cr 5.13<H>, <H>, K+ 4.9, Phos 5.6<H>, Mg 2.6, Alk Phos 106, ALT/SGPT 19, AST/SGOT 34,     A1C with Estimated Average Glucose Result: 4.9 % (08-12-22 @ 00:35)    Skin per nursing documentation: no pressure injuries documented  Edema: 2+ left/right hand, left/right foot  s/p 1L removed via HD 8/11    Estimated Needs: based on dosing wt 72.6kg, with consideration for BMI 29.3  Energy: 5119-2435 calories (25-30 kcal/kg)  Protein: 80-94 grams (1.1-1.3 gm/kg)    Previous Nutrition Diagnosis: Inadequate Protein-Energy Intake  Nutrition Diagnosis is: [X] ongoing/escalated below; addressed with therapeutic diet    New Nutrition Diagnosis: Malnutrition, moderate, in context of acute illness, related to inability to meet nutrition needs in setting of decompensated cirrhosis and acute cholecystitis, as evidenced by pt meeting <75% of nutrition needs x 7 days, moderate fluid accumulation     New Nutrition Diagnosis: Food and Nutrition Related Knowledge Deficit related to limited prior education on post-transplant nutrition therapy and food safety guidelines as evidenced by pt signed consent for Liver Transplant Evaluation 8/11/22    Nutrition Care Plan:  [X] In Progress  [] Achieved  [] Not applicable    Nutrition Interventions:     Education Provided: transplant nutrition education provided to wife at bedside. Reviewed 1L fluid restriction, and potassium restriction.       Recommendations:         [X] Continue current diet order: Renal; all high potassium foods removed from menu            [X] Add oral nutrition supplement: High Protein Gelatin, High Protein Apple Juice (low potassium supplements only)     [X] Add micronutrient supplementation: Nephro-rupa     [X] Other: Pt with no nutrition contraindication to transplant. Evaluation/assessment communicated with outpatient Transplant RDs and Transplant Hepatology Team.    Monitoring and Evaluation:   Continue to monitor nutritional intake, tolerance to diet prescription, weights, labs, skin integrity      RD remains available upon request and will follow up per protocol  Marnie Richter MS RD CDN [facility] (pager [phone])

## 2022-08-12 NOTE — PROGRESS NOTE ADULT - PROBLEM SELECTOR PLAN 1
ROBBIE likely ATN from severe sepsis (from cholecystitis & UTI) causing hypotension vs HRS. Contrast administration likely contributed     Baseline SCr is 0.7 on 7/26.   SCr on arrival is 1.9 on 8/8 & trending up to 5 on 8/11. Pt remained anuric despite IV albumin & trial of HTS with diuretics. Had uremic symptoms so was started on HD on 8/11. Pt remains on 2% HTS & salt tabs 2 gm tid this AM. Started Octreotide 100 q8 hr &  Midodrine 10 mg qid. s/p paracentesis & perc rodney. Cultures growing E.coli &  Streptococcus anginosus. UA with high sp gravity (from IV contrast), 30 mg /dl of proteinuria, pyuria, +LE. spot urine TP/CR 0.8 g/g non nephrotic. Urine electrolytes suggesting pre renal etiology such as seen in volume depletion vs HRS.     Continue octreotide & midodrine for splanchnic vasoconstriction & to optimize hemodynamics. Check Renal US when stable. HD #1 yesterday 8/11 without any issues. Will do HD #2 today with 1L UF. UOP improving with pt making 50-75cc/hr in the last few hrs (700cc in 24 hrs).  Would discontinue 2% HTS & salt tabs 2 gm tid as hyponatremia will improve with improving UOP & UF during HD. Will monitor for renal recovery. Can give DDAVP for uremic bleeding. Abx per primary team. Monitor labs and urine output. Avoid NSAIDs, ACEI/ARBS, RCA and nephrotoxins. Dose medications as per eGFR.

## 2022-08-12 NOTE — PROGRESS NOTE ADULT - ATTENDING COMMENTS
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 coli and Streptococcus anginosis. Ascitic fluid studies from paracentesis (8/9) most consistent with secondary peritonitis with concern for possible perforated viscus (likely gallbladder) given markedly elevated ascitic fluid LDH. S/p ceftriaxone (8/8) and continuing Zosyn (8/8- ) as per Transplant ID recommendations. Recommend repeat diagnostic and therapeutic paracentesis today to alleviate abdominal distension and possible renal vascular congestion and to assess for interval response to his antibiotic therapy in terms of the peritonitis. S/p initial session of HD yesterday. Urine output appeared to improve overnight and this morning but has decreased again. Albumin discontinued due to pulmonary congestion but continuing midodrine and octreotide for possible ROBBIE-HRS. Defer decision on need for HD today to Nephrology team but he has anasarca on exam. Agree with discontinuation of 2% saline and salt tabs. He continues to have normal mentation. Liver transplant evaluation has been initiated. Current MELD-Na 35 (ABO O).    Laron Harper M.D., Ph.D.  Transplant Hepatology

## 2022-08-12 NOTE — PROGRESS NOTE ADULT - SUBJECTIVE AND OBJECTIVE BOX
HISTORY:  Mr. Lobo is a 66y Male with history of recently diagnosed cirrhosis and cholangitis s/p ERCP with stent placement (4/2022) with stent removal on 7/20/22. Pt presented on 7/26 with complaints of RUQ pain for one week along with decreased appetite. While in ED, US performed reporting a distended gallbladder with stones and CBD measuring 5mm. Pt denied nausea, vomiting, fevers or chills and was discharged home.    Pt returned to [facility] on 8/08/22 presenting with tachycardic 's, normotensive, with an SpO2 94% on RA.  CT Abd/ Pelvis with IV contrast ( 8/08) showed a distended gallbladder with stones in the cystic duct, gallbladder wall thickening, suggesting chronic cholecystitis; cirrhosis with portal hypertension, moderate ascites with diffuse peritoneal enhancement concerning for SBP, small and large bowel thickening suggesting hepatic enterocolopathy. Pt had a percutaneous cholecystostomy tube placed on 8/09 along with a paracentesis yielding 300cc of purulent fluid. Pt admitted to SICU for further hemodynamic monitoring and management.     24 HOUR EVENTS:  - Shiley placed during the day for HD. Received HD and had 1L removed.   - Received FFP and DDAVP for shiley placement. However has been oozing intermittently from site.   - May give lactulose in the day if no BM ON. Went up on his bowel regiment  - Placed Arrow ON  - IR to do paracentesis in the AM  - RUQ US performed and found unremarkable  - Grew Streptococcus anginosus in bile fluid. Will hold on the Vancomycin  - Giorgi PT/PTT/INR after HD->coagulopathic. Continues to intermittently ooze from shiley s/p quickclot x2    NEURO  Exam: AAO3  Meds:     RESPIRATORY  RR: 21 (08-12-22 @ 04:00) (12 - 26)  SpO2: 98% (08-12-22 @ 04:00) (94% - 100%)  Wt(kg): --  Exam: clear breat sounds  ABG - ( 12 Aug 2022 00:01 )  pH: x     /  pCO2: x     /  pO2: x     / HCO3: x     / Base Excess: x     /  SaO2: x       Lactate: 2.7              Meds:     CARDIOVASCULAR  HR: 75 (08-12-22 @ 04:00) (72 - 97)  BP: 126/70 (08-12-22 @ 04:00) (90/55 - 156/76)  BP(mean): 93 (08-12-22 @ 04:00) (69 - 109)  ABP: --  ABP(mean): --  Wt(kg): --  CVP(cm H2O): --  VBG - ( 11 Aug 2022 23:24 )  pH: 7.35  /  pCO2: 47    /  pO2: 54    / HCO3: 26    / Base Excess: 0.1   /  SaO2: 82.6   Lactate: 2.5              Lactate, Blood: 2.7 mmol/L (08-12 @ 00:01)    Exam: well perfused  Cardiac Rhythm: RRR  Perfusion     [x]Adequate   [ ]Inadequate  Mentation   [x]Normal       [ ]Reduced  Extremities  [x]Warm         [ ]Cool  Volume Status [ ]Hypervolemic [x]Euvolemic [ ]Hypovolemic  Meds: midodrine. 10 milliGRAM(s) Oral <User Schedule>      GI/NUTRITION  Exam: soft, non tender  Diet: renal diet  Meds: polyethylene glycol 3350 17 Gram(s) Oral two times a day  senna 2 Tablet(s) Oral at bedtime      GENITOURINARY  I&O's Detail    08-10 @ 07:01  -  08-11 @ 07:00  --------------------------------------------------------  IN:    Albumin 25%  -  50 mL: 300 mL    Bumetanide: 27.5 mL    freetext medication - Infusion: 660 mL    IV PiggyBack: 225 mL    IV PiggyBack: 250 mL    IV PiggyBack: 50 mL    Oral Fluid: 760 mL    sodium chloride 0.9%: 60 mL  Total IN: 2332.5 mL    OUT:    Indwelling Catheter - Urethral (mL): 188 mL    T-Tube (mL): 85 mL  Total OUT: 273 mL    Total NET: 2059.5 mL      08-11 @ 07:01  -  08-12 @ 04:08  --------------------------------------------------------  IN:    Albumin 25%  -  50 mL: 150 mL    freetext medication - Infusion: 540 mL    IV PiggyBack: 50 mL    IV PiggyBack: 200 mL    Oral Fluid: 300 mL    Other (mL): 700 mL    Plasma: 300 mL  Total IN: 2240 mL    OUT:    Bumetanide: 0 mL    Indwelling Catheter - Urethral (mL): 533 mL    Other (mL): 1700 mL    T-Tube (mL): 60 mL  Total OUT: 2293 mL    Total NET: -53 mL          08-12    130<L>  |  93<L>  |  62<H>  ----------------------------<  141<H>  4.4   |  23  |  4.17<H>    Ca    8.7      12 Aug 2022 00:32  Phos  5.5     08-11  Mg     2.4     08-11    TPro  5.9<L>  /  Alb  3.5  /  TBili  3.2<H>  /  DBili  1.7<H>  /  AST  31  /  ALT  18  /  AlkPhos  107  08-12    Meds: sodium chloride 2 Gram(s) Oral every 8 hours      HEMATOLOGIC  Meds:                         8.9    25.61 )-----------( 83       ( 11 Aug 2022 23:58 )             25.2     PT/INR - ( 12 Aug 2022 00:04 )   PT: 26.3 sec;   INR: 2.25 ratio         PTT - ( 12 Aug 2022 00:04 )  PTT:46.2 sec    INFECTIOUS DISEASES  T(C): 36.7 (08-11-22 @ 23:00), Max: 36.8 (08-11-22 @ 18:05)  Wt(kg): --  WBC Count: 25.61 K/uL (08-11 @ 23:58)  WBC Count: 25.84 K/uL (08-11 @ 23:58)  WBC Count: 27.54 K/uL (08-11 @ 17:12)  WBC Count: 24.93 K/uL (08-11 @ 10:27)  WBC Count: 24.26 K/uL (08-11 @ 04:14)    Recent Cultures:  Specimen Source: Peritoneal Peritoneal Fluid, 08-09 @ 19:01; Results   Few Escherichia coli  Moderate Streptococcus anginosus "Susceptibilities not performed"; Gram Stain:   polymorphonuclear leukocytes seen  Moderate Gram positive cocci in pairs seen  Limited volume of specimen received, Unable to centrifuge/concentrate  specimen. Culture results may be compromised.; Organism: Escherichia coli  Specimen Source: Catheterized Catheterized, 08-08 @ 12:30; Results   10,000 - 49,000 CFU/mL Escherichia coli; Gram Stain: --; Organism: Escherichia coli  Specimen Source: .Blood Blood-Peripheral, 08-08 @ 07:31; Results   No growth to date.; Gram Stain: --; Organism: --  Specimen Source: .Blood Blood-Peripheral, 08-08 @ 07:15; Results   No growth to date.; Gram Stain: --; Organism: --    Meds: piperacillin/tazobactam IVPB.. 3.375 Gram(s) IV Intermittent every 12 hours      ENDOCRINE  Capillary Blood Glucose    Meds: octreotide  Injectable 100 MICROGram(s) IV Push every 8 hours      ACCESS DEVICES:  [x] Peripheral IV  [ ] Central Venous Line		[ ] R	[ ] L	[ ] IJ	[ ] Fem	[ ] SC	Placed:   [ ] Arterial Line			[ ] R	[ ] L	[ ] Fem	[ ] Rad	[ ] Ax	Placed:   [ ] PICC:					[ ] Mediport  [ ] Urinary Catheter, Date Placed:   [x] Necessity of urinary, arterial, and venous catheters discussed    OTHER MEDICATIONS:  chlorhexidine 2% Cloths 1 Application(s) Topical <User Schedule>  Dextrose 10 % + 2% Sodium Chloride 1000 milliLiter(s) 1000 milliLiter(s) IV Continuous <Continuous>

## 2022-08-12 NOTE — PROGRESS NOTE ADULT - ATTENDING COMMENTS
66y Male with history of recently diagnosed cirrhosis and cholangitis s/p ERCP with stent placement (4/2022) s/p stent removal on 7/20/22 presenting for one week of RUQ abdominal pain found to have chronic cholecystitis; cirrhosis with portal hypertension, moderate ascites with diffuse peritoneal enhancement concerning for SBP, small and large bowel thickening suggesting hepatic enterocolopathy. Nephrology called for ROBBIE, severe metabolic acidosis, hyponatremia and hyperkalemia. Remains oliguric, so SOB, BUT uremic symptoms with tremor.  Now resolved with initial HD and seen this pm during second session.  Well tolerated.  Wife at bedside.    1. ARF--likely ATN multifactorial including sepsis, radiocontrast.  Persistently elevated lactate although improving.  OLIGURIC.  Consent obtained from patient.  NOW WITH UREMIC NEUROPATHY HD initiated.     2.  Lactic acidosis--hemodynamic optimization, no aggressive volume repletion.  MODEST UF ON HD.  Echo reviewed and is appropriately hyperdynamic but NO reduced EF.  Trend with volume optimization including paracentesis.    3.  Hyponatremia--minimal free water clearance with 1. HD dialysate will --> gradual improvement.    4.  Ascites--for IR.  Continue albumin infusion and increase if >2L HD UF may decrease overall volume overload    discussed with team, intensivist    Cristhian Jacob MD  contact via teams

## 2022-08-12 NOTE — DIETITIAN NUTRITION RISK NOTIFICATION - TREATMENT: THE FOLLOWING DIET HAS BEEN RECOMMENDED
Diet, NPO:   NPO for Procedure/Test     NPO Start Date: 12-Aug-2022,   NPO Start Time: 08:00 (08-12-22 @ 07:50) [Active]  Diet, Renal Restrictions:   For patients receiving Renal Replacement - No Protein Restr, No Conc K, No Conc Phos, Low Sodium (08-11-22 @ 09:17) [Active]

## 2022-08-12 NOTE — PROGRESS NOTE ADULT - SUBJECTIVE AND OBJECTIVE BOX
Follow Up:      Interval History:    REVIEW OF SYSTEMS  [  ] ROS unobtainable because:    [  ] All other systems negative except as noted below    Constitutional:  [ ] fever [ ] chills  [ ] weight loss  [ ] weakness  Skin:  [ ] rash [ ] phlebitis	  Eyes: [ ] icterus [ ] pain  [ ] discharge	  ENMT: [ ] sore throat  [ ] thrush [ ] ulcers [ ] exudates  Respiratory: [ ] dyspnea [ ] hemoptysis [ ] cough [ ] sputum	  Cardiovascular:  [ ] chest pain [ ] palpitations [ ] edema	  Gastrointestinal:  [ ] nausea [ ] vomiting [ ] diarrhea [ ] constipation [ ] pain	  Genitourinary:  [ ] dysuria [ ] frequency [ ] hematuria [ ] discharge [ ] flank pain  [ ] incontinence  Musculoskeletal:  [ ] myalgias [ ] arthralgias [ ] arthritis  [ ] back pain  Neurological:  [ ] headache [ ] seizures  [ ] confusion/altered mental status    Allergies  No Known Allergies        ANTIMICROBIALS:  piperacillin/tazobactam IVPB.. 3.375 every 12 hours      OTHER MEDS:  MEDICATIONS  (STANDING):  bisacodyl 5 every 12 hours PRN  midodrine. 10 <User Schedule>  octreotide  Injectable 100 every 8 hours  polyethylene glycol 3350 17 two times a day  senna 2 at bedtime      Vital Signs Last 24 Hrs  T(C): 36.6 (12 Aug 2022 12:28), Max: 36.8 (11 Aug 2022 18:05)  T(F): 97.9 (12 Aug 2022 12:28), Max: 98.2 (11 Aug 2022 18:05)  HR: 70 (12 Aug 2022 12:28) (70 - 98)  BP: 153/83 (12 Aug 2022 12:28) (90/55 - 156/76)  BP(mean): 105 (12 Aug 2022 12:00) (69 - 109)  RR: 12 (12 Aug 2022 12:28) (12 - 26)  SpO2: 97% (12 Aug 2022 12:28) (94% - 100%)    Parameters below as of 12 Aug 2022 12:28  Patient On (Oxygen Delivery Method): nasal cannula  O2 Flow (L/min): 1      PHYSICAL EXAMINATION:  General: Alert and Awake, NAD  HEENT: PERRL, EOMI  Neck: Supple  Cardiac: RRR, No M/R/G  Resp: CTAB, No Wh/Rh/Ra  Abdomen: NBS, NT/ND, No HSM, No rigidity or guarding  MSK: No LE edema. No Calf tenderness  : No milian  Skin: No rashes or lesions. Skin is warm and dry to the touch.   Neuro: Alert and Awake. CN 2-12 Grossly intact. Moves all four extremities spontaneously.  Psych: Calm, Pleasant, Cooperative                          8.5    22.19 )-----------( 59       ( 12 Aug 2022 12:04 )             25.2       08-12    133<L>  |  94<L>  |  68<H>  ----------------------------<  144<H>  4.2   |  24  |  4.28<H>    Ca    8.7      12 Aug 2022 12:04  Phos  5.7     08-12  Mg     2.3     08-12    TPro  6.2  /  Alb  3.5  /  TBili  3.2<H>  /  DBili  1.7<H>  /  AST  31  /  ALT  20  /  AlkPhos  104  08-12          MICROBIOLOGY:  v  Peritoneal Peritoneal Fluid  08-09-22   Few Escherichia coli  Moderate Streptococcus anginosus "Susceptibilities not performed"  --  Escherichia coli      Catheterized Catheterized  08-08-22   10,000 - 49,000 CFU/mL Escherichia coli  --  Escherichia coli      .Blood Blood-Peripheral  08-08-22   No growth to date.  --  --      .Blood Blood-Peripheral  08-08-22   No growth to date.  --  --                RADIOLOGY:    <The imaging below has been reviewed and visualized by me independently. Findings as detailed in report below> Follow Up:  Cholecystitis, Peritonitis    Interval History: planned for paracentesis today.     REVIEW OF SYSTEMS  [  ] ROS unobtainable because:    [  x] All other systems negative except as noted below    Constitutional:  [ ] fever [ ] chills  [ ] weight loss  [ ] weakness  Skin:  [ ] rash [ ] phlebitis	  Eyes: [ ] icterus [ ] pain  [ ] discharge	  ENMT: [ ] sore throat  [ ] thrush [ ] ulcers [ ] exudates  Respiratory: [ ] dyspnea [ ] hemoptysis [ ] cough [ ] sputum	  Cardiovascular:  [ ] chest pain [ ] palpitations [ ] edema	  Gastrointestinal:  [ ] nausea [ ] vomiting [ ] diarrhea [ ] constipation [x ] pain	  Genitourinary:  [ ] dysuria [ ] frequency [ ] hematuria [ ] discharge [ ] flank pain  [ ] incontinence  Musculoskeletal:  [ ] myalgias [ ] arthralgias [ ] arthritis  [ ] back pain  Neurological:  [ ] headache [ ] seizures  [ ] confusion/altered mental status    Allergies  No Known Allergies        ANTIMICROBIALS:  piperacillin/tazobactam IVPB.. 3.375 every 12 hours      OTHER MEDS:  MEDICATIONS  (STANDING):  bisacodyl 5 every 12 hours PRN  midodrine. 10 <User Schedule>  octreotide  Injectable 100 every 8 hours  polyethylene glycol 3350 17 two times a day  senna 2 at bedtime      Vital Signs Last 24 Hrs  T(C): 36.6 (12 Aug 2022 12:28), Max: 36.8 (11 Aug 2022 18:05)  T(F): 97.9 (12 Aug 2022 12:28), Max: 98.2 (11 Aug 2022 18:05)  HR: 70 (12 Aug 2022 12:28) (70 - 98)  BP: 153/83 (12 Aug 2022 12:28) (90/55 - 156/76)  BP(mean): 105 (12 Aug 2022 12:00) (69 - 109)  RR: 12 (12 Aug 2022 12:28) (12 - 26)  SpO2: 97% (12 Aug 2022 12:28) (94% - 100%)    Parameters below as of 12 Aug 2022 12:28  Patient On (Oxygen Delivery Method): nasal cannula  O2 Flow (L/min): 1      PHYSICAL EXAMINATION:  General: Alert and Awake, NAD  Cardiac: RRR, No M/R/G  Resp: CTAB, No Wh/Rh/Ra  Abdomen: +Hepatobiliary Drain, Distended, No HSM, No rigidity or guarding  MSK: No LE edema. No Calf tenderness  Skin: No rashes or lesions. Skin is warm and dry to the touch.   Neuro: Alert and Awake. CN 2-12 Grossly intact. Moves all four extremities spontaneously.  Psych: Calm, Pleasant, Cooperative                          8.5    22.19 )-----------( 59       ( 12 Aug 2022 12:04 )             25.2       08-12    133<L>  |  94<L>  |  68<H>  ----------------------------<  144<H>  4.2   |  24  |  4.28<H>    Ca    8.7      12 Aug 2022 12:04  Phos  5.7     08-12  Mg     2.3     08-12    TPro  6.2  /  Alb  3.5  /  TBili  3.2<H>  /  DBili  1.7<H>  /  AST  31  /  ALT  20  /  AlkPhos  104  08-12          MICROBIOLOGY:  v  Peritoneal Peritoneal Fluid  08-09-22   Few Escherichia coli  Moderate Streptococcus anginosus "Susceptibilities not performed"  --  Escherichia coli      Catheterized Catheterized  08-08-22   10,000 - 49,000 CFU/mL Escherichia coli  --  Escherichia coli      .Blood Blood-Peripheral  08-08-22   No growth to date.  --  --      .Blood Blood-Peripheral  08-08-22   No growth to date.  --  --    RADIOLOGY:    <The imaging below has been reviewed and visualized by me independently. Findings as detailed in report below>    < from: Xray Chest 1 View- PORTABLE-Routine (Xray Chest 1 View- PORTABLE-Routine in AM.) (08.12.22 @ 07:29) >  IMPRESSION:  Bilateral patchy opacifications likely secondary to pulmonary vascular   congestion.  Bilateral lung base hazy opacification, likely secondary to atelectasis   and/or effusions.    < end of copied text >

## 2022-08-12 NOTE — PROGRESS NOTE ADULT - ATTENDING COMMENTS
65 yo m, decompensated Laennec's cirrhosis, s/p ERCP and stent, now with cholecystostomy tube and ABP.  N Dilaudid PRN. Lavinia 0.  P NC sat >90. Pulmonary toilet. IS.  C Monitor hemodynamics.  G IR for paracentesis today. Start Dulcolax. LFTs T lázaro 3.2. NaMELD 35. On transplant evaluation.  R ROBBIE. HD yesterday, UF 1L. Midodrine, octreotide, albumin 25% q8h. UOP 75 cc/last hour. Na 130.  I E. coli, S. anginosis on paracentesis. Zosyn, ID following.  H Hgb 8.9(9.3). INR 2.25.   DVT SCDs, chemical on hold for procedures.

## 2022-08-12 NOTE — PROGRESS NOTE ADULT - SUBJECTIVE AND OBJECTIVE BOX
[facility] Division of Kidney Diseases & Hypertension  FOLLOW UP NOTE  [phone]--------------------------------------------------------------------------------  Chief Complaint:Acute cholecystitis        24 hour events/subjective: Patient seen & examined. Labs & vitals reviewed. Denies chest pain, fever, chills, increased frequency, dysuria, hematuria, pus in urine, frothy urine, SOB, leg edema, loss of appetite, N/V, pruritis. UO in the last 24 hours          PAST HISTORY  --------------------------------------------------------------------------------  No significant changes to PMH, PSH, FHx, SHx, unless otherwise noted    ALLERGIES & MEDICATIONS  --------------------------------------------------------------------------------  Allergies    No Known Allergies    Intolerances      Standing Inpatient Medications  chlorhexidine 2% Cloths 1 Application(s) Topical <User Schedule>  midodrine. 10 milliGRAM(s) Oral <User Schedule>  octreotide  Injectable 100 MICROGram(s) IV Push every 8 hours  piperacillin/tazobactam IVPB.. 3.375 Gram(s) IV Intermittent every 12 hours  polyethylene glycol 3350 17 Gram(s) Oral two times a day  senna 2 Tablet(s) Oral at bedtime  sodium chloride 2 Gram(s) Oral every 8 hours    PRN Inpatient Medications      REVIEW OF SYSTEMS  --------------------------------------------------------------------------------  Gen: No chills  Respiratory: No dyspnea, cough  CV: No chest pain  GI: No abdominal pain, diarrhea,  nausea, vomiting  : No increased frequency, dysuria, hematuria  MSK:  no edema  Neuro: No dizziness/lightheadedness      All other systems were reviewed and are negative, except as noted.    VITALS/PHYSICAL EXAM  --------------------------------------------------------------------------------  T(C): 36.3 (08-12-22 @ 07:00), Max: 36.8 (08-11-22 @ 18:05)  HR: 80 (08-12-22 @ 09:00) (72 - 98)  BP: 134/65 (08-12-22 @ 09:00) (90/55 - 156/76)  RR: 14 (08-12-22 @ 09:00) (12 - 26)  SpO2: 98% (08-12-22 @ 09:00) (94% - 100%)  Wt(kg): --        08-11-22 @ 07:01  -  08-12-22 @ 07:00  --------------------------------------------------------  IN: 2420 mL / OUT: 2523 mL / NET: -103 mL    08-12-22 @ 07:01  -  08-12-22 @ 09:32  --------------------------------------------------------  IN: 0 mL / OUT: 200 mL / NET: -200 mL      Physical Exam:  	Gen: NAD, elderly  	HEENT: Anicteric, no JVD  	Pulm: CTA B/L  	CV: RRR, S1S2, no rub  	Back: No spinal or CVA tenderness  	Abd: +BS, soft, nontender/nondistended          No presacral edema  	: No suprapubic tenderness          Extremities: no bilateral LE edema, no asterixis          Neuro: Awake, alert  	Skin: Warm, without rashes  	Vascular access:    LABS/STUDIES  --------------------------------------------------------------------------------              8.9    25.61 >-----------<  83       [08-11-22 @ 23:58]              25.2     130  |  93  |  62  ----------------------------<  141      [08-12-22 @ 00:32]  4.4   |  23  |  4.17        Ca     8.7     [08-12-22 @ 00:32]      Mg     2.4     [08-11-22 @ 23:59]      Phos  5.5     [08-11-22 @ 23:59]    TPro  5.9  /  Alb  3.5  /  TBili  3.2  /  DBili  1.7  /  AST  31  /  ALT  18  /  AlkPhos  107  [08-12-22 @ 00:32]    PT/INR: PT 26.3 , INR 2.25       [08-12-22 @ 00:04]  PTT: 46.2       [08-12-22 @ 00:04]    Uric acid 7.0      [08-12-22 @ 00:32]    Creatinine Trend:  SCr 4.17 [08-12 @ 00:32]  SCr 4.28 [08-11 @ 23:59]  SCr 5.13 [08-11 @ 17:12]  SCr 5.13 [08-11 @ 10:27]  SCr 5.11 [08-11 @ 04:14]    Urinalysis - [08-08-22 @ 10:19]      Color Dark Yellow / Appearance Slightly Turbid / SG >1.050 / pH 5.5      Gluc Negative / Ketone Trace  / Bili Small / Urobili Negative       Blood Negative / Protein 30 mg/dL / Leuk Est Small / Nitrite Negative      RBC 2 / WBC 18 / Hyaline 30 / Gran  / Sq Epi  / Non Sq Epi 3 / Bacteria Many    Urine Creatinine 69      [08-10-22 @ 11:44]  Urine Protein 124      [08-09-22 @ 18:53]  Urine Sodium 92      [08-10-22 @ 11:44]  Urine Urea Nitrogen 62      [08-10-22 @ 12:04]  Urine Potassium 33      [08-10-22 @ 11:44]  Urine Chloride 80      [08-10-22 @ 11:44]  Urine Osmolality 295      [08-10-22 @ 11:44]    Iron 32, TIBC 93, %sat 34      [08-12-22 @ 00:02]  Ferritin 728      [08-12-22 @ 00:00]  TSH 0.15      [08-12-22 @ 00:00]  Lipid: chol 41, TG 52, HDL 9, LDL --      [08-11-22 @ 23:59]    HBsAb <3.0      [08-11-22 @ 18:49]  HBsAg Nonreact      [08-11-22 @ 18:49]  HCV 0.15, Nonreact      [08-11-22 @ 18:49]  HIV Nonreact      [08-12-22 @ 00:00]  HIV Nonreact      [08-11-22 @ 23:59]     [facility] Division of Kidney Diseases & Hypertension  FOLLOW UP NOTE  [phone]--------------------------------------------------------------------------------  Chief Complaint:Acute cholecystitis      HPI: 66y Male with history of recently diagnosed cirrhosis and cholangitis with septic shock with E. coli bacteremia 2/2 acute cholecystitis  s/p ERCP with stent placement (4/2022) s/p stent removal on 7/20/22 presenting for one week of RUQ abdominal pain found to have acute cholecystitis & SBP. Nephrology called for ROBBIE, severe metabolic acidosis, hyponatremia and hyperkalemia. s/p perc rodney and diagnostic paracentesis with purulent material drained.  Baseline SCr is 0.7 on 7/26. SCr on arrival is 1.9 on 8/8 & trending up to 5 on 8/11. Pt remained anuric despite IV albumin & trial of HTS with diuretics. Started on HD on 8/11.     Pt seen & examined. Tolerated HD #1 yesterday with 1L UF. Received FFP and DDAVP for shiley placement. However has been oozing intermittently from site.   No sob, light headedness/dizziness, difficulty breathing/cough, n/v, diarrhea, dysuria or hematuria. VSS. UOP improving with pt making 50-75cc/hr in the last few hrs (700cc in 24 hrs).  Pt remains on 2% HTS & salt tabs 2 gm tid this AM.        PAST HISTORY  --------------------------------------------------------------------------------  No significant changes to PMH, PSH, FHx, SHx, unless otherwise noted    ALLERGIES & MEDICATIONS  --------------------------------------------------------------------------------  Allergies    No Known Allergies    Intolerances      Standing Inpatient Medications  chlorhexidine 2% Cloths 1 Application(s) Topical <User Schedule>  midodrine. 10 milliGRAM(s) Oral <User Schedule>  octreotide  Injectable 100 MICROGram(s) IV Push every 8 hours  piperacillin/tazobactam IVPB.. 3.375 Gram(s) IV Intermittent every 12 hours  polyethylene glycol 3350 17 Gram(s) Oral two times a day  senna 2 Tablet(s) Oral at bedtime  sodium chloride 2 Gram(s) Oral every 8 hours    PRN Inpatient Medications      REVIEW OF SYSTEMS  --------------------------------------------------------------------------------      All other systems were reviewed and are negative, except as noted.    VITALS/PHYSICAL EXAM  --------------------------------------------------------------------------------  T(C): 36.3 (08-12-22 @ 07:00), Max: 36.8 (08-11-22 @ 18:05)  HR: 80 (08-12-22 @ 09:00) (72 - 98)  BP: 134/65 (08-12-22 @ 09:00) (90/55 - 156/76)  RR: 14 (08-12-22 @ 09:00) (12 - 26)  SpO2: 98% (08-12-22 @ 09:00) (94% - 100%)  Wt(kg): --        08-11-22 @ 07:01  -  08-12-22 @ 07:00  --------------------------------------------------------  IN: 2420 mL / OUT: 2523 mL / NET: -103 mL    08-12-22 @ 07:01  -  08-12-22 @ 09:32  --------------------------------------------------------  IN: 0 mL / OUT: 200 mL / NET: -200 mL      Physical Exam:  	Gen: NAD  	HEENT: Anicteric, MMM, no JVD  	Pulm: CTA B/L  	CV: S1S2, no rub  	Abd: Soft, +BS, RUQ drain with SS fluid,  +fluid wave          No presacral edema  	Ext: ++ LE edema B/L, + asterixis  	Neuro: Awake, alert  	Skin: Warm and dry  	Vascular access: RIJ non tunneled cath with dried blood under dressing              +Mahad         LABS/STUDIES  --------------------------------------------------------------------------------              8.9    25.61 >-----------<  83       [08-11-22 @ 23:58]              25.2     130  |  93  |  62  ----------------------------<  141      [08-12-22 @ 00:32]  4.4   |  23  |  4.17        Ca     8.7     [08-12-22 @ 00:32]      Mg     2.4     [08-11-22 @ 23:59]      Phos  5.5     [08-11-22 @ 23:59]    TPro  5.9  /  Alb  3.5  /  TBili  3.2  /  DBili  1.7  /  AST  31  /  ALT  18  /  AlkPhos  107  [08-12-22 @ 00:32]    PT/INR: PT 26.3 , INR 2.25       [08-12-22 @ 00:04]  PTT: 46.2       [08-12-22 @ 00:04]    Uric acid 7.0      [08-12-22 @ 00:32]    Creatinine Trend:  SCr 4.17 [08-12 @ 00:32]  SCr 4.28 [08-11 @ 23:59]  SCr 5.13 [08-11 @ 17:12]  SCr 5.13 [08-11 @ 10:27]  SCr 5.11 [08-11 @ 04:14]    Urinalysis - [08-08-22 @ 10:19]      Color Dark Yellow / Appearance Slightly Turbid / SG >1.050 / pH 5.5      Gluc Negative / Ketone Trace  / Bili Small / Urobili Negative       Blood Negative / Protein 30 mg/dL / Leuk Est Small / Nitrite Negative      RBC 2 / WBC 18 / Hyaline 30 / Gran  / Sq Epi  / Non Sq Epi 3 / Bacteria Many    Urine Creatinine 69      [08-10-22 @ 11:44]  Urine Protein 124      [08-09-22 @ 18:53]  Urine Sodium 92      [08-10-22 @ 11:44]  Urine Urea Nitrogen 62      [08-10-22 @ 12:04]  Urine Potassium 33      [08-10-22 @ 11:44]  Urine Chloride 80      [08-10-22 @ 11:44]  Urine Osmolality 295      [08-10-22 @ 11:44]    Iron 32, TIBC 93, %sat 34      [08-12-22 @ 00:02]  Ferritin 728      [08-12-22 @ 00:00]  TSH 0.15      [08-12-22 @ 00:00]  Lipid: chol 41, TG 52, HDL 9, LDL --      [08-11-22 @ 23:59]    HBsAb <3.0      [08-11-22 @ 18:49]  HBsAg Nonreact      [08-11-22 @ 18:49]  HCV 0.15, Nonreact      [08-11-22 @ 18:49]  HIV Nonreact      [08-12-22 @ 00:00]  HIV Nonreact      [08-11-22 @ 23:59]

## 2022-08-13 LAB
ALBUMIN SERPL ELPH-MCNC: 2.9 G/DL — LOW (ref 3.3–5)
ALP SERPL-CCNC: 95 U/L — SIGNIFICANT CHANGE UP (ref 40–120)
ALT FLD-CCNC: 16 U/L — SIGNIFICANT CHANGE UP (ref 10–45)
ANA TITR SER: NEGATIVE — SIGNIFICANT CHANGE UP
ANION GAP SERPL CALC-SCNC: 12 MMOL/L — SIGNIFICANT CHANGE UP (ref 5–17)
ANION GAP SERPL CALC-SCNC: 7 MMOL/L — SIGNIFICANT CHANGE UP (ref 5–17)
APTT BLD: 37.2 SEC — HIGH (ref 27.5–35.5)
AST SERPL-CCNC: 35 U/L — SIGNIFICANT CHANGE UP (ref 10–40)
BILIRUB DIRECT SERPL-MCNC: 1 MG/DL — HIGH (ref 0–0.3)
BILIRUB FLD-MCNC: 2.1 MG/DL — SIGNIFICANT CHANGE UP
BILIRUB INDIRECT FLD-MCNC: 1.8 MG/DL — HIGH (ref 0.2–1)
BILIRUB SERPL-MCNC: 2.8 MG/DL — HIGH (ref 0.2–1.2)
BLD GP AB SCN SERPL QL: NEGATIVE — SIGNIFICANT CHANGE UP
BUN SERPL-MCNC: 30 MG/DL — HIGH (ref 7–23)
BUN SERPL-MCNC: 48 MG/DL — HIGH (ref 7–23)
CALCIUM SERPL-MCNC: 8 MG/DL — LOW (ref 8.4–10.5)
CALCIUM SERPL-MCNC: 8.5 MG/DL — SIGNIFICANT CHANGE UP (ref 8.4–10.5)
CHLORIDE SERPL-SCNC: 100 MMOL/L — SIGNIFICANT CHANGE UP (ref 96–108)
CHLORIDE SERPL-SCNC: 95 MMOL/L — LOW (ref 96–108)
CO2 SERPL-SCNC: 26 MMOL/L — SIGNIFICANT CHANGE UP (ref 22–31)
CO2 SERPL-SCNC: 28 MMOL/L — SIGNIFICANT CHANGE UP (ref 22–31)
CREAT SERPL-MCNC: 1.86 MG/DL — HIGH (ref 0.5–1.3)
CREAT SERPL-MCNC: 2.59 MG/DL — HIGH (ref 0.5–1.3)
CREATININE, URINE RESULT: 159 MG/DL — SIGNIFICANT CHANGE UP
CULTURE RESULTS: SIGNIFICANT CHANGE UP
CULTURE RESULTS: SIGNIFICANT CHANGE UP
EGFR: 27 ML/MIN/1.73M2 — LOW
EGFR: 40 ML/MIN/1.73M2 — LOW
GLUCOSE SERPL-MCNC: 136 MG/DL — HIGH (ref 70–99)
GLUCOSE SERPL-MCNC: 172 MG/DL — HIGH (ref 70–99)
HCT VFR BLD CALC: 24 % — LOW (ref 39–50)
HCT VFR BLD CALC: 24.4 % — LOW (ref 39–50)
HGB BLD-MCNC: 8.2 G/DL — LOW (ref 13–17)
HGB BLD-MCNC: 8.3 G/DL — LOW (ref 13–17)
INR BLD: 2.21 RATIO — HIGH (ref 0.88–1.16)
LKM AB SER-ACNC: <20.1 UNITS — SIGNIFICANT CHANGE UP (ref 0–20)
MAGNESIUM SERPL-MCNC: 1.8 MG/DL — SIGNIFICANT CHANGE UP (ref 1.6–2.6)
MAGNESIUM SERPL-MCNC: 2 MG/DL — SIGNIFICANT CHANGE UP (ref 1.6–2.6)
MCHC RBC-ENTMCNC: 32.7 PG — SIGNIFICANT CHANGE UP (ref 27–34)
MCHC RBC-ENTMCNC: 32.8 PG — SIGNIFICANT CHANGE UP (ref 27–34)
MCHC RBC-ENTMCNC: 34 GM/DL — SIGNIFICANT CHANGE UP (ref 32–36)
MCHC RBC-ENTMCNC: 34.2 GM/DL — SIGNIFICANT CHANGE UP (ref 32–36)
MCV RBC AUTO: 96 FL — SIGNIFICANT CHANGE UP (ref 80–100)
MCV RBC AUTO: 96.1 FL — SIGNIFICANT CHANGE UP (ref 80–100)
NRBC # BLD: 0 /100 WBCS — SIGNIFICANT CHANGE UP (ref 0–0)
NRBC # BLD: 0 /100 WBCS — SIGNIFICANT CHANGE UP (ref 0–0)
PHOSPHATE SERPL-MCNC: 3.5 MG/DL — SIGNIFICANT CHANGE UP (ref 2.5–4.5)
PHOSPHATE SERPL-MCNC: 4.2 MG/DL — SIGNIFICANT CHANGE UP (ref 2.5–4.5)
PLATELET # BLD AUTO: 43 K/UL — LOW (ref 150–400)
PLATELET # BLD AUTO: 52 K/UL — LOW (ref 150–400)
POTASSIUM SERPL-MCNC: 3.6 MMOL/L — SIGNIFICANT CHANGE UP (ref 3.5–5.3)
POTASSIUM SERPL-MCNC: 3.8 MMOL/L — SIGNIFICANT CHANGE UP (ref 3.5–5.3)
POTASSIUM SERPL-SCNC: 3.6 MMOL/L — SIGNIFICANT CHANGE UP (ref 3.5–5.3)
POTASSIUM SERPL-SCNC: 3.8 MMOL/L — SIGNIFICANT CHANGE UP (ref 3.5–5.3)
PROT ?TM UR-MCNC: 165 MG/DL — HIGH (ref 0–12)
PROT SERPL-MCNC: 5.6 G/DL — LOW (ref 6–8.3)
PROTHROM AB SERPL-ACNC: 25.8 SEC — HIGH (ref 10.5–13.4)
RBC # BLD: 2.5 M/UL — LOW (ref 4.2–5.8)
RBC # BLD: 2.54 M/UL — LOW (ref 4.2–5.8)
RBC # FLD: 15.1 % — HIGH (ref 10.3–14.5)
RBC # FLD: 15.5 % — HIGH (ref 10.3–14.5)
RH IG SCN BLD-IMP: POSITIVE — SIGNIFICANT CHANGE UP
SODIUM SERPL-SCNC: 133 MMOL/L — LOW (ref 135–145)
SODIUM SERPL-SCNC: 135 MMOL/L — SIGNIFICANT CHANGE UP (ref 135–145)
SPECIMEN SOURCE: SIGNIFICANT CHANGE UP
SPECIMEN SOURCE: SIGNIFICANT CHANGE UP
WBC # BLD: 19.29 K/UL — HIGH (ref 3.8–10.5)
WBC # BLD: 22.4 K/UL — HIGH (ref 3.8–10.5)
WBC # FLD AUTO: 19.29 K/UL — HIGH (ref 3.8–10.5)
WBC # FLD AUTO: 22.4 K/UL — HIGH (ref 3.8–10.5)

## 2022-08-13 PROCEDURE — 71045 X-RAY EXAM CHEST 1 VIEW: CPT | Mod: 26

## 2022-08-13 PROCEDURE — 99233 SBSQ HOSP IP/OBS HIGH 50: CPT

## 2022-08-13 PROCEDURE — 99232 SBSQ HOSP IP/OBS MODERATE 35: CPT | Mod: GC

## 2022-08-13 RX ADMIN — PIPERACILLIN AND TAZOBACTAM 25 GRAM(S): 4; .5 INJECTION, POWDER, LYOPHILIZED, FOR SOLUTION INTRAVENOUS at 09:35

## 2022-08-13 RX ADMIN — MIDODRINE HYDROCHLORIDE 10 MILLIGRAM(S): 2.5 TABLET ORAL at 05:22

## 2022-08-13 RX ADMIN — MIDODRINE HYDROCHLORIDE 10 MILLIGRAM(S): 2.5 TABLET ORAL at 14:43

## 2022-08-13 RX ADMIN — CHLORHEXIDINE GLUCONATE 1 APPLICATION(S): 213 SOLUTION TOPICAL at 05:21

## 2022-08-13 RX ADMIN — OCTREOTIDE ACETATE 100 MICROGRAM(S): 200 INJECTION, SOLUTION INTRAVENOUS; SUBCUTANEOUS at 02:11

## 2022-08-13 RX ADMIN — SODIUM CHLORIDE 2 GRAM(S): 9 INJECTION INTRAMUSCULAR; INTRAVENOUS; SUBCUTANEOUS at 05:22

## 2022-08-13 RX ADMIN — Medication 50 MILLILITER(S): at 05:22

## 2022-08-13 RX ADMIN — OCTREOTIDE ACETATE 100 MICROGRAM(S): 200 INJECTION, SOLUTION INTRAVENOUS; SUBCUTANEOUS at 10:11

## 2022-08-13 RX ADMIN — MIDODRINE HYDROCHLORIDE 10 MILLIGRAM(S): 2.5 TABLET ORAL at 21:07

## 2022-08-13 RX ADMIN — OCTREOTIDE ACETATE 100 MICROGRAM(S): 200 INJECTION, SOLUTION INTRAVENOUS; SUBCUTANEOUS at 17:14

## 2022-08-13 RX ADMIN — POLYETHYLENE GLYCOL 3350 17 GRAM(S): 17 POWDER, FOR SOLUTION ORAL at 05:22

## 2022-08-13 RX ADMIN — SENNA PLUS 2 TABLET(S): 8.6 TABLET ORAL at 21:07

## 2022-08-13 RX ADMIN — Medication 5 MILLIGRAM(S): at 21:07

## 2022-08-13 RX ADMIN — PIPERACILLIN AND TAZOBACTAM 25 GRAM(S): 4; .5 INJECTION, POWDER, LYOPHILIZED, FOR SOLUTION INTRAVENOUS at 21:07

## 2022-08-13 NOTE — PROGRESS NOTE ADULT - ASSESSMENT
Pt is a 67 y/o Male with a PMH Cirrhosis and recent cholangitis s/p ERCP stent (4/22) and stent removal (7/20/22). Pt presented on 8/8 with increasing RUQ pain. CT Abd/Pelvis reported distended gallbladder with stones in the cystic duct, gallbladder wall thickening, suggesting chronic cholecystitis. Pt had a percutaneous cholecystostomy tube placed on 8/09 along with a paracentesis yielding 300cc of purulent fluid. Pt admitted to SICU for further hemodynamic monitoring and management.     PLAN:   Neurologic: A, O x3  - Pain Control with Dilaudid as needed after abdominal exam    Respiratory: Atelectasis  - Maintain SpO2 > 92%  - Currently on NC @1L  - Encourage OOB, Incentive spirometry, and chest PT  - AM CXR    Cardiovascular: Sinus tachycardia resolved   - Maintain MAP > 65   - C/w Midodrine for increased MAP in setting of ROBBIE    Gastrointestinal/Nutrition: cirrhosis (MELD 34), cholecystitis s/p per rodney tube and diagnostic paracentesis (8/09)   - Advance to renal diet  - CT Abd/ Pelvis w/ Iv contrast ( 8/08)- distended gallbladder with stones in the cystic duct, gallbladder wall thickening, suggesting chronic cholecystitis  - GI not concerned for cholangitis due to negative for bile duct dilation  - No bowel movements since admission. Increased patient's bowel regimen. Will give lactulose in the day if no response.  - Start Octreotide 100mg q8hr   - Repeat CT Abd/Pelvis limited study; ordered RUQ Sono to eval for Gallbladder perforation->was found unremarkable  - Transplant hepatology consulted, reccs appreciated   -perc rodney tube check and paracentesis today    Genitourinary/Renal: ROBBIE and hyperkalemia s/p shifting& Lokelma, urethral stricture, Hyponatremia with severe metabolic acidosis.   - Tobin placed by Urology 2/2 urethral stricture, Continue to monitor strict I&Os  - d/c D10 + 2%NS @ 30cc/hr   - Worsening oliguria, s/p Bumex 2mg IVP and started on Bumex gtt for 10 hours without response   - HD today .   - Salt tabs    Hematologic: coagulopathy of liver failure/ sepsis  - Monitor CBC and Coags   - Coagulopathy 2/2 uremia/sepsis s/p FFP and DDAVP for blood oozing around shiley. Quickclot used twice ON.   - SCDs for mechanical VTE ppx   - Hold chemical VTE ppx    Infectious Disease: sepsis  - F/u BCx ( 8/9) NGTD   - UCx ( 8/8) + for E. Coli , Bile Cx ( 8/9) + E. Coli & Streptococcus anginosus  - Continue Zosyn ( 8/9 -?).   - Grew Streptococcus anginosus in bile culture. Will Dc Sandra.    Endocrine: no acute issues  - Monitor glucose on BMP    Code Status: Full Code   Disposition: SICU   Pt is a 65 y/o Male with a PMH Cirrhosis and recent cholangitis s/p ERCP stent (4/22) and stent removal (7/20/22). Pt presented on 8/8 with increasing RUQ pain. CT Abd/Pelvis reported distended gallbladder with stones in the cystic duct, gallbladder wall thickening, suggesting chronic cholecystitis. Pt had a percutaneous cholecystostomy tube placed on 8/09 along with a paracentesis yielding 300cc of purulent fluid. Pt admitted to SICU for further hemodynamic monitoring and management.     PLAN:   Neurologic: A, O x3  - Pain Control with Dilaudid as needed after abdominal exam    Respiratory: Atelectasis  - Maintain SpO2 > 92%  - Currently on NC @1L  - Encourage OOB, Incentive spirometry, and chest PT  - AM CXR    Cardiovascular: Sinus tachycardia resolved   - Maintain MAP > 65   - Midodrine q8    Gastrointestinal/Nutrition: cirrhosis (MELD 34), cholecystitis s/p per rodney tube and diagnostic paracentesis (8/09)   - Advance to renal diet  - CT Abd/ Pelvis w/ Iv contrast ( 8/08)- distended gallbladder with stones in the cystic duct, gallbladder wall thickening, suggesting chronic cholecystitis  - GI not concerned for cholangitis due to negative for bile duct dilation  - Bowel regimen Miralax, senna and dulcolax supp  - Start Octreotide 100mg q8hr   - Repeat CT Abd/Pelvis limited study; ordered RUQ Sono to eval for Gallbladder perforation->was found unremarkable  - Transplant hepatology consulted, reccs appreciated   -Perc rodney tube check and paracentesis today. Cholecystostomy tube with contrast filling the gallbladder. Paracentesis with 1.5L of murky and purulent drainage    Genitourinary/Renal: ROBBIE and hyperkalemia s/p shifting& Lokelma, urethral stricture, Hyponatremia with severe metabolic acidosis.   - Tobin placed by Urology 2/2 urethral stricture, Continue to monitor strict I&Os  - d/c D10 + 2%NS @ 30cc/hr   - Worsening oliguria, s/p Bumex 2mg IVP and started on Bumex gtt for 10 hours without response   - HD today .   - Salt tabs    Hematologic: coagulopathy of liver failure/ sepsis  - Monitor CBC and Coags   - Coagulopathy 2/2 uremia/sepsis s/p FFP and DDAVP for blood oozing around shiley. Quickclot used twice ON.   - SCDs for mechanical VTE ppx   - Hold chemical VTE ppx    Infectious Disease: sepsis  - F/u BCx ( 8/9) NGTD   - UCx ( 8/8) + for E. Coli , Bile Cx ( 8/9) + E. Coli & Streptococcus anginosus  - Continue Zosyn ( 8/9 -?).   - Grew Streptococcus anginosus in bile culture. Will Dc Sandra.    Endocrine: no acute issues  - Monitor glucose on BMP    Code Status: Full Code   Disposition: SICU   Pt is a 67 y/o Male with a PMH Cirrhosis and recent cholangitis s/p ERCP stent (4/22) and stent removal (7/20/22). Pt presented on 8/8 with increasing RUQ pain. CT Abd/Pelvis reported distended gallbladder with stones in the cystic duct, gallbladder wall thickening, suggesting chronic cholecystitis. Pt had a percutaneous cholecystostomy tube placed on 8/09 along with a paracentesis yielding 300cc of purulent fluid. Pt admitted to SICU for further hemodynamic monitoring and management.     PLAN:   Neurologic: A, O x3  - Pain Control with Dilaudid as needed after abdominal exam    Respiratory: Atelectasis  - Maintain SpO2 > 92%  - Currently on NC @1L  - Encourage OOB, Incentive spirometry, and chest PT  - AM CXR    Cardiovascular: Sinus tachycardia resolved   - Maintain MAP > 65   - Midodrine q8 for increased MAP    Gastrointestinal/Nutrition: cirrhosis (MELD 34), cholecystitis s/p per rodney tube and diagnostic paracentesis (8/09)   - Advance to renal diet  - CT Abd/ Pelvis w/ Iv contrast ( 8/08)- distended gallbladder with stones in the cystic duct, gallbladder wall thickening, suggesting chronic cholecystitis  - GI not concerned for cholangitis due to negative for bile duct dilation  - Bowel regimen Miralax, senna and dulcolax supp  - Start Octreotide 100mg q8hr   - Repeat CT Abd/Pelvis limited study; ordered RUQ Sono to eval for Gallbladder perforation->was found unremarkable  - Transplant hepatology consulted, reccs appreciated   -Perc rodney tube check and paracentesis today. Cholecystostomy tube with contrast filling the gallbladder. Paracentesis with 1.5L of murky and purulent drainage    Genitourinary/Renal: ROBBIE and hyperkalemia s/p shifting& Lokelma, urethral stricture, Hyponatremia with severe metabolic acidosis.   - Tobin placed by Urology 2/2 urethral stricture, Continue to monitor strict I&Os  - Urine output began to improve during the day. S Cr down trending  - Hyperkalemia improved  - HD today.   - Salt tabs  - Albumin 50cc q4 for 24 hours 8/12    Hematologic: coagulopathy of liver failure/ sepsis  - Monitor CBC and Coags   - Coagulopathy 2/2 uremia/sepsis s/p FFP and DDAVP for blood oozing around shiley. Quickclot used twice ON. (8/12)   - SCDs for mechanical VTE ppx   - Hold chemical VTE ppx    Infectious Disease: sepsis  - F/u BCx ( 8/9) NGTD   - UCx ( 8/8) + for E. Coli , Bile Cx ( 8/9) + E. Coli & Streptococcus anginosus  - Continue Zosyn ( 8/9 -?).   - Grew Streptococcus anginosus in bile culture.     Endocrine: no acute issues  - Monitor glucose on BMP    Code Status: Full Code   Disposition: SICU

## 2022-08-13 NOTE — PROGRESS NOTE ADULT - ATTENDING COMMENTS
65 yo m, decompensated Laennec's cirrhosis.  Admitted to SICU for cholangitis, ABP, s/p cholecystostomy, ERCP/stent and paracentesis.    N No acute changes.  P NC1 L, sat >90. CXR unchanged.  C Monitor hemodynamics.   G Continue midodrine, Octreotide. Cholecystostomy in GB. Monitor LFTs.  R UOP 1345/24h. Cr 2.9(4.3). HD yesterday. Na 134, d/c salt tabs. Fluid restriction.  H Hgb 8.1(8.5). INR 2.4.  DVT SCDs.  I Continue Zosyn, ID following. Peritoneal cultures pending, lázaro 2.1.  E Monitor glycemia.

## 2022-08-13 NOTE — PROGRESS NOTE ADULT - ATTENDING COMMENTS
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 coli and Streptococcus anginosis. Ascitic fluid studies from paracentesis (8/9) most consistent with secondary peritonitis with concern for possible perforated viscus (likely gallbladder) given markedly elevated ascitic fluid LDH. Repeat paracentesis (8/12) still consistent with secondary peritonitis but with improving neutrophil count from 99k to 8k. Ascitic fluid bilirubin similar to serum, suggesting against bile leak. S/p ceftriaxone (8/8) and continuing Zosyn (8/8- ) as per Transplant ID recommendations. S/p HD on 8/11 and 8/12 but recommend to hold dialysis today and monitor UOP given some early signs of renal recovery. 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 Hyponatremia improved and salt tabs discontinued today especially given his anasarca. He continues to have normal mentation. Liver transplant evaluation is in progress. Current MELD-Na 35 (ABO O). Possible downgrade to floor today or tomorrow.    Laron Harper M.D., Ph.D.  Transplant Hepatology

## 2022-08-13 NOTE — PROGRESS NOTE ADULT - SUBJECTIVE AND OBJECTIVE BOX
Transplant Surgery - Multidisciplinary Rounds  --------------------------------------------------------------  Present:   Patient seen and examined with multidisciplinary team including Transplant Surgeon: Dr. Velasco, Transplant Hepatologist Dr. Harper, JOHN Mckeon and unit RN during am rounds.  Disciplines not in attendance will be notified of the plan.     HPI: 66y Male with history of recently diagnosed cirrhosis and cholangitis s/p ERCP with stent placement (4/2022) with stent removal on 7/20/22. Pt presented on 7/26 with complaints of RUQ pain for one week along with decreased appetite. While in ED, US performed reporting a distended gallbladder with stones and CBD measuring 5mm. Pt denied nausea, vomiting, fevers or chills and was discharged home.    Pt returned to [facility] on 8/08/22 presenting with tachycardic 's, normotensive, with an SpO2 94% on RA.  CT Abd/ Pelvis with IV contrast ( 8/08) showed a distended gallbladder with stones in the cystic duct, gallbladder wall thickening, suggesting chronic cholecystitis; cirrhosis with portal hypertension, moderate ascites with diffuse peritoneal enhancement concerning for SBP, small and large bowel thickening suggesting hepatic enterocolopathy. Pt had a percutaneous cholecystostomy tube placed on 8/09 along with a paracentesis yielding 300cc of purulent fluid. Cultures from asc Pt admitted to SICU for further hemodynamic monitoring and management.     Interval Events:  - t tube study yest showed contrast filling the gallbladder  - midodrine decreased tid  - HD yest  - LVP 1.5L removed (fluid bili 2.1)     MEDICATIONS  (STANDING):  bisacodyl 5 milliGRAM(s) Oral at bedtime  chlorhexidine 2% Cloths 1 Application(s) Topical <User Schedule>  midodrine. 10 milliGRAM(s) Oral every 8 hours  octreotide  Injectable 100 MICROGram(s) IV Push every 8 hours  piperacillin/tazobactam IVPB.. 3.375 Gram(s) IV Intermittent every 12 hours  polyethylene glycol 3350 17 Gram(s) Oral two times a day  senna 2 Tablet(s) Oral at bedtime    PAST MEDICAL & SURGICAL HISTORY:  H/O acute cholangitis  S/P ERCP 4/2022  H/O colonoscopy    Vital Signs Last 24 Hrs  T(C): 36.6 (13 Aug 2022 19:05), Max: 37 (13 Aug 2022 12:00)  T(F): 97.9 (13 Aug 2022 19:05), Max: 98.6 (13 Aug 2022 12:00)  HR: 79 (13 Aug 2022 21:00) (70 - 90)  BP: 130/70 (13 Aug 2022 21:00) (96/51 - 156/76)  BP(mean): 94 (13 Aug 2022 21:00) (69 - 107)  RR: 15 (13 Aug 2022 21:00) (11 - 22)  SpO2: 98% (13 Aug 2022 21:00) (94% - 100%)    I&O's Summary    12 Aug 2022 07:01  -  13 Aug 2022 07:00  --------------------------------------------------------  IN: 420 mL / OUT: 2955 mL / NET: -2535 mL    13 Aug 2022 07:01  -  13 Aug 2022 21:34  --------------------------------------------------------  IN: 320 mL / OUT: 1900 mL / NET: -1580 mL                       8.3    19.29 )-----------( 52       ( 13 Aug 2022 13:39 )             24.4     08-13    133<L>  |  95<L>  |  48<H>  ----------------------------<  172<H>  3.6   |  26  |  2.59<H>    Ca    8.5      13 Aug 2022 13:39  Phos  4.2     08-13  Mg     2.0     08-13    TPro  5.4<L>  /  Alb  2.9<L>  /  TBili  2.9<H>  /  DBili  1.5<H>  /  AST  32  /  ALT  18  /  AlkPhos  96  08-12      Review of systems  Gen: No weight changes, fatigue, fevers/chills, weakness  Skin: No rashes  Head/Eyes/Ears/Mouth: No headache; Normal hearing; Normal vision w/o blurriness; No sinus pain/discomfort, sore throat  Respiratory: No dyspnea, cough, wheezing, hemoptysis  CV: No chest pain, PND, orthopnea  GI: Mild abdominal pain at surgical incision site; denies diarrhea, constipation, nausea, vomiting, melena, hematochezia  : No increased frequency, dysuria, hematuria, nocturia  MSK: No joint pain/swelling; no back pain; no edema  Neuro: No dizziness/lightheadedness, weakness, seizures, numbness, tingling  Heme: No easy bruising or bleeding  Endo: No heat/cold intolerance  Psych: No significant nervousness, anxiety, stress, depression  All other systems were reviewed and are negative, except as noted.      PHYSICAL EXAM:  Constitutional: Well developed / well nourished  Eyes: Anicteric, PERRLA  ENMT: nc/at  Neck: R shiley  Respiratory: CTA B/L  Cardiovascular: RRR  Gastrointestinal: Soft, non distended, C-stomy tube with sanguinous drainage  Genitourinary: Urinary catheter in place  Extremities: SCD's in place and working bilaterally  Vascular: Palpable dp pulses bilaterally  Neurological: A&O x3  Skin: no rashes, ulcerations or lesions;  Musculoskeletal: Moving all extremities  Psychiatric: Responsive

## 2022-08-13 NOTE — PROGRESS NOTE ADULT - ASSESSMENT
66y Male with history of recently diagnosed cirrhosis and cholangitis s/p ERCP with stent placement (4/2022) with stent removal on 7/20/22. Pt presented on 7/26 with complaints of RUQ pain for one week along with decreased appetite. While in ED, US performed reporting a distended gallbladder with stones and CBD measuring 5mm. Pt denied nausea, vomiting, fevers or chills and was discharged home.    Pt returned to [facility] on 8/08/22 presenting with tachycardic 's, normotensive, with an SpO2 94% on RA.  CT Abd/ Pelvis with IV contrast ( 8/08) showed a distended gallbladder with stones in the cystic duct, gallbladder wall thickening, suggesting chronic cholecystitis; cirrhosis with portal hypertension, moderate ascites with diffuse peritoneal enhancement concerning for SBP, small and large bowel thickening suggesting hepatic enterocolopathy. Pt had a percutaneous cholecystostomy tube placed on 8/09 along with a paracentesis yielding 300cc of purulent fluid. Cultures from ascitic fluid and bile grew e coli and strep anginosis.  Pt admitted to SICU for further hemodynamic monitoring and management.     Plan:   - monitor C tube output, flush drain daily  - lD following; Cont Zosyn   - HD yest and today. Recommend holding further HD to monitor for renal recovery  -liver transplantation evaluation opened  -PPI daily for stress prophylaxis  -potential transfer to floor if stable

## 2022-08-13 NOTE — PROGRESS NOTE ADULT - SUBJECTIVE AND OBJECTIVE BOX
HISTORY:  Mr. Lobo is a 66y Male with history of recently diagnosed cirrhosis and cholangitis s/p ERCP with stent placement (4/2022) with stent removal on 7/20/22. Pt presented on 7/26 with complaints of RUQ pain for one week along with decreased appetite. While in ED, US performed reporting a distended gallbladder with stones and CBD measuring 5mm. Pt denied nausea, vomiting, fevers or chills and was discharged home.    Pt returned to [facility] on 8/08/22 presenting with tachycardic 's, normotensive, with an SpO2 94% on RA.  CT Abd/ Pelvis with IV contrast ( 8/08) showed a distended gallbladder with stones in the cystic duct, gallbladder wall thickening, suggesting chronic cholecystitis; cirrhosis with portal hypertension, moderate ascites with diffuse peritoneal enhancement concerning for SBP, small and large bowel thickening suggesting hepatic enterocolopathy. Pt had a percutaneous cholecystostomy tube placed on 8/09 along with a paracentesis yielding 300cc of purulent fluid. Pt admitted to SICU for further hemodynamic monitoring and management.     24 HOUR EVENTS:  - Tube study performed in the day. Found with: Cholecystostomy tube with contrast filling the gallbladder.  - Midodrine decreased to TID  - Ducolax suppository for bowel movement  - Received HD today  - Urine output improving  - Paracentesis performed and had 1.5L removed. Drained murky and purulent drainage  - Less blood oozing in the day from around the [facility] site.     NEURO  Exam: AAO3  Meds:     RESPIRATORY  RR: 13 (08-13-22 @ 02:00) (10 - 23)  SpO2: 98% (08-13-22 @ 02:00) (94% - 100%)  Wt(kg): --  Exam: clear breath sounds  ABG - ( 12 Aug 2022 00:01 )  pH: x     /  pCO2: x     /  pO2: x     / HCO3: x     / Base Excess: x     /  SaO2: x       Lactate: 2.7              Meds:     CARDIOVASCULAR  HR: 79 (08-13-22 @ 02:00) (70 - 98)  BP: 134/68 (08-13-22 @ 02:00) (121/64 - 160/80)  BP(mean): 95 (08-13-22 @ 02:00) (84 - 112)  ABP: --  ABP(mean): --  Wt(kg): --  CVP(cm H2O): --  VBG - ( 11 Aug 2022 23:24 )  pH: 7.35  /  pCO2: 47    /  pO2: 54    / HCO3: 26    / Base Excess: 0.1   /  SaO2: 82.6   Lactate: 2.5                Exam: well perfused  Cardiac Rhythm: RRR  Perfusion     [x]Adequate   [ ]Inadequate  Mentation   [x]Normal       [ ]Reduced  Extremities  [x]Warm         [ ]Cool  Volume Status [ ]Hypervolemic [x]Euvolemic [ ]Hypovolemic  Meds: midodrine. 10 milliGRAM(s) Oral every 8 hours      GI/NUTRITION  Exam: soft, non tender  Diet: renal diet  Meds: bisacodyl 5 milliGRAM(s) Oral every 12 hours PRN Constipation  polyethylene glycol 3350 17 Gram(s) Oral two times a day  senna 2 Tablet(s) Oral at bedtime      GENITOURINARY  I&O's Detail    08-11 @ 07:01 - 08-12 @ 07:00  --------------------------------------------------------  IN:    Albumin 25%  -  50 mL: 150 mL    freetext medication - Infusion: 720 mL    IV PiggyBack: 50 mL    IV PiggyBack: 200 mL    Oral Fluid: 300 mL    Other (mL): 700 mL    Plasma: 300 mL  Total IN: 2420 mL    OUT:    Bumetanide: 0 mL    Indwelling Catheter - Urethral (mL): 733 mL    Other (mL): 1700 mL    T-Tube (mL): 90 mL  Total OUT: 2523 mL    Total NET: -103 mL      08-12 @ 07:01 - 08-13 @ 02:47  --------------------------------------------------------  IN:    Albumin 25%  -  50 mL: 50 mL    IV PiggyBack: 200 mL  Total IN: 250 mL    OUT:    Indwelling Catheter - Urethral (mL): 1035 mL    Other (mL): 1500 mL    Other (mL): 0 mL    T-Tube (mL): 60 mL  Total OUT: 2595 mL    Total NET: -2345 mL          08-12    134<L>  |  96  |  45<H>  ----------------------------<  144<H>  3.7   |  24  |  2.99<H>    Ca    8.6      12 Aug 2022 22:06  Phos  4.8     08-12  Mg     2.1     08-12    TPro  5.4<L>  /  Alb  2.9<L>  /  TBili  2.9<H>  /  DBili  1.5<H>  /  AST  32  /  ALT  18  /  AlkPhos  96  08-12    Meds: albumin human 25% IVPB 50 milliLiter(s) IV Intermittent every 6 hours  sodium chloride 2 Gram(s) Oral every 8 hours      HEMATOLOGIC  Meds:                         8.1    20.71 )-----------( 49       ( 12 Aug 2022 22:06 )             23.7     PT/INR - ( 12 Aug 2022 22:06 )   PT: 28.6 sec;   INR: 2.46 ratio         PTT - ( 12 Aug 2022 22:06 )  PTT:42.2 sec    INFECTIOUS DISEASES  T(C): 36.7 (08-12-22 @ 23:00), Max: 36.7 (08-12-22 @ 23:00)  Wt(kg): --  WBC Count: 20.71 K/uL (08-12 @ 22:06)  WBC Count: 22.19 K/uL (08-12 @ 12:04)    Recent Cultures:  Specimen Source: Peritoneal Peritoneal Fluid, 08-12 @ 17:26; Results --; Gram Stain:   polymorphonuclear leukocytes seen  No organisms seen  by cytocentrifuge; Organism: --  Specimen Source: Peritoneal Peritoneal Fluid, 08-09 @ 19:01; Results   Few Escherichia coli  Moderate Streptococcus anginosus "Susceptibilities not performed"; Gram Stain:   polymorphonuclear leukocytes seen  Moderate Gram positive cocci in pairs seen  Limited volume of specimen received, Unable to centrifuge/concentrate  specimen. Culture results may be compromised.; Organism: Escherichia coli  Specimen Source: Catheterized Catheterized, 08-08 @ 12:30; Results   10,000 - 49,000 CFU/mL Escherichia coli; Gram Stain: --; Organism: Escherichia coli  Specimen Source: .Blood Blood-Peripheral, 08-08 @ 07:31; Results   No growth to date.; Gram Stain: --; Organism: --  Specimen Source: .Blood Blood-Peripheral, 08-08 @ 07:15; Results   No growth to date.; Gram Stain: --; Organism: --    Meds: piperacillin/tazobactam IVPB.. 3.375 Gram(s) IV Intermittent every 12 hours      ENDOCRINE  Capillary Blood Glucose    Meds: octreotide  Injectable 100 MICROGram(s) IV Push every 8 hours      ACCESS DEVICES:  [x] Peripheral IV  [ ] Central Venous Line		[ ] R	[ ] L	[ ] IJ	[ ] Fem	[ ] SC	Placed:   [ ] Arterial Line			[ ] R	[ ] L	[ ] Fem	[ ] Rad	[ ] Ax	Placed:   [ ] PICC:					[ ] Mediport  [ ] Urinary Catheter, Date Placed:   [x] Necessity of urinary, arterial, and venous catheters discussed    OTHER MEDICATIONS:  chlorhexidine 2% Cloths 1 Application(s) Topical <User Schedule>      IMAGING:  EXAM: XR FEEDING TUBE CHECK SISC    PROCEDURE DATE: 08/12/2022                                                                                                                           INTERPRETATION: CLINICAL INFORMATION: History of cholangitis with percutaneous cholecystostomy tube. Assess tube.    EXAM: single frontal view of the abdomen.    COMPARISON: CT abdomen and pelvis 8/10/2022    FINDINGS:  Cholecystostomy tube with contrast filling the gallbladder.  Nonobstructive bowel gas pattern.  There is no evidence of intraperitoneal free air on this single supine radiograph.  The visualized osseous structures demonstrate no acute pathology.    IMPRESSION:  Cholecystostomy tube in gallbladder.   24 HOUR EVENTS:  -  dc Albumin  - HD today  - dc/ salt tabs  f/u peritoneal results  - Midodrine decreased to TID  - Ducolax suppository for bowel movement  - Received HD today  - Urine output improving  - Paracentesis performed and had 1.5L removed. Drained murky and purulent drainage  - Less blood oozing in the day from around the shiley site.       HISTORY:  Mr. Lobo is a 66y Male with history of recently diagnosed cirrhosis and cholangitis s/p ERCP with stent placement (4/2022) with stent removal on 7/20/22. Pt presented on 7/26 with complaints of RUQ pain for one week along with decreased appetite. While in ED, US performed reporting a distended gallbladder with stones and CBD measuring 5mm. Pt denied nausea, vomiting, fevers or chills and was discharged home.    Pt returned to [facility] on 8/08/22 presenting with tachycardic 's, normotensive, with an SpO2 94% on RA.  CT Abd/ Pelvis with IV contrast ( 8/08) showed a distended gallbladder with stones in the cystic duct, gallbladder wall thickening, suggesting chronic cholecystitis; cirrhosis with portal hypertension, moderate ascites with diffuse peritoneal enhancement concerning for SBP, small and large bowel thickening suggesting hepatic enterocolopathy. Pt had a percutaneous cholecystostomy tube placed on 8/09 along with a paracentesis yielding 300cc of purulent fluid. Pt admitted to SICU for further hemodynamic monitoring and management.         NEURO  Exam: AAO3  Meds:     RESPIRATORY  RR: 13 (08-13-22 @ 02:00) (10 - 23)  SpO2: 98% (08-13-22 @ 02:00) (94% - 100%)  Wt(kg): --  Exam: clear breath sounds  ABG - ( 12 Aug 2022 00:01 )  pH: x     /  pCO2: x     /  pO2: x     / HCO3: x     / Base Excess: x     /  SaO2: x       Lactate: 2.7              Meds:     CARDIOVASCULAR  HR: 79 (08-13-22 @ 02:00) (70 - 98)  BP: 134/68 (08-13-22 @ 02:00) (121/64 - 160/80)  BP(mean): 95 (08-13-22 @ 02:00) (84 - 112)  ABP: --  ABP(mean): --  Wt(kg): --  CVP(cm H2O): --  VBG - ( 11 Aug 2022 23:24 )  pH: 7.35  /  pCO2: 47    /  pO2: 54    / HCO3: 26    / Base Excess: 0.1   /  SaO2: 82.6   Lactate: 2.5                Exam: well perfused  Cardiac Rhythm: RRR  Perfusion     [x]Adequate   [ ]Inadequate  Mentation   [x]Normal       [ ]Reduced  Extremities  [x]Warm         [ ]Cool  Volume Status [ ]Hypervolemic [x]Euvolemic [ ]Hypovolemic  Meds: midodrine. 10 milliGRAM(s) Oral every 8 hours      GI/NUTRITION  Exam: soft, non tender  Diet: renal diet  Meds: bisacodyl 5 milliGRAM(s) Oral every 12 hours PRN Constipation  polyethylene glycol 3350 17 Gram(s) Oral two times a day  senna 2 Tablet(s) Oral at bedtime      GENITOURINARY  I&O's Detail    08-11 @ 07:01 - 08-12 @ 07:00  --------------------------------------------------------  IN:    Albumin 25%  -  50 mL: 150 mL    freetext medication - Infusion: 720 mL    IV PiggyBack: 50 mL    IV PiggyBack: 200 mL    Oral Fluid: 300 mL    Other (mL): 700 mL    Plasma: 300 mL  Total IN: 2420 mL    OUT:    Bumetanide: 0 mL    Indwelling Catheter - Urethral (mL): 733 mL    Other (mL): 1700 mL    T-Tube (mL): 90 mL  Total OUT: 2523 mL    Total NET: -103 mL      08-12 @ 07:01 - 08-13 @ 02:47  --------------------------------------------------------  IN:    Albumin 25%  -  50 mL: 50 mL    IV PiggyBack: 200 mL  Total IN: 250 mL    OUT:    Indwelling Catheter - Urethral (mL): 1035 mL    Other (mL): 1500 mL    Other (mL): 0 mL    T-Tube (mL): 60 mL  Total OUT: 2595 mL    Total NET: -2345 mL          08-12    134<L>  |  96  |  45<H>  ----------------------------<  144<H>  3.7   |  24  |  2.99<H>    Ca    8.6      12 Aug 2022 22:06  Phos  4.8     08-12  Mg     2.1     08-12    TPro  5.4<L>  /  Alb  2.9<L>  /  TBili  2.9<H>  /  DBili  1.5<H>  /  AST  32  /  ALT  18  /  AlkPhos  96  08-12    Meds: albumin human 25% IVPB 50 milliLiter(s) IV Intermittent every 6 hours  sodium chloride 2 Gram(s) Oral every 8 hours      HEMATOLOGIC  Meds:                         8.1    20.71 )-----------( 49       ( 12 Aug 2022 22:06 )             23.7     PT/INR - ( 12 Aug 2022 22:06 )   PT: 28.6 sec;   INR: 2.46 ratio         PTT - ( 12 Aug 2022 22:06 )  PTT:42.2 sec    INFECTIOUS DISEASES  T(C): 36.7 (08-12-22 @ 23:00), Max: 36.7 (08-12-22 @ 23:00)  Wt(kg): --  WBC Count: 20.71 K/uL (08-12 @ 22:06)  WBC Count: 22.19 K/uL (08-12 @ 12:04)    Recent Cultures:  Specimen Source: Peritoneal Peritoneal Fluid, 08-12 @ 17:26; Results --; Gram Stain:   polymorphonuclear leukocytes seen  No organisms seen  by cytocentrifuge; Organism: --  Specimen Source: Peritoneal Peritoneal Fluid, 08-09 @ 19:01; Results   Few Escherichia coli  Moderate Streptococcus anginosus "Susceptibilities not performed"; Gram Stain:   polymorphonuclear leukocytes seen  Moderate Gram positive cocci in pairs seen  Limited volume of specimen received, Unable to centrifuge/concentrate  specimen. Culture results may be compromised.; Organism: Escherichia coli  Specimen Source: Catheterized Catheterized, 08-08 @ 12:30; Results   10,000 - 49,000 CFU/mL Escherichia coli; Gram Stain: --; Organism: Escherichia coli  Specimen Source: .Blood Blood-Peripheral, 08-08 @ 07:31; Results   No growth to date.; Gram Stain: --; Organism: --  Specimen Source: .Blood Blood-Peripheral, 08-08 @ 07:15; Results   No growth to date.; Gram Stain: --; Organism: --    Meds: piperacillin/tazobactam IVPB.. 3.375 Gram(s) IV Intermittent every 12 hours      ENDOCRINE  Capillary Blood Glucose    Meds: octreotide  Injectable 100 MICROGram(s) IV Push every 8 hours      ACCESS DEVICES:  [x] Peripheral IV  [ ] Central Venous Line		[ ] R	[ ] L	[ ] IJ	[ ] Fem	[ ] SC	Placed:   [ ] Arterial Line			[ ] R	[ ] L	[ ] Fem	[ ] Rad	[ ] Ax	Placed:   [ ] PICC:					[ ] Mediport  [ ] Urinary Catheter, Date Placed:   [x] Necessity of urinary, arterial, and venous catheters discussed    OTHER MEDICATIONS:  chlorhexidine 2% Cloths 1 Application(s) Topical <User Schedule>      IMAGING:  EXAM: XR FEEDING TUBE CHECK SISC    PROCEDURE DATE: 08/12/2022                                                                                                                           INTERPRETATION: CLINICAL INFORMATION: History of cholangitis with percutaneous cholecystostomy tube. Assess tube.    EXAM: single frontal view of the abdomen.    COMPARISON: CT abdomen and pelvis 8/10/2022    FINDINGS:  Cholecystostomy tube with contrast filling the gallbladder.  Nonobstructive bowel gas pattern.  There is no evidence of intraperitoneal free air on this single supine radiograph.  The visualized osseous structures demonstrate no acute pathology.    IMPRESSION:  Cholecystostomy tube in gallbladder.

## 2022-08-13 NOTE — PROGRESS NOTE ADULT - SUBJECTIVE AND OBJECTIVE BOX
Interval Events:   No acute events overnight.  Patient underwent paracentesis yesterday.      ROS:   12 point review of systems performed and negative except otherwise noted in HPI.    Hospital Medications:  bisacodyl 5 milliGRAM(s) Oral at bedtime  chlorhexidine 2% Cloths 1 Application(s) Topical <User Schedule>  midodrine. 10 milliGRAM(s) Oral every 8 hours  octreotide  Injectable 100 MICROGram(s) IV Push every 8 hours  piperacillin/tazobactam IVPB.. 3.375 Gram(s) IV Intermittent every 12 hours  polyethylene glycol 3350 17 Gram(s) Oral two times a day  senna 2 Tablet(s) Oral at bedtime    MAR over past 24 hours:    albumin human 25% IVPB   50 mL/Hr IV Intermittent (08-13-22 @ 05:22)   50 mL/Hr IV Intermittent (08-12-22 @ 23:58)    chlorhexidine 2% Cloths   1 Application(s) Topical (08-13-22 @ 05:21)    midodrine.   10 milliGRAM(s) Oral (08-13-22 @ 14:43)   10 milliGRAM(s) Oral (08-13-22 @ 05:22)   10 milliGRAM(s) Oral (08-12-22 @ 21:10)    octreotide  Injectable   100 MICROGram(s) IV Push (08-13-22 @ 17:14)   100 MICROGram(s) IV Push (08-13-22 @ 10:11)   100 MICROGram(s) IV Push (08-13-22 @ 02:11)    piperacillin/tazobactam IVPB..   25 mL/Hr IV Intermittent (08-13-22 @ 09:35)   25 mL/Hr IV Intermittent (08-12-22 @ 21:11)    polyethylene glycol 3350   17 Gram(s) Oral (08-13-22 @ 05:22)    senna   2 Tablet(s) Oral (08-12-22 @ 21:10)    sodium chloride   2 Gram(s) Oral (08-13-22 @ 05:22)   2 Gram(s) Oral (08-12-22 @ 21:10)      Home Medications:  Last Order Reconciliation Date: 08-08-22 @ 06:30 (Admission Reconciliation)    PHYSICAL EXAM:   Vital Signs last 24 hours:  T(F): 97.9 (08-13-22 @ 19:05), Max: 98.6 (08-13-22 @ 12:00)  HR: 90 (08-13-22 @ 19:05) (70 - 90)  BP: 109/56 (08-13-22 @ 19:05) (109/56 - 156/76)  BP(mean): 89 (08-13-22 @ 18:00) (79 - 107)  ABP: --  ABP(mean): --  RR: 13 (08-13-22 @ 19:05) (11 - 22)  SpO2: 95% (08-13-22 @ 19:05) (94% - 100%)    I&Os:    08-12-22 @ 07:01  -  08-13-22 @ 07:00  --------------------------------------------------------  IN:    Albumin 25%  -  50 mL: 100 mL    IV PiggyBack: 200 mL    Oral Fluid: 120 mL  Total IN: 420 mL    OUT:    Indwelling Catheter - Urethral (mL): 1345 mL    Other (mL): 0 mL    Other (mL): 1500 mL    T-Tube (mL): 110 mL  Total OUT: 2955 mL    Total NET: -2535 mL      08-13-22 @ 07:01  -  08-13-22 @ 20:01  --------------------------------------------------------  IN:    IV PiggyBack: 100 mL    Oral Fluid: 220 mL  Total IN: 320 mL    OUT:    Indwelling Catheter - Urethral (mL): 560 mL    Other (mL): 1000 mL    T-Tube (mL): 300 mL  Total OUT: 1860 mL    Total NET: -1540 mL        BMI (kg/m2): 29.3 (08-09-22 @ 14:59)  GENERAL: no acute distress  NEURO: alert  HEENT: NCAT, no conjunctival pallor appreciated  CHEST: no respiratory distress, no accessory muscle use  CARDIAC: regular rate, +S1/S2  ABDOMEN: soft, some distention, no rebound or guarding  EXTREMITIES: warm, well perfused  SKIN: no lesions noted    DIAGNOSTICS:  WBC      Hg       PLT      Na       K        CO2     BUN      Cr       ALT      AST      TB       ALP  19.29    8.3      52       133      3.6      26       48       2.59     ------   ------   ------   ------   08-13-22 @ 13:39  20.71    8.1      49       134      3.7      24       45       2.99     18       32       2.9      96       08-12-22 @ 22:06  22.19    8.5      59       133      4.2      24       68       4.28     20       31       3.2      104      08-12-22 @ 12:04  ------   ------   ------   130      4.4      23       62       4.17     18       31       3.2      107      08-12-22 @ 00:32  ------   ------   ------   131      4.6      24       62       4.28     20       32       3.4      122      08-11-22 @ 23:59    PT             INR            MELDwith  MELDw/o  28.6           2.46           --             --             08-12-22 @ 22:06  26.0           2.24           --             --             08-12-22 @ 12:04  26.3           2.25           --             --             08-12-22 @ 00:04  24.4           2.09           --             --             08-11-22 @ 17:12  27.3           2.33           --             --             08-11-22 @ 10:27

## 2022-08-13 NOTE — PROGRESS NOTE ADULT - ASSESSMENT
66 year old male with decompensated cirrhosis and cholangitis s/p ERCP with stent placement (4/2022) s/p stent removal on 7/20 (along sphincterotomy and stone extraction) presenting for one week of RUQ abdominal pain associated with new abdominal distension and decreased appetite.     #Septic shock 2/2 acute cholecystitis vs ascending cholangitis  #ROBBIE, worsening: initial urine sodium 9, likely indicating prerenal ROBBIE vs less likely HRS given initial presentation as above  #History of cholangitis s/p biliary stent placement April 2022 and removal 7/20/2022, now s/p perc rodney tube  #Decompensated ALD/PARNELL cirrhosis c/b ascites  EV: normal esophagus on ERCP from 4/2022  Ascites: increase in ascites from mild to moderate on current imaging  SBP: paracentesis 8/9/2022 c/w peritonitis given 99K PMNs however elevated LDH more indicative of secondary bacterial peritonitis  HE: none currently       -MELD-Na = 35 on 8/11/2022       -Ascites: yes       -SBP: paracentesis 8/9/2022 reveals likely secondary bacterial peritonitis from suspected gallbladder pathology       -Varices: no mention of varices on EGD/ERCP on 7/20/2022       -Hepatic encephalopathy: Ammonia, Serum: 38 umol/L [11 - 55] (08-12-22 @ 00:21)       -HCC: unknown       -LT candidacy and evaluation:            [ ] Psychosocial            [ ] Infectious            [ ] Cardiology:                    Cardiac cath:                    Stress test:             [ ] Colonoscopy            [x] Prostate: Prostate Specific Antigen: negative at 2.21 ng/mL on 8/12/2022            [ ] Dental            [ ] PT/Frailty    Recommendations:  -trend clinical symptoms, exam findings, vital signs, CBC, CMP, INR  -weaned off pressors  -s/p percutaneous cholecystostomy as above  -likely secondary bacterial peritonitis based on paracentesis and clinical findings, CT A/P negative for perforation however performed without contrast 2/2 renal function; s/p repeat paracentesis 8/12/2022  -would favor ATN as likely etiology, however given worsening ROBBIE continue midodrine/octreotide for empiric treatment for HRS-ROBBIE, hold IV albumin given pulmonary congestion  -initiated hemodialysis 8/11/2022  -liver transplantation evaluation opened  -PPI daily for stress prophylaxis  -potential transfer to floor if stable    Note incomplete until finalized by attending signature/attestation.    Fadi Churchill  GI/Hepatology Fellow    MONDAY-FRIDAY 8AM-5PM:  Pager# [phone] ([facility]) or [phone] ([facility])    NON-URGENT CONSULTS:  Please email [email or web address]  AT NIGHT AND ON WEEKENDS:  Contact on-call GI fellow via answering service ([phone]) from 5pm-8am and on weekends/holidays

## 2022-08-14 LAB
B2 GLYCOPROT1 AB SER QL: NEGATIVE — SIGNIFICANT CHANGE UP
CARDIOLIPIN AB SER-ACNC: POSITIVE
CULTURE RESULTS: SIGNIFICANT CHANGE UP
CULTURE RESULTS: SIGNIFICANT CHANGE UP
MITOCHONDRIA AB SER-ACNC: SIGNIFICANT CHANGE UP
ORGANISM # SPEC MICROSCOPIC CNT: SIGNIFICANT CHANGE UP
SARS-COV-2 RNA SPEC QL NAA+PROBE: SIGNIFICANT CHANGE UP
SMOOTH MUSCLE AB SER-ACNC: SIGNIFICANT CHANGE UP
SPECIMEN SOURCE: SIGNIFICANT CHANGE UP
SPECIMEN SOURCE: SIGNIFICANT CHANGE UP

## 2022-08-14 PROCEDURE — 99233 SBSQ HOSP IP/OBS HIGH 50: CPT | Mod: GC

## 2022-08-14 PROCEDURE — 99233 SBSQ HOSP IP/OBS HIGH 50: CPT

## 2022-08-14 PROCEDURE — 71045 X-RAY EXAM CHEST 1 VIEW: CPT | Mod: 26

## 2022-08-14 PROCEDURE — 99232 SBSQ HOSP IP/OBS MODERATE 35: CPT | Mod: GC

## 2022-08-14 RX ORDER — FUROSEMIDE 40 MG
40 TABLET ORAL ONCE
Refills: 0 | Status: COMPLETED | OUTPATIENT
Start: 2022-08-14 | End: 2022-08-14

## 2022-08-14 RX ORDER — PANTOPRAZOLE SODIUM 20 MG/1
40 TABLET, DELAYED RELEASE ORAL
Refills: 0 | Status: DISCONTINUED | OUTPATIENT
Start: 2022-08-14 | End: 2022-09-02

## 2022-08-14 RX ORDER — ALBUMIN HUMAN 25 %
250 VIAL (ML) INTRAVENOUS ONCE
Refills: 0 | Status: COMPLETED | OUTPATIENT
Start: 2022-08-14 | End: 2022-08-14

## 2022-08-14 RX ORDER — FUROSEMIDE 40 MG
80 TABLET ORAL ONCE
Refills: 0 | Status: COMPLETED | OUTPATIENT
Start: 2022-08-14 | End: 2022-08-14

## 2022-08-14 RX ADMIN — MIDODRINE HYDROCHLORIDE 10 MILLIGRAM(S): 2.5 TABLET ORAL at 06:00

## 2022-08-14 RX ADMIN — SENNA PLUS 2 TABLET(S): 8.6 TABLET ORAL at 22:17

## 2022-08-14 RX ADMIN — OCTREOTIDE ACETATE 100 MICROGRAM(S): 200 INJECTION, SOLUTION INTRAVENOUS; SUBCUTANEOUS at 09:14

## 2022-08-14 RX ADMIN — MIDODRINE HYDROCHLORIDE 10 MILLIGRAM(S): 2.5 TABLET ORAL at 13:44

## 2022-08-14 RX ADMIN — OCTREOTIDE ACETATE 100 MICROGRAM(S): 200 INJECTION, SOLUTION INTRAVENOUS; SUBCUTANEOUS at 17:13

## 2022-08-14 RX ADMIN — POLYETHYLENE GLYCOL 3350 17 GRAM(S): 17 POWDER, FOR SOLUTION ORAL at 17:13

## 2022-08-14 RX ADMIN — Medication 40 MILLIGRAM(S): at 11:51

## 2022-08-14 RX ADMIN — PANTOPRAZOLE SODIUM 40 MILLIGRAM(S): 20 TABLET, DELAYED RELEASE ORAL at 09:13

## 2022-08-14 RX ADMIN — PIPERACILLIN AND TAZOBACTAM 25 GRAM(S): 4; .5 INJECTION, POWDER, LYOPHILIZED, FOR SOLUTION INTRAVENOUS at 22:17

## 2022-08-14 RX ADMIN — Medication 5 MILLIGRAM(S): at 22:17

## 2022-08-14 RX ADMIN — MIDODRINE HYDROCHLORIDE 10 MILLIGRAM(S): 2.5 TABLET ORAL at 22:16

## 2022-08-14 RX ADMIN — CHLORHEXIDINE GLUCONATE 1 APPLICATION(S): 213 SOLUTION TOPICAL at 05:10

## 2022-08-14 RX ADMIN — POLYETHYLENE GLYCOL 3350 17 GRAM(S): 17 POWDER, FOR SOLUTION ORAL at 06:00

## 2022-08-14 RX ADMIN — OCTREOTIDE ACETATE 100 MICROGRAM(S): 200 INJECTION, SOLUTION INTRAVENOUS; SUBCUTANEOUS at 02:00

## 2022-08-14 RX ADMIN — Medication 80 MILLIGRAM(S): at 16:20

## 2022-08-14 RX ADMIN — PIPERACILLIN AND TAZOBACTAM 25 GRAM(S): 4; .5 INJECTION, POWDER, LYOPHILIZED, FOR SOLUTION INTRAVENOUS at 09:13

## 2022-08-14 RX ADMIN — Medication 500 MILLILITER(S): at 01:45

## 2022-08-14 NOTE — PROGRESS NOTE ADULT - ASSESSMENT
ROBBIE likely ATN from severe sepsis (from cholecystitis & UTI) causing hypotension vs HRS. Contrast administration likely contributed     Baseline SCr is 0.7 on 7/26.   SCr on arrival is 1.9 on 8/8 peaked at 5.15 and most recently 8/13 1.86 following dialysis hoever   Pt remained anuric despite IV albumin & trial of HTS with diuretics. Had uremic symptoms so was started on HD on 8/11. Pt remains on 2% HTS & salt tabs 2 gm tid this AM. Started Octreotide 100 q8 hr &  Midodrine 10 mg qid. s/p paracentesis & perc rodney. Cultures growing E.coli &  Streptococcus anginosus. UA with high sp gravity (from IV contrast), 30 mg /dl of proteinuria, pyuria, +LE. spot urine TP/CR 0.8 g/g non nephrotic. Urine electrolytes suggesting pre renal etiology such as seen in volume depletion vs HRS.     Continue octreotide & midodrine for splanchnic vasoconstriction & to optimize hemodynamics. Check Renal US when stable. HD #18/11 without any issues recieved dialysis on 8/13.     Would discontinue 2% HTS & salt tabs 2 gm tid as hyponatremia will improve with improving UOP & UF during HD. Will monitor for renal recovery. Can give DDAVP for uremic bleeding. Abx per primary team. Monitor labs and urine output. Avoid NSAIDs, ACEI/ARBS, RCA and nephrotoxins. Dose medications as per eGFR.

## 2022-08-14 NOTE — PROGRESS NOTE ADULT - SUBJECTIVE AND OBJECTIVE BOX
SICU Daily Progress Note  =====================================================  Interval/Overnight Events:     - HD done  - urine output improving  - dc'd salt tabs   - Had a bowel movement (finally)    HPI:    Allergies: No Known Allergies      MEDICATIONS:   --------------------------------------------------------------------------------------  Neurologic Medications    Respiratory Medications    Cardiovascular Medications  midodrine. 10 milliGRAM(s) Oral every 8 hours    Gastrointestinal Medications  bisacodyl 5 milliGRAM(s) Oral at bedtime  polyethylene glycol 3350 17 Gram(s) Oral two times a day  senna 2 Tablet(s) Oral at bedtime    Genitourinary Medications    Hematologic/Oncologic Medications    Antimicrobial/Immunologic Medications  piperacillin/tazobactam IVPB.. 3.375 Gram(s) IV Intermittent every 12 hours    Endocrine/Metabolic Medications  octreotide  Injectable 100 MICROGram(s) IV Push every 8 hours    Topical/Other Medications  chlorhexidine 2% Cloths 1 Application(s) Topical <User Schedule>    --------------------------------------------------------------------------------------    VITAL SIGNS, INS/OUTS (last 24 hours):  --------------------------------------------------------------------------------------  T(C): 36.8 (08-14-22 @ 03:00), Max: 37.1 (08-13-22 @ 23:00)  HR: 78 (08-14-22 @ 04:00) (70 - 90)  BP: 138/67 (08-14-22 @ 04:00) (96/51 - 156/76)  RR: 19 (08-14-22 @ 04:00) (12 - 22)  SpO2: 97% (08-14-22 @ 04:00) (94% - 100%)    08-12-22 @ 07:01  -  08-13-22 @ 07:00  --------------------------------------------------------  IN: 420 mL / OUT: 2955 mL / NET: -2535 mL    08-13-22 @ 07:01  -  08-14-22 @ 04:52  --------------------------------------------------------  IN: 670 mL / OUT: 2000 mL / NET: -1330 mL      --------------------------------------------------------------------------------------    EXAM  NEUROLOGY  Exam: Normal, NAD, alert, oriented x3, no focal deficits.    HEENT  Exam: Normocephalic, atraumatic, EOMI.     RESPIRATORY  Exam: Normal expansion/effort.  Mechanical Ventilation:     CARDIOVASCULAR  Exam: Regular rate and rhythm.       GI/NUTRITION  Exam: Abdomen soft, Non-tender, Non-distended.     VASCULAR  Exam: Extremities warm, pink, well-perfused.     MUSCULOSKELETAL  Exam: All extremities moving spontaneously without limitations.     SKIN  Exam: Good skin turgor, no skin breakdown.       LABS  --------------------------------------------------------------------------------------                        8.2    22.40 )-----------( 43       ( 13 Aug 2022 22:35 )             24.0   08-13    135  |  100  |  30<H>  ----------------------------<  136<H>  3.8   |  28  |  1.86<H>    Ca    8.0<L>      13 Aug 2022 22:35  Phos  3.5     08-13  Mg     1.8     08-13    TPro  5.6<L>  /  Alb  2.9<L>  /  TBili  2.8<H>  /  DBili  1.0<H>  /  AST  35  /  ALT  16  /  AlkPhos  95  08-13  PT/INR - ( 13 Aug 2022 22:35 )   PT: 25.8 sec;   INR: 2.21 ratio         PTT - ( 13 Aug 2022 22:35 )  PTT:37.2 sec  --------------------------------------------------------------------------------------

## 2022-08-14 NOTE — PROGRESS NOTE ADULT - ATTENDING COMMENTS
66y Male with history of recently diagnosed cirrhosis and cholangitis s/p ERCP with stent placement (4/2022) s/p stent removal on 7/20/22 presenting for one week of RUQ abdominal pain found to have chronic cholecystitis; cirrhosis with portal hypertension, moderate ascites with diffuse peritoneal enhancement concerning for SBP, small and large bowel thickening suggesting hepatic enterocolopathy. Nephrology called for ROBBIE, severe metabolic acidosis, hyponatremia and hyperkalemia. Remains oliguric, so SOB, BUT uremic symptoms with tremor.  Now resolved with initial HD and seen this pm during 3rd session.  Feeling much better, alert, conversant, funny!.  Seen 8/14    1. ARF--likely ATN multifactorial including sepsis, radiocontrast.  Persistently elevated lactate although improving.  OLIGURIC.  Consent obtained from patient.  NOW WITH UREMIC NEUROPATHY HD initiated.  Symptoms resolved.  NO HD today.  Trend UO.   2.  Lactic acidosis--hemodynamic optimization, no aggressive volume repletion.  MODEST UF ON HD.  Echo reviewed and is appropriately hyperdynamic but NO reduced EF.  Trend with volume optimization including paracentesis.  Resolved and trend  3.  Hyponatremia--minimal free water clearance with 1. HD dialysate will --> gradual improvement.    4.  Ascites--for IR.  Continue albumin infusion and increase if >2L HD UF may decrease overall volume overload.  NO SBP    discussed with team, intensivist    Cristhian Jacob MD  contact via teams

## 2022-08-14 NOTE — PROGRESS NOTE ADULT - ASSESSMENT
66y Male with history of recently diagnosed cirrhosis and cholangitis s/p ERCP with stent placement (4/2022) with stent removal on 7/20/22. Pt presented on 7/26 with complaints of RUQ pain for one week along with decreased appetite. While in ED, US performed reporting a distended gallbladder with stones and CBD measuring 5mm. Pt denied nausea, vomiting, fevers or chills and was discharged home.    Pt returned to [facility] on 8/08/22 presenting with tachycardic 's, normotensive, with an SpO2 94% on RA.  CT Abd/ Pelvis with IV contrast ( 8/08) showed a distended gallbladder with stones in the cystic duct, gallbladder wall thickening, suggesting chronic cholecystitis; cirrhosis with portal hypertension, moderate ascites with diffuse peritoneal enhancement concerning for SBP, small and large bowel thickening suggesting hepatic enterocolopathy. Pt had a percutaneous cholecystostomy tube placed on 8/09 along with a paracentesis yielding 300cc of purulent fluid. Cultures from ascitic fluid and bile grew e coli and strep anginosis.  Pt admitted to SICU for further hemodynamic monitoring and management.     Plan:   - monitor C tube output, flush drain daily  - lD following; Cont Zosyn   - HD yest and today. Recommend holding further HD to monitor for renal recovery  -liver transplantation evaluation opened  -PPI daily for stress prophylaxis  -potential transfer to floor if stable   66y Male with history of recently diagnosed cirrhosis and cholangitis s/p ERCP with stent placement (4/2022) with stent removal on 7/20/22. Pt presented on 7/26 with complaints of RUQ pain for one week along with decreased appetite. While in ED, US performed reporting a distended gallbladder with stones and CBD measuring 5mm. Pt denied nausea, vomiting, fevers or chills and was discharged home.    Pt returned to [facility] on 8/08/22 presenting with tachycardic 's, normotensive, with an SpO2 94% on RA.  CT Abd/ Pelvis with IV contrast ( 8/08) showed a distended gallbladder with stones in the cystic duct, gallbladder wall thickening, suggesting chronic cholecystitis; cirrhosis with portal hypertension, moderate ascites with diffuse peritoneal enhancement concerning for SBP, small and large bowel thickening suggesting hepatic enterocolopathy. Pt had a percutaneous cholecystostomy tube placed on 8/09 along with a paracentesis yielding 300cc of purulent fluid. Cultures from ascitic fluid and bile grew e coli and strep anginosis.  Pt admitted to SICU for further hemodynamic monitoring and management.     Plan:   - MELD 33, ABO O, liver transplantation initiated on  8/11  - monitor C tube output, flush drain daily  - ID following; Cont Zosyn, check repeat tap to assess SBP clearance. (99K-->8K)  - HOLD HD today, showing signs of renal recovery. Lasix trial today per Hepatology  - d/c milian  - OOB with PT/RN  - SCDs  - ADAT  - potential downgrade to floor tomorrow

## 2022-08-14 NOTE — PROGRESS NOTE ADULT - ATTENDING COMMENTS
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 coli and Streptococcus anginosis. Ascitic fluid studies from paracentesis (8/9) most consistent with secondary peritonitis with concern for possible perforated viscus (likely gallbladder) given markedly elevated ascitic fluid LDH. Repeat paracentesis (8/12) still consistent with secondary peritonitis but with improving neutrophil count from 99k to 8k. Ascitic fluid bilirubin similar to serum, suggesting against bile leak. Recommend repeat paracentesis tomorrow (8/15) for re-evaluation especially given rising leukocytosis again today, though he remains afebrile and hemodynamically stable. S/p ceftriaxone (8/8) and continuing Zosyn (8/8- ) as per Transplant ID recommendations. S/p HD on 8/11, 8/12, and 8/13 but recommend to hold dialysis today and give Lasix 80 mg iv x1-2 doses with monitoring of UOP (though can D/C Tobin) given some early signs of renal recovery but still with anasarca on exam. Goal net negative 1-1.5L in the next 24h. Continuing midodrine and octreotide for now for possible ROBBIE-HRS though more likely ATN. He continues to have normal mentation. Liver transplant evaluation is in progress. Current MELD-Na 33 (ABO O). Possible downgrade to floor today or tomorrow.    Laron Harper M.D., Ph.D.  Transplant Hepatology

## 2022-08-14 NOTE — PROGRESS NOTE ADULT - ASSESSMENT
66 year old male with decompensated cirrhosis and cholangitis s/p ERCP with stent placement (4/2022) s/p stent removal on 7/20 (along sphincterotomy and stone extraction) presenting for one week of RUQ abdominal pain associated with new abdominal distension and decreased appetite.     #Septic shock 2/2 acute cholecystitis vs ascending cholangitis  #ROBBIE, worsening: initial urine sodium 9, likely indicating prerenal ROBBIE vs less likely HRS given initial presentation as above  #History of cholangitis s/p biliary stent placement April 2022 and removal 7/20/2022, now s/p perc rodney tube  #Decompensated ALD/PARNELL cirrhosis c/b ascites  EV: normal esophagus on ERCP from 4/2022  Ascites: increase in ascites from mild to moderate on current imaging  SBP: paracentesis 8/9/2022 c/w peritonitis given 99K PMNs however elevated LDH more indicative of secondary bacterial peritonitis  HE: none currently       -MELD-Na = 35 on 8/11/2022       -Ascites: yes       -SBP: paracentesis 8/9/2022 reveals likely secondary bacterial peritonitis from suspected gallbladder pathology       -Varices: no mention of varices on EGD/ERCP on 7/20/2022       -Hepatic encephalopathy: Ammonia, Serum: 38 umol/L [11 - 55] (08-12-22 @ 00:21)       -HCC: unknown       -LT candidacy and evaluation:            [ ] Psychosocial            [ ] Infectious            [ ] Cardiology:                    Cardiac cath:                    Stress test:             [ ] Colonoscopy            [x] Prostate: Prostate Specific Antigen: negative at 2.21 ng/mL on 8/12/2022            [ ] Dental            [ ] PT/Frailty    Recommendations:  -trend clinical symptoms, exam findings, vital signs, CBC, CMP, INR  -weaned off pressors  -s/p percutaneous cholecystostomy as above  -likely secondary bacterial peritonitis based on paracentesis and clinical findings, CT A/P negative for perforation however performed without contrast 2/2 renal function; s/p repeat paracentesis 8/12/2022  -would favor ATN as likely etiology, however given worsening ROBBIE continue midodrine/octreotide for empiric treatment for HRS-ROBBIE, hold IV albumin given pulmonary congestion  -initiated hemodialysis 8/11/2022  -liver transplantation evaluation opened  -PPI daily for stress prophylaxis  -potential transfer to floor if stable  -plan for repeat paracentesis Monday    Note incomplete until finalized by attending signature/attestation.    Fadi Churchill  GI/Hepatology Fellow    MONDAY-FRIDAY 8AM-5PM:  Pager# [phone] ([facility]) or [phone] ([facility])    NON-URGENT CONSULTS:  Please email [email or web address]  AT NIGHT AND ON WEEKENDS:  Contact on-call GI fellow via answering service ([phone]) from 5pm-8am and on weekends/holidays

## 2022-08-14 NOTE — PROGRESS NOTE ADULT - ATTENDING COMMENTS
- Decompensated cirrhosis, ROBBIE, cholangitis, s/p cholecystostomy.  - Anasarca, lasix 40 x1 today. Hold RRT.  - Continue midodrine and octreotide.  - Tolerating PO.  - Continue PPI ppx.  - Cholecystostomy serosanguineous.  - LFTs unchanged.  - DVT SCDs/chemical on hold.  - Zosyn for E coli and Strep anginosus in bile.

## 2022-08-14 NOTE — PROGRESS NOTE ADULT - SUBJECTIVE AND OBJECTIVE BOX
[facility] DIVISION OF KIDNEY DISEASES AND HYPERTENSION -- FOLLOW UP NOTE  --------------------------------------------------------------------------------  Chief Complaint:    24 hour events/subjective:  Patietn tolerated dialysis 8/13  urine output 1170        PAST HISTORY  --------------------------------------------------------------------------------  No significant changes to PMH, PSH, FHx, SHx, unless otherwise noted    ALLERGIES & MEDICATIONS  --------------------------------------------------------------------------------  Allergies    No Known Allergies    Intolerances      Standing Inpatient Medications  bisacodyl 5 milliGRAM(s) Oral at bedtime  chlorhexidine 2% Cloths 1 Application(s) Topical <User Schedule>  midodrine. 10 milliGRAM(s) Oral every 8 hours  octreotide  Injectable 100 MICROGram(s) IV Push every 8 hours  pantoprazole    Tablet 40 milliGRAM(s) Oral before breakfast  piperacillin/tazobactam IVPB.. 3.375 Gram(s) IV Intermittent every 12 hours  polyethylene glycol 3350 17 Gram(s) Oral two times a day  senna 2 Tablet(s) Oral at bedtime    PRN Inpatient Medications      REVIEW OF SYSTEMS  --------------------------------------------------------------------------------  Gen: No fevers/chills  Skin: No rashes  Head/Eyes/Ears: Normal hearing,   Respiratory: No dyspnea, cough  CV: No chest pain  GI: No abdominal pain, diarrhea  : No dysuria, hematuria  MSK: No  edema  Heme: No easy bruising or bleeding  Psych: No significant depression      All other systems were reviewed and are negative, except as noted.    VITALS/PHYSICAL EXAM  --------------------------------------------------------------------------------  T(C): 36.8 (08-15-22 @ 00:00), Max: 36.9 (08-14-22 @ 16:00)  HR: 74 (08-15-22 @ 00:00) (70 - 88)  BP: 128/74 (08-15-22 @ 00:00) (109/59 - 141/72)  RR: 12 (08-15-22 @ 00:00) (12 - 21)  SpO2: 96% (08-15-22 @ 00:00) (91% - 100%)  Wt(kg): --        08-13-22 @ 07:01  -  08-14-22 @ 07:00  --------------------------------------------------------  IN: 870 mL / OUT: 2070 mL / NET: -1200 mL    08-14-22 @ 07:01  -  08-15-22 @ 00:41  --------------------------------------------------------  IN: 660 mL / OUT: 1200 mL / NET: -540 mL        Physical Exam:  	Gen: NAD  	HEENT: MMM  	Pulm: CTA B/L  	CV: S1S2  	Abd: Soft, +BS   	Ext: No LE edema B/L  	Neuro: Awake  	Skin: Warm and dry  	Vascular access:      LABS/STUDIES  --------------------------------------------------------------------------------              8.2    22.40 >-----------<  43       [08-13-22 @ 22:35]              24.0     135  |  100  |  30  ----------------------------<  136      [08-13-22 @ 22:35]  3.8   |  28  |  1.86        Ca     8.0     [08-13-22 @ 22:35]      Mg     1.8     [08-13-22 @ 22:35]      Phos  3.5     [08-13-22 @ 22:35]    TPro  5.6  /  Alb  2.9  /  TBili  2.8  /  DBili  1.0  /  AST  35  /  ALT  16  /  AlkPhos  95  [08-13-22 @ 22:35]    PT/INR: PT 25.8 , INR 2.21       [08-13-22 @ 22:35]  PTT: 37.2       [08-13-22 @ 22:35]      Creatinine Trend:  SCr 1.86 [08-13 @ 22:35]  SCr 2.59 [08-13 @ 13:39]  SCr 2.99 [08-12 @ 22:06]  SCr 4.28 [08-12 @ 12:04]  SCr 4.17 [08-12 @ 00:32]    Urinalysis - [08-08-22 @ 10:19]      Color Dark Yellow / Appearance Slightly Turbid / SG >1.050 / pH 5.5      Gluc Negative / Ketone Trace  / Bili Small / Urobili Negative       Blood Negative / Protein 30 mg/dL / Leuk Est Small / Nitrite Negative      RBC 2 / WBC 18 / Hyaline 30 / Gran  / Sq Epi  / Non Sq Epi 3 / Bacteria Many    Urine Creatinine 69      [08-10-22 @ 11:44]  Urine Protein 165      [08-11-22 @ 21:33]  Urine Sodium 92      [08-10-22 @ 11:44]  Urine Urea Nitrogen 62      [08-10-22 @ 12:04]  Urine Potassium 33      [08-10-22 @ 11:44]  Urine Chloride 80      [08-10-22 @ 11:44]  Urine Osmolality 295      [08-10-22 @ 11:44]    Iron 32, TIBC 93, %sat 34      [08-12-22 @ 00:02]  Ferritin 728      [08-12-22 @ 00:00]  TSH 0.15      [08-12-22 @ 00:00]  Lipid: chol 41, TG 52, HDL 9, LDL --      [08-11-22 @ 23:59]    HBsAb <3.0      [08-11-22 @ 18:49]  HBsAb Nonreact      [08-12-22 @ 00:00]  HBsAg Nonreact      [08-11-22 @ 18:49]  HBcAb Nonreact      [08-12-22 @ 00:00]  HCV 0.15, Nonreact      [08-11-22 @ 18:49]  HIV Nonreact      [08-12-22 @ 00:00]  HIV Nonreact      [08-11-22 @ 23:59]    MICHELLE: titer Negative, pattern --      [08-12-22 @ 00:32]

## 2022-08-14 NOTE — PROGRESS NOTE ADULT - ASSESSMENT
Pt is a 65 y/o Male with a PMH Cirrhosis and recent cholangitis s/p ERCP stent (4/22) and stent removal (7/20/22). Pt presented on 8/8 with increasing RUQ pain. CT Abd/Pelvis reported distended gallbladder with stones in the cystic duct, gallbladder wall thickening, suggesting chronic cholecystitis. Pt had a percutaneous cholecystostomy tube placed on 8/09 along with a paracentesis yielding 300cc of purulent fluid. Pt admitted to SICU for further hemodynamic monitoring and management.     PLAN:   Neurologic: A, O x3  - Pain Control with Dilaudid as needed after abdominal exam    Respiratory: Atelectasis  - Maintain SpO2 > 92%  - Currently on NC @1L  - Encourage OOB, Incentive spirometry, and chest PT  - AM CXR    Cardiovascular: Sinus tachycardia resolved   - Maintain MAP > 65   - Midodrine q8 for increased MAP    Gastrointestinal/Nutrition: cirrhosis (MELD 34), cholecystitis s/p per rodney tube and diagnostic paracentesis (8/09)   - Advance to renal diet  - CT Abd/ Pelvis w/ Iv contrast ( 8/08)- distended gallbladder with stones in the cystic duct, gallbladder wall thickening, suggesting chronic cholecystitis  - GI not concerned for cholangitis due to negative for bile duct dilation  - Bowel regimen Miralax, senna and dulcolax supp  - Start Octreotide 100mg q8hr   - Repeat CT Abd/Pelvis limited study; ordered RUQ Sono to eval for Gallbladder perforation->was found unremarkable  - Transplant hepatology consulted, reccs appreciated   -Perc rodney tube check and paracentesis today. Cholecystostomy tube with contrast filling the gallbladder. Paracentesis with 1.5L of murky and purulent drainage    Genitourinary/Renal: ROBBIE and hyperkalemia s/p shifting& Lokelma, urethral stricture, Hyponatremia with severe metabolic acidosis.   - Tobin placed by Urology 2/2 urethral stricture, Continue to monitor strict I&Os  - Urine output began to improve during the day. S Cr down trending  - Hyperkalemia improved  - HD today.   - Salt tabs  - Albumin 50cc q4 for 24 hours 8/12    Hematologic: coagulopathy of liver failure/ sepsis  - Monitor CBC and Coags   - Coagulopathy 2/2 uremia/sepsis s/p FFP and DDAVP for blood oozing around shiley. Quickclot used twice ON. (8/12)   - SCDs for mechanical VTE ppx   - Hold chemical VTE ppx    Infectious Disease: sepsis  - F/u BCx ( 8/9) NGTD   - UCx ( 8/8) + for E. Coli , Bile Cx ( 8/9) + E. Coli & Streptococcus anginosus  - Continue Zosyn ( 8/9 -?).   - Grew Streptococcus anginosus in bile culture.     Endocrine: no acute issues  - Monitor glucose on BMP    Code Status: Full Code   Disposition: SICU

## 2022-08-14 NOTE — PROGRESS NOTE ADULT - SUBJECTIVE AND OBJECTIVE BOX
Transplant Surgery - Multidisciplinary Progress Note  --------------------------------------------------------------  Present:   Patient seen and examined with multidisciplinary team including Transplant Surgeon: Dr. Velasco, Transplant Hepatologist Dr. Harper/Dr. Churchill, JOHN Guzmán and unit RN during am rounds.  Disciplines not in attendance will be notified of the plan.     HPI: 66y Male with history of recently diagnosed cirrhosis and cholangitis s/p ERCP with stent placement (4/2022) with stent removal on 7/20/22. Pt presented on 7/26 with complaints of RUQ pain for one week along with decreased appetite. While in ED, US performed reporting a distended gallbladder with stones and CBD measuring 5mm. Pt denied nausea, vomiting, fevers or chills and was discharged home.    Pt returned to [facility] on 8/08/22 presenting with tachycardic 's, normotensive, with an SpO2 94% on RA.  CT Abd/ Pelvis with IV contrast ( 8/08) showed a distended gallbladder with stones in the cystic duct, gallbladder wall thickening, suggesting chronic cholecystitis; cirrhosis with portal hypertension, moderate ascites with diffuse peritoneal enhancement concerning for SBP, small and large bowel thickening suggesting hepatic enterocolopathy. Pt had a percutaneous cholecystostomy tube placed on 8/09 along with a paracentesis yielding 300cc of purulent fluid. Cultures from asc Pt admitted to SICU for further hemodynamic monitoring and management.     Interval Events:  - Afebrile, VSS   - Per Shasha: 300, bilious  - HD yesterday with 1L off, tolerated  - overall feeling better  - t tube study yest showed contrast filling the gallbladder  - midodrine decreased tid  - HD yest  - LVP 1.5L removed (fluid bili 2.1)     MEDICATIONS  (STANDING):  bisacodyl 5 milliGRAM(s) Oral at bedtime  chlorhexidine 2% Cloths 1 Application(s) Topical <User Schedule>  midodrine. 10 milliGRAM(s) Oral every 8 hours  octreotide  Injectable 100 MICROGram(s) IV Push every 8 hours  piperacillin/tazobactam IVPB.. 3.375 Gram(s) IV Intermittent every 12 hours  polyethylene glycol 3350 17 Gram(s) Oral two times a day  senna 2 Tablet(s) Oral at bedtime    PAST MEDICAL & SURGICAL HISTORY:  H/O acute cholangitis  S/P ERCP 4/2022  H/O colonoscopy    Vital Signs Last 24 Hrs  T(C): 36.6 (13 Aug 2022 19:05), Max: 37 (13 Aug 2022 12:00)  T(F): 97.9 (13 Aug 2022 19:05), Max: 98.6 (13 Aug 2022 12:00)  HR: 79 (13 Aug 2022 21:00) (70 - 90)  BP: 130/70 (13 Aug 2022 21:00) (96/51 - 156/76)  BP(mean): 94 (13 Aug 2022 21:00) (69 - 107)  RR: 15 (13 Aug 2022 21:00) (11 - 22)  SpO2: 98% (13 Aug 2022 21:00) (94% - 100%)    I&O's Summary    12 Aug 2022 07:01  -  13 Aug 2022 07:00  --------------------------------------------------------  IN: 420 mL / OUT: 2955 mL / NET: -2535 mL    13 Aug 2022 07:01  -  13 Aug 2022 21:34  --------------------------------------------------------  IN: 320 mL / OUT: 1900 mL / NET: -1580 mL                       8.3    19.29 )-----------( 52       ( 13 Aug 2022 13:39 )             24.4     08-13    133<L>  |  95<L>  |  48<H>  ----------------------------<  172<H>  3.6   |  26  |  2.59<H>    Ca    8.5      13 Aug 2022 13:39  Phos  4.2     08-13  Mg     2.0     08-13    TPro  5.4<L>  /  Alb  2.9<L>  /  TBili  2.9<H>  /  DBili  1.5<H>  /  AST  32  /  ALT  18  /  AlkPhos  96  08-12      Review of systems  Gen: No weight changes, fatigue, fevers/chills, weakness  Skin: No rashes  Head/Eyes/Ears/Mouth: No headache; Normal hearing; Normal vision w/o blurriness; No sinus pain/discomfort, sore throat  Respiratory: No dyspnea, cough, wheezing, hemoptysis  CV: No chest pain, PND, orthopnea  GI: Mild abdominal pain at surgical incision site; denies diarrhea, constipation, nausea, vomiting, melena, hematochezia  : No increased frequency, dysuria, hematuria, nocturia  MSK: No joint pain/swelling; no back pain; no edema  Neuro: No dizziness/lightheadedness, weakness, seizures, numbness, tingling  Heme: No easy bruising or bleeding  Endo: No heat/cold intolerance  Psych: No significant nervousness, anxiety, stress, depression  All other systems were reviewed and are negative, except as noted.      PHYSICAL EXAM:  Constitutional: Well developed / well nourished  Eyes: Anicteric, PERRLA  ENMT: nc/at  Neck: R shiley  Respiratory: CTA B/L  Cardiovascular: RRR  Gastrointestinal: Soft, non distended, C-stomy tube with sanguinous drainage  Genitourinary: Urinary catheter in place  Extremities: SCD's in place and working bilaterally  Vascular: Palpable dp pulses bilaterally  Neurological: A&O x3  Skin: no rashes, ulcerations or lesions;  Musculoskeletal: Moving all extremities  Psychiatric: Responsive       Transplant Surgery - Multidisciplinary Progress Note  --------------------------------------------------------------  Present:   Patient seen and examined with multidisciplinary team including Transplant Surgeon: Dr. Velasco, Transplant Hepatologist Dr. Harper/Dr. Churchill, JOHN Guzmán and unit RN during am rounds.  Disciplines not in attendance will be notified of the plan.     HPI: 66y Male with history of recently diagnosed cirrhosis and cholangitis s/p ERCP with stent placement (4/2022) with stent removal on 7/20/22. Pt presented on 7/26 with complaints of RUQ pain for one week along with decreased appetite. While in ED, US performed reporting a distended gallbladder with stones and CBD measuring 5mm. Pt denied nausea, vomiting, fevers or chills and was discharged home.    Pt returned to [facility] on 8/08/22 presenting with tachycardic 's, normotensive, with an SpO2 94% on RA.  CT Abd/ Pelvis with IV contrast ( 8/08) showed a distended gallbladder with stones in the cystic duct, gallbladder wall thickening, suggesting chronic cholecystitis; cirrhosis with portal hypertension, moderate ascites with diffuse peritoneal enhancement concerning for SBP, small and large bowel thickening suggesting hepatic enterocolopathy. Pt had a percutaneous cholecystostomy tube placed on 8/09 along with a paracentesis yielding 300cc of purulent fluid. Cultures from asc Pt admitted to SICU for further hemodynamic monitoring and management.     Interval Events:  - Afebrile, VSS   - Per Shasha: 300, bilious  - HD yesterday with 1L off, tolerated, improving UO daily  - overall feeling better          MEDICATIONS  (STANDING):  bisacodyl 5 milliGRAM(s) Oral at bedtime  chlorhexidine 2% Cloths 1 Application(s) Topical <User Schedule>  midodrine. 10 milliGRAM(s) Oral every 8 hours  octreotide  Injectable 100 MICROGram(s) IV Push every 8 hours  pantoprazole    Tablet 40 milliGRAM(s) Oral before breakfast  piperacillin/tazobactam IVPB.. 3.375 Gram(s) IV Intermittent every 12 hours  polyethylene glycol 3350 17 Gram(s) Oral two times a day  senna 2 Tablet(s) Oral at bedtime          Vital Signs Last 24 Hrs  T(C): 36.8 (14 Aug 2022 11:00), Max: 37.1 (13 Aug 2022 23:00)  T(F): 98.3 (14 Aug 2022 11:00), Max: 98.8 (13 Aug 2022 23:00)  HR: 88 (14 Aug 2022 14:00) (71 - 90)  BP: 135/78 (14 Aug 2022 14:00) (96/51 - 138/67)  BP(mean): 101 (14 Aug 2022 14:00) (69 - 101)  RR: 19 (14 Aug 2022 14:00) (12 - 22)  SpO2: 94% (14 Aug 2022 14:00) (93% - 100%)    Parameters below as of 14 Aug 2022 12:00  Patient On (Oxygen Delivery Method): room air        I&O's Summary    13 Aug 2022 07:01  -  14 Aug 2022 07:00  --------------------------------------------------------  IN: 870 mL / OUT: 2070 mL / NET: -1200 mL    14 Aug 2022 07:01  -  14 Aug 2022 14:43  --------------------------------------------------------  IN: 280 mL / OUT: 465 mL / NET: -185 mL                              8.2    22.40 )-----------( 43       ( 13 Aug 2022 22:35 )             24.0     08-13    135  |  100  |  30<H>  ----------------------------<  136<H>  3.8   |  28  |  1.86<H>    Ca    8.0<L>      13 Aug 2022 22:35  Phos  3.5     08-13  Mg     1.8     08-13    TPro  5.6<L>  /  Alb  2.9<L>  /  TBili  2.8<H>  /  DBili  1.0<H>  /  AST  35  /  ALT  16  /  AlkPhos  95  08-13          Culture - Body Fluid with Gram Stain (collected 08-12-22 @ 17:26)  Source: Peritoneal Peritoneal Fluid  Gram Stain (08-12-22 @ 23:56):    polymorphonuclear leukocytes seen    No organisms seen    by cytocentrifuge  Preliminary Report (08-13-22 @ 14:23):    No growth    Culture - Blood (collected 08-11-22 @ 23:28)  Source: .Blood Blood  Preliminary Report (08-13-22 @ 03:02):    No growth to date.    Culture - Body Fluid with Gram Stain (collected 08-09-22 @ 19:01)  Source: Bile Bile Fluid  Gram Stain (08-10-22 @ 01:31):    polymorphonuclear leukocytes seen    Gram Negative Rods seen    Gram positive cocci in pairs seen    by cytocentrifuge  Preliminary Report (08-11-22 @ 21:19):    Numerous Escherichia coli    Moderate Streptococcus anginosus "Susceptibilities not performed"  Organism: Escherichia coli (08-11-22 @ 17:47)  Organism: Escherichia coli (08-11-22 @ 17:47)    Culture - Fungal, Body Fluid (collected 08-09-22 @ 19:01)  Source: Peritoneal Peritoneal Fluid  Preliminary Report (08-10-22 @ 06:49):    Testing in progress    Culture - Body Fluid with Gram Stain (collected 08-09-22 @ 19:01)  Source: Peritoneal Peritoneal Fluid  Gram Stain (08-10-22 @ 05:58):    polymorphonuclear leukocytes seen    Moderate Gram positive cocci in pairs seen    Limited volume of specimen received, Unable to centrifuge/concentrate    specimen. Culture results may be compromised.  Preliminary Report (08-11-22 @ 21:15):    Few Escherichia coli    Moderate Streptococcus anginosus "Susceptibilities not performed"  Organism: Escherichia coli (08-11-22 @ 17:32)  Organism: Escherichia coli (08-11-22 @ 17:32)    Culture - Urine (collected 08-08-22 @ 12:30)  Source: Catheterized Catheterized  Final Report (08-10-22 @ 17:00):    10,000 - 49,000 CFU/mL Escherichia coli  Organism: Escherichia coli (08-10-22 @ 17:00)  Organism: Escherichia coli (08-10-22 @ 17:00)    Culture - Blood (collected 08-08-22 @ 07:31)  Source: .Blood Blood-Peripheral  Final Report (08-13-22 @ 12:00):    No Growth Final    Culture - Blood (collected 08-08-22 @ 07:15)  Source: .Blood Blood-Peripheral  Final Report (08-13-22 @ 12:00):    No Growth Final                    Review of systems  Gen: No weight changes, fatigue, fevers/chills, weakness  Skin: No rashes  Head/Eyes/Ears/Mouth: No headache; Normal hearing; Normal vision w/o blurriness; No sinus pain/discomfort, sore throat  Respiratory: No dyspnea, cough, wheezing, hemoptysis  CV: No chest pain, PND, orthopnea  GI: Mild abdominal pain ; denies diarrhea, constipation, nausea, vomiting, melena, hematochezia  : No increased frequency, dysuria, hematuria, nocturia  MSK: No joint pain/swelling; no back pain; no edema  Neuro: No dizziness/lightheadedness, weakness, seizures, numbness, tingling  Heme: No easy bruising or bleeding  Endo: No heat/cold intolerance  Psych: No significant nervousness, anxiety, stress, depression  All other systems were reviewed and are negative, except as noted.      PHYSICAL EXAM:  Constitutional: Well developed / well nourished  Eyes: Anicteric, PERRLA  ENMT: nc/at  Neck: R shiley  Respiratory: CTA B/L  Cardiovascular: RRR  Gastrointestinal: Soft, distention improving, improving TTP, C-tube with bilio-sanguinous drainage  Genitourinary: Urinary catheter in place  Extremities: SCD's in place and working bilaterally, improving edema  Vascular: Palpable dp pulses bilaterally  Neurological: A&O x3  Skin: no rashes, ulcerations or lesions;  Musculoskeletal: Moving all extremities  Psychiatric: Responsive

## 2022-08-14 NOTE — PROGRESS NOTE ADULT - SUBJECTIVE AND OBJECTIVE BOX
Interval Events:   No acute events overnight.  Patient underwent hemodialysis and without acute symptoms at this time.    ROS:   12 point review of systems performed and negative except otherwise noted in HPI.    Hospital Medications:  bisacodyl 5 milliGRAM(s) Oral at bedtime  chlorhexidine 2% Cloths 1 Application(s) Topical <User Schedule>  midodrine. 10 milliGRAM(s) Oral every 8 hours  octreotide  Injectable 100 MICROGram(s) IV Push every 8 hours  pantoprazole    Tablet 40 milliGRAM(s) Oral before breakfast  piperacillin/tazobactam IVPB.. 3.375 Gram(s) IV Intermittent every 12 hours  polyethylene glycol 3350 17 Gram(s) Oral two times a day  senna 2 Tablet(s) Oral at bedtime    MAR over past 24 hours:    albumin human  5% IVPB   500 mL/Hr IV Intermittent (08-14-22 @ 01:45)    bisacodyl   5 milliGRAM(s) Oral (08-13-22 @ 21:07)    chlorhexidine 2% Cloths   1 Application(s) Topical (08-14-22 @ 05:10)    midodrine.   10 milliGRAM(s) Oral (08-14-22 @ 06:00)   10 milliGRAM(s) Oral (08-13-22 @ 21:07)   10 milliGRAM(s) Oral (08-13-22 @ 14:43)    octreotide  Injectable   100 MICROGram(s) IV Push (08-14-22 @ 09:14)   100 MICROGram(s) IV Push (08-14-22 @ 02:00)   100 MICROGram(s) IV Push (08-13-22 @ 17:14)   100 MICROGram(s) IV Push (08-13-22 @ 10:11)    pantoprazole    Tablet   40 milliGRAM(s) Oral (08-14-22 @ 09:13)    piperacillin/tazobactam IVPB..   25 mL/Hr IV Intermittent (08-14-22 @ 09:13)   25 mL/Hr IV Intermittent (08-13-22 @ 21:07)    polyethylene glycol 3350   17 Gram(s) Oral (08-14-22 @ 06:00)    senna   2 Tablet(s) Oral (08-13-22 @ 21:07)      Home Medications:  Last Order Reconciliation Date: 08-08-22 @ 06:30 (Admission Reconciliation)    PHYSICAL EXAM:   Vital Signs last 24 hours:  T(F): 98.3 (08-14-22 @ 07:00), Max: 98.8 (08-13-22 @ 23:00)  HR: 76 (08-14-22 @ 09:00) (71 - 90)  BP: 119/68 (08-14-22 @ 09:00) (96/51 - 156/76)  BP(mean): 89 (08-14-22 @ 09:00) (69 - 107)  ABP: --  ABP(mean): --  RR: 21 (08-14-22 @ 09:00) (12 - 22)  SpO2: 98% (08-14-22 @ 09:00) (94% - 100%)    I&Os:    08-13-22 @ 07:01  -  08-14-22 @ 07:00  --------------------------------------------------------  IN:    IV PiggyBack: 200 mL    IV PiggyBack: 250 mL    Oral Fluid: 420 mL  Total IN: 870 mL    OUT:    Indwelling Catheter - Urethral (mL): 770 mL    Other (mL): 1000 mL    T-Tube (mL): 300 mL  Total OUT: 2070 mL    Total NET: -1200 mL      08-14-22 @ 07:01  -  08-14-22 @ 10:07  --------------------------------------------------------  IN:    IV PiggyBack: 25 mL  Total IN: 25 mL    OUT:    Indwelling Catheter - Urethral (mL): 100 mL  Total OUT: 100 mL    Total NET: -75 mL        BMI (kg/m2): 29.3 (08-09-22 @ 14:59)  GENERAL: no acute distress  NEURO: alert  HEENT: NCAT, no conjunctival pallor appreciated  CHEST: no respiratory distress, no accessory muscle use  CARDIAC: regular rate, +S1/S2  ABDOMEN: soft, perc rodney tube in place, distended, nontender, no rebound or guarding  EXTREMITIES: warm, well perfused  SKIN: no lesions noted    DIAGNOSTICS:  WBC      Hg       PLT      Na       K        CO2     BUN      Cr       ALT      AST      TB       ALP  22.40    8.2      43       135      3.8      28       30       1.86     16       35       2.8      95       08-13-22 @ 22:35  19.29    8.3      52       133      3.6      26       48       2.59     ------   ------   ------   ------   08-13-22 @ 13:39  20.71    8.1      49       134      3.7      24       45       2.99     18       32       2.9      96       08-12-22 @ 22:06  22.19    8.5      59       133      4.2      24       68       4.28     20       31       3.2      104      08-12-22 @ 12:04  ------   ------   ------   130      4.4      23       62       4.17     18       31       3.2      107      08-12-22 @ 00:32    PT             INR            MELDwith  MELDw/o  25.8           2.21           --             --             08-13-22 @ 22:35  28.6           2.46           --             --             08-12-22 @ 22:06  26.0           2.24           --             --             08-12-22 @ 12:04  26.3           2.25           --             --             08-12-22 @ 00:04  24.4           2.09           --             --             08-11-22 @ 17:12   Interval Events:   No acute events overnight.  Patient underwent hemodialysis and without acute symptoms at this time.    ROS:   12 point review of systems performed and negative except otherwise noted in HPI.    Hospital Medications:  bisacodyl 5 milliGRAM(s) Oral at bedtime  chlorhexidine 2% Cloths 1 Application(s) Topical <User Schedule>  midodrine. 10 milliGRAM(s) Oral every 8 hours  octreotide  Injectable 100 MICROGram(s) IV Push every 8 hours  pantoprazole    Tablet 40 milliGRAM(s) Oral before breakfast  piperacillin/tazobactam IVPB.. 3.375 Gram(s) IV Intermittent every 12 hours  polyethylene glycol 3350 17 Gram(s) Oral two times a day  senna 2 Tablet(s) Oral at bedtime    MAR over past 24 hours:    albumin human  5% IVPB   500 mL/Hr IV Intermittent (08-14-22 @ 01:45)    bisacodyl   5 milliGRAM(s) Oral (08-13-22 @ 21:07)    chlorhexidine 2% Cloths   1 Application(s) Topical (08-14-22 @ 05:10)    midodrine.   10 milliGRAM(s) Oral (08-14-22 @ 06:00)   10 milliGRAM(s) Oral (08-13-22 @ 21:07)   10 milliGRAM(s) Oral (08-13-22 @ 14:43)    octreotide  Injectable   100 MICROGram(s) IV Push (08-14-22 @ 09:14)   100 MICROGram(s) IV Push (08-14-22 @ 02:00)   100 MICROGram(s) IV Push (08-13-22 @ 17:14)   100 MICROGram(s) IV Push (08-13-22 @ 10:11)    pantoprazole    Tablet   40 milliGRAM(s) Oral (08-14-22 @ 09:13)    piperacillin/tazobactam IVPB..   25 mL/Hr IV Intermittent (08-14-22 @ 09:13)   25 mL/Hr IV Intermittent (08-13-22 @ 21:07)    polyethylene glycol 3350   17 Gram(s) Oral (08-14-22 @ 06:00)    senna   2 Tablet(s) Oral (08-13-22 @ 21:07)      Home Medications:  Last Order Reconciliation Date: 08-08-22 @ 06:30 (Admission Reconciliation)    PHYSICAL EXAM:   Vital Signs last 24 hours:  T(F): 98.3 (08-14-22 @ 07:00), Max: 98.8 (08-13-22 @ 23:00)  HR: 76 (08-14-22 @ 09:00) (71 - 90)  BP: 119/68 (08-14-22 @ 09:00) (96/51 - 156/76)  BP(mean): 89 (08-14-22 @ 09:00) (69 - 107)  ABP: --  ABP(mean): --  RR: 21 (08-14-22 @ 09:00) (12 - 22)  SpO2: 98% (08-14-22 @ 09:00) (94% - 100%)    I&Os:    08-13-22 @ 07:01  -  08-14-22 @ 07:00  --------------------------------------------------------  IN:    IV PiggyBack: 200 mL    IV PiggyBack: 250 mL    Oral Fluid: 420 mL  Total IN: 870 mL    OUT:    Indwelling Catheter - Urethral (mL): 770 mL    Other (mL): 1000 mL    T-Tube (mL): 300 mL  Total OUT: 2070 mL    Total NET: -1200 mL      08-14-22 @ 07:01  -  08-14-22 @ 10:07  --------------------------------------------------------  IN:    IV PiggyBack: 25 mL  Total IN: 25 mL    OUT:    Indwelling Catheter - Urethral (mL): 100 mL  Total OUT: 100 mL    Total NET: -75 mL        BMI (kg/m2): 29.3 (08-09-22 @ 14:59)  GENERAL: no acute distress  NEURO: alert  HEENT: NCAT, no conjunctival pallor appreciated  CHEST: no respiratory distress, no accessory muscle use  CARDIAC: regular rate, +S1/S2  ABDOMEN: soft, perc rodney tube in place, distended, nontender, no rebound or guarding, +anasarca  EXTREMITIES: warm, well perfused  SKIN: no lesions noted    DIAGNOSTICS:  WBC      Hg       PLT      Na       K        CO2     BUN      Cr       ALT      AST      TB       ALP  22.40    8.2      43       135      3.8      28       30       1.86     16       35       2.8      95       08-13-22 @ 22:35  19.29    8.3      52       133      3.6      26       48       2.59     ------   ------   ------   ------   08-13-22 @ 13:39  20.71    8.1      49       134      3.7      24       45       2.99     18       32       2.9      96       08-12-22 @ 22:06  22.19    8.5      59       133      4.2      24       68       4.28     20       31       3.2      104      08-12-22 @ 12:04  ------   ------   ------   130      4.4      23       62       4.17     18       31       3.2      107      08-12-22 @ 00:32    PT             INR            MELDwith  MELDw/o  25.8           2.21           --             --             08-13-22 @ 22:35  28.6           2.46           --             --             08-12-22 @ 22:06  26.0           2.24           --             --             08-12-22 @ 12:04  26.3           2.25           --             --             08-12-22 @ 00:04  24.4           2.09           --             --             08-11-22 @ 17:12

## 2022-08-15 LAB
ALBUMIN SERPL ELPH-MCNC: 2.5 G/DL — LOW (ref 3.3–5)
ALBUMIN SERPL ELPH-MCNC: 2.7 G/DL — LOW (ref 3.3–5)
ALP SERPL-CCNC: 78 U/L — SIGNIFICANT CHANGE UP (ref 40–120)
ALP SERPL-CCNC: 79 U/L — SIGNIFICANT CHANGE UP (ref 40–120)
ALT FLD-CCNC: 15 U/L — SIGNIFICANT CHANGE UP (ref 10–45)
ALT FLD-CCNC: 16 U/L — SIGNIFICANT CHANGE UP (ref 10–45)
ANION GAP SERPL CALC-SCNC: 11 MMOL/L — SIGNIFICANT CHANGE UP (ref 5–17)
ANION GAP SERPL CALC-SCNC: 13 MMOL/L — SIGNIFICANT CHANGE UP (ref 5–17)
APPEARANCE UR: CLEAR — SIGNIFICANT CHANGE UP
APTT BLD: 34.9 SEC — SIGNIFICANT CHANGE UP (ref 27.5–35.5)
AST SERPL-CCNC: 29 U/L — SIGNIFICANT CHANGE UP (ref 10–40)
AST SERPL-CCNC: 29 U/L — SIGNIFICANT CHANGE UP (ref 10–40)
BACTERIA # UR AUTO: ABNORMAL
BILIRUB DIRECT SERPL-MCNC: 1.2 MG/DL — HIGH (ref 0–0.3)
BILIRUB DIRECT SERPL-MCNC: 1.3 MG/DL — HIGH (ref 0–0.3)
BILIRUB INDIRECT FLD-MCNC: 1.8 MG/DL — HIGH (ref 0.2–1)
BILIRUB INDIRECT FLD-MCNC: 2.2 MG/DL — HIGH (ref 0.2–1)
BILIRUB SERPL-MCNC: 3 MG/DL — HIGH (ref 0.2–1.2)
BILIRUB SERPL-MCNC: 3.5 MG/DL — HIGH (ref 0.2–1.2)
BILIRUB UR-MCNC: NEGATIVE — SIGNIFICANT CHANGE UP
BUN SERPL-MCNC: 42 MG/DL — HIGH (ref 7–23)
BUN SERPL-MCNC: 45 MG/DL — HIGH (ref 7–23)
CALCIUM SERPL-MCNC: 8.4 MG/DL — SIGNIFICANT CHANGE UP (ref 8.4–10.5)
CALCIUM SERPL-MCNC: 8.4 MG/DL — SIGNIFICANT CHANGE UP (ref 8.4–10.5)
CHLORIDE SERPL-SCNC: 93 MMOL/L — LOW (ref 96–108)
CHLORIDE SERPL-SCNC: 96 MMOL/L — SIGNIFICANT CHANGE UP (ref 96–108)
CO2 SERPL-SCNC: 25 MMOL/L — SIGNIFICANT CHANGE UP (ref 22–31)
CO2 SERPL-SCNC: 26 MMOL/L — SIGNIFICANT CHANGE UP (ref 22–31)
COLOR SPEC: YELLOW — SIGNIFICANT CHANGE UP
CREAT SERPL-MCNC: 2.05 MG/DL — HIGH (ref 0.5–1.3)
CREAT SERPL-MCNC: 2.22 MG/DL — HIGH (ref 0.5–1.3)
DIFF PNL FLD: ABNORMAL
EGFR: 32 ML/MIN/1.73M2 — LOW
EGFR: 36 ML/MIN/1.73M2 — LOW
EPI CELLS # UR: 2 /HPF — SIGNIFICANT CHANGE UP
GLUCOSE SERPL-MCNC: 133 MG/DL — HIGH (ref 70–99)
GLUCOSE SERPL-MCNC: 157 MG/DL — HIGH (ref 70–99)
GLUCOSE UR QL: NEGATIVE — SIGNIFICANT CHANGE UP
HCT VFR BLD CALC: 20.6 % — CRITICAL LOW (ref 39–50)
HCT VFR BLD CALC: 21.5 % — LOW (ref 39–50)
HCT VFR BLD CALC: 21.9 % — LOW (ref 39–50)
HGB BLD-MCNC: 7.2 G/DL — LOW (ref 13–17)
HGB BLD-MCNC: 7.3 G/DL — LOW (ref 13–17)
HGB BLD-MCNC: 7.6 G/DL — LOW (ref 13–17)
HYALINE CASTS # UR AUTO: 35 /LPF — HIGH (ref 0–2)
INR BLD: 2.33 RATIO — HIGH (ref 0.88–1.16)
KETONES UR-MCNC: NEGATIVE — SIGNIFICANT CHANGE UP
LEUKOCYTE ESTERASE UR-ACNC: NEGATIVE — SIGNIFICANT CHANGE UP
MAGNESIUM SERPL-MCNC: 1.6 MG/DL — SIGNIFICANT CHANGE UP (ref 1.6–2.6)
MAGNESIUM SERPL-MCNC: 2 MG/DL — SIGNIFICANT CHANGE UP (ref 1.6–2.6)
MCHC RBC-ENTMCNC: 31.4 PG — SIGNIFICANT CHANGE UP (ref 27–34)
MCHC RBC-ENTMCNC: 32.6 PG — SIGNIFICANT CHANGE UP (ref 27–34)
MCHC RBC-ENTMCNC: 33 PG — SIGNIFICANT CHANGE UP (ref 27–34)
MCHC RBC-ENTMCNC: 34 GM/DL — SIGNIFICANT CHANGE UP (ref 32–36)
MCHC RBC-ENTMCNC: 34.7 GM/DL — SIGNIFICANT CHANGE UP (ref 32–36)
MCHC RBC-ENTMCNC: 35 GM/DL — SIGNIFICANT CHANGE UP (ref 32–36)
MCV RBC AUTO: 90.5 FL — SIGNIFICANT CHANGE UP (ref 80–100)
MCV RBC AUTO: 94.5 FL — SIGNIFICANT CHANGE UP (ref 80–100)
MCV RBC AUTO: 96 FL — SIGNIFICANT CHANGE UP (ref 80–100)
NITRITE UR-MCNC: NEGATIVE — SIGNIFICANT CHANGE UP
NRBC # BLD: 0 /100 WBCS — SIGNIFICANT CHANGE UP (ref 0–0)
PH UR: 5 — SIGNIFICANT CHANGE UP (ref 5–8)
PHOSPHATE SERPL-MCNC: 3.6 MG/DL — SIGNIFICANT CHANGE UP (ref 2.5–4.5)
PHOSPHATE SERPL-MCNC: 3.6 MG/DL — SIGNIFICANT CHANGE UP (ref 2.5–4.5)
PLATELET # BLD AUTO: 36 K/UL — LOW (ref 150–400)
PLATELET # BLD AUTO: 45 K/UL — LOW (ref 150–400)
PLATELET # BLD AUTO: 48 K/UL — LOW (ref 150–400)
POTASSIUM SERPL-MCNC: 3.6 MMOL/L — SIGNIFICANT CHANGE UP (ref 3.5–5.3)
POTASSIUM SERPL-MCNC: 3.6 MMOL/L — SIGNIFICANT CHANGE UP (ref 3.5–5.3)
POTASSIUM SERPL-SCNC: 3.6 MMOL/L — SIGNIFICANT CHANGE UP (ref 3.5–5.3)
POTASSIUM SERPL-SCNC: 3.6 MMOL/L — SIGNIFICANT CHANGE UP (ref 3.5–5.3)
PROT SERPL-MCNC: 5.5 G/DL — LOW (ref 6–8.3)
PROT SERPL-MCNC: 5.6 G/DL — LOW (ref 6–8.3)
PROT UR-MCNC: ABNORMAL
PROTHROM AB SERPL-ACNC: 27 SEC — HIGH (ref 10.5–13.4)
RBC # BLD: 2.18 M/UL — LOW (ref 4.2–5.8)
RBC # BLD: 2.24 M/UL — LOW (ref 4.2–5.8)
RBC # BLD: 2.42 M/UL — LOW (ref 4.2–5.8)
RBC # FLD: 15.2 % — HIGH (ref 10.3–14.5)
RBC # FLD: 15.4 % — HIGH (ref 10.3–14.5)
RBC # FLD: 17.2 % — HIGH (ref 10.3–14.5)
RBC CASTS # UR COMP ASSIST: SIGNIFICANT CHANGE UP /HPF (ref 0–4)
SODIUM SERPL-SCNC: 131 MMOL/L — LOW (ref 135–145)
SODIUM SERPL-SCNC: 133 MMOL/L — LOW (ref 135–145)
SP GR SPEC: 1.02 — SIGNIFICANT CHANGE UP (ref 1.01–1.02)
UROBILINOGEN FLD QL: NEGATIVE — SIGNIFICANT CHANGE UP
WBC # BLD: 21.86 K/UL — HIGH (ref 3.8–10.5)
WBC # BLD: 24.01 K/UL — HIGH (ref 3.8–10.5)
WBC # BLD: 30.74 K/UL — HIGH (ref 3.8–10.5)
WBC # FLD AUTO: 21.86 K/UL — HIGH (ref 3.8–10.5)
WBC # FLD AUTO: 24.01 K/UL — HIGH (ref 3.8–10.5)
WBC # FLD AUTO: 30.74 K/UL — HIGH (ref 3.8–10.5)
WBC UR QL: 3 /HPF — SIGNIFICANT CHANGE UP (ref 0–5)

## 2022-08-15 PROCEDURE — 71045 X-RAY EXAM CHEST 1 VIEW: CPT | Mod: 26

## 2022-08-15 PROCEDURE — 99222 1ST HOSP IP/OBS MODERATE 55: CPT

## 2022-08-15 PROCEDURE — 99233 SBSQ HOSP IP/OBS HIGH 50: CPT | Mod: GC

## 2022-08-15 PROCEDURE — 99232 SBSQ HOSP IP/OBS MODERATE 35: CPT

## 2022-08-15 PROCEDURE — 99232 SBSQ HOSP IP/OBS MODERATE 35: CPT | Mod: GC

## 2022-08-15 RX ORDER — ACETAMINOPHEN 500 MG
650 TABLET ORAL ONCE
Refills: 0 | Status: COMPLETED | OUTPATIENT
Start: 2022-08-15 | End: 2022-08-15

## 2022-08-15 RX ORDER — FUROSEMIDE 40 MG
40 TABLET ORAL ONCE
Refills: 0 | Status: COMPLETED | OUTPATIENT
Start: 2022-08-15 | End: 2022-08-15

## 2022-08-15 RX ORDER — MAGNESIUM SULFATE 500 MG/ML
2 VIAL (ML) INJECTION ONCE
Refills: 0 | Status: COMPLETED | OUTPATIENT
Start: 2022-08-15 | End: 2022-08-15

## 2022-08-15 RX ORDER — ALBUMIN HUMAN 25 %
50 VIAL (ML) INTRAVENOUS ONCE
Refills: 0 | Status: COMPLETED | OUTPATIENT
Start: 2022-08-15 | End: 2022-08-15

## 2022-08-15 RX ORDER — FUROSEMIDE 40 MG
80 TABLET ORAL ONCE
Refills: 0 | Status: COMPLETED | OUTPATIENT
Start: 2022-08-15 | End: 2022-08-15

## 2022-08-15 RX ADMIN — PIPERACILLIN AND TAZOBACTAM 25 GRAM(S): 4; .5 INJECTION, POWDER, LYOPHILIZED, FOR SOLUTION INTRAVENOUS at 10:05

## 2022-08-15 RX ADMIN — MIDODRINE HYDROCHLORIDE 10 MILLIGRAM(S): 2.5 TABLET ORAL at 06:13

## 2022-08-15 RX ADMIN — OCTREOTIDE ACETATE 100 MICROGRAM(S): 200 INJECTION, SOLUTION INTRAVENOUS; SUBCUTANEOUS at 10:42

## 2022-08-15 RX ADMIN — Medication 25 GRAM(S): at 08:07

## 2022-08-15 RX ADMIN — OCTREOTIDE ACETATE 100 MICROGRAM(S): 200 INJECTION, SOLUTION INTRAVENOUS; SUBCUTANEOUS at 02:13

## 2022-08-15 RX ADMIN — CHLORHEXIDINE GLUCONATE 1 APPLICATION(S): 213 SOLUTION TOPICAL at 06:13

## 2022-08-15 RX ADMIN — POLYETHYLENE GLYCOL 3350 17 GRAM(S): 17 POWDER, FOR SOLUTION ORAL at 17:40

## 2022-08-15 RX ADMIN — PIPERACILLIN AND TAZOBACTAM 25 GRAM(S): 4; .5 INJECTION, POWDER, LYOPHILIZED, FOR SOLUTION INTRAVENOUS at 22:16

## 2022-08-15 RX ADMIN — OCTREOTIDE ACETATE 100 MICROGRAM(S): 200 INJECTION, SOLUTION INTRAVENOUS; SUBCUTANEOUS at 17:40

## 2022-08-15 RX ADMIN — SENNA PLUS 2 TABLET(S): 8.6 TABLET ORAL at 22:16

## 2022-08-15 RX ADMIN — POLYETHYLENE GLYCOL 3350 17 GRAM(S): 17 POWDER, FOR SOLUTION ORAL at 06:13

## 2022-08-15 RX ADMIN — Medication 650 MILLIGRAM(S): at 22:00

## 2022-08-15 RX ADMIN — Medication 650 MILLIGRAM(S): at 21:21

## 2022-08-15 RX ADMIN — MIDODRINE HYDROCHLORIDE 10 MILLIGRAM(S): 2.5 TABLET ORAL at 22:16

## 2022-08-15 RX ADMIN — Medication 40 MILLIGRAM(S): at 16:03

## 2022-08-15 RX ADMIN — Medication 80 MILLIGRAM(S): at 20:57

## 2022-08-15 RX ADMIN — PANTOPRAZOLE SODIUM 40 MILLIGRAM(S): 20 TABLET, DELAYED RELEASE ORAL at 08:07

## 2022-08-15 RX ADMIN — Medication 50 MILLILITER(S): at 19:57

## 2022-08-15 RX ADMIN — MIDODRINE HYDROCHLORIDE 10 MILLIGRAM(S): 2.5 TABLET ORAL at 14:16

## 2022-08-15 NOTE — BH CONSULTATION LIAISON ASSESSMENT NOTE - NSBHCHARTREVIEWLAB_PSY_A_CORE FT
08-15    133<L>  |  96  |  42<H>  ----------------------------<  133<H>  3.6   |  26  |  2.05<H>    Ca    8.4      15 Aug 2022 05:51  Phos  3.6     08-15  Mg     1.6     08-15    TPro  5.5<L>  /  Alb  2.5<L>  /  TBili  3.0<H>  /  DBili  1.2<H>  /  AST  29  /  ALT  16  /  AlkPhos  78  08-15                        7.3    24.01 )-----------( 48       ( 15 Aug 2022 10:03 )             21.5

## 2022-08-15 NOTE — PROGRESS NOTE ADULT - SUBJECTIVE AND OBJECTIVE BOX
Follow Up:      Interval History:    REVIEW OF SYSTEMS  [  ] ROS unobtainable because:    [  ] All other systems negative except as noted below    Constitutional:  [ ] fever [ ] chills  [ ] weight loss  [ ] weakness  Skin:  [ ] rash [ ] phlebitis	  Eyes: [ ] icterus [ ] pain  [ ] discharge	  ENMT: [ ] sore throat  [ ] thrush [ ] ulcers [ ] exudates  Respiratory: [ ] dyspnea [ ] hemoptysis [ ] cough [ ] sputum	  Cardiovascular:  [ ] chest pain [ ] palpitations [ ] edema	  Gastrointestinal:  [ ] nausea [ ] vomiting [ ] diarrhea [ ] constipation [ ] pain	  Genitourinary:  [ ] dysuria [ ] frequency [ ] hematuria [ ] discharge [ ] flank pain  [ ] incontinence  Musculoskeletal:  [ ] myalgias [ ] arthralgias [ ] arthritis  [ ] back pain  Neurological:  [ ] headache [ ] seizures  [ ] confusion/altered mental status    Allergies  No Known Allergies        ANTIMICROBIALS:  piperacillin/tazobactam IVPB.. 3.375 every 12 hours      OTHER MEDS:  MEDICATIONS  (STANDING):  bisacodyl 5 at bedtime  furosemide   Injectable 80 once  midodrine. 10 every 8 hours  octreotide  Injectable 100 every 8 hours  pantoprazole    Tablet 40 before breakfast  polyethylene glycol 3350 17 two times a day  senna 2 at bedtime      Vital Signs Last 24 Hrs  T(C): 37.2 (15 Aug 2022 15:00), Max: 37.2 (15 Aug 2022 15:00)  T(F): 99 (15 Aug 2022 15:00), Max: 99 (15 Aug 2022 15:00)  HR: 83 (15 Aug 2022 17:00) (71 - 99)  BP: 129/68 (15 Aug 2022 17:00) (111/65 - 152/69)  BP(mean): 92 (15 Aug 2022 17:00) (80 - 100)  RR: 20 (15 Aug 2022 17:00) (12 - 28)  SpO2: 95% (15 Aug 2022 17:00) (93% - 97%)    Parameters below as of 15 Aug 2022 11:31  Patient On (Oxygen Delivery Method): room air        PHYSICAL EXAMINATION:  General: Alert and Awake, NAD  HEENT: PERRL, EOMI  Neck: Supple  Cardiac: RRR, No M/R/G  Resp: CTAB, No Wh/Rh/Ra  Abdomen: NBS, NT/ND, No HSM, No rigidity or guarding  MSK: No LE edema. No Calf tenderness  : No milian  Skin: No rashes or lesions. Skin is warm and dry to the touch.   Neuro: Alert and Awake. CN 2-12 Grossly intact. Moves all four extremities spontaneously.  Psych: Calm, Pleasant, Cooperative                          7.3    24.01 )-----------( 48       ( 15 Aug 2022 10:03 )             21.5       08-15    133<L>  |  96  |  42<H>  ----------------------------<  133<H>  3.6   |  26  |  2.05<H>    Ca    8.4      15 Aug 2022 05:51  Phos  3.6     08-15  Mg     1.6     08-15    TPro  5.5<L>  /  Alb  2.5<L>  /  TBili  3.0<H>  /  DBili  1.2<H>  /  AST  29  /  ALT  16  /  AlkPhos  78  08-15      Urinalysis Basic - ( 15 Aug 2022 17:07 )    Color: Yellow / Appearance: Clear / S.019 / pH: x  Gluc: x / Ketone: Negative  / Bili: Negative / Urobili: Negative   Blood: x / Protein: Trace / Nitrite: Negative   Leuk Esterase: Negative / RBC: 3-5 /hpf / WBC 3 /HPF   Sq Epi: x / Non Sq Epi: 2 /hpf / Bacteria: Few        MICROBIOLOGY:  v  Peritoneal Peritoneal Fluid  22   No growth  --    polymorphonuclear leukocytes seen  No organisms seen  by cytocentrifuge      .Blood Blood  22   No growth to date.  --  --      Peritoneal Peritoneal Fluid  22   Few Escherichia coli  Moderate Streptococcus anginosus "Susceptibilities not performed"  --  Escherichia coli      Catheterized Catheterized  22   10,000 - 49,000 CFU/mL Escherichia coli  --  Escherichia coli      .Blood Blood-Peripheral  22   No Growth Final  --  --      .Blood Blood-Peripheral  22   No Growth Final  --  --        CMV IgG Antibody: 4.00 U/mL (22 @ 00:32)    CMVPCR Log: Det <1.54 Assay Dynamic Range: 34.5 to 1.0E+07 IU/mL (1.54 to 7.00 Log10 IU/mL)  Assay lower limit of quantification (LLOQ) is 34.5 IU/mL (1.54 Log10  IU/mL)  CMV DNA detected below the LLOQ will be reported as Detected < 34.5 IU/mL  (<1.54 Log10 IU/mL)  Vipul Cytomegalovirus (CMV) is an FDA-cleared quantitative test that  enables the detection and quantitation of CMV DNA in EDTA plasma of  infected transplant patients on the vipul 8800 system. This test was  verified by [employer]. Results should be interpreted  with consideration of all clinical findings and laboratory findings and  should not form the sole basis for a diagnosis or treatment decision. Lvo41DP/mL ( @ 23:58)        RADIOLOGY:    <The imaging below has been reviewed and visualized by me independently. Findings as detailed in report below> Follow Up:  Cholecystitis, Peritonitis    Interval History: abdominal pain improving. afebrile.      REVIEW OF SYSTEMS  [  ] ROS unobtainable because:    [  x] All other systems negative except as noted below    Constitutional:  [ ] fever [ ] chills  [ ] weight loss  [ ] weakness  Skin:  [ ] rash [ ] phlebitis	  Eyes: [ ] icterus [ ] pain  [ ] discharge	  ENMT: [ ] sore throat  [ ] thrush [ ] ulcers [ ] exudates  Respiratory: [ ] dyspnea [ ] hemoptysis [ ] cough [ ] sputum	  Cardiovascular:  [ ] chest pain [ ] palpitations [ ] edema	  Gastrointestinal:  [ ] nausea [ ] vomiting [ ] diarrhea [ ] constipation [x ] pain	  Genitourinary:  [ ] dysuria [ ] frequency [ ] hematuria [ ] discharge [ ] flank pain  [ ] incontinence  Musculoskeletal:  [ ] myalgias [ ] arthralgias [ ] arthritis  [ ] back pain  Neurological:  [ ] headache [ ] seizures  [ ] confusion/altered mental status    Allergies  No Known Allergies        ANTIMICROBIALS:  piperacillin/tazobactam IVPB.. 3.375 every 12 hours      OTHER MEDS:  MEDICATIONS  (STANDING):  bisacodyl 5 at bedtime  furosemide   Injectable 80 once  midodrine. 10 every 8 hours  octreotide  Injectable 100 every 8 hours  pantoprazole    Tablet 40 before breakfast  polyethylene glycol 3350 17 two times a day  senna 2 at bedtime      Vital Signs Last 24 Hrs  T(C): 37.2 (15 Aug 2022 15:00), Max: 37.2 (15 Aug 2022 15:00)  T(F): 99 (15 Aug 2022 15:00), Max: 99 (15 Aug 2022 15:00)  HR: 83 (15 Aug 2022 17:00) (71 - 99)  BP: 129/68 (15 Aug 2022 17:00) (111/65 - 152/69)  BP(mean): 92 (15 Aug 2022 17:00) (80 - 100)  RR: 20 (15 Aug 2022 17:00) (12 - 28)  SpO2: 95% (15 Aug 2022 17:00) (93% - 97%)    Parameters below as of 15 Aug 2022 11:31  Patient On (Oxygen Delivery Method): room air    PHYSICAL EXAMINATION:  General: Alert and Awake, NAD  Cardiac: RRR, No M/R/G  Resp: CTAB, No Wh/Rh/Ra  Abdomen: +Hepatobiliary Drain, Distended, No HSM, No rigidity or guarding  MSK: No LE edema. No Calf tenderness  Skin: No rashes or lesions. Skin is warm and dry to the touch.   Neuro: Alert and Awake. CN 2-12 Grossly intact. Moves all four extremities spontaneously.  Psych: Calm, Pleasant, Cooperative                          7.3    24.01 )-----------( 48       ( 15 Aug 2022 10:03 )             21.5       08-15    133<L>  |  96  |  42<H>  ----------------------------<  133<H>  3.6   |  26  |  2.05<H>    Ca    8.4      15 Aug 2022 05:51  Phos  3.6     08-15  Mg     1.6     08-15    TPro  5.5<L>  /  Alb  2.5<L>  /  TBili  3.0<H>  /  DBili  1.2<H>  /  AST  29  /  ALT  16  /  AlkPhos  78  08-15      Urinalysis Basic - ( 15 Aug 2022 17:07 )    Color: Yellow / Appearance: Clear / S.019 / pH: x  Gluc: x / Ketone: Negative  / Bili: Negative / Urobili: Negative   Blood: x / Protein: Trace / Nitrite: Negative   Leuk Esterase: Negative / RBC: 3-5 /hpf / WBC 3 /HPF   Sq Epi: x / Non Sq Epi: 2 /hpf / Bacteria: Few        MICROBIOLOGY:  v  Peritoneal Peritoneal Fluid  22   No growth  --    polymorphonuclear leukocytes seen  No organisms seen  by cytocentrifuge      .Blood Blood  22   No growth to date.  --  --      Peritoneal Peritoneal Fluid  22   Few Escherichia coli  Moderate Streptococcus anginosus "Susceptibilities not performed"  --  Escherichia coli      Catheterized Catheterized  22   10,000 - 49,000 CFU/mL Escherichia coli  --  Escherichia coli      .Blood Blood-Peripheral  22   No Growth Final  --  --      .Blood Blood-Peripheral  22   No Growth Final  --  --        CMV IgG Antibody: 4.00 U/mL (22 @ 00:32)    CMVPCR Log: Det <1.54 Assay Dynamic Range: 34.5 to 1.0E+07 IU/mL (1.54 to 7.00 Log10 IU/mL)  Assay lower limit of quantification (LLOQ) is 34.5 IU/mL (1.54 Log10  IU/mL)  CMV DNA detected below the LLOQ will be reported as Detected < 34.5 IU/mL  (<1.54 Log10 IU/mL)  Vipul Cytomegalovirus (CMV) is an FDA-cleared quantitative test that  enables the detection and quantitation of CMV DNA in EDTA plasma of  infected transplant patients on the vipul 8800 system. This test was  verified by [employer]. Results should be interpreted  with consideration of all clinical findings and laboratory findings and  should not form the sole basis for a diagnosis or treatment decision. Kmx89NR/mL ( @ 23:58)        RADIOLOGY:    <The imaging below has been reviewed and visualized by me independently. Findings as detailed in report below>    < from: Xray Chest 1 View- PORTABLE-Routine (Xray Chest 1 View- PORTABLE-Routine in AM.) (08.15.22 @ 06:54) >  IMPRESSION:  Hazy perihilar and bibasilar opacities, which could represent pulmonary   vascular congestion along with bilateral pleural effusion (right greater   than left) and/or atelectasis.    < end of copied text >

## 2022-08-15 NOTE — BH CONSULTATION LIAISON ASSESSMENT NOTE - NSBHATTESTCOMMENTATTENDFT_PSY_A_CORE
Patient is a 66 year-old employed, domiciled male. Patient lives in a home with his wife and has four daughters. 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 Patient denies past psychiatric history or previous admissions. Has PMHx of cirrhosis and recent cholangitis s/p ERCP stent (4/22) and stent removal (7/20/22). Denies cigarette or illicit drug use. States she previously drank about half a pint of vodka daily beginning in 2016 when his mother passed away. Patient's last drink was 4/2022. Denies access to firearms. Psychiatry was consulted today for clearance for a liver transplant.     Today, patient was examined at bedside. Patient is oriented to self, place and time. Patient is from [facility] and recently traveled there from May to June 10th. While visiting, patient drank nonalcoholic beers and states he enjoyed his time there. States he feels hopeful for the future and wants to live. Denies feeling depressed, anxious, helpless or hopeless, SI or HI, auditory or visual hallucinations. Endorses ability to sleep throughout the night and an improvement in appetite over the last few days. Patient is interested in in-person AA meetings in [place] or virtual meetings. States he is ready to retire as he has worked for over 30 years in [place] [employer].     Patient was not yet informed about the details or risks and benefits of a liver transplant. States he wants to live and is agreeable to taking medication after the transplant if needed. Understands the need for lifelong immunosuppressive therapy and anti-rejection medication. Overall, agree that patient appears to be a good candidate for liver transplant.

## 2022-08-15 NOTE — PROGRESS NOTE ADULT - ATTENDING COMMENTS
66y Male with history of recently diagnosed cirrhosis and cholangitis s/p ERCP with stent placement (4/2022) s/p stent removal on 7/20/22 presenting for one week of RUQ abdominal pain found to have chronic cholecystitis; cirrhosis with portal hypertension, moderate ascites with diffuse peritoneal enhancement concerning for SBP, small and large bowel thickening suggesting hepatic enterocolopathy. Nephrology called for ROBBIE, severe metabolic acidosis, hyponatremia and hyperkalemia. Remains oliguric, so SOB, BUT uremic symptoms with tremor.  Now resolved with initial HD and seen this pm during 3rd session.  Feeling much better, alert, conversant.  HD catheter d/nico    1. ARF--likely ATN multifactorial including sepsis, radiocontrast.  Persistently elevated lactate although improving.  OLIGURIC.  Consent obtained from patient.  WITH UREMIC NEUROPATHY HD initiated.  Symptoms resolved.  NO HD required.  NON oliguric, diuretic assist as needed   2.  Lactic acidosis--hemodynamic optimization, no aggressive volume repletion.  MODEST UF ON HD.  Echo reviewed and is appropriately hyperdynamic but NO reduced EF.  Trend with volume optimization including paracentesis.  Resolved and trend  3.  Hyponatremia--minimal free water clearance with 1. HD dialysate will --> gradual improvement.  Now off HD fluid restriction, modest  4.  Ascites--for IR.  Continue albumin infusion and increase if >2L HD UF may decrease overall volume overload.  NO SBP    discussed with team, intensivist    Cristhian Jacob MD  contact via teams

## 2022-08-15 NOTE — PROGRESS NOTE ADULT - SUBJECTIVE AND OBJECTIVE BOX
[facility] Division of Kidney Diseases & Hypertension  FOLLOW UP NOTE  [phone]--------------------------------------------------------------------------------  Chief Complaint:Acute cholecystitis      24 hour events/subjective:  Patient's catheter removed; Last HD on 8/13. UOP 1.4L in last 24hours      PAST HISTORY  --------------------------------------------------------------------------------  No significant changes to PMH, PSH, FHx, SHx, unless otherwise noted    ALLERGIES & MEDICATIONS  --------------------------------------------------------------------------------  Allergies    No Known Allergies    Intolerances      Standing Inpatient Medications  bisacodyl 5 milliGRAM(s) Oral at bedtime  chlorhexidine 2% Cloths 1 Application(s) Topical <User Schedule>  midodrine. 10 milliGRAM(s) Oral every 8 hours  octreotide  Injectable 100 MICROGram(s) IV Push every 8 hours  pantoprazole    Tablet 40 milliGRAM(s) Oral before breakfast  piperacillin/tazobactam IVPB.. 3.375 Gram(s) IV Intermittent every 12 hours  polyethylene glycol 3350 17 Gram(s) Oral two times a day  senna 2 Tablet(s) Oral at bedtime    PRN Inpatient Medications      REVIEW OF SYSTEMS  --------------------------------------------------------------------------------  Gen: No fevers/chills  Skin: No rashes  Head/Eyes/Ears: Normal hearing,   Respiratory: No dyspnea, cough  CV: No chest pain  GI: No abdominal pain, diarrhea  : No dysuria, hematuria  MSK: No  edema  Heme: No easy bruising or bleeding  Psych: No significant depression    All other systems were reviewed and are negative, except as noted.    VITALS/PHYSICAL EXAM  --------------------------------------------------------------------------------  T(C): 37.1 (08-15-22 @ 07:00), Max: 37.1 (08-15-22 @ 07:00)  HR: 99 (08-15-22 @ 09:00) (70 - 99)  BP: 129/70 (08-15-22 @ 09:00) (111/65 - 141/72)  RR: 17 (08-15-22 @ 09:00) (12 - 28)  SpO2: 94% (08-15-22 @ 09:00) (91% - 98%)  Wt(kg): --        08-14-22 @ 07:01  -  08-15-22 @ 07:00  --------------------------------------------------------  IN: 710 mL / OUT: 1495 mL / NET: -785 mL    08-15-22 @ 07:01  -  08-15-22 @ 10:06  --------------------------------------------------------  IN: 150 mL / OUT: 50 mL / NET: 100 mL      Physical Exam:  	Gen: NAD, sitting up in a chair  	HEENT: MMM  	Pulm: CTA B/L  	CV: S1S2  	Abd: Soft, +BS   	Ext: No LE edema B/L  	Neuro: Awake  	Skin: Warm and dry      LABS/STUDIES  --------------------------------------------------------------------------------              7.2    21.86 >-----------<  36       [08-15-22 @ 05:51]              20.6     133  |  96  |  42  ----------------------------<  133      [08-15-22 @ 05:51]  3.6   |  26  |  2.05        Ca     8.4     [08-15-22 @ 05:51]      Mg     1.6     [08-15-22 @ 05:51]      Phos  3.6     [08-15-22 @ 05:51]    TPro  5.5  /  Alb  2.5  /  TBili  3.0  /  DBili  1.2  /  AST  29  /  ALT  16  /  AlkPhos  78  [08-15-22 @ 05:51]    PT/INR: PT 27.0 , INR 2.33       [08-15-22 @ 05:51]  PTT: 34.9       [08-15-22 @ 05:51]      Creatinine Trend:  SCr 2.05 [08-15 @ 05:51]  SCr 1.86 [08-13 @ 22:35]  SCr 2.59 [08-13 @ 13:39]  SCr 2.99 [08-12 @ 22:06]  SCr 4.28 [08-12 @ 12:04]    Urinalysis - [08-08-22 @ 10:19]      Color Dark Yellow / Appearance Slightly Turbid / SG >1.050 / pH 5.5      Gluc Negative / Ketone Trace  / Bili Small / Urobili Negative       Blood Negative / Protein 30 mg/dL / Leuk Est Small / Nitrite Negative      RBC 2 / WBC 18 / Hyaline 30 / Gran  / Sq Epi  / Non Sq Epi 3 / Bacteria Many    Urine Creatinine 69      [08-10-22 @ 11:44]  Urine Protein 165      [08-11-22 @ 21:33]  Urine Sodium 92      [08-10-22 @ 11:44]  Urine Urea Nitrogen 62      [08-10-22 @ 12:04]  Urine Potassium 33      [08-10-22 @ 11:44]  Urine Chloride 80      [08-10-22 @ 11:44]  Urine Osmolality 295      [08-10-22 @ 11:44]    Iron 32, TIBC 93, %sat 34      [08-12-22 @ 00:02]  Ferritin 728      [08-12-22 @ 00:00]  TSH 0.15      [08-12-22 @ 00:00]  Lipid: chol 41, TG 52, HDL 9, LDL --      [08-11-22 @ 23:59]    HBsAb <3.0      [08-11-22 @ 18:49]  HBsAb Nonreact      [08-12-22 @ 00:00]  HBsAg Nonreact      [08-11-22 @ 18:49]  HBcAb Nonreact      [08-12-22 @ 00:00]  HCV 0.15, Nonreact      [08-11-22 @ 18:49]  HIV Nonreact      [08-12-22 @ 00:00]  HIV Nonreact      [08-11-22 @ 23:59]    MICHELLE: titer Negative, pattern --      [08-12-22 @ 00:32]

## 2022-08-15 NOTE — PROGRESS NOTE ADULT - ASSESSMENT
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 coli and Streptococcus anginosis.  - Ascitic fluid studies from paracentesis (8/9) most consistent with secondary peritonitis with concern for possible perforated viscus (likely gallbladder) given markedly elevated ascitic fluid LDH.   - Repeat paracentesis (8/12) still consistent with secondary peritonitis but with improving neutrophil count from 99k to 8k. Ascitic fluid bilirubin similar to serum, suggesting against bile leak.  - HD for ATN vs. HRS (8/11-13)    Plan:   - MELD 34 today, ABO O, liver transplantation initiated on  8/11  - monitor C tube output, flush drain daily (5cc only)  - ID following; Cont Zosyn, check repeat tap to assess SBP clearance. (99K-->8K)  - showing signs of renal recovery. Lasix trial again today with Albumin  - AOCD: 1U PRBC with Lasix after as above  - d/c milian  - OOB with PT/RN  - SCDs  - ADAT  - Fluid restriction 500cc  - potential downgrade to floor tomorrow

## 2022-08-15 NOTE — BH CONSULTATION LIAISON ASSESSMENT NOTE - RISK ASSESSMENT
Protective: engagement in treatment, social support of family, future oriented  Risks: chronic medical illness    There are few risk factors mitigated by several protective factors. Patient is currently low risk.

## 2022-08-15 NOTE — PROGRESS NOTE ADULT - ASSESSMENT
66 year old male with decompensated cirrhosis and cholangitis s/p ERCP with stent placement (4/2022) s/p stent removal on 7/20 (along sphincterotomy and stone extraction) presenting for one week of RUQ abdominal pain associated with new abdominal distension and decreased appetite.     #Septic shock 2/2 acute cholecystitis vs ascending cholangitis  #ROBBIE, worsening: initial urine sodium 9, likely indicating prerenal ROBBIE vs less likely HRS given initial presentation as above  #History of cholangitis s/p biliary stent placement April 2022 and removal 7/20/2022, now s/p perc rodney tube  #Decompensated ALD/PARNELL cirrhosis c/b ascites  EV: normal esophagus on ERCP from 4/2022  Ascites: increase in ascites from mild to moderate on current imaging  SBP: paracentesis 8/9/2022 c/w peritonitis given 99K PMNs however elevated LDH more indicative of secondary bacterial peritonitis  HE: none currently       -MELD-Na = 35 on 8/11/2022       -Ascites: yes       -SBP: paracentesis 8/9/2022 reveals likely secondary bacterial peritonitis from suspected gallbladder pathology       -Varices: no mention of varices on EGD/ERCP on 7/20/2022       -Hepatic encephalopathy: Ammonia, Serum: 38 umol/L [11 - 55] (08-12-22 @ 00:21)       -HCC: unknown       -LT candidacy and evaluation:            [ ] Psychosocial            [ ] Infectious            [ ] Cardiology:                    Cardiac cath:                    Stress test:             [ ] Colonoscopy            [x] Prostate: Prostate Specific Antigen: negative at 2.21 ng/mL on 8/12/2022            [ ] Dental            [x] PT/Frailty    Recommendations:  -trend clinical symptoms, exam findings, vital signs, CBC, CMP, INR  -weaned off pressors  -s/p percutaneous cholecystostomy as above  -likely secondary bacterial peritonitis based on paracentesis and clinical findings, CT A/P negative for perforation however performed without contrast 2/2 renal function; s/p repeat paracentesis 8/12/2022  -would favor ATN as likely etiology, however given worsening ROBBIE continue midodrine/octreotide for empiric treatment for HRS-ROBBIE, hold IV albumin given pulmonary congestion  -initiated hemodialysis 8/11/2022  -liver transplantation evaluation opened  -PPI daily for stress prophylaxis  -potential transfer to floor if stable  -diuretics for volume removal given some renal recovery    Note incomplete until finalized by attending signature/attestation.    Fadi Churchill  GI/Hepatology Fellow    MONDAY-FRIDAY 8AM-5PM:  Pager# [phone] ([facility]) or [phone] ([facility])    NON-URGENT CONSULTS:  Please email [email or web address] OR [email or web address]  AT NIGHT AND ON WEEKENDS:  Contact on-call GI fellow via answering service ([phone]) from 5pm-8am and on weekends/holidays

## 2022-08-15 NOTE — BH CONSULTATION LIAISON ASSESSMENT NOTE - SUMMARY
Patient is a 66 year-old employed, domiciled male. Patient lives in a home with his wife and has four daughters. 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 Patient denies past psychiatric history or previous admissions. Has PMHx of cirrhosis and recent cholangitis s/p ERCP stent (4/22) and stent removal (7/20/22). Denies cigarette or illicit drug use. States she previously drank about half a pint of vodka daily beginning in 2016 when his mother passed away. Patient's last drink was 4/2022. Denies access to firearms. Psychiatry was consulted today for clearance for a liver transplant.     Today, patient was examined at bedside. Patient is oriented to self, place and time. Patient is from [facility] and recently traveled there from May to June 10th. While visiting, patient drank nonalcoholic beers and states he enjoyed his time there. States he feels hopeful for the future and wants to live. Denies feeling depressed, anxious, helpless or hopeless, SI or HI, auditory or visual hallucinations. Endorses ability to sleep throughout the night and an improvement in appetite over the last few days. Patient is interested in in-person AA meetings in [place] or virtual meetings. States he is ready to retire as he has worked for over 30 years in [place] [employer].     Patient was not yet informed about the details or risks and benefits of a liver transplant. States he wants to live and is agreeable to taking medication after the transplant if needed. Understands the need for lifelong immunosuppressive therapy and anti-rejection medication. Overall, patient appears to be a good candidate for liver transplant.

## 2022-08-15 NOTE — PROGRESS NOTE ADULT - ASSESSMENT
ROBBIE likely ATN from severe sepsis (from cholecystitis & UTI) causing hypotension vs HRS. Contrast administration likely contributed     Baseline SCr is 0.7 on 7/26.   SCr on arrival is 1.9 on 8/8 peaked at 5.15 and most recently 8/13 1.86 following dialysis hoever   Pt remained anuric despite IV albumin & trial of HTS with diuretics. Had uremic symptoms so was started on HD on 8/11. Pt remains on 2% HTS & salt tabs 2 gm tid this AM. Started Octreotide 100 q8 hr &  Midodrine 10 mg qid. s/p paracentesis & perc rodney. Cultures growing E.coli &  Streptococcus anginosus. UA with high sp gravity (from IV contrast), 30 mg /dl of proteinuria, pyuria, +LE. spot urine TP/CR 0.8 g/g non nephrotic. Urine electrolytes suggesting pre renal etiology such as seen in volume depletion vs HRS.

## 2022-08-15 NOTE — PROGRESS NOTE ADULT - ASSESSMENT
67 y/o M PMHx cholangitis/cholecystitis (s/p ERCP w/ biliary stent placement 04/2022) and recently diagnosed etOH cirrhosis, presents with RUQ pain following biliary stent removal on 7/20/22. Found to have distended GB with wall thickening and stone in cystic duct, concerning for cholecystitis. Admitted to SICU for monitoring, s/p perc rodney and paracentesis by IR on 8/9. Paracentesis results consistent with SBP. Course now c/b ARF.     s/p Perc Rodney Tube and Paracentesis  Paracentesis cell counts suggestive of Peritonitis  Cultures thus far with E coli and Streptococcus on Perc Rodney Drainage and Ascites drainage  Concerning with cholecystitis with associated perforation    #Peritonitis, Cholecystitis, SBP, Leukocytosis   --Would deescalate Zosyn to Unasyn 3g IV Q12H (based on E coli susceptibilities)  --Favor another repeat paracentesis; cell counts from Friday improving)  --Continue to follow CBC with diff  --Continue to follow temperature curve  --Follow up on preliminary blood cultures  --Follow up on preliminary bile culture, ascites cultures    #Pre-Liver Transplant Evaluation  --ID clearance for OLT pending resolution of peritonitis and quantiferon gold  --Follow up on Quantiferon Gold  --Follow up on Strongyloides Ab    I will continue to follow. Please feel free to contact me with any further questions.    Bladimir Nix M.D.  [facility] Division of Infectious Disease  8AM-5PM Monday - Friday: Available on [employer] Teams  After Hours and Holidays (or if no response on [employer] Teams): Please contact the Infectious Diseases Office at [phone]    The above assessment and plan were discussed with Pb, transplant surgery PA

## 2022-08-15 NOTE — BH CONSULTATION LIAISON ASSESSMENT NOTE - HPI (INCLUDE ILLNESS QUALITY, SEVERITY, DURATION, TIMING, CONTEXT, MODIFYING FACTORS, ASSOCIATED SIGNS AND SYMPTOMS)
Patient is a 66 year-old employed, domiciled male. Patient lives in a home with his wife and has four daughters. 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 Patient denies past psychiatric history or previous admissions. Has PMHx of cirrhosis and recent cholangitis s/p ERCP stent (4/22) and stent removal (7/20/22). Denies cigarette or illicit drug use. States she previously drank about half a pint of vodka daily beginning in 2016 when his mother passed away. Patient's last drink was 4/2022. Denies access to firearms. Psychiatry was consulted today for clearance for a liver transplant.    Patient has history of gallstones, is status post stent placement and removal. Patient had follow up appointment with GI on 8/15, but endorsed excruciating pain on 8/8 and was BIB EMS to the ED. Patient had percutaneous cholecystostomy tube placed on 8/9 with paracentesis yielding 300 cc. Patient has been weaned off pressors and had repeat paracentesis performed 8/12. Patient has shown improvement of symptoms and is currently able to pass urine. Patient has been given diuretics for volume removal given some renal recovery. Currently continuing antibiotics.     Today, patient was examined at bedside. Patient is oriented to self, place and time. Patient is from [facility] and recently traveled there from May to June 10th. While visiting, patient drank nonalcoholic beers and states he enjoyed his time there. States he feels hopeful for the future and wants to live. Denies feeling depressed, anxious, helpless or hopeless, SI or HI, auditory or visual hallucinations. Endorses ability to sleep throughout the night and an improvement in appetite over the last few days. Patient is interested in in-person AA meetings in [place] or virtual meetings. States he is ready to retire as he has worked for over 30 years in [place] [employer].     Patient was not yet informed about the details or risks and benefits of a liver transplant. States he wants to live and is agreeable to taking medication after the transplant if needed. Understands the need for lifelong immunosuppressive therapy and anti-rejection medication. Overall, patient appears to be a good candidate for liver transplant.

## 2022-08-15 NOTE — PROGRESS NOTE ADULT - SUBJECTIVE AND OBJECTIVE BOX
Transplant Surgery - Multidisciplinary Progress Note  --------------------------------------------------------------  HPI: 67 yo M with decompensated cirrhosis possibly secondary to ALD/PARNELL (with last reported alcohol in 4/2022) and history of cholangitis s/p ERCP with stent placement (4/2022) with subsequent repeat ERCP with stent removal, sphincterotomy, and balloon sweep removal of sludge/stones (7/20/22), admitted with severe sepsis with associated oliguric ROBBIE and lactic acidosis secondary to acute cholecystitis and peritonitis    - now s/p percutaneous cholecystostomy tube placement (8/9) with cultures from ascitic fluid and bile growing E. coli and Streptococcus anginosis.  - Ascitic fluid studies from paracentesis (8/9) most consistent with secondary peritonitis with concern for possible perforated viscus (likely gallbladder) given markedly elevated ascitic fluid LDH.   - Repeat paracentesis (8/12) still consistent with secondary peritonitis but with improving neutrophil count from 99k to 8k. Ascitic fluid bilirubin similar to serum, suggesting against bile leak.  - HD for ATN vs. HRS (8/11-13)    Interval Events:  - Afebrile, VSS   - Perc Shasha: 50 bilious  - last HD 8/13.  Lasix total 120 yesterday with response--1.4L via milian  - R shiley removed 2/2 bleeding  - overall feeling better        MEDICATIONS  (STANDING):  bisacodyl 5 milliGRAM(s) Oral at bedtime  chlorhexidine 2% Cloths 1 Application(s) Topical <User Schedule>  midodrine. 10 milliGRAM(s) Oral every 8 hours  octreotide  Injectable 100 MICROGram(s) IV Push every 8 hours  pantoprazole    Tablet 40 milliGRAM(s) Oral before breakfast  piperacillin/tazobactam IVPB.. 3.375 Gram(s) IV Intermittent every 12 hours  polyethylene glycol 3350 17 Gram(s) Oral two times a day  senna 2 Tablet(s) Oral at bedtime          Vital Signs Last 24 Hrs  T(C): 37 (15 Aug 2022 11:00), Max: 37.1 (15 Aug 2022 07:00)  T(F): 98.6 (15 Aug 2022 11:00), Max: 98.8 (15 Aug 2022 07:00)  HR: 96 (15 Aug 2022 12:00) (70 - 99)  BP: 137/65 (15 Aug 2022 12:00) (111/65 - 141/72)  BP(mean): 92 (15 Aug 2022 12:00) (82 - 101)  RR: 27 (15 Aug 2022 12:00) (12 - 28)  SpO2: 96% (15 Aug 2022 12:00) (91% - 97%)    Parameters below as of 15 Aug 2022 11:31  Patient On (Oxygen Delivery Method): room air        I&O's Summary    14 Aug 2022 07:01  -  15 Aug 2022 07:00  --------------------------------------------------------  IN: 710 mL / OUT: 1495 mL / NET: -785 mL    15 Aug 2022 07:01  -  15 Aug 2022 12:28  --------------------------------------------------------  IN: 375 mL / OUT: 260 mL / NET: 115 mL                              7.3    24.01 )-----------( 48       ( 15 Aug 2022 10:03 )             21.5     08-15    133<L>  |  96  |  42<H>  ----------------------------<  133<H>  3.6   |  26  |  2.05<H>    Ca    8.4      15 Aug 2022 05:51  Phos  3.6     08-15  Mg     1.6     08-15    TPro  5.5<L>  /  Alb  2.5<L>  /  TBili  3.0<H>  /  DBili  1.2<H>  /  AST  29  /  ALT  16  /  AlkPhos  78  08-15          Culture - Fungal, Body Fluid (collected 08-12-22 @ 17:26)  Source: Peritoneal Peritoneal Fluid  Preliminary Report (08-15-22 @ 08:29):    Testing in progress    Culture - Body Fluid with Gram Stain (collected 08-12-22 @ 17:26)  Source: Peritoneal Peritoneal Fluid  Gram Stain (08-12-22 @ 23:56):    polymorphonuclear leukocytes seen    No organisms seen    by cytocentrifuge  Preliminary Report (08-13-22 @ 14:23):    No growth    Culture - Blood (collected 08-11-22 @ 23:28)  Source: .Blood Blood  Preliminary Report (08-13-22 @ 03:02):    No growth to date.    Culture - Body Fluid with Gram Stain (collected 08-09-22 @ 19:01)  Source: Bile Bile Fluid  Gram Stain (08-10-22 @ 01:31):    polymorphonuclear leukocytes seen    Gram Negative Rods seen    Gram positive cocci in pairs seen    by cytocentrifuge  Final Report (08-14-22 @ 20:41):    Numerous Escherichia coli    Moderate Streptococcus anginosus "Susceptibilities not performed"  Organism: Escherichia coli (08-14-22 @ 20:41)  Organism: Escherichia coli (08-14-22 @ 20:41)    Culture - Fungal, Body Fluid (collected 08-09-22 @ 19:01)  Source: Peritoneal Peritoneal Fluid  Preliminary Report (08-10-22 @ 06:49):    Testing in progress    Culture - Body Fluid with Gram Stain (collected 08-09-22 @ 19:01)  Source: Peritoneal Peritoneal Fluid  Gram Stain (08-10-22 @ 05:58):    polymorphonuclear leukocytes seen    Moderate Gram positive cocci in pairs seen    Limited volume of specimen received, Unable to centrifuge/concentrate    specimen. Culture results may be compromised.  Final Report (08-14-22 @ 20:40):    Few Escherichia coli    Moderate Streptococcus anginosus "Susceptibilities not performed"  Organism: Escherichia coli (08-14-22 @ 20:40)  Organism: Escherichia coli (08-14-22 @ 20:40)    Culture - Urine (collected 08-08-22 @ 12:30)  Source: Catheterized Catheterized  Final Report (08-10-22 @ 17:00):    10,000 - 49,000 CFU/mL Escherichia coli  Organism: Escherichia coli (08-10-22 @ 17:00)  Organism: Escherichia coli (08-10-22 @ 17:00)                      Review of systems  Gen: No weight changes, fatigue, fevers/chills, weakness  Skin: No rashes  Head/Eyes/Ears/Mouth: No headache; Normal hearing; Normal vision w/o blurriness; No sinus pain/discomfort, sore throat  Respiratory: No dyspnea, cough, wheezing, hemoptysis  CV: No chest pain, PND, orthopnea  GI: Mild abdominal pain ; denies diarrhea, constipation, nausea, vomiting, melena, hematochezia  : No increased frequency, dysuria, hematuria, nocturia  MSK: No joint pain/swelling; no back pain; no edema  Neuro: No dizziness/lightheadedness, weakness, seizures, numbness, tingling  Heme: No easy bruising or bleeding  Endo: No heat/cold intolerance  Psych: No significant nervousness, anxiety, stress, depression  All other systems were reviewed and are negative, except as noted.      PHYSICAL EXAM:  Constitutional: Well developed / well nourished  Eyes: Anicteric, PERRLA  ENMT: nc/at  Neck: supple  Respiratory: CTA B/L  Cardiovascular: RRR  Gastrointestinal: Soft, distention improving, improving TTP, C-tube with bilio-sanguinous drainage  Genitourinary: Urinary catheter in place  Extremities: SCD's in place and working bilaterally, improving edema  Vascular: Palpable dp pulses bilaterally  Neurological: A&O x3  Skin: no rashes, ulcerations or lesions;  Musculoskeletal: Moving all extremities  Psychiatric: Responsive

## 2022-08-15 NOTE — PROGRESS NOTE ADULT - PROBLEM SELECTOR PLAN 1
-Patient s/p HD on 8/11, 8/13 due to uremic symptoms  -Patient got a dose of 120 lasix on 8/14; responded well  -Renal US with b/l echogenic kidneys  -Patient now non-oliguric, with out uremic symptoms  -will monitor for further dialysis need  -consider c/w lasix 60 BID IV for diuresis   -monitor BMP frequently; avoid nephrotoxic agents    #hypomagnesium  -replete magnesium    If you have any questions, please feel free to contact me  Vlad Bermudez  Nephrology Fellow  [phone]; Prefer [employer] TEAMS  (After 5pm or on weekends please page the on-call fellow).    Patient was discussed with Dr. Jacob who agrees with my A/P. Addendum to follow -Patient s/p HD on 8/11, 8/13 due to uremic symptoms  -Patient got a dose of 120 lasix on 8/14; responded well  -Renal US with b/l echogenic kidneys  -Patient now non-oliguric, with out uremic symptoms  -will monitor for further dialysis need  -consider c/w lasix 60 BID IV for diuresis   -monitor BMP frequently; avoid nephrotoxic agents    #hypomagnesium  -replete magnesium

## 2022-08-15 NOTE — BH CONSULTATION LIAISON ASSESSMENT NOTE - DESCRIPTION
Patient is a 66 year-old male who lives with his wife in a home in [place]. Patient has 4 daughter and is from [place]. Currently works for [employer] with plans to retire in the near future. Previously drank 1/2 pint of vodka daily, quit drinking 4/2022. Denies other substance use.

## 2022-08-15 NOTE — PROGRESS NOTE ADULT - ATTENDING COMMENTS
66 year old man with decompensated cirrhosis admitted with ROBBIE and cholangitis s/p cholecystectomy   -Hemodynamically stable on mitodrine.  -Cholangitis s/p perc rodney. Drain with 50cc output. Bili stable at 1.2.   -Cr improving to 2.5 from peak of 5. UOP adequate. Net -785. Will continue to hold dialysis. Continue diuresis with lasix   -Hb 7.3 from 8. Will give 1 Unit RBC prior to lasix. Plt stable at 48. INR 2.3  -Biliary cx with E. Coli and Strep Anginosus. Continue Zosyn. Transplant ID on board.

## 2022-08-15 NOTE — PROGRESS NOTE ADULT - SUBJECTIVE AND OBJECTIVE BOX
Interval Events:   No acute events overnight.  Patient afebrile and hemodynamically stable.    ROS:   12 point review of systems performed and negative except otherwise noted in HPI.    Hospital Medications:  bisacodyl 5 milliGRAM(s) Oral at bedtime  chlorhexidine 2% Cloths 1 Application(s) Topical <User Schedule>  midodrine. 10 milliGRAM(s) Oral every 8 hours  octreotide  Injectable 100 MICROGram(s) IV Push every 8 hours  pantoprazole    Tablet 40 milliGRAM(s) Oral before breakfast  piperacillin/tazobactam IVPB.. 3.375 Gram(s) IV Intermittent every 12 hours  polyethylene glycol 3350 17 Gram(s) Oral two times a day  senna 2 Tablet(s) Oral at bedtime      PHYSICAL EXAM:   Vital Signs:  Vital Signs Last 24 Hrs  T(C): 37.1 (15 Aug 2022 07:00), Max: 37.1 (15 Aug 2022 07:00)  T(F): 98.8 (15 Aug 2022 07:00), Max: 98.8 (15 Aug 2022 07:00)  HR: 81 (15 Aug 2022 08:00) (70 - 88)  BP: 132/71 (15 Aug 2022 08:00) (111/65 - 141/72)  BP(mean): 95 (15 Aug 2022 08:00) (82 - 101)  RR: 21 (15 Aug 2022 08:00) (12 - 28)  SpO2: 94% (15 Aug 2022 08:00) (91% - 98%)    Parameters below as of 14 Aug 2022 12:00  Patient On (Oxygen Delivery Method): room air      Daily     Daily Weight in k.7 (15 Aug 2022 06:00)    GENERAL: no acute distress  NEURO: alert  HEENT: NCAT, no conjunctival pallor appreciated  CHEST: no respiratory distress, no accessory muscle use  CARDIAC: regular rate, +S1/S2  ABDOMEN: soft, perc rodney tube present, distended, no rebound or guarding  EXTREMITIES: warm, well perfused  SKIN: no lesions noted    LABS: reviewed                        7.2    21.86 )-----------( 36       ( 15 Aug 2022 05:51 )             20.6     08-15    133<L>  |  96  |  42<H>  ----------------------------<  133<H>  3.6   |  26  |  2.05<H>    Ca    8.4      15 Aug 2022 05:51  Phos  3.6     08-15  Mg     1.6     -15    TPro  5.5<L>  /  Alb  2.5<L>  /  TBili  3.0<H>  /  DBili  1.2<H>  /  AST  29  /  ALT  16  /  AlkPhos  78  08-15    LIVER FUNCTIONS - ( 15 Aug 2022 05:51 )  Alb: 2.5 g/dL / Pro: 5.5 g/dL / ALK PHOS: 78 U/L / ALT: 16 U/L / AST: 29 U/L / GGT: x             Interval Diagnostic Studies: see sunrise for full report

## 2022-08-15 NOTE — BH CONSULTATION LIAISON ASSESSMENT NOTE - NSBHCHARTREVIEWVS_PSY_A_CORE FT
Vital Signs Last 24 Hrs  T(C): 37 (15 Aug 2022 11:00), Max: 37.1 (15 Aug 2022 07:00)  T(F): 98.6 (15 Aug 2022 11:00), Max: 98.8 (15 Aug 2022 07:00)  HR: 89 (15 Aug 2022 11:31) (70 - 99)  BP: 124/65 (15 Aug 2022 11:31) (111/65 - 141/72)  BP(mean): 86 (15 Aug 2022 11:31) (82 - 101)  RR: 16 (15 Aug 2022 11:31) (12 - 28)  SpO2: 96% (15 Aug 2022 11:31) (91% - 97%)    Parameters below as of 15 Aug 2022 11:31  Patient On (Oxygen Delivery Method): room air

## 2022-08-15 NOTE — PROGRESS NOTE ADULT - ASSESSMENT
Pt is a 65 y/o Male with a PMH Cirrhosis and recent cholangitis s/p ERCP stent (4/22) and stent removal (7/20/22). Pt presented on 8/8 with increasing RUQ pain. CT Abd/Pelvis reported distended gallbladder with stones in the cystic duct, gallbladder wall thickening, suggesting chronic cholecystitis. Pt had a percutaneous cholecystostomy tube placed on 8/09 along with a paracentesis yielding 300cc of purulent fluid. Pt admitted to SICU for further hemodynamic monitoring and management.     PLAN:   Neurologic: A, O x3  - Pain Control with Dilaudid as needed after abdominal exam    Respiratory: Atelectasis  - Maintain SpO2 > 92%  - Encourage OOB, Incentive spirometry, and chest PT  - AM CXR    Cardiovascular: Sinus tachycardia resolved   - Maintain MAP > 65   - Midodrine q8 for increased MAP    Gastrointestinal/Nutrition: cirrhosis (MELD 34), cholecystitis s/p per rodney tube and diagnostic paracentesis (8/09)   - RD  - CT Abd/ Pelvis w/ Iv contrast ( 8/08)- distended gallbladder with stones in the cystic duct, gallbladder wall thickening, suggesting chronic cholecystitis  - GI not concerned for cholangitis due to negative for bile duct dilation  - Bowel regimen Miralax, senna and dulcolax supp  - Octreotide 100mg q8hr   - Repeat CT Abd/Pelvis limited study; ordered RUQ Sono to eval for Gallbladder perforation->was found unremarkable  - Transplant hepatology consulted, reccs appreciated   -paracentesis today.     Genitourinary/Renal: ROBBIE and hyperkalemia s/p shifting& Lokelma, urethral stricture, Hyponatremia with severe metabolic acidosis.   - Tobin placed by Urology 2/2 urethral stricture, Continue to monitor strict I&Os  - Urine output began to improve during the day. S Cr down trending  - Hyperkalemia improved  -no need for further HD at the moment    Hematologic: coagulopathy of liver failure/ sepsis  - Monitor CBC and Coags   - Coagulopathy 2/2 uremia/sepsis s/p FFP and DDAVP for blood oozing around shiley. Quickclot used twice ON. (8/12)   - SCDs for mechanical VTE ppx   - Hold chemical VTE ppx    Infectious Disease: sepsis  - F/u BCx ( 8/9) NGTD   - UCx ( 8/8) + for E. Coli , Bile Cx ( 8/9) + E. Coli & Streptococcus anginosus  - Continue Zosyn ( 8/9 -?).   - Grew Streptococcus anginosus in bile culture.     Endocrine: no acute issues  - Monitor glucose on BMP    Code Status: Full Code   Disposition: SICU

## 2022-08-15 NOTE — CONSULT NOTE ADULT - SUBJECTIVE AND OBJECTIVE BOX
Patient is a 66y old  Male who presents with a chief complaint of Ascending cholangitis (15 Aug 2022 17:53)      HPI:  67yo M with history of recently diagnosed cirrhosis (MELD 30) and cholangitis s/p ERCP with stent placement (2022) s/p stent removal on  presenting for one week of RUQ abdominal pain. States that one week after his stent was removed, he started experiencing RUQ pain, associated with decreased appetite. Denies nausea or vomiting, fevers or chills. Endorses regular normal bowel movements. Presented to the ED on  for the pain where he had an US performed which showed a distended gallbladder with stones. CBD 5mm. Patient was discharged home.    In the ED, patient tachycardic to 120s, normotensive. Satting 94% on RA. Labs showed WBC 14, Na 120, K 6.1, HCO3 14, Cr 1.95, tbili 4.7, Alk phos 127, ALT/AST 38/22. CT showed a distended gallbladder with stones in the cystic duct, cirrhosis, ascites, small and large bowel thickening.  (08 Aug 2022 06:20)      PAST MEDICAL & SURGICAL HISTORY:  H/O acute cholangitis      2019 novel coronavirus disease (COVID-19)  2021  no hospitalization, no treatment      S/P ERCP  2022      H/O colonoscopy        ( - ) heart valve replacement  ( - ) joint replacement  ( - ) pregnancy    MEDICATIONS  (STANDING):  albumin human 25% IVPB 50 milliLiter(s) IV Intermittent once  bisacodyl 5 milliGRAM(s) Oral at bedtime  chlorhexidine 2% Cloths 1 Application(s) Topical <User Schedule>  furosemide   Injectable 80 milliGRAM(s) IV Push once  midodrine. 10 milliGRAM(s) Oral every 8 hours  octreotide  Injectable 100 MICROGram(s) IV Push every 8 hours  pantoprazole    Tablet 40 milliGRAM(s) Oral before breakfast  piperacillin/tazobactam IVPB.. 3.375 Gram(s) IV Intermittent every 12 hours  polyethylene glycol 3350 17 Gram(s) Oral two times a day  senna 2 Tablet(s) Oral at bedtime    MEDICATIONS  (PRN):      Allergies    No Known Allergies    Intolerances        FAMILY HISTORY:      *SOCIAL HISTORY: (guardian or who pt came with), (smoking hx)    *Last Dental Visit:    Vital Signs Last 24 Hrs  T(C): 37.2 (15 Aug 2022 15:00), Max: 37.2 (15 Aug 2022 15:00)  T(F): 99 (15 Aug 2022 15:00), Max: 99 (15 Aug 2022 15:00)  HR: 83 (15 Aug 2022 17:00) (71 - 99)  BP: 129/68 (15 Aug 2022 17:00) (111/65 - 152/69)  BP(mean): 92 (15 Aug 2022 17:00) (80 - 99)  RR: 20 (15 Aug 2022 17:00) (12 - 28)  SpO2: 95% (15 Aug 2022 17:00) (93% - 97%)    Parameters below as of 15 Aug 2022 11:31  Patient On (Oxygen Delivery Method): room air        EOE:  TMJ ( - ) clicks                    ( - ) pops                    ( - ) crepitus             Mandible FROM             Facial bones and MOM grossly intact             ( - ) trismus             ( - ) LAD             ( - ) swelling             ( - ) asymmetry             ( - ) palpation             ( - ) SOB             ( - ) dysphagia             ( - ) LOC    IOE:  permanent dentition: grossly intact with some multiple carious teeth and some missing teeth           hard/soft palate:  ( - ) palatal torus           tongue/FOM WNL           labial/buccal mucosa WNL            ( - ) percussion           ( - ) palpation           ( - ) swelling   Bilateral buccal exostoses on maxilla. #3 furcation involvement. #9, #10 gold crowns. #18 occlusal caries. No mobility noted on any teeth.     Radiographs: Non taken as patient was not transportable by wheelchair.    LABS:                        7.3    24.01 )-----------( 48       ( 15 Aug 2022 10:03 )             21.5     08-15    133<L>  |  96  |  42<H>  ----------------------------<  133<H>  3.6   |  26  |  2.05<H>    Ca    8.4      15 Aug 2022 05:51  Phos  3.6     08-15  Mg     1.6     08-15    TPro  5.5<L>  /  Alb  2.5<L>  /  TBili  3.0<H>  /  DBili  1.2<H>  /  AST  29  /  ALT  16  /  AlkPhos  78  08-15    WBC Count: 24.01 K/uL *H* [3.80 - 10.50] (08-15 @ 10:03)  WBC Count: 21.86 K/uL *H* [3.80 - 10.50] (08-15 @ 05:51)    Platelet Count - Automated: 48 K/uL *L* [150 - 400] (08-15 @ 10:03)  Platelet Count - Automated: 36 K/uL *L* [150 - 400] (08-15 @ 05:51)  Platelet Count - Automated: 43 K/uL *L* [150 - 400] ( @ 22:35)    INR: 2.33 ratio *H* [0.88 - 1.16] (08-15 @ 05:51)  INR: 2.21 ratio *H* [0.88 - 1.16] ( @ 22:35)      Urinalysis Basic - ( 15 Aug 2022 17:07 )    Color: Yellow / Appearance: Clear / S.019 / pH: x  Gluc: x / Ketone: Negative  / Bili: Negative / Urobili: Negative   Blood: x / Protein: Trace / Nitrite: Negative   Leuk Esterase: Negative / RBC: 3-5 /hpf / WBC 3 /HPF   Sq Epi: x / Non Sq Epi: 2 /hpf / Bacteria: Few    ASSESSMENT: No acute odontogenic infection noted. Patient is dentally optimized for liver transplant. Intubate with caution.     RECOMMENDATIONS:   1) Intubate with caution.  2) Dental F/U with outpatient dentist for comprehensive dental care.     Isidro Brown DDS, #[id number]  Sabi Bailey DDS, #[id number]  Consulted: Dr. Chamberlain

## 2022-08-15 NOTE — PROGRESS NOTE ADULT - SUBJECTIVE AND OBJECTIVE BOX
HISTORY:    24 HOUR EVENTS:  -shiley d/c'd  -lasix given  -no need for HD    NEURO  Exam: AxOx3  Meds:     RESPIRATORY  RR: 12 (08-15-22 @ 00:00) (12 - 21)  SpO2: 96% (08-15-22 @ 00:00) (91% - 100%)  Wt(kg): --  Exam:    Meds:     CARDIOVASCULAR  HR: 74 (08-15-22 @ 00:00) (70 - 88)  BP: 128/74 (08-15-22 @ 00:00) (109/59 - 141/72)  BP(mean): 95 (08-15-22 @ 00:00) (80 - 101)  ABP: --  ABP(mean): --  Wt(kg): --  CVP(cm H2O): --      Exam:   Cardiac Rhythm: NSR  Perfusion     [x ]Adequate   [ ]Inadequate  Mentation   [x ]Normal       [ ]Reduced  Extremities  [ x]Warm         [ ]Cool  Volume Status [ ]Hypervolemic [ ]Euvolemic [ ]Hypovolemic  Meds: midodrine. 10 milliGRAM(s) Oral every 8 hours      GI/NUTRITION  Exam: NTND  Diet: RD  Meds: bisacodyl 5 milliGRAM(s) Oral at bedtime  pantoprazole    Tablet 40 milliGRAM(s) Oral before breakfast  polyethylene glycol 3350 17 Gram(s) Oral two times a day  senna 2 Tablet(s) Oral at bedtime      GENITOURINARY  I&O's Detail    08-13 @ 07:01  -  08-14 @ 07:00  --------------------------------------------------------  IN:    IV PiggyBack: 200 mL    IV PiggyBack: 250 mL    Oral Fluid: 420 mL  Total IN: 870 mL    OUT:    Indwelling Catheter - Urethral (mL): 770 mL    Other (mL): 1000 mL    T-Tube (mL): 300 mL  Total OUT: 2070 mL    Total NET: -1200 mL      08-14 @ 07:01  -  08-15 @ 00:44  --------------------------------------------------------  IN:    IV PiggyBack: 150 mL    Oral Fluid: 510 mL  Total IN: 660 mL    OUT:    Indwelling Catheter - Urethral (mL): 1170 mL    T-Tube (mL): 30 mL  Total OUT: 1200 mL    Total NET: -540 mL          08-13    135  |  100  |  30<H>  ----------------------------<  136<H>  3.8   |  28  |  1.86<H>    Ca    8.0<L>      13 Aug 2022 22:35  Phos  3.5     08-13  Mg     1.8     08-13    TPro  5.6<L>  /  Alb  2.9<L>  /  TBili  2.8<H>  /  DBili  1.0<H>  /  AST  35  /  ALT  16  /  AlkPhos  95  08-13    Meds:     HEMATOLOGIC  Meds:                         8.2    22.40 )-----------( 43       ( 13 Aug 2022 22:35 )             24.0     PT/INR - ( 13 Aug 2022 22:35 )   PT: 25.8 sec;   INR: 2.21 ratio         PTT - ( 13 Aug 2022 22:35 )  PTT:37.2 sec    INFECTIOUS DISEASES  T(C): 36.8 (08-15-22 @ 00:00), Max: 36.9 (08-14-22 @ 16:00)  Wt(kg): --    Recent Cultures:  Specimen Source: Peritoneal Peritoneal Fluid, 08-12 @ 17:26; Results   No growth; Gram Stain:   polymorphonuclear leukocytes seen  No organisms seen  by cytocentrifuge; Organism: --  Specimen Source: .Blood Blood, 08-11 @ 23:28; Results   No growth to date.; Gram Stain: --; Organism: --  Specimen Source: Peritoneal Peritoneal Fluid, 08-09 @ 19:01; Results   Few Escherichia coli  Moderate Streptococcus anginosus "Susceptibilities not performed"; Gram Stain:   polymorphonuclear leukocytes seen  Moderate Gram positive cocci in pairs seen  Limited volume of specimen received, Unable to centrifuge/concentrate  specimen. Culture results may be compromised.; Organism: Escherichia coli  Specimen Source: Catheterized Catheterized, 08-08 @ 12:30; Results   10,000 - 49,000 CFU/mL Escherichia coli; Gram Stain: --; Organism: Escherichia coli  Specimen Source: .Blood Blood-Peripheral, 08-08 @ 07:31; Results   No Growth Final; Gram Stain: --; Organism: --  Specimen Source: .Blood Blood-Peripheral, 08-08 @ 07:15; Results   No Growth Final; Gram Stain: --; Organism: --    Meds: piperacillin/tazobactam IVPB.. 3.375 Gram(s) IV Intermittent every 12 hours      ENDOCRINE  Capillary Blood Glucose    Meds: octreotide  Injectable 100 MICROGram(s) IV Push every 8 hours      ACCESS DEVICES:  [x ] Peripheral IV  [ ] Central Venous Line		[ ] R	[ ] L	[ ] IJ	[ ] Fem	[ ] SC	Placed:   [ ] Arterial Line			[ ] R	[ ] L	[ ] Fem	[ ] Rad	[ ] Ax	Placed:   [ ] PICC:					[ ] Mediport  [x ] Urinary Catheter, Date Placed:   [ ] Necessity of urinary, arterial, and venous catheters discussed    OTHER MEDICATIONS:  chlorhexidine 2% Cloths 1 Application(s) Topical <User Schedule>      IMAGING:

## 2022-08-15 NOTE — BH CONSULTATION LIAISON ASSESSMENT NOTE - CURRENT MEDICATION
MEDICATIONS  (STANDING):  bisacodyl 5 milliGRAM(s) Oral at bedtime  chlorhexidine 2% Cloths 1 Application(s) Topical <User Schedule>  midodrine. 10 milliGRAM(s) Oral every 8 hours  octreotide  Injectable 100 MICROGram(s) IV Push every 8 hours  pantoprazole    Tablet 40 milliGRAM(s) Oral before breakfast  piperacillin/tazobactam IVPB.. 3.375 Gram(s) IV Intermittent every 12 hours  polyethylene glycol 3350 17 Gram(s) Oral two times a day  senna 2 Tablet(s) Oral at bedtime    MEDICATIONS  (PRN):

## 2022-08-15 NOTE — PROGRESS NOTE ADULT - PROBLEM SELECTOR PLAN 2
Stable for now; monitor off HD and with IV diuretics Stable for now; monitor off HD and with IV diuretics      If you have any questions, please feel free to contact me  Vlad Bermudez  Nephrology Fellow  [phone]; Prefer [employer] TEAMS  (After 5pm or on weekends please page the on-call fellow).    Patient was discussed with Dr. Jacob who agrees with my A/P. Addendum to follow

## 2022-08-16 ENCOUNTER — RESULT REVIEW (OUTPATIENT)
Age: 67
End: 2022-08-16

## 2022-08-16 LAB
ALBUMIN FLD-MCNC: 2.1 G/DL — SIGNIFICANT CHANGE UP
APTT BLD: 32.8 SEC — SIGNIFICANT CHANGE UP (ref 27.5–35.5)
B PERT IGG+IGM PNL SER: ABNORMAL
CARDIOLIPIN IGM SER-MCNC: 13.2 GPL — HIGH (ref 0–12.5)
CARDIOLIPIN IGM SER-MCNC: 84.1 MPL — HIGH (ref 0–12.5)
COLOR FLD: SIGNIFICANT CHANGE UP
DEPRECATED CARDIOLIPIN IGA SER: 8.7 APL — SIGNIFICANT CHANGE UP (ref 0–12.5)
EOSINOPHIL # FLD: 1 % — SIGNIFICANT CHANGE UP
FLUID INTAKE SUBSTANCE CLASS: SIGNIFICANT CHANGE UP
GLUCOSE FLD-MCNC: <6 MG/DL — SIGNIFICANT CHANGE UP
HEV IGM SER QL: SIGNIFICANT CHANGE UP
INR BLD: 2.21 RATIO — HIGH (ref 0.88–1.16)
LDH SERPL L TO P-CCNC: 4736 U/L — SIGNIFICANT CHANGE UP
LYMPHOCYTES # FLD: 7 % — SIGNIFICANT CHANGE UP
NEUTROPHILS-BODY FLUID: 92 % — SIGNIFICANT CHANGE UP
PROT C AG PPP-MCNC: 14 % — LOW (ref 80–184)
PROT FLD-MCNC: 4.2 G/DL — SIGNIFICANT CHANGE UP
PROTHROM AB SERPL-ACNC: 25.9 SEC — HIGH (ref 10.5–13.4)
RCV VOL RI: HIGH /UL (ref 0–0)
RPR SER-TITR: SIGNIFICANT CHANGE UP
RPR SER-TITR: SIGNIFICANT CHANGE UP
RPR SERPL-ACNC: SIGNIFICANT CHANGE UP
RPR SERPL-ACNC: SIGNIFICANT CHANGE UP
STRONGYLOIDES AB SER-ACNC: NEGATIVE — SIGNIFICANT CHANGE UP
T PALLIDUM AB TITR SER: POSITIVE
T PALLIDUM AB TITR SER: POSITIVE
T PALLIDUM IGG SER QL IF: REACTIVE
T PALLIDUM IGG SER QL IF: REACTIVE
TOTAL NUCLEATED CELL COUNT, BODY FLUID: SIGNIFICANT CHANGE UP /UL
TUBE TYPE: SIGNIFICANT CHANGE UP

## 2022-08-16 PROCEDURE — 49083 ABD PARACENTESIS W/IMAGING: CPT

## 2022-08-16 PROCEDURE — 71045 X-RAY EXAM CHEST 1 VIEW: CPT | Mod: 26

## 2022-08-16 PROCEDURE — 99232 SBSQ HOSP IP/OBS MODERATE 35: CPT | Mod: GC

## 2022-08-16 PROCEDURE — 88305 TISSUE EXAM BY PATHOLOGIST: CPT | Mod: 26

## 2022-08-16 PROCEDURE — 49082 ABD PARACENTESIS: CPT

## 2022-08-16 PROCEDURE — 99232 SBSQ HOSP IP/OBS MODERATE 35: CPT

## 2022-08-16 PROCEDURE — 88112 CYTOPATH CELL ENHANCE TECH: CPT | Mod: 26

## 2022-08-16 RX ORDER — FUROSEMIDE 40 MG
60 TABLET ORAL EVERY 12 HOURS
Refills: 0 | Status: COMPLETED | OUTPATIENT
Start: 2022-08-16 | End: 2022-08-17

## 2022-08-16 RX ORDER — POTASSIUM CHLORIDE 20 MEQ
20 PACKET (EA) ORAL ONCE
Refills: 0 | Status: COMPLETED | OUTPATIENT
Start: 2022-08-16 | End: 2022-08-16

## 2022-08-16 RX ORDER — AMPICILLIN SODIUM AND SULBACTAM SODIUM 250; 125 MG/ML; MG/ML
1.5 INJECTION, POWDER, FOR SUSPENSION INTRAMUSCULAR; INTRAVENOUS EVERY 6 HOURS
Refills: 0 | Status: DISCONTINUED | OUTPATIENT
Start: 2022-08-16 | End: 2022-08-18

## 2022-08-16 RX ORDER — ALBUMIN HUMAN 25 %
250 VIAL (ML) INTRAVENOUS ONCE
Refills: 0 | Status: COMPLETED | OUTPATIENT
Start: 2022-08-16 | End: 2022-08-16

## 2022-08-16 RX ADMIN — SENNA PLUS 2 TABLET(S): 8.6 TABLET ORAL at 22:53

## 2022-08-16 RX ADMIN — POLYETHYLENE GLYCOL 3350 17 GRAM(S): 17 POWDER, FOR SOLUTION ORAL at 17:17

## 2022-08-16 RX ADMIN — AMPICILLIN SODIUM AND SULBACTAM SODIUM 100 GRAM(S): 250; 125 INJECTION, POWDER, FOR SUSPENSION INTRAMUSCULAR; INTRAVENOUS at 17:34

## 2022-08-16 RX ADMIN — OCTREOTIDE ACETATE 100 MICROGRAM(S): 200 INJECTION, SOLUTION INTRAVENOUS; SUBCUTANEOUS at 09:00

## 2022-08-16 RX ADMIN — CHLORHEXIDINE GLUCONATE 1 APPLICATION(S): 213 SOLUTION TOPICAL at 06:11

## 2022-08-16 RX ADMIN — MIDODRINE HYDROCHLORIDE 10 MILLIGRAM(S): 2.5 TABLET ORAL at 22:53

## 2022-08-16 RX ADMIN — OCTREOTIDE ACETATE 100 MICROGRAM(S): 200 INJECTION, SOLUTION INTRAVENOUS; SUBCUTANEOUS at 02:49

## 2022-08-16 RX ADMIN — OCTREOTIDE ACETATE 100 MICROGRAM(S): 200 INJECTION, SOLUTION INTRAVENOUS; SUBCUTANEOUS at 17:17

## 2022-08-16 RX ADMIN — Medication 125 MILLILITER(S): at 21:00

## 2022-08-16 RX ADMIN — PANTOPRAZOLE SODIUM 40 MILLIGRAM(S): 20 TABLET, DELAYED RELEASE ORAL at 07:31

## 2022-08-16 RX ADMIN — MIDODRINE HYDROCHLORIDE 10 MILLIGRAM(S): 2.5 TABLET ORAL at 06:11

## 2022-08-16 RX ADMIN — Medication 60 MILLIGRAM(S): at 17:16

## 2022-08-16 RX ADMIN — PIPERACILLIN AND TAZOBACTAM 25 GRAM(S): 4; .5 INJECTION, POWDER, LYOPHILIZED, FOR SOLUTION INTRAVENOUS at 09:00

## 2022-08-16 RX ADMIN — MIDODRINE HYDROCHLORIDE 10 MILLIGRAM(S): 2.5 TABLET ORAL at 13:15

## 2022-08-16 RX ADMIN — POLYETHYLENE GLYCOL 3350 17 GRAM(S): 17 POWDER, FOR SOLUTION ORAL at 06:11

## 2022-08-16 RX ADMIN — Medication 20 MILLIEQUIVALENT(S): at 06:11

## 2022-08-16 NOTE — PROGRESS NOTE ADULT - ASSESSMENT
67 y/o M PMHx cholangitis/cholecystitis (s/p ERCP w/ biliary stent placement 04/2022) and recently diagnosed etOH cirrhosis, presents with RUQ pain following biliary stent removal on 7/20/22. Found to have distended GB with wall thickening and stone in cystic duct, concerning for cholecystitis. Admitted to SICU for monitoring, s/p perc rodney and paracentesis by IR on 8/9. Paracentesis results consistent with SBP. Course now c/b ARF.     s/p Perc Rodney Tube and Paracentesis  Paracentesis cell counts suggestive of Peritonitis  Cultures thus far with E coli and Streptococcus on Perc Rodney Drainage and Ascites drainage  Concerning with cholecystitis with associated perforation    #Peritonitis, Cholecystitis, SBP, Leukocytosis   --Would deescalate Zosyn to Unasyn 3g IV Q8H (based on E coli susceptibilities)  --s/p repeat paracentesis today; follow up on cell counts  --If continued WBC uptrend or clinical status changes would pursue CT A/P to exclude loculation of ascitic fluid  --Continue to follow CBC with diff  --Continue to follow temperature curve  --Follow up on preliminary blood cultures  --Follow up on preliminary bile culture, ascites cultures    #Pre-Liver Transplant Evaluation  --ID clearance for OLT pending resolution of peritonitis and quantiferon gold  --Recommend repeat Quantiferon Gold; initial cancelled by the lab    I will continue to follow. Please feel free to contact me with any further questions.    Bladimir Nix M.D.  [facility] Division of Infectious Disease  8AM-5PM Monday - Friday: Available on [employer] Teams  After Hours and Holidays (or if no response on [employer] Teams): Please contact the Infectious Diseases Office at [phone]

## 2022-08-16 NOTE — PROGRESS NOTE ADULT - ATTENDING COMMENTS
66y Male with history of recently diagnosed cirrhosis and cholangitis s/p ERCP with stent placement (4/2022) s/p stent removal on 7/20/22 presenting for one week of RUQ abdominal pain found to have chronic cholecystitis; cirrhosis with portal hypertension, moderate ascites with diffuse peritoneal enhancement concerning for SBP, small and large bowel thickening suggesting hepatic enterocolopathy. Nephrology called for ROBBIE, severe metabolic acidosis, hyponatremia and hyperkalemia. Remains oliguric, so SOB, BUT uremic symptoms with tremor.  Now resolved with initial HD and seen this pm during 3rd session.  Feeling much better, alert, conversant.  HD catheter d/nico.    1. ARF--likely ATN multifactorial including sepsis, radiocontrast.  Cr increase modest and does NOT require HD resumption.  NON oliguric.  Symptoms resolved.    2.  Lactic acidosis-- Resolved and trend  3.  Hyponatremia--minimal free water clearance with 1. Fluid restriction  4.  Ascites--Trend Continue albumin infusion  if paracentesis.  NO SBP    discussed with team, intensivist    Cristhian Jacob MD  contact via teams

## 2022-08-16 NOTE — PROGRESS NOTE ADULT - ATTENDING COMMENTS
66 year old man with decompensated cirrhosis admitted with ROBBIE and cholangitis s/p cholecystectomy tube placement on 8/9/22  N-Pain controlled. No complaints   R-Repirastory status stable on room air   C-Hemodynamically stable on mitodrine.  GI-O_Cholangitis s/p perc rodney. Drain with 35cc bilious output. Bili stable at 1.3.   -ROBBIE with Cr stable at 2.2 from peak of 5. Poor response with to 80 of lasix yestewrday. /24 hours. Net +675. Will continue to hold dialysis. Plan for 60 BID lasix IV as per renal recs. Bladder scan to evaluate for retention   H-Plt stable at 45. INR 2.2. Will F/up transplant Re DVT ppx   ID-Acute cholecystitis s/p Bcholecystostomy tube with biliary cx growing E. Coli and Strep Anginosus. Afebrile but WBC increasing to 30 on Zosyn. Plan for repeat paracentesis with IR today. Transplant ID on board. 66 year old man with decompensated cirrhosis admitted with ROBBIE and cholangitis s/p cholecystectomy tube placement on 8/9/22  N - Pain controlled. No complaints   R - Perspiratory status stable on room air   C - Hemodynamically stable on mitodrine.  GI - Cholangitis s/p perc rodney. Drain with 35cc bilious output. Bili stable at 1.3.    - ROBBIE with Cr stable at 2.2 from peak of 5. Poor response with to 80 of lasix yesterday. /24 hours. Net +675. Will continue to hold dialysis. Plan for 60 BID lasix IV as per renal recs. Bladder scan to evaluate for retention   H - Plt stable at 45. INR 2.2. Will F/up transplant RE DVT ppx   ID - Acute cholecystitis s/p Cholecystostomy tube with cx growing E. Coli and Strep Anginosus. WBC increasing to 30 on Zosyn. Plan for repeat paracentesis with IR today. Will de-escalate from Zosyn to Unasyn based on E. Coli susceptibilities as per transplant ID recs.

## 2022-08-16 NOTE — PROCEDURE NOTE - SPECIMEN OBTAINED
Fluid sent for chemistry/Fluid sent for cytology/Fluid sent for gram stain and culture
Fluid sent for gram stain and culture

## 2022-08-16 NOTE — CONSULT NOTE ADULT - SUPERVISING ATTENDING
Patient presents from Dr. Posada's office for further evaluation of a head injury that occurred yesterday.  Patient states that she was walking when she tripped over a power strip landing face down.  Patient has swelling and bruising to nose and around eyes.  She denies LOC at the time of fall.  She complains now of intermittent nausea, frontal temporal head pain, feeling shaking, and blurred vision.  She ambulated into ED and is awake, alert, appropriate at present time.   BROCK SINGH

## 2022-08-16 NOTE — PROGRESS NOTE ADULT - SUBJECTIVE AND OBJECTIVE BOX
Interval Events:   No acute events overnight.  Patient without acute symptoms at this time.    ROS:   12 point review of systems performed and negative except otherwise noted in HPI.    Hospital Medications:  chlorhexidine 2% Cloths 1 Application(s) Topical <User Schedule>  furosemide   Injectable 60 milliGRAM(s) IV Push every 12 hours  midodrine. 10 milliGRAM(s) Oral every 8 hours  octreotide  Injectable 100 MICROGram(s) IV Push every 8 hours  pantoprazole    Tablet 40 milliGRAM(s) Oral before breakfast  piperacillin/tazobactam IVPB.. 3.375 Gram(s) IV Intermittent every 12 hours  polyethylene glycol 3350 17 Gram(s) Oral two times a day  senna 2 Tablet(s) Oral at bedtime      PHYSICAL EXAM:   Vital Signs:  Vital Signs Last 24 Hrs  T(C): 36.9 (16 Aug 2022 07:00), Max: 37.2 (15 Aug 2022 15:00)  T(F): 98.5 (16 Aug 2022 07:00), Max: 99 (15 Aug 2022 15:00)  HR: 80 (16 Aug 2022 10:00) (70 - 100)  BP: 123/63 (16 Aug 2022 10:00) (97/52 - 152/69)  BP(mean): 87 (16 Aug 2022 10:00) (68 - 103)  RR: 15 (16 Aug 2022 10:00) (11 - 27)  SpO2: 96% (16 Aug 2022 10:00) (93% - 97%)    Parameters below as of 16 Aug 2022 07:00  Patient On (Oxygen Delivery Method): room air      Daily     Daily     GENERAL: no acute distress  NEURO: alert  HEENT: NCAT, no conjunctival pallor appreciated  CHEST: no respiratory distress, no accessory muscle use  CARDIAC: regular rate, +S1/S2  ABDOMEN: soft, distended, perc rodney tube with sanguinous output, nontender, no rebound or guarding  EXTREMITIES: warm, well perfused  SKIN: no lesions noted    LABS: reviewed                        7.6    30.74 )-----------( 45       ( 15 Aug 2022 23:26 )             21.9     08-15    131<L>  |  93<L>  |  45<H>  ----------------------------<  157<H>  3.6   |  25  |  2.22<H>    Ca    8.4      15 Aug 2022 23:26  Phos  3.6     08-15  Mg     2.0     08-15    TPro  5.6<L>  /  Alb  2.7<L>  /  TBili  3.5<H>  /  DBili  1.3<H>  /  AST  29  /  ALT  15  /  AlkPhos  79  08-15    LIVER FUNCTIONS - ( 15 Aug 2022 23:26 )  Alb: 2.7 g/dL / Pro: 5.6 g/dL / ALK PHOS: 79 U/L / ALT: 15 U/L / AST: 29 U/L / GGT: x             Interval Diagnostic Studies: see sunrise for full report

## 2022-08-16 NOTE — PROGRESS NOTE ADULT - ASSESSMENT
66 year old male with decompensated cirrhosis and cholangitis s/p ERCP with stent placement (4/2022) s/p stent removal on 7/20 (along sphincterotomy and stone extraction) presenting for one week of RUQ abdominal pain associated with new abdominal distension and decreased appetite.     #Septic shock 2/2 acute cholecystitis vs ascending cholangitis  #ROBBIE, worsening: initial urine sodium 9, likely indicating prerenal ROBBIE vs less likely HRS given initial presentation as above  #History of cholangitis s/p biliary stent placement April 2022 and removal 7/20/2022, now s/p perc rodney tube  #Decompensated ALD/PARNELL cirrhosis c/b ascites  EV: normal esophagus on ERCP from 4/2022  Ascites: increase in ascites from mild to moderate on current imaging  SBP: paracentesis 8/9/2022 c/w peritonitis given 99K PMNs however elevated LDH more indicative of secondary bacterial peritonitis  HE: none currently       -MELD-Na = 35 on 8/11/2022       -Ascites: yes       -SBP: paracentesis 8/9/2022 reveals likely secondary bacterial peritonitis from suspected gallbladder pathology       -Varices: no mention of varices on EGD/ERCP on 7/20/2022       -Hepatic encephalopathy: Ammonia, Serum: 38 umol/L [11 - 55] (08-12-22 @ 00:21)       -HCC: unknown       -LT candidacy and evaluation:            [ ] Psychosocial            [ ] Infectious            [ ] Cardiology:                    Cardiac cath:                    Stress test:             [ ] Colonoscopy            [x] Prostate: Prostate Specific Antigen: negative at 2.21 ng/mL on 8/12/2022            [x] Dental            [x] PT/Frailty    Recommendations:  -trend clinical symptoms, exam findings, vital signs, CBC, CMP, INR  -s/p percutaneous cholecystostomy as above  -likely secondary bacterial peritonitis based on paracentesis and clinical findings, CT A/P negative for perforation however performed without contrast 2/2 renal function; s/p repeat paracentesis 8/12/2022  -would favor ATN as likely etiology, however given worsening ROBBIE continue midodrine/octreotide for empiric treatment for HRS-ROBBIE, hold IV albumin given pulmonary congestion  -initiated hemodialysis 8/11/2022  -liver transplantation evaluation opened  -PPI daily for stress prophylaxis  -diuretics for volume removal given some renal recovery  -repeat paracentesis today  -renal US and transplant nephrology consultation  -attempt to get dobutamine stress test for cardiac evaluation    Note incomplete until finalized by attending signature/attestation.    Fadi Churchill  GI/Hepatology Fellow    MONDAY-FRIDAY 8AM-5PM:  Pager# [phone] ([facility]) or [phone] ([facility])    NON-URGENT CONSULTS:  Please email [email or web address]  AT NIGHT AND ON WEEKENDS:  Contact on-call GI fellow via answering service ([phone]) from 5pm-8am and on weekends/holidays

## 2022-08-16 NOTE — PROGRESS NOTE ADULT - PROBLEM SELECTOR PLAN 2
Stable for now; monitor off HD and with IV diuretics  -fluid restrict to 1L      If you have any questions, please feel free to contact me  Vlad Bermudez  Nephrology Fellow  [phone]; Prefer [employer] TEAMS  (After 5pm or on weekends please page the on-call fellow).    Patient was discussed with Dr. Jacob who agrees with my A/P. Addendum to follow

## 2022-08-16 NOTE — PROGRESS NOTE ADULT - ASSESSMENT
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 coli and Streptococcus anginosis.  - Ascitic fluid studies from paracentesis (8/9) consistent with secondary peritonitis with concern for possible perforated viscus (likely gallbladder) given markedly elevated ascitic fluid LDH.   - Repeat paracentesis (8/12) still consistent with secondary peritonitis but with improving neutrophil count from 99k to 8k. Ascitic fluid bilirubin similar to serum, suggesting against bile leak.  - HD for ATN vs. HRS (8/11-13)    [] Peritonitis, Cholecystitis, SBP, Leukocytosis   - ID following; On Zosyn; recommend deescalating Zosyn to Unasyn 3g IV Q12H (based on E coli susceptibilities)  - Repeat LVP today, Last LVP 8/12     [] Decompensated ALD/PARNELL Cirrhosis  - ABO OP, MELD 34 (8/16  - Liver txp eval initiated on   - EV: none on EGG/ERCP 7/2022  - Ascites: + recent SBP (8/9) ?secondary peritonitis   - HE: none     Liver txp eval:   - cleared by Dental   - Cleared by Psych  - Cards: needs DSE (ordered)  -ID clearance for OLT pending resolution of peritonitis and quantiferon gold    [] ROBBIE vs. HRS  - Transplant Nephrology consulted  - Renal us ordered  - Last HD 8/13,  Ashwin removed 8/12

## 2022-08-16 NOTE — PROGRESS NOTE ADULT - SUBJECTIVE AND OBJECTIVE BOX
[facility] Division of Kidney Diseases & Hypertension  FOLLOW UP NOTE  [phone]--------------------------------------------------------------------------------  Chief Complaint:Acute cholecystitis        24 hour events/subjective:  patient still on fluid restriction, Na of 131      PAST HISTORY  --------------------------------------------------------------------------------  No significant changes to PMH, PSH, FHx, SHx, unless otherwise noted    ALLERGIES & MEDICATIONS  --------------------------------------------------------------------------------  Allergies    No Known Allergies    Intolerances      Standing Inpatient Medications  chlorhexidine 2% Cloths 1 Application(s) Topical <User Schedule>  furosemide   Injectable 60 milliGRAM(s) IV Push every 12 hours  midodrine. 10 milliGRAM(s) Oral every 8 hours  octreotide  Injectable 100 MICROGram(s) IV Push every 8 hours  pantoprazole    Tablet 40 milliGRAM(s) Oral before breakfast  piperacillin/tazobactam IVPB.. 3.375 Gram(s) IV Intermittent every 12 hours  polyethylene glycol 3350 17 Gram(s) Oral two times a day  senna 2 Tablet(s) Oral at bedtime    PRN Inpatient Medications      REVIEW OF SYSTEMS  --------------------------------------------------------------------------------  Gen: No fevers/chills  Skin: No rashes  Head/Eyes/Ears: Normal hearing,   Respiratory: No dyspnea, cough  CV: No chest pain  GI: No abdominal pain, diarrhea  : No dysuria, hematuria  MSK: No  edema  Heme: No easy bruising or bleeding  Psych: No significant depression    All other systems were reviewed and are negative, except as noted.    VITALS/PHYSICAL EXAM  --------------------------------------------------------------------------------  T(C): 36.1 (08-16-22 @ 13:22), Max: 37.2 (08-15-22 @ 15:00)  HR: 75 (08-16-22 @ 14:00) (70 - 118)  BP: 119/62 (08-16-22 @ 14:00) (97/52 - 147/74)  RR: 26 (08-16-22 @ 14:00) (11 - 27)  SpO2: 95% (08-16-22 @ 14:00) (93% - 97%)  Wt(kg): --        08-15-22 @ 07:01  -  08-16-22 @ 07:00  --------------------------------------------------------  IN: 1300 mL / OUT: 625 mL / NET: 675 mL    08-16-22 @ 07:01  -  08-16-22 @ 14:38  --------------------------------------------------------  IN: 100 mL / OUT: 130 mL / NET: -30 mL      Physical Exam:  	Gen: NAD, sitting up in a chair  	HEENT: MMM  	Pulm: CTA B/L  	CV: S1S2  	Abd: Soft, +BS   	Ext: No LE edema B/L  	Neuro: Awake  	Skin: Warm and dry      LABS/STUDIES  --------------------------------------------------------------------------------              7.6    30.74 >-----------<  45       [08-15-22 @ 23:26]              21.9     131  |  93  |  45  ----------------------------<  157      [08-15-22 @ 23:26]  3.6   |  25  |  2.22        Ca     8.4     [08-15-22 @ 23:26]      Mg     2.0     [08-15-22 @ 23:26]      Phos  3.6     [08-15-22 @ 23:26]    TPro  5.6  /  Alb  2.7  /  TBili  3.5  /  DBili  1.3  /  AST  29  /  ALT  15  /  AlkPhos  79  [08-15-22 @ 23:26]    PT/INR: PT 25.9 , INR 2.21       [08-15-22 @ 23:26]  PTT: 32.8       [08-15-22 @ 23:26]      Creatinine Trend:  SCr 2.22 [08-15 @ 23:26]  SCr 2.05 [08-15 @ 05:51]  SCr 1.86 [08-13 @ 22:35]  SCr 2.59 [08-13 @ 13:39]  SCr 2.99 [08-12 @ 22:06]    Urinalysis - [08-15-22 @ 17:07]      Color Yellow / Appearance Clear / SG 1.019 / pH 5.0      Gluc Negative / Ketone Negative  / Bili Negative / Urobili Negative       Blood Moderate / Protein Trace / Leuk Est Negative / Nitrite Negative      RBC 3-5 / WBC 3 / Hyaline 35 / Gran  / Sq Epi  / Non Sq Epi 2 / Bacteria Few    Urine Creatinine 69      [08-10-22 @ 11:44]  Urine Protein 165      [08-11-22 @ 21:33]  Urine Sodium 92      [08-10-22 @ 11:44]  Urine Urea Nitrogen 62      [08-10-22 @ 12:04]  Urine Potassium 33      [08-10-22 @ 11:44]  Urine Chloride 80      [08-10-22 @ 11:44]  Urine Osmolality 295      [08-10-22 @ 11:44]    Iron 32, TIBC 93, %sat 34      [08-12-22 @ 00:02]  Ferritin 728      [08-12-22 @ 00:00]  TSH 0.15      [08-12-22 @ 00:00]  Lipid: chol 41, TG 52, HDL 9, LDL --      [08-11-22 @ 23:59]    HBsAb <3.0      [08-11-22 @ 18:49]  HBsAb Nonreact      [08-12-22 @ 00:00]  HBsAg Nonreact      [08-11-22 @ 18:49]  HBcAb Nonreact      [08-12-22 @ 00:00]  HCV 0.15, Nonreact      [08-11-22 @ 18:49]  HIV Nonreact      [08-12-22 @ 00:00]  HIV Nonreact      [08-11-22 @ 23:59]    MICHELLE: titer Negative, pattern --      [08-12-22 @ 00:32]

## 2022-08-16 NOTE — CONSULT NOTE ADULT - SUBJECTIVE AND OBJECTIVE BOX
Interventional Radiology    Evaluate for Procedure: need repeat LVP diag/therapeutic to assess SBP response to ABX, elevated WBC    HPI: 66y Male with history of recently diagnosed cirrhosis (MELD 30) and cholangitis s/p ERCP with stent placement (4/2022) s/p stent removal on 7/20 presenting for one week of RUQ abdominal pain with clinical exam, labs and radiographic findings meeting [place] Criteria grade III for severe acute cholangitis. Patient is now s/p image guided paracentesis and image guided percutaneous cholecystostomy tube placement 8/9.  s/p dx&tx para 8/12 1.5L removed.  IR reconsulted 8/16 for repeat Para, increasing leukocytosis, reqeusting  diag/therapeutic to assess SBP response to antibiotics      Allergies:   Medications (Abx/Cardiac/Anticoagulation/Blood Products)    furosemide   Injectable: 40 milliGRAM(s) IV Push (08-15 @ 16:03)  furosemide   Injectable: 40 milliGRAM(s) IV Push (08-14 @ 11:51)  furosemide   Injectable: 80 milliGRAM(s) IV Push (08-14 @ 16:20)  furosemide   Injectable: 80 milliGRAM(s) IV Push (08-15 @ 20:57)  midodrine.: 10 milliGRAM(s) Oral (08-16 @ 06:11)  piperacillin/tazobactam IVPB..: 25 mL/Hr IV Intermittent (08-15 @ 22:16)    Data:    T(C): 37  HR: 79  BP: 130/69  RR: 13  SpO2: 96%    -WBC 30.74 / HgB 7.6 / Hct 21.9 / Plt 45  -Na 131 / Cl 93 / BUN 45 / Glucose 157  -K 3.6 / CO2 25 / Cr 2.22  -ALT 15 / Alk Phos 79 / T.Bili 3.5  -INR 2.21 / PTT 32.8    Radiology:   reviewed     Assessment/Plan:    66y Male with history of recently diagnosed cirrhosis (MELD 30) and cholangitis s/p ERCP with stent placement (4/2022) s/p stent removal on 7/20 presenting for one week of RUQ abdominal pain with clinical exam, labs and radiographic findings meeting [place] Criteria grade III for severe acute cholangitis. Patient is now s/p image guided paracentesis and image guided percutaneous cholecystostomy tube placement 8/9.  s/p dx&tx para 8/12 1.5L removed.  IR reconsulted 8/16 for repeat Para, increasing leukocytosis, reqeusting  diag/therapeutic to assess SBP response to antibiotics      - case reviewed and approved for 8/16  - please place IR procedure order under PA Jalili   - STAT labs in AM (cbc,coags, bmp, T&S)  - Patient does not need to be NPO on   - COVID PCR results within 5 days of planned procedure  - d/w primary team

## 2022-08-16 NOTE — PROGRESS NOTE ADULT - SUBJECTIVE AND OBJECTIVE BOX
HISTORY:    24 HOUR EVENTS:  -bladder scan with subsequent u/s showed 30-40 cc in bladder  -milian d/c'd  -s/p 1u pRBC, 120mg lasix in total and 50cc 25% albumin     NEURO  Exam: AxOx3  Meds:     RESPIRATORY  RR: 16 (08-16-22 @ 00:00) (12 - 28)  SpO2: 95% (08-16-22 @ 00:00) (93% - 97%)  Wt(kg): --  Exam:      Meds:     CARDIOVASCULAR  HR: 81 (08-16-22 @ 00:00) (71 - 100)  BP: 117/62 (08-16-22 @ 00:00) (106/57 - 152/69)  BP(mean): 82 (08-16-22 @ 00:00) (75 - 103)  ABP: --  ABP(mean): --  Wt(kg): --  CVP(cm H2O): --      Exam:   Cardiac Rhythm: NSR  Perfusion     [ x]Adequate   [ ]Inadequate  Mentation   [ x]Normal       [ ]Reduced  Extremities  [x ]Warm         [ ]Cool  Volume Status [ ]Hypervolemic [ x]Euvolemic [ ]Hypovolemic  Meds: midodrine. 10 milliGRAM(s) Oral every 8 hours      GI/NUTRITION  Exam: SNTND  Diet: RD  Meds: pantoprazole    Tablet 40 milliGRAM(s) Oral before breakfast  polyethylene glycol 3350 17 Gram(s) Oral two times a day  senna 2 Tablet(s) Oral at bedtime      GENITOURINARY  I&O's Detail    08-14 @ 07:01  -  08-15 @ 07:00  --------------------------------------------------------  IN:    IV PiggyBack: 200 mL    Oral Fluid: 510 mL  Total IN: 710 mL    OUT:    Indwelling Catheter - Urethral (mL): 1445 mL    T-Tube (mL): 50 mL  Total OUT: 1495 mL    Total NET: -785 mL      08-15 @ 07:01  -  08-16 @ 00:27  --------------------------------------------------------  IN:    Albumin 25%  -  50 mL: 100 mL    IV PiggyBack: 50 mL    IV PiggyBack: 150 mL    Oral Fluid: 650 mL    PRBCs (Packed Red Blood Cells): 300 mL  Total IN: 1250 mL    OUT:    Indwelling Catheter - Urethral (mL): 465 mL    T-Tube (mL): 30 mL    Voided (mL): 50 mL  Total OUT: 545 mL    Total NET: 705 mL          08-15    131<L>  |  93<L>  |  45<H>  ----------------------------<  157<H>  3.6   |  25  |  2.22<H>    Ca    8.4      15 Aug 2022 23:26  Phos  3.6     08-15  Mg     2.0     08-15    TPro  5.6<L>  /  Alb  2.7<L>  /  TBili  3.5<H>  /  DBili  1.3<H>  /  AST  29  /  ALT  15  /  AlkPhos  79  08-15    Meds:     HEMATOLOGIC  Meds:                         7.6    30.74 )-----------( 45       ( 15 Aug 2022 23:26 )             21.9     PT/INR - ( 15 Aug 2022 23:26 )   PT: 25.9 sec;   INR: 2.21 ratio         PTT - ( 15 Aug 2022 23:26 )  PTT:32.8 sec    INFECTIOUS DISEASES  T(C): 37.1 (08-16-22 @ 00:00), Max: 37.2 (08-15-22 @ 15:00)  Wt(kg): --  WBC Count: 30.74 K/uL (08-15 @ 23:26)  WBC Count: 24.01 K/uL (08-15 @ 10:03)  WBC Count: 21.86 K/uL (08-15 @ 05:51)    Recent Cultures:  Specimen Source: Peritoneal Peritoneal Fluid, 08-12 @ 17:26; Results   No growth; Gram Stain:   polymorphonuclear leukocytes seen  No organisms seen  by cytocentrifuge; Organism: --  Specimen Source: .Blood Blood, 08-11 @ 23:28; Results   No growth to date.; Gram Stain: --; Organism: --  Specimen Source: Peritoneal Peritoneal Fluid, 08-09 @ 19:01; Results   Few Escherichia coli  Moderate Streptococcus anginosus "Susceptibilities not performed"; Gram Stain:   polymorphonuclear leukocytes seen  Moderate Gram positive cocci in pairs seen  Limited volume of specimen received, Unable to centrifuge/concentrate  specimen. Culture results may be compromised.; Organism: Escherichia coli    Meds: piperacillin/tazobactam IVPB.. 3.375 Gram(s) IV Intermittent every 12 hours      ENDOCRINE  Capillary Blood Glucose    Meds: octreotide  Injectable 100 MICROGram(s) IV Push every 8 hours      ACCESS DEVICES:  [x ] Peripheral IV  [ ] Central Venous Line		[ ] R	[ ] L	[ ] IJ	[ ] Fem	[ ] SC	Placed:   [ ] Arterial Line			[ ] R	[ ] L	[ ] Fem	[ ] Rad	[ ] Ax	Placed:   [ ] PICC:					[ ] Mediport  [ ] Urinary Catheter, Date Placed:   [ ] Necessity of urinary, arterial, and venous catheters discussed    OTHER MEDICATIONS:  chlorhexidine 2% Cloths 1 Application(s) Topical <User Schedule>      IMAGING:

## 2022-08-16 NOTE — PROGRESS NOTE ADULT - ASSESSMENT
Pt is a 65 y/o Male with a PMH Cirrhosis and recent cholangitis s/p ERCP stent (4/22) and stent removal (7/20/22). Pt presented on 8/8 with increasing RUQ pain. CT Abd/Pelvis reported distended gallbladder with stones in the cystic duct, gallbladder wall thickening, suggesting chronic cholecystitis. Pt had a percutaneous cholecystostomy tube placed on 8/09 along with a paracentesis yielding 300cc of purulent fluid. Pt admitted to SICU for further hemodynamic monitoring and management.     PLAN:   Neurologic: A, O x3  - Pain Control with Dilaudid as needed after abdominal exam    Respiratory: Atelectasis  - Maintain SpO2 > 92%  - Encourage OOB, Incentive spirometry, and chest PT  - AM CXR    Cardiovascular: Sinus tachycardia resolved   - Maintain MAP > 65       Gastrointestinal/Nutrition: cirrhosis (MELD 34), cholecystitis s/p per rodney tube and diagnostic paracentesis (8/09)   - RD  - CT Abd/ Pelvis w/ Iv contrast ( 8/08)- distended gallbladder with stones in the cystic duct, gallbladder wall thickening, suggesting chronic cholecystitis  - GI not concerned for cholangitis due to negative for bile duct dilation  - Bowel regimen Miralax, senna and dulcolax supp  - Octreotide 100mg q8hr   -midodrine 10mg for hepatorenal syndrome  - Repeat CT Abd/Pelvis limited study; ordered RUQ Sono to eval for Gallbladder perforation->was found unremarkable  - Transplant hepatology consulted, reccs appreciated   -paracentesis tomorrow with IR    Genitourinary/Renal: ROBBIE and hyperkalemia s/p shifting& Lokelma, urethral stricture, Hyponatremia with severe metabolic acidosis.   - Tobin d/c'd 8/15, monitor UOP  - s/p 120 mg lasix 8/14 and 50cc 25% albumin  - Hyperkalemia improved  -no need for further HD at the moment    Hematologic: coagulopathy of liver failure/ sepsis  - Monitor CBC and Coags   - SCDs for mechanical VTE ppx   - Hold chemical VTE ppx    Infectious Disease: sepsis  - F/u BCx ( 8/9) NGTD   - UCx ( 8/8) + for E. Coli , Bile Cx ( 8/9) + E. Coli & Streptococcus anginosus  - Continue Zosyn ( 8/9 -?).   - Grew Streptococcus anginosus in bile culture.     Endocrine: no acute issues  - Monitor glucose on BMP    Code Status: Full Code   Disposition: SICU

## 2022-08-16 NOTE — PROGRESS NOTE ADULT - SUBJECTIVE AND OBJECTIVE BOX
Follow Up:      Interval History:    REVIEW OF SYSTEMS  [  ] ROS unobtainable because:    [  ] All other systems negative except as noted below    Constitutional:  [ ] fever [ ] chills  [ ] weight loss  [ ] weakness  Skin:  [ ] rash [ ] phlebitis	  Eyes: [ ] icterus [ ] pain  [ ] discharge	  ENMT: [ ] sore throat  [ ] thrush [ ] ulcers [ ] exudates  Respiratory: [ ] dyspnea [ ] hemoptysis [ ] cough [ ] sputum	  Cardiovascular:  [ ] chest pain [ ] palpitations [ ] edema	  Gastrointestinal:  [ ] nausea [ ] vomiting [ ] diarrhea [ ] constipation [ ] pain	  Genitourinary:  [ ] dysuria [ ] frequency [ ] hematuria [ ] discharge [ ] flank pain  [ ] incontinence  Musculoskeletal:  [ ] myalgias [ ] arthralgias [ ] arthritis  [ ] back pain  Neurological:  [ ] headache [ ] seizures  [ ] confusion/altered mental status    Allergies  No Known Allergies        ANTIMICROBIALS:  ampicillin/sulbactam  IVPB 1.5 every 6 hours      OTHER MEDS:  MEDICATIONS  (STANDING):  furosemide   Injectable 60 every 12 hours  midodrine. 10 every 8 hours  octreotide  Injectable 100 every 8 hours  pantoprazole    Tablet 40 before breakfast  polyethylene glycol 3350 17 two times a day  senna 2 at bedtime      Vital Signs Last 24 Hrs  T(C): 36.3 (16 Aug 2022 15:00), Max: 37.1 (15 Aug 2022 20:00)  T(F): 97.3 (16 Aug 2022 15:00), Max: 98.8 (15 Aug 2022 20:00)  HR: 69 (16 Aug 2022 17:00) (69 - 118)  BP: 125/61 (16 Aug 2022 17:00) (97/52 - 147/74)  BP(mean): 86 (16 Aug 2022 17:00) (68 - 103)  RR: 18 (16 Aug 2022 17:00) (11 - 26)  SpO2: 97% (16 Aug 2022 17:00) (93% - 97%)    Parameters below as of 16 Aug 2022 07:00  Patient On (Oxygen Delivery Method): room air        PHYSICAL EXAMINATION:  General: Alert and Awake, NAD  HEENT: PERRL, EOMI  Neck: Supple  Cardiac: RRR, No M/R/G  Resp: CTAB, No Wh/Rh/Ra  Abdomen: NBS, NT/ND, No HSM, No rigidity or guarding  MSK: No LE edema. No Calf tenderness  : No milian  Skin: No rashes or lesions. Skin is warm and dry to the touch.   Neuro: Alert and Awake. CN 2-12 Grossly intact. Moves all four extremities spontaneously.  Psych: Calm, Pleasant, Cooperative                          7.6    30.74 )-----------( 45       ( 15 Aug 2022 23:26 )             21.9       08-15    131<L>  |  93<L>  |  45<H>  ----------------------------<  157<H>  3.6   |  25  |  2.22<H>    Ca    8.4      15 Aug 2022 23:26  Phos  3.6     08-15  Mg     2.0     08-15    TPro  5.6<L>  /  Alb  2.7<L>  /  TBili  3.5<H>  /  DBili  1.3<H>  /  AST  29  /  ALT  15  /  AlkPhos  79  08-15      Urinalysis Basic - ( 15 Aug 2022 17:07 )    Color: Yellow / Appearance: Clear / S.019 / pH: x  Gluc: x / Ketone: Negative  / Bili: Negative / Urobili: Negative   Blood: x / Protein: Trace / Nitrite: Negative   Leuk Esterase: Negative / RBC: 3-5 /hpf / WBC 3 /HPF   Sq Epi: x / Non Sq Epi: 2 /hpf / Bacteria: Few        MICROBIOLOGY:  v  Peritoneal Peritoneal Fluid  22   No growth  --    polymorphonuclear leukocytes seen  No organisms seen  by cytocentrifuge      .Blood Blood  22   No growth to date.  --  --      Peritoneal Peritoneal Fluid  22   Few Escherichia coli  Moderate Streptococcus anginosus "Susceptibilities not performed"  --  Escherichia coli      Catheterized Catheterized  22   10,000 - 49,000 CFU/mL Escherichia coli  --  Escherichia coli      .Blood Blood-Peripheral  22   No Growth Final  --  --      .Blood Blood-Peripheral  22   No Growth Final  --  --        CMV IgG Antibody: 4.00 U/mL (22 @ 00:32)    CMVPCR Log: Det <1.54 Assay Dynamic Range: 34.5 to 1.0E+07 IU/mL (1.54 to 7.00 Log10 IU/mL)  Assay lower limit of quantification (LLOQ) is 34.5 IU/mL (1.54 Log10  IU/mL)  CMV DNA detected below the LLOQ will be reported as Detected < 34.5 IU/mL  (<1.54 Log10 IU/mL)  V[facility] Cytomegalovirus (CMV) is an FDA-cleared quantitative test that  enables the detection and quantitation of CMV DNA in EDTA plasma of  infected transplant patients on the vipul 8800 system. This test was  verified by [employer]. Results should be interpreted  with consideration of all clinical findings and laboratory findings and  should not form the sole basis for a diagnosis or treatment decision. [id number]/mL ( @ 23:58)        RADIOLOGY:    <The imaging below has been reviewed and visualized by me independently. Findings as detailed in report below> Follow Up:  peritonitis    Interval History: afebrile. abdominal pain improving.     REVIEW OF SYSTEMS  [  ] ROS unobtainable because:    [  x] All other systems negative except as noted below    Constitutional:  [ ] fever [ ] chills  [ ] weight loss  [ ] weakness  Skin:  [ ] rash [ ] phlebitis	  Eyes: [ ] icterus [ ] pain  [ ] discharge	  ENMT: [ ] sore throat  [ ] thrush [ ] ulcers [ ] exudates  Respiratory: [ ] dyspnea [ ] hemoptysis [ ] cough [ ] sputum	  Cardiovascular:  [ ] chest pain [ ] palpitations [ ] edema	  Gastrointestinal:  [ ] nausea [ ] vomiting [ ] diarrhea [ ] constipation [x ] pain	  Genitourinary:  [ ] dysuria [ ] frequency [ ] hematuria [ ] discharge [ ] flank pain  [ ] incontinence  Musculoskeletal:  [ ] myalgias [ ] arthralgias [ ] arthritis  [ ] back pain  Neurological:  [ ] headache [ ] seizures  [ ] confusion/altered mental status    Allergies  No Known Allergies        ANTIMICROBIALS:  ampicillin/sulbactam  IVPB 1.5 every 6 hours      OTHER MEDS:  MEDICATIONS  (STANDING):  furosemide   Injectable 60 every 12 hours  midodrine. 10 every 8 hours  octreotide  Injectable 100 every 8 hours  pantoprazole    Tablet 40 before breakfast  polyethylene glycol 3350 17 two times a day  senna 2 at bedtime      Vital Signs Last 24 Hrs  T(C): 36.3 (16 Aug 2022 15:00), Max: 37.1 (15 Aug 2022 20:00)  T(F): 97.3 (16 Aug 2022 15:00), Max: 98.8 (15 Aug 2022 20:00)  HR: 69 (16 Aug 2022 17:00) (69 - 118)  BP: 125/61 (16 Aug 2022 17:00) (97/52 - 147/74)  BP(mean): 86 (16 Aug 2022 17:00) (68 - 103)  RR: 18 (16 Aug 2022 17:00) (11 - 26)  SpO2: 97% (16 Aug 2022 17:00) (93% - 97%)    Parameters below as of 16 Aug 2022 07:00  Patient On (Oxygen Delivery Method): room air      PHYSICAL EXAMINATION:  General: Alert and Awake, NAD  Cardiac: RRR, No M/R/G  Resp: CTAB, No Wh/Rh/Ra  Abdomen: +Hepatobiliary Drain, Distended, No HSM, No rigidity or guarding  MSK: No LE edema. No Calf tenderness  Skin: No rashes or lesions. Skin is warm and dry to the touch.   Neuro: Alert and Awake. CN 2-12 Grossly intact. Moves all four extremities spontaneously.  Psych: Calm, Pleasant, Cooperative                          7.6    30.74 )-----------( 45       ( 15 Aug 2022 23:26 )             21.9       08-15    131<L>  |  93<L>  |  45<H>  ----------------------------<  157<H>  3.6   |  25  |  2.22<H>    Ca    8.4      15 Aug 2022 23:26  Phos  3.6     08-15  Mg     2.0     08-15    TPro  5.6<L>  /  Alb  2.7<L>  /  TBili  3.5<H>  /  DBili  1.3<H>  /  AST  29  /  ALT  15  /  AlkPhos  79  08-15      Urinalysis Basic - ( 15 Aug 2022 17:07 )    Color: Yellow / Appearance: Clear / S.019 / pH: x  Gluc: x / Ketone: Negative  / Bili: Negative / Urobili: Negative   Blood: x / Protein: Trace / Nitrite: Negative   Leuk Esterase: Negative / RBC: 3-5 /hpf / WBC 3 /HPF   Sq Epi: x / Non Sq Epi: 2 /hpf / Bacteria: Few        MICROBIOLOGY:  v  Peritoneal Peritoneal Fluid  22   No growth  --    polymorphonuclear leukocytes seen  No organisms seen  by cytocentrifuge      .Blood Blood  22   No growth to date.  --  --      Peritoneal Peritoneal Fluid  22   Few Escherichia coli  Moderate Streptococcus anginosus "Susceptibilities not performed"  --  Escherichia coli      Catheterized Catheterized  22   10,000 - 49,000 CFU/mL Escherichia coli  --  Escherichia coli      .Blood Blood-Peripheral  22   No Growth Final  --  --      .Blood Blood-Peripheral  22   No Growth Final  --  --        CMV IgG Antibody: 4.00 U/mL (22 @ 00:32)    CMVPCR Log: Det <1.54 Assay Dynamic Range: 34.5 to 1.0E+07 IU/mL (1.54 to 7.00 Log10 IU/mL)  Assay lower limit of quantification (LLOQ) is 34.5 IU/mL (1.54 Log10  IU/mL)  CMV DNA detected below the LLOQ will be reported as Detected < 34.5 IU/mL  (<1.54 Log10 IU/mL)  [facility] Cytomegalovirus (CMV) is an FDA-cleared quantitative test that  enables the detection and quantitation of CMV DNA in EDTA plasma of  infected transplant patients on the vipul 8800 system. This test was  verified by [employer]. Results should be interpreted  with consideration of all clinical findings and laboratory findings and  should not form the sole basis for a diagnosis or treatment decision. [id number]/mL ( @ 23:58)        RADIOLOGY:    <The imaging below has been reviewed and visualized by me independently. Findings as detailed in report below>    < from: Xray Chest 1 View- PORTABLE-Routine (Xray Chest 1 View- PORTABLE-Routine in AM.) (22 @ 06:09) >  IMPRESSION:  Low lung volumes.    Right basilar linear atelectasis.    Improved right basilar aeration, possibly a decreased or redistributed   small right pleural effusion with associated passive atelectasis.    No significant interval change in left basilar and retrocardiac opacity   which may be due to a left pleural effusion with passive atelectasis,   atelectasis of other cause, and/or pneumonia.    < end of copied text >

## 2022-08-16 NOTE — PROGRESS NOTE ADULT - SUBJECTIVE AND OBJECTIVE BOX
Transplant Surgery - Multidisciplinary Progress Note  --------------------------------------------------------------  Present: Patient seen and examined with Transplant Surgeon Dr. Dagher, Hepatologist Dr. Monson, JOHN Cadet, Pharmacist Jordy Collins and unit RN during am rounds.   Disciplines not in attendance will be notified of plan.     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 coli and Streptococcus anginosis.  - Ascitic fluid studies from paracentesis (8/9) most consistent with secondary peritonitis with concern for possible perforated viscus (likely gallbladder) given markedly elevated ascitic fluid LDH.   - Repeat paracentesis (8/12) still consistent with secondary peritonitis but with improving neutrophil count from 99k to 8k. Ascitic fluid bilirubin similar to serum, suggesting against bile leak.  - HD for ATN vs. HRS (8/11-13)    Interval Events:  - Afebrile, VSS   - last HD 8/13, fei removed (bleeding at site)  - received 1u PRBC  - Lasix 120mg x 1 yest with minimal response (per patient also incontinent)    MEDICATIONS  (STANDING):  chlorhexidine 2% Cloths 1 Application(s) Topical <User Schedule>  furosemide   Injectable 60 milliGRAM(s) IV Push every 12 hours  midodrine. 10 milliGRAM(s) Oral every 8 hours  octreotide  Injectable 100 MICROGram(s) IV Push every 8 hours  pantoprazole    Tablet 40 milliGRAM(s) Oral before breakfast  piperacillin/tazobactam IVPB.. 3.375 Gram(s) IV Intermittent every 12 hours  polyethylene glycol 3350 17 Gram(s) Oral two times a day  senna 2 Tablet(s) Oral at bedtime    PAST MEDICAL & SURGICAL HISTORY:  H/O acute cholangitis  2019 novel coronavirus disease (COVID-19) 12/24/2021  no hospitalization, no treatment  S/P ERCP 4/2022  H/O colonoscopy    Vital Signs Last 24 Hrs  T(C): 36.9 (16 Aug 2022 07:00), Max: 37.2 (15 Aug 2022 15:00)  T(F): 98.5 (16 Aug 2022 07:00), Max: 99 (15 Aug 2022 15:00)  HR: 80 (16 Aug 2022 10:00) (70 - 100)  BP: 123/63 (16 Aug 2022 10:00) (97/52 - 147/74)  BP(mean): 87 (16 Aug 2022 10:00) (68 - 103)  RR: 15 (16 Aug 2022 10:00) (11 - 27)  SpO2: 96% (16 Aug 2022 10:00) (93% - 97%)      I&O's Summary    15 Aug 2022 07:01  -  16 Aug 2022 07:00  --------------------------------------------------------  IN: 1300 mL / OUT: 625 mL / NET: 675 mL    16 Aug 2022 07:01  -  16 Aug 2022 12:51  --------------------------------------------------------  IN: 25 mL / OUT: 20 mL / NET: 5 mL                          7.6    30.74 )-----------( 45       ( 15 Aug 2022 23:26 )             21.9     08-15    131<L>  |  93<L>  |  45<H>  ----------------------------<  157<H>  3.6   |  25  |  2.22<H>    Ca    8.4      15 Aug 2022 23:26  Phos  3.6     08-15  Mg     2.0     08-15    TPro  5.6<L>  /  Alb  2.7<L>  /  TBili  3.5<H>  /  DBili  1.3<H>  /  AST  29  /  ALT  15  /  AlkPhos  79  08-15    Culture - Fungal, Body Fluid (collected 08-12-22 @ 17:26)  Source: Peritoneal Peritoneal Fluid  Preliminary Report (08-15-22 @ 08:29):    Testing in progress    Culture - Body Fluid with Gram Stain (collected 08-12-22 @ 17:26)  Source: Peritoneal Peritoneal Fluid  Gram Stain (08-12-22 @ 23:56):    polymorphonuclear leukocytes seen    No organisms seen    by cytocentrifuge  Preliminary Report (08-13-22 @ 14:23):    No growth    Culture - Blood (collected 08-11-22 @ 23:28)  Source: .Blood Blood  Preliminary Report (08-13-22 @ 03:02):    No growth to date.    Culture - Body Fluid with Gram Stain (collected 08-09-22 @ 19:01)  Source: Bile Bile Fluid  Gram Stain (08-10-22 @ 01:31):    polymorphonuclear leukocytes seen    Gram Negative Rods seen    Gram positive cocci in pairs seen    by cytocentrifuge  Final Report (08-14-22 @ 20:41):    Numerous Escherichia coli    Moderate Streptococcus anginosus "Susceptibilities not performed"  Organism: Escherichia coli (08-14-22 @ 20:41)  Organism: Escherichia coli (08-14-22 @ 20:41)    Culture - Fungal, Body Fluid (collected 08-09-22 @ 19:01)  Source: Peritoneal Peritoneal Fluid  Preliminary Report (08-10-22 @ 06:49):    Testing in progress    Culture - Body Fluid with Gram Stain (collected 08-09-22 @ 19:01)  Source: Peritoneal Peritoneal Fluid  Gram Stain (08-10-22 @ 05:58):    polymorphonuclear leukocytes seen    Moderate Gram positive cocci in pairs seen    Limited volume of specimen received, Unable to centrifuge/concentrate    specimen. Culture results may be compromised.  Final Report (08-14-22 @ 20:40):    Few Escherichia coli    Moderate Streptococcus anginosus "Susceptibilities not performed"  Organism: Escherichia coli (08-14-22 @ 20:40)  Organism: Escherichia coli (08-14-22 @ 20:40)      Review of systems  Gen: No weight changes, fatigue, fevers/chills, weakness  Skin: No rashes  Head/Eyes/Ears/Mouth: No headache; Normal hearing; Normal vision w/o blurriness; No sinus pain/discomfort, sore throat  Respiratory: No dyspnea, cough, wheezing, hemoptysis  CV: No chest pain, PND, orthopnea  GI: Mild abdominal pain ; denies diarrhea, constipation, nausea, vomiting, melena, hematochezia  : No increased frequency, dysuria, hematuria, nocturia  MSK: No joint pain/swelling; no back pain; no edema  Neuro: No dizziness/lightheadedness, weakness, seizures, numbness, tingling  Heme: No easy bruising or bleeding  Endo: No heat/cold intolerance  Psych: No significant nervousness, anxiety, stress, depression  All other systems were reviewed and are negative, except as noted.      PHYSICAL EXAM:  Constitutional: Well developed / well nourished  Eyes: Anicteric, PERRLA  ENMT: nc/at  Neck: supple  Respiratory: CTA B/L  Cardiovascular: RRR  Gastrointestinal: Soft, distention improving, improving TTP, C-tube with bilio-sanguinous drainage  Genitourinary: voiding   Extremities: SCD's in place and working bilaterally, improving edema  Vascular: Palpable dp pulses bilaterally  Neurological: A&O x3  Skin: no rashes, ulcerations or lesions;  Musculoskeletal: Moving all extremities  Psychiatric: Responsive

## 2022-08-16 NOTE — PROGRESS NOTE ADULT - PROBLEM SELECTOR PLAN 1
-Patient s/p HD on 8/11, 8/13 due to uremic symptoms  -Patient got a dose of 120 lasix on 8/14; responded well  -Renal US with b/l echogenic kidneys  -creatinine downtrending  -consider c/w lasix 60 BID IV for diuresis   -monitor BMP frequently; avoid nephrotoxic agents    #hypomagnesium  -replete magnesium

## 2022-08-17 LAB
% ALBUMIN: 60.7 % — SIGNIFICANT CHANGE UP
% ALPHA 1: 4.6 % — SIGNIFICANT CHANGE UP
% ALPHA 2: 5.8 % — SIGNIFICANT CHANGE UP
% BETA: 7 % — SIGNIFICANT CHANGE UP
% GAMMA: 21.9 % — SIGNIFICANT CHANGE UP
% M SPIKE: 4.7 % — SIGNIFICANT CHANGE UP
ALBUMIN SERPL ELPH-MCNC: 2.6 G/DL — LOW (ref 3.3–5)
ALBUMIN SERPL ELPH-MCNC: 3.6 G/DL — SIGNIFICANT CHANGE UP (ref 3.6–5.5)
ALBUMIN/GLOB SERPL ELPH: 1.6 RATIO — SIGNIFICANT CHANGE UP
ALP SERPL-CCNC: 62 U/L — SIGNIFICANT CHANGE UP (ref 40–120)
ALPHA1 GLOB SERPL ELPH-MCNC: 0.3 G/DL — SIGNIFICANT CHANGE UP (ref 0.1–0.4)
ALPHA2 GLOB SERPL ELPH-MCNC: 0.3 G/DL — LOW (ref 0.5–1)
ALT FLD-CCNC: 12 U/L — SIGNIFICANT CHANGE UP (ref 10–45)
ANION GAP SERPL CALC-SCNC: 13 MMOL/L — SIGNIFICANT CHANGE UP (ref 5–17)
APTT BLD: 35.6 SEC — HIGH (ref 27.5–35.5)
AST SERPL-CCNC: 25 U/L — SIGNIFICANT CHANGE UP (ref 10–40)
B-GLOBULIN SERPL ELPH-MCNC: 0.4 G/DL — LOW (ref 0.5–1)
BILIRUB DIRECT SERPL-MCNC: 1.2 MG/DL — HIGH (ref 0–0.3)
BILIRUB INDIRECT FLD-MCNC: 2.4 MG/DL — HIGH (ref 0.2–1)
BILIRUB SERPL-MCNC: 3.6 MG/DL — HIGH (ref 0.2–1.2)
BLD GP AB SCN SERPL QL: NEGATIVE — SIGNIFICANT CHANGE UP
BUN SERPL-MCNC: 53 MG/DL — HIGH (ref 7–23)
CALCIUM SERPL-MCNC: 8.3 MG/DL — LOW (ref 8.4–10.5)
CHLORIDE SERPL-SCNC: 94 MMOL/L — LOW (ref 96–108)
CO2 SERPL-SCNC: 24 MMOL/L — SIGNIFICANT CHANGE UP (ref 22–31)
CREAT SERPL-MCNC: 2.16 MG/DL — HIGH (ref 0.5–1.3)
CULTURE RESULTS: SIGNIFICANT CHANGE UP
EGFR: 33 ML/MIN/1.73M2 — LOW
GAMMA GLOBULIN: 1.3 G/DL — SIGNIFICANT CHANGE UP (ref 0.6–1.6)
GLUCOSE SERPL-MCNC: 133 MG/DL — HIGH (ref 70–99)
GRAM STN FLD: SIGNIFICANT CHANGE UP
HCT VFR BLD CALC: 19.9 % — CRITICAL LOW (ref 39–50)
HCT VFR BLD CALC: 21.6 % — LOW (ref 39–50)
HDV AB SER-ACNC: NEGATIVE — SIGNIFICANT CHANGE UP
HDV IGM SER QL IA: SIGNIFICANT CHANGE UP
HGB BLD-MCNC: 6.9 G/DL — CRITICAL LOW (ref 13–17)
HGB BLD-MCNC: 7.6 G/DL — LOW (ref 13–17)
INR BLD: 2.35 RATIO — HIGH (ref 0.88–1.16)
INTERPRETATION SERPL IFE-IMP: SIGNIFICANT CHANGE UP
M-SPIKE: 0.3 G/DL — HIGH (ref 0–0)
MAGNESIUM SERPL-MCNC: 1.9 MG/DL — SIGNIFICANT CHANGE UP (ref 1.6–2.6)
MCHC RBC-ENTMCNC: 31.7 PG — SIGNIFICANT CHANGE UP (ref 27–34)
MCHC RBC-ENTMCNC: 31.9 PG — SIGNIFICANT CHANGE UP (ref 27–34)
MCHC RBC-ENTMCNC: 34.7 GM/DL — SIGNIFICANT CHANGE UP (ref 32–36)
MCHC RBC-ENTMCNC: 35.2 GM/DL — SIGNIFICANT CHANGE UP (ref 32–36)
MCV RBC AUTO: 90.8 FL — SIGNIFICANT CHANGE UP (ref 80–100)
MCV RBC AUTO: 91.3 FL — SIGNIFICANT CHANGE UP (ref 80–100)
NRBC # BLD: 0 /100 WBCS — SIGNIFICANT CHANGE UP (ref 0–0)
NRBC # BLD: 0 /100 WBCS — SIGNIFICANT CHANGE UP (ref 0–0)
OSMOLALITY SERPL: 279 MOSMOL/KG — LOW (ref 280–301)
OSMOLALITY UR: 334 MOS/KG — SIGNIFICANT CHANGE UP (ref 300–900)
PHOSPHATE SERPL-MCNC: 4.3 MG/DL — SIGNIFICANT CHANGE UP (ref 2.5–4.5)
PLATELET # BLD AUTO: 56 K/UL — LOW (ref 150–400)
PLATELET # BLD AUTO: 59 K/UL — LOW (ref 150–400)
POTASSIUM SERPL-MCNC: 3.9 MMOL/L — SIGNIFICANT CHANGE UP (ref 3.5–5.3)
POTASSIUM SERPL-SCNC: 3.9 MMOL/L — SIGNIFICANT CHANGE UP (ref 3.5–5.3)
POTASSIUM UR-SCNC: 36 MMOL/L — SIGNIFICANT CHANGE UP
PROT PATTERN SERPL ELPH-IMP: SIGNIFICANT CHANGE UP
PROT S FREE PPP-ACNC: 33 % — LOW (ref 63–140)
PROT SERPL-MCNC: 5.4 G/DL — LOW (ref 6–8.3)
PROTHROM AB SERPL-ACNC: 27.3 SEC — HIGH (ref 10.5–13.4)
RBC # BLD: 2.18 M/UL — LOW (ref 4.2–5.8)
RBC # BLD: 2.38 M/UL — LOW (ref 4.2–5.8)
RBC # FLD: 17.2 % — HIGH (ref 10.3–14.5)
RBC # FLD: 17.8 % — HIGH (ref 10.3–14.5)
RH IG SCN BLD-IMP: POSITIVE — SIGNIFICANT CHANGE UP
SARS-COV-2 RNA SPEC QL NAA+PROBE: SIGNIFICANT CHANGE UP
SODIUM SERPL-SCNC: 131 MMOL/L — LOW (ref 135–145)
SODIUM UR-SCNC: 15 MMOL/L — SIGNIFICANT CHANGE UP
SPECIMEN SOURCE: SIGNIFICANT CHANGE UP
SPECIMEN SOURCE: SIGNIFICANT CHANGE UP
UUN UR-MCNC: 524 MG/DL — SIGNIFICANT CHANGE UP
WBC # BLD: 21.77 K/UL — HIGH (ref 3.8–10.5)
WBC # BLD: 23.47 K/UL — HIGH (ref 3.8–10.5)
WBC # FLD AUTO: 21.77 K/UL — HIGH (ref 3.8–10.5)
WBC # FLD AUTO: 23.47 K/UL — HIGH (ref 3.8–10.5)

## 2022-08-17 PROCEDURE — 76770 US EXAM ABDO BACK WALL COMP: CPT | Mod: 26

## 2022-08-17 PROCEDURE — 99232 SBSQ HOSP IP/OBS MODERATE 35: CPT | Mod: GC

## 2022-08-17 PROCEDURE — 99232 SBSQ HOSP IP/OBS MODERATE 35: CPT

## 2022-08-17 PROCEDURE — 99233 SBSQ HOSP IP/OBS HIGH 50: CPT | Mod: GC

## 2022-08-17 RX ORDER — FUROSEMIDE 40 MG
60 TABLET ORAL
Refills: 0 | Status: DISCONTINUED | OUTPATIENT
Start: 2022-08-17 | End: 2022-08-18

## 2022-08-17 RX ORDER — MIDODRINE HYDROCHLORIDE 2.5 MG/1
5 TABLET ORAL EVERY 8 HOURS
Refills: 0 | Status: DISCONTINUED | OUTPATIENT
Start: 2022-08-17 | End: 2022-08-24

## 2022-08-17 RX ORDER — PHYTONADIONE (VIT K1) 5 MG
10 TABLET ORAL ONCE
Refills: 0 | Status: COMPLETED | OUTPATIENT
Start: 2022-08-17 | End: 2022-08-17

## 2022-08-17 RX ADMIN — AMPICILLIN SODIUM AND SULBACTAM SODIUM 100 GRAM(S): 250; 125 INJECTION, POWDER, FOR SUSPENSION INTRAMUSCULAR; INTRAVENOUS at 17:10

## 2022-08-17 RX ADMIN — MIDODRINE HYDROCHLORIDE 5 MILLIGRAM(S): 2.5 TABLET ORAL at 13:06

## 2022-08-17 RX ADMIN — PANTOPRAZOLE SODIUM 40 MILLIGRAM(S): 20 TABLET, DELAYED RELEASE ORAL at 08:15

## 2022-08-17 RX ADMIN — AMPICILLIN SODIUM AND SULBACTAM SODIUM 100 GRAM(S): 250; 125 INJECTION, POWDER, FOR SUSPENSION INTRAMUSCULAR; INTRAVENOUS at 11:07

## 2022-08-17 RX ADMIN — Medication 60 MILLIGRAM(S): at 05:38

## 2022-08-17 RX ADMIN — POLYETHYLENE GLYCOL 3350 17 GRAM(S): 17 POWDER, FOR SOLUTION ORAL at 05:38

## 2022-08-17 RX ADMIN — MIDODRINE HYDROCHLORIDE 10 MILLIGRAM(S): 2.5 TABLET ORAL at 05:39

## 2022-08-17 RX ADMIN — OCTREOTIDE ACETATE 100 MICROGRAM(S): 200 INJECTION, SOLUTION INTRAVENOUS; SUBCUTANEOUS at 01:55

## 2022-08-17 RX ADMIN — MIDODRINE HYDROCHLORIDE 5 MILLIGRAM(S): 2.5 TABLET ORAL at 21:22

## 2022-08-17 RX ADMIN — OCTREOTIDE ACETATE 100 MICROGRAM(S): 200 INJECTION, SOLUTION INTRAVENOUS; SUBCUTANEOUS at 17:08

## 2022-08-17 RX ADMIN — AMPICILLIN SODIUM AND SULBACTAM SODIUM 100 GRAM(S): 250; 125 INJECTION, POWDER, FOR SUSPENSION INTRAMUSCULAR; INTRAVENOUS at 00:12

## 2022-08-17 RX ADMIN — Medication 60 MILLIGRAM(S): at 17:09

## 2022-08-17 RX ADMIN — SENNA PLUS 2 TABLET(S): 8.6 TABLET ORAL at 21:21

## 2022-08-17 RX ADMIN — AMPICILLIN SODIUM AND SULBACTAM SODIUM 100 GRAM(S): 250; 125 INJECTION, POWDER, FOR SUSPENSION INTRAMUSCULAR; INTRAVENOUS at 05:38

## 2022-08-17 RX ADMIN — OCTREOTIDE ACETATE 100 MICROGRAM(S): 200 INJECTION, SOLUTION INTRAVENOUS; SUBCUTANEOUS at 10:03

## 2022-08-17 RX ADMIN — CHLORHEXIDINE GLUCONATE 1 APPLICATION(S): 213 SOLUTION TOPICAL at 05:38

## 2022-08-17 RX ADMIN — Medication 102 MILLIGRAM(S): at 21:55

## 2022-08-17 NOTE — PROGRESS NOTE ADULT - SUBJECTIVE AND OBJECTIVE BOX
[facility] DIVISION OF KIDNEY DISEASES AND HYPERTENSION -- FOLLOW UP NOTE  --------------------------------------------------------------------------------  Chief Complaint: Acute cholecystitis    24 hour events/subjective:  Pt. seen this AM, denies any acute issues, tolerated paracentesis yesterday, SCr. stable. Received 60mg BID.         PAST HISTORY  --------------------------------------------------------------------------------  No significant changes to PMH, PSH, FHx, SHx, unless otherwise noted    ALLERGIES & MEDICATIONS  --------------------------------------------------------------------------------  Allergies    No Known Allergies    Intolerances      Standing Inpatient Medications  ampicillin/sulbactam  IVPB 1.5 Gram(s) IV Intermittent every 6 hours  chlorhexidine 2% Cloths 1 Application(s) Topical <User Schedule>  furosemide   Injectable 60 milliGRAM(s) IV Push <User Schedule>  midodrine. 5 milliGRAM(s) Oral every 8 hours  octreotide  Injectable 100 MICROGram(s) IV Push every 8 hours  pantoprazole    Tablet 40 milliGRAM(s) Oral before breakfast  polyethylene glycol 3350 17 Gram(s) Oral two times a day  senna 2 Tablet(s) Oral at bedtime    PRN Inpatient Medications      REVIEW OF SYSTEMS  --------------------------------------------------------------------------------  Gen: No fevers/chills  Skin: No rashes  Head/Eyes/Ears: Normal hearing,   Respiratory: No dyspnea, cough  CV: No chest pain  GI: No abdominal pain, diarrhea  : No dysuria, hematuria  MSK: No  edema  Heme: No easy bruising or bleeding  Psych: No significant depression    All other systems were reviewed and are negative, except as noted.    VITALS/PHYSICAL EXAM  --------------------------------------------------------------------------------  T(C): 37.4 (08-17-22 @ 10:00), Max: 37.4 (08-17-22 @ 10:00)  HR: 69 (08-17-22 @ 12:00) (67 - 118)  BP: 102/58 (08-17-22 @ 12:00) (102/58 - 155/60)  RR: 13 (08-17-22 @ 12:00) (12 - 29)  SpO2: 98% (08-17-22 @ 12:00) (95% - 100%)  Wt(kg): --        08-16-22 @ 07:01  -  08-17-22 @ 07:00  --------------------------------------------------------  IN: 950 mL / OUT: 750 mL / NET: 200 mL    08-17-22 @ 07:01  -  08-17-22 @ 12:49  --------------------------------------------------------  IN: 300 mL / OUT: 295 mL / NET: 5 mL        Physical Exam:  	Gen: NAD  	HEENT: MMM  	Pulm: CTA B/L anteriorly  	CV: S1S2  	Abd: Soft, +BS; Tobin+  	Ext: No LE edema B/L  	Neuro: Awake  	Skin: Warm and dry      LABS/STUDIES  --------------------------------------------------------------------------------              7.6    21.77 >-----------<  59       [08-17-22 @ 07:23]              21.6     131  |  94  |  53  ----------------------------<  133      [08-17-22 @ 00:38]  3.9   |  24  |  2.16        Ca     8.3     [08-17-22 @ 00:38]      Mg     1.9     [08-17-22 @ 00:38]      Phos  4.3     [08-17-22 @ 00:38]    TPro  5.4  /  Alb  2.6  /  TBili  3.6  /  DBili  1.2  /  AST  25  /  ALT  12  /  AlkPhos  62  [08-17-22 @ 00:38]    PT/INR: PT 27.3 , INR 2.35       [08-17-22 @ 00:38]  PTT: 35.6       [08-17-22 @ 00:38]    Serum Osmolality 279      [08-17-22 @ 00:38]    Creatinine Trend:  SCr 2.16 [08-17 @ 00:38]  SCr 2.22 [08-15 @ 23:26]  SCr 2.05 [08-15 @ 05:51]  SCr 1.86 [08-13 @ 22:35]  SCr 2.59 [08-13 @ 13:39]    Urinalysis - [08-15-22 @ 17:07]      Color Yellow / Appearance Clear / SG 1.019 / pH 5.0      Gluc Negative / Ketone Negative  / Bili Negative / Urobili Negative       Blood Moderate / Protein Trace / Leuk Est Negative / Nitrite Negative      RBC 3-5 / WBC 3 / Hyaline 35 / Gran  / Sq Epi  / Non Sq Epi 2 / Bacteria Few    Urine Protein 165      [08-11-22 @ 21:33]  Urine Sodium 15      [08-17-22 @ 00:33]  Urine Urea Nitrogen 524      [08-17-22 @ 00:32]  Urine Potassium 36      [08-17-22 @ 00:33]  Urine Osmolality 334      [08-17-22 @ 00:33]    Iron 32, TIBC 93, %sat 34      [08-12-22 @ 00:02]  Ferritin 728      [08-12-22 @ 00:00]  TSH 0.15      [08-12-22 @ 00:00]  Lipid: chol 41, TG 52, HDL 9, LDL --      [08-11-22 @ 23:59]    HBsAb <3.0      [08-11-22 @ 18:49]  HBsAb Nonreact      [08-12-22 @ 00:00]  HBsAg Nonreact      [08-11-22 @ 18:49]  HBcAb Nonreact      [08-12-22 @ 00:00]  HCV 0.15, Nonreact      [08-11-22 @ 18:49]  HIV Nonreact      [08-12-22 @ 00:00]  HIV Nonreact      [08-11-22 @ 23:59]    MICHELLE: titer Negative, pattern --      [08-12-22 @ 00:32]  Syphilis Screen (Treponema Pallidum Ab) Positive      [08-12-22 @ 00:32]  Syphilis Screen (RPR Titer) <1:1      [08-12-22 @ 00:32]

## 2022-08-17 NOTE — PROGRESS NOTE ADULT - PROBLEM SELECTOR PLAN 1
-Patient s/p HD on 8/11, 8/13 due to uremic symptoms  -Patient got a dose of 120 lasix on 8/16; responded well  -Renal US with b/l echogenic kidneys  -creatinine downtrending  -consider c/w lasix 60 BID IV for diuresis   -monitor BMP frequently; avoid nephrotoxic agents    #hypomagnesium  -replete magnesium

## 2022-08-17 NOTE — PROGRESS NOTE ADULT - SUBJECTIVE AND OBJECTIVE BOX
Transplant Surgery - Multidisciplinary Progress Note  --------------------------------------------------------------  Present: Patient seen and examined with Transplant Surgeon Dr. Dagher, Hepatologist Dr. Monson, JOHN Cadet, Pharmacist Jordy Collins and unit RN during am rounds.   Disciplines not in attendance will be notified of plan.     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 coli and Streptococcus anginosis.  - Ascitic fluid studies from paracentesis (8/9) most consistent with secondary peritonitis with concern for possible perforated viscus (likely gallbladder) given markedly elevated ascitic fluid LDH.   - Repeat paracentesis (8/12) still consistent with secondary peritonitis but with improving neutrophil count from 99k to 8k. Ascitic fluid bilirubin similar to serum, suggesting against bile leak.  - HD for ATN vs. HRS (8/11-13)    Interval Events:  - Afebrile, VSS on Midodrine   - last HD 8/13, UOP improved to 650/24 hours after (milian replaced)   - Continues on Lasix 60 IV BID   - received 1u PRBC for Hgb 6.9   - LVP yesterday with 2.9L removed- prelim gram stain with GPC pairs   - Improved mentation    MEDICATIONS  (STANDING):  ampicillin/sulbactam  IVPB 1.5 Gram(s) IV Intermittent every 6 hours  chlorhexidine 2% Cloths 1 Application(s) Topical <User Schedule>  furosemide   Injectable 60 milliGRAM(s) IV Push <User Schedule>  midodrine. 5 milliGRAM(s) Oral every 8 hours  octreotide  Injectable 100 MICROGram(s) IV Push every 8 hours  pantoprazole    Tablet 40 milliGRAM(s) Oral before breakfast  polyethylene glycol 3350 17 Gram(s) Oral two times a day  senna 2 Tablet(s) Oral at bedtime      PAST MEDICAL & SURGICAL HISTORY:  H/O acute cholangitis  2019 novel coronavirus disease (COVID-19) 12/24/2021  no hospitalization, no treatment  S/P ERCP 4/2022  H/O colonoscopy    ICU Vital Signs Last 24 Hrs  T(C): 37.4 (17 Aug 2022 10:00), Max: 37.4 (17 Aug 2022 10:00)  T(F): 99.3 (17 Aug 2022 10:00), Max: 99.3 (17 Aug 2022 10:00)  HR: 80 (17 Aug 2022 13:00) (67 - 87)  BP: 128/60 (17 Aug 2022 13:00) (102/58 - 155/60)  BP(mean): 87 (17 Aug 2022 13:00) (74 - 95)  ABP: --  ABP(mean): --  RR: 16 (17 Aug 2022 13:00) (12 - 29)  SpO2: 95% (17 Aug 2022 13:00) (95% - 100%)    I&O's Detail    16 Aug 2022 07:01  -  17 Aug 2022 07:00  --------------------------------------------------------  IN:    IV PiggyBack: 200 mL    IV PiggyBack: 100 mL    IV PiggyBack: 250 mL    Oral Fluid: 100 mL    PRBCs (Packed Red Blood Cells): 300 mL  Total IN: 950 mL    OUT:    Indwelling Catheter - Urethral (mL): 475 mL    T-Tube (mL): 20 mL    Voided (mL): 255 mL  Total OUT: 750 mL    Total NET: 200 mL      17 Aug 2022 07:01  -  17 Aug 2022 14:05  --------------------------------------------------------  IN:    IV PiggyBack: 100 mL    IV PiggyBack: 100 mL    Oral Fluid: 100 mL  Total IN: 300 mL    OUT:    Indwelling Catheter - Urethral (mL): 300 mL    T-Tube (mL): 45 mL  Total OUT: 345 mL    Total NET: -45 mL      Labs                                   7.6    21.77 )-----------( 59       ( 17 Aug 2022 07:23 )             21.6     08-17    131<L>  |  94<L>  |  53<H>  ----------------------------<  133<H>  3.9   |  24  |  2.16<H>    Ca    8.3<L>      17 Aug 2022 00:38  Phos  4.3     08-17  Mg     1.9     08-17    TPro  5.4<L>  /  Alb  2.6<L>  /  TBili  3.6<H>  /  DBili  1.2<H>  /  AST  25  /  ALT  12  /  AlkPhos  62  08-17    PT/INR - ( 17 Aug 2022 00:38 )   PT: 27.3 sec;   INR: 2.35 ratio      PTT - ( 17 Aug 2022 00:38 )  PTT:35.6 sec    COVID-19 PCR: NotDetec (14 Aug 2022 06:42)  COVID-19 PCR: NotDetec (08 Aug 2022 01:02)  SARS-CoV-2: NotDetec (12 Apr 2022 11:5      Culture - Fungal, Body Fluid (collected 16 Aug 2022 18:42)  Source: Peritoneal Peritoneal Fluid  Preliminary Report (17 Aug 2022 07:08):    Testing in progress    Culture - Body Fluid with Gram Stain (collected 16 Aug 2022 18:42)  Source: Peritoneal Peritoneal Fluid  Gram Stain (17 Aug 2022 01:29):    polymorphonuclear leukocytes seen    Gram positive cocci in pairs seen    by cytocentrifuge    Culture - Blood (collected 16 Aug 2022 06:05)  Source: .Blood Blood-Peripheral  Preliminary Report (17 Aug 2022 12:01):    No growth to date.    Culture - Blood (collected 16 Aug 2022 06:05)  Source: .Blood Blood-Peripheral  Preliminary Report (17 Aug 2022 12:01):    No growth to date.        Review of systems  Gen: No weight changes, fatigue, fevers/chills, weakness  Skin: No rashes  Head/Eyes/Ears/Mouth: No headache; Normal hearing; Normal vision w/o blurriness; No sinus pain/discomfort, sore throat  Respiratory: No dyspnea, cough, wheezing, hemoptysis  CV: No chest pain, PND, orthopnea  GI: Mild abdominal pain ; denies diarrhea, constipation, nausea, vomiting, melena, hematochezia  : No increased frequency, dysuria, hematuria, nocturia  MSK: No joint pain/swelling; no back pain; no edema  Neuro: No dizziness/lightheadedness, weakness, seizures, numbness, tingling  Heme: No easy bruising or bleeding  Endo: No heat/cold intolerance  Psych: No significant nervousness, anxiety, stress, depression  All other systems were reviewed and are negative, except as noted.      PHYSICAL EXAM:  Constitutional: Well developed / well nourished  Eyes: Anicteric, PERRLA  ENMT: nc/at  Neck: supple  Respiratory: CTA B/L  Cardiovascular: RRR  Gastrointestinal: Soft, distention improving, improving TTP, C-tube with bilio-sanguinous drainage  Genitourinary: voiding   Extremities: SCD's in place and working bilaterally, improving edema  Vascular: Palpable dp pulses bilaterally  Neurological: A&O x3  Skin: no rashes, ulcerations or lesions;  Musculoskeletal: Moving all extremities  Psychiatric: Responsive

## 2022-08-17 NOTE — PROGRESS NOTE ADULT - SUBJECTIVE AND OBJECTIVE BOX
HISTORY:    24 HOUR EVENTS:  paracentesis s/p 2.9 L removed  s/p 250cc 5% albumin   milian back in place  urine lytes sent  s/p 1upRBCs    NEURO  Exam: AxOx3  Meds:     RESPIRATORY  RR: 18 (08-17-22 @ 02:00) (12 - 26)  SpO2: 97% (08-17-22 @ 02:00) (95% - 98%)  Wt(kg): --  Exam:      Meds:     CARDIOVASCULAR  HR: 78 (08-17-22 @ 02:00) (67 - 118)  BP: 130/65 (08-17-22 @ 02:00) (106/58 - 144/66)  BP(mean): 91 (08-17-22 @ 02:00) (76 - 96)  ABP: --  ABP(mean): --  Wt(kg): --  CVP(cm H2O): --      Exam:   Cardiac Rhythm: NSR  Perfusion     [x ]Adequate   [ ]Inadequate  Mentation   [x ]Normal       [ ]Reduced  Extremities  [ x]Warm         [ ]Cool  Volume Status [ ]Hypervolemic [ ]Euvolemic [ ]Hypovolemic  Meds: furosemide   Injectable 60 milliGRAM(s) IV Push every 12 hours  midodrine. 10 milliGRAM(s) Oral every 8 hours      GI/NUTRITION  Exam: SNTND  Diet: RD  Meds: pantoprazole    Tablet 40 milliGRAM(s) Oral before breakfast  polyethylene glycol 3350 17 Gram(s) Oral two times a day  senna 2 Tablet(s) Oral at bedtime      GENITOURINARY  I&O's Detail    08-15 @ 07:01  -  08-16 @ 07:00  --------------------------------------------------------  IN:    Albumin 25%  -  50 mL: 100 mL    IV PiggyBack: 200 mL    IV PiggyBack: 50 mL    Oral Fluid: 650 mL    PRBCs (Packed Red Blood Cells): 300 mL  Total IN: 1300 mL    OUT:    Indwelling Catheter - Urethral (mL): 465 mL    T-Tube (mL): 35 mL    Voided (mL): 125 mL  Total OUT: 625 mL    Total NET: 675 mL      08-16 @ 07:01  -  08-17 @ 02:22  --------------------------------------------------------  IN:    IV PiggyBack: 100 mL    IV PiggyBack: 150 mL    IV PiggyBack: 250 mL    Oral Fluid: 100 mL  Total IN: 600 mL    OUT:    Indwelling Catheter - Urethral (mL): 175 mL    T-Tube (mL): 15 mL    Voided (mL): 255 mL  Total OUT: 445 mL    Total NET: 155 mL          08-17    131<L>  |  94<L>  |  53<H>  ----------------------------<  133<H>  3.9   |  24  |  2.16<H>    Ca    8.3<L>      17 Aug 2022 00:38  Phos  4.3     08-17  Mg     1.9     08-17    TPro  5.4<L>  /  Alb  2.6<L>  /  TBili  3.6<H>  /  DBili  1.2<H>  /  AST  25  /  ALT  12  /  AlkPhos  62  08-17    Meds:     HEMATOLOGIC  Meds:                         6.9    23.47 )-----------( 56       ( 17 Aug 2022 00:38 )             19.9     PT/INR - ( 17 Aug 2022 00:38 )   PT: 27.3 sec;   INR: 2.35 ratio         PTT - ( 17 Aug 2022 00:38 )  PTT:35.6 sec    INFECTIOUS DISEASES  T(C): 37 (08-17-22 @ 00:00), Max: 37 (08-16-22 @ 04:00)  Wt(kg): --  WBC Count: 23.47 K/uL (08-17 @ 00:38)    Recent Cultures:  Specimen Source: Peritoneal Peritoneal Fluid, 08-16 @ 18:42; Results --; Gram Stain:   polymorphonuclear leukocytes seen  Gram positive cocci in pairs seen  by cytocentrifuge; Organism: --  Specimen Source: Peritoneal Peritoneal Fluid, 08-12 @ 17:26; Results   No growth; Gram Stain:   polymorphonuclear leukocytes seen  No organisms seen  by cytocentrifuge; Organism: --  Specimen Source: .Blood Blood, 08-11 @ 23:28; Results   No growth to date.; Gram Stain: --; Organism: --    Meds: ampicillin/sulbactam  IVPB 1.5 Gram(s) IV Intermittent every 6 hours      ENDOCRINE  Capillary Blood Glucose    Meds: octreotide  Injectable 100 MICROGram(s) IV Push every 8 hours      ACCESS DEVICES:  [ ] Peripheral IV  [ ] Central Venous Line		[ ] R	[ ] L	[ ] IJ	[ ] Fem	[ ] SC	Placed:   [ ] Arterial Line			[ ] R	[ ] L	[ ] Fem	[ ] Rad	[ ] Ax	Placed:   [ ] PICC:					[ ] Mediport  [ ] Urinary Catheter, Date Placed:   [ ] Necessity of urinary, arterial, and venous catheters discussed    OTHER MEDICATIONS:  chlorhexidine 2% Cloths 1 Application(s) Topical <User Schedule>      IMAGING:

## 2022-08-17 NOTE — PROGRESS NOTE ADULT - ASSESSMENT
66 year old male with decompensated cirrhosis and cholangitis s/p ERCP with stent placement (4/2022) s/p stent removal on 7/20 (along sphincterotomy and stone extraction) presenting for one week of RUQ abdominal pain associated with new abdominal distension and decreased appetite.     #Septic shock 2/2 acute cholecystitis vs ascending cholangitis  #ROBBIE: initial urine sodium 9, likely indicating prerenal ROBBIE vs less likely HRS given initial presentation as above  #History of cholangitis s/p biliary stent placement April 2022 and removal 7/20/2022, now s/p perc rodney tube  #Decompensated ALD/PARNELL cirrhosis c/b ascites  EV: normal esophagus on ERCP from 4/2022  Ascites: increase in ascites from mild to moderate on current imaging  SBP: paracentesis 8/9/2022 c/w peritonitis given 99K PMNs however elevated LDH more indicative of secondary bacterial peritonitis  HE: none currently       -MELD-Na = 35 on 8/17/2022       -Ascites: yes       -SBP: paracentesis 8/9/2022 reveals likely secondary bacterial peritonitis from suspected gallbladder pathology       -Varices: no mention of varices on EGD/ERCP on 7/20/2022       -Hepatic encephalopathy: Ammonia, Serum: 38 umol/L [11 - 55] (08-12-22 @ 00:21)       -HCC: unknown       -LT candidacy and evaluation:            [ ] Psychosocial            [ ] Infectious            [ ] Cardiology:                    Cardiac cath:                    Stress test: pending            [ ] Colonoscopy: needed            [x] Prostate: Prostate Specific Antigen: negative at 2.21 ng/mL on 8/12/2022            [x] Dental            [x] PT/Frailty    Recommendations:  -trend clinical symptoms, exam findings, vital signs, CBC, CMP, INR  -s/p percutaneous cholecystostomy as above  -likely secondary bacterial peritonitis based on paracentesis and clinical findings, CT A/P negative for perforation however performed without contrast 2/2 renal function; s/p repeat paracentesis 8/12/2022 and again 8/16/2022 with significant PMN count  -would favor ATN as likely etiology, however given worsening ROBBIE continue midodrine/octreotide for empiric treatment for HRS-ROBBIE, hold IV albumin given pulmonary congestion  -initiated hemodialysis 8/11/2022, now off with intermittent diuresis and some renal recovery  -liver transplantation evaluation opened  -PPI daily for stress prophylaxis  -appreciate transplant nephrology consultation  -attempt to get dobutamine stress test for cardiac evaluation  -IR consultation for peritoneal/abdominal drain  -would likely benefit from abdominal imaging, however likely low utility given renal function has precluded use of IV contrast    Note incomplete until finalized by attending signature/attestation.    Fadi Churchill  GI/Hepatology Fellow    MONDAY-FRIDAY 8AM-5PM:  Pager# [phone] ([facility]) or [phone] ([facility])    NON-URGENT CONSULTS:  Please email [email or web address]  AT NIGHT AND ON WEEKENDS:  Contact on-call GI fellow via answering service ([phone]) from 5pm-8am and on weekends/holidays

## 2022-08-17 NOTE — CONSULT NOTE ADULT - ASSESSMENT
Interventional Radiology    Evaluate for Procedure: ascites drainage catheter     HPI: 66 year old male with decompensated cirrhosis and cholangitis s/p ERCP with stent placement (4/2022) s/p stent removal on 7/20 (along sphincterotomy and stone extraction) admitted with abdominal pain s/p multiple paracentesis (+) SBP. IR consulted for chronic ascites drainage catheter.     Allergies:   Medications (Abx/Cardiac/Anticoagulation/Blood Products)    ampicillin/sulbactam  IVPB: 100 mL/Hr IV Intermittent (08-17 @ 11:07)  furosemide   Injectable: 80 milliGRAM(s) IV Push (08-15 @ 20:57)  furosemide   Injectable: 40 milliGRAM(s) IV Push (08-15 @ 16:03)  furosemide   Injectable: 60 milliGRAM(s) IV Push (08-17 @ 05:38)  midodrine.: 10 milliGRAM(s) Oral (08-17 @ 05:39)  piperacillin/tazobactam IVPB..: 25 mL/Hr IV Intermittent (08-16 @ 09:00)    Data:    T(C): 37.4  HR: 85  BP: 155/60  RR: 20  SpO2: 95%    -WBC 21.77 / HgB 7.6 / Hct 21.6 / Plt 59  -Na 131 / Cl 94 / BUN 53 / Glucose 133  -K 3.9 / CO2 24 / Cr 2.16  -ALT 12 / Alk Phos 62 / T.Bili 3.6  -INR 2.35 / PTT 35.6    Radiology:     Assessment/Plan: 66 year old male with decompensated cirrhosis and cholangitis s/p ERCP with stent placement (4/2022) s/p stent removal on 7/20 (along sphincterotomy and stone extraction) admitted with abdominal pain s/p multiple paracentesis (+) SBP. IR consulted for chronic ascites drainage catheter.      - Per discussion between Dr. Yin and Dr. Dagher, will plan for pigtail catheter on 8/18.  - Pt s/p Paracentesis on 8/16 with 2.9 L removal. Pt needs repeat abdominal ultrasound to assess fluid.   - Place order under Dr. Yin.   - Case and Images reviewed with Dr. Yin  - NEED COVID TEST WITHIN  5 DAYS OF PLANNED PROCEDURE.  - Pt needs to be NPO AMN.  - Please hold All A/C.  - Needs STAT CBC, BMP, Coags in AM.  - d/w Transplant ACP.

## 2022-08-17 NOTE — PROGRESS NOTE ADULT - ATTENDING COMMENTS
66 year old man with decompensated cirrhosis admitted with ROBBIE and cholangitis s/p cholecystectomy tube placement on 8/9/22  N - A&Ox3, Pain controlled. No complaints   R - Respiratory status stable on room air   C - SBP 110s - 150s on mitodrine. Will decrease dose to 5.   GI - Cholangitis s/p perc rodney. Drain with 45cc bilious output. D. Bili stable at 1.2.    - ROBBIE with Cr stable at downtrending, 2.1 today from peak of 5. Currently on 60 BID lasix IV as per renal recs. /24 hrs, net positivee 280.   H - Coagulapathy secondary to cirrhosis. Plt stable at 45. INR stable 2.3. Currently holding DVT ppx.   ID - Acute cholecystitis s/p Cholecystostomy tube with cx growing E. Coli and Strep Anginosus. Afebrile, WBC 23 from peak of 30s.  On Unasyn as per ID recs. Zosyn. Will F/up RE duration of Abx and +FTA syphilis test.  E -  Glucose unremarkable 66 year old man with decompensated cirrhosis admitted with ROBBIE and cholangitis s/p cholecystectomy tube placement on 8/9/22  N - A&Ox3, Pain controlled. No complaints   R - Respiratory status stable on room air   C - SBP 110s - 150s on midodrine. Will decrease dose to 5.   GI - Cholangitis s/p perc rodney. Drain with 45cc bilious output. D. Bili stable at 1.2.    - ROBBIE with Cr stable at downtrending, 2.1 today from peak of 5. Currently on 60 BID lasix IV as per renal recs. /24 hrs, net positive 280.   H - Hb trended down to 6.7 overnight now s/p 1 U RBC with good response. Coagulapathy secondary to cirrhosis. Plt stable at 45. INR stable 2.3. Currently holding DVT ppx.   ID - Acute cholecystitis s/p Cholecystostomy tube with cx growing E. Coli and Strep Anginosus. Afebrile, WBC 23 from peak of 30s. On Unasyn as per ID recs. Zosyn. Will F/up RE duration of Abx and +FTA syphilis test.  E -  Glucose unremarkable

## 2022-08-17 NOTE — PROGRESS NOTE ADULT - ASSESSMENT
Pt is a 65 y/o Male with a PMH Cirrhosis and recent cholangitis s/p ERCP stent (4/22) and stent removal (7/20/22). Pt presented on 8/8 with increasing RUQ pain. CT Abd/Pelvis reported distended gallbladder with stones in the cystic duct, gallbladder wall thickening, suggesting chronic cholecystitis. Pt had a percutaneous cholecystostomy tube placed on 8/09 along with a paracentesis yielding 300cc of purulent fluid. Pt admitted to SICU for further hemodynamic monitoring and management.     PLAN:   Neurologic: A, O x3  - Pain Control with Dilaudid as needed after abdominal exam    Respiratory: Atelectasis  - Maintain SpO2 > 92%  - Encourage OOB, Incentive spirometry, and chest PT  - AM CXR    Cardiovascular: Sinus tachycardia resolved   - Maintain MAP > 65       Gastrointestinal/Nutrition: cirrhosis (MELD 34), cholecystitis s/p per rodney tube and diagnostic paracentesis (8/09)   - RD  - CT Abd/ Pelvis w/ Iv contrast ( 8/08)- distended gallbladder with stones in the cystic duct, gallbladder wall thickening, suggesting chronic cholecystitis  - GI not concerned for cholangitis due to negative for bile duct dilation  - Bowel regimen Miralax, senna and dulcolax supp  - Octreotide 100mg q8hr   -midodrine 10mg for hepatorenal syndrome  - Repeat CT Abd/Pelvis limited study; ordered RUQ Sono to eval for Gallbladder perforation->was found unremarkable  - Transplant hepatology consulted, reccs appreciated   -paracentesis  8/16 2.9L removed, s/p 250cc 5% albumin     Genitourinary/Renal: ROBBIE and hyperkalemia s/p shifting& Lokelma, urethral stricture, Hyponatremia with severe metabolic acidosis.   - Tobin placed ON  - s/p 250cc 25% albumin  - Hyperkalemia improved  -no need for further HD at the moment    Hematologic: coagulopathy of liver failure/ sepsis  - Monitor CBC and Coags   - SCDs for mechanical VTE ppx   - Hold chemical VTE ppx  -s/p 1upRBCs    Infectious Disease: sepsis  - F/u BCx ( 8/9) NGTD   - UCx ( 8/8) + for E. Coli , Bile Cx ( 8/9) + E. Coli & Streptococcus anginosus  - Continue Zosyn ( 8/9 -?).   - Grew Streptococcus anginosus in bile culture.     Endocrine: no acute issues  - Monitor glucose on BMP    Code Status: Full Code   Disposition: SICU

## 2022-08-17 NOTE — PROGRESS NOTE ADULT - ASSESSMENT
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 coli and Streptococcus anginosis.  - Ascitic fluid studies from paracentesis (8/9) consistent with secondary peritonitis with concern for possible perforated viscus (likely gallbladder) given markedly elevated ascitic fluid LDH.   - Repeat paracentesis (8/12) still consistent with secondary peritonitis but with improving neutrophil count from 99k to 8k. Ascitic fluid bilirubin similar to serum, suggesting against bile leak.  - HD for ATN vs. HRS (8/11-13)    [] Peritonitis, Cholecystitis, SBP, Leukocytosis   - ID following: Continue Unasyn for Strep/E. Coli coverage   - Para cx (8/16)- GPC pairs   - Repeat LVP yesterday- 2.9L removed repleted with 250 Alb 5% x1 (Prior LVP 8/12)    - IR consulted for drainage catheter insertion in AM     [] Decompensated ALD/PARNELL Cirrhosis  - ABO OP, MELD 34 (8/16  - Liver txp eval initiated   - EV: none on EGD/ERCP 7/2022  - Ascites: peritonitis   - HE: none     [] Liver txp eval:   - cleared by Dental   - Cleared by Psych  - Cards: needs DSE (ordered)  - ID clearance for OLT pending resolution of peritonitis and quantiferon gold    [] ROBBIE vs. HRS  - Nephrology consulted  - Renal us ordered  - Last HD 8/13,  Shiley removed 8/12  - Continue Lasix 60 IV BID   - Urine output improving, keep milian for accurate I&Os  - Renal US completed today- follow up final read

## 2022-08-17 NOTE — PROGRESS NOTE ADULT - ASSESSMENT
67 y/o M PMHx cholangitis/cholecystitis (s/p ERCP w/ biliary stent placement 04/2022) and recently diagnosed etOH cirrhosis, presents with RUQ pain following biliary stent removal on 7/20/22. Found to have distended GB with wall thickening and stone in cystic duct, concerning for cholecystitis. Admitted to SICU for monitoring, s/p perc rodney and paracentesis by IR on 8/9. Paracentesis results consistent with SBP. Course now c/b ARF.     s/p Perc Rodney Tube and Paracentesis  Paracentesis cell counts suggestive of Peritonitis  Cultures thus far with E coli and Streptococcus on Perc Rodney Drainage and Ascites drainage  Concerning with cholecystitis with associated perforation    Paracentesis (8/9) 160,713 with 62% PMN  Paracentesis (8/12) 8,344 with 97% PMN  Paracentesis (8/16) 53,930 with 92% PMN    #Peritonitis, Cholecystitis, SBP, Leukocytosis   --Would adjust Unasyn to 3g IV Q8H  --Would consider CT A/P to exclude loculation of ascitic fluid  --Continue to follow CBC with diff  --Continue to follow temperature curve  --Follow up on preliminary blood cultures  --Follow up on preliminary bile culture, ascites cultures    #Pre-Liver Transplant Evaluation  --ID clearance for OLT pending resolution of peritonitis and quantiferon gold  --Recommend repeat Quantiferon Gold; initial cancelled by the lab    I will continue to follow. Please feel free to contact me with any further questions.    Bladimir Nix M.D.  [facility] Division of Infectious Disease  8AM-5PM Monday - Friday: Available on [employer] Teams  After Hours and Holidays (or if no response on [employer] Teams): Please contact the Infectious Diseases Office at [phone]    The above assessment and plan were discussed with Val[facility] PA

## 2022-08-17 NOTE — CHART NOTE - NSCHARTNOTEFT_GEN_A_CORE
Nutrition Follow Up Note  Patient seen for: malnutrition follow up on SICU    Chart reviewed, events noted. "67 y/o Male with a PMH Cirrhosis and recent cholangitis s/p ERCP stent () and stent removal (22). Pt presented on  with increasing RUQ pain. CT Abd/Pelvis reported distended gallbladder with stones in the cystic duct, gallbladder wall thickening, suggesting chronic cholecystitis. Pt had a percutaneous cholecystostomy tube placed on  along with a paracentesis yielding 300cc of purulent fluid. Pt admitted to SICU for further hemodynamic monitoring and management."    24-Hour Events:  - paracentesis; 2.9 L removed    Source: [] Patient       [x] EMR        [] RN        [] Family at bedside       [X] Other: 8ICU Interdisciplinary Rounds      Diet Order:   Diet, Renal Restrictions:   For patients receiving Renal Replacement - No Protein Restr, No Conc K, No Conc Phos, Low Sodium  800mL Fluid Restriction ([id number]) (08-15-22)    - Is current order appropriate/adequate? [X] Yes     Diet History:   - : NPO or Clear Liquids (inadequate diet order x 4 days)  : Renal diet; minimal po intake    PO intake :   [] >75%  Adequate    [] 50-75%  Fair       [] <50%  Poor    Nutrition-related concerns:  - Liver Transplant Evaluation completed by RD (). FRAILTY Score per PT assessment (): 5.1 = FRAIL  - Recently diagnosed cirrhosis, MELD 34. Cholecystitis s/p per rodney tube and diagnostic paracentesis ()   - Renal: ROBBIE and hyperkalemia s/p shifting & Lokelma. HD provided , , . Hyperkalemia improved, No further HD ordered.  - Hyponatremia (Na 131) addressed with 800ml fluid restriction  - Paracentesis , ,     GI:    Last BM  (x2).   Bowel Regimen? [X] Yes: Miralax, senna  Octreotide ordered    Weights:   Daily Weight in k.1 (-17), Weight in k.7 (-15), Weight in k.4 (-), Weight in k.4 (-), Weight in k.4 (), Weight in k.7 (), Weight in k.7 ()  DOSING Weight (kg): 72.6 (22) *consistent with UBW used for calculations  BMI: 29.3 kg/m2 (22)  IBW: 53.5 kg    Nutritionally Pertinent MEDICATIONS  (STANDING):  ampicillin/sulbactam  IVPB  midodrine.  octreotide  Injectable  pantoprazole    Tablet  polyethylene glycol 3350  senna    Pertinent Labs:  @ 00:38: Na 131<L>, BUN 53<H>, Cr 2.16<H>, <H>, K+ 3.9, Phos 4.3, Mg 1.9, Alk Phos 62, ALT/SGPT 12, AST/SGOT 25    A1C with Estimated Average Glucose Result: 4.9 % (22 @ 00:35)    Skin per nursing documentation: no pressure injuries documented  Edema: 2+ left/right leg    Estimated Needs: based on dosing wt 72.6kg, with consideration for BMI 29.3  Energy: 1372-8362 calories (25-30 kcal/kg)  Protein: 80-94 grams (1.1-1.3 gm/kg)    Previous Nutrition Diagnosis: 1) Malnutrition, moderate   Nutrition Diagnosis is: [X] ongoing; addressed with therapeutic diet and high protein supplements    Previous Nutrition Diagnosis: 2) Food and Nutrition Related Knowledge Deficit  Nutrition Diagnosis is: [X] ongoing; diet education provided to family     New Nutrition Diagnosis: [X] Not applicable    Nutrition Care Plan:  [X] In Progress  [] Achieved  [] Not applicable    Nutrition Interventions:     Education Provided:  transplant nutrition education provided to wife at bedside,       [] Yes:  [] No:        Recommendations:         [X] Continue current diet order: Renal; all high potassium foods removed from menu     [X] Oral nutrition supplement: High Protein Gelatin, High Protein Apple Juice (low potassium supplements) added to meal trays     [X] Other: Defer fluid restriction to team, currently 800ml    Monitoring and Evaluation:   Continue to monitor nutritional intake, tolerance to diet prescription, weights, labs, skin integrity      RD remains available upon request and will follow up per protocol  Marnie Richter, MS RD CDN [facility] (pager [phone]) Nutrition Follow Up Note  Patient seen for: malnutrition follow up on SICU    Chart reviewed, events noted. "67 y/o Male with a PMH Cirrhosis and recent cholangitis s/p ERCP stent () and stent removal (22). Pt presented on  with increasing RUQ pain. CT Abd/Pelvis reported distended gallbladder with stones in the cystic duct, gallbladder wall thickening, suggesting chronic cholecystitis. Pt had a percutaneous cholecystostomy tube placed on  along with a paracentesis yielding 300cc of purulent fluid. Pt admitted to SICU for further hemodynamic monitoring and management."    24-Hour Events:  - paracentesis; 2.9 L removed    Source: [X] Patient       [x] EMR        [] RN        [] Family at bedside       [X] Other: 8ICU Interdisciplinary Rounds      Diet Order:   Diet, Renal Restrictions:   For patients receiving Renal Replacement - No Protein Restr, No Conc K, No Conc Phos, Low Sodium  800mL Fluid Restriction ([id number]) (08-15-22)    - Is current order appropriate/adequate? [X] Yes     Diet History:   - : NPO or Clear Liquids (inadequate diet order x 4 days)  : Renal diet; minimal po intake    PO intake :   [X] >75%  Adequate    [] 50-75%  Fair       [] <50%  Poor  Pt consumed hot cereal and Pashto toast for breakfast; denies GI distress. Pt reports he likes the high protein gelatin and high protein juice provided.  Pt is aware of 800ml fluid restriction, and is actively measuring his daily intake.    Nutrition-related concerns:  - Liver Transplant Evaluation completed by RD (). FRAILTY Score per PT assessment (): 5.1 = FRAIL  - Recently diagnosed cirrhosis, MELD 34. Cholecystitis s/p per rodney tube and diagnostic paracentesis ()   - Renal: ROBBIE and hyperkalemia s/p shifting & Lokelma. HD provided , , . Hyperkalemia improved, No further HD ordered.  - Hyponatremia (Na 131) addressed with 800ml fluid restriction; pt aware and adherent  - Paracentesis , ,     GI:    Last BM  (x2).   Bowel Regimen? [X] Yes: Miralax, senna  Octreotide ordered    Weights:   Daily Weight in k.1 (), Weight in k.7 (08-15), Weight in k.4 (), Weight in k.4 (), Weight in k.4 (), Weight in k.7 (), Weight in k.7 ()  DOSING Weight (kg): 72.6 (22) *consistent with UBW used for calculations  BMI: 29.3 kg/m2 (22)  IBW: 53.5 kg    Nutritionally Pertinent MEDICATIONS  (STANDING):  ampicillin/sulbactam  IVPB  midodrine.  octreotide  Injectable  pantoprazole    Tablet  polyethylene glycol 3350  senna    Pertinent Labs:  @ 00:38: Na 131<L>, BUN 53<H>, Cr 2.16<H>, <H>, K+ 3.9, Phos 4.3, Mg 1.9, Alk Phos 62, ALT/SGPT 12, AST/SGOT 25    A1C with Estimated Average Glucose Result: 4.9 % (22 @ 00:35)    Skin per nursing documentation: no pressure injuries documented  Edema: 2+ left/right leg    Estimated Needs: based on dosing wt 72.6kg, with consideration for BMI 29.3  Energy: 8353-4846 calories (25-30 kcal/kg)  Protein: 80-94 grams (1.1-1.3 gm/kg)    Previous Nutrition Diagnosis: 1) Malnutrition, moderate   Nutrition Diagnosis is: [X] ongoing; addressed with therapeutic diet and high protein supplements    Previous Nutrition Diagnosis: 2) Food and Nutrition Related Knowledge Deficit  Nutrition Diagnosis is: [X] ongoing; diet education provided to family     New Nutrition Diagnosis: [X] Not applicable    Nutrition Care Plan:  [X] In Progress  [] Achieved  [] Not applicable    Nutrition Interventions:     Education Provided:    - Transplant nutrition education provided to wife at bedside,   - Pt educated on 800ml flud restriction,        Recommendations:         [X] Continue current diet order: Renal; all high potassium foods removed from menu     [X] Oral nutrition supplement: High Protein Gelatin, High Protein Apple Juice (low potassium supplements) added to meal trays     [X] Other: Defer fluid restriction to team, currently 800ml    Monitoring and Evaluation:   Continue to monitor nutritional intake, tolerance to diet prescription, weights, labs, skin integrity      RD remains available upon request and will follow up per protocol  Marnie Richter MS RD CDN [facility] (pager [phone])

## 2022-08-17 NOTE — PROGRESS NOTE ADULT - SUBJECTIVE AND OBJECTIVE BOX
Interval Events:   No acute events overnight.  Paracentesis yesterday still with significant PMN count.  Slight Hg drop to 6.9 yesterday requiring 1U pRBC.    ROS:   12 point review of systems performed and negative except otherwise noted in HPI.    Hospital Medications:  ampicillin/sulbactam  IVPB 1.5 Gram(s) IV Intermittent every 6 hours  chlorhexidine 2% Cloths 1 Application(s) Topical <User Schedule>  furosemide   Injectable 60 milliGRAM(s) IV Push <User Schedule>  midodrine. 5 milliGRAM(s) Oral every 8 hours  octreotide  Injectable 100 MICROGram(s) IV Push every 8 hours  pantoprazole    Tablet 40 milliGRAM(s) Oral before breakfast  polyethylene glycol 3350 17 Gram(s) Oral two times a day  senna 2 Tablet(s) Oral at bedtime    MAR over past 24 hours:    albumin human  5% IVPB   125 mL/Hr IV Intermittent (08-16-22 @ 21:00)    ampicillin/sulbactam  IVPB   100 mL/Hr IV Intermittent (08-17-22 @ 11:07)   100 mL/Hr IV Intermittent (08-17-22 @ 05:38)   100 mL/Hr IV Intermittent (08-17-22 @ 00:12)   100 mL/Hr IV Intermittent (08-16-22 @ 17:34)    chlorhexidine 2% Cloths   1 Application(s) Topical (08-17-22 @ 05:38)    furosemide   Injectable   60 milliGRAM(s) IV Push (08-17-22 @ 05:38)   60 milliGRAM(s) IV Push (08-16-22 @ 17:16)    midodrine.   10 milliGRAM(s) Oral (08-17-22 @ 05:39)   10 milliGRAM(s) Oral (08-16-22 @ 22:53)   10 milliGRAM(s) Oral (08-16-22 @ 13:15)    octreotide  Injectable   100 MICROGram(s) IV Push (08-17-22 @ 10:03)   100 MICROGram(s) IV Push (08-17-22 @ 01:55)   100 MICROGram(s) IV Push (08-16-22 @ 17:17)    pantoprazole    Tablet   40 milliGRAM(s) Oral (08-17-22 @ 08:15)    polyethylene glycol 3350   17 Gram(s) Oral (08-17-22 @ 05:38)   17 Gram(s) Oral (08-16-22 @ 17:17)    senna   2 Tablet(s) Oral (08-16-22 @ 22:53)      Home Medications:  Last Order Reconciliation Date: 08-08-22 @ 06:30 (Admission Reconciliation)    PHYSICAL EXAM:   Vital Signs last 24 hours:  T(F): 99.3 (08-17-22 @ 10:00), Max: 99.3 (08-17-22 @ 10:00)  HR: 85 (08-17-22 @ 10:00) (67 - 118)  BP: 155/60 (08-17-22 @ 10:00) (106/58 - 155/60)  BP(mean): 87 (08-17-22 @ 10:00) (74 - 95)  ABP: --  ABP(mean): --  RR: 20 (08-17-22 @ 10:00) (12 - 29)  SpO2: 95% (08-17-22 @ 10:00) (95% - 100%)    I&Os:    08-16-22 @ 07:01  -  08-17-22 @ 07:00  --------------------------------------------------------  IN:    IV PiggyBack: 200 mL    IV PiggyBack: 100 mL    IV PiggyBack: 250 mL    Oral Fluid: 100 mL    PRBCs (Packed Red Blood Cells): 300 mL  Total IN: 950 mL    OUT:    Indwelling Catheter - Urethral (mL): 475 mL    T-Tube (mL): 20 mL    Voided (mL): 255 mL  Total OUT: 750 mL    Total NET: 200 mL      08-17-22 @ 07:01  -  08-17-22 @ 11:12  --------------------------------------------------------  IN:    Oral Fluid: 100 mL  Total IN: 100 mL    OUT:    Indwelling Catheter - Urethral (mL): 190 mL    T-Tube (mL): 45 mL  Total OUT: 235 mL    Total NET: -135 mL        BMI (kg/m2): 29.3 (08-09-22 @ 14:59)  GENERAL: no acute distress  NEURO: alert  HEENT: NCAT, no conjunctival pallor appreciated  CHEST: no respiratory distress, no accessory muscle use  CARDIAC: regular rate, +S1/S2  ABDOMEN: soft, distended, (sero)sanguinous output per perc rodney tube, no rebound or guarding  EXTREMITIES: warm, well perfused  SKIN: no lesions noted    DIAGNOSTICS:  WBC      Hg       PLT      Na       K        CO2     BUN      Cr       ALT      AST      TB       ALP  21.77    7.6      59       ------   ------   ------   ------   ------   ------   ------   ------   ------   08-17-22 @ 07:23  23.47    6.9      56       131      3.9      24       53       2.16     12       25       3.6      62       08-17-22 @ 00:38  30.74    7.6      45       131      3.6      25       45       2.22     15       29       3.5      79       08-15-22 @ 23:26  24.01    7.3      48       ------   ------   ------   ------   ------   ------   ------   ------   ------   08-15-22 @ 10:03  21.86    7.2      36       133      3.6      26       42       2.05     16       29       3.0      78       08-15-22 @ 05:51    PT             INR            MELDwith  MELDw/o  27.3           2.35           --             --             08-17-22 @ 00:38  25.9           2.21           --             --             08-15-22 @ 23:26  27.0           2.33           --             --             08-15-22 @ 05:51  25.8           2.21           --             --             08-13-22 @ 22:35  28.6           2.46           --             --             08-12-22 @ 22:06

## 2022-08-17 NOTE — PROGRESS NOTE ADULT - SUBJECTIVE AND OBJECTIVE BOX
Follow Up:  peritonitis    Interval History: continued abdominal tenderness. afebrile.     REVIEW OF SYSTEMS  [  ] ROS unobtainable because:    [ x ] All other systems negative except as noted below    Constitutional:  [ ] fever [ ] chills  [ ] weight loss  [ ] weakness  Skin:  [ ] rash [ ] phlebitis	  Eyes: [ ] icterus [ ] pain  [ ] discharge	  ENMT: [ ] sore throat  [ ] thrush [ ] ulcers [ ] exudates  Respiratory: [ ] dyspnea [ ] hemoptysis [ ] cough [ ] sputum	  Cardiovascular:  [ ] chest pain [ ] palpitations [ ] edema	  Gastrointestinal:  [ ] nausea [ ] vomiting [ ] diarrhea [ ] constipation [ x] pain	  Genitourinary:  [ ] dysuria [ ] frequency [ ] hematuria [ ] discharge [ ] flank pain  [ ] incontinence  Musculoskeletal:  [ ] myalgias [ ] arthralgias [ ] arthritis  [ ] back pain  Neurological:  [ ] headache [ ] seizures  [ ] confusion/altered mental status    Allergies  No Known Allergies        ANTIMICROBIALS:  ampicillin/sulbactam  IVPB 1.5 every 6 hours      OTHER MEDS:  MEDICATIONS  (STANDING):  furosemide   Injectable 60 <User Schedule>  midodrine. 5 every 8 hours  octreotide  Injectable 100 every 8 hours  pantoprazole    Tablet 40 before breakfast  polyethylene glycol 3350 17 two times a day  senna 2 at bedtime      Vital Signs Last 24 Hrs  T(C): 37.4 (17 Aug 2022 14:00), Max: 37.4 (17 Aug 2022 10:00)  T(F): 99.3 (17 Aug 2022 14:00), Max: 99.3 (17 Aug 2022 10:00)  HR: 81 (17 Aug 2022 15:00) (67 - 87)  BP: 130/65 (17 Aug 2022 15:00) (102/58 - 155/60)  BP(mean): 91 (17 Aug 2022 15:00) (74 - 95)  RR: 19 (17 Aug 2022 15:00) (12 - 29)  SpO2: 96% (17 Aug 2022 15:00) (95% - 100%)    PHYSICAL EXAMINATION:  General: Alert and Awake, NAD  Cardiac: RRR, No M/R/G  Resp: CTAB, No Wh/Rh/Ra  Abdomen: +Hepatobiliary Drain, Distended, No HSM, No rigidity or guarding  MSK: No LE edema. No Calf tenderness  Skin: No rashes or lesions. Skin is warm and dry to the touch.   Neuro: Alert and Awake. CN 2-12 Grossly intact. Moves all four extremities spontaneously.  Psych: Calm, Pleasant, Cooperative                          7.6    21.77 )-----------( 59       ( 17 Aug 2022 07:23 )             21.6           131<L>  |  94<L>  |  53<H>  ----------------------------<  133<H>  3.9   |  24  |  2.16<H>    Ca    8.3<L>      17 Aug 2022 00:38  Phos  4.3       Mg     1.9         TPro  5.4<L>  /  Alb  2.6<L>  /  TBili  3.6<H>  /  DBili  1.2<H>  /  AST  25  /  ALT  12  /  AlkPhos  62        Urinalysis Basic - ( 15 Aug 2022 17:07 )    Color: Yellow / Appearance: Clear / S.019 / pH: x  Gluc: x / Ketone: Negative  / Bili: Negative / Urobili: Negative   Blood: x / Protein: Trace / Nitrite: Negative   Leuk Esterase: Negative / RBC: 3-5 /hpf / WBC 3 /HPF   Sq Epi: x / Non Sq Epi: 2 /hpf / Bacteria: Few        MICROBIOLOGY:  v  Peritoneal Peritoneal Fluid  22   Testing in progress  --    polymorphonuclear leukocytes seen  Gram positive cocci in pairs seen  by cytocentrifuge      .Blood Blood-Peripheral  22   No growth to date.  --  --      Peritoneal Peritoneal Fluid  22   No growth  --    polymorphonuclear leukocytes seen  No organisms seen  by cytocentrifuge      .Blood Blood  22   No Growth Final  --  --      Peritoneal Peritoneal Fluid  22   Few Escherichia coli  Moderate Streptococcus anginosus "Susceptibilities not performed"  --  Escherichia coli      Catheterized Catheterized  22   10,000 - 49,000 CFU/mL Escherichia coli  --  Escherichia coli      .Blood Blood-Peripheral  22   No Growth Final  --  --      .Blood Blood-Peripheral  22   No Growth Final  --  --        CMV IgG Antibody: 4.00 U/mL (22 @ 00:32)    CMVPCR Log: Det <1.54 Assay Dynamic Range: 34.5 to 1.0E+07 IU/mL (1.54 to 7.00 Log10 IU/mL)  Assay lower limit of quantification (LLOQ) is 34.5 IU/mL (1.54 Log10  IU/mL)  CMV DNA detected below the LLOQ will be reported as Detected < 34.5 IU/mL  (<1.54 Log10 IU/mL)  Vipul Cytomegalovirus (CMV) is an FDA-cleared quantitative test that  enables the detection and quantitation of CMV DNA in EDTA plasma of  infected transplant patients on the vipul 8800 system. This test was  verified by [employer]. Results should be interpreted  with consideration of all clinical findings and laboratory findings and  should not form the sole basis for a diagnosis or treatment decision. Odb58TR/mL ( @ 23:58)        RADIOLOGY:    <The imaging below has been reviewed and visualized by me independently. Findings as detailed in report below>    < from: US Kidney and Bladder (22 @ 11:23) >  IMPRESSION:  Increased echogenicity of bilateral kidneys, suggestive of parenchymal   disease.    Multi quadrant complex ascites, moderate in volume    Bilateral pleural effusions    < end of copied text >

## 2022-08-17 NOTE — PROGRESS NOTE ADULT - PROBLEM SELECTOR PLAN 2
Stable for now; monitor off HD and with IV diuretics  -fluid restrict to 1L  -c/w Midodrine and albumin with paracentesis  - Avoid hypotension    If you have any questions, please feel free to contact me  Eduardo Whalen  Nephrology Fellow  [phone]; Prefer [employer] TEAMS  (After 5pm or on weekends please page the on-call fellow)

## 2022-08-18 LAB
-  AMIKACIN: SIGNIFICANT CHANGE UP
-  AMOXICILLIN/CLAVULANIC ACID: SIGNIFICANT CHANGE UP
-  AMPICILLIN/SULBACTAM: SIGNIFICANT CHANGE UP
-  AMPICILLIN: SIGNIFICANT CHANGE UP
-  AZTREONAM: SIGNIFICANT CHANGE UP
-  CEFAZOLIN: SIGNIFICANT CHANGE UP
-  CEFEPIME: SIGNIFICANT CHANGE UP
-  CEFOXITIN: SIGNIFICANT CHANGE UP
-  CEFTRIAXONE: SIGNIFICANT CHANGE UP
-  CIPROFLOXACIN: SIGNIFICANT CHANGE UP
-  ERTAPENEM: SIGNIFICANT CHANGE UP
-  GENTAMICIN: SIGNIFICANT CHANGE UP
-  IMIPENEM: SIGNIFICANT CHANGE UP
-  LEVOFLOXACIN: SIGNIFICANT CHANGE UP
-  MEROPENEM: SIGNIFICANT CHANGE UP
-  PIPERACILLIN/TAZOBACTAM: SIGNIFICANT CHANGE UP
-  TOBRAMYCIN: SIGNIFICANT CHANGE UP
-  TRIMETHOPRIM/SULFAMETHOXAZOLE: SIGNIFICANT CHANGE UP
ALBUMIN SERPL ELPH-MCNC: 2.4 G/DL — LOW (ref 3.3–5)
ALP SERPL-CCNC: 63 U/L — SIGNIFICANT CHANGE UP (ref 40–120)
ALT FLD-CCNC: 13 U/L — SIGNIFICANT CHANGE UP (ref 10–45)
ANION GAP SERPL CALC-SCNC: 10 MMOL/L — SIGNIFICANT CHANGE UP (ref 5–17)
APTT BLD: 31.9 SEC — SIGNIFICANT CHANGE UP (ref 27.5–35.5)
AST SERPL-CCNC: 29 U/L — SIGNIFICANT CHANGE UP (ref 10–40)
BASE EXCESS BLDV CALC-SCNC: 3.3 MMOL/L — HIGH (ref -2–3)
BILIRUB DIRECT SERPL-MCNC: 1.3 MG/DL — HIGH (ref 0–0.3)
BILIRUB INDIRECT FLD-MCNC: 3 MG/DL — HIGH (ref 0.2–1)
BILIRUB SERPL-MCNC: 4.3 MG/DL — HIGH (ref 0.2–1.2)
BLOOD GAS VENOUS - CREATININE: SIGNIFICANT CHANGE UP MG/DL (ref 0.5–1.3)
BUN SERPL-MCNC: 52 MG/DL — HIGH (ref 7–23)
CA-I SERPL-SCNC: 1.15 MMOL/L — SIGNIFICANT CHANGE UP (ref 1.15–1.33)
CALCIUM SERPL-MCNC: 8.4 MG/DL — SIGNIFICANT CHANGE UP (ref 8.4–10.5)
CHLORIDE BLDV-SCNC: 100 MMOL/L — SIGNIFICANT CHANGE UP (ref 96–108)
CHLORIDE SERPL-SCNC: 96 MMOL/L — SIGNIFICANT CHANGE UP (ref 96–108)
CO2 BLDV-SCNC: 29 MMOL/L — HIGH (ref 22–26)
CO2 SERPL-SCNC: 24 MMOL/L — SIGNIFICANT CHANGE UP (ref 22–31)
CREAT SERPL-MCNC: 1.77 MG/DL — HIGH (ref 0.5–1.3)
CULTURE RESULTS: NO GROWTH — SIGNIFICANT CHANGE UP
DNA PLOIDY SPEC FC-IMP: SIGNIFICANT CHANGE UP
EGFR: 42 ML/MIN/1.73M2 — LOW
GAS PNL BLDV: 130 MMOL/L — LOW (ref 136–145)
GAS PNL BLDV: SIGNIFICANT CHANGE UP
GAS PNL BLDV: SIGNIFICANT CHANGE UP
GLUCOSE BLDV-MCNC: 97 MG/DL — SIGNIFICANT CHANGE UP (ref 70–99)
GLUCOSE SERPL-MCNC: 126 MG/DL — HIGH (ref 70–99)
HCO3 BLDV-SCNC: 28 MMOL/L — SIGNIFICANT CHANGE UP (ref 22–29)
HCT VFR BLD CALC: 17.7 % — CRITICAL LOW (ref 39–50)
HCT VFR BLD CALC: 19.9 % — CRITICAL LOW (ref 39–50)
HCT VFR BLD CALC: 23 % — LOW (ref 39–50)
HCT VFR BLD CALC: 24.4 % — LOW (ref 39–50)
HCT VFR BLDA CALC: 25 % — LOW (ref 39–51)
HGB BLD CALC-MCNC: 8.4 G/DL — LOW (ref 12.6–17.4)
HGB BLD-MCNC: 6.1 G/DL — CRITICAL LOW (ref 13–17)
HGB BLD-MCNC: 6.8 G/DL — CRITICAL LOW (ref 13–17)
HGB BLD-MCNC: 8.1 G/DL — LOW (ref 13–17)
HGB BLD-MCNC: 8.4 G/DL — LOW (ref 13–17)
HOROWITZ INDEX BLDV+IHG-RTO: 21 — SIGNIFICANT CHANGE UP
INR BLD: 1.87 RATIO — HIGH (ref 0.88–1.16)
INR BLD: 2.3 RATIO — HIGH (ref 0.88–1.16)
LACTATE BLDV-MCNC: 1.7 MMOL/L — SIGNIFICANT CHANGE UP (ref 0.5–2)
MAGNESIUM SERPL-MCNC: 1.8 MG/DL — SIGNIFICANT CHANGE UP (ref 1.6–2.6)
MCHC RBC-ENTMCNC: 31 PG — SIGNIFICANT CHANGE UP (ref 27–34)
MCHC RBC-ENTMCNC: 31.3 PG — SIGNIFICANT CHANGE UP (ref 27–34)
MCHC RBC-ENTMCNC: 31.4 PG — SIGNIFICANT CHANGE UP (ref 27–34)
MCHC RBC-ENTMCNC: 31.9 PG — SIGNIFICANT CHANGE UP (ref 27–34)
MCHC RBC-ENTMCNC: 34.2 GM/DL — SIGNIFICANT CHANGE UP (ref 32–36)
MCHC RBC-ENTMCNC: 34.4 GM/DL — SIGNIFICANT CHANGE UP (ref 32–36)
MCHC RBC-ENTMCNC: 34.5 GM/DL — SIGNIFICANT CHANGE UP (ref 32–36)
MCHC RBC-ENTMCNC: 35.2 GM/DL — SIGNIFICANT CHANGE UP (ref 32–36)
MCV RBC AUTO: 89.1 FL — SIGNIFICANT CHANGE UP (ref 80–100)
MCV RBC AUTO: 90 FL — SIGNIFICANT CHANGE UP (ref 80–100)
MCV RBC AUTO: 91.7 FL — SIGNIFICANT CHANGE UP (ref 80–100)
MCV RBC AUTO: 92.7 FL — SIGNIFICANT CHANGE UP (ref 80–100)
METHOD TYPE: SIGNIFICANT CHANGE UP
NON-GYNECOLOGICAL CYTOLOGY STUDY: SIGNIFICANT CHANGE UP
NRBC # BLD: 0 /100 WBCS — SIGNIFICANT CHANGE UP (ref 0–0)
PCO2 BLDV: 41 MMHG — LOW (ref 42–55)
PH BLDV: 7.44 — HIGH (ref 7.32–7.43)
PHOSPHATE SERPL-MCNC: 3.6 MG/DL — SIGNIFICANT CHANGE UP (ref 2.5–4.5)
PHOSPHATIDYLETHANOL (PETH) - RESULT: NEGATIVE NG/ML — SIGNIFICANT CHANGE UP
PLATELET # BLD AUTO: 110 K/UL — LOW (ref 150–400)
PLATELET # BLD AUTO: 77 K/UL — LOW (ref 150–400)
PLATELET # BLD AUTO: 82 K/UL — LOW (ref 150–400)
PLATELET # BLD AUTO: 84 K/UL — LOW (ref 150–400)
PO2 BLDV: 38 MMHG — SIGNIFICANT CHANGE UP (ref 25–45)
POTASSIUM BLDV-SCNC: 3.9 MMOL/L — SIGNIFICANT CHANGE UP (ref 3.5–5.1)
POTASSIUM SERPL-MCNC: 4.2 MMOL/L — SIGNIFICANT CHANGE UP (ref 3.5–5.3)
POTASSIUM SERPL-SCNC: 4.2 MMOL/L — SIGNIFICANT CHANGE UP (ref 3.5–5.3)
PROT SERPL-MCNC: 5.5 G/DL — LOW (ref 6–8.3)
PROTHROM AB SERPL-ACNC: 21.6 SEC — HIGH (ref 10.5–13.4)
PROTHROM AB SERPL-ACNC: 26.9 SEC — HIGH (ref 10.5–13.4)
PTR INTERPRETATION: SIGNIFICANT CHANGE UP
RAPIDTEG MAXIMUM AMPLITUDE: 45 MM — SIGNIFICANT CHANGE UP (ref 52–70)
RBC # BLD: 1.91 M/UL — LOW (ref 4.2–5.8)
RBC # BLD: 2.17 M/UL — LOW (ref 4.2–5.8)
RBC # BLD: 2.58 M/UL — LOW (ref 4.2–5.8)
RBC # BLD: 2.71 M/UL — LOW (ref 4.2–5.8)
RBC # FLD: 16.3 % — HIGH (ref 10.3–14.5)
RBC # FLD: 16.5 % — HIGH (ref 10.3–14.5)
RBC # FLD: 16.6 % — HIGH (ref 10.3–14.5)
RBC # FLD: 18 % — HIGH (ref 10.3–14.5)
SAO2 % BLDV: 71.3 % — SIGNIFICANT CHANGE UP (ref 67–88)
SODIUM SERPL-SCNC: 130 MMOL/L — LOW (ref 135–145)
SPECIMEN SOURCE: SIGNIFICANT CHANGE UP
TEG FUNCTIONAL FIBRINOGEN: 14.2 MM — SIGNIFICANT CHANGE UP (ref 15–32)
TEG LY30 (LYSIS): 1.7 % — SIGNIFICANT CHANGE UP (ref 0–2.6)
TEG REACTION TIME: 5.7 MIN — SIGNIFICANT CHANGE UP (ref 4.6–9.1)
WBC # BLD: 17.4 K/UL — HIGH (ref 3.8–10.5)
WBC # BLD: 19.02 K/UL — HIGH (ref 3.8–10.5)
WBC # BLD: 20.14 K/UL — HIGH (ref 3.8–10.5)
WBC # BLD: 24.98 K/UL — HIGH (ref 3.8–10.5)
WBC # FLD AUTO: 17.4 K/UL — HIGH (ref 3.8–10.5)
WBC # FLD AUTO: 19.02 K/UL — HIGH (ref 3.8–10.5)
WBC # FLD AUTO: 20.14 K/UL — HIGH (ref 3.8–10.5)
WBC # FLD AUTO: 24.98 K/UL — HIGH (ref 3.8–10.5)

## 2022-08-18 PROCEDURE — 99233 SBSQ HOSP IP/OBS HIGH 50: CPT | Mod: GC

## 2022-08-18 PROCEDURE — 71250 CT THORAX DX C-: CPT | Mod: 26

## 2022-08-18 PROCEDURE — 74176 CT ABD & PELVIS W/O CONTRAST: CPT | Mod: 26

## 2022-08-18 PROCEDURE — 99232 SBSQ HOSP IP/OBS MODERATE 35: CPT | Mod: GC

## 2022-08-18 PROCEDURE — 99233 SBSQ HOSP IP/OBS HIGH 50: CPT

## 2022-08-18 RX ORDER — AMPICILLIN SODIUM AND SULBACTAM SODIUM 250; 125 MG/ML; MG/ML
3 INJECTION, POWDER, FOR SUSPENSION INTRAMUSCULAR; INTRAVENOUS EVERY 6 HOURS
Refills: 0 | Status: DISCONTINUED | OUTPATIENT
Start: 2022-08-18 | End: 2022-09-01

## 2022-08-18 RX ORDER — PENICILLIN G BENZATHINE 1200000 [IU]/2ML
2.4 INJECTION, SUSPENSION INTRAMUSCULAR
Refills: 0 | Status: COMPLETED | OUTPATIENT
Start: 2022-08-18 | End: 2022-09-01

## 2022-08-18 RX ORDER — MAGNESIUM SULFATE 500 MG/ML
2 VIAL (ML) INJECTION ONCE
Refills: 0 | Status: COMPLETED | OUTPATIENT
Start: 2022-08-18 | End: 2022-08-18

## 2022-08-18 RX ADMIN — OCTREOTIDE ACETATE 100 MICROGRAM(S): 200 INJECTION, SOLUTION INTRAVENOUS; SUBCUTANEOUS at 01:06

## 2022-08-18 RX ADMIN — Medication 25 GRAM(S): at 06:18

## 2022-08-18 RX ADMIN — PENICILLIN G BENZATHINE 2.4 MILLION UNIT(S): 1200000 INJECTION, SUSPENSION INTRAMUSCULAR at 18:30

## 2022-08-18 RX ADMIN — AMPICILLIN SODIUM AND SULBACTAM SODIUM 100 GRAM(S): 250; 125 INJECTION, POWDER, FOR SUSPENSION INTRAMUSCULAR; INTRAVENOUS at 18:18

## 2022-08-18 RX ADMIN — AMPICILLIN SODIUM AND SULBACTAM SODIUM 100 GRAM(S): 250; 125 INJECTION, POWDER, FOR SUSPENSION INTRAMUSCULAR; INTRAVENOUS at 00:00

## 2022-08-18 RX ADMIN — SENNA PLUS 2 TABLET(S): 8.6 TABLET ORAL at 21:55

## 2022-08-18 RX ADMIN — CHLORHEXIDINE GLUCONATE 1 APPLICATION(S): 213 SOLUTION TOPICAL at 05:09

## 2022-08-18 RX ADMIN — AMPICILLIN SODIUM AND SULBACTAM SODIUM 100 GRAM(S): 250; 125 INJECTION, POWDER, FOR SUSPENSION INTRAMUSCULAR; INTRAVENOUS at 05:46

## 2022-08-18 RX ADMIN — MIDODRINE HYDROCHLORIDE 5 MILLIGRAM(S): 2.5 TABLET ORAL at 21:54

## 2022-08-18 RX ADMIN — MIDODRINE HYDROCHLORIDE 5 MILLIGRAM(S): 2.5 TABLET ORAL at 05:48

## 2022-08-18 RX ADMIN — AMPICILLIN SODIUM AND SULBACTAM SODIUM 100 GRAM(S): 250; 125 INJECTION, POWDER, FOR SUSPENSION INTRAMUSCULAR; INTRAVENOUS at 14:03

## 2022-08-18 RX ADMIN — OCTREOTIDE ACETATE 100 MICROGRAM(S): 200 INJECTION, SOLUTION INTRAVENOUS; SUBCUTANEOUS at 18:21

## 2022-08-18 RX ADMIN — OCTREOTIDE ACETATE 100 MICROGRAM(S): 200 INJECTION, SOLUTION INTRAVENOUS; SUBCUTANEOUS at 09:06

## 2022-08-18 RX ADMIN — MIDODRINE HYDROCHLORIDE 5 MILLIGRAM(S): 2.5 TABLET ORAL at 14:03

## 2022-08-18 RX ADMIN — PANTOPRAZOLE SODIUM 40 MILLIGRAM(S): 20 TABLET, DELAYED RELEASE ORAL at 08:41

## 2022-08-18 NOTE — PROGRESS NOTE ADULT - ATTENDING COMMENTS
66 year old man with decompensated cirrhosis admitted with ROBBIE and cholangitis s/p cholecystectomy tube placement on 8/9/22    N - A&Ox4, Pain controlled  R - Respiratory status stable on room air   C - SBP 98 - 119. Continue midodrine 5  GI - Cholangitis s/p perc rodney. Drain with 125 cc bilious output in 24 hours.   - Cirrhosis with stable D. Bili stable at 1.3. Plan for IR peritoneal drain today    - ROBBIE secondary to HRS improving with Cr 1.7 from 2.1.   - Will hold lasix today due to episode of hypotension. /24 hrs, net positive 280.   H - Hb trended down to 6.1 overnight now s/p 2 U RBC.   - Coagulapathy secondary to cirrhosis. Plt increased to 110 from 45. INR stable 2.3. Repeat CBC and INR at noon.  - Currently holding DVT ppx.   ID - Acute cholecystitis s/p Cholecystostomy tube with cx growing E. Coli and Strep Anginosus. Afebrile, WBC stable at 24 down from peak of 30s. On Unasyn as per ID recs. Will F/up RE duration of Abx and +FTA syphilis test.  E - Glucose unremarkable

## 2022-08-18 NOTE — PROGRESS NOTE ADULT - ASSESSMENT
66 year old male with decompensated cirrhosis and cholangitis s/p ERCP with stent placement (4/2022) s/p stent removal on 7/20 (along sphincterotomy and stone extraction) presenting for one week of RUQ abdominal pain associated with new abdominal distension and decreased appetite.     #Septic shock 2/2 acute cholecystitis vs ascending cholangitis  #ROBBIE: initial urine sodium 9, likely indicating prerenal ROBBIE vs less likely HRS given initial presentation as above  #History of cholangitis s/p biliary stent placement April 2022 and removal 7/20/2022, now s/p perc rodney tube  #Anemia  #Decompensated ALD/PARNELL cirrhosis c/b ascites  EV: normal esophagus on ERCP from 4/2022  Ascites: increase in ascites from mild to moderate on current imaging  SBP: paracentesis 8/9/2022 c/w peritonitis given 99K PMNs however elevated LDH more indicative of secondary bacterial peritonitis  HE: none currently       -MELD-Na = 30 on 8/18/2022       -Ascites: yes       -SBP: paracentesis 8/9/2022 reveals likely secondary bacterial peritonitis from suspected gallbladder pathology       -Varices: no mention of varices on EGD/ERCP on 7/20/2022       -Hepatic encephalopathy: Ammonia, Serum: 38 umol/L [11 - 55] (08-12-22 @ 00:21)       -HCC: unknown       -LT candidacy and evaluation:            [ ] Psychosocial            [ ] Infectious            [ ] Cardiology:                    Cardiac cath:                    Stress test: pending            [ ] Colonoscopy: needed, awaiting records from outside GI colonoscopy reports            [x] Prostate: Prostate Specific Antigen: negative at 2.21 ng/mL on 8/12/2022            [x] Dental            [x] PT/Frailty    Recommendations:  -trend clinical symptoms, exam findings, vital signs, CBC, CMP, INR  -s/p percutaneous cholecystostomy as above  -likely secondary bacterial peritonitis based on paracentesis and clinical findings, CT A/P negative for perforation however performed without contrast 2/2 renal function; s/p repeat paracentesis 8/12/2022 and again 8/16/2022 with significant PMN count  -would favor ATN as likely etiology, however given worsening ROBBIE continue midodrine/octreotide for empiric treatment for HRS-ROBBIE, hold IV albumin given pulmonary congestion  -initiated hemodialysis 8/11/2022, now off with intermittent diuresis and renal recovery  -liver transplantation evaluation opened  -PPI daily for stress prophylaxis  -appreciate transplant nephrology consultation  -will need dobutamine stress test for cardiac evaluation, however likely needs stabilization of anemia first  -IR consultation for peritoneal/abdominal drain, planning for today  -transfusion with IV diuresis  -would likely benefit from abdominal imaging, however likely low utility given renal function has precluded use of IV contrast    Note incomplete until finalized by attending signature/attestation.    Fadi Churchill  GI/Hepatology Fellow    MONDAY-FRIDAY 8AM-5PM:  Pager# [phone] ([facility]) or [phone] ([facility])    NON-URGENT CONSULTS:  Please email [email or web address]  AT NIGHT AND ON WEEKENDS:  Contact on-call GI fellow via answering service ([phone]) from 5pm-8am and on weekends/holidays

## 2022-08-18 NOTE — PROGRESS NOTE ADULT - SUBJECTIVE AND OBJECTIVE BOX
Transplant Surgery - Multidisciplinary Rounds  --------------------------------------------------------------    Present: Patient seen and examined with Transplant Surgeon Dr. Velasco, Hepatologist Dr. Monson, JOHN Flores, Pharmacist Jordy Collins, Dr Lindsey ([facility]), Dr Bowen ([facility]), Dr Churchill (GI fellow) during am rounds.   Disciplines not in attendance will be notified of plan.     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 coli and Streptococcus anginosis.  - Ascitic fluid studies from paracentesis (8/9) most consistent with secondary peritonitis with concern for possible perforated viscus (likely gallbladder) given markedly elevated ascitic fluid LDH.   - Repeat paracentesis (8/12) still consistent with secondary peritonitis but with improving neutrophil count from 99k to 8k. Ascitic fluid bilirubin similar to serum, suggesting against bile leak.  - HD for ATN vs. HRS (8/11-13)    Interval Events:  - received 1uprbc due to drop in hgb to 6.1  - received 1uffp  - midodrine dose decreased from 10 to 5    MEDICATIONS  (STANDING):  ampicillin/sulbactam  IVPB 1.5 Gram(s) IV Intermittent every 6 hours  chlorhexidine 2% Cloths 1 Application(s) Topical <User Schedule>  midodrine. 5 milliGRAM(s) Oral every 8 hours  octreotide  Injectable 100 MICROGram(s) IV Push every 8 hours  pantoprazole    Tablet 40 milliGRAM(s) Oral before breakfast  polyethylene glycol 3350 17 Gram(s) Oral two times a day  senna 2 Tablet(s) Oral at bedtime    MEDICATIONS  (PRN):      PAST MEDICAL & SURGICAL HISTORY:  H/O acute cholangitis      2019 novel coronavirus disease (COVID-19)  12/24/2021  no hospitalization, no treatment      S/P ERCP  4/2022      H/O colonoscopy          Vital Signs Last 24 Hrs  T(C): 37.1 (18 Aug 2022 08:00), Max: 37.6 (17 Aug 2022 17:00)  T(F): 98.8 (18 Aug 2022 08:00), Max: 99.7 (17 Aug 2022 17:00)  HR: 85 (18 Aug 2022 10:00) (69 - 107)  BP: 119/58 (18 Aug 2022 10:00) (98/55 - 140/70)  BP(mean): 82 (18 Aug 2022 10:00) (69 - 99)  RR: 17 (18 Aug 2022 10:00) (12 - 23)  SpO2: 97% (18 Aug 2022 10:00) (93% - 99%)    Parameters below as of 17 Aug 2022 23:00  Patient On (Oxygen Delivery Method): room air        I&O's Summary    17 Aug 2022 07:01  -  18 Aug 2022 07:00  --------------------------------------------------------  IN: 1350 mL / OUT: 1530 mL / NET: -180 mL    18 Aug 2022 07:01  -  18 Aug 2022 10:14  --------------------------------------------------------  IN: 300 mL / OUT: 85 mL / NET: 215 mL                              6.1    24.98 )-----------( 110      ( 18 Aug 2022 04:53 )             17.7     08-18    130<L>  |  96  |  52<H>  ----------------------------<  126<H>  4.2   |  24  |  1.77<H>    Ca    8.4      18 Aug 2022 04:53  Phos  3.6     08-18  Mg     1.8     08-18    TPro  5.5<L>  /  Alb  2.4<L>  /  TBili  4.3<H>  /  DBili  1.3<H>  /  AST  29  /  ALT  13  /  AlkPhos  63  08-18    Review of systems  Gen: No weight changes, fatigue, fevers/chills, weakness  Skin: No rashes  Head/Eyes/Ears/Mouth: No headache; Normal hearing; Normal vision w/o blurriness; No sinus pain/discomfort, sore throat  Respiratory: No dyspnea, cough, wheezing, hemoptysis  CV: No chest pain, PND, orthopnea  GI: Mild abdominal pain ; denies diarrhea, constipation, nausea, vomiting, melena, hematochezia  : No increased frequency, dysuria, hematuria, nocturia  MSK: No joint pain/swelling; no back pain; no edema  Neuro: No dizziness/lightheadedness, weakness, seizures, numbness, tingling  Heme: No easy bruising or bleeding  Endo: No heat/cold intolerance  Psych: No significant nervousness, anxiety, stress, depression  All other systems were reviewed and are negative, except as noted.      PHYSICAL EXAM:  Constitutional: Well developed / well nourished  Eyes: Anicteric, PERRLA  ENMT: nc/at  Neck: supple  Respiratory: CTA B/L  Cardiovascular: RRR  Gastrointestinal: Soft, distention improving, improving TTP, C-tube with bilio-sanguinous drainage  Genitourinary: voiding   Extremities: SCD's in place and working bilaterally, improving edema  Vascular: Palpable dp pulses bilaterally  Neurological: A&O x3  Skin: no rashes, ulcerations or lesions;  Musculoskeletal: Moving all extremities  Psychiatric: Responsive

## 2022-08-18 NOTE — PROGRESS NOTE ADULT - SUBJECTIVE AND OBJECTIVE BOX
HISTORY:    24 HOUR EVENTS:  - 10 IV vit K for elevated INR  - Patient going with IR for peritoneal drain on 8/18  - 1u pRBC today  - CBC changed to qday  - Lasix 60 BID    NEURO  Exam: AOx4   Meds: None    RESPIRATORY  RR: 13 (08-18-22 @ 01:00) (12 - 29)  SpO2: 98% (08-18-22 @ 01:00) (95% - 100%)  Exam: Symmetric chest rise, non-labored breathing    Meds:     CARDIOVASCULAR  HR: 88 (08-18-22 @ 01:00) (68 - 90)  BP: 119/57 (08-18-22 @ 01:00) (102/58 - 155/60)  BP(mean): 80 (08-18-22 @ 01:00) (74 - 99)    Exam:   Cardiac Rhythm:   Perfusion     [X ]Adequate   [ ]Inadequate  Mentation   [X ]Normal       [ ]Reduced  Extremities  [X ]Warm         [ ]Cool  Volume Status [X ]Hypervolemic [ ]Euvolemic [ ]Hypovolemic  Meds: furosemide   Injectable 60 milliGRAM(s) IV Push <User Schedule>  midodrine. 5 milliGRAM(s) Oral every 8 hours    GI/NUTRITION  Exam: Soft, nontender, distended  Diet: NPO s/p midnight for IR procedure tomorrow  Meds: pantoprazole    Tablet 40 milliGRAM(s) Oral before breakfast  polyethylene glycol 3350 17 Gram(s) Oral two times a day  senna 2 Tablet(s) Oral at bedtime      GENITOURINARY  I&O's Detail    08-16 @ 07:01  -  08-17 @ 07:00  --------------------------------------------------------  IN:    IV PiggyBack: 200 mL    IV PiggyBack: 100 mL    IV PiggyBack: 250 mL    Oral Fluid: 100 mL    PRBCs (Packed Red Blood Cells): 300 mL  Total IN: 950 mL    OUT:    Indwelling Catheter - Urethral (mL): 475 mL    T-Tube (mL): 20 mL    Voided (mL): 255 mL  Total OUT: 750 mL    Total NET: 200 mL      08-17 @ 07:01  -  08-18 @ 01:44  --------------------------------------------------------  IN:    IV PiggyBack: 200 mL    IV PiggyBack: 150 mL    Oral Fluid: 300 mL  Total IN: 650 mL    OUT:    Indwelling Catheter - Urethral (mL): 1280 mL    T-Tube (mL): 95 mL  Total OUT: 1375 mL    Total NET: -725 mL      08-17    131<L>  |  94<L>  |  53<H>  ----------------------------<  133<H>  3.9   |  24  |  2.16<H>    Ca    8.3<L>      17 Aug 2022 00:38  Phos  4.3     08-17  Mg     1.9     08-17    TPro  5.4<L>  /  Alb  2.6<L>  /  TBili  3.6<H>  /  DBili  1.2<H>  /  AST  25  /  ALT  12  /  AlkPhos  62  08-17    Meds: Fursomide 40mg IV, 5mg midodrine q8hrs, octreotide 100mg q8hrs     HEMATOLOGIC  Meds:                         7.6    21.77 )-----------( 59       ( 17 Aug 2022 07:23 )             21.6     PT/INR - ( 17 Aug 2022 00:38 )   PT: 27.3 sec;   INR: 2.35 ratio         PTT - ( 17 Aug 2022 00:38 )  PTT:35.6 sec    INFECTIOUS DISEASES  T(C): 36.3 (08-17-22 @ 23:00), Max: 37.6 (08-17-22 @ 17:00)  Wt(kg): --  WBC Count: 21.77 K/uL (08-17 @ 07:23)    Recent Cultures:  Specimen Source: Peritoneal Peritoneal Fluid, 08-16 @ 18:42; Results   Rare Escherichia coli; Gram Stain:   polymorphonuclear leukocytes seen  Gram positive cocci in pairs seen  by cytocentrifuge; Organism: --  Specimen Source: .Blood Blood-Peripheral, 08-16 @ 06:05; Results   No growth to date.; Gram Stain: --; Organism: --  Specimen Source: Peritoneal Peritoneal Fluid, 08-12 @ 17:26; Results   No growth; Gram Stain:   polymorphonuclear leukocytes seen  No organisms seen  by cytocentrifuge; Organism: --  Specimen Source: .Blood Blood, 08-11 @ 23:28; Results   No Growth Final; Gram Stain: --; Organism: --    Meds: ampicillin/sulbactam  IVPB 1.5 Gram(s) IV Intermittent every 6 hours      ENDOCRINE  Capillary Blood Glucose    Meds: octreotide  Injectable 100 MICROGram(s) IV Push every 8 hours      OTHER MEDICATIONS:  chlorhexidine 2% Cloths 1 Application(s) Topical <User Schedule>   HISTORY:    24 HOUR EVENTS:  - 10 IV vit K for elevated INR  - Patient going with IR for peritoneal drain on 8/18  - 1u pRBC today  - CBC changed to qday  - Lasix 60 BID  -H/H drop to 6.1/17.7, s/p 1up RBCs  -INR AM 2.3,- s/p 1uFFP    NEURO  Exam: AOx4   Meds: None    RESPIRATORY  RR: 13 (08-18-22 @ 01:00) (12 - 29)  SpO2: 98% (08-18-22 @ 01:00) (95% - 100%)  Exam: Symmetric chest rise, non-labored breathing    Meds:     CARDIOVASCULAR  HR: 88 (08-18-22 @ 01:00) (68 - 90)  BP: 119/57 (08-18-22 @ 01:00) (102/58 - 155/60)  BP(mean): 80 (08-18-22 @ 01:00) (74 - 99)    Exam:   Cardiac Rhythm:   Perfusion     [X ]Adequate   [ ]Inadequate  Mentation   [X ]Normal       [ ]Reduced  Extremities  [X ]Warm         [ ]Cool  Volume Status [X ]Hypervolemic [ ]Euvolemic [ ]Hypovolemic  Meds: furosemide   Injectable 60 milliGRAM(s) IV Push <User Schedule>  midodrine. 5 milliGRAM(s) Oral every 8 hours    GI/NUTRITION  Exam: Soft, nontender, distended  Diet: NPO s/p midnight for IR procedure tomorrow  Meds: pantoprazole    Tablet 40 milliGRAM(s) Oral before breakfast  polyethylene glycol 3350 17 Gram(s) Oral two times a day  senna 2 Tablet(s) Oral at bedtime      GENITOURINARY  I&O's Detail    08-16 @ 07:01  -  08-17 @ 07:00  --------------------------------------------------------  IN:    IV PiggyBack: 200 mL    IV PiggyBack: 100 mL    IV PiggyBack: 250 mL    Oral Fluid: 100 mL    PRBCs (Packed Red Blood Cells): 300 mL  Total IN: 950 mL    OUT:    Indwelling Catheter - Urethral (mL): 475 mL    T-Tube (mL): 20 mL    Voided (mL): 255 mL  Total OUT: 750 mL    Total NET: 200 mL      08-17 @ 07:01  -  08-18 @ 01:44  --------------------------------------------------------  IN:    IV PiggyBack: 200 mL    IV PiggyBack: 150 mL    Oral Fluid: 300 mL  Total IN: 650 mL    OUT:    Indwelling Catheter - Urethral (mL): 1280 mL    T-Tube (mL): 95 mL  Total OUT: 1375 mL    Total NET: -725 mL      08-17    131<L>  |  94<L>  |  53<H>  ----------------------------<  133<H>  3.9   |  24  |  2.16<H>    Ca    8.3<L>      17 Aug 2022 00:38  Phos  4.3     08-17  Mg     1.9     08-17    TPro  5.4<L>  /  Alb  2.6<L>  /  TBili  3.6<H>  /  DBili  1.2<H>  /  AST  25  /  ALT  12  /  AlkPhos  62  08-17    Meds: Fursomide 40mg IV, 5mg midodrine q8hrs, octreotide 100mg q8hrs     HEMATOLOGIC  Meds:                         7.6    21.77 )-----------( 59       ( 17 Aug 2022 07:23 )             21.6     PT/INR - ( 17 Aug 2022 00:38 )   PT: 27.3 sec;   INR: 2.35 ratio         PTT - ( 17 Aug 2022 00:38 )  PTT:35.6 sec    INFECTIOUS DISEASES  T(C): 36.3 (08-17-22 @ 23:00), Max: 37.6 (08-17-22 @ 17:00)  Wt(kg): --  WBC Count: 21.77 K/uL (08-17 @ 07:23)    Recent Cultures:  Specimen Source: Peritoneal Peritoneal Fluid, 08-16 @ 18:42; Results   Rare Escherichia coli; Gram Stain:   polymorphonuclear leukocytes seen  Gram positive cocci in pairs seen  by cytocentrifuge; Organism: --  Specimen Source: .Blood Blood-Peripheral, 08-16 @ 06:05; Results   No growth to date.; Gram Stain: --; Organism: --  Specimen Source: Peritoneal Peritoneal Fluid, 08-12 @ 17:26; Results   No growth; Gram Stain:   polymorphonuclear leukocytes seen  No organisms seen  by cytocentrifuge; Organism: --  Specimen Source: .Blood Blood, 08-11 @ 23:28; Results   No Growth Final; Gram Stain: --; Organism: --    Meds: ampicillin/sulbactam  IVPB 1.5 Gram(s) IV Intermittent every 6 hours      ENDOCRINE  Capillary Blood Glucose    Meds: octreotide  Injectable 100 MICROGram(s) IV Push every 8 hours      OTHER MEDICATIONS:  chlorhexidine 2% Cloths 1 Application(s) Topical <User Schedule>

## 2022-08-18 NOTE — PROGRESS NOTE ADULT - ASSESSMENT
Pt is a 65 y/o Male with a PMH Cirrhosis and recent cholangitis s/p ERCP stent (4/22) and stent removal (7/20/22). Pt presented on 8/8 with increasing RUQ pain. CT Abd/Pelvis reported distended gallbladder with stones in the cystic duct, gallbladder wall thickening, suggesting chronic cholecystitis. Pt had a percutaneous cholecystostomy tube placed on 8/09 along with a paracentesis yielding 300cc of purulent fluid. Pt admitted to SICU for further hemodynamic monitoring and management.     PLAN:   Neurologic: A, O x3    Respiratory: Atelectasis  - Maintain SpO2 > 92%  - Encourage OOB, Incentive spirometry, and chest PT  - AM CXR    Cardiovascular: Sinus tachycardia resolved   - Maintain MAP > 65     Gastrointestinal/Nutrition: cirrhosis (MELD 34), cholecystitis s/p per rodney tube and diagnostic paracentesis (8/09)   - RD  - CT Abd/ Pelvis w/ Iv contrast ( 8/08)- distended gallbladder with stones in the cystic duct, gallbladder wall thickening, suggesting chronic cholecystitis  - GI not concerned for cholangitis due to negative for bile duct dilation  - Bowel regimen Miralax, senna and dulcolax supp  - Octreotide 100mg q8hr   -midodrine 10mg for hepatorenal syndrome  - Repeat CT Abd/Pelvis limited study; ordered RUQ Sono to eval for Gallbladder perforation->was found unremarkable  - Transplant hepatology consulted, reccs appreciated   -paracentesis  8/16 2.9L removed, s/p 250cc 5% albumin   - Patient scheduled for peritoneal drain with IR on 8/18    Genitourinary/Renal: ROBBIE and hyperkalemia s/p shifting& Lokelma, urethral stricture, Hyponatremia with severe metabolic acidosis.   - Tobin placed ON  - 60 Lasix BID  - s/p 250cc 25% albumin  - Hyperkalemia improved  -no need for further HD at the moment    Hematologic: coagulopathy of liver failure/ sepsis  - Monitor CBC and Coags   - SCDs for mechanical VTE ppx   - Hold chemical VTE ppx  - s/p 1upRBCs  - Vit K 10mg IV given for elevated INR, f/u coags in AM for goal INR < 2  - cbc qday    Infectious Disease: sepsis  - F/u BCx ( 8/9) NGTD   - UCx ( 8/8) + for E. Coli , Bile Cx ( 8/9) + E. Coli & Streptococcus anginosus  - Continue unasyn  - Grew Streptococcus anginosus in bile culture.     Endocrine: no acute issues  - Monitor glucose on BMP    Code Status: Full Code   Disposition: SICU   Pt is a 67 y/o Male with a PMH Cirrhosis and recent cholangitis s/p ERCP stent (4/22) and stent removal (7/20/22). Pt presented on 8/8 with increasing RUQ pain. CT Abd/Pelvis reported distended gallbladder with stones in the cystic duct, gallbladder wall thickening, suggesting chronic cholecystitis. Pt had a percutaneous cholecystostomy tube placed on 8/09 along with a paracentesis yielding 300cc of purulent fluid. Pt admitted to SICU for further hemodynamic monitoring and management.     PLAN:   Neurologic: A, O x3    Respiratory: Atelectasis  - Maintain SpO2 > 92%  - Encourage OOB, Incentive spirometry, and chest PT  - AM CXR    Cardiovascular: Sinus tachycardia resolved   - Maintain MAP > 65     Gastrointestinal/Nutrition: cirrhosis (MELD 34), cholecystitis s/p per rodney tube and diagnostic paracentesis (8/09)   - RD  - CT Abd/ Pelvis w/ Iv contrast ( 8/08)- distended gallbladder with stones in the cystic duct, gallbladder wall thickening, suggesting chronic cholecystitis  - GI not concerned for cholangitis due to negative for bile duct dilation  - Bowel regimen Miralax, senna and dulcolax supp  - Octreotide 100mg q8hr   -midodrine 10mg for hepatorenal syndrome  - Repeat CT Abd/Pelvis limited study; ordered RUQ Sono to eval for Gallbladder perforation->was found unremarkable  - Transplant hepatology consulted, reccs appreciated   -paracentesis  8/16 2.9L removed, s/p 250cc 5% albumin   - Patient scheduled for peritoneal drain with IR on 8/18    Genitourinary/Renal: ROBBIE and hyperkalemia s/p shifting& Lokelma, urethral stricture, Hyponatremia with severe metabolic acidosis.   - Tobin placed ON  - 60 Lasix BID  - s/p 250cc 25% albumin  - Hyperkalemia improved  -no need for further HD at the moment    Hematologic: coagulopathy of liver failure/ sepsis  - Monitor CBC and Coags   - SCDs for mechanical VTE ppx   - Hold chemical VTE ppx  - s/p 1upRBCs  - Vit K 10mg IV given for elevated INR, f/u coags in AM for goal INR < 2  - If INR above goal in AM will give 1 FFP  - cbc qday    Infectious Disease: sepsis  - F/u BCx ( 8/9) NGTD   - UCx ( 8/8) + for E. Coli , Bile Cx ( 8/9) + E. Coli & Streptococcus anginosus  - Continue unasyn  - Grew Streptococcus anginosus in bile culture.     Endocrine: no acute issues  - Monitor glucose on BMP    Code Status: Full Code   Disposition: SICU   Pt is a 65 y/o Male with a PMH Cirrhosis and recent cholangitis s/p ERCP stent (4/22) and stent removal (7/20/22). Pt presented on 8/8 with increasing RUQ pain. CT Abd/Pelvis reported distended gallbladder with stones in the cystic duct, gallbladder wall thickening, suggesting chronic cholecystitis. Pt had a percutaneous cholecystostomy tube placed on 8/09 along with a paracentesis yielding 300cc of purulent fluid. Pt admitted to SICU for further hemodynamic monitoring and management.     PLAN:   Neurologic: A, O x3    Respiratory: Atelectasis  - Maintain SpO2 > 92%  - Encourage OOB, Incentive spirometry, and chest PT  - AM CXR    Cardiovascular: Sinus tachycardia resolved   - Maintain MAP > 65     Gastrointestinal/Nutrition: cirrhosis (MELD 34), cholecystitis s/p per rodney tube and diagnostic paracentesis (8/09)   - RD  - CT Abd/ Pelvis w/ Iv contrast ( 8/08)- distended gallbladder with stones in the cystic duct, gallbladder wall thickening, suggesting chronic cholecystitis  - GI not concerned for cholangitis due to negative for bile duct dilation  - Bowel regimen Miralax, senna and dulcolax supp  - Octreotide 100mg q8hr   -midodrine 10mg for hepatorenal syndrome  - Repeat CT Abd/Pelvis limited study; ordered RUQ Sono to eval for Gallbladder perforation->was found unremarkable  - Transplant hepatology consulted, reccs appreciated   -paracentesis  8/16 2.9L removed, s/p 250cc 5% albumin   - Patient scheduled for peritoneal drain with IR on 8/18    Genitourinary/Renal: ROBBIE and hyperkalemia s/p shifting& Lokelma, urethral stricture, Hyponatremia with severe metabolic acidosis.   - Tobin placed ON  - 60 Lasix BID  - s/p 250cc 25% albumin  - Hyperkalemia improved  -no need for further HD at the moment    Hematologic: coagulopathy of liver failure/ sepsis  - Monitor CBC and Coags   - SCDs for mechanical VTE ppx   - Hold chemical VTE ppx  - s/p 2upRBCs  - Vit K 10mg IV given for elevated INR, f/u coags in AM for goal INR < 2  - If INR above goal in AM will give 1 FFP  - cbc qday    Infectious Disease: sepsis  - F/u BCx ( 8/9) NGTD   - UCx ( 8/8) + for E. Coli , Bile Cx ( 8/9) + E. Coli & Streptococcus anginosus  - Continue unasyn  - Grew Streptococcus anginosus in bile culture.     Endocrine: no acute issues  - Monitor glucose on BMP    Code Status: Full Code   Disposition: SICU

## 2022-08-18 NOTE — PROGRESS NOTE ADULT - SUBJECTIVE AND OBJECTIVE BOX
[facility] DIVISION OF KIDNEY DISEASES AND HYPERTENSION -- FOLLOW UP NOTE  --------------------------------------------------------------------------------  Chief Complaint: Acute cholecystitis    24 hour events/subjective:  Pt. seen this AM, denies any acute issues. For cather placement today. SCr. improving, good UOP.         PAST HISTORY  --------------------------------------------------------------------------------  No significant changes to PMH, PSH, FHx, SHx, unless otherwise noted    ALLERGIES & MEDICATIONS  --------------------------------------------------------------------------------  Allergies    No Known Allergies    Intolerances      Standing Inpatient Medications  ampicillin/sulbactam  IVPB 1.5 Gram(s) IV Intermittent every 6 hours  chlorhexidine 2% Cloths 1 Application(s) Topical <User Schedule>  furosemide   Injectable 60 milliGRAM(s) IV Push <User Schedule>  midodrine. 5 milliGRAM(s) Oral every 8 hours  octreotide  Injectable 100 MICROGram(s) IV Push every 8 hours  pantoprazole    Tablet 40 milliGRAM(s) Oral before breakfast  polyethylene glycol 3350 17 Gram(s) Oral two times a day  senna 2 Tablet(s) Oral at bedtime    PRN Inpatient Medications      REVIEW OF SYSTEMS  --------------------------------------------------------------------------------  Gen: No fevers/chills  Skin: No rashes  Head/Eyes/Ears: Normal hearing,   Respiratory: No dyspnea, cough  CV: No chest pain  GI: No abdominal pain, diarrhea  : No dysuria, hematuria  MSK: No  edema  Heme: No easy bruising or bleeding  Psych: No significant depression    All other systems were reviewed and are negative, except as noted.      VITALS/PHYSICAL EXAM  --------------------------------------------------------------------------------  T(C): 36.6 (08-18-22 @ 04:00), Max: 37.6 (08-17-22 @ 17:00)  HR: 84 (08-18-22 @ 07:00) (69 - 107)  BP: 118/57 (08-18-22 @ 07:00) (98/55 - 155/60)  RR: 13 (08-18-22 @ 07:00) (12 - 23)  SpO2: 98% (08-18-22 @ 07:00) (95% - 100%)  Wt(kg): --        08-17-22 @ 07:01  -  08-18-22 @ 07:00  --------------------------------------------------------  IN: 1350 mL / OUT: 1530 mL / NET: -180 mL        Physical Exam:  	Gen: NAD  	HEENT: MMM  	Pulm: CTA B/L anteriorly  	CV: S1S2  	Abd: Soft, +BS; Tobin+  	Ext: No LE edema B/L  	Neuro: Awake  	Skin: Warm and dry      LABS/STUDIES  --------------------------------------------------------------------------------              6.1    24.98 >-----------<  110      [08-18-22 @ 04:53]              17.7     130  |  96  |  52  ----------------------------<  126      [08-18-22 @ 04:53]  4.2   |  24  |  1.77        Ca     8.4     [08-18-22 @ 04:53]      Mg     1.8     [08-18-22 @ 04:53]      Phos  3.6     [08-18-22 @ 04:53]    TPro  5.5  /  Alb  2.4  /  TBili  4.3  /  DBili  1.3  /  AST  29  /  ALT  13  /  AlkPhos  63  [08-18-22 @ 04:53]    PT/INR: PT 26.9 , INR 2.30       [08-18-22 @ 04:53]  PTT: 31.9       [08-18-22 @ 04:53]    Serum Osmolality 279      [08-17-22 @ 00:38]    Creatinine Trend:  SCr 1.77 [08-18 @ 04:53]  SCr 2.16 [08-17 @ 00:38]  SCr 2.22 [08-15 @ 23:26]  SCr 2.05 [08-15 @ 05:51]  SCr 1.86 [08-13 @ 22:35]    Urinalysis - [08-15-22 @ 17:07]      Color Yellow / Appearance Clear / SG 1.019 / pH 5.0      Gluc Negative / Ketone Negative  / Bili Negative / Urobili Negative       Blood Moderate / Protein Trace / Leuk Est Negative / Nitrite Negative      RBC 3-5 / WBC 3 / Hyaline 35 / Gran  / Sq Epi  / Non Sq Epi 2 / Bacteria Few    Urine Protein 165      [08-11-22 @ 21:33]  Urine Sodium 15      [08-17-22 @ 00:33]  Urine Urea Nitrogen 524      [08-17-22 @ 00:32]  Urine Potassium 36      [08-17-22 @ 00:33]  Urine Osmolality 334      [08-17-22 @ 00:33]    Iron 32, TIBC 93, %sat 34      [08-12-22 @ 00:02]  Ferritin 728      [08-12-22 @ 00:00]  TSH 0.15      [08-12-22 @ 00:00]  Lipid: chol 41, TG 52, HDL 9, LDL --      [08-11-22 @ 23:59]    HBsAb <3.0      [08-11-22 @ 18:49]  HBsAb Nonreact      [08-12-22 @ 00:00]  HBsAg Nonreact      [08-11-22 @ 18:49]  HBcAb Nonreact      [08-12-22 @ 00:00]  HCV 0.15, Nonreact      [08-11-22 @ 18:49]  HIV Nonreact      [08-12-22 @ 00:00]  HIV Nonreact      [08-11-22 @ 23:59]    MICHELLE: titer Negative, pattern --      [08-12-22 @ 00:32]  Syphilis Screen (Treponema Pallidum Ab) Positive      [08-12-22 @ 00:32]  Syphilis Screen (RPR Titer) <1:1      [08-12-22 @ 00:32]  Immunofixation Serum:   IgG Lambda Band Identified    Reference Range: None Detected      [08-11-22 @ 23:59]  SPEP Interpretation: Gamma Migrating Paraprotein Identified      [08-11-22 @ 23:59]

## 2022-08-18 NOTE — PROGRESS NOTE ADULT - ASSESSMENT
67 y/o M PMHx cholangitis/cholecystitis (s/p ERCP w/ biliary stent placement 04/2022) and recently diagnosed etOH cirrhosis, presents with RUQ pain following biliary stent removal on 7/20/22. Found to have distended GB with wall thickening and stone in cystic duct, concerning for cholecystitis. Admitted to SICU for monitoring, s/p perc rodney and paracentesis by IR on 8/9. Paracentesis results consistent with SBP. Course now c/b ARF.     s/p Perc Rodney Tube and Paracentesis  Paracentesis cell counts suggestive of Peritonitis  Cultures thus far with E coli and Streptococcus on Perc Rodney Drainage and Ascites drainage  Concerning with cholecystitis with associated perforation    Paracentesis (8/9) 160,713 with 62% PMN  Paracentesis (8/12) 8,344 with 97% PMN  Paracentesis (8/16) 53,930 with 92% PMN    #Late Latent Syphilis  Denies knowledge of preceding diagnosis  No preceding treatment  Will treat at late latent syphilis  --Recommend IM Benzathine Penicillin 2.4 million units Q7Days x 3 doses    #Peritonitis, Cholecystitis, SBP, Leukocytosis   --Would adjust Unasyn to 3g IV Q6H  --Follow up on final CT A/P read  --Continue to follow CBC with diff  --Continue to follow temperature curve  --Follow up on preliminary blood cultures  --Follow up on preliminary bile culture, ascites cultures    #Pre-Liver Transplant Evaluation  --ID clearance for OLT pending resolution of peritonitis  --Follow up on repeat Quantiferon Gold    I will continue to follow. Please feel free to contact me with any further questions.    Bladimir Nix M.D.  [facility] Division of Infectious Disease  8AM-5PM Monday - Friday: Available on [employer] Teams  After Hours and Holidays (or if no response on [employer] Teams): Please contact the Infectious Diseases Office at [phone]    The above assessment and plan were discussed with Valeria, transplant surgery PA

## 2022-08-18 NOTE — PROGRESS NOTE ADULT - ASSESSMENT
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 coli and Streptococcus anginosis.  - Ascitic fluid studies from paracentesis (8/9) consistent with secondary peritonitis with concern for possible perforated viscus (likely gallbladder) given markedly elevated ascitic fluid LDH.   - Repeat paracentesis (8/12) still consistent with secondary peritonitis but with improving neutrophil count from 99k to 8k. Ascitic fluid bilirubin similar to serum, suggesting against bile leak.  - HD for ATN vs. HRS (8/11-13)    [] Peritonitis, Cholecystitis, SBP, Leukocytosis   - ID following: Continue Unasyn for Strep/E. Coli coverage, will f/u abx plan  - Para cx (8/16)- GPC pairs   - Repeat LVP 8/16 - 2.9L removed repleted with 250 Alb 5% x1 (Prior LVP 8/12)    - IR consulted for drainage catheter insertion, planned for today    [] Decompensated ALD/PARNELL Cirrhosis  - ABO OP, MELD-Na 30 on 8/18  - Liver txp eval initiated   - EV: none on EGD/ERCP 7/2022  - Ascites: peritonitis   - HE: none     [] Liver txp eval:   - cleared by Dental   - Cleared by Psych  - Cards: needs DSE (ordered)  - ID clearance for OLT pending resolution of peritonitis and quantiferon gold    [] ROBBIE vs. HRS  - Nephrology consulted  - Renal us ordered  - Last HD 8/13,  Ashwin removed 8/12  - Continue Lasix 60 IV BID   - Urine output improving, keep milian for accurate I&Os  - Renal US completed 8/17 - suggestive of parenchymal disease

## 2022-08-18 NOTE — PHARMACOTHERAPY INTERVENTION NOTE - COMMENTS
Patient is a 67 y/o M PMHx cholangitis/cholecystitis (s/p ERCP w/ biliary stent placement 04/2022) and recently diagnosed EtOH cirrhosis, presents with RUQ pain following biliary stent removal on 7/20/22. Found to have distended GB with wall thickening and stone in cystic duct, concerning for cholecystitis. Admitted to SICU for monitoring, s/p perc rodney and paracentesis. Paracentesis results consistent with SBP. Course now c/b ARF and late latent syphilis. Cultures with streptococcus anginosus and E.coli (with E.coli susceptible to Unasyn. Recommended Unasyn IV 3g q6h from 1.5g q6h since renal function has improved.      Clarence Winters, PharmD   PGY-1 Pharmacy Resident   Spectra: [phone]  Available on Teams  Email: [email or web address]

## 2022-08-18 NOTE — PROGRESS NOTE ADULT - PROBLEM SELECTOR PLAN 2
Stable for now; monitor off HD and with IV diuretics  -fluid restrict to 1L  -c/w Midodrine and albumin with paracentesis  - Avoid hypotension    If you have any questions, please feel free to contact me  Eduardo Whalen  Nephrology Fellow  [phone]; Prefer [employer] TEAMS  (After 5pm or on weekends please page the on-call fellow)

## 2022-08-18 NOTE — PROGRESS NOTE ADULT - SUBJECTIVE AND OBJECTIVE BOX
Follow Up:      Interval History:    REVIEW OF SYSTEMS  [  ] ROS unobtainable because:    [  ] All other systems negative except as noted below    Constitutional:  [ ] fever [ ] chills  [ ] weight loss  [ ] weakness  Skin:  [ ] rash [ ] phlebitis	  Eyes: [ ] icterus [ ] pain  [ ] discharge	  ENMT: [ ] sore throat  [ ] thrush [ ] ulcers [ ] exudates  Respiratory: [ ] dyspnea [ ] hemoptysis [ ] cough [ ] sputum	  Cardiovascular:  [ ] chest pain [ ] palpitations [ ] edema	  Gastrointestinal:  [ ] nausea [ ] vomiting [ ] diarrhea [ ] constipation [ ] pain	  Genitourinary:  [ ] dysuria [ ] frequency [ ] hematuria [ ] discharge [ ] flank pain  [ ] incontinence  Musculoskeletal:  [ ] myalgias [ ] arthralgias [ ] arthritis  [ ] back pain  Neurological:  [ ] headache [ ] seizures  [ ] confusion/altered mental status    Allergies  No Known Allergies        ANTIMICROBIALS:  ampicillin/sulbactam  IVPB 1.5 every 6 hours      OTHER MEDS:  MEDICATIONS  (STANDING):  midodrine. 5 every 8 hours  octreotide  Injectable 100 every 8 hours  pantoprazole    Tablet 40 before breakfast  polyethylene glycol 3350 17 two times a day  senna 2 at bedtime      Vital Signs Last 24 Hrs  T(C): 37.1 (18 Aug 2022 11:00), Max: 37.6 (17 Aug 2022 17:00)  T(F): 98.8 (18 Aug 2022 11:00), Max: 99.7 (17 Aug 2022 17:00)  HR: 82 (18 Aug 2022 14:00) (73 - 107)  BP: 111/57 (18 Aug 2022 14:00) (98/55 - 140/70)  BP(mean): 78 (18 Aug 2022 14:00) (69 - 99)  RR: 19 (18 Aug 2022 14:00) (12 - 23)  SpO2: 93% (18 Aug 2022 14:00) (93% - 99%)    Parameters below as of 17 Aug 2022 23:00  Patient On (Oxygen Delivery Method): room air        PHYSICAL EXAMINATION:  General: Alert and Awake, NAD  HEENT: PERRL, EOMI  Neck: Supple  Cardiac: RRR, No M/R/G  Resp: CTAB, No Wh/Rh/Ra  Abdomen: NBS, NT/ND, No HSM, No rigidity or guarding  MSK: No LE edema. No Calf tenderness  : No milian  Skin: No rashes or lesions. Skin is warm and dry to the touch.   Neuro: Alert and Awake. CN 2-12 Grossly intact. Moves all four extremities spontaneously.  Psych: Calm, Pleasant, Cooperative                          6.8    19.02 )-----------( 77       ( 18 Aug 2022 12:27 )             19.9       08-18    130<L>  |  96  |  52<H>  ----------------------------<  126<H>  4.2   |  24  |  1.77<H>    Ca    8.4      18 Aug 2022 04:53  Phos  3.6     08-18  Mg     1.8     08-18    TPro  5.5<L>  /  Alb  2.4<L>  /  TBili  4.3<H>  /  DBili  1.3<H>  /  AST  29  /  ALT  13  /  AlkPhos  63  08-18          MICROBIOLOGY:  v  Peritoneal Peritoneal Fluid  08-16-22   Rare Escherichia coli  --    polymorphonuclear leukocytes seen  Gram positive cocci in pairs seen  by cytocentrifuge      .Blood Blood-Peripheral  08-16-22   No growth to date.  --  --      Peritoneal Peritoneal Fluid  08-12-22   No growth  --    polymorphonuclear leukocytes seen  No organisms seen  by cytocentrifuge      .Blood Blood  08-11-22   No Growth Final  --  --      Peritoneal Peritoneal Fluid  08-09-22   Few Escherichia coli  Moderate Streptococcus anginosus "Susceptibilities not performed"  --  Escherichia coli      Catheterized Catheterized  08-08-22   10,000 - 49,000 CFU/mL Escherichia coli  --  Escherichia coli      .Blood Blood-Peripheral  08-08-22   No Growth Final  --  --      .Blood Blood-Peripheral  08-08-22   No Growth Final  --  --        CMV IgG Antibody: 4.00 U/mL (08-12-22 @ 00:32)    CMVPCR Log: Det <1.54 Assay Dynamic Range: 34.5 to 1.0E+07 IU/mL (1.54 to 7.00 Log10 IU/mL)  Assay lower limit of quantification (LLOQ) is 34.5 IU/mL (1.54 Log10  IU/mL)  CMV DNA detected below the LLOQ will be reported as Detected < 34.5 IU/mL  (<1.54 Log10 IU/mL)  Vipul Cytomegalovirus (CMV) is an FDA-cleared quantitative test that  enables the detection and quantitation of CMV DNA in EDTA plasma of  infected transplant patients on the vipul 8800 system. This test was  verified by [employer]. Results should be interpreted  with consideration of all clinical findings and laboratory findings and  should not form the sole basis for a diagnosis or treatment decision. Tjt32HZ/mL (08-11 @ 23:58)        RADIOLOGY:    <The imaging below has been reviewed and visualized by me independently. Findings as detailed in report below> Follow Up:  peritonitis    Interval History: afebrile. still with abdominal discomfort.     REVIEW OF SYSTEMS  [  ] ROS unobtainable because:    [ x ] All other systems negative except as noted below    Constitutional:  [ ] fever [ ] chills  [ ] weight loss  [ ] weakness  Skin:  [ ] rash [ ] phlebitis	  Eyes: [ ] icterus [ ] pain  [ ] discharge	  ENMT: [ ] sore throat  [ ] thrush [ ] ulcers [ ] exudates  Respiratory: [ ] dyspnea [ ] hemoptysis [ ] cough [ ] sputum	  Cardiovascular:  [ ] chest pain [ ] palpitations [ ] edema	  Gastrointestinal:  [ ] nausea [ ] vomiting [ ] diarrhea [ ] constipation [ x] pain	  Genitourinary:  [ ] dysuria [ ] frequency [ ] hematuria [ ] discharge [ ] flank pain  [ ] incontinence  Musculoskeletal:  [ ] myalgias [ ] arthralgias [ ] arthritis  [ ] back pain  Neurological:  [ ] headache [ ] seizures  [ ] confusion/altered mental status    Allergies  No Known Allergies        ANTIMICROBIALS:  ampicillin/sulbactam  IVPB 1.5 every 6 hours      OTHER MEDS:  MEDICATIONS  (STANDING):  midodrine. 5 every 8 hours  octreotide  Injectable 100 every 8 hours  pantoprazole    Tablet 40 before breakfast  polyethylene glycol 3350 17 two times a day  senna 2 at bedtime      Vital Signs Last 24 Hrs  T(C): 37.1 (18 Aug 2022 11:00), Max: 37.6 (17 Aug 2022 17:00)  T(F): 98.8 (18 Aug 2022 11:00), Max: 99.7 (17 Aug 2022 17:00)  HR: 82 (18 Aug 2022 14:00) (73 - 107)  BP: 111/57 (18 Aug 2022 14:00) (98/55 - 140/70)  BP(mean): 78 (18 Aug 2022 14:00) (69 - 99)  RR: 19 (18 Aug 2022 14:00) (12 - 23)  SpO2: 93% (18 Aug 2022 14:00) (93% - 99%)    Parameters below as of 17 Aug 2022 23:00  Patient On (Oxygen Delivery Method): room air    PHYSICAL EXAMINATION:  General: Alert and Awake, NAD  Cardiac: RRR, No M/R/G  Resp: CTAB, No Wh/Rh/Ra  Abdomen: +Hepatobiliary Drain, Distended, No HSM, No rigidity or guarding  MSK: No LE edema. No Calf tenderness  Skin: No rashes or lesions. Skin is warm and dry to the touch.   Neuro: Alert and Awake. CN 2-12 Grossly intact. Moves all four extremities spontaneously.  Psych: Calm, Pleasant, Cooperative                            6.8    19.02 )-----------( 77       ( 18 Aug 2022 12:27 )             19.9       08-18    130<L>  |  96  |  52<H>  ----------------------------<  126<H>  4.2   |  24  |  1.77<H>    Ca    8.4      18 Aug 2022 04:53  Phos  3.6     08-18  Mg     1.8     08-18    TPro  5.5<L>  /  Alb  2.4<L>  /  TBili  4.3<H>  /  DBili  1.3<H>  /  AST  29  /  ALT  13  /  AlkPhos  63  08-18          MICROBIOLOGY:  v  Peritoneal Peritoneal Fluid  08-16-22   Rare Escherichia coli  --    polymorphonuclear leukocytes seen  Gram positive cocci in pairs seen  by cytocentrifuge      .Blood Blood-Peripheral  08-16-22   No growth to date.  --  --      Peritoneal Peritoneal Fluid  08-12-22   No growth  --    polymorphonuclear leukocytes seen  No organisms seen  by cytocentrifuge      .Blood Blood  08-11-22   No Growth Final  --  --      Peritoneal Peritoneal Fluid  08-09-22   Few Escherichia coli  Moderate Streptococcus anginosus "Susceptibilities not performed"  --  Escherichia coli      Catheterized Catheterized  08-08-22   10,000 - 49,000 CFU/mL Escherichia coli  --  Escherichia coli      .Blood Blood-Peripheral  08-08-22   No Growth Final  --  --      .Blood Blood-Peripheral  08-08-22   No Growth Final  --  --        CMV IgG Antibody: 4.00 U/mL (08-12-22 @ 00:32)    CMVPCR Log: Det <1.54 Assay Dynamic Range: 34.5 to 1.0E+07 IU/mL (1.54 to 7.00 Log10 IU/mL)  Assay lower limit of quantification (LLOQ) is 34.5 IU/mL (1.54 Log10  IU/mL)  CMV DNA detected below the LLOQ will be reported as Detected < 34.5 IU/mL  (<1.54 Log10 IU/mL)  Vipul Cytomegalovirus (CMV) is an FDA-cleared quantitative test that  enables the detection and quantitation of CMV DNA in EDTA plasma of  infected transplant patients on the vipul 8800 system. This test was  verified by [employer]. Results should be interpreted  with consideration of all clinical findings and laboratory findings and  should not form the sole basis for a diagnosis or treatment decision. Rfh30SB/mL (08-11 @ 23:58)        RADIOLOGY:    <The imaging below has been reviewed and visualized by me independently. Findings as detailed in report below>    < from: CT Abdomen and Pelvis No Cont (08.18.22 @ 17:46) >  INTERPRETATION:  New hemoperitoneum with a few locules of free air since   8/10/2022, possibly related to prior procedure, however sentinel clot   appears to in the left hemiabdomen. Etiology is uncertain. Consider CTA   for further evaluation if indicated. These findings were discussed with   JOHN BotelloAlli on 8/18/2022 at 6:47 PM.    Asymmetric left breast soft tissue. Small bilateral pleural effusions and   associated atelectasis. Faint peripheral right upper lobe   opacities.Cholelithiasis vs choledocolithiasis, difficult to assess as   galbladder is underdistended 2/2 cholecystotomy tube. Cirrhosis.    < end of copied text >

## 2022-08-18 NOTE — PROGRESS NOTE ADULT - SUBJECTIVE AND OBJECTIVE BOX
Interval Events:   Hg drop to 6.1 overnight s/p blood product transfusions.  Afebrile and hemodynamically stable.    ROS:   12 point review of systems performed and negative except otherwise noted in HPI.    Hospital Medications:  ampicillin/sulbactam  IVPB 1.5 Gram(s) IV Intermittent every 6 hours  chlorhexidine 2% Cloths 1 Application(s) Topical <User Schedule>  furosemide   Injectable 60 milliGRAM(s) IV Push <User Schedule>  midodrine. 5 milliGRAM(s) Oral every 8 hours  octreotide  Injectable 100 MICROGram(s) IV Push every 8 hours  pantoprazole    Tablet 40 milliGRAM(s) Oral before breakfast  polyethylene glycol 3350 17 Gram(s) Oral two times a day  senna 2 Tablet(s) Oral at bedtime    MAR over past 24 hours:    ampicillin/sulbactam  IVPB   100 mL/Hr IV Intermittent (08-18-22 @ 05:46)   100 mL/Hr IV Intermittent (08-18-22 @ 00:00)   100 mL/Hr IV Intermittent (08-17-22 @ 17:10)   100 mL/Hr IV Intermittent (08-17-22 @ 11:07)    chlorhexidine 2% Cloths   1 Application(s) Topical (08-18-22 @ 05:09)    furosemide   Injectable   60 milliGRAM(s) IV Push (08-17-22 @ 17:09)    magnesium sulfate  IVPB   25 mL/Hr IV Intermittent (08-18-22 @ 06:18)    midodrine.   5 milliGRAM(s) Oral (08-18-22 @ 05:48)   5 milliGRAM(s) Oral (08-17-22 @ 21:22)   5 milliGRAM(s) Oral (08-17-22 @ 13:06)    octreotide  Injectable   100 MICROGram(s) IV Push (08-18-22 @ 01:06)   100 MICROGram(s) IV Push (08-17-22 @ 17:08)   100 MICROGram(s) IV Push (08-17-22 @ 10:03)    pantoprazole    Tablet   40 milliGRAM(s) Oral (08-17-22 @ 08:15)    phytonadione  IVPB   102 mL/Hr IV Intermittent (08-17-22 @ 21:55)    senna   2 Tablet(s) Oral (08-17-22 @ 21:21)      Home Medications:  Last Order Reconciliation Date: 08-08-22 @ 06:30 (Admission Reconciliation)    PHYSICAL EXAM:   Vital Signs last 24 hours:  T(F): 97.9 (08-18-22 @ 04:00), Max: 99.7 (08-17-22 @ 17:00)  HR: 84 (08-18-22 @ 07:00) (69 - 107)  BP: 118/57 (08-18-22 @ 07:00) (98/55 - 155/60)  BP(mean): 82 (08-18-22 @ 07:00) (69 - 99)  ABP: --  ABP(mean): --  RR: 13 (08-18-22 @ 07:00) (12 - 29)  SpO2: 98% (08-18-22 @ 07:00) (95% - 100%)    I&Os:    08-17-22 @ 07:01  -  08-18-22 @ 07:00  --------------------------------------------------------  IN:    IV PiggyBack: 150 mL    IV PiggyBack: 300 mL    Oral Fluid: 300 mL    Plasma: 300 mL    PRBCs (Packed Red Blood Cells): 300 mL  Total IN: 1350 mL    OUT:    Indwelling Catheter - Urethral (mL): 1405 mL    T-Tube (mL): 125 mL  Total OUT: 1530 mL    Total NET: -180 mL        BMI (kg/m2): 29.3 (08-09-22 @ 14:59)  GENERAL: no acute distress  NEURO: alert  HEENT: NCAT, no conjunctival pallor appreciated  CHEST: no respiratory distress, no accessory muscle use  CARDIAC: regular rate, +S1/S2  ABDOMEN: soft, perc rodney tube in place, distended, nontender, no rebound or guarding  EXTREMITIES: warm, well perfused  SKIN: no lesions noted    DIAGNOSTICS:  WBC      Hg       PLT      Na       K        CO2     BUN      Cr       ALT      AST      TB       ALP  24.98    6.1      110      130      4.2      24       52       1.77     13       29       4.3      63       08-18-22 @ 04:53  21.77    7.6      59       ------   ------   ------   ------   ------   ------   ------   ------   ------   08-17-22 @ 07:23  23.47    6.9      56       131      3.9      24       53       2.16     12       25       3.6      62       08-17-22 @ 00:38  30.74    7.6      45       131      3.6      25       45       2.22     15       29       3.5      79       08-15-22 @ 23:26  24.01    7.3      48       ------   ------   ------   ------   ------   ------   ------   ------   ------   08-15-22 @ 10:03    PT             INR            MELDwith  MELDw/o  26.9           2.30           --             --             08-18-22 @ 04:53  27.3           2.35           --             --             08-17-22 @ 00:38  25.9           2.21           --             --             08-15-22 @ 23:26  27.0           2.33           --             --             08-15-22 @ 05:51  25.8           2.21           --             --             08-13-22 @ 22:35

## 2022-08-19 LAB
% GAMMA, URINE: 13.4 % — SIGNIFICANT CHANGE UP
-  CEFTRIAXONE: SIGNIFICANT CHANGE UP
-  CLINDAMYCIN: SIGNIFICANT CHANGE UP
-  ERYTHROMYCIN: SIGNIFICANT CHANGE UP
-  LEVOFLOXACIN: SIGNIFICANT CHANGE UP
-  PENICILLIN: SIGNIFICANT CHANGE UP
-  VANCOMYCIN: SIGNIFICANT CHANGE UP
ALBUMIN 24H MFR UR ELPH: 18.9 % — SIGNIFICANT CHANGE UP
ALBUMIN SERPL ELPH-MCNC: 2.5 G/DL — LOW (ref 3.3–5)
ALBUMIN SERPL ELPH-MCNC: 2.6 G/DL — LOW (ref 3.3–5)
ALP SERPL-CCNC: 59 U/L — SIGNIFICANT CHANGE UP (ref 40–120)
ALP SERPL-CCNC: 66 U/L — SIGNIFICANT CHANGE UP (ref 40–120)
ALPHA1 GLOB 24H MFR UR ELPH: 15.3 % — SIGNIFICANT CHANGE UP
ALPHA2 GLOB 24H MFR UR ELPH: 27.2 % — SIGNIFICANT CHANGE UP
ALT FLD-CCNC: 12 U/L — SIGNIFICANT CHANGE UP (ref 10–45)
ALT FLD-CCNC: 12 U/L — SIGNIFICANT CHANGE UP (ref 10–45)
ANION GAP SERPL CALC-SCNC: 11 MMOL/L — SIGNIFICANT CHANGE UP (ref 5–17)
ANION GAP SERPL CALC-SCNC: 9 MMOL/L — SIGNIFICANT CHANGE UP (ref 5–17)
APTT BLD: 30.7 SEC — SIGNIFICANT CHANGE UP (ref 27.5–35.5)
APTT BLD: 30.8 SEC — SIGNIFICANT CHANGE UP (ref 27.5–35.5)
AST SERPL-CCNC: 26 U/L — SIGNIFICANT CHANGE UP (ref 10–40)
AST SERPL-CCNC: 28 U/L — SIGNIFICANT CHANGE UP (ref 10–40)
B-GLOBULIN 24H MFR UR ELPH: 25.2 % — SIGNIFICANT CHANGE UP
BILIRUB DIRECT SERPL-MCNC: 1.6 MG/DL — HIGH (ref 0–0.3)
BILIRUB DIRECT SERPL-MCNC: 2 MG/DL — HIGH (ref 0–0.3)
BILIRUB INDIRECT FLD-MCNC: 2.5 MG/DL — HIGH (ref 0.2–1)
BILIRUB INDIRECT FLD-MCNC: 3.4 MG/DL — HIGH (ref 0.2–1)
BILIRUB SERPL-MCNC: 4.1 MG/DL — HIGH (ref 0.2–1.2)
BILIRUB SERPL-MCNC: 5.4 MG/DL — HIGH (ref 0.2–1.2)
BUN SERPL-MCNC: 48 MG/DL — HIGH (ref 7–23)
BUN SERPL-MCNC: 50 MG/DL — HIGH (ref 7–23)
CALCIUM SERPL-MCNC: 8.1 MG/DL — LOW (ref 8.4–10.5)
CALCIUM SERPL-MCNC: 8.2 MG/DL — LOW (ref 8.4–10.5)
CHLORIDE SERPL-SCNC: 97 MMOL/L — SIGNIFICANT CHANGE UP (ref 96–108)
CHLORIDE SERPL-SCNC: 99 MMOL/L — SIGNIFICANT CHANGE UP (ref 96–108)
CO2 SERPL-SCNC: 23 MMOL/L — SIGNIFICANT CHANGE UP (ref 22–31)
CO2 SERPL-SCNC: 26 MMOL/L — SIGNIFICANT CHANGE UP (ref 22–31)
CREAT SERPL-MCNC: 1.43 MG/DL — HIGH (ref 0.5–1.3)
CREAT SERPL-MCNC: 1.6 MG/DL — HIGH (ref 0.5–1.3)
EGFR: 48 ML/MIN/1.73M2 — LOW
EGFR: 55 ML/MIN/1.73M2 — LOW
GLUCOSE SERPL-MCNC: 146 MG/DL — HIGH (ref 70–99)
GLUCOSE SERPL-MCNC: 93 MG/DL — SIGNIFICANT CHANGE UP (ref 70–99)
HCT VFR BLD CALC: 23.2 % — LOW (ref 39–50)
HCT VFR BLD CALC: 23.3 % — LOW (ref 39–50)
HCT VFR BLD CALC: 25.1 % — LOW (ref 39–50)
HGB BLD-MCNC: 8 G/DL — LOW (ref 13–17)
HGB BLD-MCNC: 8 G/DL — LOW (ref 13–17)
HGB BLD-MCNC: 8.7 G/DL — LOW (ref 13–17)
INR BLD: 1.81 RATIO — HIGH (ref 0.88–1.16)
INR BLD: 1.86 RATIO — HIGH (ref 0.88–1.16)
INTERPRETATION 24H UR IFE-IMP: SIGNIFICANT CHANGE UP
INTERPRETATION 24H UR IFE-IMP: SIGNIFICANT CHANGE UP
M PROTEIN 24H UR ELPH-MRATE: SIGNIFICANT CHANGE UP
MAGNESIUM SERPL-MCNC: 2.2 MG/DL — SIGNIFICANT CHANGE UP (ref 1.6–2.6)
MAGNESIUM SERPL-MCNC: 2.3 MG/DL — SIGNIFICANT CHANGE UP (ref 1.6–2.6)
MCHC RBC-ENTMCNC: 31.1 PG — SIGNIFICANT CHANGE UP (ref 27–34)
MCHC RBC-ENTMCNC: 31.4 PG — SIGNIFICANT CHANGE UP (ref 27–34)
MCHC RBC-ENTMCNC: 31.4 PG — SIGNIFICANT CHANGE UP (ref 27–34)
MCHC RBC-ENTMCNC: 34.3 GM/DL — SIGNIFICANT CHANGE UP (ref 32–36)
MCHC RBC-ENTMCNC: 34.5 GM/DL — SIGNIFICANT CHANGE UP (ref 32–36)
MCHC RBC-ENTMCNC: 34.7 GM/DL — SIGNIFICANT CHANGE UP (ref 32–36)
MCV RBC AUTO: 90.6 FL — SIGNIFICANT CHANGE UP (ref 80–100)
MCV RBC AUTO: 90.7 FL — SIGNIFICANT CHANGE UP (ref 80–100)
MCV RBC AUTO: 91 FL — SIGNIFICANT CHANGE UP (ref 80–100)
METHOD TYPE: SIGNIFICANT CHANGE UP
NRBC # BLD: 0 /100 WBCS — SIGNIFICANT CHANGE UP (ref 0–0)
PHOSPHATE SERPL-MCNC: 3 MG/DL — SIGNIFICANT CHANGE UP (ref 2.5–4.5)
PHOSPHATE SERPL-MCNC: 3.6 MG/DL — SIGNIFICANT CHANGE UP (ref 2.5–4.5)
PLATELET # BLD AUTO: 118 K/UL — LOW (ref 150–400)
PLATELET # BLD AUTO: 89 K/UL — LOW (ref 150–400)
PLATELET # BLD AUTO: 99 K/UL — LOW (ref 150–400)
POTASSIUM SERPL-MCNC: 4.1 MMOL/L — SIGNIFICANT CHANGE UP (ref 3.5–5.3)
POTASSIUM SERPL-MCNC: 4.1 MMOL/L — SIGNIFICANT CHANGE UP (ref 3.5–5.3)
POTASSIUM SERPL-SCNC: 4.1 MMOL/L — SIGNIFICANT CHANGE UP (ref 3.5–5.3)
POTASSIUM SERPL-SCNC: 4.1 MMOL/L — SIGNIFICANT CHANGE UP (ref 3.5–5.3)
PROT ?TM UR-MCNC: 165 MG/DL — HIGH (ref 0–12)
PROT PATTERN 24H UR ELPH-IMP: SIGNIFICANT CHANGE UP
PROT SERPL-MCNC: 5.7 G/DL — LOW (ref 6–8.3)
PROT SERPL-MCNC: 5.9 G/DL — LOW (ref 6–8.3)
PROTHROM AB SERPL-ACNC: 20.9 SEC — HIGH (ref 10.5–13.4)
PROTHROM AB SERPL-ACNC: 21.7 SEC — HIGH (ref 10.5–13.4)
RBC # BLD: 2.55 M/UL — LOW (ref 4.2–5.8)
RBC # BLD: 2.57 M/UL — LOW (ref 4.2–5.8)
RBC # BLD: 2.77 M/UL — LOW (ref 4.2–5.8)
RBC # FLD: 17.1 % — HIGH (ref 10.3–14.5)
RBC # FLD: 17.3 % — HIGH (ref 10.3–14.5)
RBC # FLD: 17.6 % — HIGH (ref 10.3–14.5)
SODIUM SERPL-SCNC: 132 MMOL/L — LOW (ref 135–145)
SODIUM SERPL-SCNC: 133 MMOL/L — LOW (ref 135–145)
TOTAL VOLUME - 24 HOUR: SIGNIFICANT CHANGE UP ML
URINE CREATININE CALCULATION: SIGNIFICANT CHANGE UP G/24 H (ref 1–2)
WBC # BLD: 16.64 K/UL — HIGH (ref 3.8–10.5)
WBC # BLD: 17 K/UL — HIGH (ref 3.8–10.5)
WBC # BLD: 19.74 K/UL — HIGH (ref 3.8–10.5)
WBC # FLD AUTO: 16.64 K/UL — HIGH (ref 3.8–10.5)
WBC # FLD AUTO: 17 K/UL — HIGH (ref 3.8–10.5)
WBC # FLD AUTO: 19.74 K/UL — HIGH (ref 3.8–10.5)

## 2022-08-19 PROCEDURE — 93018 CV STRESS TEST I&R ONLY: CPT

## 2022-08-19 PROCEDURE — 93350 STRESS TTE ONLY: CPT | Mod: 26

## 2022-08-19 PROCEDURE — 93325 DOPPLER ECHO COLOR FLOW MAPG: CPT | Mod: 26

## 2022-08-19 PROCEDURE — 99232 SBSQ HOSP IP/OBS MODERATE 35: CPT

## 2022-08-19 PROCEDURE — 93016 CV STRESS TEST SUPVJ ONLY: CPT

## 2022-08-19 PROCEDURE — 99232 SBSQ HOSP IP/OBS MODERATE 35: CPT | Mod: GC

## 2022-08-19 PROCEDURE — 93320 DOPPLER ECHO COMPLETE: CPT | Mod: 26

## 2022-08-19 RX ORDER — TAMSULOSIN HYDROCHLORIDE 0.4 MG/1
0.4 CAPSULE ORAL AT BEDTIME
Refills: 0 | Status: DISCONTINUED | OUTPATIENT
Start: 2022-08-19 | End: 2022-09-02

## 2022-08-19 RX ORDER — FLUCONAZOLE 150 MG/1
400 TABLET ORAL EVERY 24 HOURS
Refills: 0 | Status: DISCONTINUED | OUTPATIENT
Start: 2022-08-19 | End: 2022-08-31

## 2022-08-19 RX ADMIN — TAMSULOSIN HYDROCHLORIDE 0.4 MILLIGRAM(S): 0.4 CAPSULE ORAL at 21:12

## 2022-08-19 RX ADMIN — MIDODRINE HYDROCHLORIDE 5 MILLIGRAM(S): 2.5 TABLET ORAL at 14:53

## 2022-08-19 RX ADMIN — AMPICILLIN SODIUM AND SULBACTAM SODIUM 100 GRAM(S): 250; 125 INJECTION, POWDER, FOR SUSPENSION INTRAMUSCULAR; INTRAVENOUS at 12:25

## 2022-08-19 RX ADMIN — OCTREOTIDE ACETATE 100 MICROGRAM(S): 200 INJECTION, SOLUTION INTRAVENOUS; SUBCUTANEOUS at 09:59

## 2022-08-19 RX ADMIN — OCTREOTIDE ACETATE 100 MICROGRAM(S): 200 INJECTION, SOLUTION INTRAVENOUS; SUBCUTANEOUS at 17:47

## 2022-08-19 RX ADMIN — PANTOPRAZOLE SODIUM 40 MILLIGRAM(S): 20 TABLET, DELAYED RELEASE ORAL at 08:04

## 2022-08-19 RX ADMIN — MIDODRINE HYDROCHLORIDE 5 MILLIGRAM(S): 2.5 TABLET ORAL at 21:12

## 2022-08-19 RX ADMIN — CHLORHEXIDINE GLUCONATE 1 APPLICATION(S): 213 SOLUTION TOPICAL at 05:36

## 2022-08-19 RX ADMIN — POLYETHYLENE GLYCOL 3350 17 GRAM(S): 17 POWDER, FOR SOLUTION ORAL at 17:47

## 2022-08-19 RX ADMIN — FLUCONAZOLE 100 MILLIGRAM(S): 150 TABLET ORAL at 17:54

## 2022-08-19 RX ADMIN — OCTREOTIDE ACETATE 100 MICROGRAM(S): 200 INJECTION, SOLUTION INTRAVENOUS; SUBCUTANEOUS at 01:01

## 2022-08-19 RX ADMIN — MIDODRINE HYDROCHLORIDE 5 MILLIGRAM(S): 2.5 TABLET ORAL at 05:35

## 2022-08-19 RX ADMIN — SENNA PLUS 2 TABLET(S): 8.6 TABLET ORAL at 21:13

## 2022-08-19 RX ADMIN — AMPICILLIN SODIUM AND SULBACTAM SODIUM 100 GRAM(S): 250; 125 INJECTION, POWDER, FOR SUSPENSION INTRAMUSCULAR; INTRAVENOUS at 17:54

## 2022-08-19 RX ADMIN — AMPICILLIN SODIUM AND SULBACTAM SODIUM 100 GRAM(S): 250; 125 INJECTION, POWDER, FOR SUSPENSION INTRAMUSCULAR; INTRAVENOUS at 00:26

## 2022-08-19 RX ADMIN — AMPICILLIN SODIUM AND SULBACTAM SODIUM 100 GRAM(S): 250; 125 INJECTION, POWDER, FOR SUSPENSION INTRAMUSCULAR; INTRAVENOUS at 05:35

## 2022-08-19 NOTE — PROGRESS NOTE ADULT - PROBLEM SELECTOR PROBLEM 1
ROBBIE (acute kidney injury)

## 2022-08-19 NOTE — PROGRESS NOTE ADULT - SUBJECTIVE AND OBJECTIVE BOX
Interval Events:   CT AP with hemoperitoneum and locules of air.  He received multiple runs of blood product transfusion and vitamin K overnight.    ROS:   12 point review of systems performed and negative except otherwise noted in HPI.    Hospital Medications:  ampicillin/sulbactam  IVPB 3 Gram(s) IV Intermittent every 6 hours  chlorhexidine 2% Cloths 1 Application(s) Topical <User Schedule>  midodrine. 5 milliGRAM(s) Oral every 8 hours  octreotide  Injectable 100 MICROGram(s) IV Push every 8 hours  pantoprazole    Tablet 40 milliGRAM(s) Oral before breakfast  penicillin   G benzathine Injectable 2.4 Million Unit(s) IntraMuscular every 7 days  polyethylene glycol 3350 17 Gram(s) Oral two times a day  senna 2 Tablet(s) Oral at bedtime    MAR over past 24 hours:    ampicillin/sulbactam  IVPB   100 mL/Hr IV Intermittent (08-18-22 @ 14:03)    ampicillin/sulbactam  IVPB   100 mL/Hr IV Intermittent (08-19-22 @ 05:35)   100 mL/Hr IV Intermittent (08-19-22 @ 00:26)   100 mL/Hr IV Intermittent (08-18-22 @ 18:18)    chlorhexidine 2% Cloths   1 Application(s) Topical (08-19-22 @ 05:36)    midodrine.   5 milliGRAM(s) Oral (08-19-22 @ 05:35)   5 milliGRAM(s) Oral (08-18-22 @ 21:54)   5 milliGRAM(s) Oral (08-18-22 @ 14:03)    octreotide  Injectable   100 MICROGram(s) IV Push (08-19-22 @ 01:01)   100 MICROGram(s) IV Push (08-18-22 @ 18:21)   100 MICROGram(s) IV Push (08-18-22 @ 09:06)    pantoprazole    Tablet   40 milliGRAM(s) Oral (08-19-22 @ 08:04)   40 milliGRAM(s) Oral (08-18-22 @ 08:41)    penicillin   G benzathine Injectable   2.4 Million Unit(s) IntraMuscular (08-18-22 @ 18:30)    senna   2 Tablet(s) Oral (08-18-22 @ 21:55)      Home Medications:  Last Order Reconciliation Date: 08-08-22 @ 06:30 (Admission Reconciliation)    PHYSICAL EXAM:   Vital Signs last 24 hours:  T(F): 97.9 (08-19-22 @ 05:00), Max: 98.9 (08-18-22 @ 19:00)  HR: 80 (08-19-22 @ 07:00) (72 - 91)  BP: 133/62 (08-19-22 @ 07:00) (100/56 - 133/62)  BP(mean): 89 (08-19-22 @ 07:00) (75 - 91)  ABP: --  ABP(mean): --  RR: 18 (08-19-22 @ 07:00) (14 - 25)  SpO2: 94% (08-19-22 @ 07:00) (93% - 97%)    I&Os:    08-18-22 @ 07:01  -  08-19-22 @ 07:00  --------------------------------------------------------  IN:    IV PiggyBack: 205 mL    Other (mL): 5 mL    Plasma: 300 mL    PRBCs (Packed Red Blood Cells): 600 mL  Total IN: 1110 mL    OUT:    Indwelling Catheter - Urethral (mL): 1000 mL    T-Tube (mL): 45 mL  Total OUT: 1045 mL    Total NET: 65 mL        BMI (kg/m2): 29.3 (08-09-22 @ 14:59)  GENERAL: no acute distress  NEURO: alert  HEENT: NCAT, no conjunctival pallor appreciated  CHEST: no respiratory distress, no accessory muscle use  CARDIAC: regular rate, +S1/S2  ABDOMEN: soft, distended, perc rodney drain in place, nontender, no rebound or guarding  EXTREMITIES: warm, well perfused  SKIN: no lesions noted    DIAGNOSTICS:  WBC      Hg       PLT      Na       K        CO2     BUN      Cr       ALT      AST      TB       ALP  16.64    8.0      89       132      4.1      26       50       1.60     12       26       5.4      59       08-19-22 @ 04:21  17.40    8.1      82       ------   ------   ------   ------   ------   ------   ------   ------   ------   08-18-22 @ 21:59  20.14    8.4      84       ------   ------   ------   ------   ------   ------   ------   ------   ------   08-18-22 @ 17:36  19.02    6.8      77       ------   ------   ------   ------   ------   ------   ------   ------   ------   08-18-22 @ 12:27  24.98    6.1      110      130      4.2      24       52       1.77     13       29       4.3      63       08-18-22 @ 04:53    PT             INR            MELDwith  MELDw/o  20.9           1.81           --             --             08-19-22 @ 04:21  21.6           1.87           --             --             08-18-22 @ 12:27  26.9           2.30           --             --             08-18-22 @ 04:53  27.3           2.35           --             --             08-17-22 @ 00:38  25.9           2.21           --             --             08-15-22 @ 23:26

## 2022-08-19 NOTE — PROGRESS NOTE ADULT - SUBJECTIVE AND OBJECTIVE BOX
HISTORY:    24 HOUR EVENTS:  - s/p 4u pRBC and 2u FFP  - midodrine 5 q8hrs  - non-con CT showed blood in abdomen and pneumoperitoneum   - CBC q4hrs  - Unasyn increased from 1.5 to 3 since renal function improved  - IM penicillin 3 doses 1x week for syphilis  - IR procedure and dobutamine stress test aborted in setting of acute bleed    NEURO  Exam: AOX4  Meds:     RESPIRATORY  RR: 15 (08-19-22 @ 03:00) (13 - 25)  SpO2: 93% (08-19-22 @ 03:00) (93% - 98%)  Exam: Saturating well on RA, symmetric chest rise, non-labored breathing    Meds:     CARDIOVASCULAR  HR: 75 (08-19-22 @ 03:00) (75 - 102)  BP: 115/59 (08-19-22 @ 03:00) (98/55 - 128/63)  BP(mean): 81 (08-19-22 @ 03:00) (69 - 91)  VBG - ( 18 Aug 2022 19:32 )  pH: 7.44  /  pCO2: 41    /  pO2: 38    / HCO3: 28    / Base Excess: 3.3   /  SaO2: 71.3   Lactate: 1.7      Exam:   Cardiac Rhythm:   Perfusion     [X ]Adequate   [ ]Inadequate  Mentation   [X ]Normal       [ ]Reduced  Extremities  [X ]Warm         [ ]Cool  Volume Status [ ]Hypervolemic [X ]Euvolemic [ ]Hypovolemic  Meds: midodrine. 5 milliGRAM(s) Oral every 8 hours    GI/NUTRITION  Exam: soft, nontender, nondistended   Diet: NPO  Meds: pantoprazole    Tablet 40 milliGRAM(s) Oral before breakfast  polyethylene glycol 3350 17 Gram(s) Oral two times a day  senna 2 Tablet(s) Oral at bedtime    GENITOURINARY  I&O's Detail    08-17 @ 07:01  -  08-18 @ 07:00  --------------------------------------------------------  IN:    IV PiggyBack: 150 mL    IV PiggyBack: 300 mL    Oral Fluid: 300 mL    Plasma: 300 mL    PRBCs (Packed Red Blood Cells): 300 mL  Total IN: 1350 mL    OUT:    Indwelling Catheter - Urethral (mL): 1405 mL    T-Tube (mL): 125 mL  Total OUT: 1530 mL    Total NET: -180 mL      08-18 @ 07:01  -  08-19 @ 03:15  --------------------------------------------------------  IN:    IV PiggyBack: 155 mL    Other (mL): 5 mL    Plasma: 300 mL    PRBCs (Packed Red Blood Cells): 600 mL  Total IN: 1060 mL    OUT:    Indwelling Catheter - Urethral (mL): 800 mL    T-Tube (mL): 30 mL  Total OUT: 830 mL    Total NET: 230 mL    08-18    130<L>  |  96  |  52<H>  ----------------------------<  126<H>  4.2   |  24  |  1.77<H>    Ca    8.4      18 Aug 2022 04:53  Phos  3.6     08-18  Mg     1.8     08-18    TPro  5.5<L>  /  Alb  2.4<L>  /  TBili  4.3<H>  /  DBili  1.3<H>  /  AST  29  /  ALT  13  /  AlkPhos  63  08-18    Meds:     HEMATOLOGIC  Meds:                         8.1    17.40 )-----------( 82       ( 18 Aug 2022 21:59 )             23.0     PT/INR - ( 18 Aug 2022 12:27 )   PT: 21.6 sec;   INR: 1.87 ratio         PTT - ( 18 Aug 2022 04:53 )  PTT:31.9 sec    INFECTIOUS DISEASES  T(C): 36.8 (08-19-22 @ 01:00), Max: 37.2 (08-18-22 @ 19:00)  Wt(kg): --  WBC Count: 17.40 K/uL (08-18 @ 21:59)  WBC Count: 20.14 K/uL (08-18 @ 17:36)  WBC Count: 19.02 K/uL (08-18 @ 12:27)  WBC Count: 24.98 K/uL (08-18 @ 04:53)    Recent Cultures:  Specimen Source: Peritoneal Peritoneal Fluid, 08-16 @ 18:42; Results   Rare Escherichia coli  Few Streptococcus anginosus; Gram Stain:   polymorphonuclear leukocytes seen  Gram positive cocci in pairs seen  by cytocentrifuge; Organism: Escherichia coli  Specimen Source: .Blood Blood-Peripheral, 08-16 @ 06:05; Results   No growth to date.; Gram Stain: --; Organism: --  Specimen Source: Peritoneal Peritoneal Fluid, 08-12 @ 17:26; Results   No growth; Gram Stain:   polymorphonuclear leukocytes seen  No organisms seen  by cytocentrifuge; Organism: --    Meds: ampicillin/sulbactam  IVPB 3 Gram(s) IV Intermittent every 6 hours  penicillin   G benzathine Injectable 2.4 Million Unit(s) IntraMuscular every 7 days      ENDOCRINE  Capillary Blood Glucose    Meds: octreotide  Injectable 100 MICROGram(s) IV Push every 8 hours      ACCESS DEVICES:  [X] Peripheral IV  [ ] Central Venous Line		[ ] R	[ ] L	[ ] IJ	[ ] Fem	[ ] SC	Placed:   [ ] Arterial Line			[ ] R	[ ] L	[ ] Fem	[ ] Rad	[ ] Ax	Placed:   [ ] PICC:					[ ] Mediport  [ ] Urinary Catheter, Date Placed:   [ ] Necessity of urinary, arterial, and venous catheters discussed    OTHER MEDICATIONS:  chlorhexidine 2% Cloths 1 Application(s) Topical <User Schedule>

## 2022-08-19 NOTE — PROGRESS NOTE ADULT - ASSESSMENT
66 year old male with decompensated cirrhosis and cholangitis s/p ERCP with stent placement (4/2022) s/p stent removal on 7/20 (along sphincterotomy and stone extraction) presenting for one week of RUQ abdominal pain associated with new abdominal distension and decreased appetite.     #Septic shock 2/2 acute cholecystitis vs ascending cholangitis  #Hemoperitoneum with free locules of air on CT imaging 8/18/2022  #ROBBIE: initial urine sodium 9, likely indicating prerenal ROBBIE vs less likely HRS given initial presentation as above  #History of cholangitis s/p biliary stent placement April 2022 and removal 7/20/2022, now s/p perc rodney tube  #Anemia  #Decompensated ALD/PARNELL cirrhosis c/b ascites  EV: normal esophagus on ERCP from 4/2022  Ascites: increase in ascites from mild to moderate on current imaging  SBP: paracentesis 8/9/2022 c/w peritonitis given 99K PMNs however elevated LDH more indicative of secondary bacterial peritonitis  HE: none currently       -MELD-Na = 27 on 8/19/2022       -Ascites: yes       -SBP: paracentesis 8/9/2022 reveals likely secondary bacterial peritonitis from suspected gallbladder pathology       -Varices: no mention of varices on EGD/ERCP on 7/20/2022       -Hepatic encephalopathy: Ammonia, Serum: 38 umol/L [11 - 55] (08-12-22 @ 00:21)       -HCC: unknown       -LT candidacy and evaluation:            [ ] Psychosocial            [ ] Infectious            [ ] Cardiology:                    Cardiac cath:                    Stress test: pending            [ ] Colonoscopy: needed, awaiting records from outside GI colonoscopy reports            [x] Prostate: Prostate Specific Antigen: negative at 2.21 ng/mL on 8/12/2022            [x] Dental            [x] PT/Frailty    Recommendations:  -trend clinical symptoms, exam findings, vital signs, CBC, CMP, INR  -s/p percutaneous cholecystostomy as above  -likely secondary bacterial peritonitis based on paracentesis and clinical findings, CT A/P negative for perforation however performed without contrast 2/2 renal function; s/p repeat paracentesis 8/12/2022 and again 8/16/2022 with significant PMN count  -would favor ATN as likely etiology, however given worsening ROBBIE continue midodrine/octreotide for empiric treatment for HRS-ROBBIE, hold IV albumin given pulmonary congestion  -initiated hemodialysis 8/11/2022, now off with intermittent diuresis and renal recovery  -liver transplantation evaluation opened  -PPI daily for stress prophylaxis  -appreciate transplant nephrology consultation  -dobutamine stress test for cardiac evaluation  -appreciate surgery recommendations regarding hemoperitoneum    Note incomplete until finalized by attending signature/attestation.    Fadi Churchill  GI/Hepatology Fellow    MONDAY-FRIDAY 8AM-5PM:  Pager# [phone] ([facility]) or [phone] ([facility])    NON-URGENT CONSULTS:  Please email [email or web address]  AT NIGHT AND ON WEEKENDS:  Contact on-call GI fellow via answering service ([phone]) from 5pm-8am and on weekends/holidays

## 2022-08-19 NOTE — CONSULT NOTE ADULT - SUBJECTIVE AND OBJECTIVE BOX
UROLOGY CONSULT NOTE    HPI:  This is a 66y old Male with decompensated cirrhosis, admitted for cholangitis and being evaluated for a possible liver transplant - urology was consulted for possible urethral stricture.  Urology performed pt's first milian catheter for difficulty (250cc output) and had the milian removed later.  It was replaced again by the ICU team with 125cc output.  The patient states that he has never had urinary problems prior ot admission and denies  history, nocturia, hematuria, difficulty voiding.  He has never had  -related surgery.      PAST MEDICAL HISTORY    No pertinent past medical history    H/O acute cholangitis    2019 novel coronavirus disease (COVID-19)        PAST SURGICAL HISTORY    No significant past surgical history    S/P ERCP    H/O colonoscopy        SOCIAL HISTORY    No smoking history        DRUG ALLERGIES    NKDA    REVIEW OF SYSTEMS: Pertinent positives and negatives as stated in HPI, otherwise negative      VITAL SIGNS    T(F): 98.6, Max: 98.6 (08-19-22 @ 11:00)  HR: 89  BP: 126/63  RR: 23  SpO2: 96%    I's & O's      18 Aug 2022 07:01  -  19 Aug 2022 07:00  --------------------------------------------------------  IN: 1110 mL / OUT: 1045 mL / NET: 65 mL    19 Aug 2022 07:01  -  19 Aug 2022 13:03  --------------------------------------------------------  IN: 5 mL / OUT: 120 mL / NET: -115 mL        PHYSICAL EXAM    Gen: Well groomed, well dressed, well nourished  Abd: Soft, NT      LABS:                        8.7    19.74 )-----------( 118               25.1     132  |  97  |  50  ----------------------------<  93  4.1   |  26  |  1.60    Ca    8.1  Phos  3.6  Mg     2.3    TPro  5.7  /  Alb  2.5  /  TBili  5.4  /  DBili  2.0  /  AST  26  /  ALT  12  /  AlkPhos  59    PT: 20.9 sec;   INR: 1.81 ratio  PTT:30.7 sec        Urine culture: 50k ecoli 8/8; pt on unasyn      IMAGING:      CT: * Moderate volume of abdominopelvic fluid with new findings of hemoperitoneum. Clot appears most concentrated within the left lower quadrant, suggesting that a source of bleed is potentially in this location. Limited assessment for hemorrhage without intravenous contrast. Consider further evaluation with contrast enhanced CT.  * New droplets of pneumoperitoneum, also most concentrated within the left lower quadrant. Pneumoperitoneum is potentially postprocedural given recent paracentesis, though exact etiology is uncertain.  * Cirrhosis.   UROLOGY CONSULT NOTE    HPI:  This is a 66y old Male with decompensated cirrhosis, admitted for cholangitis and being evaluated for a possible liver transplant - urology was consulted for possible urethral stricture.  Urology performed pt's first milian catheter for difficulty (250cc output) and had the milian removed later.  It was replaced again by the ICU team with 125cc output.  The patient states that he has never had urinary problems prior to admission and denies  history, nocturia, hematuria, difficulty voiding.  He has never had -related surgery.      PAST MEDICAL HISTORY    No pertinent past medical history    H/O acute cholangitis    2019 novel coronavirus disease (COVID-19)        PAST SURGICAL HISTORY    No significant past surgical history    S/P ERCP    H/O colonoscopy        SOCIAL HISTORY    No smoking history        DRUG ALLERGIES    NKDA    REVIEW OF SYSTEMS: Pertinent positives and negatives as stated in HPI, otherwise negative      VITAL SIGNS    T(F): 98.6, Max: 98.6 (08-19-22 @ 11:00)  HR: 89  BP: 126/63  RR: 23  SpO2: 96%    I's & O's      18 Aug 2022 07:01  -  19 Aug 2022 07:00  --------------------------------------------------------  IN: 1110 mL / OUT: 1045 mL / NET: 65 mL    19 Aug 2022 07:01  -  19 Aug 2022 13:03  --------------------------------------------------------  IN: 5 mL / OUT: 120 mL / NET: -115 mL        PHYSICAL EXAM    Gen: Well groomed, well dressed, well nourished  Abd: Soft, NT      LABS:                        8.7    19.74 )-----------( 118               25.1     132  |  97  |  50  ----------------------------<  93  4.1   |  26  |  1.60    Ca    8.1  Phos  3.6  Mg     2.3    TPro  5.7  /  Alb  2.5  /  TBili  5.4  /  DBili  2.0  /  AST  26  /  ALT  12  /  AlkPhos  59    PT: 20.9 sec;   INR: 1.81 ratio  PTT:30.7 sec        Urine culture: 50k ecoli 8/8; pt on unasyn      IMAGING:      CT: * Moderate volume of abdominopelvic fluid with new findings of hemoperitoneum. Clot appears most concentrated within the left lower quadrant, suggesting that a source of bleed is potentially in this location. Limited assessment for hemorrhage without intravenous contrast. Consider further evaluation with contrast enhanced CT.  * New droplets of pneumoperitoneum, also most concentrated within the left lower quadrant. Pneumoperitoneum is potentially postprocedural given recent paracentesis, though exact etiology is uncertain.  * Cirrhosis.

## 2022-08-19 NOTE — PROGRESS NOTE ADULT - ASSESSMENT
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 coli and Streptococcus anginosis.  - Ascitic fluid studies from paracentesis (8/9) consistent with secondary peritonitis with concern for possible perforated viscus (likely gallbladder) given markedly elevated ascitic fluid LDH.   - Repeat paracentesis (8/12) still consistent with secondary peritonitis but with improving neutrophil count from 99k to 8k. Ascitic fluid bilirubin similar to serum, suggesting against bile leak.  - HD for ATN vs. HRS (8/11-13)  - Repeat LVP 2.9L on 8/16 with +SBP  - hemoperitoneum, s/p 4U PRBC, 3U FFP, 1U Vitamin K--remained hemodynamically stable during this time    [] Hemoperitoneum, SBP, Cholecystitis, SBP, Leukocytosis   - Hemoperitoneum:  expect drainage within the next few days  - ID following: Continue Unasyn for Strep/E. Coli coverage. Add Fluc for PPx in setting of hemoperitoneum  - SBP:  LVP cx (8/16)- E.Coli/Strep Anginosus, 2.9L removed.  continues with SBP in setting of hemoperitoneum  - continue C-tube management    [] Decompensated ALD/PARNELL Cirrhosis  - ABO O, MELD-Na 27 on 8/19  - Liver txp eval initiated   - EV: none on EGD/ERCP 7/2022, on PPI  - Ascites: peritonitis   - HE: none     [] Liver txp eval:   - cleared by Dental   - Cleared by Psych  - Cards: needs DSE today  - ID clearance for OLT pending resolution of peritonitis and quantiferon gold    [] ROBBIE vs. HRS  - Nephrology following  - Last HD 8/13,  Shiley removed 8/12  - intermittent diuretics as needed  - Midodrine/Octreotide  - Urine output improving, keep milian for accurate I&Os  -  to comment today on ?urethral stricture and recent urinary retention  - Renal US completed 8/17:suggestive of parenchymal disease

## 2022-08-19 NOTE — CONSULT NOTE ADULT - ASSESSMENT
66 year old male admitted for cholangitis, with history of a difficult catheterization; transplant surgery would like to rule out a urethral stricture    -  -  -  - 66 year old male admitted for cholangitis, with history of a difficult catheterization; transplant surgery would like to rule out a urethral stricture and urinary retention.    - Patient unlikely to have significant stricture given no urinary history and 14 Fr Tobin in place  - Recommend starting Flomax (0.4 mg at bedtime) now for possible urinary retention and optimization prior to TOV  - When Tobin for strict outputs is no longer necessary, please obtain PVR  - If PVR elevated (above 100-150 mL), patient can follow up with a general urologist for further evaluation    Case discussed with Dr. Skelton

## 2022-08-19 NOTE — PROGRESS NOTE ADULT - PROBLEM SELECTOR PLAN 1
-Last HD done 8/13, catheter and since been removed.   -Patient got a dose of 120mg lasix on 8/16; responded well  -Renal US with b/l echogenic kidneys  -creatinine downtrending  -consider c/w lasix 60 BID IV for diuresis as hemodynamic tolerates  -monitor BMP frequently; avoid nephrotoxic agents  -Optimize hemodynamics, transfusion as per primary team.     #hypomagnesium  -replete magnesium -Last HD done 8/13, catheter and since been removed.   -Patient got a dose of 120mg lasix on 8/16; responded well  -Renal US with b/l echogenic kidneys  -creatinine downtrending to 1.6  -consider c/w lasix 60 BID IV for diuresis as hemodynamic tolerates  -monitor BMP frequently; avoid nephrotoxic agents  -Optimize hemodynamics, transfusion as per primary team.

## 2022-08-19 NOTE — PROGRESS NOTE ADULT - SUBJECTIVE AND OBJECTIVE BOX
Follow Up:      Interval History:    REVIEW OF SYSTEMS  [  ] ROS unobtainable because:    [  ] All other systems negative except as noted below    Constitutional:  [ ] fever [ ] chills  [ ] weight loss  [ ] weakness  Skin:  [ ] rash [ ] phlebitis	  Eyes: [ ] icterus [ ] pain  [ ] discharge	  ENMT: [ ] sore throat  [ ] thrush [ ] ulcers [ ] exudates  Respiratory: [ ] dyspnea [ ] hemoptysis [ ] cough [ ] sputum	  Cardiovascular:  [ ] chest pain [ ] palpitations [ ] edema	  Gastrointestinal:  [ ] nausea [ ] vomiting [ ] diarrhea [ ] constipation [ ] pain	  Genitourinary:  [ ] dysuria [ ] frequency [ ] hematuria [ ] discharge [ ] flank pain  [ ] incontinence  Musculoskeletal:  [ ] myalgias [ ] arthralgias [ ] arthritis  [ ] back pain  Neurological:  [ ] headache [ ] seizures  [ ] confusion/altered mental status    Allergies  No Known Allergies        ANTIMICROBIALS:  ampicillin/sulbactam  IVPB 3 every 6 hours  fluconAZOLE IVPB 400 every 24 hours  penicillin   G benzathine Injectable 2.4 <User Schedule>      OTHER MEDS:  MEDICATIONS  (STANDING):  midodrine. 5 every 8 hours  octreotide  Injectable 100 every 8 hours  pantoprazole    Tablet 40 before breakfast  polyethylene glycol 3350 17 two times a day  senna 2 at bedtime  tamsulosin 0.4 at bedtime      Vital Signs Last 24 Hrs  T(C): 37 (19 Aug 2022 15:00), Max: 37.2 (18 Aug 2022 19:00)  T(F): 98.6 (19 Aug 2022 15:00), Max: 98.9 (18 Aug 2022 19:00)  HR: 88 (19 Aug 2022 18:00) (72 - 100)  BP: 157/76 (19 Aug 2022 18:00) (100/56 - 157/76)  BP(mean): 109 (19 Aug 2022 18:00) (75 - 109)  RR: 23 (19 Aug 2022 18:00) (14 - 36)  SpO2: 94% (19 Aug 2022 18:00) (91% - 96%)    Parameters below as of 19 Aug 2022 13:50  Patient On (Oxygen Delivery Method): room air        PHYSICAL EXAMINATION:  General: Alert and Awake, NAD  HEENT: PERRL, EOMI  Neck: Supple  Cardiac: RRR, No M/R/G  Resp: CTAB, No Wh/Rh/Ra  Abdomen: NBS, NT/ND, No HSM, No rigidity or guarding  MSK: No LE edema. No Calf tenderness  : No milian  Skin: No rashes or lesions. Skin is warm and dry to the touch.   Neuro: Alert and Awake. CN 2-12 Grossly intact. Moves all four extremities spontaneously.  Psych: Calm, Pleasant, Cooperative                          8.7    19.74 )-----------( 118      ( 19 Aug 2022 09:58 )             25.1       08-19    132<L>  |  97  |  50<H>  ----------------------------<  93  4.1   |  26  |  1.60<H>    Ca    8.1<L>      19 Aug 2022 04:21  Phos  3.6     08-19  Mg     2.3     08-19    TPro  5.7<L>  /  Alb  2.5<L>  /  TBili  5.4<H>  /  DBili  2.0<H>  /  AST  26  /  ALT  12  /  AlkPhos  59  08-19          MICROBIOLOGY:  v  Peritoneal Peritoneal Fluid  08-16-22   Rare Escherichia coli  Few Streptococcus anginosus  --  Escherichia coli      .Blood Blood-Peripheral  08-16-22   No growth to date.  --  --      Peritoneal Peritoneal Fluid  08-12-22   No growth  --    polymorphonuclear leukocytes seen  No organisms seen  by cytocentrifuge      .Blood Blood  08-11-22   No Growth Final  --  --      Peritoneal Peritoneal Fluid  08-09-22   Few Escherichia coli  Moderate Streptococcus anginosus "Susceptibilities not performed"  --  Escherichia coli      Catheterized Catheterized  08-08-22   10,000 - 49,000 CFU/mL Escherichia coli  --  Escherichia coli      .Blood Blood-Peripheral  08-08-22   No Growth Final  --  --      .Blood Blood-Peripheral  08-08-22   No Growth Final  --  --        CMV IgG Antibody: 4.00 U/mL (08-12-22 @ 00:32)          RADIOLOGY:    <The imaging below has been reviewed and visualized by me independently. Findings as detailed in report below> Follow Up:  peritonitis    Interval History: afebrile. abdominal pain stable.     REVIEW OF SYSTEMS  [  ] ROS unobtainable because:    [ x ] All other systems negative except as noted below    Constitutional:  [ ] fever [ ] chills  [ ] weight loss  [ ] weakness  Skin:  [ ] rash [ ] phlebitis	  Eyes: [ ] icterus [ ] pain  [ ] discharge	  ENMT: [ ] sore throat  [ ] thrush [ ] ulcers [ ] exudates  Respiratory: [ ] dyspnea [ ] hemoptysis [ ] cough [ ] sputum	  Cardiovascular:  [ ] chest pain [ ] palpitations [ ] edema	  Gastrointestinal:  [ ] nausea [ ] vomiting [ ] diarrhea [ ] constipation [ x] pain	  Genitourinary:  [ ] dysuria [ ] frequency [ ] hematuria [ ] discharge [ ] flank pain  [ ] incontinence  Musculoskeletal:  [ ] myalgias [ ] arthralgias [ ] arthritis  [ ] back pain  Neurological:  [ ] headache [ ] seizures  [ ] confusion/altered mental status      Allergies  No Known Allergies        ANTIMICROBIALS:  ampicillin/sulbactam  IVPB 3 every 6 hours  fluconAZOLE IVPB 400 every 24 hours  penicillin   G benzathine Injectable 2.4 <User Schedule>      OTHER MEDS:  MEDICATIONS  (STANDING):  midodrine. 5 every 8 hours  octreotide  Injectable 100 every 8 hours  pantoprazole    Tablet 40 before breakfast  polyethylene glycol 3350 17 two times a day  senna 2 at bedtime  tamsulosin 0.4 at bedtime      Vital Signs Last 24 Hrs  T(C): 37 (19 Aug 2022 15:00), Max: 37.2 (18 Aug 2022 19:00)  T(F): 98.6 (19 Aug 2022 15:00), Max: 98.9 (18 Aug 2022 19:00)  HR: 88 (19 Aug 2022 18:00) (72 - 100)  BP: 157/76 (19 Aug 2022 18:00) (100/56 - 157/76)  BP(mean): 109 (19 Aug 2022 18:00) (75 - 109)  RR: 23 (19 Aug 2022 18:00) (14 - 36)  SpO2: 94% (19 Aug 2022 18:00) (91% - 96%)    Parameters below as of 19 Aug 2022 13:50  Patient On (Oxygen Delivery Method): room air    PHYSICAL EXAMINATION:  General: Alert and Awake, NAD  Cardiac: RRR, No M/R/G  Resp: CTAB, No Wh/Rh/Ra  Abdomen: +Hepatobiliary Drain, Distended, No HSM, No rigidity or guarding  MSK: No LE edema. No Calf tenderness  Skin: No rashes or lesions. Skin is warm and dry to the touch.   Neuro: Alert and Awake. CN 2-12 Grossly intact. Moves all four extremities spontaneously.  Psych: Calm, Pleasant, Cooperative                            8.7    19.74 )-----------( 118      ( 19 Aug 2022 09:58 )             25.1       08-19    132<L>  |  97  |  50<H>  ----------------------------<  93  4.1   |  26  |  1.60<H>    Ca    8.1<L>      19 Aug 2022 04:21  Phos  3.6     08-19  Mg     2.3     08-19    TPro  5.7<L>  /  Alb  2.5<L>  /  TBili  5.4<H>  /  DBili  2.0<H>  /  AST  26  /  ALT  12  /  AlkPhos  59  08-19          MICROBIOLOGY:  v  Peritoneal Peritoneal Fluid  08-16-22   Rare Escherichia coli  Few Streptococcus anginosus  --  Escherichia coli      .Blood Blood-Peripheral  08-16-22   No growth to date.  --  --      Peritoneal Peritoneal Fluid  08-12-22   No growth  --    polymorphonuclear leukocytes seen  No organisms seen  by cytocentrifuge      .Blood Blood  08-11-22   No Growth Final  --  --      Peritoneal Peritoneal Fluid  08-09-22   Few Escherichia coli  Moderate Streptococcus anginosus "Susceptibilities not performed"  --  Escherichia coli      Catheterized Catheterized  08-08-22   10,000 - 49,000 CFU/mL Escherichia coli  --  Escherichia coli      .Blood Blood-Peripheral  08-08-22   No Growth Final  --  --      .Blood Blood-Peripheral  08-08-22   No Growth Final  --  --    CMV IgG Antibody: 4.00 U/mL (08-12-22 @ 00:32)    RADIOLOGY:    <The imaging below has been reviewed and visualized by me independently. Findings as detailed in report below>    < from: CT Chest No Cont (08.18.22 @ 17:46) >  IMPRESSION:  CHEST:  *  Moderate bilateral pleural effusions.  *  Ground glass opacities in the right upper lobe, which may be   infectious/inflammatory.  *  Asymmetric gynecomastia.    ABDOMEN AND PELVIS:  *  Moderate volume of abdominopelvic fluid with new findings of   hemoperitoneum. Clot appears most concentrated within the left lower   quadrant, suggesting that a source of bleed is potentially in this   location. Limited assessment for hemorrhage without intravenous contrast.   Consider further evaluation with contrast enhanced CT.  *  New droplets of pneumoperitoneum, also most concentrated within the   left lower quadrant. Pneumoperitoneum is potentially postprocedural given   recent paracentesis, though exact etiology is uncertain.  *  Cirrhosis.    < end of copied text >

## 2022-08-19 NOTE — PROGRESS NOTE ADULT - SUBJECTIVE AND OBJECTIVE BOX
[facility] Division of Kidney Diseases & Hypertension  FOLLOW UP NOTE  [phone]--------------------------------------------------------------------------------  Chief Complaint:Acute cholecystitis      24 hour events/subjective:  Creatinine downtrendign to 1.6, Na of 132.   Patient found to have a pneumoperitoneum and with bleeding in abdomen      PAST HISTORY  --------------------------------------------------------------------------------  No significant changes to PMH, PSH, FHx, SHx, unless otherwise noted    ALLERGIES & MEDICATIONS  --------------------------------------------------------------------------------  Allergies    No Known Allergies    Intolerances      Standing Inpatient Medications  ampicillin/sulbactam  IVPB 3 Gram(s) IV Intermittent every 6 hours  chlorhexidine 2% Cloths 1 Application(s) Topical <User Schedule>  midodrine. 5 milliGRAM(s) Oral every 8 hours  octreotide  Injectable 100 MICROGram(s) IV Push every 8 hours  pantoprazole    Tablet 40 milliGRAM(s) Oral before breakfast  penicillin   G benzathine Injectable 2.4 Million Unit(s) IntraMuscular <User Schedule>  polyethylene glycol 3350 17 Gram(s) Oral two times a day  senna 2 Tablet(s) Oral at bedtime    PRN Inpatient Medications      REVIEW OF SYSTEMS  --------------------------------------------------------------------------------  Gen: No fevers/chills  Skin: No rashes  Head/Eyes/Ears: Normal hearing,   Respiratory: No dyspnea, cough  CV: No chest pain  GI: No abdominal pain, diarrhea  : No dysuria, hematuria  MSK: No  edema  Heme: No easy bruising or bleeding  Psych: No significant depression      All other systems were reviewed and are negative, except as noted.    VITALS/PHYSICAL EXAM  --------------------------------------------------------------------------------  T(C): 36.8 (08-19-22 @ 07:00), Max: 37.2 (08-18-22 @ 19:00)  HR: 80 (08-19-22 @ 08:00) (72 - 91)  BP: 133/64 (08-19-22 @ 08:00) (100/56 - 133/64)  RR: 17 (08-19-22 @ 08:00) (14 - 25)  SpO2: 95% (08-19-22 @ 08:00) (93% - 96%)  Wt(kg): --        08-18-22 @ 07:01  -  08-19-22 @ 07:00  --------------------------------------------------------  IN: 1110 mL / OUT: 1045 mL / NET: 65 mL    08-19-22 @ 07:01  -  08-19-22 @ 10:49  --------------------------------------------------------  IN: 5 mL / OUT: 60 mL / NET: -55 mL      Physical Exam:  	Gen: NAD  	HEENT: MMM  	Pulm: CTA B/L anteriorly  	CV: S1S2  	Abd: Soft, +BS; Tobin+  	Ext: mild LE edema B/L  	Neuro: Awake  	Skin: Warm and dry    LABS/STUDIES  --------------------------------------------------------------------------------              8.7    19.74 >-----------<  118      [08-19-22 @ 09:58]              25.1     132  |  97  |  50  ----------------------------<  93      [08-19-22 @ 04:21]  4.1   |  26  |  1.60        Ca     8.1     [08-19-22 @ 04:21]      Mg     2.3     [08-19-22 @ 04:21]      Phos  3.6     [08-19-22 @ 04:21]    TPro  5.7  /  Alb  2.5  /  TBili  5.4  /  DBili  2.0  /  AST  26  /  ALT  12  /  AlkPhos  59  [08-19-22 @ 04:21]    PT/INR: PT 20.9 , INR 1.81       [08-19-22 @ 04:21]  PTT: 30.7       [08-19-22 @ 04:21]      Creatinine Trend:  SCr 1.60 [08-19 @ 04:21]  SCr 1.77 [08-18 @ 04:53]  SCr 2.16 [08-17 @ 00:38]  SCr 2.22 [08-15 @ 23:26]  SCr 2.05 [08-15 @ 05:51]    Urinalysis - [08-15-22 @ 17:07]      Color Yellow / Appearance Clear / SG 1.019 / pH 5.0      Gluc Negative / Ketone Negative  / Bili Negative / Urobili Negative       Blood Moderate / Protein Trace / Leuk Est Negative / Nitrite Negative      RBC 3-5 / WBC 3 / Hyaline 35 / Gran  / Sq Epi  / Non Sq Epi 2 / Bacteria Few    Urine Sodium 15      [08-17-22 @ 00:33]  Urine Urea Nitrogen 524      [08-17-22 @ 00:32]  Urine Potassium 36      [08-17-22 @ 00:33]  Urine Osmolality 334      [08-17-22 @ 00:33]

## 2022-08-19 NOTE — PROGRESS NOTE ADULT - SUBJECTIVE AND OBJECTIVE BOX
Transplant Surgery - Multidisciplinary Progress Note  --------------------------------------------------------------  Present: Patient seen and examined with Transplant Surgeon Dr. Dagher, Hepatologist Zhao Valdivia. Pharmacist Jordy Collins, JOHN Guzmán, Dr Lindsey ([facility]), SICU team during am rounds.   Disciplines not in attendance will be notified of plan.     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 coli and Streptococcus anginosis.  - Ascitic fluid studies from paracentesis (8/9) most consistent with secondary peritonitis with concern for possible perforated viscus (likely gallbladder) given markedly elevated ascitic fluid LDH.   - Repeat paracentesis (8/12) still consistent with secondary peritonitis but with improving neutrophil count from 99k to 8k. Ascitic fluid bilirubin similar to serum, suggesting against bile leak.  - HD for ATN vs. HRS (8/11-13)  - hemoperitoneum, s/p 4U PRBC, 3U FFP, 1U Vitamin K--remained hemodynamically stable during this time    Interval Events:  -Afebrile, VSS  -mild abdominal pain  -C-Tube 45  -urinary retention 2/2 ?urethral stricture: milian with 880 UO  -tolerating PO, ambulating with RN/PT      MEDICATIONS  (STANDING):  ampicillin/sulbactam  IVPB 3 Gram(s) IV Intermittent every 6 hours  chlorhexidine 2% Cloths 1 Application(s) Topical <User Schedule>  midodrine. 5 milliGRAM(s) Oral every 8 hours  octreotide  Injectable 100 MICROGram(s) IV Push every 8 hours  pantoprazole    Tablet 40 milliGRAM(s) Oral before breakfast  penicillin   G benzathine Injectable 2.4 Million Unit(s) IntraMuscular <User Schedule>  polyethylene glycol 3350 17 Gram(s) Oral two times a day  senna 2 Tablet(s) Oral at bedtime      Vital Signs Last 24 Hrs  T(C): 37 (19 Aug 2022 11:00), Max: 37.2 (18 Aug 2022 19:00)  T(F): 98.6 (19 Aug 2022 11:00), Max: 98.9 (18 Aug 2022 19:00)  HR: 89 (19 Aug 2022 11:00) (72 - 91)  BP: 126/63 (19 Aug 2022 11:00) (100/56 - 133/64)  BP(mean): 88 (19 Aug 2022 11:00) (75 - 91)  RR: 23 (19 Aug 2022 11:00) (14 - 25)  SpO2: 96% (19 Aug 2022 11:00) (93% - 96%)    Parameters below as of 19 Aug 2022 07:00  Patient On (Oxygen Delivery Method): room air        I&O's Summary    18 Aug 2022 07:01  -  19 Aug 2022 07:00  --------------------------------------------------------  IN: 1110 mL / OUT: 1045 mL / NET: 65 mL    19 Aug 2022 07:01  -  19 Aug 2022 12:15  --------------------------------------------------------  IN: 5 mL / OUT: 120 mL / NET: -115 mL                              8.7    19.74 )-----------( 118      ( 19 Aug 2022 09:58 )             25.1     08-19    132<L>  |  97  |  50<H>  ----------------------------<  93  4.1   |  26  |  1.60<H>    Ca    8.1<L>      19 Aug 2022 04:21  Phos  3.6     08-19  Mg     2.3     08-19    TPro  5.7<L>  /  Alb  2.5<L>  /  TBili  5.4<H>  /  DBili  2.0<H>  /  AST  26  /  ALT  12  /  AlkPhos  59  08-19          Culture - Fungal, Body Fluid (collected 08-16-22 @ 18:42)  Source: Peritoneal Peritoneal Fluid  Preliminary Report (08-17-22 @ 07:08):    Testing in progress    Culture - Body Fluid with Gram Stain (collected 08-16-22 @ 18:42)  Source: Peritoneal Peritoneal Fluid  Gram Stain (08-17-22 @ 01:29):    polymorphonuclear leukocytes seen    Gram positive cocci in pairs seen    by cytocentrifuge  Preliminary Report (08-18-22 @ 23:03):    Rare Escherichia coli    Few Streptococcus anginosus  Organism: Escherichia coli (08-18-22 @ 18:37)  Organism: Escherichia coli (08-18-22 @ 18:37)    Culture - Blood (collected 08-16-22 @ 06:05)  Source: .Blood Blood-Peripheral  Preliminary Report (08-17-22 @ 12:01):    No growth to date.    Culture - Blood (collected 08-16-22 @ 06:05)  Source: .Blood Blood-Peripheral  Preliminary Report (08-17-22 @ 12:01):    No growth to date.    Culture - Fungal, Body Fluid (collected 08-12-22 @ 17:26)  Source: Peritoneal Peritoneal Fluid  Preliminary Report (08-15-22 @ 08:29):    Testing in progress    Culture - Body Fluid with Gram Stain (collected 08-12-22 @ 17:26)  Source: Peritoneal Peritoneal Fluid  Gram Stain (08-12-22 @ 23:56):    polymorphonuclear leukocytes seen    No organisms seen    by cytocentrifuge  Final Report (08-18-22 @ 20:56):    No growth                      Review of systems  Gen: No weight changes, fatigue, fevers/chills, weakness  Skin: No rashes  Head/Eyes/Ears/Mouth: No headache; Normal hearing; Normal vision w/o blurriness; No sinus pain/discomfort, sore throat  Respiratory: No dyspnea, cough, wheezing, hemoptysis  CV: No chest pain, PND, orthopnea  GI: Mild abdominal pain ; denies diarrhea, constipation, nausea, vomiting, melena, hematochezia  : No increased frequency, dysuria, hematuria, nocturia  MSK: No joint pain/swelling; no back pain; no edema  Neuro: No dizziness/lightheadedness, weakness, seizures, numbness, tingling  Heme: No easy bruising or bleeding  Endo: No heat/cold intolerance  Psych: No significant nervousness, anxiety, stress, depression  All other systems were reviewed and are negative, except as noted.      PHYSICAL EXAM:  Constitutional: Well developed / well nourished  Eyes: Anicteric, PERRLA  ENMT: nc/at  Neck: supple  Respiratory: CTA B/L  Cardiovascular: RRR  Gastrointestinal: Soft, distention improving, improving TTP, C-tube with bilio-sanguinous drainage  Genitourinary: voiding via milian  Extremities: SCD's in place and working bilaterally, improving edema  Vascular: Palpable dp pulses bilaterally  Neurological: A&O x3  Skin: no rashes, ulcerations or lesions;  Musculoskeletal: Moving all extremities  Psychiatric: Responsive

## 2022-08-19 NOTE — PROGRESS NOTE ADULT - ATTENDING COMMENTS
65 y/o Male with a PMH Cirrhosis and recent cholangitis s/p ERCP stent (4/22) and stent removal (7/20/22). Pt presented on 8/8 with increasing RUQ pain. CT Abd/Pelvis reported distended gallbladder with stones in the cystic duct, gallbladder wall thickening, suggesting chronic cholecystitis. Pt had a percutaneous cholecystostomy tube placed on 8/09 along with a paracentesis yielding 300cc of purulent fluid.     Awake and alert, ammonia level not high  Hemodynamically stable saturating well on RA  On Octreotide and Midodrine for hepatorenal synd, off HD, diuresing, BUN Cr stable  Possible intraabd bleed. Monitor HCT platelets coags, transfuse as needed  Abx Unasyn  Mechanical DVT ppx  OOB.mobilization

## 2022-08-19 NOTE — PROGRESS NOTE ADULT - ASSESSMENT
Pt is a 67 y/o Male with a PMH Cirrhosis and recent cholangitis s/p ERCP stent (4/22) and stent removal (7/20/22). Pt presented on 8/8 with increasing RUQ pain. CT Abd/Pelvis reported distended gallbladder with stones in the cystic duct, gallbladder wall thickening, suggesting chronic cholecystitis. Pt had a percutaneous cholecystostomy tube placed on 8/09 along with a paracentesis yielding 300cc of purulent fluid. Pt admitted to SICU for further hemodynamic monitoring and management.     PLAN:   Neurologic: A, O x3    Respiratory: Atelectasis  - Maintain SpO2 > 92%  - Encourage OOB, Incentive spirometry, and chest PT  - AM CXR    Cardiovascular: Sinus tachycardia resolved   - Maintain MAP > 65   - Midodrine 5 q8hrs     Gastrointestinal/Nutrition: cirrhosis (MELD 34), cholecystitis s/p per rodney tube and diagnostic paracentesis (8/09)   - RD  - CT Abd/ Pelvis w/ Iv contrast ( 8/08)- distended gallbladder with stones in the cystic duct, gallbladder wall thickening, suggesting chronic cholecystitis  - GI not concerned for cholangitis due to negative for bile duct dilation  - Bowel regimen Miralax, senna and dulcolax supp  - Octreotide 100mg q8hr   -midodrine 10mg for hepatorenal syndrome  - Repeat CT Abd/Pelvis limited study; ordered RUQ Sono to eval for Gallbladder perforation->was found unremarkable  - Transplant hepatology consulted, reccs appreciated   -paracentesis  8/16 2.9L removed, s/p 250cc 5% albumin   - Non-con CT AP found penumoperitoneum and bleed in abdomen    Genitourinary/Renal: ROBBIE and hyperkalemia s/p shifting& Lokelma, urethral stricture, Hyponatremia with severe metabolic acidosis.   - Tobin placed ON  - 60 Lasix BID held in setting of bleed  - s/p 250cc 25% albumin  - Hyperkalemia improved  -no need for further HD at the moment    Hematologic: coagulopathy of liver failure/ sepsis  - Monitor CBC and Coags   - SCDs for mechanical VTE ppx   - Hold chemical VTE ppx  - s/p 4 u pRBCs and 2u FFP  - Vit K 10mg IV given for elevated INR, f/u coags in AM for goal INR < 2  - If INR above goal in AM will give 1 FFP  - cbc q4hrs    Infectious Disease: sepsis  - F/u BCx ( 8/9) NGTD   - UCx ( 8/8) + for E. Coli , Bile Cx ( 8/9) + E. Coli & Streptococcus anginosus  - Unasyn increased from 1.5 to 3  - Grew Streptococcus anginosus in bile culture.   - IM penicillin injection once a week for 3 weeks for syphillus    Endocrine: no acute issues  - Monitor glucose on BMP    Code Status: Full Code   Disposition: SICU

## 2022-08-19 NOTE — PROGRESS NOTE ADULT - ASSESSMENT
65 y/o M PMHx cholangitis/cholecystitis (s/p ERCP w/ biliary stent placement 04/2022) and recently diagnosed etOH cirrhosis, presents with RUQ pain following biliary stent removal on 7/20/22. Found to have distended GB with wall thickening and stone in cystic duct, concerning for cholecystitis. Admitted to SICU for monitoring, s/p perc rodney and paracentesis by IR on 8/9. Paracentesis results consistent with SBP. Course now c/b ARF.     s/p Perc Rodney Tube and Paracentesis  Paracentesis cell counts suggestive of Peritonitis  Cultures thus far with E coli and Streptococcus on Perc Rodney Drainage and Ascites drainage  Concerning with cholecystitis with associated perforation    Paracentesis (8/9) 160,713 with 62% PMN  Paracentesis (8/12) 8,344 with 97% PMN  Paracentesis (8/16) 53,930 with 92% PMN    #Peritonitis, Cholecystitis, SBP, Leukocytosis   Ascites Cultures (8/16) Rare E coli and Few Strep anginosis  E coli is still susceptible to Unasyn  CT A/P (8/18) Moderate volume of abdominopelvic fluid with new findings of hemoperitoneum. Clot appears most concentrated within the left lower quadrant, suggesting that a source of bleed is potentially in this location.   CT Chest (8/18) Ground glass opacities in the right upper lobe. Moderate bilateral pleural effusions.   Suspect GGO on CT is representative of pulmonary edema  --Continue Unasyn 3g IV Q6H  --Continue to follow CBC with diff  --Continue to follow temperature curve  --Follow up on preliminary blood cultures    #Late Latent Syphilis  Denies knowledge of preceding diagnosis  No preceding treatment  Will treat as late latent syphilis  --Continue IM Benzathine Penicillin 2.4 million units Q7Days x 3 doses  --Recommend urine GC/Chlamydia NAAT (ordered)    #Pre-Liver Transplant Evaluation  --ID clearance for OLT pending resolution of peritonitis  --Follow up on repeat Quantiferon Gold    I will be away over this upcoming weekend. Please contact the Infectious Diseases Office with any further questions or concerns.     Bladimir Nix M.D.  [facility] Division of Infectious Disease  8AM-5PM Monday - Friday: Available on [employer] Teams  After Hours and Holidays (or if no response on [employer] Teams): Please contact the Infectious Diseases Office at [phone]     The above assessment and plan were discussed with Pb transplant surgery PA

## 2022-08-19 NOTE — PROGRESS NOTE ADULT - PROBLEM SELECTOR PLAN 2
Stable for now; monitor off HD and with IV diuretics  -fluid restrict to 1L  -c/w Midodrine and albumin with paracentesis  - Avoid hypotension  w) Stable for now; monitor off HD and with IV diuretics  -fluid restrict to 1L, encourage PO intake      Patient with downtrending creatinine and no need for HD since 8/13 with stable electrolytes and good UOP with intermittent diuretics. Nephrology will be signing off at this time. Thank you for allowing us to participate in the care of this patient. Please contact for any further questions and concerns.    If you have any questions, please feel free to contact me  Vlad Bermudez  Nephrology Fellow  [phone]; Prefer [employer] TEAMS  (After 5pm or on weekends please page the on-call fellow).    Patient was discussed with Dr. Aquino who agrees with my A/P. Addendum to follow

## 2022-08-20 PROCEDURE — 99232 SBSQ HOSP IP/OBS MODERATE 35: CPT

## 2022-08-20 PROCEDURE — 99252 IP/OBS CONSLTJ NEW/EST SF 35: CPT

## 2022-08-20 RX ORDER — FUROSEMIDE 40 MG
40 TABLET ORAL ONCE
Refills: 0 | Status: COMPLETED | OUTPATIENT
Start: 2022-08-20 | End: 2022-08-20

## 2022-08-20 RX ORDER — FUROSEMIDE 40 MG
40 TABLET ORAL EVERY 12 HOURS
Refills: 0 | Status: DISCONTINUED | OUTPATIENT
Start: 2022-08-20 | End: 2022-08-26

## 2022-08-20 RX ORDER — TRAMADOL HYDROCHLORIDE 50 MG/1
25 TABLET ORAL ONCE
Refills: 0 | Status: DISCONTINUED | OUTPATIENT
Start: 2022-08-20 | End: 2022-08-20

## 2022-08-20 RX ORDER — POLYETHYLENE GLYCOL 3350 17 G/17G
17 POWDER, FOR SOLUTION ORAL ONCE
Refills: 0 | Status: COMPLETED | OUTPATIENT
Start: 2022-08-20 | End: 2022-08-20

## 2022-08-20 RX ADMIN — POLYETHYLENE GLYCOL 3350 17 GRAM(S): 17 POWDER, FOR SOLUTION ORAL at 17:18

## 2022-08-20 RX ADMIN — FLUCONAZOLE 100 MILLIGRAM(S): 150 TABLET ORAL at 17:18

## 2022-08-20 RX ADMIN — Medication 40 MILLIGRAM(S): at 17:18

## 2022-08-20 RX ADMIN — SENNA PLUS 2 TABLET(S): 8.6 TABLET ORAL at 21:56

## 2022-08-20 RX ADMIN — CHLORHEXIDINE GLUCONATE 1 APPLICATION(S): 213 SOLUTION TOPICAL at 05:10

## 2022-08-20 RX ADMIN — TRAMADOL HYDROCHLORIDE 25 MILLIGRAM(S): 50 TABLET ORAL at 15:16

## 2022-08-20 RX ADMIN — AMPICILLIN SODIUM AND SULBACTAM SODIUM 200 GRAM(S): 250; 125 INJECTION, POWDER, FOR SUSPENSION INTRAMUSCULAR; INTRAVENOUS at 05:10

## 2022-08-20 RX ADMIN — AMPICILLIN SODIUM AND SULBACTAM SODIUM 200 GRAM(S): 250; 125 INJECTION, POWDER, FOR SUSPENSION INTRAMUSCULAR; INTRAVENOUS at 17:17

## 2022-08-20 RX ADMIN — TAMSULOSIN HYDROCHLORIDE 0.4 MILLIGRAM(S): 0.4 CAPSULE ORAL at 21:56

## 2022-08-20 RX ADMIN — MIDODRINE HYDROCHLORIDE 5 MILLIGRAM(S): 2.5 TABLET ORAL at 21:56

## 2022-08-20 RX ADMIN — MIDODRINE HYDROCHLORIDE 5 MILLIGRAM(S): 2.5 TABLET ORAL at 15:16

## 2022-08-20 RX ADMIN — OCTREOTIDE ACETATE 100 MICROGRAM(S): 200 INJECTION, SOLUTION INTRAVENOUS; SUBCUTANEOUS at 01:03

## 2022-08-20 RX ADMIN — POLYETHYLENE GLYCOL 3350 17 GRAM(S): 17 POWDER, FOR SOLUTION ORAL at 15:16

## 2022-08-20 RX ADMIN — Medication 40 MILLIGRAM(S): at 10:18

## 2022-08-20 RX ADMIN — TRAMADOL HYDROCHLORIDE 25 MILLIGRAM(S): 50 TABLET ORAL at 16:16

## 2022-08-20 RX ADMIN — AMPICILLIN SODIUM AND SULBACTAM SODIUM 200 GRAM(S): 250; 125 INJECTION, POWDER, FOR SUSPENSION INTRAMUSCULAR; INTRAVENOUS at 00:05

## 2022-08-20 RX ADMIN — MIDODRINE HYDROCHLORIDE 5 MILLIGRAM(S): 2.5 TABLET ORAL at 05:11

## 2022-08-20 RX ADMIN — PANTOPRAZOLE SODIUM 40 MILLIGRAM(S): 20 TABLET, DELAYED RELEASE ORAL at 08:20

## 2022-08-20 RX ADMIN — POLYETHYLENE GLYCOL 3350 17 GRAM(S): 17 POWDER, FOR SOLUTION ORAL at 05:11

## 2022-08-20 RX ADMIN — AMPICILLIN SODIUM AND SULBACTAM SODIUM 200 GRAM(S): 250; 125 INJECTION, POWDER, FOR SUSPENSION INTRAMUSCULAR; INTRAVENOUS at 11:01

## 2022-08-20 NOTE — PROGRESS NOTE ADULT - ASSESSMENT
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 coli and Streptococcus anginosis.  - Ascitic fluid studies from paracentesis (8/9) consistent with secondary peritonitis with concern for possible perforated viscus (likely gallbladder) given markedly elevated ascitic fluid LDH.   - Repeat paracentesis (8/12) still consistent with secondary peritonitis but with improving neutrophil count from 99k to 8k. Ascitic fluid bilirubin similar to serum, suggesting against bile leak.  - HD for ATN vs. HRS (8/11-13)  - Repeat LVP 2.9L on 8/16 with +SBP  - hemoperitoneum, s/p 4U PRBC, 3U FFP, 1U Vitamin K--remained hemodynamically stable during this time    Hemoperitoneum, SBP, Cholecystitis, SBP, Leukocytosis   - Hemoperitoneum:  +/- drainage within the next few days  - ID following: Continue Unasyn for Strep/E. Coli coverage. Fluc for PPx in setting of hemoperitoneum  - SBP:  LVP cx (8/16)- E.Coli/Strep Anginosus, 2.9L removed.  continues with SBP  - continue C-tube management    Decompensated ALD/PARNELL Cirrhosis  - ABO O, MELD-Na 24 on 8/20  - Liver txp eval initiated on 8/11  - EV: none on EGD/ERCP 7/2022, on PPI  - Ascites: restart Lasix 40 IV BID  - HE: none   - PT/Nutrition, education provided    Liver txp eval:   - cleared by Dental   - Cleared by Psych  - Cards: DSE/TTE WNL  - ID clearance for OLT pending resolution of peritonitis and quantiferon gold    ROBBIE vs. HRS  - Nephrology following  - Last HD 8/13,  Shiley removed 8/12  - diuretics as above  - Midodrine to continue, stop Octreotide  - strict I&Os via milian/C-Tube  -  following, on Flomax. Will attempt TOV tomorrow  - Renal US completed 8/17:suggestive of parenchymal disease    DISPO  - Transfer to [facility]

## 2022-08-20 NOTE — PROGRESS NOTE ADULT - SUBJECTIVE AND OBJECTIVE BOX
Surgery Progress Note     Subjective/24hour Events:     started CLD  s/p stress test- NL  added fluconazole for candida growth in peritoneal fluid  started flomax       Patient seen and examined.   No acute events overnight. Jarvis in place and draining   Pain controlled.     Vital Signs:  Vital Signs Last 24 Hrs  T(C): 36.9 (20 Aug 2022 11:00), Max: 37 (19 Aug 2022 15:00)  T(F): 98.4 (20 Aug 2022 11:00), Max: 98.6 (19 Aug 2022 15:00)  HR: 97 (20 Aug 2022 12:00) (78 - 101)  BP: 150/73 (20 Aug 2022 12:00) (115/58 - 157/76)  BP(mean): 105 (20 Aug 2022 12:00) (82 - 109)  RR: 21 (20 Aug 2022 12:00) (13 - 23)  SpO2: 94% (20 Aug 2022 12:00) (91% - 96%)    Parameters below as of 20 Aug 2022 07:00  Patient On (Oxygen Delivery Method): room air        CAPILLARY BLOOD GLUCOSE          I&O's Detail    19 Aug 2022 07:01  -  20 Aug 2022 07:00  --------------------------------------------------------  IN:    IV PiggyBack: 400 mL    Other (mL): 5 mL  Total IN: 405 mL    OUT:    Indwelling Catheter - Urethral (mL): 1405 mL    T-Tube (mL): 30 mL  Total OUT: 1435 mL    Total NET: -1030 mL      20 Aug 2022 07:01  -  20 Aug 2022 12:14  --------------------------------------------------------  IN:    IV PiggyBack: 100 mL    Other (mL): 5 mL  Total IN: 105 mL    OUT:    Indwelling Catheter - Urethral (mL): 415 mL  Total OUT: 415 mL    Total NET: -310 mL          MEDICATIONS  (STANDING):  ampicillin/sulbactam  IVPB 3 Gram(s) IV Intermittent every 6 hours  chlorhexidine 2% Cloths 1 Application(s) Topical <User Schedule>  fluconAZOLE IVPB 400 milliGRAM(s) IV Intermittent every 24 hours  furosemide   Injectable 40 milliGRAM(s) IV Push every 12 hours  midodrine. 5 milliGRAM(s) Oral every 8 hours  pantoprazole    Tablet 40 milliGRAM(s) Oral before breakfast  penicillin   G benzathine Injectable 2.4 Million Unit(s) IntraMuscular <User Schedule>  polyethylene glycol 3350 17 Gram(s) Oral two times a day  senna 2 Tablet(s) Oral at bedtime  tamsulosin 0.4 milliGRAM(s) Oral at bedtime    MEDICATIONS  (PRN):      Physical Exam:  Gen: NAD.  Lungs: Non labored breathing.   Ab: Soft, nontender, nondistended, Drain in place  : Jarvis in place with clear yellow urine.     Labs:    08-19    133<L>  |  99  |  48<H>  ----------------------------<  146<H>  4.1   |  23  |  1.43<H>    Ca    8.2<L>      19 Aug 2022 22:04  Phos  3.0     08-19  Mg     2.2     08-19    TPro  5.9<L>  /  Alb  2.6<L>  /  TBili  4.1<H>  /  DBili  1.6<H>  /  AST  28  /  ALT  12  /  AlkPhos  66  08-19    LIVER FUNCTIONS - ( 19 Aug 2022 22:04 )  Alb: 2.6 g/dL / Pro: 5.9 g/dL / ALK PHOS: 66 U/L / ALT: 12 U/L / AST: 28 U/L / GGT: x                                 8.0    17.00 )-----------( 99       ( 19 Aug 2022 22:04 )             23.2     PT/INR - ( 19 Aug 2022 22:04 )   PT: 21.7 sec;   INR: 1.86 ratio         PTT - ( 19 Aug 2022 22:04 )  PTT:30.8 sec       Surgery Progress Note     Subjective/24hour Events:     started CLD  s/p stress test- NL  added fluconazole for candida growth in peritoneal fluid  started flomax       Patient seen and examined.   No acute events overnight. Jarvis in place and draining. Complaining of catheter related discomfort.  Pain controlled.     Vital Signs:  Vital Signs Last 24 Hrs  T(C): 36.9 (20 Aug 2022 11:00), Max: 37 (19 Aug 2022 15:00)  T(F): 98.4 (20 Aug 2022 11:00), Max: 98.6 (19 Aug 2022 15:00)  HR: 97 (20 Aug 2022 12:00) (78 - 101)  BP: 150/73 (20 Aug 2022 12:00) (115/58 - 157/76)  BP(mean): 105 (20 Aug 2022 12:00) (82 - 109)  RR: 21 (20 Aug 2022 12:00) (13 - 23)  SpO2: 94% (20 Aug 2022 12:00) (91% - 96%)    Parameters below as of 20 Aug 2022 07:00  Patient On (Oxygen Delivery Method): room air        CAPILLARY BLOOD GLUCOSE          I&O's Detail    19 Aug 2022 07:01  -  20 Aug 2022 07:00  --------------------------------------------------------  IN:    IV PiggyBack: 400 mL    Other (mL): 5 mL  Total IN: 405 mL    OUT:    Indwelling Catheter - Urethral (mL): 1405 mL    T-Tube (mL): 30 mL  Total OUT: 1435 mL    Total NET: -1030 mL      20 Aug 2022 07:01  -  20 Aug 2022 12:14  --------------------------------------------------------  IN:    IV PiggyBack: 100 mL    Other (mL): 5 mL  Total IN: 105 mL    OUT:    Indwelling Catheter - Urethral (mL): 415 mL  Total OUT: 415 mL    Total NET: -310 mL          MEDICATIONS  (STANDING):  ampicillin/sulbactam  IVPB 3 Gram(s) IV Intermittent every 6 hours  chlorhexidine 2% Cloths 1 Application(s) Topical <User Schedule>  fluconAZOLE IVPB 400 milliGRAM(s) IV Intermittent every 24 hours  furosemide   Injectable 40 milliGRAM(s) IV Push every 12 hours  midodrine. 5 milliGRAM(s) Oral every 8 hours  pantoprazole    Tablet 40 milliGRAM(s) Oral before breakfast  penicillin   G benzathine Injectable 2.4 Million Unit(s) IntraMuscular <User Schedule>  polyethylene glycol 3350 17 Gram(s) Oral two times a day  senna 2 Tablet(s) Oral at bedtime  tamsulosin 0.4 milliGRAM(s) Oral at bedtime    MEDICATIONS  (PRN):      Physical Exam:  Gen: NAD.  Lungs: Non labored breathing.   Ab: Soft, nontender, nondistended, Drain in place  : Jarvis in place with clear yellow urine.     Labs:    08-19    133<L>  |  99  |  48<H>  ----------------------------<  146<H>  4.1   |  23  |  1.43<H>    Ca    8.2<L>      19 Aug 2022 22:04  Phos  3.0     08-19  Mg     2.2     08-19    TPro  5.9<L>  /  Alb  2.6<L>  /  TBili  4.1<H>  /  DBili  1.6<H>  /  AST  28  /  ALT  12  /  AlkPhos  66  08-19    LIVER FUNCTIONS - ( 19 Aug 2022 22:04 )  Alb: 2.6 g/dL / Pro: 5.9 g/dL / ALK PHOS: 66 U/L / ALT: 12 U/L / AST: 28 U/L / GGT: x                                 8.0    17.00 )-----------( 99       ( 19 Aug 2022 22:04 )             23.2     PT/INR - ( 19 Aug 2022 22:04 )   PT: 21.7 sec;   INR: 1.86 ratio         PTT - ( 19 Aug 2022 22:04 )  PTT:30.8 sec

## 2022-08-20 NOTE — PROGRESS NOTE ADULT - SUBJECTIVE AND OBJECTIVE BOX
Transplant Surgery - Multidisciplinary Progress Note  --------------------------------------------------------------  HPI: 67 yo M with decompensated cirrhosis possibly secondary to ALD/PARNELL (with last reported alcohol in 4/2022) and history of cholangitis s/p ERCP with stent placement (4/2022) with subsequent repeat ERCP with stent removal, sphincterotomy, and balloon sweep removal of sludge/stones (7/20/22), admitted with severe sepsis with associated oliguric ROBBIE and lactic acidosis secondary to acute cholecystitis and peritonitis    - now s/p percutaneous cholecystostomy tube placement (8/9) with cultures from ascitic fluid and bile growing E. coli and Streptococcus anginosis.  - Ascitic fluid studies from paracentesis (8/9) most consistent with secondary peritonitis with concern for possible perforated viscus (likely gallbladder) given markedly elevated ascitic fluid LDH.   - Repeat paracentesis (8/12) still consistent with secondary peritonitis but with improving neutrophil count from 99k to 8k. Ascitic fluid bilirubin similar to serum, suggesting against bile leak.  - HD for ATN vs. HRS (8/11-13)  - hemoperitoneum, s/p 4U PRBC, 3U FFP, 1U Vitamin K--remained hemodynamically stable during this time    Interval Events:  -Afebrile, VSS  -C-Tube 30, bilious  -urinary retention: on Flomax, milian with ~1L UO  -tolerating PO, ambulating with RN/PT      MEDICATIONS  (STANDING):  ampicillin/sulbactam  IVPB 3 Gram(s) IV Intermittent every 6 hours  chlorhexidine 2% Cloths 1 Application(s) Topical <User Schedule>  fluconAZOLE IVPB 400 milliGRAM(s) IV Intermittent every 24 hours  furosemide   Injectable 40 milliGRAM(s) IV Push every 12 hours  midodrine. 5 milliGRAM(s) Oral every 8 hours  pantoprazole    Tablet 40 milliGRAM(s) Oral before breakfast  penicillin   G benzathine Injectable 2.4 Million Unit(s) IntraMuscular <User Schedule>  polyethylene glycol 3350 17 Gram(s) Oral two times a day  polyethylene glycol 3350 17 Gram(s) Oral once  senna 2 Tablet(s) Oral at bedtime  tamsulosin 0.4 milliGRAM(s) Oral at bedtime  traMADol 25 milliGRAM(s) Oral once      Vital Signs Last 24 Hrs  T(C): 36.8 (20 Aug 2022 12:58), Max: 36.9 (20 Aug 2022 11:00)  T(F): 98.3 (20 Aug 2022 12:58), Max: 98.4 (20 Aug 2022 11:00)  HR: 93 (20 Aug 2022 12:58) (78 - 101)  BP: 130/73 (20 Aug 2022 12:58) (115/58 - 157/76)  BP(mean): 105 (20 Aug 2022 12:00) (82 - 109)  RR: 20 (20 Aug 2022 12:58) (13 - 23)  SpO2: 97% (20 Aug 2022 12:58) (92% - 97%)    Parameters below as of 20 Aug 2022 12:58  Patient On (Oxygen Delivery Method): room air        I&O's Summary    19 Aug 2022 07:01  -  20 Aug 2022 07:00  --------------------------------------------------------  IN: 405 mL / OUT: 1435 mL / NET: -1030 mL    20 Aug 2022 07:01  -  20 Aug 2022 15:09  --------------------------------------------------------  IN: 105 mL / OUT: 590 mL / NET: -485 mL                              8.0    17.00 )-----------( 99       ( 19 Aug 2022 22:04 )             23.2     08-19    133<L>  |  99  |  48<H>  ----------------------------<  146<H>  4.1   |  23  |  1.43<H>    Ca    8.2<L>      19 Aug 2022 22:04  Phos  3.0     08-19  Mg     2.2     08-19    TPro  5.9<L>  /  Alb  2.6<L>  /  TBili  4.1<H>  /  DBili  1.6<H>  /  AST  28  /  ALT  12  /  AlkPhos  66  08-19          Culture - Fungal, Body Fluid (collected 08-16-22 @ 18:42)  Source: Peritoneal Peritoneal Fluid  Preliminary Report (08-17-22 @ 07:08):    Testing in progress    Culture - Body Fluid with Gram Stain (collected 08-16-22 @ 18:42)  Source: Peritoneal Peritoneal Fluid  Gram Stain (08-17-22 @ 01:29):    polymorphonuclear leukocytes seen    Gram positive cocci in pairs seen    by cytocentrifuge  Preliminary Report (08-18-22 @ 23:03):    Rare Escherichia coli    Few Streptococcus anginosus  Organism: Escherichia coli  Streptococcus anginosus (08-19-22 @ 20:47)  Organism: Streptococcus anginosus (08-19-22 @ 20:47)  Organism: Escherichia coli (08-18-22 @ 18:37)    Culture - Blood (collected 08-16-22 @ 06:05)  Source: .Blood Blood-Peripheral  Preliminary Report (08-17-22 @ 12:01):    No growth to date.    Culture - Blood (collected 08-16-22 @ 06:05)  Source: .Blood Blood-Peripheral  Preliminary Report (08-17-22 @ 12:01):    No growth to date.                  Review of systems  Gen: No weight changes, fatigue, fevers/chills, weakness  Skin: No rashes  Head/Eyes/Ears/Mouth: No headache; Normal hearing; Normal vision w/o blurriness; No sinus pain/discomfort, sore throat  Respiratory: No dyspnea, cough, wheezing, hemoptysis  CV: No chest pain, PND, orthopnea  GI: Mild abdominal pain ; denies diarrhea, constipation, nausea, vomiting, melena, hematochezia  : No increased frequency, dysuria, hematuria, nocturia  MSK: No joint pain/swelling; no back pain; no edema  Neuro: No dizziness/lightheadedness, weakness, seizures, numbness, tingling  Heme: No easy bruising or bleeding  Endo: No heat/cold intolerance  Psych: No significant nervousness, anxiety, stress, depression  All other systems were reviewed and are negative, except as noted.      PHYSICAL EXAM:  Constitutional: Well developed / well nourished  Eyes: Anicteric, PERRLA  ENMT: nc/at  Neck: supple  Respiratory: CTA B/L  Cardiovascular: RRR  Gastrointestinal: Soft, distention stable, improving TTP, C-tube with bilious drainage  Genitourinary: voiding via milian  Extremities: SCD's in place and working bilaterally, improving edema  Vascular: Palpable dp pulses bilaterally  Neurological: A&O x3  Skin: no rashes, ulcerations or lesions;  Musculoskeletal: Moving all extremities  Psychiatric: Responsive

## 2022-08-20 NOTE — PROGRESS NOTE ADULT - ASSESSMENT
66 year old male admitted for cholangitis, with history of a difficult catheterization; transplant surgery would like to rule out a urethral stricture and urinary retention.    - Patient unlikely to have significant stricture given no urinary history and 14 Fr Tobin in place  - Recommend starting Flomax (0.4 mg at bedtime) now for possible urinary retention and optimization prior to TOV  - When Tobin for strict outputs is no longer necessary, please obtain PVR  - If PVR elevated (above 100-150 mL), patient can follow up with a general urologist for further evaluation in the outpatient setting.    [facility]  [address] [place] [address] [phone]       66 year old male admitted for cholangitis, with history of a difficult catheterization; transplant surgery would like to rule out a urethral stricture and urinary retention.    - Patient unlikely to have significant stricture given no urinary history and 14 Fr Tobin in place  - Recommend starting Flomax (0.4 mg at bedtime) now for possible urinary retention and optimization prior to TOV  - When Tobin for strict outputs is no longer necessary, please obtain PVR  - If PVR elevated (above 100-150 mL), patient can follow up with a general urologist for further evaluation in the outpatient setting.  - Please provide patient with lidocaine gel for catheter discomfort.     [facility]  [address] [place] [phone]

## 2022-08-20 NOTE — PROGRESS NOTE ADULT - SUBJECTIVE AND OBJECTIVE BOX
HISTORY:    24 HOUR EVENTS:  started CLD  s/p stress test- NL  added fluconazole for candida growth in peritoneal fluid  started flomax     NEURO  Exam: AxO x3  Meds:     RESPIRATORY  RR: 13 (08-20-22 @ 01:00) (13 - 36)  SpO2: 94% (08-20-22 @ 01:00) (91% - 96%)  Wt(kg): --  Exam:    Meds:     CARDIOVASCULAR  HR: 80 (08-20-22 @ 01:00) (72 - 100)  BP: 122/57 (08-20-22 @ 01:00) (100/56 - 157/76)  BP(mean): 82 (08-20-22 @ 01:00) (75 - 109)  ABP: --  ABP(mean): --  Wt(kg): --  CVP(cm H2O): --  VBG - ( 18 Aug 2022 19:32 )  pH: 7.44  /  pCO2: 41    /  pO2: 38    / HCO3: 28    / Base Excess: 3.3   /  SaO2: 71.3   Lactate: 1.7        Exam:   Cardiac Rhythm: NSR  Perfusion     [x ]Adequate   [ ]Inadequate  Mentation   [x ]Normal       [ ]Reduced  Extremities  [x ]Warm         [ ]Cool  Volume Status [ ]Hypervolemic [ x]Euvolemic [ ]Hypovolemic  Meds: midodrine. 5 milliGRAM(s) Oral every 8 hours  tamsulosin 0.4 milliGRAM(s) Oral at bedtime      GI/NUTRITION  Exam: SNTND  Diet: CLD  Meds: pantoprazole    Tablet 40 milliGRAM(s) Oral before breakfast  polyethylene glycol 3350 17 Gram(s) Oral two times a day  senna 2 Tablet(s) Oral at bedtime      GENITOURINARY  I&O's Detail    08-18 @ 07:01  -  08-19 @ 07:00  --------------------------------------------------------  IN:    IV PiggyBack: 205 mL    Other (mL): 5 mL    Plasma: 300 mL    PRBCs (Packed Red Blood Cells): 600 mL  Total IN: 1110 mL    OUT:    Indwelling Catheter - Urethral (mL): 1000 mL    T-Tube (mL): 45 mL  Total OUT: 1045 mL    Total NET: 65 mL      08-19 @ 07:01  -  08-20 @ 01:34  --------------------------------------------------------  IN:    IV PiggyBack: 300 mL    Other (mL): 5 mL  Total IN: 305 mL    OUT:    Indwelling Catheter - Urethral (mL): 970 mL    T-Tube (mL): 30 mL  Total OUT: 1000 mL    Total NET: -695 mL          08-19    133<L>  |  99  |  48<H>  ----------------------------<  146<H>  4.1   |  23  |  1.43<H>    Ca    8.2<L>      19 Aug 2022 22:04  Phos  3.0     08-19  Mg     2.2     08-19    TPro  5.9<L>  /  Alb  2.6<L>  /  TBili  4.1<H>  /  DBili  1.6<H>  /  AST  28  /  ALT  12  /  AlkPhos  66  08-19    Meds:     HEMATOLOGIC  Meds:                         8.0    17.00 )-----------( 99       ( 19 Aug 2022 22:04 )             23.2     PT/INR - ( 19 Aug 2022 22:04 )   PT: 21.7 sec;   INR: 1.86 ratio         PTT - ( 19 Aug 2022 22:04 )  PTT:30.8 sec    INFECTIOUS DISEASES  T(C): 36.4 (08-19-22 @ 23:00), Max: 37 (08-19-22 @ 11:00)  Wt(kg): --  WBC Count: 17.00 K/uL (08-19 @ 22:04)  WBC Count: 19.74 K/uL (08-19 @ 09:58)  WBC Count: 16.64 K/uL (08-19 @ 04:21)    Recent Cultures:  Specimen Source: Peritoneal Peritoneal Fluid, 08-16 @ 18:42; Results   Rare Escherichia coli  Few Streptococcus anginosus; Gram Stain:   polymorphonuclear leukocytes seen  Gram positive cocci in pairs seen  by cytocentrifuge; Organism: Escherichia coli  Streptococcus anginosus  Specimen Source: .Blood Blood-Peripheral, 08-16 @ 06:05; Results   No growth to date.; Gram Stain: --; Organism: --    Meds: ampicillin/sulbactam  IVPB 3 Gram(s) IV Intermittent every 6 hours  fluconAZOLE IVPB 400 milliGRAM(s) IV Intermittent every 24 hours  penicillin   G benzathine Injectable 2.4 Million Unit(s) IntraMuscular <User Schedule>      ENDOCRINE  Capillary Blood Glucose    Meds: octreotide  Injectable 100 MICROGram(s) IV Push every 8 hours      ACCESS DEVICES:  [ x] Peripheral IV  [ ] Central Venous Line		[ ] R	[ ] L	[ ] IJ	[ ] Fem	[ ] SC	Placed:   [ ] Arterial Line			[ ] R	[ ] L	[ ] Fem	[ ] Rad	[ ] Ax	Placed:   [ ] PICC:					[ ] Mediport  [ ] Urinary Catheter, Date Placed:   [ ] Necessity of urinary, arterial, and venous catheters discussed    OTHER MEDICATIONS:  chlorhexidine 2% Cloths 1 Application(s) Topical <User Schedule>      IMAGING:

## 2022-08-20 NOTE — PROGRESS NOTE ADULT - ATTENDING COMMENTS
67 y/o Male with a PMH Cirrhosis and recent cholangitis s/p ERCP stent (4/22) and stent removal (7/20/22). Pt presented on 8/8 with increasing RUQ pain. CT Abd/Pelvis reported distended gallbladder with stones in the cystic duct, gallbladder wall thickening, suggesting chronic cholecystitis. Pt had a percutaneous cholecystostomy tube placed on 8/09 along with a paracentesis yielding 300cc of purulent fluid.     Awake and alert  Hemodynamically stable saturating well on RA. Midodrine for BS support  Off HD, diuresing, BUN Cr stable, can DC octreotide   Possible intraabd bleed. HCT platelets stable., transfuse as needed  Abx Unasyn  Mechanical DVT ppx  OOB.mobilization

## 2022-08-20 NOTE — PROGRESS NOTE ADULT - ASSESSMENT
66 year old male with decompensated cirrhosis and cholangitis s/p ERCP with stent placement (4/2022) s/p stent removal on 7/20 (along sphincterotomy and stone extraction) presenting for one week of RUQ abdominal pain associated with new abdominal distension and decreased appetite. Septic shock 2/2 acute cholecystitis vs ascending cholangitis; ROBBIE, worsening: initial urine sodium 9, likely indicating prerenal ROBBIE vs less likely HRS given initial presentation as above; History of cholangitis s/p biliary stent placement April 2022 and removal 7/20/2022, now s/p perc rodney tube. Repeat peritoneal fluid tap yesterday still with high neutrophil count; n/p/r/b/a of peritoneal drain discussed; at this time benefits outweigh risks, will ask IR to place today; ID input for abx change  Cr improving, making urine, renal following, Lasix 40mg bid  Cardiac stress test – neg  hemoperitoneum – likely from paracentesis; s/p prbc support; HD stable; and h/h is stable  abd distended – will consider LVP prn   input appreciated – will give Flomax, can consider d/c milian in 1 day and follow PVR  MELD 24 – OLT under eval; DSE neg  Nutrition- encouraged to diet high protein diet- pt wants to drink fluids for now, PT, IS  Ok to transfer to [address] [place] [address] – appreciate SICU care      Lety Monson DO  Transplant Hepatologist   Associate Professor of Medicine

## 2022-08-20 NOTE — PROVIDER CONTACT NOTE (OTHER) - ASSESSMENT
Patient having short periods of tachycardia to the 130s and complaining of milian pain, when nurse manipulates milian he said he "feels relief".

## 2022-08-20 NOTE — PROGRESS NOTE ADULT - SUBJECTIVE AND OBJECTIVE BOX
Transplant Hepatology Attending  Present:   Patient seen and examined with multidisciplinary Transplant team, daughter at bedside, feels better; bruna po, no abd pain        MEDICATIONS  (STANDING):  ampicillin/sulbactam  IVPB 3 Gram(s) IV Intermittent every 6 hours  chlorhexidine 2% Cloths 1 Application(s) Topical <User Schedule>  fluconAZOLE IVPB 400 milliGRAM(s) IV Intermittent every 24 hours  furosemide   Injectable 40 milliGRAM(s) IV Push every 12 hours  midodrine. 5 milliGRAM(s) Oral every 8 hours  pantoprazole    Tablet 40 milliGRAM(s) Oral before breakfast  penicillin   G benzathine Injectable 2.4 Million Unit(s) IntraMuscular <User Schedule>  polyethylene glycol 3350 17 Gram(s) Oral two times a day  senna 2 Tablet(s) Oral at bedtime  tamsulosin 0.4 milliGRAM(s) Oral at bedtime        Vital Signs Last 24 Hrs  T(C): 36.8 (20 Aug 2022 12:58), Max: 37 (19 Aug 2022 15:00)  T(F): 98.3 (20 Aug 2022 12:58), Max: 98.6 (19 Aug 2022 15:00)  HR: 93 (20 Aug 2022 12:58) (78 - 101)  BP: 130/73 (20 Aug 2022 12:58) (115/58 - 157/76)  BP(mean): 105 (20 Aug 2022 12:00) (82 - 109)  RR: 20 (20 Aug 2022 12:58) (13 - 23)  SpO2: 97% (20 Aug 2022 12:58) (92% - 97%)    Parameters below as of 20 Aug 2022 12:58  Patient On (Oxygen Delivery Method): room air        I&O's Summary    19 Aug 2022 07:01  -  20 Aug 2022 07:00  --------------------------------------------------------  IN: 405 mL / OUT: 1435 mL / NET: -1030 mL    20 Aug 2022 07:01  -  20 Aug 2022 14:51  --------------------------------------------------------  IN: 105 mL / OUT: 590 mL / NET: -485 mL                              8.0    17.00 )-----------( 99       ( 19 Aug 2022 22:04 )             23.2     08-19    133<L>  |  99  |  48<H>  ----------------------------<  146<H>  4.1   |  23  |  1.43<H>    Ca    8.2<L>      19 Aug 2022 22:04  Phos  3.0     08-19  Mg     2.2     08-19    TPro  5.9<L>  /  Alb  2.6<L>  /  TBili  4.1<H>  /  DBili  1.6<H>  /  AST  28  /  ALT  12  /  AlkPhos  66  08-19        Review of systems  Gen: No weight changes, fatigue, fevers/chills, weakness  Skin: No rashes  Head/Eyes/Ears/Mouth: No headache; Normal hearing; Normal vision w/o blurriness; No sinus pain/discomfort, sore throat  Respiratory: No dyspnea, cough, wheezing, hemoptysis  CV: No chest pain, PND, orthopnea  GI: C/O mild abdominal pain at surgical site. no diarrhea, constipation, nausea, vomiting, melena, hematochezia  : No increased frequency, dysuria, hematuria, nocturia  MSK: No joint pain/swelling; no back pain; no edema  Neuro: No dizziness/lightheadedness, weakness, seizures, numbness, tingling  Heme: No easy bruising or bleeding  Endo: No heat/cold intolerance  Psych: No significant nervousness, anxiety, stress, depression  All other systems were reviewed and are negative, except as noted.      PHYSICAL EXAM:  Constitutional: Well developed / well nourished  Eyes: icteric  ENMT: nc/at, no thrush  Neck: supple  Respiratory: CTA B/L  Cardiovascular: RRR  Gastrointestinal: distended abdomen, ND, C tube in place  Genitourinary: Urinary catheter in place  Extremities: SCD's in place and working bilaterally  Vascular: Palpable dp pulses bilaterally.   Neurological: A&O x3  Skin: no rashes, ulcerations, lesions  Musculoskeletal: Moving all extremities  Psychiatric: Responsive

## 2022-08-20 NOTE — PROGRESS NOTE ADULT - ATTENDING COMMENTS
Pt with milian in place for I/O monitoring in ICU   14 F cath placed   urine output from 50 ml to 100 ml   urine clear  Prostate around 30 gm on CT  No previous urological hx of BPH/LUTS    rec: leave milian in as long as strict i/o needed    start flomax - no data on liver function adjustment need    do PVR after milian removed - if high reconsult urology    Doubt urethral stricture of significance     No urological intervention needed at this point   Patient can follow with general urologist outpatient if needed    we will sign off   I have seen the patient with the urology team.   I have reviewed and verified the PA/ resident note, physical exam findings, PMHx, PSUx, social history and hospital course during rounds with house staff.   I have discussed pertinent laboratory results and imaging studies reviewed.    I agree with assessment and plan as per resident's note.     Plan of care discussed with the team during rounds.

## 2022-08-20 NOTE — PROGRESS NOTE ADULT - ASSESSMENT
Pt is a 67 y/o Male with a PMH Cirrhosis and recent cholangitis s/p ERCP stent (4/22) and stent removal (7/20/22). Pt presented on 8/8 with increasing RUQ pain. CT Abd/Pelvis reported distended gallbladder with stones in the cystic duct, gallbladder wall thickening, suggesting chronic cholecystitis. Pt had a percutaneous cholecystostomy tube placed on 8/09 along with a paracentesis yielding 300cc of purulent fluid. Pt admitted to SICU for further hemodynamic monitoring and management.     PLAN:   Neurologic: A, O x3    Respiratory: Atelectasis  - Maintain SpO2 > 92%  - Encourage OOB, Incentive spirometry, and chest PT  - AM CXR    Cardiovascular: Sinus tachycardia resolved   - Maintain MAP > 65   - Midodrine 5 q8hrs   -s/p stress test- NL    Gastrointestinal/Nutrition: cirrhosis (MELD 34), cholecystitis s/p per rodney tube and diagnostic paracentesis (8/09)   - RD  - CT Abd/ Pelvis w/ Iv contrast ( 8/08)- distended gallbladder with stones in the cystic duct, gallbladder wall thickening, suggesting chronic cholecystitis  - GI not concerned for cholangitis due to negative for bile duct dilation  - Bowel regimen Miralax, senna and dulcolax supp  - Octreotide 100mg q8hr   -midodrine 10mg for hepatorenal syndrome  - Repeat CT Abd/Pelvis limited study; ordered RUQ Sono to eval for Gallbladder perforation->was found unremarkable  - Transplant hepatology consulted, reccs appreciated   -paracentesis  8/16 2.9L removed, s/p 250cc 5% albumin   - Non-con CT AP found penumoperitoneum and bleed in abdomen- patient is now stable    Genitourinary/Renal: ROBBIE and hyperkalemia s/p shifting& Lokelma, urethral stricture, Hyponatremia with severe metabolic acidosis.   - Tobni placed ON  - 60 Lasix BID held in setting of bleed  - s/p 250cc 25% albumin  - Hyperkalemia improved  -no need for further HD at the moment  -c/w flomax    Hematologic: coagulopathy of liver failure/ sepsis  - Monitor CBC and Coags   - SCDs for mechanical VTE ppx   - Hold chemical VTE ppx  - s/p 4 u pRBCs and 2u FFP  - Vit K 10mg IV given for elevated INR, f/u coags in AM for goal INR < 2  - If INR above goal in AM will give 1 FFP  - cbc q4hrs    Infectious Disease: sepsis  - F/u BCx ( 8/9) NGTD   - UCx ( 8/8) + for E. Coli , Bile Cx ( 8/9) + E. Coli & Streptococcus anginosus  - Unasyn increased from 1.5 to 3  - Grew Streptococcus anginosus in bile culture.   - IM penicillin injection once a week for 3 weeks for syphillus  -c/w fluconazole    Endocrine: no acute issues  - Monitor glucose on BMP    Code Status: Full Code   Disposition: SICU

## 2022-08-20 NOTE — PROGRESS NOTE ADULT - NS ATTEND AMEND GEN_ALL_CORE FT
ARLD with decompensated cirrhosis  complicated with acute cholecystitis requiring cholecystostomy tube drainage  also ROBBIE requiring dialysis  now slowly improving on iv antibiotics  had multiple paracentesis  will liver transplant evaluation
Agree with assessment and plan.
Agree with assessment and plan.
Agree with assessment and plan.  Renal function improving.  hgb stable.
Agree, doing well.  feels better.     hgb stable, no evidence of bleeding.     white count improving, afebrile, continue abx.     tolerating regular diet.     Cr improving.     PT.  to 6 Abraham today.
drop of Hct today  will transfuse  kidney function improving  for paracentesis today  workup for liver transplant in progress
Agree, all in all improved but remains with high white count and ROBBIE.     UOP is adequate but not diuresing well. will try to increase lasix and follow.    mental status is good.     encourage po intake.     continue abx, follow up cultures, will need repeat tap, ID following.     PT as tolerated.
feels better  afebrile   pain subsiding  UOP improving slowly; had dialysis yesterday  continue iv antibiotics  transplant evaluation in progress

## 2022-08-21 LAB
6-ACETYLCODEINE UR CFM-MCNC: NEGATIVE NG/ML — SIGNIFICANT CHANGE UP
ALBUMIN SERPL ELPH-MCNC: 2.6 G/DL — LOW (ref 3.3–5)
ALP SERPL-CCNC: 71 U/L — SIGNIFICANT CHANGE UP (ref 40–120)
ALT FLD-CCNC: 14 U/L — SIGNIFICANT CHANGE UP (ref 10–45)
AMPHET UR-MCNC: NEGATIVE — SIGNIFICANT CHANGE UP
ANION GAP SERPL CALC-SCNC: 9 MMOL/L — SIGNIFICANT CHANGE UP (ref 5–17)
APPEARANCE UR: CLEAR — SIGNIFICANT CHANGE UP
APTT BLD: 30.7 SEC — SIGNIFICANT CHANGE UP (ref 27.5–35.5)
AST SERPL-CCNC: 38 U/L — SIGNIFICANT CHANGE UP (ref 10–40)
BACTERIA # UR AUTO: NEGATIVE — SIGNIFICANT CHANGE UP
BARBITURATES, URINE.: NEGATIVE — SIGNIFICANT CHANGE UP
BENZODIAZ UR-MCNC: NEGATIVE — SIGNIFICANT CHANGE UP
BILIRUB SERPL-MCNC: 3.6 MG/DL — HIGH (ref 0.2–1.2)
BILIRUB UR-MCNC: NEGATIVE — SIGNIFICANT CHANGE UP
BUN SERPL-MCNC: 38 MG/DL — HIGH (ref 7–23)
CALCIUM SERPL-MCNC: 8.4 MG/DL — SIGNIFICANT CHANGE UP (ref 8.4–10.5)
CHLORIDE SERPL-SCNC: 98 MMOL/L — SIGNIFICANT CHANGE UP (ref 96–108)
CO2 SERPL-SCNC: 27 MMOL/L — SIGNIFICANT CHANGE UP (ref 22–31)
COCAINE METAB.OTHER UR-MCNC: NEGATIVE — SIGNIFICANT CHANGE UP
CODEINE UR CFM-MCNC: SIGNIFICANT CHANGE UP NG/ML
COLOR SPEC: YELLOW — SIGNIFICANT CHANGE UP
CREAT ?TM UR-MCNC: 92 MG/DL — SIGNIFICANT CHANGE UP
CREAT SERPL-MCNC: 1.24 MG/DL — SIGNIFICANT CHANGE UP (ref 0.5–1.3)
CREATININE, URINE THERAPEUTIC: 149.3 MG/DL — SIGNIFICANT CHANGE UP
CULTURE RESULTS: SIGNIFICANT CHANGE UP
DIFF PNL FLD: ABNORMAL
EGFR: 65 ML/MIN/1.73M2 — SIGNIFICANT CHANGE UP
EPI CELLS # UR: 3 /HPF — SIGNIFICANT CHANGE UP
GLUCOSE SERPL-MCNC: 107 MG/DL — HIGH (ref 70–99)
GLUCOSE UR QL: NEGATIVE — SIGNIFICANT CHANGE UP
GRAN CASTS # UR COMP ASSIST: ABNORMAL /LPF
HCT VFR BLD CALC: 24.7 % — LOW (ref 39–50)
HGB BLD-MCNC: 8.1 G/DL — LOW (ref 13–17)
HYALINE CASTS # UR AUTO: 19 /LPF — HIGH (ref 0–2)
HYDROCODONE UR CFM-MCNC: SIGNIFICANT CHANGE UP NG/ML
HYDROMORPHONE UR CFM-MCNC: 166 NG/ML — SIGNIFICANT CHANGE UP
INR BLD: 1.88 RATIO — HIGH (ref 0.88–1.16)
KETONES UR-MCNC: NEGATIVE — SIGNIFICANT CHANGE UP
LEUKOCYTE ESTERASE UR-ACNC: ABNORMAL
MAGNESIUM SERPL-MCNC: 2 MG/DL — SIGNIFICANT CHANGE UP (ref 1.6–2.6)
MCHC RBC-ENTMCNC: 31.3 PG — SIGNIFICANT CHANGE UP (ref 27–34)
MCHC RBC-ENTMCNC: 32.8 GM/DL — SIGNIFICANT CHANGE UP (ref 32–36)
MCV RBC AUTO: 95.4 FL — SIGNIFICANT CHANGE UP (ref 80–100)
METHADONE UR-MCNC: NEGATIVE — SIGNIFICANT CHANGE UP
METHAQUALONE UR QL: NEGATIVE — SIGNIFICANT CHANGE UP
METHAQUALONE UR-MCNC: NEGATIVE — SIGNIFICANT CHANGE UP
MORPHINE UR QL CFM: 750 NG/ML — SIGNIFICANT CHANGE UP
NITRITE UR-MCNC: NEGATIVE — SIGNIFICANT CHANGE UP
NRBC # BLD: 0 /100 WBCS — SIGNIFICANT CHANGE UP (ref 0–0)
OPIATES UR-MCNC: SIGNIFICANT CHANGE UP
ORGANISM # SPEC MICROSCOPIC CNT: SIGNIFICANT CHANGE UP
OSMOLALITY UR: 403 MOS/KG — SIGNIFICANT CHANGE UP (ref 300–900)
PCP UR-MCNC: NEGATIVE — SIGNIFICANT CHANGE UP
PH UR: 6 — SIGNIFICANT CHANGE UP (ref 5–8)
PHOSPHATE SERPL-MCNC: 2.9 MG/DL — SIGNIFICANT CHANGE UP (ref 2.5–4.5)
PLATELET # BLD AUTO: 118 K/UL — LOW (ref 150–400)
POTASSIUM SERPL-MCNC: 3.8 MMOL/L — SIGNIFICANT CHANGE UP (ref 3.5–5.3)
POTASSIUM SERPL-SCNC: 3.8 MMOL/L — SIGNIFICANT CHANGE UP (ref 3.5–5.3)
POTASSIUM UR-SCNC: 33 MMOL/L — SIGNIFICANT CHANGE UP
PROPOXYPH UR QL: NEGATIVE — SIGNIFICANT CHANGE UP
PROT ?TM UR-MCNC: 24 MG/DL — HIGH (ref 0–12)
PROT SERPL-MCNC: 6.3 G/DL — SIGNIFICANT CHANGE UP (ref 6–8.3)
PROT UR-MCNC: ABNORMAL
PROT/CREAT UR-RTO: 0.3 RATIO — HIGH (ref 0–0.2)
PROTHROM AB SERPL-ACNC: 21.9 SEC — HIGH (ref 10.5–13.4)
RBC # BLD: 2.59 M/UL — LOW (ref 4.2–5.8)
RBC # FLD: 18 % — HIGH (ref 10.3–14.5)
RBC CASTS # UR COMP ASSIST: 30 /HPF — HIGH (ref 0–4)
SODIUM SERPL-SCNC: 134 MMOL/L — LOW (ref 135–145)
SODIUM UR-SCNC: 10 MMOL/L — SIGNIFICANT CHANGE UP
SP GR SPEC: 1.02 — SIGNIFICANT CHANGE UP (ref 1.01–1.02)
SPECIMEN SOURCE: SIGNIFICANT CHANGE UP
THC UR QL: NEGATIVE — SIGNIFICANT CHANGE UP
UROBILINOGEN FLD QL: NEGATIVE — SIGNIFICANT CHANGE UP
WBC # BLD: 11.23 K/UL — HIGH (ref 3.8–10.5)
WBC # FLD AUTO: 11.23 K/UL — HIGH (ref 3.8–10.5)
WBC UR QL: 11 /HPF — HIGH (ref 0–5)

## 2022-08-21 PROCEDURE — 99232 SBSQ HOSP IP/OBS MODERATE 35: CPT

## 2022-08-21 RX ADMIN — Medication 40 MILLIGRAM(S): at 05:45

## 2022-08-21 RX ADMIN — FLUCONAZOLE 100 MILLIGRAM(S): 150 TABLET ORAL at 20:30

## 2022-08-21 RX ADMIN — AMPICILLIN SODIUM AND SULBACTAM SODIUM 200 GRAM(S): 250; 125 INJECTION, POWDER, FOR SUSPENSION INTRAMUSCULAR; INTRAVENOUS at 05:44

## 2022-08-21 RX ADMIN — AMPICILLIN SODIUM AND SULBACTAM SODIUM 200 GRAM(S): 250; 125 INJECTION, POWDER, FOR SUSPENSION INTRAMUSCULAR; INTRAVENOUS at 00:16

## 2022-08-21 RX ADMIN — CHLORHEXIDINE GLUCONATE 1 APPLICATION(S): 213 SOLUTION TOPICAL at 05:44

## 2022-08-21 RX ADMIN — POLYETHYLENE GLYCOL 3350 17 GRAM(S): 17 POWDER, FOR SOLUTION ORAL at 05:44

## 2022-08-21 RX ADMIN — MIDODRINE HYDROCHLORIDE 5 MILLIGRAM(S): 2.5 TABLET ORAL at 05:45

## 2022-08-21 RX ADMIN — AMPICILLIN SODIUM AND SULBACTAM SODIUM 200 GRAM(S): 250; 125 INJECTION, POWDER, FOR SUSPENSION INTRAMUSCULAR; INTRAVENOUS at 23:58

## 2022-08-21 RX ADMIN — AMPICILLIN SODIUM AND SULBACTAM SODIUM 200 GRAM(S): 250; 125 INJECTION, POWDER, FOR SUSPENSION INTRAMUSCULAR; INTRAVENOUS at 12:56

## 2022-08-21 RX ADMIN — MIDODRINE HYDROCHLORIDE 5 MILLIGRAM(S): 2.5 TABLET ORAL at 21:52

## 2022-08-21 RX ADMIN — MIDODRINE HYDROCHLORIDE 5 MILLIGRAM(S): 2.5 TABLET ORAL at 13:05

## 2022-08-21 RX ADMIN — TAMSULOSIN HYDROCHLORIDE 0.4 MILLIGRAM(S): 0.4 CAPSULE ORAL at 21:52

## 2022-08-21 RX ADMIN — Medication 40 MILLIGRAM(S): at 19:35

## 2022-08-21 RX ADMIN — POLYETHYLENE GLYCOL 3350 17 GRAM(S): 17 POWDER, FOR SOLUTION ORAL at 19:35

## 2022-08-21 RX ADMIN — AMPICILLIN SODIUM AND SULBACTAM SODIUM 200 GRAM(S): 250; 125 INJECTION, POWDER, FOR SUSPENSION INTRAMUSCULAR; INTRAVENOUS at 19:35

## 2022-08-21 RX ADMIN — SENNA PLUS 2 TABLET(S): 8.6 TABLET ORAL at 21:52

## 2022-08-21 RX ADMIN — PANTOPRAZOLE SODIUM 40 MILLIGRAM(S): 20 TABLET, DELAYED RELEASE ORAL at 08:42

## 2022-08-21 NOTE — PROGRESS NOTE ADULT - SUBJECTIVE AND OBJECTIVE BOX
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 coli and Streptococcus anginosis.  - Ascitic fluid studies from paracentesis (8/9) most consistent with secondary peritonitis with concern for possible perforated viscus (likely gallbladder) given markedly elevated ascitic fluid LDH.   - Repeat paracentesis (8/12) still consistent with secondary peritonitis but with improving neutrophil count from 99k to 8k. Ascitic fluid bilirubin similar to serum, suggesting against bile leak.  - HD for ATN vs. HRS (8/11-13)  - hemoperitoneum, s/p 4U PRBC, 3U FFP, 1U Vitamin K--remained hemodynamically stable during this time    Interval Events:  -Afebrile, VSS  -C-Tube 30, bilious  -urinary retention: on Flomax, milian with ~1.7L UO  -tolerating PO, ambulating with RN/PT      MEDICATIONS  (STANDING):  ampicillin/sulbactam  IVPB 3 Gram(s) IV Intermittent every 6 hours  chlorhexidine 2% Cloths 1 Application(s) Topical <User Schedule>  fluconAZOLE IVPB 400 milliGRAM(s) IV Intermittent every 24 hours  furosemide   Injectable 40 milliGRAM(s) IV Push every 12 hours  midodrine. 5 milliGRAM(s) Oral every 8 hours  pantoprazole    Tablet 40 milliGRAM(s) Oral before breakfast  penicillin   G benzathine Injectable 2.4 Million Unit(s) IntraMuscular <User Schedule>  polyethylene glycol 3350 17 Gram(s) Oral two times a day  senna 2 Tablet(s) Oral at bedtime  tamsulosin 0.4 milliGRAM(s) Oral at bedtime      Vital Signs Last 24 Hrs  T(C): 36.6 (21 Aug 2022 09:00), Max: 37 (20 Aug 2022 21:10)  T(F): 97.9 (21 Aug 2022 09:00), Max: 98.6 (20 Aug 2022 21:10)  HR: 96 (21 Aug 2022 09:00) (90 - 100)  BP: 131/72 (21 Aug 2022 09:00) (127/73 - 150/73)  BP(mean): 105 (20 Aug 2022 12:00) (105 - 105)  RR: 18 (21 Aug 2022 09:00) (18 - 21)  SpO2: 95% (21 Aug 2022 09:00) (94% - 97%)    Parameters below as of 21 Aug 2022 09:00  Patient On (Oxygen Delivery Method): room air        I&O's Summary    20 Aug 2022 07:01  -  21 Aug 2022 07:00  --------------------------------------------------------  IN: 545 mL / OUT: 1770 mL / NET: -1225 mL                              8.1    11.23 )-----------( 118      ( 21 Aug 2022 07:05 )             24.7     08-21    134<L>  |  98  |  38<H>  ----------------------------<  107<H>  3.8   |  27  |  1.24    Ca    8.4      21 Aug 2022 06:59  Phos  2.9     08-21  Mg     2.0     08-21    TPro  6.3  /  Alb  2.6<L>  /  TBili  3.6<H>  /  DBili  x   /  AST  38  /  ALT  14  /  AlkPhos  71  08-21          Culture - Fungal, Body Fluid (collected 08-16-22 @ 18:42)  Source: Peritoneal Peritoneal Fluid  Preliminary Report (08-17-22 @ 07:08):    Testing in progress    Culture - Body Fluid with Gram Stain (collected 08-16-22 @ 18:42)  Source: Peritoneal Peritoneal Fluid  Gram Stain (08-17-22 @ 01:29):    polymorphonuclear leukocytes seen    Gram positive cocci in pairs seen    by cytocentrifuge  Preliminary Report (08-18-22 @ 23:03):    Rare Escherichia coli    Few Streptococcus anginosus  Organism: Escherichia coli  Streptococcus anginosus (08-19-22 @ 20:47)  Organism: Streptococcus anginosus (08-19-22 @ 20:47)  Organism: Escherichia coli (08-18-22 @ 18:37)    Culture - Blood (collected 08-16-22 @ 06:05)  Source: .Blood Blood-Peripheral  Preliminary Report (08-17-22 @ 12:01):    No growth to date.    Culture - Blood (collected 08-16-22 @ 06:05)  Source: .Blood Blood-Peripheral  Preliminary Report (08-17-22 @ 12:01):    No growth to date.                      Review of systems  Gen: No weight changes, fatigue, fevers/chills, weakness  Skin: No rashes  Head/Eyes/Ears/Mouth: No headache; Normal hearing; Normal vision w/o blurriness; No sinus pain/discomfort, sore throat  Respiratory: No dyspnea, cough, wheezing, hemoptysis  CV: No chest pain, PND, orthopnea  GI: Mild abdominal pain ; denies diarrhea, constipation, nausea, vomiting, melena, hematochezia  : No increased frequency, dysuria, hematuria, nocturia  MSK: No joint pain/swelling; no back pain; no edema  Neuro: No dizziness/lightheadedness, weakness, seizures, numbness, tingling  Heme: No easy bruising or bleeding  Endo: No heat/cold intolerance  Psych: No significant nervousness, anxiety, stress, depression  All other systems were reviewed and are negative, except as noted.      PHYSICAL EXAM:  Constitutional: Well developed / well nourished  Eyes: Anicteric, PERRLA  ENMT: nc/at  Neck: supple  Respiratory: CTA B/L  Cardiovascular: RRR  Gastrointestinal: Soft, distention stable, improving TTP, C-tube with bilious drainage  Genitourinary: voiding via milian  Extremities: SCD's in place and working bilaterally, improving edema  Vascular: Palpable dp pulses bilaterally  Neurological: A&O x3  Skin: no rashes, ulcerations or lesions;  Musculoskeletal: Moving all extremities  Psychiatric: Responsive

## 2022-08-21 NOTE — PROGRESS NOTE ADULT - ASSESSMENT
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 coli and Streptococcus anginosis.  - Ascitic fluid studies from paracentesis (8/9) consistent with secondary peritonitis with concern for possible perforated viscus (likely gallbladder) given markedly elevated ascitic fluid LDH.   - Repeat paracentesis (8/12) still consistent with secondary peritonitis but with improving neutrophil count from 99k to 8k. Ascitic fluid bilirubin similar to serum, suggesting against bile leak.  - HD for ATN vs. HRS (8/11-13)  - Repeat LVP 2.9L on 8/16 with +SBP  - hemoperitoneum, s/p 4U PRBC, 3U FFP, 1U Vitamin K--remained hemodynamically stable during this time    Hemoperitoneum, SBP, Cholecystitis, SBP, Leukocytosis   - Hemoperitoneum: no signs of bleeding.  +/- drainage within the next few days  - ID following: Continue Unasyn for Strep/E. Coli coverage. Fluc for PPx in setting of hemoperitoneum  - SBP:  LVP cx (8/16)- E.Coli/Strep Anginosus, 2.9L removed.  continues with SBP  - continue C-tube management    Decompensated ALD/PARNELL Cirrhosis  - ABO O, MELD-Na 22 on 8/21  - Liver txp evaluation initiated on 8/11  - EV: none on EGD/ERCP 7/2022, on PPI  - Ascites: Lasix 40 IV BID  - HE: none   - PT/Nutrition, education provided    Liver txp eval:   - cleared by Dental   - Cleared by Psych  - Cards: DSE/TTE WNL  - ID clearance for OLT pending resolution of peritonitis and quantiferon gold    ROBBIE vs. HRS  - Nephrology following  - Last HD 8/13,  Shiley removed 8/12  - diuretics as above  - Midodrine to continue  - strict I&Os via C-Tube  -  following, on Flomax. Will attempt TOV today  - Renal US completed 8/17: suggestive of parenchymal disease

## 2022-08-22 LAB
ALBUMIN SERPL ELPH-MCNC: 2.3 G/DL — LOW (ref 3.3–5)
ALP SERPL-CCNC: 73 U/L — SIGNIFICANT CHANGE UP (ref 40–120)
ALT FLD-CCNC: 13 U/L — SIGNIFICANT CHANGE UP (ref 10–45)
ANION GAP SERPL CALC-SCNC: 6 MMOL/L — SIGNIFICANT CHANGE UP (ref 5–17)
APTT BLD: 33.1 SEC — SIGNIFICANT CHANGE UP (ref 27.5–35.5)
AST SERPL-CCNC: 34 U/L — SIGNIFICANT CHANGE UP (ref 10–40)
BASOPHILS # BLD AUTO: 0.03 K/UL — SIGNIFICANT CHANGE UP (ref 0–0.2)
BASOPHILS NFR BLD AUTO: 0.3 % — SIGNIFICANT CHANGE UP (ref 0–2)
BILIRUB SERPL-MCNC: 3.3 MG/DL — HIGH (ref 0.2–1.2)
BUN SERPL-MCNC: 30 MG/DL — HIGH (ref 7–23)
C TRACH RRNA SPEC QL NAA+PROBE: SIGNIFICANT CHANGE UP
C TRACH RRNA SPEC QL NAA+PROBE: SIGNIFICANT CHANGE UP
CALCIUM SERPL-MCNC: 8.3 MG/DL — LOW (ref 8.4–10.5)
CHLORIDE SERPL-SCNC: 99 MMOL/L — SIGNIFICANT CHANGE UP (ref 96–108)
CO2 SERPL-SCNC: 28 MMOL/L — SIGNIFICANT CHANGE UP (ref 22–31)
CREAT SERPL-MCNC: 1.1 MG/DL — SIGNIFICANT CHANGE UP (ref 0.5–1.3)
EGFR: 75 ML/MIN/1.73M2 — SIGNIFICANT CHANGE UP
EOSINOPHIL # BLD AUTO: 0.16 K/UL — SIGNIFICANT CHANGE UP (ref 0–0.5)
EOSINOPHIL NFR BLD AUTO: 1.8 % — SIGNIFICANT CHANGE UP (ref 0–6)
GLUCOSE SERPL-MCNC: 95 MG/DL — SIGNIFICANT CHANGE UP (ref 70–99)
HCT VFR BLD CALC: 24.1 % — LOW (ref 39–50)
HGB BLD-MCNC: 8 G/DL — LOW (ref 13–17)
IMM GRANULOCYTES NFR BLD AUTO: 0.6 % — SIGNIFICANT CHANGE UP (ref 0–1.5)
INR BLD: 2.23 RATIO — HIGH (ref 0.88–1.16)
LYMPHOCYTES # BLD AUTO: 1 K/UL — SIGNIFICANT CHANGE UP (ref 1–3.3)
LYMPHOCYTES # BLD AUTO: 11.1 % — LOW (ref 13–44)
MAGNESIUM SERPL-MCNC: 1.8 MG/DL — SIGNIFICANT CHANGE UP (ref 1.6–2.6)
MCHC RBC-ENTMCNC: 31.9 PG — SIGNIFICANT CHANGE UP (ref 27–34)
MCHC RBC-ENTMCNC: 33.2 GM/DL — SIGNIFICANT CHANGE UP (ref 32–36)
MCV RBC AUTO: 96 FL — SIGNIFICANT CHANGE UP (ref 80–100)
MONOCYTES # BLD AUTO: 0.97 K/UL — HIGH (ref 0–0.9)
MONOCYTES NFR BLD AUTO: 10.7 % — SIGNIFICANT CHANGE UP (ref 2–14)
N GONORRHOEA RRNA SPEC QL NAA+PROBE: SIGNIFICANT CHANGE UP
N GONORRHOEA RRNA SPEC QL NAA+PROBE: SIGNIFICANT CHANGE UP
NEUTROPHILS # BLD AUTO: 6.82 K/UL — SIGNIFICANT CHANGE UP (ref 1.8–7.4)
NEUTROPHILS NFR BLD AUTO: 75.5 % — SIGNIFICANT CHANGE UP (ref 43–77)
NRBC # BLD: 0 /100 WBCS — SIGNIFICANT CHANGE UP (ref 0–0)
PHOSPHATE SERPL-MCNC: 2.6 MG/DL — SIGNIFICANT CHANGE UP (ref 2.5–4.5)
PLATELET # BLD AUTO: 120 K/UL — LOW (ref 150–400)
POTASSIUM SERPL-MCNC: 3.6 MMOL/L — SIGNIFICANT CHANGE UP (ref 3.5–5.3)
POTASSIUM SERPL-SCNC: 3.6 MMOL/L — SIGNIFICANT CHANGE UP (ref 3.5–5.3)
PROT SERPL-MCNC: 6.3 G/DL — SIGNIFICANT CHANGE UP (ref 6–8.3)
PROTHROM AB SERPL-ACNC: 25.8 SEC — HIGH (ref 10.5–13.4)
RBC # BLD: 2.51 M/UL — LOW (ref 4.2–5.8)
RBC # FLD: 18.2 % — HIGH (ref 10.3–14.5)
SODIUM SERPL-SCNC: 133 MMOL/L — LOW (ref 135–145)
SPECIMEN SOURCE: SIGNIFICANT CHANGE UP
UUN UR-MCNC: 706 MG/DL — SIGNIFICANT CHANGE UP
WBC # BLD: 9.03 K/UL — SIGNIFICANT CHANGE UP (ref 3.8–10.5)
WBC # FLD AUTO: 9.03 K/UL — SIGNIFICANT CHANGE UP (ref 3.8–10.5)

## 2022-08-22 PROCEDURE — 99223 1ST HOSP IP/OBS HIGH 75: CPT

## 2022-08-22 PROCEDURE — 99251: CPT

## 2022-08-22 PROCEDURE — 99232 SBSQ HOSP IP/OBS MODERATE 35: CPT

## 2022-08-22 PROCEDURE — 99232 SBSQ HOSP IP/OBS MODERATE 35: CPT | Mod: GC

## 2022-08-22 RX ORDER — PHYTONADIONE (VIT K1) 5 MG
10 TABLET ORAL ONCE
Refills: 0 | Status: COMPLETED | OUTPATIENT
Start: 2022-08-22 | End: 2022-08-22

## 2022-08-22 RX ADMIN — MIDODRINE HYDROCHLORIDE 5 MILLIGRAM(S): 2.5 TABLET ORAL at 22:34

## 2022-08-22 RX ADMIN — Medication 102 MILLIGRAM(S): at 14:41

## 2022-08-22 RX ADMIN — POLYETHYLENE GLYCOL 3350 17 GRAM(S): 17 POWDER, FOR SOLUTION ORAL at 22:34

## 2022-08-22 RX ADMIN — PANTOPRAZOLE SODIUM 40 MILLIGRAM(S): 20 TABLET, DELAYED RELEASE ORAL at 08:54

## 2022-08-22 RX ADMIN — MIDODRINE HYDROCHLORIDE 5 MILLIGRAM(S): 2.5 TABLET ORAL at 05:46

## 2022-08-22 RX ADMIN — AMPICILLIN SODIUM AND SULBACTAM SODIUM 200 GRAM(S): 250; 125 INJECTION, POWDER, FOR SUSPENSION INTRAMUSCULAR; INTRAVENOUS at 17:52

## 2022-08-22 RX ADMIN — TAMSULOSIN HYDROCHLORIDE 0.4 MILLIGRAM(S): 0.4 CAPSULE ORAL at 22:34

## 2022-08-22 RX ADMIN — SENNA PLUS 2 TABLET(S): 8.6 TABLET ORAL at 22:34

## 2022-08-22 RX ADMIN — AMPICILLIN SODIUM AND SULBACTAM SODIUM 200 GRAM(S): 250; 125 INJECTION, POWDER, FOR SUSPENSION INTRAMUSCULAR; INTRAVENOUS at 12:50

## 2022-08-22 RX ADMIN — AMPICILLIN SODIUM AND SULBACTAM SODIUM 200 GRAM(S): 250; 125 INJECTION, POWDER, FOR SUSPENSION INTRAMUSCULAR; INTRAVENOUS at 23:47

## 2022-08-22 RX ADMIN — AMPICILLIN SODIUM AND SULBACTAM SODIUM 200 GRAM(S): 250; 125 INJECTION, POWDER, FOR SUSPENSION INTRAMUSCULAR; INTRAVENOUS at 05:45

## 2022-08-22 RX ADMIN — FLUCONAZOLE 100 MILLIGRAM(S): 150 TABLET ORAL at 19:04

## 2022-08-22 RX ADMIN — Medication 40 MILLIGRAM(S): at 17:52

## 2022-08-22 RX ADMIN — CHLORHEXIDINE GLUCONATE 1 APPLICATION(S): 213 SOLUTION TOPICAL at 05:45

## 2022-08-22 RX ADMIN — Medication 40 MILLIGRAM(S): at 05:46

## 2022-08-22 NOTE — CONSULT NOTE ADULT - SUBJECTIVE AND OBJECTIVE BOX
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 Patient now s/p percutaneous cholecystostomy tube placement (8/9) with cultures from ascitic fluid and bile growing E. coli and Streptococcus anginosis. Ascitic fluid studies from paracentesis (8/9) most consistent with secondary peritonitis with concern for possible perforated viscus (likely gallbladder) given markedly elevated ascitic fluid LDH. Repeat paracentesis (8/12) still consistent with secondary peritonitis but with improving neutrophil count from 99k to 8k. Ascitic fluid bilirubin similar to serum, suggesting against bile leak. Patient now with hemoperitoneum, s/p 4U PRBC, 3U FFP, 1U Vitamin K--remained hemodynamically stable during this time. Patient now with blood tinged output from Perc Shasha bag.    ROS  General:  No fevers, chills  CV:  No pain, palpitations,  Resp:  No dyspnea, cough, tachypnea, wheezing  GI:  No pain, nausea, vomiting    PMHx  H/O acute cholangitis    2019 novel coronavirus disease (COVID-19)      Allergies  No Known Allergies    Medications  furosemide   Injectable, 18:00    PHYSICAL EXAM:  T(C): 36.8, Max: 37.2 (08-21-22 @ 17:00)  HR: 100  BP: 135/69  RR: 18  SpO2: 93%    General:  No acute distress, well-appearing  Neuro:  A &O x 3  Respiratory: Non-labored breathing  Abdomen:  Soft, non-tender, non-distended, no peritoneal signs  Extremities:  no swelling, warm, normal color    LABS:  WBC 9.03 / HgB 8.0 / Hct 24.1 / Plt 120  Na 133 / K 3.6 / CO2 28 / Cl 99 / BUN 30 / Cr 1.10 / Glucose 95  ALT 13 / 34 / Alk Phos 73 / TBili 3.3  PTT 33.1 / PT 25.8 / INR 2.23    Assessment and plan: 66 year old male with 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 Patient now s/p percutaneous cholecystostomy tube placement (8/9) with cultures from ascitic fluid and bile growing E. coli and Streptococcus anginosis. Ascitic fluid studies from paracentesis (8/9) most consistent with secondary peritonitis with concern for possible perforated viscus (likely gallbladder) given markedly elevated ascitic fluid LDH. Repeat paracentesis (8/12) still consistent with secondary peritonitis but with improving neutrophil count from 99k to 8k. Ascitic fluid bilirubin similar to serum, suggesting against bile leak. Patient now with hemoperitoneum, s/p 4U PRBC, 3U FFP, 1U Vitamin K--remained hemodynamically stable during this time. Patient now with blood tinged output from Perc Shasha bag.    - Drain easily flushed at bedside with 20cc NS. Bilious output return noted post flush. Patient reports no pain during flushing. Will increase flushing regimen.  - Flush drain with 10cc NS BID forward only; DO NOT aspirate  - Change dressing q3 days or when dressing is saturated.  - Trend vs/labs  - Continue global management per primary team, IR will follow  - If the patient is d/c home with drainage catheter, pt can make an appointment with IR by calling the IR booking office at [phone]; recommend IR follow in three months after discharge for tube evaluation.  - Pt will benefit from VNS service to help with drainage catheter care; Pt should continue same drainage catheter care as an outpatient.     Please call IR at [phone] with any questions, concerns, or issues regarding above.      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 Patient now s/p percutaneous cholecystostomy tube placement (8/9) with cultures from ascitic fluid and bile growing E. coli and Streptococcus anginosis. Ascitic fluid studies from paracentesis (8/9) most consistent with secondary peritonitis with concern for possible perforated viscus (likely gallbladder) given markedly elevated ascitic fluid LDH. Repeat paracentesis (8/12) still consistent with secondary peritonitis but with improving neutrophil count from 99k to 8k. Ascitic fluid bilirubin similar to serum, suggesting against bile leak. Patient now with hemoperitoneum, s/p 4U PRBC, 3U FFP, 1U Vitamin K--remained hemodynamically stable during this time. Patient now with blood tinged output from Perc Shasha bag.    ROS  General:  No fevers, chills  CV:  No pain, palpitations,  Resp:  No dyspnea, cough, tachypnea, wheezing  GI:  No pain, nausea, vomiting    PMHx  H/O acute cholangitis    2019 novel coronavirus disease (COVID-19)      Allergies  No Known Allergies    Medications  furosemide   Injectable, 18:00    PHYSICAL EXAM:  T(C): 36.8, Max: 37.2 (08-21-22 @ 17:00)  HR: 100  BP: 135/69  RR: 18  SpO2: 93%    General:  No acute distress, well-appearing  Neuro:  A &O x 3  Respiratory: Non-labored breathing  Abdomen:  Soft, non-tender, non-distended, no peritoneal signs  Extremities:  no swelling, warm, normal color    LABS:  WBC 9.03 / HgB 8.0 / Hct 24.1 / Plt 120  Na 133 / K 3.6 / CO2 28 / Cl 99 / BUN 30 / Cr 1.10 / Glucose 95  ALT 13 / 34 / Alk Phos 73 / TBili 3.3  PTT 33.1 / PT 25.8 / INR 2.23    Assessment and plan: 66 year old male with 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 Patient now s/p percutaneous cholecystostomy tube placement (8/9) with cultures from ascitic fluid and bile growing E. coli and Streptococcus anginosis. Ascitic fluid studies from paracentesis (8/9) most consistent with secondary peritonitis with concern for possible perforated viscus (likely gallbladder) given markedly elevated ascitic fluid LDH. Repeat paracentesis (8/12) still consistent with secondary peritonitis but with improving neutrophil count from 99k to 8k. Ascitic fluid bilirubin similar to serum, suggesting against bile leak. Patient now with hemoperitoneum, s/p 4U PRBC, 3U FFP, 1U Vitamin K--remained hemodynamically stable during this time. Patient now with blood tinged output from Perc Shasha bag.    - Drain easily flushed at bedside with 20cc NS. Bilious output return noted post flush. Patient reports no pain during flushing.   - Patient with hemoperitoneum likely communicating with drain  - Flush drain with 10cc NS daily forward only; DO NOT aspirate  - Change dressing q3 days or when dressing is saturated.  - Trend vs/labs  - Continue global management per primary team, IR will follow  - If the patient is d/c home with drainage catheter, pt can make an appointment with IR by calling the IR booking office at [phone]; recommend IR follow in three months after discharge for tube evaluation.  - Pt will benefit from VNS service to help with drainage catheter care; Pt should continue same drainage catheter care as an outpatient.     Please call IR at [phone] with any questions, concerns, or issues regarding above.      Also available on TEAMS

## 2022-08-22 NOTE — PROGRESS NOTE ADULT - ASSESSMENT
66 year old male with decompensated cirrhosis and cholangitis s/p ERCP with stent placement (4/2022) s/p stent removal on 7/20 (along sphincterotomy and stone extraction) presenting for one week of RUQ abdominal pain associated with new abdominal distension and decreased appetite.     #Septic shock 2/2 acute cholecystitis vs ascending cholangitis  # E coli and Streptococcus anginosis secondary bacterial peritonitis  #Hemoperitoneum with free locules of air on CT imaging 8/18/2022  #ROBBIE: initial urine sodium 9, likely indicating prerenal ROBBIE vs less likely HRS given initial presentation as above  #History of cholangitis s/p biliary stent placement April 2022 and removal 7/20/2022, now s/p perc rodney tube  #Anemia  #Decompensated ALD/PARNELL cirrhosis c/b ascites  EV: normal esophagus on ERCP from 4/2022  Ascites: increase in ascites from mild to moderate on current imaging  SBP: paracentesis 8/9/2022 c/w peritonitis given 99K PMNs however elevated LDH more indicative of secondary bacterial peritonitis  HE: none currently       -MELD-Na = 24 on 8/22/2022       -Ascites: yes       -SBP: paracentesis 8/9/2022 reveals likely secondary bacterial peritonitis from suspected gallbladder pathology       -Varices: no mention of varices on EGD/ERCP on 7/20/2022       -Hepatic encephalopathy: Ammonia, Serum: 38 umol/L [11 - 55] (08-12-22 @ 00:21)       -HCC: unknown       -LT candidacy and evaluation:            [ ] Psychosocial            [ ] Infectious            [x] Cardiology:                    Cardiac cath: n/a                   Stress test: negative noninvasive stress test on 8/19/2022 for inducible ischemia            [ ] Colonoscopy: needed, f/u records from outside GI colonoscopy reports            [x] Prostate: Prostate Specific Antigen: negative at 2.21 ng/mL on 8/12/2022            [x] Dental            [x] PT/Frailty    Recommendations:  -trend clinical symptoms, exam findings, vital signs, CBC, CMP, INR  -s/p percutaneous cholecystostomy as above  -likely secondary bacterial peritonitis based on paracentesis and clinical findings, CT A/P negative for perforation however performed without contrast 2/2 renal function; s/p repeat paracentesis 8/12/2022 and again 8/16/2022 with significant PMN count; appreciate ID recommendations  -initiated hemodialysis 8/11/2022, now off with intermittent diuresis and renal recovery  -liver transplantation evaluation opened  -PPI daily for stress prophylaxis  -appreciate transplant nephrology consultation  -appreciate surgery recommendations regarding hemoperitoneum  -tube check today  -plan for egd/colon this week    Note incomplete until finalized by attending signature/attestation.    Fadi Churchill  GI/Hepatology Fellow    MONDAY-FRIDAY 8AM-5PM:  Pager# [phone] ([facility]) or [phone] ([facility])    NON-URGENT CONSULTS:  Please email [email or web address]  AT NIGHT AND ON WEEKENDS:  Contact on-call GI fellow via answering service ([phone]) from 5pm-8am and on weekends/holidays

## 2022-08-22 NOTE — CONSULT NOTE ADULT - ASSESSMENT
66 year-old with cardiovascular risk factors as above being evaluated for liver transplant.   CT chest with atherosclerotic coronary calcifications.  Echocardiogram as above with a hyperdynamic ventricle and no evidence of elevated pulmonary pressures.  Dobutamine stress echo with normal augmentation of systolic function.    No further cardiovascular testing is necessary prior to consideration for liver transplant.   Discussed with Transplant Team on rounds.

## 2022-08-22 NOTE — PROVIDER CONTACT NOTE (OTHER) - ASSESSMENT
Pt a&o x4. VSS- /69, , T 98.3, O2 94% on RA. No complaints of pain or discomfort at this time. Pts t-tube drained 575ccs of dark red drainage. Pt a&o x4. VSS- /69, , T 98.3, O2 94% on RA. No complaints of pain or discomfort at this time. Pts t-tube drained 575ccs of dark bloody drainage.

## 2022-08-22 NOTE — PROGRESS NOTE ADULT - SUBJECTIVE AND OBJECTIVE BOX
Interval Events:   No acute events overnight.  Patient without acute symptoms at this time.    ROS:   12 point review of systems performed and negative except otherwise noted in HPI.    Hospital Medications:  ampicillin/sulbactam  IVPB 3 Gram(s) IV Intermittent every 6 hours  chlorhexidine 2% Cloths 1 Application(s) Topical <User Schedule>  fluconAZOLE IVPB 400 milliGRAM(s) IV Intermittent every 24 hours  furosemide   Injectable 40 milliGRAM(s) IV Push every 12 hours  midodrine. 5 milliGRAM(s) Oral every 8 hours  pantoprazole    Tablet 40 milliGRAM(s) Oral before breakfast  penicillin   G benzathine Injectable 2.4 Million Unit(s) IntraMuscular <User Schedule>  polyethylene glycol 3350 17 Gram(s) Oral two times a day  senna 2 Tablet(s) Oral at bedtime  tamsulosin 0.4 milliGRAM(s) Oral at bedtime    MAR over past 24 hours:    ampicillin/sulbactam  IVPB   200 mL/Hr IV Intermittent (08-22-22 @ 05:45)   200 mL/Hr IV Intermittent (08-21-22 @ 23:58)   200 mL/Hr IV Intermittent (08-21-22 @ 19:35)   200 mL/Hr IV Intermittent (08-21-22 @ 12:56)    chlorhexidine 2% Cloths   1 Application(s) Topical (08-22-22 @ 05:45)    fluconAZOLE IVPB   100 mL/Hr IV Intermittent (08-21-22 @ 20:30)    furosemide   Injectable   40 milliGRAM(s) IV Push (08-22-22 @ 05:46)   40 milliGRAM(s) IV Push (08-21-22 @ 19:35)    midodrine.   5 milliGRAM(s) Oral (08-22-22 @ 05:46)   5 milliGRAM(s) Oral (08-21-22 @ 21:52)   5 milliGRAM(s) Oral (08-21-22 @ 13:05)    pantoprazole    Tablet   40 milliGRAM(s) Oral (08-22-22 @ 08:54)    polyethylene glycol 3350   17 Gram(s) Oral (08-21-22 @ 19:35)    senna   2 Tablet(s) Oral (08-21-22 @ 21:52)    tamsulosin   0.4 milliGRAM(s) Oral (08-21-22 @ 21:52)      Home Medications:  Last Order Reconciliation Date: 08-08-22 @ 06:30 (Admission Reconciliation)    PHYSICAL EXAM:   Vital Signs last 24 hours:  T(F): 98.3 (08-22-22 @ 05:00), Max: 98.9 (08-21-22 @ 17:00)  HR: 100 (08-22-22 @ 05:00) (89 - 100)  BP: 135/69 (08-22-22 @ 05:00) (135/69 - 144/77)  BP(mean): --  ABP: --  ABP(mean): --  RR: 18 (08-22-22 @ 05:00) (18 - 18)  SpO2: 93% (08-22-22 @ 05:00) (93% - 97%)    I&Os:    08-21-22 @ 07:01  -  08-22-22 @ 07:00  --------------------------------------------------------  IN:    IV PiggyBack: 600 mL    Oral Fluid: 780 mL  Total IN: 1380 mL    OUT:    Indwelling Catheter - Urethral (mL): 500 mL    T-Tube (mL): 575 mL    Voided (mL): 675 mL  Total OUT: 1750 mL    Total NET: -370 mL      08-22-22 @ 07:01  -  08-22-22 @ 09:08  --------------------------------------------------------  IN:    Oral Fluid: 120 mL  Total IN: 120 mL    OUT:    Voided (mL): 150 mL  Total OUT: 150 mL    Total NET: -30 mL        BMI (kg/m2): 29.3 (08-09-22 @ 14:59)  GENERAL: no acute distress  NEURO: alert  HEENT: NCAT, no conjunctival pallor appreciated  CHEST: no respiratory distress, no accessory muscle use  CARDIAC: regular rate, +S1/S2  ABDOMEN: soft, nontender, no rebound or guarding  EXTREMITIES: warm, well perfused  SKIN: no lesions noted    DIAGNOSTICS:  WBC      Hg       PLT      Na       K        CO2     BUN      Cr       ALT      AST      TB       ALP  9.03     8.0      120      133      3.6      28       30       1.10     13       34       3.3      73       08-22-22 @ 06:53  11.23    8.1      118      ------   ------   ------   ------   ------   ------   ------   ------   ------   08-21-22 @ 07:05  ------   ------   ------   134      3.8      27       38       1.24     14       38       3.6      71       08-21-22 @ 06:59  17.00    8.0      99       133      4.1      23       48       1.43     12       28       4.1      66       08-19-22 @ 22:04  19.74    8.7      118      ------   ------   ------   ------   ------   ------   ------   ------   ------   08-19-22 @ 09:58    PT             INR            MELDwith  MELDw/o  25.8           2.23           --             --             08-22-22 @ 06:53  21.9           1.88           --             --             08-21-22 @ 07:05  21.7           1.86           --             --             08-19-22 @ 22:04  20.9           1.81           --             --             08-19-22 @ 04:21  21.6           1.87           --             --             08-18-22 @ 12:27

## 2022-08-22 NOTE — PROVIDER CONTACT NOTE (OTHER) - BACKGROUND
65yo M w decompensated cirrhosis admitted with severe sepsis with oliguric ROBBIE and lactic acidosis secondary to acute cholecystitis and peritonitis
pt admitted 7/26 RUQ pain, pt had perc choley and paracentesis preformed. Pt having worsening alguria and an ROBBIE.

## 2022-08-22 NOTE — CONSULT NOTE ADULT - SUBJECTIVE AND OBJECTIVE BOX
Patient seen and evaluated @ 7am on 6 [facility]  Chief Complaint: abdominal pain    HPI:  67yo M with history of recently diagnosed cirrhosis (MELD 30) and cholangitis s/p ERCP with stent placement (2022) s/p stent removal on  presenting for one week of RUQ abdominal pain. States that one week after his stent was removed, he started experiencing RUQ pain, associated with decreased appetite. Denies nausea or vomiting, fevers or chills. Endorses regular normal bowel movements. Presented to the ED on  for the pain where he had an US performed which showed a distended gallbladder with stones. CBD 5mm. Patient was discharged home.    In the ED, patient tachycardic to 120s, normotensive. Satting 94% on RA. Labs showed WBC 14, Na 120, K 6.1, HCO3 14, Cr 1.95, tbili 4.7, Alk phos 127, ALT/AST 38/22. CT showed a distended gallbladder with stones in the cystic duct, cirrhosis, ascites, small and large bowel thickening.  (08 Aug 2022 06:20)    PMH:   No pertinent past medical history    H/O acute cholangitis    2019 novel coronavirus disease (COVID-19)      PSH:   No significant past surgical history    S/P ERCP    H/O colonoscopy      Medications:   ampicillin/sulbactam  IVPB 3 Gram(s) IV Intermittent every 6 hours  chlorhexidine 2% Cloths 1 Application(s) Topical <User Schedule>  fluconAZOLE IVPB 400 milliGRAM(s) IV Intermittent every 24 hours  furosemide   Injectable 40 milliGRAM(s) IV Push every 12 hours  midodrine. 5 milliGRAM(s) Oral every 8 hours  pantoprazole    Tablet 40 milliGRAM(s) Oral before breakfast  penicillin   G benzathine Injectable 2.4 Million Unit(s) IntraMuscular <User Schedule>  polyethylene glycol 3350 17 Gram(s) Oral two times a day  senna 2 Tablet(s) Oral at bedtime  tamsulosin 0.4 milliGRAM(s) Oral at bedtime    Allergies:  No Known Allergies    FAMILY HISTORY:    Social History:  Smoking:  Alcohol: +EtOH (last drink 2022)  Drugs:    Review of Systems:    Constitutional: No fever, chills, fatigue, or changes in weight  HEENT: No blurry vision, eye pain, headache, runny nose, or sore throat  Respiratory: No shortness of breath, cough, or wheezing  Cardiovascular: No chest pain or palpitations  Gastrointestinal: No nausea, vomiting, diarrhea, or abdominal pain  Genitourinary: No dysuria or incontinence  Extremities: No lower extremity swelling  Neurologic: No focal findings  Lymphatic: No lymphadenopathy   Skin: No rash  Psychiatry: No anxiety or depression  10 point review of systems is otherwise negative except as mentioned above            Physical Exam:  T(C): 37 (22 @ 17:00), Max: 37 (22 @ 17:00)  HR: 105 (22 @ 17:00) (89 - 105)  BP: 137/75 (22 @ 17:00) (132/78 - 159/78)  RR: 18 (22 @ 17:00) (18 - 18)  SpO2: 96% (22 @ 17:00) (93% - 97%)  Wt(kg): --     @ 07:01  -   @ 07:00  --------------------------------------------------------  IN: 1380 mL / OUT: 1750 mL / NET: -370 mL     @ 07:01  -   @ 19:26  --------------------------------------------------------  IN: 360 mL / OUT: 500 mL / NET: -140 mL      Daily     Daily Weight in k.9 (22 Aug 2022 05:00)    Appearance: Normal, well groomed, NAD  Eyes: PERRLA, EOMI, pink conjunctiva, no scleral icterus   HENT: Normal oral mucosa  Cardiovascular: RRR, S1, S2, no murmur, rub, or gallop; no edema; no JVD  Procedural Access Site: Clean, dry, intact, without hematoma  Respiratory: Clear to auscultation bilaterally  Gastrointestinal: Soft, non-tender, non-distended, BS+  Musculoskeletal: No clubbing or joint deformity   Neurologic: No focal weakness  Lymphatic: No lymphadenopathy  Psychiatry: AAOx3 with appropriate mood and affect  Skin: No rashes, ecchymoses, or cyanosis    Cardiovascular Diagnostic Testing:  ECG: sinus tachycardia 121 bpm, incomplete RBBB with LAFB    Echo:   < from: Transthoracic Echocardiogram (22 @ 12:11) >  ------------------------------------------------------------------------  Dimensions:    Normal Values:  LA:     4.2    2.0 - 4.0 cm  Ao:     3.4    2.0 - 3.8 cm  SEPTUM: 0.9    0.6 - 1.2 cm  PWT:    1.1  0.6 - 1.1 cm  LVIDd:  4.8    3.0 - 5.6 cm  LVIDs:  2.7    1.8 - 4.0 cm  Derived variables:  LVMI: 96 g/m2  RWT: 0.45  Fractional short: 44 %  EF (Aguilar Rule): 75 %Doppler Peak Velocity (m/sec):  AoV=2.0  ------------------------------------------------------------------------  Observations:  Mitral Valve: Mitral annular calcification. Mild mitral  regurgitation.  Aortic Valve/Aorta: Calcified trileaflet aortic valve with  normal opening. Peak transaortic valve gradient equals 16  mm Hg, mean transaortic valve gradient equals 9 mm Hg,  aortic valve velocity time integral equals 18 cm, estimated  aortic valve area equals 2.5 sqcm. Peak left ventricular  outflow tract gradient equals 8 mm Hg, mean gradient is  equal to 4 mm Hg, LVOT velocitytime integral equals 16 cm.  Aortic Root: 3.4 cm.  Ascending Aorta: 2.7 cm.  LVOT diameter: 1.9 cm.  Left Atrium: Normal left atrium.  LA volume index = 18  cc/m2.  Left Ventricle: Hyperdynamic left ventricular systolic  function. Increased relative wall thickness with normal  left ventricular mass index, consistent with concentric  left ventricular remodeling. Normal diastolic function  Right Heart: Normal right atrium. Normal right ventricular  size and function. Normal tricuspid valve. Mild tricuspid  regurgitation. Normal pulmonic valve.  Pericardium/Pleura: Normal pericardium with trace  pericardial effusion.  ------------------------------------------------------------------------  Conclusions:  1. Hyperdynamic left ventricular systolicfunction.  2. Normal right ventricular size and function.  3. Normal pericardium with trace pericardial effusion.  *** No previous Echo exam.  ------------------------------------------------------------------------  Confirmed on  2022 - 17:49:24by KENYA Adame  ------------------------------------------------------------------------    < end of copied text >    Stress Testing:  < from: Stress Echo Pharmacologic (w/TTE, w/Cont) (22 @ 15:33) >  ------------------------------------------------------------------------  Observations:  Left Ventricle: Endocardial visualization enhanced with  intravenous injection of Ultrasonic Enhancing Agent  (Definity). Normal left ventricular systolic function. No  segmental wall motion abnormalities.  ------------------------------------------------------------------------  Pharmacologic Stress Test:  Agent:   Dobutamine Dose: 30  mcg/Kg/min over 11 min.  Baseline HR: 84 bpm  Peak HR: 137 bpm  % MPHR: 88 %  Baseline BP: 126/61 mmHg  Peak BP: 109/54 mmHg  Peak RPP: 14,933 (Rate Pressure Product)  Test terminated: Completion of test  Baseline EKG: Normal sinus rhythm, incomplete RBBB, poor  R-wave progression across precordium  EKG changes: No significant ST segment changes.  Arrhythmia: Rare VPD's in post-infusion  Heart Rhythm:  HR Response: Appropriate  BP Response: Appropriate  Symptoms: Palpitations  ------------------------------------------------------------------------  Echocardiographic Study:  (A) Baseline echocardiogram reveals:  1. Endocardial visualization enhanced with intravenous  injection of Ultrasonic Enhancing Agent (Definity). Normal  left ventricular systolicfunction. No segmental wall  motion abnormalities.  (B) Stress echocardiogram reveals:  Normal augmentation in left ventricular systolic function.  ------------------------------------------------------------------------  Conclusions:  1. Normal hemodynamic response.  2. Normal electrocardiographic response.  3. Normal augmentation in left ventricular systolic  function.  4. Normal pharmacologic stress echocardiogram with no  evidence of inducible ischemia.  ------------------------------------------------------------------------  Confirmed on  2022 - 18:03:34 by Meli Reynolds M.D.  ------------------------------------------------------------------------    < end of copied text >    Interpretation of Telemetry: NSR    Imaging:  < from: CT Chest No Cont (22 @ 17:46) >  FINDINGS:  Limited evaluation of the vasculature and viscera in the absence of   intravenous contrast.    CHEST:  LUNGS, PLEURA, AND LARGE AIRWAYS: Patent central airways. Ground glass   opacities in the right upper lobe. Moderate bilateral pleural effusions.   Bilateral atelectatic changes, with segmental atelectasis of the   bilateral lower lobes.  VESSELS: Atherosclerotic calcifications. Coronary artery calcifications.  HEART: Heart size is normal. No pericardial effusion.  MEDIASTINUMAND SHAI: No lymphadenopathy.  CHEST WALL AND LOWER NECK: Moderate left and mild right gynecomastia.    ABDOMEN AND PELVIS:  LIVER: Cirrhosis.  BILE DUCTS: Percutaneous cholecystostomy tube. Trace expected air within   the gallbladder. Cholelithiasis. The gallbladder is decompressed. Limited   assessment of the gallbladder due to decompressed state, lack of   intravenous contrast, and adjacent ascites fluid/hemoperitoneum.  GALLBLADDER: Within normal limits.  SPLEEN: Within normal limits.  PANCREAS: Within normal limits.  ADRENALS: Within normal limits.  KIDNEYS/URETERS: A 1.5 cm hyperdense focus in the right kidney and a 0.8   cm hyperdense focus within the left kidney may represent   proteinaceous/hemorrhagic cysts, or possibly retained contrast in the   setting of ATN, noting that persistent nephrograms were present on the   prior exam. No hydronephrosis.    BLADDER: Decompressed with a Tobin catheter present and expected   intravesicular air.  REPRODUCTIVE ORGANS: Prostate within normal limits.    BOWEL: No bowel obstruction. Appendix is not visualized.  PERITONEUM: Moderate volume of abdominopelvic fluid, which is similar in   volume in comparison to 8/10/2022. There is new complexity of the fluid   with heterogeneous hyperdensity, concerning for hemoperitoneum. Blood   clot is most prominently seen in the left lower quadrant. In addition,   there are new droplets of pneumoperitoneum admixed with the   hemoperitoneum, most concentrated in the left lower quadrant.  VESSELS: Atherosclerotic changes.  RETROPERITONEUM/LYMPH NODES: No lymphadenopathy.  ABDOMINAL WALL: Anasarca. Small umbilical hernia containing fat and trace   fluid. Percutaneous cholecystostomy tube.  BONES: Mild degenerative changes of the spine.    IMPRESSION:  CHEST:  *  Moderate bilateral pleural effusions.  *  Ground glass opacities in the right upper lobe, which may be   infectious/inflammatory.  *  Asymmetric gynecomastia.    ABDOMEN AND PELVIS:  *  Moderate volume of abdominopelvic fluid with new findings of   hemoperitoneum. Clot appears most concentrated within the left lower   quadrant, suggesting that a source of bleed is potentially in this   location. Limited assessment for hemorrhage without intravenous contrast.   Consider further evaluation with contrast enhanced CT.  *  New droplets of pneumoperitoneum, also most concentrated within the   left lower quadrant. Pneumoperitoneum is potentially postprocedural given   recent paracentesis, though exact etiology is uncertain.  *  Cirrhosis.    Findings of new hemoperitoneum and pneumoperitoneum were discussed with   JOHN Carson by Dr. Brambila on 2022 at 6:47 PM.    --- End of Report ---    ELSY ALVA MD; Attending Radiologist  This document has been electronically signed. Aug 19 2022  8:46AM    < end of copied text >      Labs:                        8.0    9.03  )-----------( 120      ( 22 Aug 2022 06:53 )             24.1     08-22    133<L>  |  99  |  30<H>  ----------------------------<  95  3.6   |  28  |  1.10    Ca    8.3<L>      22 Aug 2022 06:53  Phos  2.6     08-22  Mg     1.8     08-22    TPro  6.3  /  Alb  2.3<L>  /  TBili  3.3<H>  /  DBili  x   /  AST  34  /  ALT  13  /  AlkPhos  73  08-22    PT/INR - ( 22 Aug 2022 06:53 )   PT: 25.8 sec;   INR: 2.23 ratio         PTT - ( 22 Aug 2022 06:53 )  PTT:33.1 sec

## 2022-08-22 NOTE — PROGRESS NOTE ADULT - SUBJECTIVE AND OBJECTIVE BOX
Follow Up:      Interval History:    REVIEW OF SYSTEMS  [  ] ROS unobtainable because:    [  ] All other systems negative except as noted below    Constitutional:  [ ] fever [ ] chills  [ ] weight loss  [ ] weakness  Skin:  [ ] rash [ ] phlebitis	  Eyes: [ ] icterus [ ] pain  [ ] discharge	  ENMT: [ ] sore throat  [ ] thrush [ ] ulcers [ ] exudates  Respiratory: [ ] dyspnea [ ] hemoptysis [ ] cough [ ] sputum	  Cardiovascular:  [ ] chest pain [ ] palpitations [ ] edema	  Gastrointestinal:  [ ] nausea [ ] vomiting [ ] diarrhea [ ] constipation [ ] pain	  Genitourinary:  [ ] dysuria [ ] frequency [ ] hematuria [ ] discharge [ ] flank pain  [ ] incontinence  Musculoskeletal:  [ ] myalgias [ ] arthralgias [ ] arthritis  [ ] back pain  Neurological:  [ ] headache [ ] seizures  [ ] confusion/altered mental status    Allergies  No Known Allergies        ANTIMICROBIALS:  ampicillin/sulbactam  IVPB 3 every 6 hours  fluconAZOLE IVPB 400 every 24 hours  penicillin   G benzathine Injectable 2.4 <User Schedule>      OTHER MEDS:  MEDICATIONS  (STANDING):  furosemide   Injectable 40 every 12 hours  midodrine. 5 every 8 hours  pantoprazole    Tablet 40 before breakfast  polyethylene glycol 3350 17 two times a day  senna 2 at bedtime  tamsulosin 0.4 at bedtime      Vital Signs Last 24 Hrs  T(C): 36.8 (22 Aug 2022 13:00), Max: 37.2 (21 Aug 2022 17:00)  T(F): 98.2 (22 Aug 2022 13:00), Max: 98.9 (21 Aug 2022 17:00)  HR: 100 (22 Aug 2022 13:00) (89 - 100)  BP: 159/78 (22 Aug 2022 13:00) (132/78 - 159/78)  BP(mean): --  RR: 18 (22 Aug 2022 13:00) (18 - 18)  SpO2: 97% (22 Aug 2022 13:00) (93% - 97%)    Parameters below as of 22 Aug 2022 13:00  Patient On (Oxygen Delivery Method): room air        PHYSICAL EXAMINATION:  General: Alert and Awake, NAD  HEENT: PERRL, EOMI  Neck: Supple  Cardiac: RRR, No M/R/G  Resp: CTAB, No Wh/Rh/Ra  Abdomen: NBS, NT/ND, No HSM, No rigidity or guarding  MSK: No LE edema. No Calf tenderness  : No milian  Skin: No rashes or lesions. Skin is warm and dry to the touch.   Neuro: Alert and Awake. CN 2-12 Grossly intact. Moves all four extremities spontaneously.  Psych: Calm, Pleasant, Cooperative                          8.0    9.03  )-----------( 120      ( 22 Aug 2022 06:53 )             24.1           133<L>  |  99  |  30<H>  ----------------------------<  95  3.6   |  28  |  1.10    Ca    8.3<L>      22 Aug 2022 06:53  Phos  2.6       Mg     1.8         TPro  6.3  /  Alb  2.3<L>  /  TBili  3.3<H>  /  DBili  x   /  AST  34  /  ALT  13  /  AlkPhos  73        Urinalysis Basic - ( 21 Aug 2022 14:52 )    Color: Yellow / Appearance: Clear / S.020 / pH: x  Gluc: x / Ketone: Negative  / Bili: Negative / Urobili: Negative   Blood: x / Protein: Trace / Nitrite: Negative   Leuk Esterase: Small / RBC: 30 /hpf / WBC 11 /HPF   Sq Epi: x / Non Sq Epi: 3 /hpf / Bacteria: Negative        MICROBIOLOGY:  v  Peritoneal Peritoneal Fluid  22   Rare Escherichia coli  Few Streptococcus anginosus  --  Escherichia coli  Streptococcus anginosus      .Blood Blood-Peripheral  22   No Growth Final  --  --      Peritoneal Peritoneal Fluid  22   No growth  --    polymorphonuclear leukocytes seen  No organisms seen  by cytocentrifuge      .Blood Blood  22   No Growth Final  --  --      Peritoneal Peritoneal Fluid  22   Few Escherichia coli  Moderate Streptococcus anginosus "Susceptibilities not performed"  --  Escherichia coli      Catheterized Catheterized  22   10,000 - 49,000 CFU/mL Escherichia coli  --  Escherichia coli      .Blood Blood-Peripheral  22   No Growth Final  --  --      .Blood Blood-Peripheral  22   No Growth Final  --  --        CMV IgG Antibody: 4.00 U/mL (22 @ 00:32)          RADIOLOGY:    <The imaging below has been reviewed and visualized by me independently. Findings as detailed in report below> Follow Up:  peritonitis    Interval History: afebrile. abdominal pain improving.     REVIEW OF SYSTEMS  [  ] ROS unobtainable because:    [ x ] All other systems negative except as noted below    Constitutional:  [ ] fever [ ] chills  [ ] weight loss  [ ] weakness  Skin:  [ ] rash [ ] phlebitis	  Eyes: [ ] icterus [ ] pain  [ ] discharge	  ENMT: [ ] sore throat  [ ] thrush [ ] ulcers [ ] exudates  Respiratory: [ ] dyspnea [ ] hemoptysis [ ] cough [ ] sputum	  Cardiovascular:  [ ] chest pain [ ] palpitations [ ] edema	  Gastrointestinal:  [ ] nausea [ ] vomiting [ ] diarrhea [ ] constipation [ x] pain	  Genitourinary:  [ ] dysuria [ ] frequency [ ] hematuria [ ] discharge [ ] flank pain  [ ] incontinence  Musculoskeletal:  [ ] myalgias [ ] arthralgias [ ] arthritis  [ ] back pain  Neurological:  [ ] headache [ ] seizures  [ ] confusion/altered mental status    Allergies  No Known Allergies        ANTIMICROBIALS:  ampicillin/sulbactam  IVPB 3 every 6 hours  fluconAZOLE IVPB 400 every 24 hours  penicillin   G benzathine Injectable 2.4 <User Schedule>      OTHER MEDS:  MEDICATIONS  (STANDING):  furosemide   Injectable 40 every 12 hours  midodrine. 5 every 8 hours  pantoprazole    Tablet 40 before breakfast  polyethylene glycol 3350 17 two times a day  senna 2 at bedtime  tamsulosin 0.4 at bedtime      Vital Signs Last 24 Hrs  T(C): 36.8 (22 Aug 2022 13:00), Max: 37.2 (21 Aug 2022 17:00)  T(F): 98.2 (22 Aug 2022 13:00), Max: 98.9 (21 Aug 2022 17:00)  HR: 100 (22 Aug 2022 13:00) (89 - 100)  BP: 159/78 (22 Aug 2022 13:00) (132/78 - 159/78)  BP(mean): --  RR: 18 (22 Aug 2022 13:00) (18 - 18)  SpO2: 97% (22 Aug 2022 13:00) (93% - 97%)    Parameters below as of 22 Aug 2022 13:00  Patient On (Oxygen Delivery Method): room air    PHYSICAL EXAMINATION:  General: Alert and Awake, NAD  Cardiac: RRR, No M/R/G  Resp: CTAB, No Wh/Rh/Ra  Abdomen: +Hepatobiliary Drain, Distended, No HSM, No rigidity or guarding  MSK: No LE edema. No Calf tenderness  Skin: No rashes or lesions. Skin is warm and dry to the touch.   Neuro: Alert and Awake. CN 2-12 Grossly intact. Moves all four extremities spontaneously.  Psych: Calm, Pleasant, Cooperative                        8.0    9.03  )-----------( 120      ( 22 Aug 2022 06:53 )             24.1           133<L>  |  99  |  30<H>  ----------------------------<  95  3.6   |  28  |  1.10    Ca    8.3<L>      22 Aug 2022 06:53  Phos  2.6       Mg     1.8         TPro  6.3  /  Alb  2.3<L>  /  TBili  3.3<H>  /  DBili  x   /  AST  34  /  ALT  13  /  AlkPhos  73        Urinalysis Basic - ( 21 Aug 2022 14:52 )    Color: Yellow / Appearance: Clear / S.020 / pH: x  Gluc: x / Ketone: Negative  / Bili: Negative / Urobili: Negative   Blood: x / Protein: Trace / Nitrite: Negative   Leuk Esterase: Small / RBC: 30 /hpf / WBC 11 /HPF   Sq Epi: x / Non Sq Epi: 3 /hpf / Bacteria: Negative        MICROBIOLOGY:  v  Peritoneal Peritoneal Fluid  22   Rare Escherichia coli  Few Streptococcus anginosus  --  Escherichia coli  Streptococcus anginosus      .Blood Blood-Peripheral  22   No Growth Final  --  --      Peritoneal Peritoneal Fluid  22   No growth  --    polymorphonuclear leukocytes seen  No organisms seen  by cytocentrifuge      .Blood Blood  22   No Growth Final  --  --      Peritoneal Peritoneal Fluid  22   Few Escherichia coli  Moderate Streptococcus anginosus "Susceptibilities not performed"  --  Escherichia coli      Catheterized Catheterized  22   10,000 - 49,000 CFU/mL Escherichia coli  --  Escherichia coli      .Blood Blood-Peripheral  22   No Growth Final  --  --      .Blood Blood-Peripheral  22   No Growth Final  --  --    CMV IgG Antibody: 4.00 U/mL (22 @ 00:32)    RADIOLOGY:    <The imaging below has been reviewed and visualized by me independently. Findings as detailed in report below>    < from: CT Chest No Cont (22 @ 17:46) >  IMPRESSION:  CHEST:  *  Moderate bilateral pleural effusions.  *  Ground glass opacities in the right upper lobe, which may be   infectious/inflammatory.  *  Asymmetric gynecomastia.    ABDOMEN AND PELVIS:  *  Moderate volume of abdominopelvic fluid with new findings of   hemoperitoneum. Clot appears most concentrated within the left lower   quadrant, suggesting that a source of bleed is potentially in this   location. Limited assessment for hemorrhage without intravenous contrast.   Consider further evaluation with contrast enhanced CT.  *  New droplets of pneumoperitoneum, also most concentrated within the   left lower quadrant. Pneumoperitoneum is potentially postprocedural given   recent paracentesis, though exact etiology is uncertain.  *  Cirrhosis.    < end of copied text >

## 2022-08-22 NOTE — CHART NOTE - NSCHARTNOTEFT_GEN_A_CORE
Nutrition Follow Up Note  Patient seen for: Nutrition Consult for Assessment received and appreciated.     Chart reviewed, events noted. "65 y/o Male with a PMH Cirrhosis and recent cholangitis s/p ERCP stent () and stent removal (22). Pt presented on  with increasing RUQ pain. CT Abd/Pelvis reported distended gallbladder with stones in the cystic duct, gallbladder wall thickening, suggesting chronic cholecystitis. Pt had a percutaneous cholecystostomy tube placed on  along with a paracentesis yielding 300cc of purulent fluid. Pt admitted to SICU for further hemodynamic monitoring and management."    Interim Events:   - MELD-Na 22 (22); improving. Per Transplant "may hold off on finishing transplant eval if he continues to improve."    Source: [X] Patient       [x] EMR        [] RN        [] Family at bedside       [] Other:      Diet Order:   Diet, Regular:   1000mL Fluid Restriction ([id number])  Supplement Feeding Modality:  Oral  Ensure Enlive Cans or Servings Per Day:  1       Frequency:  Three Times a day (22)    - Is current order appropriate/adequate? [X] Yes    Diet History:   - : NPO or Clear Liquids (inadequate diet order x 4 days)  : Renal diet; minimal po intake  -: NPO or Clear Liquids  : Renal restriction was removed, diet advanced and liberalized; po intake improved  : Oral supplement changed to Ensure Enlive (350kcal, 20g protein) per patient request    PO intake :   [] >75%  Adequate    [X] 50-75%  Fair       [] <50%  Poor  Pt observed eating breakfast with no GI distress. Pt demonstrates understanding of 1L fluid restriction; endorses good po intake (50% at breakfast, 75% at lunch/dinner). Pt is eager for Ensure supplements; diet changed accordingly.    Nutrition-related concerns:  - Liver Transplant Evaluation completed by RD (). FRAILTY Score per PT assessment (): 5.1 = FRAIL  - Recently diagnosed cirrhosis with MELD 34. Cholecystitis s/p per rodney tube and diagnostic paracentesis ()   - Renal: ROBBIE and hyperkalemia s/p shifting & Lokelma. HD provided , , . Hyperkalemia improved, No further HD ordered.  - Hyponatremia improving (Na 133); addressed with 1000ml fluid restriction; pt aware and adherent  - Paracentesis , ,   - Cholecystostomy tube output = 575ml    GI:    Last BM  (daily BMs).   Bowel Regimen? [X] Yes: Miralax, senna    Weights:   Daily Weight in k.9 (), Weight in k.3 (), Weight in k.2 (), Weight in k (), 83.1 (), Weight in k.7 (08-15), Weight in k.4 (), Weight in k.4 (), Weight in k.4 (), Weight in k.7 (), Weight in k.7 ()  DOSING Weight (kg): 72.6 (22) *consistent with UBW used for calculations  BMI: 29.3 kg/m2 (22)  IBW: 53.5 kg    Nutritionally Pertinent MEDICATIONS  (STANDING):  ampicillin/sulbactam  IVPB  fluconAZOLE IVPB  furosemide   Injectable  midodrine.  pantoprazole    Tablet  penicillin   G benzathine Injectable  polyethylene glycol 3350  senna  tamsulosin    Pertinent Labs:  @ 06:53: Na 133<L>, BUN 30<H>, Cr 1.10, BG 95, K+ 3.6, Phos 2.6, Mg 1.8, Alk Phos 73, ALT/SGPT 13, AST/SGOT 34  A1C with Estimated Average Glucose Result: 4.9 % (22 @ 00:35)    Skin per nursing documentation: no pressure injuries documented  Edema: 2+ right/left foot, ankle, leg    Estimated Needs: based on dosing wt 72.6kg, with consideration for BMI 29.3  Energy: 0164-6776 calories (25-30 kcal/kg)  Protein: 80-94 grams (1.1-1.3 gm/kg)    Previous Nutrition Diagnosis: 1) Malnutrition, moderate   Nutrition Diagnosis is: [X] ongoing; addressed with therapeutic diet and high protein supplements    Previous Nutrition Diagnosis: 2) Food and Nutrition Related Knowledge Deficit  Nutrition Diagnosis is: [X] ongoing; diet education provided to family     New Nutrition Diagnosis: [X] Not applicable    Nutrition Care Plan:  [X] In Progress  [] Achieved  [] Not applicable    Nutrition Interventions:     Education Provided:    - Transplant nutrition education provided to wife at bedside,   - Pt educated on 1000ml fluid restriction,        Recommendations:         [X] Continue current diet order: Regular; all high potassium foods previously removed from menu     [X] Oral nutrition supplement: Supplement changed from Promote to Ensure Enlive (350kcal , 20 g protein) per pt request     [X] Other: Defer fluid restriction to team, currently 1000ml      Monitoring and Evaluation:   Continue to monitor nutritional intake, tolerance to diet prescription, weights, labs, skin integrity      RD remains available upon request and will follow up per protocol  Marnie Richter, MS RD CDN [facility] (pager [phone])

## 2022-08-22 NOTE — PROGRESS NOTE ADULT - ATTENDING COMMENTS
67 y/o M w/ decompensated ETOH + PARNELL cirrhosis, hx of cholangitis s/p ERCP with stent placement (4/2022) and removal on 7/20/22 w/ stone extraction here with worsening ascites, RUQ abdominal pain found to be in septic shock suspected due to acute cholecystitis, ecoli/strep anginosis secondary bacterial peritonitis.    perc tube appears to have fallen out and draining prior hemoperitoneum  ir for tube study  meld score improving  may hold off on finishing transplant eval if he continues to improve

## 2022-08-22 NOTE — PROVIDER CONTACT NOTE (OTHER) - ACTION/TREATMENT ORDERED:
SOHAM [place] came and assessed pt at bedside. Will continue to monitor pt.
MD toledo to bedside to assess. he states "Milian is still draining". Md Toledo flushed the milian at bedside and patient "felt relief".

## 2022-08-22 NOTE — PROGRESS NOTE ADULT - ASSESSMENT
67 y/o M PMHx cholangitis/cholecystitis (s/p ERCP w/ biliary stent placement 04/2022) and recently diagnosed etOH cirrhosis, presents with RUQ pain following biliary stent removal on 7/20/22. Found to have distended GB with wall thickening and stone in cystic duct, concerning for cholecystitis. Admitted to SICU for monitoring, s/p perc rodney and paracentesis by IR on 8/9. Paracentesis results consistent with SBP. Course now c/b ARF.     s/p Perc Rodney Tube and Paracentesis  Paracentesis cell counts suggestive of Peritonitis  Cultures thus far with E coli and Streptococcus on Perc Rodney Drainage and Ascites drainage  Concerning with cholecystitis with associated perforation    Paracentesis (8/9) 160,713 with 62% PMN  Paracentesis (8/12) 8,344 with 97% PMN  Paracentesis (8/16) 53,930 with 92% PMN    #Peritonitis, Cholecystitis, SBP, Leukocytosis   Ascites Cultures (8/16) Rare E coli and Few Strep anginosis  E coli is still susceptible to Unasyn  CT A/P (8/18) Moderate volume of abdominopelvic fluid with new findings of hemoperitoneum. Clot appears most concentrated within the left lower quadrant, suggesting that a source of bleed is potentially in this location.   CT Chest (8/18) Ground glass opacities in the right upper lobe. Moderate bilateral pleural effusions.   Suspect GGO on CT is representative of pulmonary edema  --Continue Unasyn 3g IV Q6H (anticipate Augmentin on discharge)  --Continue to follow CBC with diff  --Continue to follow temperature curve  --Follow up on preliminary blood cultures    #Late Latent Syphilis  Denies knowledge of preceding diagnosis  No preceding treatment  Will treat as late latent syphilis  --Continue IM Benzathine Penicillin 2.4 million units Q7Days x 3 doses    #Pre-Liver Transplant Evaluation  --ID clearance for OLT pending resolution of peritonitis  --Follow up on repeat Quantiferon Gold    I will continue to follow. Please feel free to contact me with any further questions.    Bladimir Nix M.D.  [facility] Division of Infectious Disease  8AM-5PM Monday - Friday: Available on [employer] Teams  After Hours and Holidays (or if no response on [employer] Teams): Please contact the Infectious Diseases Office at [phone]    The above assessment and plan were discussed with Pb transplant surgery PA

## 2022-08-23 LAB
ALBUMIN SERPL ELPH-MCNC: 2.5 G/DL — LOW (ref 3.3–5)
ALP SERPL-CCNC: 77 U/L — SIGNIFICANT CHANGE UP (ref 40–120)
ALT FLD-CCNC: 13 U/L — SIGNIFICANT CHANGE UP (ref 10–45)
ANION GAP SERPL CALC-SCNC: 9 MMOL/L — SIGNIFICANT CHANGE UP (ref 5–17)
APTT BLD: 33 SEC — SIGNIFICANT CHANGE UP (ref 27.5–35.5)
AST SERPL-CCNC: 34 U/L — SIGNIFICANT CHANGE UP (ref 10–40)
BASOPHILS # BLD AUTO: 0.04 K/UL — SIGNIFICANT CHANGE UP (ref 0–0.2)
BASOPHILS NFR BLD AUTO: 0.5 % — SIGNIFICANT CHANGE UP (ref 0–2)
BILIRUB SERPL-MCNC: 3.4 MG/DL — HIGH (ref 0.2–1.2)
BUN SERPL-MCNC: 28 MG/DL — HIGH (ref 7–23)
CALCIUM SERPL-MCNC: 8.4 MG/DL — SIGNIFICANT CHANGE UP (ref 8.4–10.5)
CHLORIDE SERPL-SCNC: 97 MMOL/L — SIGNIFICANT CHANGE UP (ref 96–108)
CO2 SERPL-SCNC: 27 MMOL/L — SIGNIFICANT CHANGE UP (ref 22–31)
CREAT SERPL-MCNC: 1.21 MG/DL — SIGNIFICANT CHANGE UP (ref 0.5–1.3)
EGFR: 67 ML/MIN/1.73M2 — SIGNIFICANT CHANGE UP
EOSINOPHIL # BLD AUTO: 0.1 K/UL — SIGNIFICANT CHANGE UP (ref 0–0.5)
EOSINOPHIL NFR BLD AUTO: 1.2 % — SIGNIFICANT CHANGE UP (ref 0–6)
GLUCOSE SERPL-MCNC: 93 MG/DL — SIGNIFICANT CHANGE UP (ref 70–99)
HCT VFR BLD CALC: 23.6 % — LOW (ref 39–50)
HGB BLD-MCNC: 7.7 G/DL — LOW (ref 13–17)
IMM GRANULOCYTES NFR BLD AUTO: 0.5 % — SIGNIFICANT CHANGE UP (ref 0–1.5)
INR BLD: 2.29 RATIO — HIGH (ref 0.88–1.16)
LYMPHOCYTES # BLD AUTO: 1.31 K/UL — SIGNIFICANT CHANGE UP (ref 1–3.3)
LYMPHOCYTES # BLD AUTO: 15.6 % — SIGNIFICANT CHANGE UP (ref 13–44)
MAGNESIUM SERPL-MCNC: 1.8 MG/DL — SIGNIFICANT CHANGE UP (ref 1.6–2.6)
MCHC RBC-ENTMCNC: 31.3 PG — SIGNIFICANT CHANGE UP (ref 27–34)
MCHC RBC-ENTMCNC: 32.6 GM/DL — SIGNIFICANT CHANGE UP (ref 32–36)
MCV RBC AUTO: 95.9 FL — SIGNIFICANT CHANGE UP (ref 80–100)
MONOCYTES # BLD AUTO: 1.03 K/UL — HIGH (ref 0–0.9)
MONOCYTES NFR BLD AUTO: 12.3 % — SIGNIFICANT CHANGE UP (ref 2–14)
NEUTROPHILS # BLD AUTO: 5.87 K/UL — SIGNIFICANT CHANGE UP (ref 1.8–7.4)
NEUTROPHILS NFR BLD AUTO: 69.9 % — SIGNIFICANT CHANGE UP (ref 43–77)
NRBC # BLD: 0 /100 WBCS — SIGNIFICANT CHANGE UP (ref 0–0)
PHOSPHATE SERPL-MCNC: 2.8 MG/DL — SIGNIFICANT CHANGE UP (ref 2.5–4.5)
PLATELET # BLD AUTO: 120 K/UL — LOW (ref 150–400)
POTASSIUM SERPL-MCNC: 3.7 MMOL/L — SIGNIFICANT CHANGE UP (ref 3.5–5.3)
POTASSIUM SERPL-SCNC: 3.7 MMOL/L — SIGNIFICANT CHANGE UP (ref 3.5–5.3)
PROT SERPL-MCNC: 6.3 G/DL — SIGNIFICANT CHANGE UP (ref 6–8.3)
PROTHROM AB SERPL-ACNC: 26.5 SEC — HIGH (ref 10.5–13.4)
RBC # BLD: 2.46 M/UL — LOW (ref 4.2–5.8)
RBC # FLD: 18.6 % — HIGH (ref 10.3–14.5)
SODIUM SERPL-SCNC: 133 MMOL/L — LOW (ref 135–145)
WBC # BLD: 8.39 K/UL — SIGNIFICANT CHANGE UP (ref 3.8–10.5)
WBC # FLD AUTO: 8.39 K/UL — SIGNIFICANT CHANGE UP (ref 3.8–10.5)

## 2022-08-23 PROCEDURE — 99233 SBSQ HOSP IP/OBS HIGH 50: CPT

## 2022-08-23 PROCEDURE — 99231 SBSQ HOSP IP/OBS SF/LOW 25: CPT

## 2022-08-23 PROCEDURE — 99232 SBSQ HOSP IP/OBS MODERATE 35: CPT | Mod: GC

## 2022-08-23 RX ADMIN — CHLORHEXIDINE GLUCONATE 1 APPLICATION(S): 213 SOLUTION TOPICAL at 06:43

## 2022-08-23 RX ADMIN — POLYETHYLENE GLYCOL 3350 17 GRAM(S): 17 POWDER, FOR SOLUTION ORAL at 21:51

## 2022-08-23 RX ADMIN — PANTOPRAZOLE SODIUM 40 MILLIGRAM(S): 20 TABLET, DELAYED RELEASE ORAL at 08:35

## 2022-08-23 RX ADMIN — Medication 40 MILLIGRAM(S): at 06:42

## 2022-08-23 RX ADMIN — AMPICILLIN SODIUM AND SULBACTAM SODIUM 200 GRAM(S): 250; 125 INJECTION, POWDER, FOR SUSPENSION INTRAMUSCULAR; INTRAVENOUS at 13:06

## 2022-08-23 RX ADMIN — SENNA PLUS 2 TABLET(S): 8.6 TABLET ORAL at 21:51

## 2022-08-23 RX ADMIN — TAMSULOSIN HYDROCHLORIDE 0.4 MILLIGRAM(S): 0.4 CAPSULE ORAL at 21:51

## 2022-08-23 RX ADMIN — FLUCONAZOLE 100 MILLIGRAM(S): 150 TABLET ORAL at 19:39

## 2022-08-23 RX ADMIN — POLYETHYLENE GLYCOL 3350 17 GRAM(S): 17 POWDER, FOR SOLUTION ORAL at 13:07

## 2022-08-23 RX ADMIN — Medication 40 MILLIGRAM(S): at 18:25

## 2022-08-23 RX ADMIN — AMPICILLIN SODIUM AND SULBACTAM SODIUM 200 GRAM(S): 250; 125 INJECTION, POWDER, FOR SUSPENSION INTRAMUSCULAR; INTRAVENOUS at 06:43

## 2022-08-23 RX ADMIN — AMPICILLIN SODIUM AND SULBACTAM SODIUM 200 GRAM(S): 250; 125 INJECTION, POWDER, FOR SUSPENSION INTRAMUSCULAR; INTRAVENOUS at 18:25

## 2022-08-23 RX ADMIN — MIDODRINE HYDROCHLORIDE 5 MILLIGRAM(S): 2.5 TABLET ORAL at 06:43

## 2022-08-23 NOTE — PROGRESS NOTE ADULT - SUBJECTIVE AND OBJECTIVE BOX
Interventional Radiology Follow-Up Note    Patient seen and examined @ bedside     66 year old male with 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 Patient now s/p percutaneous cholecystostomy tube placement (8/9) with cultures from ascitic fluid and bile growing E. Coli and Streptococcus anginosis. Ascitic fluid studies from paracentesis (8/9) most consistent with secondary peritonitis with concern for possible perforated viscus (likely gallbladder) given markedly elevated ascitic fluid LDH. Repeat paracentesis (8/12) still consistent with secondary peritonitis but with improving neutrophil count from 99k to 8k. Ascitic fluid bilirubin similar to serum, suggesting against bile leak. Patient now with hemoperitoneum, s/p 4U PRBC, 3U FFP, 1U Vitamin K--remained hemodynamically stable during this time. Patient now with blood tinged output from Perc Shasha bag.    No complaint offered.    Medication:  ampicillin/sulbactam  IVPB: (08-23)  fluconAZOLE IVPB: (08-22)  furosemide   Injectable: (08-23)  midodrine.: (08-23)  tamsulosin: (08-22)    Vitals:  T(F): 98.4, Max: 99.5 (21:00)  HR: 101  BP: 118/65  RR: 18  SpO2: 96%    Physical Exam:  General: Nontoxic, in NAD.  Abdomen: soft, NTND.   Drain Device: Drain intact attached to gravity bag.  Flushed with 5cc NS w/o difficulty. Dressing clean, dry, intact.   24hr Drain output: 380cc      LABS:  Na: 133 (08-23 @ 06:26), 133 (08-22 @ 06:53), 134 (08-21 @ 06:59)  K: 3.7 (08-23 @ 06:26), 3.6 (08-22 @ 06:53), 3.8 (08-21 @ 06:59)  Cl: 97 (08-23 @ 06:26), 99 (08-22 @ 06:53), 98 (08-21 @ 06:59)  CO2: 27 (08-23 @ 06:26), 28 (08-22 @ 06:53), 27 (08-21 @ 06:59)  BUN: 28 (08-23 @ 06:26), 30 (08-22 @ 06:53), 38 (08-21 @ 06:59)  Cr: 1.21 (08-23 @ 06:26), 1.10 (08-22 @ 06:53), 1.24 (08-21 @ 06:59)  Glu: 93(08-23 @ 06:26), 95(08-22 @ 06:53), 107(08-21 @ 06:59)  Hgb: 7.7 (08-23 @ 06:28), 8.0 (08-22 @ 06:53), 8.1 (08-21 @ 07:05)  Hct: 23.6 (08-23 @ 06:28), 24.1 (08-22 @ 06:53), 24.7 (08-21 @ 07:05)  WBC: 8.39 (08-23 @ 06:28), 9.03 (08-22 @ 06:53), 11.23 (08-21 @ 07:05)  Plt: 120 (08-23 @ 06:28), 120 (08-22 @ 06:53), 118 (08-21 @ 07:05)  INR: 2.29 08-23-22 @ 06:30, 2.23 08-22-22 @ 06:53, 1.88 08-21-22 @ 07:05  PTT: 33.0 08-23-22 @ 06:30, 33.1 08-22-22 @ 06:53, 30.7 08-21-22 @ 07:05      LIVER FUNCTIONS - ( 23 Aug 2022 06:26 )  Alb: 2.5 g/dL / Pro: 6.3 g/dL / ALK PHOS: 77 U/L / ALT: 13 U/L / AST: 34 U/L / GGT: x         Bilirubin Total, Serum: 3.4 mg/dL (08-23-22 @ 06:26)  Aspartate Aminotransferase (AST/SGOT): 34 U/L (08-23-22 @ 06:26)  Alanine Aminotransferase (ALT/SGPT): 13 U/L (08-23-22 @ 06:26)  Aspartate Aminotransferase (AST/SGOT): 34 U/L (08-22-22 @ 06:53)  Alanine Aminotransferase (ALT/SGPT): 13 U/L (08-22-22 @ 06:53)  Bilirubin Total, Serum: 3.4 mg/dL *H* (08-23-22 @ 06:26)  Bilirubin Total, Serum: 3.3 mg/dL *H* (08-22-22 @ 06:53)  Bilirubin Total, Serum: 3.6 mg/dL *H* (08-21-22 @ 06:59)  Bilirubin Direct, Serum: 1.6 mg/dL *H* (08-19-22 @ 22:04)  Bilirubin Total, Serum: 4.1 mg/dL *H* (08-19-22 @ 22:04)  Bilirubin Direct, Serum: 2.0 mg/dL *H* (08-19-22 @ 04:21)  Bilirubin Total, Serum: 5.4 mg/dL *H* (08-19-22 @ 04:21)      Assessment/Plan: 66 year old male with 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 Patient now s/p percutaneous cholecystostomy tube placement (8/9) with cultures from ascitic fluid and bile growing E. Coli and Streptococcus anginosis. Ascitic fluid studies from paracentesis (8/9) most consistent with secondary peritonitis with concern for possible perforated viscus (likely gallbladder) given markedly elevated ascitic fluid LDH. Repeat paracentesis (8/12) still consistent with secondary peritonitis but with improving neutrophil count from 99k to 8k. Ascitic fluid bilirubin similar to serum, suggesting against bile leak. Patient now with hemoperitoneum, s/p 4U PRBC, 3U FFP, 1U Vitamin K--remained hemodynamically stable during this time. Patient now with blood tinged output from Perc Shasha bag.     - Blood tinged output from drain  - Patient with hemoperitoneum likely communicating with drain  - Flush drain with 10cc NS daily forward only; DO NOT aspirate  - Change dressing q3 days or when dressing is saturated.  - Trend vs/labs  - Continue global management per primary team, IR will follow closely  - If the patient is d/c home with drainage catheter, pt can make an appointment with IR by calling the IR booking office at [phone]; recommend IR follow in three months after discharge for tube evaluation.  - Pt will benefit from VNS service to help with drainage catheter care; Pt should continue same drainage catheter care as an outpatient.     Please call IR at [phone] with any questions, concerns, or issues regarding above.      Also available on TEAMS

## 2022-08-23 NOTE — PROGRESS NOTE ADULT - TIME BILLING
Please see above for:    A review of diagnostic results  Prognosis, treatment options  Instructions for treatment and/or follow-up  Importance of compliance with chosen treatment options  Risk factor reduction  Patient and family education

## 2022-08-23 NOTE — PROGRESS NOTE ADULT - ATTENDING COMMENTS
67 y/o M w/ decompensated ETOH + PARNELL cirrhosis, hx of cholangitis s/p ERCP with stent placement (4/2022) and removal on 7/20/22 w/ stone extraction here with worsening ascites, RUQ abdominal pain found to be in septic shock suspected due to acute cholecystitis, ecoli/strep anginosis secondary bacterial peritonitis.    perc tube check by IR 8/22/22 > in gallbladder but probably has a connection to peritoneum  meld score in the low to mid 20s  will probably need prolonged IV abx on Unasyn for peritonitis and need recheck peritoneal fluid at some point but not urgent  ID clearance and colon exam needed for transplant eval  plan on discussing Friday at listing meeting, will hold off on colonoscopy for now due to the perc rodney tube  seems like a good social candidate for transplant. last drink in April 2022

## 2022-08-23 NOTE — PROGRESS NOTE ADULT - SUBJECTIVE AND OBJECTIVE BOX
HPI:  Patient seen and examined at bedside on 6 Abraham.  No cardiac events overnight.    Review Of Systems:           Respiratory: No shortness of breath, cough, or wheezing  Cardiovascular: No chest pain or palpitations  10 point review of systems is otherwise negative except as mentioned above        Medications:  ampicillin/sulbactam  IVPB 3 Gram(s) IV Intermittent every 6 hours  chlorhexidine 2% Cloths 1 Application(s) Topical <User Schedule>  fluconAZOLE IVPB 400 milliGRAM(s) IV Intermittent every 24 hours  furosemide   Injectable 40 milliGRAM(s) IV Push every 12 hours  midodrine. 5 milliGRAM(s) Oral every 8 hours  pantoprazole    Tablet 40 milliGRAM(s) Oral before breakfast  penicillin   G benzathine Injectable 2.4 Million Unit(s) IntraMuscular <User Schedule>  polyethylene glycol 3350 17 Gram(s) Oral two times a day  senna 2 Tablet(s) Oral at bedtime  tamsulosin 0.4 milliGRAM(s) Oral at bedtime    PAST MEDICAL & SURGICAL HISTORY:  H/O acute cholangitis      2019 novel coronavirus disease (COVID-19)  2021  no hospitalization, no treatment      S/P ERCP  2022      H/O colonoscopy        Vitals:  T(C): 37.1 (22 @ 13:00), Max: 37.5 (22 @ 21:00)  HR: 105 (22 @ 13:00) (100 - 108)  BP: 149/74 (22 @ 13:00) (118/65 - 149/74)  BP(mean): --  RR: 18 (22 @ 13:00) (18 - 18)  SpO2: 96% (22 @ 13:00) (94% - 96%)  Wt(kg): --  Daily     Daily Weight in k.4 (23 Aug 2022 07:00)  I&O's Summary    22 Aug 2022 07:01  -  23 Aug 2022 07:00  --------------------------------------------------------  IN: 1150 mL / OUT: 1380 mL / NET: -230 mL    23 Aug 2022 07:01  -  23 Aug 2022 19:36  --------------------------------------------------------  IN: 280 mL / OUT: 250 mL / NET: 30 mL        Physical Exam:  Appearance: Normal, well groomed, NAD  Eyes: PERRLA, EOMI, pink conjunctiva, no scleral icterus   HENT: Normal oral mucosa  Cardiovascular: RRR, S1, S2, no murmur, rub, or gallop; no edema; no JVD  Procedural Access Site: Clean, dry, intact, without hematoma  Respiratory: Clear to auscultation bilaterally  Gastrointestinal: Soft, non-tender, non-distended, BS+  Musculoskeletal: No clubbing or joint deformity   Neurologic: No focal weakness  Lymphatic: No lymphadenopathy  Psychiatry: AAOx3 with appropriate mood and affect  Skin: No rashes, ecchymoses, or cyanosis                          7.7    8.39  )-----------( 120      ( 23 Aug 2022 06:28 )             23.6     08-23    133<L>  |  97  |  28<H>  ----------------------------<  93  3.7   |  27  |  1.21    Ca    8.4      23 Aug 2022 06:26  Phos  2.8     08-23  Mg     1.8     08-23    TPro  6.3  /  Alb  2.5<L>  /  TBili  3.4<H>  /  DBili  x   /  AST  34  /  ALT  13  /  AlkPhos  77  08-23    PT/INR - ( 23 Aug 2022 06:30 )   PT: 26.5 sec;   INR: 2.29 ratio         PTT - ( 23 Aug 2022 06:30 )  PTT:33.0 sec      Cardiovascular Diagnostic Testing:  ECG: sinus tachycardia 121 bpm, incomplete RBBB with LAFB    Echo:   < from: Transthoracic Echocardiogram (22 @ 12:11) >  ------------------------------------------------------------------------  Dimensions:    Normal Values:  LA:     4.2    2.0 - 4.0 cm  Ao:     3.4    2.0 - 3.8 cm  SEPTUM: 0.9    0.6 - 1.2 cm  PWT:    1.1  0.6 - 1.1 cm  LVIDd:  4.8    3.0 - 5.6 cm  LVIDs:  2.7    1.8 - 4.0 cm  Derived variables:  LVMI: 96 g/m2  RWT: 0.45  Fractional short: 44 %  EF (Aguilar Rule): 75 %Doppler Peak Velocity (m/sec):  AoV=2.0  ------------------------------------------------------------------------  Observations:  Mitral Valve: Mitral annular calcification. Mild mitral  regurgitation.  Aortic Valve/Aorta: Calcified trileaflet aortic valve with  normal opening. Peak transaortic valve gradient equals 16  mm Hg, mean transaortic valve gradient equals 9 mm Hg,  aortic valve velocity time integral equals 18 cm, estimated  aortic valve area equals 2.5 sqcm. Peak left ventricular  outflow tract gradient equals 8 mm Hg, mean gradient is  equal to 4 mm Hg, LVOT velocitytime integral equals 16 cm.  Aortic Root: 3.4 cm.  Ascending Aorta: 2.7 cm.  LVOT diameter: 1.9 cm.  Left Atrium: Normal left atrium.  LA volume index = 18  cc/m2.  Left Ventricle: Hyperdynamic left ventricular systolic  function. Increased relative wall thickness with normal  left ventricular mass index, consistent with concentric  left ventricular remodeling. Normal diastolic function  Right Heart: Normal right atrium. Normal right ventricular  size and function. Normal tricuspid valve. Mild tricuspid  regurgitation. Normal pulmonic valve.  Pericardium/Pleura: Normal pericardium with trace  pericardial effusion.  ------------------------------------------------------------------------  Conclusions:  1. Hyperdynamic left ventricular systolicfunction.  2. Normal right ventricular size and function.  3. Normal pericardium with trace pericardial effusion.  *** No previous Echo exam.  ------------------------------------------------------------------------  Confirmed on  2022 - 17:49:24by KENYA Adame  ------------------------------------------------------------------------    < end of copied text >    Stress Testing:  < from: Stress Echo Pharmacologic (w/TTE, w/Cont) (22 @ 15:33) >  ------------------------------------------------------------------------  Observations:  Left Ventricle: Endocardial visualization enhanced with  intravenous injection of Ultrasonic Enhancing Agent  (Definity). Normal left ventricular systolic function. No  segmental wall motion abnormalities.  ------------------------------------------------------------------------  Pharmacologic Stress Test:  Agent:   Dobutamine Dose: 30  mcg/Kg/min over 11 min.  Baseline HR: 84 bpm  Peak HR: 137 bpm  % MPHR: 88 %  Baseline BP: 126/61 mmHg  Peak BP: 109/54 mmHg  Peak RPP: 14,933 (Rate Pressure Product)  Test terminated: Completion of test  Baseline EKG: Normal sinus rhythm, incomplete RBBB, poor  R-wave progression across precordium  EKG changes: No significant ST segment changes.  Arrhythmia: Rare VPD's in post-infusion  Heart Rhythm:  HR Response: Appropriate  BP Response: Appropriate  Symptoms: Palpitations  ------------------------------------------------------------------------  Echocardiographic Study:  (A) Baseline echocardiogram reveals:  1. Endocardial visualization enhanced with intravenous  injection of Ultrasonic Enhancing Agent (Definity). Normal  left ventricular systolicfunction. No segmental wall  motion abnormalities.  (B) Stress echocardiogram reveals:  Normal augmentation in left ventricular systolic function.  ------------------------------------------------------------------------  Conclusions:  1. Normal hemodynamic response.  2. Normal electrocardiographic response.  3. Normal augmentation in left ventricular systolic  function.  4. Normal pharmacologic stress echocardiogram with no  evidence of inducible ischemia.  ------------------------------------------------------------------------  Confirmed on  2022 - 18:03:34 by Meli Reynolds M.D.  ------------------------------------------------------------------------    < end of copied text >    Interpretation of Telemetry: NSR    Imaging:  < from: CT Chest No Cont (22 @ 17:46) >  FINDINGS:  Limited evaluation of the vasculature and viscera in the absence of   intravenous contrast.    CHEST:  LUNGS, PLEURA, AND LARGE AIRWAYS: Patent central airways. Ground glass   opacities in the right upper lobe. Moderate bilateral pleural effusions.   Bilateral atelectatic changes, with segmental atelectasis of the   bilateral lower lobes.  VESSELS: Atherosclerotic calcifications. Coronary artery calcifications.  HEART: Heart size is normal. No pericardial effusion.  MEDIASTINUMAND SHAI: No lymphadenopathy.  CHEST WALL AND LOWER NECK: Moderate left and mild right gynecomastia.    ABDOMEN AND PELVIS:  LIVER: Cirrhosis.  BILE DUCTS: Percutaneous cholecystostomy tube. Trace expected air within   the gallbladder. Cholelithiasis. The gallbladder is decompressed. Limited   assessment of the gallbladder due to decompressed state, lack of   intravenous contrast, and adjacent ascites fluid/hemoperitoneum.  GALLBLADDER: Within normal limits.  SPLEEN: Within normal limits.  PANCREAS: Within normal limits.  ADRENALS: Within normal limits.  KIDNEYS/URETERS: A 1.5 cm hyperdense focus in the right kidney and a 0.8   cm hyperdense focus within the left kidney may represent   proteinaceous/hemorrhagic cysts, or possibly retained contrast in the   setting of ATN, noting that persistent nephrograms were present on the   prior exam. No hydronephrosis.    BLADDER: Decompressed with a Tobin catheter present and expected   intravesicular air.  REPRODUCTIVE ORGANS: Prostate within normal limits.    BOWEL: No bowel obstruction. Appendix is not visualized.  PERITONEUM: Moderate volume of abdominopelvic fluid, which is similar in   volume in comparison to 8/10/2022. There is new complexity of the fluid   with heterogeneous hyperdensity, concerning for hemoperitoneum. Blood   clot is most prominently seen in the left lower quadrant. In addition,   there are new droplets of pneumoperitoneum admixed with the   hemoperitoneum, most concentrated in the left lower quadrant.  VESSELS: Atherosclerotic changes.  RETROPERITONEUM/LYMPH NODES: No lymphadenopathy.  ABDOMINAL WALL: Anasarca. Small umbilical hernia containing fat and trace   fluid. Percutaneous cholecystostomy tube.  BONES: Mild degenerative changes of the spine.    IMPRESSION:  CHEST:  *  Moderate bilateral pleural effusions.  *  Ground glass opacities in the right upper lobe, which may be   infectious/inflammatory.  *  Asymmetric gynecomastia.    ABDOMEN AND PELVIS:  *  Moderate volume of abdominopelvic fluid with new findings of   hemoperitoneum. Clot appears most concentrated within the left lower   quadrant, suggesting that a source of bleed is potentially in this   location. Limited assessment for hemorrhage without intravenous contrast.   Consider further evaluation with contrast enhanced CT.  *  New droplets of pneumoperitoneum, also most concentrated within the   left lower quadrant. Pneumoperitoneum is potentially postprocedural given   recent paracentesis, though exact etiology is uncertain.  *  Cirrhosis.    Findings of new hemoperitoneum and pneumoperitoneum were discussed with   PA Alli by Dr. Brambila on 2022 at 6:47 PM.    --- End of Report ---    ELSY ALVA MD; Attending Radiologist  This document has been electronically signed. Aug 19 2022  8:46AM    < end of copied text >

## 2022-08-23 NOTE — PROGRESS NOTE ADULT - SUBJECTIVE AND OBJECTIVE BOX
Interval Events:   No acute events overnight.  s/p IR tube check yesterday.  Patient without acute symptoms at this time.    ROS:   12 point review of systems performed and negative except otherwise noted in HPI.    Hospital Medications:  ampicillin/sulbactam  IVPB 3 Gram(s) IV Intermittent every 6 hours  chlorhexidine 2% Cloths 1 Application(s) Topical <User Schedule>  fluconAZOLE IVPB 400 milliGRAM(s) IV Intermittent every 24 hours  furosemide   Injectable 40 milliGRAM(s) IV Push every 12 hours  midodrine. 5 milliGRAM(s) Oral every 8 hours  pantoprazole    Tablet 40 milliGRAM(s) Oral before breakfast  penicillin   G benzathine Injectable 2.4 Million Unit(s) IntraMuscular <User Schedule>  polyethylene glycol 3350 17 Gram(s) Oral two times a day  senna 2 Tablet(s) Oral at bedtime  tamsulosin 0.4 milliGRAM(s) Oral at bedtime    MAR over past 24 hours:    ampicillin/sulbactam  IVPB   200 mL/Hr IV Intermittent (08-23-22 @ 06:43)   200 mL/Hr IV Intermittent (08-22-22 @ 23:47)   200 mL/Hr IV Intermittent (08-22-22 @ 17:52)   200 mL/Hr IV Intermittent (08-22-22 @ 12:50)    chlorhexidine 2% Cloths   1 Application(s) Topical (08-23-22 @ 06:43)    fluconAZOLE IVPB   100 mL/Hr IV Intermittent (08-22-22 @ 19:04)    furosemide   Injectable   40 milliGRAM(s) IV Push (08-23-22 @ 06:42)   40 milliGRAM(s) IV Push (08-22-22 @ 17:52)    midodrine.   5 milliGRAM(s) Oral (08-23-22 @ 06:43)   5 milliGRAM(s) Oral (08-22-22 @ 22:34)    pantoprazole    Tablet   40 milliGRAM(s) Oral (08-23-22 @ 08:35)    phytonadione  IVPB   102 mL/Hr IV Intermittent (08-22-22 @ 14:41)    polyethylene glycol 3350   17 Gram(s) Oral (08-22-22 @ 22:34)    senna   2 Tablet(s) Oral (08-22-22 @ 22:34)    tamsulosin   0.4 milliGRAM(s) Oral (08-22-22 @ 22:34)      Home Medications:  Last Order Reconciliation Date: 08-08-22 @ 06:30 (Admission Reconciliation)    PHYSICAL EXAM:   Vital Signs last 24 hours:  T(F): 98.4 (08-23-22 @ 05:56), Max: 99.5 (08-22-22 @ 21:00)  HR: 101 (08-23-22 @ 05:56) (100 - 108)  BP: 118/65 (08-23-22 @ 05:56) (118/65 - 159/78)  BP(mean): --  ABP: --  ABP(mean): --  RR: 18 (08-23-22 @ 05:56) (18 - 18)  SpO2: 96% (08-23-22 @ 05:56) (94% - 97%)    I&Os:    08-22-22 @ 07:01  -  08-23-22 @ 07:00  --------------------------------------------------------  IN:    IV PiggyBack: 350 mL    IV PiggyBack: 200 mL    Oral Fluid: 600 mL  Total IN: 1150 mL    OUT:    T-Tube (mL): 530 mL    Voided (mL): 850 mL  Total OUT: 1380 mL    Total NET: -230 mL        BMI (kg/m2): 29.3 (08-09-22 @ 14:59)  GENERAL: no acute distress  NEURO: alert  HEENT: NCAT, no conjunctival pallor appreciated  CHEST: no respiratory distress, no accessory muscle use  CARDIAC: regular rate, +S1/S2  ABDOMEN: perc rodney present, soft, mildly distended, nontender, no rebound or guarding  EXTREMITIES: warm, well perfused  SKIN: no lesions noted    DIAGNOSTICS:  WBC      Hg       PLT      Na       K        CO2     BUN      Cr       ALT      AST      TB       ALP  8.39     7.7      120      ------   ------   ------   ------   ------   ------   ------   ------   ------   08-23-22 @ 06:28  ------   ------   ------   133      3.7      27       28       1.21     13       34       3.4      77       08-23-22 @ 06:26  9.03     8.0      120      133      3.6      28       30       1.10     13       34       3.3      73       08-22-22 @ 06:53  11.23    8.1      118      ------   ------   ------   ------   ------   ------   ------   ------   ------   08-21-22 @ 07:05  ------   ------   ------   134      3.8      27       38       1.24     14       38       3.6      71       08-21-22 @ 06:59    PT             INR            MELDwith  MELDw/o  26.5           2.29           --             --             08-23-22 @ 06:30  25.8           2.23           --             --             08-22-22 @ 06:53  21.9           1.88           --             --             08-21-22 @ 07:05  21.7           1.86           --             --             08-19-22 @ 22:04  20.9           1.81           --             --             08-19-22 @ 04:21

## 2022-08-23 NOTE — PROGRESS NOTE ADULT - ASSESSMENT
66 year old male with decompensated cirrhosis and cholangitis s/p ERCP with stent placement (4/2022) s/p stent removal on 7/20 (along sphincterotomy and stone extraction) presenting for one week of RUQ abdominal pain associated with new abdominal distension and decreased appetite.     #Septic shock 2/2 acute cholecystitis vs ascending cholangitis  #E coli and Streptococcus anginosis secondary bacterial peritonitis  #Hemoperitoneum with free locules of air on CT imaging 8/18/2022  #ROBBIE required hemodialysis with renal recovery: initial urine sodium 9, likely indicating prerenal ROBBIE vs less likely HRS given initial presentation as above  #History of cholangitis s/p biliary stent placement April 2022 and removal 7/20/2022, now s/p perc rodney tube  #Decompensated ALD/PARNELL cirrhosis c/b ascites  EV: normal esophagus on ERCP from 4/2022  Ascites: increase in ascites from mild to moderate on current imaging  SBP: paracentesis 8/9/2022 c/w peritonitis given 99K PMNs however elevated LDH more indicative of secondary bacterial peritonitis  HE: none currently       -MELD-Na = 24 on 8/22/2022       -Ascites: yes       -SBP: paracentesis 8/9/2022 reveals likely secondary bacterial peritonitis from suspected gallbladder pathology       -Varices: no mention of varices on EGD/ERCP on 7/20/2022       -Hepatic encephalopathy: none, Ammonia, Serum: 38 umol/L [11 - 55] (08-12-22 @ 00:21)       -HCC: unknown       -LT candidacy and evaluation:            [ ] Psychosocial            [ ] Infectious            [x] Cardiology:                    Cardiac cath: n/a                   Stress test: negative noninvasive stress test on 8/19/2022 for inducible ischemia            [ ] Colonoscopy: needed, f/u records from outside GI colonoscopy reports            [x] Prostate: Prostate Specific Antigen: negative at 2.21 ng/mL on 8/12/2022            [x] Dental            [x] PT/Frailty    Recommendations:  -trend clinical symptoms, exam findings, vital signs, CBC, CMP, INR  -s/p percutaneous cholecystostomy as above  -likely secondary bacterial peritonitis based on paracentesis and clinical findings, CT A/P negative for perforation however performed without contrast 2/2 renal function; s/p repeat paracentesis 8/12/2022 and again 8/16/2022 with significant PMN count; appreciate ID recommendations  -liver transplantation evaluation opened  -PPI daily for stress prophylaxis  -appreciate transplant nephrology consultation, initiated hemodialysis 8/11/2022, now off HD with intermittent diuresis and renal recovery  -appreciate surgery recommendations regarding hemoperitoneum  -s/p IR tube check 8/22/2022  -plan for egd/colon vs virtual colonoscopy pending clinical course  -PT as able    Note incomplete until finalized by attending signature/attestation.    Fadi Churchill  GI/Hepatology Fellow    MONDAY-FRIDAY 8AM-5PM:  Pager# [phone] ([facility]) or [phone] ([facility])    NON-URGENT CONSULTS:  Please email [email or web address]  AT NIGHT AND ON WEEKENDS:  Contact on-call GI fellow via answering service ([phone]) from 5pm-8am and on weekends/holidays

## 2022-08-23 NOTE — CHART NOTE - NSCHARTNOTEFT_GEN_A_CORE
Gender: [x] male  [  ] female    Hand  Dynamometer (dominant hand): right [x] attempted   [  ] unable to attempt  attempt #1 (kg): 28   attempt #2 (kg): 28  attempt #3 (kg): 28    5 Chair Stands:  [x ] attempted   [] unable to attempt  time to complete (seconds): 42.78    3-Position Balance:  [ x ] attempted   [ ] unable to attempt  [id number] (0-10 seconds): 10  semi tandem (0-10 seconds): 10  tandem (0-10 seconds): 7    Comments:    LIVER FRAILTY INDEX SCORE: 4.83    Based on suggested cut-offs of the Liver Frailty Index™, a patient with this Liver Frailty Index™ score is considered:   [ x] Frail  [  ] Pre Frail  [  ] Robust    This Liver Frailty Index™ score falls within the 88 percentile of outpatients with cirrhosis who are listed for liver transplantation.

## 2022-08-24 LAB
ALBUMIN SERPL ELPH-MCNC: 2.6 G/DL — LOW (ref 3.3–5)
ALP SERPL-CCNC: 103 U/L — SIGNIFICANT CHANGE UP (ref 40–120)
ALT FLD-CCNC: 16 U/L — SIGNIFICANT CHANGE UP (ref 10–45)
ANION GAP SERPL CALC-SCNC: 11 MMOL/L — SIGNIFICANT CHANGE UP (ref 5–17)
APTT BLD: 32.5 SEC — SIGNIFICANT CHANGE UP (ref 27.5–35.5)
AST SERPL-CCNC: 43 U/L — HIGH (ref 10–40)
BASOPHILS # BLD AUTO: 0.04 K/UL — SIGNIFICANT CHANGE UP (ref 0–0.2)
BASOPHILS NFR BLD AUTO: 0.4 % — SIGNIFICANT CHANGE UP (ref 0–2)
BILIRUB SERPL-MCNC: 3.5 MG/DL — HIGH (ref 0.2–1.2)
BUN SERPL-MCNC: 22 MG/DL — SIGNIFICANT CHANGE UP (ref 7–23)
CALCIUM SERPL-MCNC: 8.3 MG/DL — LOW (ref 8.4–10.5)
CHLORIDE SERPL-SCNC: 97 MMOL/L — SIGNIFICANT CHANGE UP (ref 96–108)
CO2 SERPL-SCNC: 27 MMOL/L — SIGNIFICANT CHANGE UP (ref 22–31)
CREAT SERPL-MCNC: 1.08 MG/DL — SIGNIFICANT CHANGE UP (ref 0.5–1.3)
EGFR: 77 ML/MIN/1.73M2 — SIGNIFICANT CHANGE UP
EOSINOPHIL # BLD AUTO: 0.19 K/UL — SIGNIFICANT CHANGE UP (ref 0–0.5)
EOSINOPHIL NFR BLD AUTO: 1.8 % — SIGNIFICANT CHANGE UP (ref 0–6)
GLUCOSE SERPL-MCNC: 93 MG/DL — SIGNIFICANT CHANGE UP (ref 70–99)
HCT VFR BLD CALC: 25.7 % — LOW (ref 39–50)
HGB BLD-MCNC: 8.5 G/DL — LOW (ref 13–17)
IMM GRANULOCYTES NFR BLD AUTO: 0.5 % — SIGNIFICANT CHANGE UP (ref 0–1.5)
INR BLD: 2.06 RATIO — HIGH (ref 0.88–1.16)
LYMPHOCYTES # BLD AUTO: 2.11 K/UL — SIGNIFICANT CHANGE UP (ref 1–3.3)
LYMPHOCYTES # BLD AUTO: 20 % — SIGNIFICANT CHANGE UP (ref 13–44)
MAGNESIUM SERPL-MCNC: 1.6 MG/DL — SIGNIFICANT CHANGE UP (ref 1.6–2.6)
MCHC RBC-ENTMCNC: 31.3 PG — SIGNIFICANT CHANGE UP (ref 27–34)
MCHC RBC-ENTMCNC: 33.1 GM/DL — SIGNIFICANT CHANGE UP (ref 32–36)
MCV RBC AUTO: 94.5 FL — SIGNIFICANT CHANGE UP (ref 80–100)
MONOCYTES # BLD AUTO: 1.25 K/UL — HIGH (ref 0–0.9)
MONOCYTES NFR BLD AUTO: 11.8 % — SIGNIFICANT CHANGE UP (ref 2–14)
NEUTROPHILS # BLD AUTO: 6.93 K/UL — SIGNIFICANT CHANGE UP (ref 1.8–7.4)
NEUTROPHILS NFR BLD AUTO: 65.5 % — SIGNIFICANT CHANGE UP (ref 43–77)
NRBC # BLD: 0 /100 WBCS — SIGNIFICANT CHANGE UP (ref 0–0)
PHOSPHATE SERPL-MCNC: 2.4 MG/DL — LOW (ref 2.5–4.5)
PLATELET # BLD AUTO: 130 K/UL — LOW (ref 150–400)
POTASSIUM SERPL-MCNC: 3.2 MMOL/L — LOW (ref 3.5–5.3)
POTASSIUM SERPL-SCNC: 3.2 MMOL/L — LOW (ref 3.5–5.3)
PROT SERPL-MCNC: 7.2 G/DL — SIGNIFICANT CHANGE UP (ref 6–8.3)
PROTHROM AB SERPL-ACNC: 24.1 SEC — HIGH (ref 10.5–13.4)
RBC # BLD: 2.72 M/UL — LOW (ref 4.2–5.8)
RBC # FLD: 18.7 % — HIGH (ref 10.3–14.5)
SARS-COV-2 RNA SPEC QL NAA+PROBE: SIGNIFICANT CHANGE UP
SODIUM SERPL-SCNC: 135 MMOL/L — SIGNIFICANT CHANGE UP (ref 135–145)
WBC # BLD: 10.57 K/UL — HIGH (ref 3.8–10.5)
WBC # FLD AUTO: 10.57 K/UL — HIGH (ref 3.8–10.5)

## 2022-08-24 PROCEDURE — 99232 SBSQ HOSP IP/OBS MODERATE 35: CPT | Mod: GC

## 2022-08-24 PROCEDURE — 99231 SBSQ HOSP IP/OBS SF/LOW 25: CPT

## 2022-08-24 RX ORDER — MAGNESIUM SULFATE 500 MG/ML
2 VIAL (ML) INJECTION ONCE
Refills: 0 | Status: COMPLETED | OUTPATIENT
Start: 2022-08-24 | End: 2022-08-24

## 2022-08-24 RX ORDER — SPIRONOLACTONE 25 MG/1
100 TABLET, FILM COATED ORAL DAILY
Refills: 0 | Status: DISCONTINUED | OUTPATIENT
Start: 2022-08-25 | End: 2022-08-27

## 2022-08-24 RX ORDER — POTASSIUM CHLORIDE 20 MEQ
20 PACKET (EA) ORAL
Refills: 0 | Status: COMPLETED | OUTPATIENT
Start: 2022-08-24 | End: 2022-08-24

## 2022-08-24 RX ORDER — SPIRONOLACTONE 25 MG/1
100 TABLET, FILM COATED ORAL ONCE
Refills: 0 | Status: COMPLETED | OUTPATIENT
Start: 2022-08-24 | End: 2022-08-24

## 2022-08-24 RX ADMIN — CHLORHEXIDINE GLUCONATE 1 APPLICATION(S): 213 SOLUTION TOPICAL at 08:49

## 2022-08-24 RX ADMIN — AMPICILLIN SODIUM AND SULBACTAM SODIUM 200 GRAM(S): 250; 125 INJECTION, POWDER, FOR SUSPENSION INTRAMUSCULAR; INTRAVENOUS at 17:30

## 2022-08-24 RX ADMIN — PANTOPRAZOLE SODIUM 40 MILLIGRAM(S): 20 TABLET, DELAYED RELEASE ORAL at 08:49

## 2022-08-24 RX ADMIN — Medication 25 GRAM(S): at 11:26

## 2022-08-24 RX ADMIN — AMPICILLIN SODIUM AND SULBACTAM SODIUM 200 GRAM(S): 250; 125 INJECTION, POWDER, FOR SUSPENSION INTRAMUSCULAR; INTRAVENOUS at 13:16

## 2022-08-24 RX ADMIN — Medication 40 MILLIGRAM(S): at 17:31

## 2022-08-24 RX ADMIN — Medication 20 MILLIEQUIVALENT(S): at 13:29

## 2022-08-24 RX ADMIN — Medication 40 MILLIGRAM(S): at 05:23

## 2022-08-24 RX ADMIN — AMPICILLIN SODIUM AND SULBACTAM SODIUM 200 GRAM(S): 250; 125 INJECTION, POWDER, FOR SUSPENSION INTRAMUSCULAR; INTRAVENOUS at 05:23

## 2022-08-24 RX ADMIN — AMPICILLIN SODIUM AND SULBACTAM SODIUM 200 GRAM(S): 250; 125 INJECTION, POWDER, FOR SUSPENSION INTRAMUSCULAR; INTRAVENOUS at 23:24

## 2022-08-24 RX ADMIN — SPIRONOLACTONE 100 MILLIGRAM(S): 25 TABLET, FILM COATED ORAL at 13:16

## 2022-08-24 RX ADMIN — SENNA PLUS 2 TABLET(S): 8.6 TABLET ORAL at 21:42

## 2022-08-24 RX ADMIN — AMPICILLIN SODIUM AND SULBACTAM SODIUM 200 GRAM(S): 250; 125 INJECTION, POWDER, FOR SUSPENSION INTRAMUSCULAR; INTRAVENOUS at 00:50

## 2022-08-24 RX ADMIN — Medication 20 MILLIEQUIVALENT(S): at 11:26

## 2022-08-24 RX ADMIN — TAMSULOSIN HYDROCHLORIDE 0.4 MILLIGRAM(S): 0.4 CAPSULE ORAL at 21:42

## 2022-08-24 RX ADMIN — POLYETHYLENE GLYCOL 3350 17 GRAM(S): 17 POWDER, FOR SOLUTION ORAL at 17:31

## 2022-08-24 RX ADMIN — Medication 20 MILLIEQUIVALENT(S): at 17:32

## 2022-08-24 RX ADMIN — POLYETHYLENE GLYCOL 3350 17 GRAM(S): 17 POWDER, FOR SOLUTION ORAL at 05:23

## 2022-08-24 RX ADMIN — FLUCONAZOLE 100 MILLIGRAM(S): 150 TABLET ORAL at 17:30

## 2022-08-24 NOTE — PROGRESS NOTE ADULT - SUBJECTIVE AND OBJECTIVE BOX
Interval Events:   No acute events overnight.  Patient without acute symptoms at this time.    ROS:   12 point review of systems performed and negative except otherwise noted in HPI.    Hospital Medications:  ampicillin/sulbactam  IVPB 3 Gram(s) IV Intermittent every 6 hours  chlorhexidine 2% Cloths 1 Application(s) Topical <User Schedule>  fluconAZOLE IVPB 400 milliGRAM(s) IV Intermittent every 24 hours  furosemide   Injectable 40 milliGRAM(s) IV Push every 12 hours  pantoprazole    Tablet 40 milliGRAM(s) Oral before breakfast  penicillin   G benzathine Injectable 2.4 Million Unit(s) IntraMuscular <User Schedule>  polyethylene glycol 3350 17 Gram(s) Oral two times a day  potassium chloride    Tablet ER 20 milliEquivalent(s) Oral every 2 hours  senna 2 Tablet(s) Oral at bedtime  spironolactone 100 milliGRAM(s) Oral once  tamsulosin 0.4 milliGRAM(s) Oral at bedtime    MAR over past 24 hours:    ampicillin/sulbactam  IVPB   200 mL/Hr IV Intermittent (08-24-22 @ 05:23)   200 mL/Hr IV Intermittent (08-24-22 @ 00:50)   200 mL/Hr IV Intermittent (08-23-22 @ 18:25)   200 mL/Hr IV Intermittent (08-23-22 @ 13:06)    chlorhexidine 2% Cloths   1 Application(s) Topical (08-24-22 @ 08:49)    fluconAZOLE IVPB   100 mL/Hr IV Intermittent (08-23-22 @ 19:39)    furosemide   Injectable   40 milliGRAM(s) IV Push (08-24-22 @ 05:23)   40 milliGRAM(s) IV Push (08-23-22 @ 18:25)    magnesium sulfate  IVPB   25 mL/Hr IV Intermittent (08-24-22 @ 11:26)    pantoprazole    Tablet   40 milliGRAM(s) Oral (08-24-22 @ 08:49)    polyethylene glycol 3350   17 Gram(s) Oral (08-24-22 @ 05:23)   17 Gram(s) Oral (08-23-22 @ 21:51)   17 Gram(s) Oral (08-23-22 @ 13:07)    potassium chloride    Tablet ER   20 milliEquivalent(s) Oral (08-24-22 @ 11:26)    senna   2 Tablet(s) Oral (08-23-22 @ 21:51)    tamsulosin   0.4 milliGRAM(s) Oral (08-23-22 @ 21:51)      Home Medications:  Last Order Reconciliation Date: 08-08-22 @ 06:30 (Admission Reconciliation)    PHYSICAL EXAM:   Vital Signs last 24 hours:  T(F): 98.6 (08-24-22 @ 09:00), Max: 99 (08-24-22 @ 01:00)  HR: 107 (08-24-22 @ 09:00) (100 - 108)  BP: 156/84 (08-24-22 @ 09:00) (143/73 - 156/84)  BP(mean): --  ABP: --  ABP(mean): --  RR: 18 (08-24-22 @ 09:00) (18 - 18)  SpO2: 98% (08-24-22 @ 09:00) (95% - 98%)    I&Os:    08-23-22 @ 07:01  -  08-24-22 @ 07:00  --------------------------------------------------------  IN:    IV PiggyBack: 450 mL    Oral Fluid: 1080 mL  Total IN: 1530 mL    OUT:    T-Tube (mL): 300 mL    Voided (mL): 1100 mL  Total OUT: 1400 mL    Total NET: 130 mL      08-24-22 @ 07:01  -  08-24-22 @ 11:56  --------------------------------------------------------  IN:    Other (mL): 10 mL  Total IN: 10 mL    OUT:    T-Tube (mL): 180 mL    Voided (mL): 425 mL  Total OUT: 605 mL    Total NET: -595 mL        BMI (kg/m2): 29.3 (08-09-22 @ 14:59)  GENERAL: no acute distress  NEURO: alert  HEENT: NCAT, no conjunctival pallor appreciated  CHEST: no respiratory distress, no accessory muscle use  CARDIAC: regular rate, +S1/S2  ABDOMEN: perc rodney tube present, distended, nontender, no rebound or guarding  EXTREMITIES: warm, well perfused  SKIN: no lesions noted    DIAGNOSTICS:  WBC      Hg       PLT      Na       K        CO2     BUN      Cr       ALT      AST      TB       ALP  10.57    8.5      130      ------   ------   ------   ------   ------   ------   ------   ------   ------   08-24-22 @ 06:38  ------   ------   ------   135      3.2      27       22       1.08     16       43       3.5      103      08-24-22 @ 06:36  8.39     7.7      120      ------   ------   ------   ------   ------   ------   ------   ------   ------   08-23-22 @ 06:28  ------   ------   ------   133      3.7      27       28       1.21     13       34       3.4      77       08-23-22 @ 06:26  9.03     8.0      120      133      3.6      28       30       1.10     13       34       3.3      73       08-22-22 @ 06:53    PT             INR            MELDwith  MELDw/o  24.1           2.06           --             --             08-24-22 @ 06:36  26.5           2.29           --             --             08-23-22 @ 06:30  25.8           2.23           --             --             08-22-22 @ 06:53  21.9           1.88           --             --             08-21-22 @ 07:05  21.7           1.86           --             --             08-19-22 @ 22:04   Interval Events:   No acute events overnight.  Patient without acute symptoms at this time.    ROS:   12 point review of systems performed and negative except otherwise noted in HPI.    Hospital Medications:  ampicillin/sulbactam  IVPB 3 Gram(s) IV Intermittent every 6 hours  chlorhexidine 2% Cloths 1 Application(s) Topical <User Schedule>  fluconAZOLE IVPB 400 milliGRAM(s) IV Intermittent every 24 hours  furosemide   Injectable 40 milliGRAM(s) IV Push every 12 hours  pantoprazole    Tablet 40 milliGRAM(s) Oral before breakfast  penicillin   G benzathine Injectable 2.4 Million Unit(s) IntraMuscular <User Schedule>  polyethylene glycol 3350 17 Gram(s) Oral two times a day  potassium chloride    Tablet ER 20 milliEquivalent(s) Oral every 2 hours  senna 2 Tablet(s) Oral at bedtime  spironolactone 100 milliGRAM(s) Oral once  tamsulosin 0.4 milliGRAM(s) Oral at bedtime    MAR over past 24 hours:    ampicillin/sulbactam  IVPB   200 mL/Hr IV Intermittent (08-24-22 @ 05:23)   200 mL/Hr IV Intermittent (08-24-22 @ 00:50)   200 mL/Hr IV Intermittent (08-23-22 @ 18:25)   200 mL/Hr IV Intermittent (08-23-22 @ 13:06)    chlorhexidine 2% Cloths   1 Application(s) Topical (08-24-22 @ 08:49)    fluconAZOLE IVPB   100 mL/Hr IV Intermittent (08-23-22 @ 19:39)    furosemide   Injectable   40 milliGRAM(s) IV Push (08-24-22 @ 05:23)   40 milliGRAM(s) IV Push (08-23-22 @ 18:25)    magnesium sulfate  IVPB   25 mL/Hr IV Intermittent (08-24-22 @ 11:26)    pantoprazole    Tablet   40 milliGRAM(s) Oral (08-24-22 @ 08:49)    polyethylene glycol 3350   17 Gram(s) Oral (08-24-22 @ 05:23)   17 Gram(s) Oral (08-23-22 @ 21:51)   17 Gram(s) Oral (08-23-22 @ 13:07)    potassium chloride    Tablet ER   20 milliEquivalent(s) Oral (08-24-22 @ 11:26)    senna   2 Tablet(s) Oral (08-23-22 @ 21:51)    tamsulosin   0.4 milliGRAM(s) Oral (08-23-22 @ 21:51)      Home Medications:  Last Order Reconciliation Date: 08-08-22 @ 06:30 (Admission Reconciliation)    PHYSICAL EXAM:   Vital Signs last 24 hours:  T(F): 98.6 (08-24-22 @ 09:00), Max: 99 (08-24-22 @ 01:00)  HR: 107 (08-24-22 @ 09:00) (100 - 108)  BP: 156/84 (08-24-22 @ 09:00) (143/73 - 156/84)  BP(mean): --  ABP: --  ABP(mean): --  RR: 18 (08-24-22 @ 09:00) (18 - 18)  SpO2: 98% (08-24-22 @ 09:00) (95% - 98%)    I&Os:    08-23-22 @ 07:01  -  08-24-22 @ 07:00  --------------------------------------------------------  IN:    IV PiggyBack: 450 mL    Oral Fluid: 1080 mL  Total IN: 1530 mL    OUT:    T-Tube (mL): 300 mL    Voided (mL): 1100 mL  Total OUT: 1400 mL    Total NET: 130 mL      08-24-22 @ 07:01  -  08-24-22 @ 11:56  --------------------------------------------------------  IN:    Other (mL): 10 mL  Total IN: 10 mL    OUT:    T-Tube (mL): 180 mL    Voided (mL): 425 mL  Total OUT: 605 mL    Total NET: -595 mL        BMI (kg/m2): 29.3 (08-09-22 @ 14:59)  GENERAL: no acute distress  NEURO: alert, oriented x3, no asterixis  HEENT: NCAT, +scleral icterus  CHEST: no respiratory distress, no accessory muscle use  CARDIAC: regular rate, +S1/S2  ABDOMEN: perc rodney tube present with sanguinous output, distended, nontender, no rebound or guarding  EXTREMITIES: warm, well perfused  SKIN: no lesions noted, jaundiced    DIAGNOSTICS:  WBC      Hg       PLT      Na       K        CO2     BUN      Cr       ALT      AST      TB       ALP  10.57    8.5      130      ------   ------   ------   ------   ------   ------   ------   ------   ------   08-24-22 @ 06:38  ------   ------   ------   135      3.2      27       22       1.08     16       43       3.5      103      08-24-22 @ 06:36  8.39     7.7      120      ------   ------   ------   ------   ------   ------   ------   ------   ------   08-23-22 @ 06:28  ------   ------   ------   133      3.7      27       28       1.21     13       34       3.4      77       08-23-22 @ 06:26  9.03     8.0      120      133      3.6      28       30       1.10     13       34       3.3      73       08-22-22 @ 06:53    PT             INR            MELDwith  MELDw/o  24.1           2.06           --             --             08-24-22 @ 06:36  26.5           2.29           --             --             08-23-22 @ 06:30  25.8           2.23           --             --             08-22-22 @ 06:53  21.9           1.88           --             --             08-21-22 @ 07:05  21.7           1.86           --             --             08-19-22 @ 22:04

## 2022-08-24 NOTE — PROGRESS NOTE ADULT - ATTENDING COMMENTS
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 coli and Streptococcus anginosis, with hospital course also complicated by iatrogenic hemoperitoneum (seen on CT 8/18). He has clinically improved since initial presentation including hemodynamic stability no longer requiring ICU level of care and with midodrine discontinued today and with resolution of prior ROBBIE now undergoing IV diuresis for anasarca and ascites. Labs today notable for mild leukocytosis re-increased from prior. Last paracentesis (8/16) still with 47k neutrophils (after correction for ascitic fluid RBCs) and given tense ascites on exam and stable Hb/HCT after prior hemoperitoneum, will consult IR for repeat LVP. S/p ceftriaxone (8/8), s/p Zosyn (8/8-16), s/p vancomycin (8/10), and currently on Unasyn (8/16- ) and fluconazole (8/19- ) with Transplant ID following. Liver transplant evaluation is in progress and testing completed apart from colonoscopy versus CT colonography, also not yet with ID clearance to proceed with transplant. ABO O with current MELD-Na 21 (improving).    Laron Harper M.D., Ph.D.  Transplant Hepatology

## 2022-08-24 NOTE — PROGRESS NOTE ADULT - SUBJECTIVE AND OBJECTIVE BOX
Interventional Radiology Follow-Up Note.     Patient seen and examined @ bedside around 7am.     This is a 66y Male s/p cholecystostomy drain on 8/9 s/p multiple paracentesis c/b hemoperitoneum.   Now with bleeding out put from the drain.       Medication:     ampicillin/sulbactam  IVPB: (08-24)  fluconAZOLE IVPB: (08-23)  furosemide   Injectable: (08-24)  midodrine.: (08-23)  spironolactone: (08-24)  tamsulosin: (08-23)    Vitals:   T(F): 98.7, Max: 99 (01:00)  HR: 102  BP: 152/81  RR: 18  SpO2: 98%    Physical Exam:  General: Nontoxic, in NAD.  Abdomen: tense, Distended.    Drain Device: Drain intact attached to gravity drain bag. Draining SS fluid. Tubing with orange bilious output.  Flushed with 5cc NS w/o difficulty. Dressing clean, dry, intact.   24hr Drain output: 180cc    LABS:  WBC 10.57 / Hgb 8.5 / Hct 25.7 / Plt 130  Na -- / K -- / CO2 -- / Cl -- / BUN -- / Cr -- / Glucose --  ALT -- / AST -- / Alk Phos -- / Tbili --  Ptt -- / Pt -- / INR --      Assessment/Plan: 66y Male s/p cholecystostomy drain on 8/9 s/p multiple paracentesis c/b hemoperitoneum.      - Drain with SS out put. CT reviewed with Dr. Young , drain seems to be pulled back into the liver parenchyma. pigtail within the decompressed gallbladder lumen and draining well. Will plan for tube check and repositioning with anesthesia on 8/25.  - Will plan for diagnostic and therapeutic paracentesis as well.   - Flush drain with 5cc NS daily forward only; DO NOT aspirate  - Change dressing q3 days or when dressing is saturated.  -  Monitor h/h; transfuse as needed  - Trend vs/labs  - Continue global management per primary team  - Pt needs to be NPO past midnight.   - Needs a covid PCR.   - Follow up AM CBC, BMP, TEG and coags.  - D/w rocky.     Please call IR  [phone] with any questions, concerns, or issues regarding above.    Also available on Teams.

## 2022-08-24 NOTE — PROGRESS NOTE ADULT - ASSESSMENT
66 year old male with decompensated cirrhosis and cholangitis s/p ERCP with stent placement (4/2022) s/p stent removal on 7/20 (along sphincterotomy and stone extraction) presenting for one week of RUQ abdominal pain associated with new abdominal distension and decreased appetite.     #Septic shock 2/2 acute cholecystitis vs ascending cholangitis  #E coli and Streptococcus anginosis secondary bacterial peritonitis  #Hemoperitoneum with free locules of air on CT imaging 8/18/2022  #ROBBIE required hemodialysis with renal recovery: initial urine sodium 9, likely indicating prerenal ROBBIE vs less likely HRS given initial presentation as above  #History of cholangitis s/p biliary stent placement April 2022 and removal 7/20/2022, now s/p perc rodney tube  #Decompensated ALD/PARNELL cirrhosis c/b ascites  EV: normal esophagus on ERCP from 4/2022  Ascites: increase in ascites from mild to moderate on current imaging  SBP: paracentesis 8/9/2022 c/w peritonitis given 99K PMNs however elevated LDH more indicative of secondary bacterial peritonitis  HE: none currently       -MELD-Na = 22 on 8/24/2022       -Ascites: yes       -SBP: paracentesis 8/9/2022 reveals likely secondary bacterial peritonitis from suspected gallbladder pathology       -Varices: no mention of varices on EGD/ERCP on 7/20/2022       -Hepatic encephalopathy: none, Ammonia, Serum: 38 umol/L [11 - 55] (08-12-22 @ 00:21)       -HCC: unknown       -LT candidacy and evaluation:            [x] Psychosocial: cleared pending RPP            [ ] Infectious            [x] Cardiology:                    Cardiac cath: n/a                   Stress test: negative noninvasive stress test on 8/19/2022 for inducible ischemia            [ ] Colonoscopy: needed, f/u records from outside GI colonoscopy reports            [x] Prostate: Prostate Specific Antigen: negative at 2.21 ng/mL on 8/12/2022            [x] Dental            [x] PT/Frailty    Recommendations:  -trend clinical symptoms, exam findings, vital signs, CBC, CMP, INR  -s/p percutaneous cholecystostomy as above  -likely secondary bacterial peritonitis based on paracentesis and clinical findings, CT A/P negative for perforation however performed without contrast 2/2 renal function; s/p repeat paracentesis 8/12/2022 and again 8/16/2022 with significant PMN count; appreciate ID recommendations  -liver transplantation evaluation opened  -PPI daily for stress prophylaxis  -appreciate transplant nephrology consultation, initiated hemodialysis 8/11/2022, now off HD with intermittent diuresis and renal recovery; add spironolactone 100mg daily  -appreciate surgery recommendations regarding hemoperitoneum  -s/p IR tube check 8/22/2022  -given mild yet rising leukocytosis, plan for repeat paracentesis today  -plan for egd/colon vs virtual colonoscopy pending clinical course  -PT as able    Note incomplete until finalized by attending signature/attestation.    Fadi Churchill  GI/Hepatology Fellow    MONDAY-FRIDAY 8AM-5PM:  Pager# [phone] ([facility]) or [phone] ([facility])    NON-URGENT CONSULTS:  Please email [email or web address]  AT NIGHT AND ON WEEKENDS:  Contact on-call GI fellow via answering service ([phone]) from 5pm-8am and on weekends/holidays

## 2022-08-25 ENCOUNTER — RESULT REVIEW (OUTPATIENT)
Age: 67
End: 2022-08-25

## 2022-08-25 LAB
ALBUMIN FLD-MCNC: 1.8 G/DL — SIGNIFICANT CHANGE UP
ALBUMIN SERPL ELPH-MCNC: 2.4 G/DL — LOW (ref 3.3–5)
ALP SERPL-CCNC: 92 U/L — SIGNIFICANT CHANGE UP (ref 40–120)
ALT FLD-CCNC: 15 U/L — SIGNIFICANT CHANGE UP (ref 10–45)
ANION GAP SERPL CALC-SCNC: 9 MMOL/L — SIGNIFICANT CHANGE UP (ref 5–17)
APTT BLD: 34.9 SEC — SIGNIFICANT CHANGE UP (ref 27.5–35.5)
AST SERPL-CCNC: 45 U/L — HIGH (ref 10–40)
B PERT IGG+IGM PNL SER: ABNORMAL
BASOPHILS # BLD AUTO: 0.14 K/UL — SIGNIFICANT CHANGE UP (ref 0–0.2)
BASOPHILS NFR BLD AUTO: 1.7 % — SIGNIFICANT CHANGE UP (ref 0–2)
BILIRUB SERPL-MCNC: 3 MG/DL — HIGH (ref 0.2–1.2)
BLD GP AB SCN SERPL QL: NEGATIVE — SIGNIFICANT CHANGE UP
BUN SERPL-MCNC: 19 MG/DL — SIGNIFICANT CHANGE UP (ref 7–23)
CALCIUM SERPL-MCNC: 8.1 MG/DL — LOW (ref 8.4–10.5)
CHLORIDE SERPL-SCNC: 99 MMOL/L — SIGNIFICANT CHANGE UP (ref 96–108)
CO2 SERPL-SCNC: 27 MMOL/L — SIGNIFICANT CHANGE UP (ref 22–31)
COLOR FLD: SIGNIFICANT CHANGE UP
CREAT SERPL-MCNC: 0.98 MG/DL — SIGNIFICANT CHANGE UP (ref 0.5–1.3)
EGFR: 86 ML/MIN/1.73M2 — SIGNIFICANT CHANGE UP
EOSINOPHIL # BLD AUTO: 0.15 K/UL — SIGNIFICANT CHANGE UP (ref 0–0.5)
EOSINOPHIL NFR BLD AUTO: 1.8 % — SIGNIFICANT CHANGE UP (ref 0–6)
FLUID INTAKE SUBSTANCE CLASS: SIGNIFICANT CHANGE UP
GLUCOSE FLD-MCNC: 24 MG/DL — SIGNIFICANT CHANGE UP
GLUCOSE SERPL-MCNC: 87 MG/DL — SIGNIFICANT CHANGE UP (ref 70–99)
HCT VFR BLD CALC: 24.1 % — LOW (ref 39–50)
HGB BLD-MCNC: 7.7 G/DL — LOW (ref 13–17)
INR BLD: 2.23 RATIO — HIGH (ref 0.88–1.16)
LDH SERPL L TO P-CCNC: SIGNIFICANT CHANGE UP U/L
LYMPHOCYTES # BLD AUTO: 0 % — LOW (ref 13–44)
LYMPHOCYTES # BLD AUTO: 0 K/UL — LOW (ref 1–3.3)
MAGNESIUM SERPL-MCNC: 1.8 MG/DL — SIGNIFICANT CHANGE UP (ref 1.6–2.6)
MANUAL SMEAR VERIFICATION: SIGNIFICANT CHANGE UP
MCHC RBC-ENTMCNC: 30.8 PG — SIGNIFICANT CHANGE UP (ref 27–34)
MCHC RBC-ENTMCNC: 32 GM/DL — SIGNIFICANT CHANGE UP (ref 32–36)
MCV RBC AUTO: 96.4 FL — SIGNIFICANT CHANGE UP (ref 80–100)
MONOCYTES # BLD AUTO: 0.52 K/UL — SIGNIFICANT CHANGE UP (ref 0–0.9)
MONOCYTES NFR BLD AUTO: 6.1 % — SIGNIFICANT CHANGE UP (ref 2–14)
NEUTROPHILS # BLD AUTO: 7.53 K/UL — HIGH (ref 1.8–7.4)
NEUTROPHILS NFR BLD AUTO: 88.7 % — HIGH (ref 43–77)
NEUTROPHILS-BODY FLUID: SIGNIFICANT CHANGE UP
PHOSPHATE SERPL-MCNC: 2.4 MG/DL — LOW (ref 2.5–4.5)
PLAT MORPH BLD: NORMAL — SIGNIFICANT CHANGE UP
PLATELET # BLD AUTO: 86 K/UL — LOW (ref 150–400)
POTASSIUM SERPL-MCNC: 3.7 MMOL/L — SIGNIFICANT CHANGE UP (ref 3.5–5.3)
POTASSIUM SERPL-SCNC: 3.7 MMOL/L — SIGNIFICANT CHANGE UP (ref 3.5–5.3)
PROT FLD-MCNC: 4.4 G/DL — SIGNIFICANT CHANGE UP
PROT SERPL-MCNC: 6.6 G/DL — SIGNIFICANT CHANGE UP (ref 6–8.3)
PROTHROM AB SERPL-ACNC: 25.8 SEC — HIGH (ref 10.5–13.4)
RBC # BLD: 2.5 M/UL — LOW (ref 4.2–5.8)
RBC # FLD: 18.7 % — HIGH (ref 10.3–14.5)
RBC BLD AUTO: SIGNIFICANT CHANGE UP
RCV VOL RI: HIGH /UL (ref 0–0)
RH IG SCN BLD-IMP: POSITIVE — SIGNIFICANT CHANGE UP
SODIUM SERPL-SCNC: 135 MMOL/L — SIGNIFICANT CHANGE UP (ref 135–145)
TOTAL NUCLEATED CELL COUNT, BODY FLUID: SIGNIFICANT CHANGE UP /UL
TUBE TYPE: SIGNIFICANT CHANGE UP
VARIANT LYMPHS # BLD: 1.7 % — SIGNIFICANT CHANGE UP (ref 0–6)
WBC # BLD: 8.49 K/UL — SIGNIFICANT CHANGE UP (ref 3.8–10.5)
WBC # FLD AUTO: 8.49 K/UL — SIGNIFICANT CHANGE UP (ref 3.8–10.5)

## 2022-08-25 PROCEDURE — 99232 SBSQ HOSP IP/OBS MODERATE 35: CPT | Mod: GC

## 2022-08-25 PROCEDURE — 88305 TISSUE EXAM BY PATHOLOGIST: CPT | Mod: 26

## 2022-08-25 PROCEDURE — 49083 ABD PARACENTESIS W/IMAGING: CPT

## 2022-08-25 PROCEDURE — 49082 ABD PARACENTESIS: CPT

## 2022-08-25 PROCEDURE — 88112 CYTOPATH CELL ENHANCE TECH: CPT | Mod: 26

## 2022-08-25 RX ORDER — ALBUMIN HUMAN 25 %
50 VIAL (ML) INTRAVENOUS
Refills: 0 | Status: DISCONTINUED | OUTPATIENT
Start: 2022-08-25 | End: 2022-08-27

## 2022-08-25 RX ADMIN — AMPICILLIN SODIUM AND SULBACTAM SODIUM 200 GRAM(S): 250; 125 INJECTION, POWDER, FOR SUSPENSION INTRAMUSCULAR; INTRAVENOUS at 05:27

## 2022-08-25 RX ADMIN — SENNA PLUS 2 TABLET(S): 8.6 TABLET ORAL at 21:56

## 2022-08-25 RX ADMIN — AMPICILLIN SODIUM AND SULBACTAM SODIUM 200 GRAM(S): 250; 125 INJECTION, POWDER, FOR SUSPENSION INTRAMUSCULAR; INTRAVENOUS at 17:47

## 2022-08-25 RX ADMIN — POLYETHYLENE GLYCOL 3350 17 GRAM(S): 17 POWDER, FOR SOLUTION ORAL at 05:27

## 2022-08-25 RX ADMIN — PANTOPRAZOLE SODIUM 40 MILLIGRAM(S): 20 TABLET, DELAYED RELEASE ORAL at 11:43

## 2022-08-25 RX ADMIN — PENICILLIN G BENZATHINE 2.4 MILLION UNIT(S): 1200000 INJECTION, SUSPENSION INTRAMUSCULAR at 11:43

## 2022-08-25 RX ADMIN — SPIRONOLACTONE 100 MILLIGRAM(S): 25 TABLET, FILM COATED ORAL at 05:27

## 2022-08-25 RX ADMIN — FLUCONAZOLE 100 MILLIGRAM(S): 150 TABLET ORAL at 17:45

## 2022-08-25 RX ADMIN — Medication 40 MILLIGRAM(S): at 17:43

## 2022-08-25 RX ADMIN — CHLORHEXIDINE GLUCONATE 1 APPLICATION(S): 213 SOLUTION TOPICAL at 08:43

## 2022-08-25 RX ADMIN — TAMSULOSIN HYDROCHLORIDE 0.4 MILLIGRAM(S): 0.4 CAPSULE ORAL at 21:56

## 2022-08-25 RX ADMIN — AMPICILLIN SODIUM AND SULBACTAM SODIUM 200 GRAM(S): 250; 125 INJECTION, POWDER, FOR SUSPENSION INTRAMUSCULAR; INTRAVENOUS at 11:43

## 2022-08-25 RX ADMIN — Medication 40 MILLIGRAM(S): at 05:27

## 2022-08-25 NOTE — PRE-OP CHECKLIST - SELECT TESTS ORDERED
BMP/CBC/PT/PTT/INR/Type and Screen/Urinalysis
Type and Screen/COVID-19
BMP/CBC/PT/PTT/INR/Hepatic Function

## 2022-08-25 NOTE — PROGRESS NOTE ADULT - SUBJECTIVE AND OBJECTIVE BOX
Interval Events:   No acute events overnight.  Patient without acute symptoms at this time.    ROS:   12 point review of systems performed and negative except otherwise noted in HPI.    Hospital Medications:  ampicillin/sulbactam  IVPB 3 Gram(s) IV Intermittent every 6 hours  chlorhexidine 2% Cloths 1 Application(s) Topical <User Schedule>  fluconAZOLE IVPB 400 milliGRAM(s) IV Intermittent every 24 hours  furosemide   Injectable 40 milliGRAM(s) IV Push every 12 hours  pantoprazole    Tablet 40 milliGRAM(s) Oral before breakfast  penicillin   G benzathine Injectable 2.4 Million Unit(s) IntraMuscular <User Schedule>  polyethylene glycol 3350 17 Gram(s) Oral two times a day  senna 2 Tablet(s) Oral at bedtime  spironolactone 100 milliGRAM(s) Oral daily  tamsulosin 0.4 milliGRAM(s) Oral at bedtime    MAR over past 24 hours:    ampicillin/sulbactam  IVPB   200 mL/Hr IV Intermittent (08-25-22 @ 05:27)   200 mL/Hr IV Intermittent (08-24-22 @ 23:24)   200 mL/Hr IV Intermittent (08-24-22 @ 17:30)   200 mL/Hr IV Intermittent (08-24-22 @ 13:16)    chlorhexidine 2% Cloths   1 Application(s) Topical (08-25-22 @ 08:43)    fluconAZOLE IVPB   100 mL/Hr IV Intermittent (08-24-22 @ 17:30)    furosemide   Injectable   40 milliGRAM(s) IV Push (08-25-22 @ 05:27)   40 milliGRAM(s) IV Push (08-24-22 @ 17:31)    magnesium sulfate  IVPB   25 mL/Hr IV Intermittent (08-24-22 @ 11:26)    polyethylene glycol 3350   17 Gram(s) Oral (08-25-22 @ 05:27)   17 Gram(s) Oral (08-24-22 @ 17:31)    potassium chloride    Tablet ER   20 milliEquivalent(s) Oral (08-24-22 @ 17:32)   20 milliEquivalent(s) Oral (08-24-22 @ 13:29)   20 milliEquivalent(s) Oral (08-24-22 @ 11:26)    senna   2 Tablet(s) Oral (08-24-22 @ 21:42)    spironolactone   100 milliGRAM(s) Oral (08-24-22 @ 13:16)    spironolactone   100 milliGRAM(s) Oral (08-25-22 @ 05:27)    tamsulosin   0.4 milliGRAM(s) Oral (08-24-22 @ 21:42)      Home Medications:  Last Order Reconciliation Date: 08-08-22 @ 06:30 (Admission Reconciliation)    PHYSICAL EXAM:   Vital Signs last 24 hours:  T(F): 98.7 (08-25-22 @ 09:00), Max: 99.1 (08-24-22 @ 17:00)  HR: 102 (08-25-22 @ 09:00) (102 - 110)  BP: 138/73 (08-25-22 @ 09:00) (138/73 - 157/87)  BP(mean): --  ABP: --  ABP(mean): --  RR: 18 (08-25-22 @ 09:00) (18 - 18)  SpO2: 97% (08-25-22 @ 09:00) (94% - 98%)    I&Os:    08-24-22 @ 07:01  -  08-25-22 @ 07:00  --------------------------------------------------------  IN:    IV PiggyBack: 50 mL    IV PiggyBack: 100 mL    Oral Fluid: 670 mL    Other (mL): 10 mL  Total IN: 830 mL    OUT:    T-Tube (mL): 515 mL    Voided (mL): 1155 mL  Total OUT: 1670 mL    Total NET: -840 mL      08-25-22 @ 07:01  -  08-25-22 @ 09:56  --------------------------------------------------------  IN:    Other (mL): 10 mL  Total IN: 10 mL    OUT:    Oral Fluid: 0 mL    T-Tube (mL): 60 mL    Voided (mL): 0 mL  Total OUT: 60 mL    Total NET: -50 mL        BMI (kg/m2): 29.3 (08-09-22 @ 14:59)  GENERAL: no acute distress  NEURO: alert  HEENT: NCAT, no conjunctival pallor appreciated  CHEST: no respiratory distress, no accessory muscle use  CARDIAC: regular rate, +S1/S2  ABDOMEN: soft, distended, nontender, no rebound or guarding  EXTREMITIES: warm, well perfused  SKIN: no lesions noted    DIAGNOSTICS:  WBC      Hg       PLT      Na       K        CO2     BUN      Cr       ALT      AST      TB       ALP  ------   ------   ------   135      3.7      27       19       0.98     15       45       3.0      92       08-25-22 @ 06:53  8.49     7.7      86       ------   ------   ------   ------   ------   ------   ------   ------   ------   08-25-22 @ 06:51  10.57    8.5      130      ------   ------   ------   ------   ------   ------   ------   ------   ------   08-24-22 @ 06:38  ------   ------   ------   135      3.2      27       22       1.08     16       43       3.5      103      08-24-22 @ 06:36  8.39     7.7      120      ------   ------   ------   ------   ------   ------   ------   ------   ------   08-23-22 @ 06:28    PT             INR            MELDwith  MELDw/o  25.8           2.23           --             --             08-25-22 @ 06:51  24.1           2.06           --             --             08-24-22 @ 06:36  26.5           2.29           --             --             08-23-22 @ 06:30  25.8           2.23           --             --             08-22-22 @ 06:53  21.9           1.88           --             --             08-21-22 @ 07:05

## 2022-08-25 NOTE — CHART NOTE - NSCHARTNOTEFT_GEN_A_CORE
further review of CT Scan, will defer rodney tube check at this time due to high risk of loosing access. Pt doesn't need to be NPO.   Will plan for paracentesis today.  D/w Dr. Weistein and Kovalic.

## 2022-08-25 NOTE — PROGRESS NOTE ADULT - ATTENDING COMMENTS
65 y/o M w/ decompensated ETOH + PARNELL cirrhosis, hx of cholangitis s/p ERCP with stent placement (4/2022) and removal on 7/20/22 w/ stone extraction here with worsening ascites, RUQ abdominal pain found to be in septic shock suspected due to acute cholecystitis, ecoli/strep anginosis secondary bacterial peritonitis.    perc tube check by IR 8/22/22 > in gallbladder but probably has a connection to peritoneum  meld score in the low to mid 20s  will probably need prolonged IV abx on Unasyn for peritonitis   repeat paracentesis today to assess cell count in peritoneal fluid  ID clearance and colon exam needed for transplant eval  plan on discussing Friday at listing meeting, will hold off on colonoscopy for now due to the perc rodney tube  seems like a good social candidate for transplant. last drink in April 202

## 2022-08-25 NOTE — PRE PROCEDURE NOTE - PRE PROCEDURE EVALUATION
Interventional Radiology Pre-Procedure Note    This is a 66y Male who presents with cirrhosis and large volume ascites. s/p dx&tx para 8/12 1.5L removed.  IR reconsulted 8/16 for repeat Para, increasing leukocytosis, reqeusting  diag/therapeutic to assess SBP response to antibiotics    Procedure: diagnostic and therapeutic paracentesis    Diagnosis/Indication: Patient is a 66y old  Male who presents with a chief complaint of Ascending cholangitis (16 Aug 2022 11:24)      PAST MEDICAL & SURGICAL HISTORY:  H/O acute cholangitis      2019 novel coronavirus disease (COVID-19)  12/24/2021  no hospitalization, no treatment      S/P ERCP  4/2022      H/O colonoscopy           Allergies: No Known Allergies      LABS:                        7.6    30.74 )-----------( 45       ( 15 Aug 2022 23:26 )             21.9     08-15    131<L>  |  93<L>  |  45<H>  ----------------------------<  157<H>  3.6   |  25  |  2.22<H>    Ca    8.4      15 Aug 2022 23:26  Phos  3.6     08-15  Mg     2.0     08-15    TPro  5.6<L>  /  Alb  2.7<L>  /  TBili  3.5<H>  /  DBili  1.3<H>  /  AST  29  /  ALT  15  /  AlkPhos  79  08-15    PT/INR - ( 15 Aug 2022 23:26 )   PT: 25.9 sec;   INR: 2.21 ratio         PTT - ( 15 Aug 2022 23:26 )  PTT:32.8 sec    Procedure/ risks/ benefits were explained, informed consent obtained from patient, verbalizes understanding. 
Interventional Radiology    HPI: 66 year old male with decompensated cirrhosis and cholangitis with recurrent ascites here for paracentesis.         Allergies:   Medications (Abx/Cardiac/Anticoagulation/Blood Products)  ampicillin/sulbactam  IVPB: 200 mL/Hr IV Intermittent (08-25 @ 11:43)  fluconAZOLE IVPB: 100 mL/Hr IV Intermittent (08-24 @ 17:30)  furosemide   Injectable: 40 milliGRAM(s) IV Push (08-25 @ 05:27)  penicillin   G benzathine Injectable: 2.4 Million Unit(s) IntraMuscular (08-25 @ 11:43)  spironolactone: 100 milliGRAM(s) Oral (08-24 @ 13:16)  spironolactone: 100 milliGRAM(s) Oral (08-25 @ 05:27)  tamsulosin: 0.4 milliGRAM(s) Oral (08-24 @ 21:42)    Data:    T(C): 37.4  HR: 100  BP: 138/78  RR: 18  SpO2: 94%    Acute cholecystitis    Handoff    MEWS Score    No pertinent past medical history    H/O acute cholangitis    2019 novel coronavirus disease (COVID-19)    Adjustment disorder    Acute cholecystitis    ROBBIE (acute kidney injury)    Hyponatremia    Metabolic acidosis    No significant past surgical history    S/P ERCP    H/O colonoscopy    RUQ PAIN    12    Ascites    SysAdmin_VisitLink        Exam  General: No acute distress  Chest: Non labored breathing    -WBC 8.49 / HgB 7.7 / Hct 24.1 / Plt 86  -Na 135 / Cl 99 / BUN 19 / Glucose 87  -K 3.7 / CO2 27 / Cr 0.98  -ALT 15 / Alk Phos 92 / T.Bili 3.0  -INR2.23    Imaging:     Plan: 66y Male presents for Paracentesis.  -Risks/Benefits/alternatives explained with the patient  and witnessed informed consent obtained.   
Interventional Radiology    HPI: 67yo M with history of recently diagnosed cirrhosis (MELD 30) and cholangitis s/p ERCP with stent placement (4/2022) s/p stent removal on 7/20 presenting for one week of RUQ abdominal pain with clinical exam, labs and radiographic findings meeting [place] Criteria grade III for severe acute cholangitis. CT imaging demonstrating wall thickened GB with pericholecystic fluid and cystic duct stone. Patient presents to IR for Percutaneous cholecystostomy and paracentesis.     Allergies:   Medications (Abx/Cardiac/Anticoagulation/Blood Products)    buMETAnide Injectable: 2 milliGRAM(s) IV Push (08-09 @ 14:43)  cefTRIAXone   IVPB: 100 mL/Hr IV Intermittent (08-08 @ 04:32)  piperacillin/tazobactam IVPB..: 25 mL/Hr IV Intermittent (08-09 @ 09:53)  piperacillin/tazobactam IVPB..: 25 mL/Hr IV Intermittent (08-09 @ 02:05)  piperacillin/tazobactam IVPB...: 200 mL/Hr IV Intermittent (08-08 @ 05:11)    Data:  157.5  72.6  T(C): 36  HR: 101  BP: 117/56  RR: 22  SpO2: 93%    Exam  General: No acute distress  Chest: Non labored breathing    -WBC 18.21 / HgB 10.0 / Hct 28.3 / Plt 144  -Na 125 / Cl 86 / BUN 65 / Glucose 92  -K 4.9 / CO2 24 / Cr 3.51  -ALT 22 / Alk Phos 95 / T.Bili 3.1  -INR2.06    Plan: 66y Male presents for Percutaneous cholecystostomy and paracentesis  -Risks/Benefits/alternatives explained with the patient and/or healthcare proxy and witnessed informed consent obtained. 2 separate consents were obtained for each procedure.

## 2022-08-25 NOTE — PROGRESS NOTE ADULT - ASSESSMENT
66 year old male with decompensated cirrhosis and cholangitis s/p ERCP with stent placement (4/2022) s/p stent removal on 7/20 (along sphincterotomy and stone extraction) presenting for one week of RUQ abdominal pain associated with new abdominal distension and decreased appetite.     #Septic shock 2/2 acute cholecystitis vs ascending cholangitis  #E coli and Streptococcus anginosis secondary bacterial peritonitis  #Hemoperitoneum with free locules of air on CT imaging 8/18/2022  #ROBBIE required hemodialysis with renal recovery: initial urine sodium 9, likely indicating prerenal ROBBIE vs less likely HRS given initial presentation as above  #History of cholangitis s/p biliary stent placement April 2022 and removal 7/20/2022, now s/p perc rodney tube  #Decompensated ALD/PARNELL cirrhosis c/b ascites  EV: normal esophagus on ERCP from 4/2022  Ascites: increase in ascites from mild to moderate on current imaging  SBP: paracentesis 8/9/2022 c/w peritonitis given 99K PMNs however elevated LDH more indicative of secondary bacterial peritonitis  HE: none currently       -MELD-Na = 22 on 8/24/2022       -Ascites: yes       -SBP: paracentesis 8/9/2022 reveals likely secondary bacterial peritonitis from suspected gallbladder pathology       -Varices: no mention of varices on EGD/ERCP on 7/20/2022       -Hepatic encephalopathy: none, Ammonia, Serum: 38 umol/L [11 - 55] (08-12-22 @ 00:21)       -HCC: unknown       -LT candidacy and evaluation:            [x] Psychosocial: cleared pending RPP            [ ] Infectious            [x] Cardiology:                    Cardiac cath: n/a                   Stress test: negative noninvasive stress test on 8/19/2022 for inducible ischemia            [ ] Colonoscopy: needed, f/u records from outside GI colonoscopy reports            [x] Prostate: Prostate Specific Antigen: negative at 2.21 ng/mL on 8/12/2022            [x] Dental            [x] PT/Frailty    Recommendations:  -trend clinical symptoms, exam findings, vital signs, CBC, CMP, INR  -s/p percutaneous cholecystostomy as above  -likely secondary bacterial peritonitis based on paracentesis and clinical findings, CT A/P negative for perforation however performed without contrast 2/2 renal function; s/p repeat paracentesis 8/12/2022 and again 8/16/2022 with significant PMN count; appreciate ID recommendations  -liver transplantation evaluation opened  -PPI daily for stress prophylaxis  -appreciate transplant nephrology consultation, initiated hemodialysis 8/11/2022, now off HD with intermittent diuresis and renal recovery; add spironolactone 100mg daily  -appreciate surgery recommendations regarding hemoperitoneum  -s/p IR tube check 8/22/2022  -plan for repeat paracentesis today  -appreciate ID recommendations regarding ABx duration  -plan for egd/colon vs virtual colonoscopy pending clinical course  -PT as able    Note incomplete until finalized by attending signature/attestation.    Fadi Churchill  GI/Hepatology Fellow    MONDAY-FRIDAY 8AM-5PM:  Pager# [phone] ([facility]) or [phone] ([facility])    NON-URGENT CONSULTS:  Please email [email or web address]  AT NIGHT AND ON WEEKENDS:  Contact on-call GI fellow via answering service ([phone]) from 5pm-8am and on weekends/holidays

## 2022-08-26 ENCOUNTER — NON-APPOINTMENT (OUTPATIENT)
Age: 67
End: 2022-08-26

## 2022-08-26 LAB
ALBUMIN SERPL ELPH-MCNC: 2.1 G/DL — LOW (ref 3.3–5)
ALP SERPL-CCNC: 83 U/L — SIGNIFICANT CHANGE UP (ref 40–120)
ALT FLD-CCNC: 14 U/L — SIGNIFICANT CHANGE UP (ref 10–45)
ANION GAP SERPL CALC-SCNC: 5 MMOL/L — SIGNIFICANT CHANGE UP (ref 5–17)
APTT BLD: 32.4 SEC — SIGNIFICANT CHANGE UP (ref 27.5–35.5)
AST SERPL-CCNC: 37 U/L — SIGNIFICANT CHANGE UP (ref 10–40)
BASOPHILS # BLD AUTO: 0.04 K/UL — SIGNIFICANT CHANGE UP (ref 0–0.2)
BASOPHILS NFR BLD AUTO: 0.6 % — SIGNIFICANT CHANGE UP (ref 0–2)
BILIRUB SERPL-MCNC: 2.8 MG/DL — HIGH (ref 0.2–1.2)
BUN SERPL-MCNC: 16 MG/DL — SIGNIFICANT CHANGE UP (ref 7–23)
CALCIUM SERPL-MCNC: 8 MG/DL — LOW (ref 8.4–10.5)
CHLORIDE SERPL-SCNC: 101 MMOL/L — SIGNIFICANT CHANGE UP (ref 96–108)
CO2 SERPL-SCNC: 28 MMOL/L — SIGNIFICANT CHANGE UP (ref 22–31)
CREAT SERPL-MCNC: 0.95 MG/DL — SIGNIFICANT CHANGE UP (ref 0.5–1.3)
EGFR: 89 ML/MIN/1.73M2 — SIGNIFICANT CHANGE UP
EOSINOPHIL # BLD AUTO: 0.22 K/UL — SIGNIFICANT CHANGE UP (ref 0–0.5)
EOSINOPHIL NFR BLD AUTO: 3.2 % — SIGNIFICANT CHANGE UP (ref 0–6)
GLUCOSE SERPL-MCNC: 92 MG/DL — SIGNIFICANT CHANGE UP (ref 70–99)
GRAM STN FLD: SIGNIFICANT CHANGE UP
HCT VFR BLD CALC: 23 % — LOW (ref 39–50)
HGB BLD-MCNC: 7.4 G/DL — LOW (ref 13–17)
IMM GRANULOCYTES NFR BLD AUTO: 0.6 % — SIGNIFICANT CHANGE UP (ref 0–1.5)
INR BLD: 2.35 RATIO — HIGH (ref 0.88–1.16)
LYMPHOCYTES # BLD AUTO: 1.32 K/UL — SIGNIFICANT CHANGE UP (ref 1–3.3)
LYMPHOCYTES # BLD AUTO: 19.1 % — SIGNIFICANT CHANGE UP (ref 13–44)
MAGNESIUM SERPL-MCNC: 1.6 MG/DL — SIGNIFICANT CHANGE UP (ref 1.6–2.6)
MCHC RBC-ENTMCNC: 31.1 PG — SIGNIFICANT CHANGE UP (ref 27–34)
MCHC RBC-ENTMCNC: 32.2 GM/DL — SIGNIFICANT CHANGE UP (ref 32–36)
MCV RBC AUTO: 96.6 FL — SIGNIFICANT CHANGE UP (ref 80–100)
MONOCYTES # BLD AUTO: 1.01 K/UL — HIGH (ref 0–0.9)
MONOCYTES NFR BLD AUTO: 14.6 % — HIGH (ref 2–14)
NEUTROPHILS # BLD AUTO: 4.29 K/UL — SIGNIFICANT CHANGE UP (ref 1.8–7.4)
NEUTROPHILS NFR BLD AUTO: 61.9 % — SIGNIFICANT CHANGE UP (ref 43–77)
NRBC # BLD: 0 /100 WBCS — SIGNIFICANT CHANGE UP (ref 0–0)
PHOSPHATE SERPL-MCNC: 2.6 MG/DL — SIGNIFICANT CHANGE UP (ref 2.5–4.5)
PLATELET # BLD AUTO: 60 K/UL — LOW (ref 150–400)
POTASSIUM SERPL-MCNC: 3.4 MMOL/L — LOW (ref 3.5–5.3)
POTASSIUM SERPL-SCNC: 3.4 MMOL/L — LOW (ref 3.5–5.3)
PROT SERPL-MCNC: 6 G/DL — SIGNIFICANT CHANGE UP (ref 6–8.3)
PROTHROM AB SERPL-ACNC: 27.3 SEC — HIGH (ref 10.5–13.4)
RBC # BLD: 2.38 M/UL — LOW (ref 4.2–5.8)
RBC # FLD: 18.6 % — HIGH (ref 10.3–14.5)
SODIUM SERPL-SCNC: 134 MMOL/L — LOW (ref 135–145)
SPECIMEN SOURCE: SIGNIFICANT CHANGE UP
WBC # BLD: 6.92 K/UL — SIGNIFICANT CHANGE UP (ref 3.8–10.5)
WBC # FLD AUTO: 6.92 K/UL — SIGNIFICANT CHANGE UP (ref 3.8–10.5)

## 2022-08-26 PROCEDURE — 99232 SBSQ HOSP IP/OBS MODERATE 35: CPT

## 2022-08-26 PROCEDURE — 74176 CT ABD & PELVIS W/O CONTRAST: CPT | Mod: 26

## 2022-08-26 RX ORDER — MAGNESIUM OXIDE 400 MG ORAL TABLET 241.3 MG
400 TABLET ORAL
Refills: 0 | Status: DISCONTINUED | OUTPATIENT
Start: 2022-08-26 | End: 2022-09-01

## 2022-08-26 RX ORDER — FUROSEMIDE 40 MG
40 TABLET ORAL
Refills: 0 | Status: DISCONTINUED | OUTPATIENT
Start: 2022-08-26 | End: 2022-08-27

## 2022-08-26 RX ORDER — MAGNESIUM SULFATE 500 MG/ML
2 VIAL (ML) INJECTION ONCE
Refills: 0 | Status: COMPLETED | OUTPATIENT
Start: 2022-08-26 | End: 2022-08-26

## 2022-08-26 RX ORDER — POTASSIUM CHLORIDE 20 MEQ
40 PACKET (EA) ORAL ONCE
Refills: 0 | Status: COMPLETED | OUTPATIENT
Start: 2022-08-26 | End: 2022-08-26

## 2022-08-26 RX ADMIN — CHLORHEXIDINE GLUCONATE 1 APPLICATION(S): 213 SOLUTION TOPICAL at 09:22

## 2022-08-26 RX ADMIN — SENNA PLUS 2 TABLET(S): 8.6 TABLET ORAL at 21:18

## 2022-08-26 RX ADMIN — AMPICILLIN SODIUM AND SULBACTAM SODIUM 200 GRAM(S): 250; 125 INJECTION, POWDER, FOR SUSPENSION INTRAMUSCULAR; INTRAVENOUS at 17:13

## 2022-08-26 RX ADMIN — POLYETHYLENE GLYCOL 3350 17 GRAM(S): 17 POWDER, FOR SOLUTION ORAL at 05:30

## 2022-08-26 RX ADMIN — AMPICILLIN SODIUM AND SULBACTAM SODIUM 200 GRAM(S): 250; 125 INJECTION, POWDER, FOR SUSPENSION INTRAMUSCULAR; INTRAVENOUS at 12:55

## 2022-08-26 RX ADMIN — PANTOPRAZOLE SODIUM 40 MILLIGRAM(S): 20 TABLET, DELAYED RELEASE ORAL at 09:25

## 2022-08-26 RX ADMIN — AMPICILLIN SODIUM AND SULBACTAM SODIUM 200 GRAM(S): 250; 125 INJECTION, POWDER, FOR SUSPENSION INTRAMUSCULAR; INTRAVENOUS at 00:08

## 2022-08-26 RX ADMIN — Medication 25 GRAM(S): at 13:35

## 2022-08-26 RX ADMIN — FLUCONAZOLE 100 MILLIGRAM(S): 150 TABLET ORAL at 19:29

## 2022-08-26 RX ADMIN — AMPICILLIN SODIUM AND SULBACTAM SODIUM 200 GRAM(S): 250; 125 INJECTION, POWDER, FOR SUSPENSION INTRAMUSCULAR; INTRAVENOUS at 05:30

## 2022-08-26 RX ADMIN — TAMSULOSIN HYDROCHLORIDE 0.4 MILLIGRAM(S): 0.4 CAPSULE ORAL at 21:18

## 2022-08-26 RX ADMIN — MAGNESIUM OXIDE 400 MG ORAL TABLET 400 MILLIGRAM(S): 241.3 TABLET ORAL at 21:18

## 2022-08-26 RX ADMIN — Medication 40 MILLIGRAM(S): at 13:35

## 2022-08-26 RX ADMIN — Medication 40 MILLIGRAM(S): at 05:30

## 2022-08-26 RX ADMIN — SPIRONOLACTONE 100 MILLIGRAM(S): 25 TABLET, FILM COATED ORAL at 05:30

## 2022-08-26 RX ADMIN — Medication 40 MILLIEQUIVALENT(S): at 22:08

## 2022-08-26 NOTE — PROGRESS NOTE ADULT - SUBJECTIVE AND OBJECTIVE BOX
Follow Up:      Interval History:    REVIEW OF SYSTEMS  [  ] ROS unobtainable because:    [  ] All other systems negative except as noted below    Constitutional:  [ ] fever [ ] chills  [ ] weight loss  [ ] weakness  Skin:  [ ] rash [ ] phlebitis	  Eyes: [ ] icterus [ ] pain  [ ] discharge	  ENMT: [ ] sore throat  [ ] thrush [ ] ulcers [ ] exudates  Respiratory: [ ] dyspnea [ ] hemoptysis [ ] cough [ ] sputum	  Cardiovascular:  [ ] chest pain [ ] palpitations [ ] edema	  Gastrointestinal:  [ ] nausea [ ] vomiting [ ] diarrhea [ ] constipation [ ] pain	  Genitourinary:  [ ] dysuria [ ] frequency [ ] hematuria [ ] discharge [ ] flank pain  [ ] incontinence  Musculoskeletal:  [ ] myalgias [ ] arthralgias [ ] arthritis  [ ] back pain  Neurological:  [ ] headache [ ] seizures  [ ] confusion/altered mental status    Allergies  No Known Allergies        ANTIMICROBIALS:  ampicillin/sulbactam  IVPB 3 every 6 hours  fluconAZOLE IVPB 400 every 24 hours  penicillin   G benzathine Injectable 2.4 <User Schedule>      OTHER MEDS:  MEDICATIONS  (STANDING):  furosemide    Tablet 40 two times a day  pantoprazole    Tablet 40 before breakfast  polyethylene glycol 3350 17 two times a day  senna 2 at bedtime  spironolactone 100 daily  tamsulosin 0.4 at bedtime      Vital Signs Last 24 Hrs  T(C): 37.1 (26 Aug 2022 12:00), Max: 37.3 (26 Aug 2022 04:49)  T(F): 98.8 (26 Aug 2022 12:00), Max: 99.1 (26 Aug 2022 04:49)  HR: 113 (26 Aug 2022 12:00) (95 - 113)  BP: 142/81 (26 Aug 2022 12:00) (136/70 - 160/84)  BP(mean): --  RR: 18 (26 Aug 2022 12:00) (18 - 18)  SpO2: 97% (26 Aug 2022 12:00) (95% - 97%)    Parameters below as of 26 Aug 2022 12:00  Patient On (Oxygen Delivery Method): room air        PHYSICAL EXAMINATION:  General: Alert and Awake, NAD  HEENT: PERRL, EOMI  Neck: Supple  Cardiac: RRR, No M/R/G  Resp: CTAB, No Wh/Rh/Ra  Abdomen: NBS, NT/ND, No HSM, No rigidity or guarding  MSK: No LE edema. No Calf tenderness  : No milian  Skin: No rashes or lesions. Skin is warm and dry to the touch.   Neuro: Alert and Awake. CN 2-12 Grossly intact. Moves all four extremities spontaneously.  Psych: Calm, Pleasant, Cooperative                          7.4    6.92  )-----------( 60       ( 26 Aug 2022 06:12 )             23.0       08-26    134<L>  |  101  |  16  ----------------------------<  92  3.4<L>   |  28  |  0.95    Ca    8.0<L>      26 Aug 2022 06:16  Phos  2.6     08-26  Mg     1.6     08-26    TPro  6.0  /  Alb  2.1<L>  /  TBili  2.8<H>  /  DBili  x   /  AST  37  /  ALT  14  /  AlkPhos  83  08-26          MICROBIOLOGY:  v  Peritoneal Peritoneal Fluid  08-25-22   Testing in progress  --    polymorphonuclear leukocytes seen  No organisms seen  by cytocentrifuge      Peritoneal Peritoneal Fluid  08-16-22   Rare Escherichia coli  Few Streptococcus anginosus  --  Escherichia coli  Streptococcus anginosus      .Blood Blood-Peripheral  08-16-22   No Growth Final  --  --      Peritoneal Peritoneal Fluid  08-12-22   No growth  --    polymorphonuclear leukocytes seen  No organisms seen  by cytocentrifuge      .Blood Blood  08-11-22   No Growth Final  --  --      Peritoneal Peritoneal Fluid  08-09-22   Few Escherichia coli  Moderate Streptococcus anginosus "Susceptibilities not performed"  --  Escherichia coli      Catheterized Catheterized  08-08-22   10,000 - 49,000 CFU/mL Escherichia coli  --  Escherichia coli      .Blood Blood-Peripheral  08-08-22   No Growth Final  --  --      .Blood Blood-Peripheral  08-08-22   No Growth Final  --  --        CMV IgG Antibody: 4.00 U/mL (08-12-22 @ 00:32)          RADIOLOGY:    <The imaging below has been reviewed and visualized by me independently. Findings as detailed in report below> Follow Up:  Peritonitis    Interval History: afebrile. minimal abdominal pain.     REVIEW OF SYSTEMS  [  ] ROS unobtainable because:    [ x ] All other systems negative except as noted below    Constitutional:  [ ] fever [ ] chills  [ ] weight loss  [ ] weakness  Skin:  [ ] rash [ ] phlebitis	  Eyes: [ ] icterus [ ] pain  [ ] discharge	  ENMT: [ ] sore throat  [ ] thrush [ ] ulcers [ ] exudates  Respiratory: [ ] dyspnea [ ] hemoptysis [ ] cough [ ] sputum	  Cardiovascular:  [ ] chest pain [ ] palpitations [ ] edema	  Gastrointestinal:  [ ] nausea [ ] vomiting [ ] diarrhea [ ] constipation [ x] pain	  Genitourinary:  [ ] dysuria [ ] frequency [ ] hematuria [ ] discharge [ ] flank pain  [ ] incontinence  Musculoskeletal:  [ ] myalgias [ ] arthralgias [ ] arthritis  [ ] back pain  Neurological:  [ ] headache [ ] seizures  [ ] confusion/altered mental status    Allergies  No Known Allergies        ANTIMICROBIALS:  ampicillin/sulbactam  IVPB 3 every 6 hours  fluconAZOLE IVPB 400 every 24 hours  penicillin   G benzathine Injectable 2.4 <User Schedule>      OTHER MEDS:  MEDICATIONS  (STANDING):  furosemide    Tablet 40 two times a day  pantoprazole    Tablet 40 before breakfast  polyethylene glycol 3350 17 two times a day  senna 2 at bedtime  spironolactone 100 daily  tamsulosin 0.4 at bedtime      Vital Signs Last 24 Hrs  T(C): 37.1 (26 Aug 2022 12:00), Max: 37.3 (26 Aug 2022 04:49)  T(F): 98.8 (26 Aug 2022 12:00), Max: 99.1 (26 Aug 2022 04:49)  HR: 113 (26 Aug 2022 12:00) (95 - 113)  BP: 142/81 (26 Aug 2022 12:00) (136/70 - 160/84)  BP(mean): --  RR: 18 (26 Aug 2022 12:00) (18 - 18)  SpO2: 97% (26 Aug 2022 12:00) (95% - 97%)    Parameters below as of 26 Aug 2022 12:00  Patient On (Oxygen Delivery Method): room air      PHYSICAL EXAMINATION:  General: Alert and Awake, NAD  Cardiac: RRR, No M/R/G  Resp: CTAB, No Wh/Rh/Ra  Abdomen: +Hepatobiliary Drain, Distended, No HSM, No rigidity or guarding  MSK: No LE edema. No Calf tenderness  Skin: No rashes or lesions. Skin is warm and dry to the touch.   Neuro: Alert and Awake. CN 2-12 Grossly intact. Moves all four extremities spontaneously.  Psych: Calm, Pleasant, Cooperative                          7.4    6.92  )-----------( 60       ( 26 Aug 2022 06:12 )             23.0       08-26    134<L>  |  101  |  16  ----------------------------<  92  3.4<L>   |  28  |  0.95    Ca    8.0<L>      26 Aug 2022 06:16  Phos  2.6     08-26  Mg     1.6     08-26    TPro  6.0  /  Alb  2.1<L>  /  TBili  2.8<H>  /  DBili  x   /  AST  37  /  ALT  14  /  AlkPhos  83  08-26          MICROBIOLOGY:  v  Peritoneal Peritoneal Fluid  08-25-22   Testing in progress  --    polymorphonuclear leukocytes seen  No organisms seen  by cytocentrifuge      Peritoneal Peritoneal Fluid  08-16-22   Rare Escherichia coli  Few Streptococcus anginosus  --  Escherichia coli  Streptococcus anginosus      .Blood Blood-Peripheral  08-16-22   No Growth Final  --  --      Peritoneal Peritoneal Fluid  08-12-22   No growth  --    polymorphonuclear leukocytes seen  No organisms seen  by cytocentrifuge      .Blood Blood  08-11-22   No Growth Final  --  --      Peritoneal Peritoneal Fluid  08-09-22   Few Escherichia coli  Moderate Streptococcus anginosus "Susceptibilities not performed"  --  Escherichia coli      Catheterized Catheterized  08-08-22   10,000 - 49,000 CFU/mL Escherichia coli  --  Escherichia coli      .Blood Blood-Peripheral  08-08-22   No Growth Final  --  --      .Blood Blood-Peripheral  08-08-22   No Growth Final  --  --    CMV IgG Antibody: 4.00 U/mL (08-12-22 @ 00:32)    RADIOLOGY:    <The imaging below has been reviewed and visualized by me independently. Findings as detailed in report below>    < from: CT Abdomen and Pelvis No Cont (08.26.22 @ 17:43) >    INTERPRETATION:  Percutaneous cholecystostomy tube terminates in the   decompressed gallbladder lumen.  Mild interval decrease in moderate hemoperitoneum with a few residual   locules of gas within the fluid and high density clot in the left lower   quadrant.  Small bilateral pleural effusions, partially imaged.  Please follow up the final report.    < end of copied text >

## 2022-08-26 NOTE — PROGRESS NOTE ADULT - ATTENDING COMMENTS
67 y/o M w/ decompensated ETOH + PARNELL cirrhosis, hx of cholangitis s/p ERCP with stent placement (4/2022) and removal on 7/20/22 w/ stone extraction here with worsening ascites, RUQ abdominal pain found to be in septic shock suspected due to acute cholecystitis, ecoli/strep anginosis secondary bacterial peritonitis.    perc tube check by IR 8/22/22 > in gallbladder but probably has a connection to peritoneum  meld score in the low to mid 20s  will probably need prolonged IV abx on Unasyn for peritonitis   repeat paracentesis on 8/25/22 shows persistently high cell counts, difficult to interpret in the setting of hemoperitoneum  ID clearance and colon exam needed for transplant eval  plan on discussing today at listing meeting, will hold off on colonoscopy for now due to the perc rodney tube  seems like a good social candidate for transplant. last drink in April 2022 65 y/o M w/ decompensated ETOH + PARNELL cirrhosis, hx of cholangitis s/p ERCP with stent placement (4/2022) and removal on 7/20/22 w/ stone extraction here with worsening ascites, RUQ abdominal pain found to be in septic shock suspected due to acute cholecystitis, ecoli/strep anginosis secondary bacterial peritonitis.    perc tube check by IR 8/22/22 > in gallbladder but probably has a connection to peritoneum  meld score in the low to mid 20s  will probably need prolonged IV abx on Unasyn for peritonitis   repeat paracentesis on 8/25/22 shows persistently high cell counts, difficult to interpret in the setting of hemoperitoneum  ID clearance and colon exam needed for transplant eval  plan on discussing today at listing meeting, will hold off on colonoscopy for now due to the perc rodney tube  seems like a good social candidate for transplant. last drink in April 2022. 67 y/o M w/ decompensated ETOH + PARNELL cirrhosis, hx of cholangitis s/p ERCP with stent placement (4/2022) and removal on 7/20/22 w/ stone extraction here with worsening ascites, RUQ abdominal pain found to be in septic shock suspected due to acute cholecystitis, ecoli/strep anginosis secondary bacterial peritonitis.    perc tube check by IR 8/22/22 > in gallbladder but probably has a connection to peritoneum  meld score in the low to mid 20s  will probably need prolonged abx on Unasyn transition to PO Augmentin for peritonitis   repeat paracentesis on 8/25/22 shows persistently high cell counts, difficult to interpret in the setting of hemoperitoneum  discussed at listing meeting, listed for OLT today.  re-discuss with IR regarding repositioning perc tube since patient is pending discharge home

## 2022-08-26 NOTE — CHART NOTE - NSCHARTNOTEFT_GEN_A_CORE
Gender: [x ] male  [  ] female    Hand  Dynamometer (dominant hand): right [x] attempted   [  ] unable to attempt  attempt #1 (kg): 30  attempt #2 (kg): 30  attempt #3 (kg): 31    5 Chair Stands:  [ x ] attempted   [] unable to attempt  time to complete (seconds): 40.58    3-Position Balance:  [ x ] attempted   [ ] unable to attempt  fwud-kh-asdb (0-10 seconds): 10  semi tandem (0-10 seconds): 10  tandem (0-10 seconds): 6    Comments:    LIVER FRAILTY INDEX SCORE: 4.78    Based on suggested cut-offs of the Liver Frailty Index™, a patient with this Liver Frailty Index™ score is considered:   [x ] Frail  [  ] Pre Frail  [  ] Robust    This Liver Frailty Index™ score falls within the 88 percentile of outpatients with cirrhosis who are listed for liver transplantation.

## 2022-08-26 NOTE — ED ADULT NURSE NOTE - NSHOSCREENINGQ1_ED_ALL_ED
8/26/2022    This is a 73 y.o. male   Chief Complaint   Patient presents with    Hypertension     Pt states he has been doing good     . Jeane Burke is here for follow up  Hypertension: Patient here for follow-up of elevated blood pressure. He is exercising and is not adherent to low salt diet. Blood pressure is well controlled at home. Cardiac symptoms none. Patient denies chest pain, chest pressure/discomfort, dyspnea, exertional chest pressure/discomfort, irregular heart beat, orthopnea, paroxysmal nocturnal dyspnea, and tachypnea. Cardiovascular risk factors: advanced age (older than 54 for men, 72 for women), dyslipidemia, family history of premature cardiovascular disease, hypertension, male gender, obesity (BMI >= 30 kg/m2), and sedentary lifestyle. Use of agents associated with hypertension: none.  History of target organ damage: angina/ prior myocardial infarction        Patient Active Problem List   Diagnosis    Hypertension    Gout    Hyperlipidemia       Current Outpatient Medications   Medication Sig Dispense Refill    allopurinol (ZYLOPRIM) 100 MG tablet TAKE 2 TABLETS DAILY 180 tablet 0    metoprolol succinate (TOPROL XL) 25 MG extended release tablet TAKE 1 TABLET DAILY 90 tablet 0    lisinopril (PRINIVIL;ZESTRIL) 10 MG tablet TAKE 1 TABLET DAILY 90 tablet 0    warfarin (COUMADIN) 5 MG tablet TAKE 1 TABLET DAILY ON     MONDAY TO FRIDAY AND 1 AND 1/2 TABLETS ON SATURDAY ANDSUNDAY 104 tablet 0    atorvastatin (LIPITOR) 40 MG tablet TAKE 1 TABLET DAILY 90 tablet 0    furosemide (LASIX) 40 MG tablet TAKE 1 TABLET DAILY 90 tablet 0    PT/INR Test STRP 1 each by In Vitro route once a week And as needed for INR medication adjustments 6 strip 2    Lancets MISC 1 each by Does not apply route daily 100 each 1    spironolactone (ALDACTONE) 25 MG tablet Take 0.5 tablets by mouth daily 90 tablet 0    Handicap Placard MISC by Does not apply route Good for 5 years 1 each 0    Multiple Vitamins-Minerals (MULTIVITAMIN ADULT PO) Take by mouth      loratadine (CLARITIN) 10 MG tablet Take 10 mg by mouth daily      magnesium gluconate (MAGONATE) 500 MG tablet Take 500 mg by mouth 2 times daily      PT/INR Testing Monitor KIT 1 each by Does not apply route once for 1 dose 1 kit 0     No current facility-administered medications for this visit. No Known Allergies    /72 (Site: Left Upper Arm, Position: Sitting)   Pulse 61   Resp 16   SpO2 98%     Social History     Tobacco Use    Smoking status: Never    Smokeless tobacco: Never   Substance Use Topics    Alcohol use: Yes     Comment: very little       Review of Systems   Constitutional:  Negative for activity change, fatigue and fever. W/sat 7.5 all other days 5     HENT: Negative. Respiratory:  Negative for cough, chest tightness, shortness of breath and wheezing. Cardiovascular:  Negative for chest pain, palpitations and leg swelling. Gastrointestinal:  Negative for abdominal pain, diarrhea and rectal pain. Genitourinary: Negative. Musculoskeletal: Negative. Skin: Negative. Hematological:  Does not bruise/bleed easily. Psychiatric/Behavioral: Negative. Some sadness regarding the death of his daughter 4 years ago. Overall he feels he is doing well except for when he talks about her. Overall he has many good memories. Physical Exam  Vitals and nursing note reviewed. Constitutional:       General: He is not in acute distress. Appearance: He is well-developed. He is obese. He is not ill-appearing or diaphoretic. Comments: INR-4, no evidence of bleeding   HENT:      Head: Normocephalic and atraumatic. Right Ear: External ear normal.      Left Ear: External ear normal.      Nose: Nose normal.      Mouth/Throat:      Pharynx: No oropharyngeal exudate. Eyes:      General:         Right eye: No discharge. Left eye: No discharge.       Conjunctiva/sclera: Conjunctivae normal.   Cardiovascular:      Rate and Rhythm: Normal rate and regular rhythm. Heart sounds: Normal heart sounds. No murmur heard. No friction rub. No gallop. Pulmonary:      Effort: Pulmonary effort is normal. No respiratory distress. Breath sounds: Normal breath sounds. No wheezing or rales. Abdominal:      General: Bowel sounds are normal. There is no distension. Palpations: Abdomen is soft. Tenderness: There is no abdominal tenderness. Musculoskeletal:         General: No tenderness. Normal range of motion. Cervical back: Normal range of motion and neck supple. Lymphadenopathy:      Cervical: No cervical adenopathy. Skin:     General: Skin is warm and dry. Coloration: Skin is not pale. Findings: No erythema or rash. Neurological:      Mental Status: He is alert and oriented to person, place, and time. Motor: No abnormal muscle tone. Coordination: Coordination normal.   Psychiatric:         Mood and Affect: Mood normal.         Behavior: Behavior normal.         Thought Content: Thought content normal.         Judgment: Judgment normal.       Diagnosis       ICD-10-CM    1. Primary hypertension  I10 metoprolol succinate (TOPROL XL) 25 MG extended release tablet     lisinopril (PRINIVIL;ZESTRIL) 10 MG tablet     atorvastatin (LIPITOR) 40 MG tablet     furosemide (LASIX) 40 MG tablet     spironolactone (ALDACTONE) 25 MG tablet      2. Chronic gout without tophus, unspecified cause, unspecified site  M1A. 9XX0       3. Mixed hyperlipidemia  E78.2 atorvastatin (LIPITOR) 40 MG tablet      4. Anticoagulation monitoring, special range  Z79.01 POCT INR     warfarin (COUMADIN) 5 MG tablet     PT/INR Testing Monitor KIT      5. Hyperlipidemia, unspecified hyperlipidemia type  E78.5 atorvastatin (LIPITOR) 40 MG tablet      6.  Coronary artery disease involving native heart without angina pectoris, unspecified vessel or lesion type  I25.10 warfarin (COUMADIN) 5 MG tablet           Plan    Continue current doses of metoprolol, Neupro, spironolactone and furosemide for hypertension and coronary artery disease, printed new Rx for home INR monitoring system  INR 4.0 today, hold today's dose of Coumadin and decrease Coumadin tomorrow to 5 mg, then return to normal schedule recheck INR in 1 week  Continue atorvastatin for hyperlipidemia  Discussed diet, limiting sweets to avoid increases in glucose  Continue allopurinol for gout, no recent flares    Orders Placed This Encounter   Procedures    POCT INR         Orders Placed This Encounter   Medications    allopurinol (ZYLOPRIM) 100 MG tablet     Sig: TAKE 2 TABLETS DAILY     Dispense:  180 tablet     Refill:  1    metoprolol succinate (TOPROL XL) 25 MG extended release tablet     Sig: TAKE 1 TABLET DAILY     Dispense:  90 tablet     Refill:  1    lisinopril (PRINIVIL;ZESTRIL) 10 MG tablet     Sig: TAKE 1 TABLET DAILY     Dispense:  90 tablet     Refill:  1    warfarin (COUMADIN) 5 MG tablet     Sig: TAKE 1 TABLET DAILY ON     MONDAY TO FRIDAY AND 1 AND 1/2 TABLETS ON SATURDAY AND     Dispense:  104 tablet     Refill:  0    atorvastatin (LIPITOR) 40 MG tablet     Sig: TAKE 1 TABLET DAILY     Dispense:  90 tablet     Refill:  1    furosemide (LASIX) 40 MG tablet     Sig: TAKE 1 TABLET DAILY     Dispense:  90 tablet     Refill:  1    PT/INR Testing Monitor KIT     Si each by Does not apply route once for 1 dose     Dispense:  1 kit     Refill:  0    spironolactone (ALDACTONE) 25 MG tablet     Sig: Take 0.5 tablets by mouth daily     Dispense:  90 tablet     Refill:  1         Patient Education:  Plan    Return in about 6 months (around 2023) for annual physical. No

## 2022-08-26 NOTE — PROGRESS NOTE ADULT - ASSESSMENT
65 y/o M PMHx cholangitis/cholecystitis (s/p ERCP w/ biliary stent placement 04/2022) and recently diagnosed etOH cirrhosis, presents with RUQ pain following biliary stent removal on 7/20/22. Found to have distended GB with wall thickening and stone in cystic duct, concerning for cholecystitis. Admitted to SICU for monitoring, s/p perc rodney and paracentesis by IR on 8/9. Paracentesis results consistent with SBP. Course now c/b ARF.     s/p Perc Rodney Tube and Paracentesis  Paracentesis cell counts suggestive of Peritonitis  Cultures thus far with E coli and Streptococcus on Perc Rodney Drainage and Ascites drainage  Concerning with cholecystitis with associated perforation    Paracentesis (8/9) 160,713 with 62% PMN  Paracentesis (8/12) 8,344 with 97% PMN  Paracentesis (8/16) 53,930 with 92% PMN  Paracentesis (8/25) with 62,700 nucleated cells but unable to run differential     #Peritonitis, Cholecystitis, SBP, Leukocytosis   Ascites Cultures (8/16) Rare E coli and Few Strep anginosis  E coli is still susceptible to Unasyn  CT A/P (8/18) Moderate volume of abdominopelvic fluid with new findings of hemoperitoneum. Clot appears most concentrated within the left lower quadrant, suggesting that a source of bleed is potentially in this location.   CT Chest (8/18) Ground glass opacities in the right upper lobe. Moderate bilateral pleural effusions.   Suspect GGO on CT is representative of pulmonary edema  Repeat Paracentesis (8/25) with 62,700 nucleated cells but unable to run differential   --Continue Unasyn 3g IV Q6H (anticipate Augmentin on discharge)  --Continue to follow CBC with diff  --Continue to follow temperature curve  --Follow up on preliminary blood cultures    #Late Latent Syphilis  Denies knowledge of preceding diagnosis  No preceding treatment  Will treat as late latent syphilis  --Continue IM Benzathine Penicillin 2.4 million units Q7Days x 3 doses (End Date: 9/1/22)    #Pre-Liver Transplant Evaluation  --ID clearance for OLT pending resolution of peritonitis but no other contraindications  --Follow up on repeat Quantiferon Gold (delay in QuantiFeron given backorder on reagents)    I will be away over this upcoming weekend. Please contact the Infectious Diseases Office with any further questions or concerns.     Bladimir Nix M.D.  [facility] Division of Infectious Disease  8AM-5PM Monday - Friday: Available on [employer] Teams  After Hours and Holidays (or if no response on [employer] Teams): Please contact the Infectious Diseases Office at [phone]     The above assessment and plan were discussed with Pb transplant surgery PA

## 2022-08-26 NOTE — PROGRESS NOTE ADULT - ASSESSMENT
66 year old male with decompensated cirrhosis and cholangitis s/p ERCP with stent placement (4/2022) s/p stent removal on 7/20 (along sphincterotomy and stone extraction) presenting for one week of RUQ abdominal pain associated with new abdominal distension and decreased appetite.     #Septic shock 2/2 acute cholecystitis vs ascending cholangitis  #E coli and Streptococcus anginosis secondary bacterial peritonitis  #Hemoperitoneum with free locules of air on CT imaging 8/18/2022  #ROBBIE required hemodialysis with renal recovery: initial urine sodium 9, likely indicating prerenal ROBBIE vs less likely HRS given initial presentation as above  #History of cholangitis s/p biliary stent placement April 2022 and removal 7/20/2022, now s/p perc rodney tube  #Decompensated ALD/PARNELL cirrhosis c/b ascites  EV: normal esophagus on ERCP from 4/2022  Ascites: increase in ascites from mild to moderate on current imaging  SBP: paracentesis 8/9/2022 c/w peritonitis given 99K PMNs however elevated LDH more indicative of secondary bacterial peritonitis  HE: none currently       -MELD-Na = 22 on 8/26/2022       -Ascites: yes       -SBP: paracentesis 8/9/2022 reveals likely secondary bacterial peritonitis from suspected gallbladder pathology       -Varices: no mention of varices on EGD/ERCP on 7/20/2022       -Hepatic encephalopathy: none, Ammonia, Serum: 38 umol/L [11 - 55] (08-12-22 @ 00:21)       -HCC: unknown       -LT candidacy and evaluation:            [x] Psychosocial: cleared pending RPP            [ ] Infectious            [x] Cardiology:                    Cardiac cath: n/a                   Stress test: negative noninvasive stress test on 8/19/2022 for inducible ischemia            [ ] Colonoscopy: needed, f/u records from outside GI colonoscopy reports            [x] Prostate: Prostate Specific Antigen: negative at 2.21 ng/mL on 8/12/2022            [x] Dental            [x] PT/Frailty    Recommendations:  -trend clinical symptoms, exam findings, vital signs, CBC, CMP, INR  -s/p percutaneous cholecystostomy as above  -likely secondary bacterial peritonitis based on paracentesis and clinical findings, CT A/P negative for perforation however performed without contrast 2/2 renal function; s/p repeat paracentesis 8/12/2022 and again 8/16/2022 with significant PMN count; appreciate ID recommendations  -liver transplantation evaluation opened  -PPI daily for stress prophylaxis  -appreciate transplant nephrology consultation, initiated hemodialysis 8/11/2022, now off HD with renal recovery; added spironolactone 100mg daily, transition furosemide to 40mg BID PO  -appreciate surgery recommendations regarding hemoperitoneum  -s/p IR tube check 8/22/2022  -plan for repeat paracentesis today  -appreciate ID recommendations regarding ABx duration, possible PICC line placement and dispo discharge next week  -plan for egd/colon vs virtual colonoscopy pending clinical course [inpatient vs outpatient]  -PT as able    Note incomplete until finalized by attending signature/attestation.    Fadi Churchill  GI/Hepatology Fellow    MONDAY-FRIDAY 8AM-5PM:  Pager# [phone] ([facility]) or [phone] ([facility])    NON-URGENT CONSULTS:  Please email [email or web address]  AT NIGHT AND ON WEEKENDS:  Contact on-call GI fellow via answering service ([phone]) from 5pm-8am and on weekends/holidays         66 year old male with decompensated cirrhosis and cholangitis s/p ERCP with stent placement (4/2022) s/p stent removal on 7/20 (along sphincterotomy and stone extraction) presenting for one week of RUQ abdominal pain associated with new abdominal distension and decreased appetite.     #Septic shock 2/2 acute cholecystitis vs ascending cholangitis  #E coli and Streptococcus anginosis secondary bacterial peritonitis  #Hemoperitoneum with free locules of air on CT imaging 8/18/2022  #ROBBIE required hemodialysis with renal recovery: initial urine sodium 9, likely indicating prerenal ROBBIE vs less likely HRS given initial presentation as above  #History of cholangitis s/p biliary stent placement April 2022 and removal 7/20/2022, now s/p perc rodney tube  #Decompensated ALD/PARNELL cirrhosis c/b ascites  EV: normal esophagus on ERCP from 4/2022  Ascites: increase in ascites from mild to moderate on current imaging  SBP: paracentesis 8/9/2022 c/w peritonitis given 99K PMNs however elevated LDH more indicative of secondary bacterial peritonitis  HE: none currently       -MELD-Na = 22 on 8/26/2022       -Ascites: yes       -SBP: paracentesis 8/9/2022 reveals likely secondary bacterial peritonitis from suspected gallbladder pathology       -Varices: no mention of varices on EGD/ERCP on 7/20/2022       -Hepatic encephalopathy: none, Ammonia, Serum: 38 umol/L [11 - 55] (08-12-22 @ 00:21)       -HCC: unknown       -LT candidacy and evaluation:            [x] Psychosocial: cleared pending RPP            [ ] Infectious            [x] Cardiology:                    Cardiac cath: n/a                   Stress test: negative noninvasive stress test on 8/19/2022 for inducible ischemia            [ ] Colonoscopy: needed, f/u records from outside GI colonoscopy reports            [x] Prostate: Prostate Specific Antigen: negative at 2.21 ng/mL on 8/12/2022            [x] Dental            [x] PT/Frailty    Recommendations:  -trend clinical symptoms, exam findings, vital signs, CBC, CMP, INR  -s/p percutaneous cholecystostomy as above  -likely secondary bacterial peritonitis based on paracentesis and clinical findings, CT A/P negative for perforation however performed without contrast 2/2 renal function; s/p repeat paracentesis 8/12/2022, 8/16/2022, and 8/25/2022 with significant PMN count; appreciate ID recommendations  -liver transplantation evaluation opened  -PPI daily for stress prophylaxis  -appreciate transplant nephrology consultation, initiated hemodialysis 8/11/2022, now off HD with renal recovery; added spironolactone 100mg daily, transition furosemide to 40mg BID PO  -appreciate surgery recommendations regarding hemoperitoneum  -s/p IR tube check 8/22/2022  -appreciate ID recommendations regarding ABx duration, possible PICC line placement and dispo discharge next week  -plan for egd/colon vs virtual colonoscopy pending clinical course [inpatient vs outpatient]  -PT as able    Note incomplete until finalized by attending signature/attestation.    Fadi Churchill  GI/Hepatology Fellow    MONDAY-FRIDAY 8AM-5PM:  Pager# [phone] ([facility]) or [phone] ([facility])    NON-URGENT CONSULTS:  Please email [email or web address] OR [email or web address]  AT NIGHT AND ON WEEKENDS:  Contact on-call GI fellow via answering service ([phone]) from 5pm-8am and on weekends/holidays

## 2022-08-26 NOTE — PROGRESS NOTE ADULT - SUBJECTIVE AND OBJECTIVE BOX
Interval Events:   No acute events overnight.  Patient without acute symptoms at this time.    ROS:   12 point review of systems performed and negative except otherwise noted in HPI.    Hospital Medications:  albumin human 25% IVPB 50 milliLiter(s) IV Intermittent every 30 minutes  ampicillin/sulbactam  IVPB 3 Gram(s) IV Intermittent every 6 hours  chlorhexidine 2% Cloths 1 Application(s) Topical <User Schedule>  fluconAZOLE IVPB 400 milliGRAM(s) IV Intermittent every 24 hours  furosemide   Injectable 40 milliGRAM(s) IV Push every 12 hours  pantoprazole    Tablet 40 milliGRAM(s) Oral before breakfast  penicillin   G benzathine Injectable 2.4 Million Unit(s) IntraMuscular <User Schedule>  polyethylene glycol 3350 17 Gram(s) Oral two times a day  senna 2 Tablet(s) Oral at bedtime  spironolactone 100 milliGRAM(s) Oral daily  tamsulosin 0.4 milliGRAM(s) Oral at bedtime    MAR over past 24 hours:    ampicillin/sulbactam  IVPB   200 mL/Hr IV Intermittent (08-26-22 @ 05:30)   200 mL/Hr IV Intermittent (08-26-22 @ 00:08)   200 mL/Hr IV Intermittent (08-25-22 @ 17:47)   200 mL/Hr IV Intermittent (08-25-22 @ 11:43)    fluconAZOLE IVPB   100 mL/Hr IV Intermittent (08-25-22 @ 17:45)    furosemide   Injectable   40 milliGRAM(s) IV Push (08-26-22 @ 05:30)   40 milliGRAM(s) IV Push (08-25-22 @ 17:43)    pantoprazole    Tablet   40 milliGRAM(s) Oral (08-25-22 @ 11:43)    penicillin   G benzathine Injectable   2.4 Million Unit(s) IntraMuscular (08-25-22 @ 11:43)    polyethylene glycol 3350   17 Gram(s) Oral (08-26-22 @ 05:30)    senna   2 Tablet(s) Oral (08-25-22 @ 21:56)    spironolactone   100 milliGRAM(s) Oral (08-26-22 @ 05:30)    tamsulosin   0.4 milliGRAM(s) Oral (08-25-22 @ 21:56)      Home Medications:  Last Order Reconciliation Date: 08-08-22 @ 06:30 (Admission Reconciliation)    PHYSICAL EXAM:   Vital Signs last 24 hours:  T(F): 98.5 (08-26-22 @ 08:00), Max: 99.3 (08-25-22 @ 13:32)  HR: 95 (08-26-22 @ 08:00) (95 - 108)  BP: 147/75 (08-26-22 @ 08:00) (136/70 - 160/84)  BP(mean): --  ABP: --  ABP(mean): --  RR: 18 (08-26-22 @ 08:00) (18 - 18)  SpO2: 96% (08-26-22 @ 08:00) (94% - 96%)    I&Os:    08-25-22 @ 07:01  -  08-26-22 @ 07:00  --------------------------------------------------------  IN:    IV PiggyBack: 100 mL    Oral Fluid: 740 mL    Other (mL): 10 mL  Total IN: 850 mL    OUT:    T-Tube (mL): 220 mL    Voided (mL): 1825 mL  Total OUT: 2045 mL    Total NET: -1195 mL        BMI (kg/m2): 29.3 (08-09-22 @ 14:59)  GENERAL: no acute distress  NEURO: alert  HEENT: NCAT, no conjunctival pallor appreciated  CHEST: no respiratory distress, no accessory muscle use  CARDIAC: regular rate, +S1/S2  ABDOMEN: soft, nontender, no rebound or guarding  EXTREMITIES: warm, well perfused  SKIN: no lesions noted    DIAGNOSTICS:  WBC      Hg       PLT      Na       K        CO2     BUN      Cr       ALT      AST      TB       ALP  ------   ------   ------   134      3.4      28       16       0.95     14       37       2.8      83       08-26-22 @ 06:16  6.92     7.4      60       ------   ------   ------   ------   ------   ------   ------   ------   ------   08-26-22 @ 06:12  ------   ------   ------   135      3.7      27       19       0.98     15       45       3.0      92       08-25-22 @ 06:53  8.49     7.7      86       ------   ------   ------   ------   ------   ------   ------   ------   ------   08-25-22 @ 06:51  10.57    8.5      130      ------   ------   ------   ------   ------   ------   ------   ------   ------   08-24-22 @ 06:38    PT             INR            MELDwith  MELDw/o  27.3           2.35           --             --             08-26-22 @ 06:12  25.8           2.23           --             --             08-25-22 @ 06:51  24.1           2.06           --             --             08-24-22 @ 06:36  26.5           2.29           --             --             08-23-22 @ 06:30  25.8           2.23           --             --             08-22-22 @ 06:53

## 2022-08-27 LAB
ALBUMIN SERPL ELPH-MCNC: 2.1 G/DL — LOW (ref 3.3–5)
ALP SERPL-CCNC: 89 U/L — SIGNIFICANT CHANGE UP (ref 40–120)
ALT FLD-CCNC: 15 U/L — SIGNIFICANT CHANGE UP (ref 10–45)
ANION GAP SERPL CALC-SCNC: 6 MMOL/L — SIGNIFICANT CHANGE UP (ref 5–17)
APTT BLD: 34.3 SEC — SIGNIFICANT CHANGE UP (ref 27.5–35.5)
AST SERPL-CCNC: 41 U/L — HIGH (ref 10–40)
BILIRUB SERPL-MCNC: 2.5 MG/DL — HIGH (ref 0.2–1.2)
BUN SERPL-MCNC: 14 MG/DL — SIGNIFICANT CHANGE UP (ref 7–23)
CALCIUM SERPL-MCNC: 7.9 MG/DL — LOW (ref 8.4–10.5)
CHLORIDE SERPL-SCNC: 99 MMOL/L — SIGNIFICANT CHANGE UP (ref 96–108)
CO2 SERPL-SCNC: 28 MMOL/L — SIGNIFICANT CHANGE UP (ref 22–31)
CREAT SERPL-MCNC: 0.88 MG/DL — SIGNIFICANT CHANGE UP (ref 0.5–1.3)
EGFR: 96 ML/MIN/1.73M2 — SIGNIFICANT CHANGE UP
GLUCOSE SERPL-MCNC: 127 MG/DL — HIGH (ref 70–99)
HCT VFR BLD CALC: 22.7 % — LOW (ref 39–50)
HGB BLD-MCNC: 7.4 G/DL — LOW (ref 13–17)
INR BLD: 2.23 RATIO — HIGH (ref 0.88–1.16)
MAGNESIUM SERPL-MCNC: 1.7 MG/DL — SIGNIFICANT CHANGE UP (ref 1.6–2.6)
MCHC RBC-ENTMCNC: 31.2 PG — SIGNIFICANT CHANGE UP (ref 27–34)
MCHC RBC-ENTMCNC: 32.6 GM/DL — SIGNIFICANT CHANGE UP (ref 32–36)
MCV RBC AUTO: 95.8 FL — SIGNIFICANT CHANGE UP (ref 80–100)
NRBC # BLD: 0 /100 WBCS — SIGNIFICANT CHANGE UP (ref 0–0)
PHOSPHATE SERPL-MCNC: 2.2 MG/DL — LOW (ref 2.5–4.5)
PLATELET # BLD AUTO: 52 K/UL — LOW (ref 150–400)
POTASSIUM SERPL-MCNC: 3.4 MMOL/L — LOW (ref 3.5–5.3)
POTASSIUM SERPL-SCNC: 3.4 MMOL/L — LOW (ref 3.5–5.3)
PROT SERPL-MCNC: 6.4 G/DL — SIGNIFICANT CHANGE UP (ref 6–8.3)
PROTHROM AB SERPL-ACNC: 25.9 SEC — HIGH (ref 10.5–13.4)
RBC # BLD: 2.37 M/UL — LOW (ref 4.2–5.8)
RBC # FLD: 18.6 % — HIGH (ref 10.3–14.5)
SODIUM SERPL-SCNC: 133 MMOL/L — LOW (ref 135–145)
WBC # BLD: 7.84 K/UL — SIGNIFICANT CHANGE UP (ref 3.8–10.5)
WBC # FLD AUTO: 7.84 K/UL — SIGNIFICANT CHANGE UP (ref 3.8–10.5)

## 2022-08-27 PROCEDURE — 99232 SBSQ HOSP IP/OBS MODERATE 35: CPT | Mod: GC

## 2022-08-27 RX ORDER — SPIRONOLACTONE 25 MG/1
100 TABLET, FILM COATED ORAL
Refills: 0 | Status: DISCONTINUED | OUTPATIENT
Start: 2022-08-27 | End: 2022-08-29

## 2022-08-27 RX ORDER — ALBUMIN HUMAN 25 %
100 VIAL (ML) INTRAVENOUS EVERY 12 HOURS
Refills: 0 | Status: COMPLETED | OUTPATIENT
Start: 2022-08-27 | End: 2022-08-29

## 2022-08-27 RX ORDER — ALBUMIN HUMAN 25 %
100 VIAL (ML) INTRAVENOUS EVERY 12 HOURS
Refills: 0 | Status: DISCONTINUED | OUTPATIENT
Start: 2022-08-27 | End: 2022-08-27

## 2022-08-27 RX ORDER — FUROSEMIDE 40 MG
40 TABLET ORAL THREE TIMES A DAY
Refills: 0 | Status: DISCONTINUED | OUTPATIENT
Start: 2022-08-27 | End: 2022-08-29

## 2022-08-27 RX ADMIN — Medication 40 MILLIGRAM(S): at 13:20

## 2022-08-27 RX ADMIN — PANTOPRAZOLE SODIUM 40 MILLIGRAM(S): 20 TABLET, DELAYED RELEASE ORAL at 09:26

## 2022-08-27 RX ADMIN — SPIRONOLACTONE 100 MILLIGRAM(S): 25 TABLET, FILM COATED ORAL at 05:32

## 2022-08-27 RX ADMIN — AMPICILLIN SODIUM AND SULBACTAM SODIUM 200 GRAM(S): 250; 125 INJECTION, POWDER, FOR SUSPENSION INTRAMUSCULAR; INTRAVENOUS at 05:31

## 2022-08-27 RX ADMIN — MAGNESIUM OXIDE 400 MG ORAL TABLET 400 MILLIGRAM(S): 241.3 TABLET ORAL at 17:06

## 2022-08-27 RX ADMIN — FLUCONAZOLE 100 MILLIGRAM(S): 150 TABLET ORAL at 17:06

## 2022-08-27 RX ADMIN — Medication 40 MILLIGRAM(S): at 21:39

## 2022-08-27 RX ADMIN — CHLORHEXIDINE GLUCONATE 1 APPLICATION(S): 213 SOLUTION TOPICAL at 09:27

## 2022-08-27 RX ADMIN — AMPICILLIN SODIUM AND SULBACTAM SODIUM 200 GRAM(S): 250; 125 INJECTION, POWDER, FOR SUSPENSION INTRAMUSCULAR; INTRAVENOUS at 17:06

## 2022-08-27 RX ADMIN — MAGNESIUM OXIDE 400 MG ORAL TABLET 400 MILLIGRAM(S): 241.3 TABLET ORAL at 09:26

## 2022-08-27 RX ADMIN — Medication 40 MILLIGRAM(S): at 05:33

## 2022-08-27 RX ADMIN — Medication 50 MILLILITER(S): at 18:34

## 2022-08-27 RX ADMIN — POLYETHYLENE GLYCOL 3350 17 GRAM(S): 17 POWDER, FOR SOLUTION ORAL at 05:32

## 2022-08-27 RX ADMIN — Medication 63.75 MILLIMOLE(S): at 11:37

## 2022-08-27 RX ADMIN — SPIRONOLACTONE 100 MILLIGRAM(S): 25 TABLET, FILM COATED ORAL at 17:06

## 2022-08-27 RX ADMIN — AMPICILLIN SODIUM AND SULBACTAM SODIUM 200 GRAM(S): 250; 125 INJECTION, POWDER, FOR SUSPENSION INTRAMUSCULAR; INTRAVENOUS at 11:38

## 2022-08-27 RX ADMIN — AMPICILLIN SODIUM AND SULBACTAM SODIUM 200 GRAM(S): 250; 125 INJECTION, POWDER, FOR SUSPENSION INTRAMUSCULAR; INTRAVENOUS at 00:32

## 2022-08-27 RX ADMIN — TAMSULOSIN HYDROCHLORIDE 0.4 MILLIGRAM(S): 0.4 CAPSULE ORAL at 21:37

## 2022-08-27 NOTE — PROGRESS NOTE ADULT - ASSESSMENT
66 year old male with decompensated cirrhosis and cholangitis s/p ERCP with stent placement (4/2022) s/p stent removal on 7/20 (along sphincterotomy and stone extraction) presenting for one week of RUQ abdominal pain associated with new abdominal distension and decreased appetite.     #Septic shock 2/2 acute cholecystitis vs ascending cholangitis  #E coli and Streptococcus anginosis secondary bacterial peritonitis  #Hemoperitoneum with free locules of air on CT imaging 8/18/2022  #ROBBIE required hemodialysis with renal recovery: initial urine sodium 9, likely indicating prerenal ROBBIE vs less likely HRS given initial presentation as above  #History of cholangitis s/p biliary stent placement April 2022 and removal 7/20/2022, now s/p perc rodney tube  #Decompensated ALD/PARNELL cirrhosis c/b ascites- Listed on 9/26 w/ MELD 22  EV: normal esophagus on ERCP from 4/2022  Ascites: increase in ascites from mild to moderate on current imaging  SBP: paracentesis 8/9/2022 c/w peritonitis given 99K PMNs however elevated LDH more indicative of secondary bacterial peritonitis  HE: none currently       -MELD-Na = 22 on 8/27/2022       -Ascites: yes       -SBP: paracentesis 8/9/2022 reveals likely secondary bacterial peritonitis from suspected gallbladder pathology       -Varices: no mention of varices on EGD/ERCP on 7/20/2022       -Hepatic encephalopathy: none, Ammonia, Serum: 38 umol/L [11 - 55] (08-12-22 @ 00:21)       -HCC: unknown       -LT candidacy- Listed on 8/26 w/ MELD 22            [x] Psychosocial: cleared pending RPP            [ ] Infectious            [x] Cardiology:                    Cardiac cath: n/a                   Stress test: negative noninvasive stress test on 8/19/2022 for inducible ischemia            [ ] Colonoscopy: needed, f/u records from outside GI colonoscopy reports            [x] Prostate: Prostate Specific Antigen: negative at 2.21 ng/mL on 8/12/2022            [x] Dental            [x] PT/Frailty    Recommendations:  -trend clinical symptoms, exam findings, vital signs, CBC, CMP, INR  -s/p percutaneous cholecystostomy as above  -likely secondary bacterial peritonitis based on paracentesis and clinical findings, CT A/P negative for perforation however performed without contrast 2/2 renal function; s/p repeat paracentesis 8/12/2022, 8/16/2022, and 8/25/2022 with significant PMN count; appreciate ID recommendations  -Listed for liver transplant on 8/26 w/ MELD 22  -PPI daily for stress prophylaxis  -appreciate transplant nephrology consultation, initiated hemodialysis 8/11/2022, now off HD with renal recovery; Increase Lasix 40 IV TID and increase Aldactone to 100 BID.   -start albumin 100cc 25% q6 hrs  -appreciate surgery recommendations regarding hemoperitoneum  -s/p IR tube check 8/22/2022  -appreciate ID recommendations regarding ABx duration, possible PICC line placement and dispo discharge next week  -plan for egd/colon vs virtual colonoscopy pending clinical course [inpatient vs outpatient]  -PT as able    Note incomplete until finalized by attending signature/attestation.

## 2022-08-27 NOTE — PROGRESS NOTE ADULT - SUBJECTIVE AND OBJECTIVE BOX
Interval Events:   -Listed w/ MELD of 22 yesterday.  -s/p CTAP (noncontrast) yesterday w/ C tube terminating in decompressed gallbaldder lumen. Mild decrease in moderate hemoperitoneum w/ a few residual locules of gas within the fluid and high density clot in LLQ. Small b/l effusions (only partially seen).   No acute events overnight.  Patient without acute symptoms at this time.      MEDICATIONS  (STANDING):  albumin human 25% IVPB 50 milliLiter(s) IV Intermittent every 30 minutes  albumin human 25% IVPB 100 milliLiter(s) IV Intermittent every 12 hours  ampicillin/sulbactam  IVPB 3 Gram(s) IV Intermittent every 6 hours  chlorhexidine 2% Cloths 1 Application(s) Topical <User Schedule>  fluconAZOLE IVPB 400 milliGRAM(s) IV Intermittent every 24 hours  furosemide   Injectable 40 milliGRAM(s) IV Push three times a day  magnesium oxide 400 milliGRAM(s) Oral two times a day with meals  pantoprazole    Tablet 40 milliGRAM(s) Oral before breakfast  penicillin   G benzathine Injectable 2.4 Million Unit(s) IntraMuscular <User Schedule>  polyethylene glycol 3350 17 Gram(s) Oral two times a day  senna 2 Tablet(s) Oral at bedtime  sodium phosphate IVPB 15 milliMole(s) IV Intermittent once  spironolactone 100 milliGRAM(s) Oral two times a day  tamsulosin 0.4 milliGRAM(s) Oral at bedtime    MEDICATIONS  (PRN):      PAST MEDICAL & SURGICAL HISTORY:  H/O acute cholangitis      2019 novel coronavirus disease (COVID-19)  12/24/2021  no hospitalization, no treatment      S/P ERCP  4/2022      H/O colonoscopy          Vital Signs Last 24 Hrs  T(C): 36.7 (27 Aug 2022 09:00), Max: 37.1 (26 Aug 2022 12:00)  T(F): 98 (27 Aug 2022 09:00), Max: 98.8 (26 Aug 2022 12:00)  HR: 103 (27 Aug 2022 09:00) (95 - 113)  BP: 145/78 (27 Aug 2022 09:00) (139/80 - 154/89)  BP(mean): --  RR: 18 (27 Aug 2022 09:00) (18 - 18)  SpO2: 95% (27 Aug 2022 09:00) (95% - 97%)    Parameters below as of 27 Aug 2022 09:00  Patient On (Oxygen Delivery Method): room air        I&O's Summary    26 Aug 2022 07:01  -  27 Aug 2022 07:00  --------------------------------------------------------  IN: 390 mL / OUT: 585 mL / NET: -195 mL    27 Aug 2022 07:01  -  27 Aug 2022 11:24  --------------------------------------------------------  IN: 0 mL / OUT: 200 mL / NET: -200 mL                              7.4    7.84  )-----------( 52       ( 27 Aug 2022 06:36 )             22.7     08-27    133<L>  |  99  |  14  ----------------------------<  127<H>  3.4<L>   |  28  |  0.88    Ca    7.9<L>      27 Aug 2022 06:38  Phos  2.2     08-27  Mg     1.7     08-27    TPro  6.4  /  Alb  2.1<L>  /  TBili  2.5<H>  /  DBili  x   /  AST  41<H>  /  ALT  15  /  AlkPhos  89  08-27      Culture - Fungal, Body Fluid (collected 08-25-22 @ 16:45)  Source: Peritoneal Peritoneal Fluid  Preliminary Report (08-26-22 @ 07:41):    Testing in progress    Culture - Body Fluid with Gram Stain (collected 08-25-22 @ 16:45)  Source: Peritoneal Peritoneal Fluid  Gram Stain (08-26-22 @ 04:21):    polymorphonuclear leukocytes seen    No organisms seen    by cytocentrifuge  Preliminary Report (08-27-22 @ 09:58):    No growth      ROS:   12 point review of systems performed and negative except otherwise noted in HPI.    GENERAL: no acute distress  NEURO: alert  HEENT: NCAT, no conjunctival pallor appreciated  CHEST: no respiratory distress, no accessory muscle use  CARDIAC: regular rate, +S1/S2  ABDOMEN: soft, nontender, no rebound or guarding. C tube w/ serous output.   EXTREMITIES: warm, well perfused  SKIN: no lesions noted    DIAGNOSTICS:  WBC      Hg       PLT      Na       K        CO2     BUN      Cr       ALT      AST      TB       ALP  ------   ------   ------   134      3.4      28       16       0.95     14       37       2.8      83       08-26-22 @ 06:16  6.92     7.4      60       ------   ------   ------   ------   ------   ------   ------   ------   ------   08-26-22 @ 06:12  ------   ------   ------   135      3.7      27       19       0.98     15       45       3.0      92       08-25-22 @ 06:53  8.49     7.7      86       ------   ------   ------   ------   ------   ------   ------   ------   ------   08-25-22 @ 06:51  10.57    8.5      130      ------   ------   ------   ------   ------   ------   ------   ------   ------   08-24-22 @ 06:38    PT             INR            MELDwith  MELDw/o  27.3           2.35           --             --             08-26-22 @ 06:12  25.8           2.23           --             --             08-25-22 @ 06:51  24.1           2.06           --             --             08-24-22 @ 06:36  26.5           2.29           --             --             08-23-22 @ 06:30  25.8           2.23           --             --             08-22-22 @ 06:53

## 2022-08-27 NOTE — PROGRESS NOTE ADULT - ATTENDING COMMENTS
67 y/o M w/ decompensated ETOH + PARNELL cirrhosis, hx of cholangitis s/p ERCP with stent placement (4/2022) and removal on 7/20/22 w/ stone extraction here with worsening ascites, RUQ abdominal pain found to be in septic shock suspected due to acute cholecystitis, ecoli/strep anginosis secondary bacterial peritonitis.    listed for OLT with MELD 22. ABO O  perc tube check by IR 8/22/22 > in gallbladder but probably has a connection to peritoneum. now draining bilious fluid.   meld score in the low to mid 20s  will probably need prolonged abx on Unasyn transition to PO Augmentin for peritonitis, duration to be determined  repeat paracentesis on 8/25/22 shows persistently high cell counts, difficult to interpret in the setting of hemoperitoneum  2-3+ pitting edema. increase diuretics to lasix 40 mg IV TID, aldactone 100 mg po BID. albumin assist q 12 hrs

## 2022-08-28 LAB
ALBUMIN SERPL ELPH-MCNC: 2.4 G/DL — LOW (ref 3.3–5)
ALP SERPL-CCNC: 95 U/L — SIGNIFICANT CHANGE UP (ref 40–120)
ALT FLD-CCNC: 15 U/L — SIGNIFICANT CHANGE UP (ref 10–45)
ANION GAP SERPL CALC-SCNC: 9 MMOL/L — SIGNIFICANT CHANGE UP (ref 5–17)
APTT BLD: 36.2 SEC — HIGH (ref 27.5–35.5)
AST SERPL-CCNC: 39 U/L — SIGNIFICANT CHANGE UP (ref 10–40)
BASOPHILS # BLD AUTO: 0.02 K/UL — SIGNIFICANT CHANGE UP (ref 0–0.2)
BASOPHILS NFR BLD AUTO: 0.3 % — SIGNIFICANT CHANGE UP (ref 0–2)
BILIRUB SERPL-MCNC: 2.8 MG/DL — HIGH (ref 0.2–1.2)
BUN SERPL-MCNC: 12 MG/DL — SIGNIFICANT CHANGE UP (ref 7–23)
CALCIUM SERPL-MCNC: 8.1 MG/DL — LOW (ref 8.4–10.5)
CHLORIDE SERPL-SCNC: 98 MMOL/L — SIGNIFICANT CHANGE UP (ref 96–108)
CO2 SERPL-SCNC: 26 MMOL/L — SIGNIFICANT CHANGE UP (ref 22–31)
CREAT SERPL-MCNC: 0.95 MG/DL — SIGNIFICANT CHANGE UP (ref 0.5–1.3)
EGFR: 89 ML/MIN/1.73M2 — SIGNIFICANT CHANGE UP
EOSINOPHIL # BLD AUTO: 0.26 K/UL — SIGNIFICANT CHANGE UP (ref 0–0.5)
EOSINOPHIL NFR BLD AUTO: 3.5 % — SIGNIFICANT CHANGE UP (ref 0–6)
GLUCOSE SERPL-MCNC: 106 MG/DL — HIGH (ref 70–99)
HCT VFR BLD CALC: 23.1 % — LOW (ref 39–50)
HGB BLD-MCNC: 7.5 G/DL — LOW (ref 13–17)
IMM GRANULOCYTES NFR BLD AUTO: 0.4 % — SIGNIFICANT CHANGE UP (ref 0–1.5)
INR BLD: 2.35 RATIO — HIGH (ref 0.88–1.16)
LYMPHOCYTES # BLD AUTO: 1.26 K/UL — SIGNIFICANT CHANGE UP (ref 1–3.3)
LYMPHOCYTES # BLD AUTO: 16.9 % — SIGNIFICANT CHANGE UP (ref 13–44)
MAGNESIUM SERPL-MCNC: 1.6 MG/DL — SIGNIFICANT CHANGE UP (ref 1.6–2.6)
MCHC RBC-ENTMCNC: 31.1 PG — SIGNIFICANT CHANGE UP (ref 27–34)
MCHC RBC-ENTMCNC: 32.5 GM/DL — SIGNIFICANT CHANGE UP (ref 32–36)
MCV RBC AUTO: 95.9 FL — SIGNIFICANT CHANGE UP (ref 80–100)
MONOCYTES # BLD AUTO: 1.07 K/UL — HIGH (ref 0–0.9)
MONOCYTES NFR BLD AUTO: 14.4 % — HIGH (ref 2–14)
NEUTROPHILS # BLD AUTO: 4.8 K/UL — SIGNIFICANT CHANGE UP (ref 1.8–7.4)
NEUTROPHILS NFR BLD AUTO: 64.5 % — SIGNIFICANT CHANGE UP (ref 43–77)
NRBC # BLD: 0 /100 WBCS — SIGNIFICANT CHANGE UP (ref 0–0)
PHOSPHATE SERPL-MCNC: 2.6 MG/DL — SIGNIFICANT CHANGE UP (ref 2.5–4.5)
PLATELET # BLD AUTO: 47 K/UL — LOW (ref 150–400)
POTASSIUM SERPL-MCNC: 3.2 MMOL/L — LOW (ref 3.5–5.3)
POTASSIUM SERPL-SCNC: 3.2 MMOL/L — LOW (ref 3.5–5.3)
PROT SERPL-MCNC: 6.5 G/DL — SIGNIFICANT CHANGE UP (ref 6–8.3)
PROTHROM AB SERPL-ACNC: 27.5 SEC — HIGH (ref 10.5–13.4)
RBC # BLD: 2.41 M/UL — LOW (ref 4.2–5.8)
RBC # FLD: 18.6 % — HIGH (ref 10.3–14.5)
SARS-COV-2 RNA SPEC QL NAA+PROBE: SIGNIFICANT CHANGE UP
SODIUM SERPL-SCNC: 133 MMOL/L — LOW (ref 135–145)
WBC # BLD: 7.44 K/UL — SIGNIFICANT CHANGE UP (ref 3.8–10.5)
WBC # FLD AUTO: 7.44 K/UL — SIGNIFICANT CHANGE UP (ref 3.8–10.5)

## 2022-08-28 PROCEDURE — 99232 SBSQ HOSP IP/OBS MODERATE 35: CPT | Mod: GC

## 2022-08-28 RX ADMIN — Medication 40 MILLIGRAM(S): at 13:02

## 2022-08-28 RX ADMIN — PANTOPRAZOLE SODIUM 40 MILLIGRAM(S): 20 TABLET, DELAYED RELEASE ORAL at 08:49

## 2022-08-28 RX ADMIN — AMPICILLIN SODIUM AND SULBACTAM SODIUM 200 GRAM(S): 250; 125 INJECTION, POWDER, FOR SUSPENSION INTRAMUSCULAR; INTRAVENOUS at 00:40

## 2022-08-28 RX ADMIN — AMPICILLIN SODIUM AND SULBACTAM SODIUM 200 GRAM(S): 250; 125 INJECTION, POWDER, FOR SUSPENSION INTRAMUSCULAR; INTRAVENOUS at 16:57

## 2022-08-28 RX ADMIN — CHLORHEXIDINE GLUCONATE 1 APPLICATION(S): 213 SOLUTION TOPICAL at 08:49

## 2022-08-28 RX ADMIN — Medication 40 MILLIGRAM(S): at 05:56

## 2022-08-28 RX ADMIN — MAGNESIUM OXIDE 400 MG ORAL TABLET 400 MILLIGRAM(S): 241.3 TABLET ORAL at 16:58

## 2022-08-28 RX ADMIN — Medication 40 MILLIGRAM(S): at 22:02

## 2022-08-28 RX ADMIN — POLYETHYLENE GLYCOL 3350 17 GRAM(S): 17 POWDER, FOR SOLUTION ORAL at 05:56

## 2022-08-28 RX ADMIN — Medication 50 MILLILITER(S): at 17:43

## 2022-08-28 RX ADMIN — AMPICILLIN SODIUM AND SULBACTAM SODIUM 200 GRAM(S): 250; 125 INJECTION, POWDER, FOR SUSPENSION INTRAMUSCULAR; INTRAVENOUS at 23:34

## 2022-08-28 RX ADMIN — MAGNESIUM OXIDE 400 MG ORAL TABLET 400 MILLIGRAM(S): 241.3 TABLET ORAL at 08:48

## 2022-08-28 RX ADMIN — SPIRONOLACTONE 100 MILLIGRAM(S): 25 TABLET, FILM COATED ORAL at 05:56

## 2022-08-28 RX ADMIN — TAMSULOSIN HYDROCHLORIDE 0.4 MILLIGRAM(S): 0.4 CAPSULE ORAL at 22:06

## 2022-08-28 RX ADMIN — SPIRONOLACTONE 100 MILLIGRAM(S): 25 TABLET, FILM COATED ORAL at 16:58

## 2022-08-28 RX ADMIN — AMPICILLIN SODIUM AND SULBACTAM SODIUM 200 GRAM(S): 250; 125 INJECTION, POWDER, FOR SUSPENSION INTRAMUSCULAR; INTRAVENOUS at 05:56

## 2022-08-28 RX ADMIN — AMPICILLIN SODIUM AND SULBACTAM SODIUM 200 GRAM(S): 250; 125 INJECTION, POWDER, FOR SUSPENSION INTRAMUSCULAR; INTRAVENOUS at 11:13

## 2022-08-28 RX ADMIN — Medication 50 MILLILITER(S): at 05:56

## 2022-08-28 RX ADMIN — SENNA PLUS 2 TABLET(S): 8.6 TABLET ORAL at 22:02

## 2022-08-28 RX ADMIN — FLUCONAZOLE 100 MILLIGRAM(S): 150 TABLET ORAL at 16:57

## 2022-08-28 NOTE — PROGRESS NOTE ADULT - ASSESSMENT
66 year old male with decompensated cirrhosis and cholangitis s/p ERCP with stent placement (4/2022) s/p stent removal on 7/20 (along sphincterotomy and stone extraction) presenting for one week of RUQ abdominal pain associated with new abdominal distension and decreased appetite.     #Septic shock 2/2 acute cholecystitis vs ascending cholangitis  #E coli and Streptococcus anginosis secondary bacterial peritonitis  #Hemoperitoneum with free locules of air on CT imaging 8/18/2022  #ROBBIE required hemodialysis with renal recovery: initial urine sodium 9, likely indicating prerenal ROBBIE vs less likely HRS given initial presentation as above  #History of cholangitis s/p biliary stent placement April 2022 and removal 7/20/2022, now s/p perc rodney tube  #Decompensated ALD/PARNELL cirrhosis c/b ascites- Listed on 9/26 w/ MELD 22  EV: normal esophagus on ERCP from 4/2022  Ascites: increase in ascites from mild to moderate on current imaging  SBP: paracentesis 8/9/2022 c/w peritonitis given 99K PMNs however elevated LDH more indicative of secondary bacterial peritonitis  HE: none currently       -MELD-Na = 23 on 8/28/2022 updated in [facility]        -Ascites: yes       -SBP: paracentesis 8/9/2022 reveals likely secondary bacterial peritonitis from suspected gallbladder pathology       -Varices: no mention of varices on EGD/ERCP on 7/20/2022       -Hepatic encephalopathy: none, Ammonia, Serum: 38 umol/L [11 - 55] (08-12-22 @ 00:21)       -HCC: unknown       -LT candidacy- Listed on 8/26 w/ MELD 22            [x] Psychosocial: cleared pending RPP            [ ] Infectious            [x] Cardiology:                    Cardiac cath: n/a                   Stress test: negative noninvasive stress test on 8/19/2022 for inducible ischemia            [ ] Colonoscopy: needed, f/u records from outside GI colonoscopy reports            [x] Prostate: Prostate Specific Antigen: negative at 2.21 ng/mL on 8/12/2022            [x] Dental            [x] PT/Frailty    Recommendations:  -trend clinical symptoms, exam findings, vital signs, CBC, CMP, INR  -s/p percutaneous cholecystostomy as above  -likely secondary bacterial peritonitis based on paracentesis and clinical findings, CT A/P negative for perforation however performed without contrast 2/2 renal function; s/p repeat paracentesis 8/12/2022, 8/16/2022, and 8/25/2022 with significant PMN count; appreciate ID recommendations  -Listed for liver transplant on 8/26 w/ MELD 22 updated today with MELD of 23  -PPI daily for stress prophylaxis  -appreciate transplant nephrology consultation, initiated hemodialysis 8/11/2022, now off HD with renal recovery; on Lasix 40 IV TID and Aldactone 100 BID.   -start albumin 100cc 25% q6 hrs  -appreciate surgery recommendations regarding hemoperitoneum  -s/p IR tube check 8/22/2022  -Plan for IR C-tube change by Dr. Yin for tomorrow. IR consult ordered preop (labs and COVID oredred)   -appreciate ID recommendations regarding ABx duration, possible PICC line placement and dispo discharge next week  -plan for egd/colon vs virtual colonoscopy pending clinical course [inpatient vs outpatient]  -PT as able    Note incomplete until finalized by attending signature/attestation.

## 2022-08-28 NOTE — PROGRESS NOTE ADULT - ATTENDING COMMENTS
67 y/o M w/ decompensated ETOH + PARNELL cirrhosis, hx of cholangitis s/p ERCP with stent placement (4/2022) and removal on 7/20/22 w/ stone extraction here with worsening ascites, RUQ abdominal pain found to be in septic shock suspected due to acute cholecystitis, ecoli/strep anginosis secondary bacterial peritonitis. listed for OLT.    perc tube check by IR 8/22/22 > in gallbladder but probably has a connection to peritoneum. now draining bilious fluid.   meld score in the low to mid 20s  will probably need prolonged abx on Unasyn transition to PO Augmentin for peritonitis, duration to be determined  repeat paracentesis on 8/25/22 shows persistently high cell counts, difficult to interpret in the setting of hemoperitoneum  1-2+ pitting edema, improving, continue lasix 40 mg IV TID, aldactone 100 mg po BID. albumin assist q 12 hrs   NPO after MN for perc rodney tube exchange monday  MELD recertified at 23 today. ABO O

## 2022-08-28 NOTE — CONSULT NOTE ADULT - REASON FOR ADMISSION
Ascending cholangitis

## 2022-08-28 NOTE — CONSULT NOTE ADULT - CONSULT REQUESTED BY NAME
Surgery team
SICU
Dr. Bermudez
Inpatient
[facility]
Dr. Bermudez
Lety Monson, DO
Dr. Bermudez
Dr. Bowen
[facility]
Transplant surgery
[facility]
SICU

## 2022-08-28 NOTE — CONSULT NOTE ADULT - CONSULT REASON
cholecystitis
Blood in Perc Shasha bag
cholecystitis
ROBBIE
concern for cholangitis
pretransplant cardiac evaluation prior to possible liver transplant
Ascending cholangitis
Shasha Tube Exchange
cirrhosis
possible urethral stricture
Cholecystostomy tube placement and paracentesis
Dental Clearance prior to Liver Transplant
need repeat LVP diag/therapeutic to assess SBP response to ABX, elevated WBC

## 2022-08-28 NOTE — CONSULT NOTE ADULT - CONSULT REQUESTED DATE/TIME
09-Aug-2022 20:58
09-Aug-2022 11:38
22-Aug-2022 10:47
08-Aug-2022 11:54
09-Aug-2022 11:38
15-Aug-2022 18:11
22-Aug-2022 06:00
08-Aug-2022 10:03
08-Aug-2022 18:49
19-Aug-2022
28-Aug-2022 12:06
08-Aug-2022 09:28
16-Aug-2022 06:48

## 2022-08-28 NOTE — CONSULT NOTE ADULT - ASSESSMENT
Assessment: 66y Male for cholecystostomy tube exchange with Dr. Yin on Monday 8/29.    Plan:   -Please place order for IR Procedure, approving attending Dr. Yin  -hold therapeutic/prophylactic anticoagulants  -AM CBC, BMP, and coags  -COVID PCR within 5 days of procedure    Luis Felipe Mccoy MD  PGY-V, Interventional Radiology    For EMERGENT inquiries/questions:  [facility]-[phone]  [facility]-p.[phone] ([phone])    Available on [employer] TEAMS for all non-urgent questions  Non-emergent consults: Please place a sunrise order "Consult-Interventional Radiology" with an appropriate callback number. Assessment: 66y Male for cholecystostomy tube exchange Monday 8/29.    Plan:   -Please place order for IR Procedure, approving attending Al-Jaylin  -hold therapeutic/prophylactic anticoagulants  -AM CBC, BMP, and coags  -COVID PCR within 5 days of procedure    Luis Felipe Mccoy MD  PGY-V, Interventional Radiology    For EMERGENT inquiries/questions:  [facility]-p.[phone]  [facility]-p.[phone] ([phone])    Available on [employer] TEAMS for all non-urgent questions  Non-emergent consults: Please place a sunrise order "Consult-Interventional Radiology" with an appropriate callback number.

## 2022-08-28 NOTE — CONSULT NOTE ADULT - SUBJECTIVE AND OBJECTIVE BOX
Reason for Consult:   History of Present Illness:   65yo M with history of recently diagnosed cirrhosis (MELD 30) and cholangitis s/p ERCP with stent placement (4/2022) s/p stent removal on 7/20 presenting for one week of RUQ abdominal pain. States that one week after his stent was removed, he started experiencing RUQ pain, associated with decreased appetite. Denies nausea or vomiting, fevers or chills. Endorses regular normal bowel movements. Presented to the ED on 7/26 for the pain where he had an US performed which showed a distended gallbladder with stones. CBD 5mm. Patient was discharged home.    In the ED, patient tachycardic to 120s, normotensive. Satting 94% on RA. Labs showed WBC 14, Na 120, K 6.1, HCO3 14, Cr 1.95, tbili 4.7, Alk phos 127, ALT/AST 38/22. CT showed a distended gallbladder with stones in the cystic duct, cirrhosis, ascites, small and large bowel thickening.  (08 Aug 2022 06:20)    Pertinent PMH/PSH:   H/O acute cholangitis  S/P ERCP 4/2022    Allergies: No Known Allergies    Medications:   albumin human 25% IVPB  spironolactone  furosemide   Injectable  magnesium oxide  tamsulosin  fluconAZOLE IVPB  ampicillin/sulbactam  IVPB  penicillin   G benzathine Injectable  pantoprazole    Tablet  senna    Vital Signs:  T(C): 37.1 (08-28-22 @ 09:00), Max: 37.7 (08-27-22 @ 17:00)  HR: 102 (08-28-22 @ 09:00) (100 - 110)  BP: 139/78 (08-28-22 @ 09:00) (130/75 - 155/84)  RR: 18 (08-28-22 @ 09:00) (18 - 18)  SpO2: 97% (08-28-22 @ 09:00) (96% - 98%)    Relevant Lab Results:            7.5  7.44)-----(47     (08-28-22 @ 06:30)         23.1     133 | 98 | 12  --------------------< 106     (08-28-22 @ 06:27)  3.2 | 26 | 0.95       PT: 27.5<H> 08-28-22 @ 06:33  aPTT: 36.2<H> 08-28-22 @ 06:33   INR: 2.35<H> 08-28-22 @ 06:33      Imaging:    Reason for Consult:   History of Present Illness:   67yo M with history of recently diagnosed cirrhosis (MELD 30) and cholangitis s/p ERCP with stent placement (4/2022) s/p stent removal on 7/20 presenting for one week of RUQ abdominal pain. States that one week after his stent was removed, he started experiencing RUQ pain, associated with decreased appetite. Denies nausea or vomiting, fevers or chills. Endorses regular normal bowel movements. Presented to the ED on 7/26 for the pain where he had an US performed which showed a distended gallbladder with stones. CBD 5mm. Patient was discharged home.    In the ED, patient tachycardic to 120s, normotensive. Satting 94% on RA. Labs showed WBC 14, Na 120, K 6.1, HCO3 14, Cr 1.95, tbili 4.7, Alk phos 127, ALT/AST 38/22. CT showed a distended gallbladder with stones in the cystic duct, cirrhosis, ascites, small and large bowel thickening. Cholecystostomy catheter was placed on 8/9/2022.    Pertinent PMH/PSH:   H/O acute cholangitis  S/P ERCP 4/2022    Allergies: No Known Allergies    Medications:   albumin human 25% IVPB  spironolactone  furosemide   Injectable  magnesium oxide  tamsulosin  fluconAZOLE IVPB  ampicillin/sulbactam  IVPB  penicillin   G benzathine Injectable  pantoprazole    Tablet  senna    Vital Signs:  T(C): 37.1 (08-28-22 @ 09:00), Max: 37.7 (08-27-22 @ 17:00)  HR: 102 (08-28-22 @ 09:00) (100 - 110)  BP: 139/78 (08-28-22 @ 09:00) (130/75 - 155/84)  RR: 18 (08-28-22 @ 09:00) (18 - 18)  SpO2: 97% (08-28-22 @ 09:00) (96% - 98%)    Relevant Lab Results:            7.5  7.44)-----(47     (08-28-22 @ 06:30)         23.1     133 | 98 | 12  --------------------< 106     (08-28-22 @ 06:27)  3.2 | 26 | 0.95       PT: 27.5<H> 08-28-22 @ 06:33  aPTT: 36.2<H> 08-28-22 @ 06:33   INR: 2.35<H> 08-28-22 @ 06:33

## 2022-08-28 NOTE — PROGRESS NOTE ADULT - SUBJECTIVE AND OBJECTIVE BOX
Interval Events:   -Listed w/ MELD of 22 yesterday. Updated today to 23  -s/p CTAP (noncontrast) yesterday w/ C tube terminating in decompressed gallbaldder lumen. Mild decrease in moderate hemoperitoneum w/ a few residual locules of gas within the fluid and high density clot in LLQ. Small b/l effusions (only partially seen).   No acute events overnight.  Patient without acute symptoms at this time.       MEDICATIONS  (STANDING):  albumin human 25% IVPB 100 milliLiter(s) IV Intermittent every 12 hours  ampicillin/sulbactam  IVPB 3 Gram(s) IV Intermittent every 6 hours  chlorhexidine 2% Cloths 1 Application(s) Topical <User Schedule>  fluconAZOLE IVPB 400 milliGRAM(s) IV Intermittent every 24 hours  furosemide   Injectable 40 milliGRAM(s) IV Push three times a day  magnesium oxide 400 milliGRAM(s) Oral two times a day with meals  pantoprazole    Tablet 40 milliGRAM(s) Oral before breakfast  penicillin   G benzathine Injectable 2.4 Million Unit(s) IntraMuscular <User Schedule>  polyethylene glycol 3350 17 Gram(s) Oral two times a day  senna 2 Tablet(s) Oral at bedtime  spironolactone 100 milliGRAM(s) Oral two times a day  tamsulosin 0.4 milliGRAM(s) Oral at bedtime    MEDICATIONS  (PRN):      PAST MEDICAL & SURGICAL HISTORY:  H/O acute cholangitis      2019 novel coronavirus disease (COVID-19)  12/24/2021  no hospitalization, no treatment      S/P ERCP  4/2022      H/O colonoscopy          Vital Signs Last 24 Hrs  T(C): 37.1 (28 Aug 2022 09:00), Max: 37.7 (27 Aug 2022 17:00)  T(F): 98.8 (28 Aug 2022 09:00), Max: 99.9 (27 Aug 2022 17:00)  HR: 102 (28 Aug 2022 09:00) (100 - 110)  BP: 139/78 (28 Aug 2022 09:00) (130/75 - 155/84)  BP(mean): --  RR: 18 (28 Aug 2022 09:00) (18 - 18)  SpO2: 97% (28 Aug 2022 09:00) (96% - 98%)    Parameters below as of 28 Aug 2022 09:00  Patient On (Oxygen Delivery Method): room air        I&O's Summary    27 Aug 2022 07:01  -  28 Aug 2022 07:00  --------------------------------------------------------  IN: 700 mL / OUT: 2385 mL / NET: -1685 mL    28 Aug 2022 07:01  -  28 Aug 2022 12:08  --------------------------------------------------------  IN: 0 mL / OUT: 650 mL / NET: -650 mL                              7.5    7.44  )-----------( 47       ( 28 Aug 2022 06:30 )             23.1     08-28    133<L>  |  98  |  12  ----------------------------<  106<H>  3.2<L>   |  26  |  0.95    Ca    8.1<L>      28 Aug 2022 06:27  Phos  2.6     08-28  Mg     1.6     08-28    TPro  6.5  /  Alb  2.4<L>  /  TBili  2.8<H>  /  DBili  x   /  AST  39  /  ALT  15  /  AlkPhos  95  08-28          Culture - Fungal, Body Fluid (collected 08-25-22 @ 16:45)  Source: Peritoneal Peritoneal Fluid  Preliminary Report (08-26-22 @ 07:41):    Testing in progress    Culture - Body Fluid with Gram Stain (collected 08-25-22 @ 16:45)  Source: Peritoneal Peritoneal Fluid  Gram Stain (08-26-22 @ 04:21):    polymorphonuclear leukocytes seen    No organisms seen    by cytocentrifuge  Preliminary Report (08-27-22 @ 09:58):    No growth          ROS:   12 point review of systems performed and negative except otherwise noted in HPI.    PE:  GENERAL: no acute distress  NEURO: alert  HEENT: NCAT, no conjunctival pallor appreciated  CHEST: no respiratory distress, no accessory muscle use  CARDIAC: regular rate, +S1/S2  ABDOMEN: soft, nontender, no rebound or guarding. C tube w/ serous output.   EXTREMITIES: warm, well perfused  SKIN: no lesions noted    DIAGNOSTICS:  WBC      Hg       PLT      Na       K        CO2     BUN      Cr       ALT      AST      TB       ALP  ------   ------   ------   134      3.4      28       16       0.95     14       37       2.8      83       08-26-22 @ 06:16  6.92     7.4      60       ------   ------   ------   ------   ------   ------   ------   ------   ------   08-26-22 @ 06:12  ------   ------   ------   135      3.7      27       19       0.98     15       45       3.0      92       08-25-22 @ 06:53  8.49     7.7      86       ------   ------   ------   ------   ------   ------   ------   ------   ------   08-25-22 @ 06:51  10.57    8.5      130      ------   ------   ------   ------   ------   ------   ------   ------   ------   08-24-22 @ 06:38    PT             INR            MELDwith  MELDw/o  27.3           2.35           --             --             08-26-22 @ 06:12  25.8           2.23           --             --             08-25-22 @ 06:51  24.1           2.06           --             --             08-24-22 @ 06:36  26.5           2.29           --             --             08-23-22 @ 06:30  25.8           2.23           --             --             08-22-22 @ 06:53

## 2022-08-29 LAB
ALBUMIN SERPL ELPH-MCNC: 2.7 G/DL — LOW (ref 3.3–5)
ALP SERPL-CCNC: 75 U/L — SIGNIFICANT CHANGE UP (ref 40–120)
ALT FLD-CCNC: 15 U/L — SIGNIFICANT CHANGE UP (ref 10–45)
ANION GAP SERPL CALC-SCNC: 8 MMOL/L — SIGNIFICANT CHANGE UP (ref 5–17)
APTT BLD: 36.3 SEC — HIGH (ref 27.5–35.5)
AST SERPL-CCNC: 36 U/L — SIGNIFICANT CHANGE UP (ref 10–40)
BASOPHILS # BLD AUTO: 0.03 K/UL — SIGNIFICANT CHANGE UP (ref 0–0.2)
BASOPHILS NFR BLD AUTO: 0.4 % — SIGNIFICANT CHANGE UP (ref 0–2)
BILIRUB SERPL-MCNC: 3.3 MG/DL — HIGH (ref 0.2–1.2)
BLD GP AB SCN SERPL QL: NEGATIVE — SIGNIFICANT CHANGE UP
BUN SERPL-MCNC: 10 MG/DL — SIGNIFICANT CHANGE UP (ref 7–23)
CALCIUM SERPL-MCNC: 8.1 MG/DL — LOW (ref 8.4–10.5)
CHLORIDE SERPL-SCNC: 97 MMOL/L — SIGNIFICANT CHANGE UP (ref 96–108)
CO2 SERPL-SCNC: 28 MMOL/L — SIGNIFICANT CHANGE UP (ref 22–31)
CREAT SERPL-MCNC: 0.91 MG/DL — SIGNIFICANT CHANGE UP (ref 0.5–1.3)
EGFR: 94 ML/MIN/1.73M2 — SIGNIFICANT CHANGE UP
EOSINOPHIL # BLD AUTO: 0.18 K/UL — SIGNIFICANT CHANGE UP (ref 0–0.5)
EOSINOPHIL NFR BLD AUTO: 2.3 % — SIGNIFICANT CHANGE UP (ref 0–6)
GLUCOSE SERPL-MCNC: 100 MG/DL — HIGH (ref 70–99)
HCT VFR BLD CALC: 22.9 % — LOW (ref 39–50)
HGB BLD-MCNC: 7.5 G/DL — LOW (ref 13–17)
IMM GRANULOCYTES NFR BLD AUTO: 0.4 % — SIGNIFICANT CHANGE UP (ref 0–1.5)
INR BLD: 2.25 RATIO — HIGH (ref 0.88–1.16)
LYMPHOCYTES # BLD AUTO: 1.09 K/UL — SIGNIFICANT CHANGE UP (ref 1–3.3)
LYMPHOCYTES # BLD AUTO: 14.2 % — SIGNIFICANT CHANGE UP (ref 13–44)
MAGNESIUM SERPL-MCNC: 1.5 MG/DL — LOW (ref 1.6–2.6)
MCHC RBC-ENTMCNC: 31.4 PG — SIGNIFICANT CHANGE UP (ref 27–34)
MCHC RBC-ENTMCNC: 32.8 GM/DL — SIGNIFICANT CHANGE UP (ref 32–36)
MCV RBC AUTO: 95.8 FL — SIGNIFICANT CHANGE UP (ref 80–100)
MONOCYTES # BLD AUTO: 0.93 K/UL — HIGH (ref 0–0.9)
MONOCYTES NFR BLD AUTO: 12.1 % — SIGNIFICANT CHANGE UP (ref 2–14)
NEUTROPHILS # BLD AUTO: 5.42 K/UL — SIGNIFICANT CHANGE UP (ref 1.8–7.4)
NEUTROPHILS NFR BLD AUTO: 70.6 % — SIGNIFICANT CHANGE UP (ref 43–77)
NON-GYNECOLOGICAL CYTOLOGY STUDY: SIGNIFICANT CHANGE UP
NRBC # BLD: 0 /100 WBCS — SIGNIFICANT CHANGE UP (ref 0–0)
PHOSPHATE SERPL-MCNC: 2.5 MG/DL — SIGNIFICANT CHANGE UP (ref 2.5–4.5)
PLATELET # BLD AUTO: 51 K/UL — LOW (ref 150–400)
POTASSIUM SERPL-MCNC: 3.4 MMOL/L — LOW (ref 3.5–5.3)
POTASSIUM SERPL-SCNC: 3.4 MMOL/L — LOW (ref 3.5–5.3)
PROT SERPL-MCNC: 6.7 G/DL — SIGNIFICANT CHANGE UP (ref 6–8.3)
PROTHROM AB SERPL-ACNC: 26.1 SEC — HIGH (ref 10.5–13.4)
RBC # BLD: 2.39 M/UL — LOW (ref 4.2–5.8)
RBC # FLD: 18.6 % — HIGH (ref 10.3–14.5)
RH IG SCN BLD-IMP: POSITIVE — SIGNIFICANT CHANGE UP
SODIUM SERPL-SCNC: 133 MMOL/L — LOW (ref 135–145)
WBC # BLD: 7.68 K/UL — SIGNIFICANT CHANGE UP (ref 3.8–10.5)
WBC # FLD AUTO: 7.68 K/UL — SIGNIFICANT CHANGE UP (ref 3.8–10.5)

## 2022-08-29 PROCEDURE — 47531 INJECTION FOR CHOLANGIOGRAM: CPT

## 2022-08-29 PROCEDURE — 99232 SBSQ HOSP IP/OBS MODERATE 35: CPT | Mod: GC

## 2022-08-29 RX ORDER — FUROSEMIDE 40 MG
80 TABLET ORAL
Refills: 0 | Status: DISCONTINUED | OUTPATIENT
Start: 2022-08-30 | End: 2022-09-01

## 2022-08-29 RX ORDER — SPIRONOLACTONE 25 MG/1
100 TABLET, FILM COATED ORAL
Refills: 0 | Status: COMPLETED | OUTPATIENT
Start: 2022-08-29 | End: 2022-08-29

## 2022-08-29 RX ORDER — SPIRONOLACTONE 25 MG/1
200 TABLET, FILM COATED ORAL
Refills: 0 | Status: DISCONTINUED | OUTPATIENT
Start: 2022-08-30 | End: 2022-09-02

## 2022-08-29 RX ORDER — FUROSEMIDE 40 MG
40 TABLET ORAL
Refills: 0 | Status: COMPLETED | OUTPATIENT
Start: 2022-08-29 | End: 2022-08-29

## 2022-08-29 RX ADMIN — SPIRONOLACTONE 100 MILLIGRAM(S): 25 TABLET, FILM COATED ORAL at 05:18

## 2022-08-29 RX ADMIN — Medication 50 MILLILITER(S): at 05:28

## 2022-08-29 RX ADMIN — AMPICILLIN SODIUM AND SULBACTAM SODIUM 200 GRAM(S): 250; 125 INJECTION, POWDER, FOR SUSPENSION INTRAMUSCULAR; INTRAVENOUS at 11:44

## 2022-08-29 RX ADMIN — TAMSULOSIN HYDROCHLORIDE 0.4 MILLIGRAM(S): 0.4 CAPSULE ORAL at 21:01

## 2022-08-29 RX ADMIN — AMPICILLIN SODIUM AND SULBACTAM SODIUM 200 GRAM(S): 250; 125 INJECTION, POWDER, FOR SUSPENSION INTRAMUSCULAR; INTRAVENOUS at 17:08

## 2022-08-29 RX ADMIN — Medication 40 MILLIGRAM(S): at 05:18

## 2022-08-29 RX ADMIN — SPIRONOLACTONE 100 MILLIGRAM(S): 25 TABLET, FILM COATED ORAL at 17:09

## 2022-08-29 RX ADMIN — Medication 40 MILLIGRAM(S): at 17:08

## 2022-08-29 RX ADMIN — CHLORHEXIDINE GLUCONATE 1 APPLICATION(S): 213 SOLUTION TOPICAL at 05:20

## 2022-08-29 RX ADMIN — MAGNESIUM OXIDE 400 MG ORAL TABLET 400 MILLIGRAM(S): 241.3 TABLET ORAL at 17:09

## 2022-08-29 RX ADMIN — AMPICILLIN SODIUM AND SULBACTAM SODIUM 200 GRAM(S): 250; 125 INJECTION, POWDER, FOR SUSPENSION INTRAMUSCULAR; INTRAVENOUS at 05:17

## 2022-08-29 RX ADMIN — FLUCONAZOLE 100 MILLIGRAM(S): 150 TABLET ORAL at 17:09

## 2022-08-29 RX ADMIN — POLYETHYLENE GLYCOL 3350 17 GRAM(S): 17 POWDER, FOR SOLUTION ORAL at 17:08

## 2022-08-29 NOTE — PROGRESS NOTE ADULT - SUBJECTIVE AND OBJECTIVE BOX
Interval Events:   No acute events overnight.  Patient without acute symptoms at this time.  Planning to go for exchange of perc rodney drain today.    ROS:   12 point review of systems performed and negative except otherwise noted in HPI.    Hospital Medications:  ampicillin/sulbactam  IVPB 3 Gram(s) IV Intermittent every 6 hours  chlorhexidine 2% Cloths 1 Application(s) Topical <User Schedule>  fluconAZOLE IVPB 400 milliGRAM(s) IV Intermittent every 24 hours  furosemide   Injectable 40 milliGRAM(s) IV Push three times a day  magnesium oxide 400 milliGRAM(s) Oral two times a day with meals  pantoprazole    Tablet 40 milliGRAM(s) Oral before breakfast  penicillin   G benzathine Injectable 2.4 Million Unit(s) IntraMuscular <User Schedule>  polyethylene glycol 3350 17 Gram(s) Oral two times a day  senna 2 Tablet(s) Oral at bedtime  spironolactone 100 milliGRAM(s) Oral two times a day  tamsulosin 0.4 milliGRAM(s) Oral at bedtime    MAR over past 24 hours:    albumin human 25% IVPB   50 mL/Hr IV Intermittent (08-29-22 @ 05:28)   50 mL/Hr IV Intermittent (08-28-22 @ 17:43)    ampicillin/sulbactam  IVPB   200 mL/Hr IV Intermittent (08-29-22 @ 05:17)   200 mL/Hr IV Intermittent (08-28-22 @ 23:34)   200 mL/Hr IV Intermittent (08-28-22 @ 16:57)   200 mL/Hr IV Intermittent (08-28-22 @ 11:13)    chlorhexidine 2% Cloths   1 Application(s) Topical (08-29-22 @ 05:20)    fluconAZOLE IVPB   100 mL/Hr IV Intermittent (08-28-22 @ 16:57)    furosemide   Injectable   40 milliGRAM(s) IV Push (08-29-22 @ 05:18)   40 milliGRAM(s) IV Push (08-28-22 @ 22:02)   40 milliGRAM(s) IV Push (08-28-22 @ 13:02)    magnesium oxide   400 milliGRAM(s) Oral (08-28-22 @ 16:58)    senna   2 Tablet(s) Oral (08-28-22 @ 22:02)    spironolactone   100 milliGRAM(s) Oral (08-29-22 @ 05:18)   100 milliGRAM(s) Oral (08-28-22 @ 16:58)    tamsulosin   0.4 milliGRAM(s) Oral (08-28-22 @ 22:06)      Home Medications:  Last Order Reconciliation Date: 08-08-22 @ 06:30 (Admission Reconciliation)    PHYSICAL EXAM:   Vital Signs last 24 hours:  T(F): 98.5 (08-29-22 @ 09:21), Max: 99.2 (08-29-22 @ 01:00)  HR: 97 (08-29-22 @ 09:21) (97 - 106)  BP: 154/82 (08-29-22 @ 09:21) (115/78 - 156/83)  BP(mean): --  ABP: --  ABP(mean): --  RR: 18 (08-29-22 @ 09:21) (18 - 18)  SpO2: 96% (08-29-22 @ 09:21) (94% - 99%)    I&Os:    08-28-22 @ 07:01  -  08-29-22 @ 07:00  --------------------------------------------------------  IN:    Oral Fluid: 980 mL  Total IN: 980 mL    OUT:    T-Tube (mL): 40 mL    Voided (mL): 2500 mL  Total OUT: 2540 mL    Total NET: -1560 mL        BMI (kg/m2): 29.3 (08-09-22 @ 14:59)  GENERAL: no acute distress  NEURO: alert  HEENT: NCAT, no conjunctival pallor appreciated  CHEST: no respiratory distress, no accessory muscle use  CARDIAC: regular rate, +S1/S2  ABDOMEN: soft, distended, no rebound or guarding  EXTREMITIES: warm, well perfused  SKIN: no lesions noted    DIAGNOSTICS:  WBC      Hg       PLT      Na       K        CO2     BUN      Cr       ALT      AST      TB       ALP  7.68     7.5      51       ------   ------   ------   ------   ------   ------   ------   ------   ------   08-29-22 @ 06:33  ------   ------   ------   133      3.4      28       10       0.91     15       36       3.3      75       08-29-22 @ 06:30  7.44     7.5      47       ------   ------   ------   ------   ------   ------   ------   ------   ------   08-28-22 @ 06:30  ------   ------   ------   133      3.2      26       12       0.95     15       39       2.8      95       08-28-22 @ 06:27  ------   ------   ------   133      3.4      28       14       0.88     15       41       2.5      89       08-27-22 @ 06:38    PT             INR            MELDwith  MELDw/o  26.1           2.25           --             --             08-29-22 @ 06:32  27.5           2.35           --             --             08-28-22 @ 06:33  25.9           2.23           --             --             08-27-22 @ 06:36  27.3           2.35           --             --             08-26-22 @ 06:12  25.8           2.23           --             --             08-25-22 @ 06:51   Interval Events:   No acute events overnight.  Patient without acute symptoms at this time.  Planning to go for exchange of perc rodney drain today.    ROS:   12 point review of systems performed and negative except otherwise noted in HPI.    Hospital Medications:  ampicillin/sulbactam  IVPB 3 Gram(s) IV Intermittent every 6 hours  chlorhexidine 2% Cloths 1 Application(s) Topical <User Schedule>  fluconAZOLE IVPB 400 milliGRAM(s) IV Intermittent every 24 hours  furosemide   Injectable 40 milliGRAM(s) IV Push three times a day  magnesium oxide 400 milliGRAM(s) Oral two times a day with meals  pantoprazole    Tablet 40 milliGRAM(s) Oral before breakfast  penicillin   G benzathine Injectable 2.4 Million Unit(s) IntraMuscular <User Schedule>  polyethylene glycol 3350 17 Gram(s) Oral two times a day  senna 2 Tablet(s) Oral at bedtime  spironolactone 100 milliGRAM(s) Oral two times a day  tamsulosin 0.4 milliGRAM(s) Oral at bedtime    MAR over past 24 hours:    albumin human 25% IVPB   50 mL/Hr IV Intermittent (08-29-22 @ 05:28)   50 mL/Hr IV Intermittent (08-28-22 @ 17:43)    ampicillin/sulbactam  IVPB   200 mL/Hr IV Intermittent (08-29-22 @ 05:17)   200 mL/Hr IV Intermittent (08-28-22 @ 23:34)   200 mL/Hr IV Intermittent (08-28-22 @ 16:57)   200 mL/Hr IV Intermittent (08-28-22 @ 11:13)    chlorhexidine 2% Cloths   1 Application(s) Topical (08-29-22 @ 05:20)    fluconAZOLE IVPB   100 mL/Hr IV Intermittent (08-28-22 @ 16:57)    furosemide   Injectable   40 milliGRAM(s) IV Push (08-29-22 @ 05:18)   40 milliGRAM(s) IV Push (08-28-22 @ 22:02)   40 milliGRAM(s) IV Push (08-28-22 @ 13:02)    magnesium oxide   400 milliGRAM(s) Oral (08-28-22 @ 16:58)    senna   2 Tablet(s) Oral (08-28-22 @ 22:02)    spironolactone   100 milliGRAM(s) Oral (08-29-22 @ 05:18)   100 milliGRAM(s) Oral (08-28-22 @ 16:58)    tamsulosin   0.4 milliGRAM(s) Oral (08-28-22 @ 22:06)      Home Medications:  Last Order Reconciliation Date: 08-08-22 @ 06:30 (Admission Reconciliation)    PHYSICAL EXAM:   Vital Signs last 24 hours:  T(F): 98.5 (08-29-22 @ 09:21), Max: 99.2 (08-29-22 @ 01:00)  HR: 97 (08-29-22 @ 09:21) (97 - 106)  BP: 154/82 (08-29-22 @ 09:21) (115/78 - 156/83)  BP(mean): --  ABP: --  ABP(mean): --  RR: 18 (08-29-22 @ 09:21) (18 - 18)  SpO2: 96% (08-29-22 @ 09:21) (94% - 99%)    I&Os:    08-28-22 @ 07:01  -  08-29-22 @ 07:00  --------------------------------------------------------  IN:    Oral Fluid: 980 mL  Total IN: 980 mL    OUT:    T-Tube (mL): 40 mL    Voided (mL): 2500 mL  Total OUT: 2540 mL    Total NET: -1560 mL        BMI (kg/m2): 29.3 (08-09-22 @ 14:59)  GENERAL: no acute distress  NEURO: alert  HEENT: NCAT, +sclera faintly icteric  CHEST: no respiratory distress, no accessory muscle use  CARDIAC: regular rate, +S1/S2  ABDOMEN: soft, moderately distended, no rebound or guarding, +RUQ cholecystostomy tube with scant output  EXTREMITIES: warm, well perfused, +edema to BLE to thighs, +dependent lower abdominal/flank edema  SKIN: no lesions noted    DIAGNOSTICS:  WBC      Hg       PLT      Na       K        CO2     BUN      Cr       ALT      AST      TB       ALP  7.68     7.5      51       ------   ------   ------   ------   ------   ------   ------   ------   ------   08-29-22 @ 06:33  ------   ------   ------   133      3.4      28       10       0.91     15       36       3.3      75       08-29-22 @ 06:30  7.44     7.5      47       ------   ------   ------   ------   ------   ------   ------   ------   ------   08-28-22 @ 06:30  ------   ------   ------   133      3.2      26       12       0.95     15       39       2.8      95       08-28-22 @ 06:27  ------   ------   ------   133      3.4      28       14       0.88     15       41       2.5      89       08-27-22 @ 06:38    PT             INR            MELDwith  MELDw/o  26.1           2.25           --             --             08-29-22 @ 06:32  27.5           2.35           --             --             08-28-22 @ 06:33  25.9           2.23           --             --             08-27-22 @ 06:36  27.3           2.35           --             --             08-26-22 @ 06:12  25.8           2.23           --             --             08-25-22 @ 06:51

## 2022-08-29 NOTE — CHART NOTE - NSCHARTNOTEFT_GEN_A_CORE
Nutrition Follow Up Note  Patient seen for malnutrition follow up     Chart reviewed, events noted.    Source: [x] Patient       [x] EMR        [] RN        [] Family at bedside       [] Other:    -If unable to interview patient: [] Trach/Vent/BiPAP  [] Disoriented/confused/inappropriate to interview as per chart     Pt NPO today, reports previously fair appetite and PO intake, unable to recall further details. States "there is just so much I can eat". Reprots drinking Ensure half bottle/day. Denies difficulty chewing/swallowing. Denies nausea or vomiting. Reports diarrhea, likely due to lactulose, last BM today ().     Discussed the importance of adequate kcal and protein intake with recommendations to optimize. Pt denies having further questions/concerns about diet and nutrition - made aware RD remains available.     Diet Order:   Diet, NPO after Midnight:      NPO Start Date: 28-Aug-2022,   NPO Start Time: 23:59  Except Medications (22)  Diet, Regular:   1000mL Fluid Restriction ([id number])  Supplement Feeding Modality:  Oral  Ensure Enlive Cans or Servings Per Day:  1       Frequency:  Three Times a day (22)  Pt receiving Ensure Plus High Protein (nutritionally similar to Ensure Enlive - currently unavailable from ; 350 kcal and 20 g protein).     Weights:   Daily Weight in k (), Weight in k.6 (), Weight in k.1 (), Weight in k.3 (), Weight in k (), Weight in k.3 (), Weight in k.4 () -?accuracy of weight fluctuations as pt with edema and ascites.     Nutritionally Pertinent MEDICATIONS  (STANDING):  ampicillin/sulbactam  IVPB  fluconAZOLE IVPB  furosemide   Injectable  magnesium oxide  pantoprazole    Tablet  penicillin   G benzathine Injectable  polyethylene glycol 3350  senna  spironolactone  tamsulosin    Pertinent Labs: ( @ 06:30): Na 133<L>, BUN 10, Cr 0.91, <H>, K+ 3.4<L>, Phos 2.5, Mg 1.5<L>, Alk Phos 75, ALT/SGPT 15, AST/SGOT 36    A1C with Estimated Average Glucose Result: 4.9 % (22 @ 00:35)    Skin per nursing documentation: no pressure injuries.   Edema as per flow sheets: +2 lázaro. foot.     Estimated Needs:   [x] no change since previous assessment  [] recalculated:     Previous Nutrition Diagnoses:   [x] Malnutrition; moderate   [x] Food and Nutrition Related Knowledge Deficit    Nutrition Diagnoses are: [x] ongoing - being addressed with PO intake encouragement, nutritional supplement, and nutrition therapy education.    New Nutrition Diagnosis: [x] Not applicable    Nutrition Care Plan:  [x] In Progress    Nutrition Interventions:     Education Provided:       [x] Yes  [] No    Recommendations:      1. Consider low salt diet once medically feasible in setting of ascites + fluids to team, currently 1000 ml fluid restriction. Will continue to monitor as able and adjust as needed.   2. Continue Ensure Enlive 3xday to optimize kcal and protein intake.   3. Once applicable, encourage PO intake and honor food preferences.   4. Recommend Multivitamin if medically feasible to optimize nutrient intake.   5. Discussed the importance of adequate kcal and protein intake with recommendations to optimize.   6. Continue to obtain weights as able to identify changes if any.     Monitoring and Evaluation:   Continue to monitor nutritional intake, tolerance to diet prescription, weights, labs, skin integrity    RD remains available upon request and will follow up per protocol  Yvette Herrera MS RDN CDN [facility] CCTD #[phone] Nutrition Follow Up Note  Patient seen for malnutrition follow up     Pt 65 y/o M with PMH as per chart: decompensated cirrhosis and cholangitis S/P ERCP with stent placement (2022) S/P stent removal on () (along sphincterotomy and stone extraction), presented for one week of RUQ abdominal pain associated with new abdominal distension and decreased appetite, septic shock 2/2 acute cholecystitis vs ascending cholangitis, E coli and Streptococcus anginosis secondary bacterial peritonitis, ROBBIE required hemodialysis with renal recovery, ascites: increase in ascites from mild to moderate on current imaging, S/P paracentesis (); liver transplantation evaluation opened - MELD-Na = 24 on (); S/P IR tube check (), planning for tube exchange today ().      Pt seen by PT on () for liver frailty assessment - pt scored 4.78 indicating frail.     Source: [x] Patient       [x] EMR        [] RN        [] Family at bedside       [] Other:    -If unable to interview patient: [] Trach/Vent/BiPAP  [] Disoriented/confused/inappropriate to interview as per chart     Pt NPO today, reports previously fair appetite and PO intake, unable to recall further details. States "there is just so much I can eat". Reports drinking Ensure half bottle/day. Denies difficulty chewing/swallowing. Denies nausea or vomiting. Reports diarrhea, likely due to lactulose, last BM today ().     Discussed the importance of adequate kcal and protein intake with recommendations to optimize. Pt denies having further questions/concerns about diet and nutrition - made aware RD remains available.     Diet Order:   Diet, NPO after Midnight:      NPO Start Date: 28-Aug-2022,   NPO Start Time: 23:59  Except Medications (22)  Diet, Regular:   1000mL Fluid Restriction ([id number])  Supplement Feeding Modality:  Oral  Ensure Enlive Cans or Servings Per Day:  1       Frequency:  Three Times a day (22)  Pt receiving Ensure Plus High Protein (nutritionally similar to Ensure Enlive - currently unavailable from ; 350 kcal and 20 g protein).     Weights:   Daily Weight in k (), Weight in k.6 (), Weight in k.1 (), Weight in k.3 (-), Weight in k (-), Weight in k.3 (-24), Weight in k.4 () -?accuracy of weight fluctuations as pt with edema and ascites.     Nutritionally Pertinent MEDICATIONS  (STANDING):  ampicillin/sulbactam  IVPB  fluconAZOLE IVPB  furosemide   Injectable  magnesium oxide  pantoprazole    Tablet  penicillin   G benzathine Injectable  polyethylene glycol 3350  senna  spironolactone  tamsulosin    Pertinent Labs: ( @ 06:30): Na 133<L>, BUN 10, Cr 0.91, <H>, K+ 3.4<L>, Phos 2.5, Mg 1.5<L>, Alk Phos 75, ALT/SGPT 15, AST/SGOT 36    A1C with Estimated Average Glucose Result: 4.9 % (22 @ 00:35)    Skin per nursing documentation: no pressure injuries.   Edema as per flow sheets: +2 lázaro. foot.     Estimated Needs:   [x] no change since previous assessment  [] recalculated:     Previous Nutrition Diagnoses:   [x] Malnutrition; moderate   [x] Food and Nutrition Related Knowledge Deficit    Nutrition Diagnoses are: [x] ongoing - being addressed with PO intake encouragement, nutritional supplement, and nutrition therapy education.    New Nutrition Diagnosis: [x] Not applicable    Nutrition Care Plan:  [x] In Progress    Nutrition Interventions:     Education Provided:       [x] Yes  [] No    Recommendations:      1. Consider low salt diet once medically feasible in setting of ascites + fluids to team, currently 1000 ml fluid restriction. Will continue to monitor as able and adjust as needed.   2. Continue Ensure Enlive 3xday to optimize kcal and protein intake.   3. Once applicable, encourage PO intake and honor food preferences.   4. Recommend Multivitamin if medically feasible to optimize nutrient intake.   5. Discussed the importance of adequate kcal and protein intake with recommendations to optimize.   6. Continue to obtain weights as able to identify changes if any.     Monitoring and Evaluation:   Continue to monitor nutritional intake, tolerance to diet prescription, weights, labs, skin integrity    RD remains available upon request and will follow up per protocol  Yvette Herrera MS RDN CDN [facility] CCTD #[phone]

## 2022-08-29 NOTE — PROGRESS NOTE ADULT - ASSESSMENT
66 year old male with decompensated cirrhosis and cholangitis s/p ERCP with stent placement (4/2022) s/p stent removal on 7/20 (along sphincterotomy and stone extraction) presenting for one week of RUQ abdominal pain associated with new abdominal distension and decreased appetite.     #Septic shock 2/2 acute cholecystitis vs ascending cholangitis  #E coli and Streptococcus anginosis secondary bacterial peritonitis  #Hemoperitoneum with free locules of air on CT imaging 8/18/2022  #ROBBIE required hemodialysis with renal recovery: initial urine sodium 9, likely indicating prerenal ROBBIE vs less likely HRS given initial presentation as above  #History of cholangitis s/p biliary stent placement April 2022 and removal 7/20/2022, now s/p perc rodney tube  #Decompensated ALD/PARNELL cirrhosis c/b ascites  EV: normal esophagus on ERCP from 4/2022  Ascites: increase in ascites from mild to moderate on current imaging  SBP: paracentesis 8/9/2022 c/w peritonitis given 99K PMNs however elevated LDH more indicative of secondary bacterial peritonitis  HE: none currently       -MELD-Na = 24 on 8/29/2022       -Ascites: yes       -SBP: paracentesis 8/9/2022 reveals likely secondary bacterial peritonitis from suspected gallbladder pathology       -Varices: no mention of varices on EGD/ERCP on 7/20/2022       -Hepatic encephalopathy: none, Ammonia, Serum: 38 umol/L [11 - 55] (08-12-22 @ 00:21)       -HCC: unknown       -LT candidacy and evaluation:            [x] Psychosocial: cleared pending RPP            [ ] Infectious            [x] Cardiology:                    Cardiac cath: n/a                   Stress test: negative noninvasive stress test on 8/19/2022 for inducible ischemia            [ ] Colonoscopy: needed, f/u records from outside GI colonoscopy reports            [x] Prostate: Prostate Specific Antigen: negative at 2.21 ng/mL on 8/12/2022            [x] Dental            [x] PT/Frailty    Recommendations:  -trend clinical symptoms, exam findings, vital signs, CBC, CMP, INR  -s/p percutaneous cholecystostomy as above  -likely secondary bacterial peritonitis based on paracentesis and clinical findings, CT A/P negative for perforation however performed without contrast 2/2 renal function; s/p repeat paracentesis 8/12/2022, 8/16/2022, and 8/25/2022 with significant PMN count; appreciate ID recommendations  -liver transplantation evaluation opened  -PPI daily for stress prophylaxis  -appreciate transplant nephrology consultation, initiated hemodialysis 8/11/2022, now off HD with renal recovery; added spironolactone 100mg daily, transition furosemide to 40mg BID PO  -appreciate surgery recommendations regarding hemoperitoneum  -s/p IR tube check 8/22/2022, planning for tube exchange today  -hopeful for transition from IV diuretics to oral Lasix 80mg/Aldactone 200mg tomorrow  -appreciate ID recommendations regarding ABx duration, planning for meropenem --> Augmentin  -plan for egd/colon vs virtual colonoscopy pending clinical course [inpatient vs outpatient]  -PT as able    Note incomplete until finalized by attending signature/attestation.    Fadi Churchill  GI/Hepatology Fellow    MONDAY-FRIDAY 8AM-5PM:  Pager# [phone] ([facility]) or [phone] ([facility])    NON-URGENT CONSULTS:  Please email [email or web address] OR [email or web address]  AT NIGHT AND ON WEEKENDS:  Contact on-call GI fellow via answering service ([phone]) from 5pm-8am and on weekends/holidays         66 year old male with decompensated cirrhosis and cholangitis s/p ERCP with stent placement (4/2022) s/p stent removal on 7/20 (along sphincterotomy and stone extraction) presenting for one week of RUQ abdominal pain associated with new abdominal distension and decreased appetite.     #Septic shock 2/2 acute cholecystitis vs ascending cholangitis  #E coli and Streptococcus anginosis secondary bacterial peritonitis  #Hemoperitoneum with free locules of air on CT imaging 8/18/2022  #ROBBIE required hemodialysis with renal recovery: initial urine sodium 9, likely indicating prerenal ROBBIE vs less likely HRS given initial presentation as above  #History of cholangitis s/p biliary stent placement April 2022 and removal 7/20/2022, now s/p perc rodney tube  #Decompensated ALD/PARNELL cirrhosis c/b ascites  EV: normal esophagus on ERCP from 4/2022  Ascites: increase in ascites from mild to moderate on current imaging  SBP: paracentesis 8/9/2022 c/w peritonitis given 99K PMNs however elevated LDH more indicative of secondary bacterial peritonitis  HE: none currently       -MELD-Na = 24 on 8/29/2022       -Ascites: yes       -SBP: paracentesis 8/9/2022 reveals likely secondary bacterial peritonitis from suspected gallbladder pathology       -Varices: no mention of varices on EGD/ERCP on 7/20/2022       -Hepatic encephalopathy: none, Ammonia, Serum: 38 umol/L [11 - 55] (08-12-22 @ 00:21)       -HCC: unknown       -LT candidacy and evaluation: listed for transplant            [x] Psychosocial: cleared pending RPP            [ ] Infectious            [x] Cardiology:                    Cardiac cath: n/a                   Stress test: negative noninvasive stress test on 8/19/2022 for inducible ischemia            [ ] Colonoscopy: needed, f/u records from outside GI colonoscopy reports            [x] Prostate: Prostate Specific Antigen: negative at 2.21 ng/mL on 8/12/2022            [x] Dental            [x] PT/Frailty    Recommendations:  -s/p percutaneous cholecystostomy exchange today  - change furosemide to 80 mg po daily starting tomorrow  - change spironolactone to 200 mg po daily starting tomorrow  -PPI daily for stress prophylaxis  - appreciate ID recommendations regarding ABx duration, planning for meropenem --> Augmentin  -PT as able    Note incomplete until finalized by attending signature/attestation.    Fadi Churchill  GI/Hepatology Fellow    MONDAY-FRIDAY 8AM-5PM:  Pager# [phone] ([facility]) or [phone] ([facility])    NON-URGENT CONSULTS:  Please email [email or web address] OR [email or web address]  AT NIGHT AND ON WEEKENDS:  Contact on-call GI fellow via answering service ([phone]) from 5pm-8am and on weekends/holidays

## 2022-08-29 NOTE — PROGRESS NOTE ADULT - ATTENDING COMMENTS
67 yo M with decompensated cirrhosis possibly secondary to ALD/PARNELL (with last reported alcohol in 4/2022) and history of cholangitis s/p ERCP with stent placement (4/2022) with subsequent repeat ERCP with stent removal, sphincterotomy, and balloon sweep removal of sludge/stones (7/20/22), admitted with severe sepsis with associated oliguric ROBBIE (resolved) and lactic acidosis (resolved) secondary to acute and likely perforated cholecystitis with secondary peritonitis, s/p percutaneous cholecystostomy tube placement (8/9). Cultures from ascitic fluid (8/9 and 8/16) grew E. coli and Streptococcus anginosis, with repeat ascitic fluid culture from 8/25 pending. Ascitic fluid PMN count had partially improved from 8/9 to 8/16 but most recent fluid studies difficult to interpret in context of interval hemoperitoneum (initially seen on CT 8/18) that has since stabilized. Anticipate that he will need a repeat paracentesis later this week for re-evaluation. S/p ceftriaxone (8/8), s/p Zosyn (8/8-16), s/p vancomycin (8/10), and currently on Unasyn (8/16- ) and fluconazole (8/19- ) with Transplant ID following, with tentative plan to switch to Augmentin when ready for discharge. End date for antibiotics not yet determined. Anasarca improving with diuresis and will tentatively switch to furosemide 80 mg po daily and spironolactone 200 mg po daily tomorrow. He is wait-listed for liver transplant, ABO O with MELD-Na 23 today, but on internal hold pending ID clearance to proceed with transplant.    Laron Harper M.D., Ph.D.  Transplant Hepatology

## 2022-08-30 LAB
ALBUMIN SERPL ELPH-MCNC: 2.9 G/DL — LOW (ref 3.3–5)
ALP SERPL-CCNC: 86 U/L — SIGNIFICANT CHANGE UP (ref 40–120)
ALT FLD-CCNC: 15 U/L — SIGNIFICANT CHANGE UP (ref 10–45)
ANION GAP SERPL CALC-SCNC: 12 MMOL/L — SIGNIFICANT CHANGE UP (ref 5–17)
APPEARANCE UR: CLEAR — SIGNIFICANT CHANGE UP
APTT BLD: 36.7 SEC — HIGH (ref 27.5–35.5)
AST SERPL-CCNC: 38 U/L — SIGNIFICANT CHANGE UP (ref 10–40)
BILIRUB SERPL-MCNC: 3.1 MG/DL — HIGH (ref 0.2–1.2)
BILIRUB UR-MCNC: NEGATIVE — SIGNIFICANT CHANGE UP
BUN SERPL-MCNC: 10 MG/DL — SIGNIFICANT CHANGE UP (ref 7–23)
CALCIUM SERPL-MCNC: 8.3 MG/DL — LOW (ref 8.4–10.5)
CHLORIDE SERPL-SCNC: 99 MMOL/L — SIGNIFICANT CHANGE UP (ref 96–108)
CO2 SERPL-SCNC: 25 MMOL/L — SIGNIFICANT CHANGE UP (ref 22–31)
COLOR SPEC: SIGNIFICANT CHANGE UP
CREAT SERPL-MCNC: 0.88 MG/DL — SIGNIFICANT CHANGE UP (ref 0.5–1.3)
DIFF PNL FLD: NEGATIVE — SIGNIFICANT CHANGE UP
EGFR: 96 ML/MIN/1.73M2 — SIGNIFICANT CHANGE UP
GLUCOSE SERPL-MCNC: 117 MG/DL — HIGH (ref 70–99)
GLUCOSE UR QL: NEGATIVE — SIGNIFICANT CHANGE UP
HCT VFR BLD CALC: 22.1 % — LOW (ref 39–50)
HGB BLD-MCNC: 7.2 G/DL — LOW (ref 13–17)
INR BLD: 2.38 RATIO — HIGH (ref 0.88–1.16)
KETONES UR-MCNC: NEGATIVE — SIGNIFICANT CHANGE UP
LEUKOCYTE ESTERASE UR-ACNC: NEGATIVE — SIGNIFICANT CHANGE UP
MAGNESIUM SERPL-MCNC: 1.5 MG/DL — LOW (ref 1.6–2.6)
MCHC RBC-ENTMCNC: 31.4 PG — SIGNIFICANT CHANGE UP (ref 27–34)
MCHC RBC-ENTMCNC: 32.6 GM/DL — SIGNIFICANT CHANGE UP (ref 32–36)
MCV RBC AUTO: 96.5 FL — SIGNIFICANT CHANGE UP (ref 80–100)
NITRITE UR-MCNC: NEGATIVE — SIGNIFICANT CHANGE UP
NRBC # BLD: 0 /100 WBCS — SIGNIFICANT CHANGE UP (ref 0–0)
PH UR: 7 — SIGNIFICANT CHANGE UP (ref 5–8)
PHOSPHATE SERPL-MCNC: 2.1 MG/DL — LOW (ref 2.5–4.5)
PLATELET # BLD AUTO: 52 K/UL — LOW (ref 150–400)
POTASSIUM SERPL-MCNC: 3.2 MMOL/L — LOW (ref 3.5–5.3)
POTASSIUM SERPL-SCNC: 3.2 MMOL/L — LOW (ref 3.5–5.3)
PROT SERPL-MCNC: 6.8 G/DL — SIGNIFICANT CHANGE UP (ref 6–8.3)
PROT UR-MCNC: SIGNIFICANT CHANGE UP
PROTHROM AB SERPL-ACNC: 27.8 SEC — HIGH (ref 10.5–13.4)
RBC # BLD: 2.29 M/UL — LOW (ref 4.2–5.8)
RBC # FLD: 19.2 % — HIGH (ref 10.3–14.5)
SODIUM SERPL-SCNC: 136 MMOL/L — SIGNIFICANT CHANGE UP (ref 135–145)
SP GR SPEC: 1.01 — SIGNIFICANT CHANGE UP (ref 1.01–1.02)
UROBILINOGEN FLD QL: NEGATIVE — SIGNIFICANT CHANGE UP
WBC # BLD: 9.67 K/UL — SIGNIFICANT CHANGE UP (ref 3.8–10.5)
WBC # FLD AUTO: 9.67 K/UL — SIGNIFICANT CHANGE UP (ref 3.8–10.5)

## 2022-08-30 PROCEDURE — 99233 SBSQ HOSP IP/OBS HIGH 50: CPT

## 2022-08-30 PROCEDURE — 99232 SBSQ HOSP IP/OBS MODERATE 35: CPT

## 2022-08-30 PROCEDURE — 99232 SBSQ HOSP IP/OBS MODERATE 35: CPT | Mod: GC

## 2022-08-30 RX ORDER — SODIUM,POTASSIUM PHOSPHATES 278-250MG
1 POWDER IN PACKET (EA) ORAL ONCE
Refills: 0 | Status: COMPLETED | OUTPATIENT
Start: 2022-08-30 | End: 2022-08-30

## 2022-08-30 RX ORDER — MAGNESIUM SULFATE 500 MG/ML
2 VIAL (ML) INJECTION ONCE
Refills: 0 | Status: COMPLETED | OUTPATIENT
Start: 2022-08-30 | End: 2022-08-30

## 2022-08-30 RX ADMIN — FLUCONAZOLE 100 MILLIGRAM(S): 150 TABLET ORAL at 17:20

## 2022-08-30 RX ADMIN — AMPICILLIN SODIUM AND SULBACTAM SODIUM 200 GRAM(S): 250; 125 INJECTION, POWDER, FOR SUSPENSION INTRAMUSCULAR; INTRAVENOUS at 00:46

## 2022-08-30 RX ADMIN — PANTOPRAZOLE SODIUM 40 MILLIGRAM(S): 20 TABLET, DELAYED RELEASE ORAL at 08:44

## 2022-08-30 RX ADMIN — SPIRONOLACTONE 200 MILLIGRAM(S): 25 TABLET, FILM COATED ORAL at 05:35

## 2022-08-30 RX ADMIN — AMPICILLIN SODIUM AND SULBACTAM SODIUM 200 GRAM(S): 250; 125 INJECTION, POWDER, FOR SUSPENSION INTRAMUSCULAR; INTRAVENOUS at 17:20

## 2022-08-30 RX ADMIN — Medication 25 GRAM(S): at 12:54

## 2022-08-30 RX ADMIN — MAGNESIUM OXIDE 400 MG ORAL TABLET 400 MILLIGRAM(S): 241.3 TABLET ORAL at 17:20

## 2022-08-30 RX ADMIN — Medication 80 MILLIGRAM(S): at 05:34

## 2022-08-30 RX ADMIN — MAGNESIUM OXIDE 400 MG ORAL TABLET 400 MILLIGRAM(S): 241.3 TABLET ORAL at 08:44

## 2022-08-30 RX ADMIN — AMPICILLIN SODIUM AND SULBACTAM SODIUM 200 GRAM(S): 250; 125 INJECTION, POWDER, FOR SUSPENSION INTRAMUSCULAR; INTRAVENOUS at 12:53

## 2022-08-30 RX ADMIN — CHLORHEXIDINE GLUCONATE 1 APPLICATION(S): 213 SOLUTION TOPICAL at 05:35

## 2022-08-30 RX ADMIN — AMPICILLIN SODIUM AND SULBACTAM SODIUM 200 GRAM(S): 250; 125 INJECTION, POWDER, FOR SUSPENSION INTRAMUSCULAR; INTRAVENOUS at 05:35

## 2022-08-30 RX ADMIN — TAMSULOSIN HYDROCHLORIDE 0.4 MILLIGRAM(S): 0.4 CAPSULE ORAL at 21:11

## 2022-08-30 RX ADMIN — Medication 1 PACKET(S): at 12:53

## 2022-08-30 RX ADMIN — SENNA PLUS 2 TABLET(S): 8.6 TABLET ORAL at 21:11

## 2022-08-30 RX ADMIN — AMPICILLIN SODIUM AND SULBACTAM SODIUM 200 GRAM(S): 250; 125 INJECTION, POWDER, FOR SUSPENSION INTRAMUSCULAR; INTRAVENOUS at 23:07

## 2022-08-30 RX ADMIN — POLYETHYLENE GLYCOL 3350 17 GRAM(S): 17 POWDER, FOR SOLUTION ORAL at 17:20

## 2022-08-30 NOTE — PROGRESS NOTE ADULT - ASSESSMENT
66 year old male with decompensated cirrhosis and cholangitis s/p ERCP with stent placement (4/2022) s/p stent removal on 7/20 (along sphincterotomy and stone extraction) presenting for one week of RUQ abdominal pain associated with new abdominal distension and decreased appetite.     #Septic shock 2/2 acute cholecystitis vs ascending cholangitis  #E coli and Streptococcus anginosis secondary bacterial peritonitis  #Hemoperitoneum with free locules of air on CT imaging 8/18/2022  #ROBBIE required hemodialysis with renal recovery: initial urine sodium 9, likely indicating prerenal ROBBIE vs less likely HRS given initial presentation as above  #History of cholangitis s/p biliary stent placement April 2022 and removal 7/20/2022, now s/p perc rodney tube  #Decompensated ALD/PARNELL cirrhosis c/b ascites  EV: normal esophagus on ERCP from 4/2022  Ascites: increase in ascites from mild to moderate on current imaging  SBP: paracentesis 8/9/2022 c/w peritonitis given 99K PMNs however elevated LDH more indicative of secondary bacterial peritonitis  HE: none currently       -MELD-Na = 24 on 8/30/2022       -Ascites: yes       -SBP: paracentesis 8/9/2022 reveals likely secondary bacterial peritonitis from suspected gallbladder pathology       -Varices: no mention of varices on EGD/ERCP on 7/20/2022       -Hepatic encephalopathy: none, Ammonia, Serum: 38 umol/L [11 - 55] (08-12-22 @ 00:21)       -HCC: unknown       -LT candidacy and evaluation: listed for transplant            [x] Psychosocial: cleared pending RPP            [ ] Infectious            [x] Cardiology:                    Cardiac cath: n/a                   Stress test: negative noninvasive stress test on 8/19/2022 for inducible ischemia            [ ] Colonoscopy: needed, f/u records from outside GI colonoscopy reports            [x] Prostate: Prostate Specific Antigen: negative at 2.21 ng/mL on 8/12/2022            [x] Dental            [x] PT/Frailty    Recommendations:  -s/p percutaneous cholecystostomy exchange today  -change furosemide to 80 mg po daily, in addition to spironolactone to 200 mg po daily  -PPI daily for stress prophylaxis  -appreciate ID recommendations regarding ABx duration, planning for meropenem --> Augmentin  -f/u repeat infectious workup following fever overnight  -PT as able    Note incomplete until finalized by attending signature/attestation.    Fadi Churchill  GI/Hepatology Fellow    MONDAY-FRIDAY 8AM-5PM:  Pager# [phone] ([facility]) or [phone] ([facility])    NON-URGENT CONSULTS:  Please email [email or web address]  AT NIGHT AND ON WEEKENDS:  Contact on-call GI fellow via answering service ([phone]) from 5pm-8am and on weekends/holidays

## 2022-08-30 NOTE — PROGRESS NOTE ADULT - SUBJECTIVE AND OBJECTIVE BOX
Interval Events:   Perc rodney tube exchanged yesterday.  Febrile 100.4 overnight.    ROS:   12 point review of systems performed and negative except otherwise noted in HPI.    Hospital Medications:  ampicillin/sulbactam  IVPB 3 Gram(s) IV Intermittent every 6 hours  chlorhexidine 2% Cloths 1 Application(s) Topical <User Schedule>  fluconAZOLE IVPB 400 milliGRAM(s) IV Intermittent every 24 hours  furosemide   Injectable 80 milliGRAM(s) IV Push <User Schedule>  magnesium oxide 400 milliGRAM(s) Oral two times a day with meals  pantoprazole    Tablet 40 milliGRAM(s) Oral before breakfast  penicillin   G benzathine Injectable 2.4 Million Unit(s) IntraMuscular <User Schedule>  polyethylene glycol 3350 17 Gram(s) Oral two times a day  senna 2 Tablet(s) Oral at bedtime  spironolactone 200 milliGRAM(s) Oral <User Schedule>  tamsulosin 0.4 milliGRAM(s) Oral at bedtime    MAR over past 24 hours:    ampicillin/sulbactam  IVPB   200 mL/Hr IV Intermittent (08-30-22 @ 05:35)   200 mL/Hr IV Intermittent (08-30-22 @ 00:46)   200 mL/Hr IV Intermittent (08-29-22 @ 17:08)   200 mL/Hr IV Intermittent (08-29-22 @ 11:44)    chlorhexidine 2% Cloths   1 Application(s) Topical (08-30-22 @ 05:35)    fluconAZOLE IVPB   100 mL/Hr IV Intermittent (08-29-22 @ 17:09)    furosemide   Injectable   40 milliGRAM(s) IV Push (08-29-22 @ 17:08)    furosemide   Injectable   80 milliGRAM(s) IV Push (08-30-22 @ 05:34)    magnesium oxide   400 milliGRAM(s) Oral (08-30-22 @ 08:44)   400 milliGRAM(s) Oral (08-29-22 @ 17:09)    pantoprazole    Tablet   40 milliGRAM(s) Oral (08-30-22 @ 08:44)    polyethylene glycol 3350   17 Gram(s) Oral (08-29-22 @ 17:08)    spironolactone   100 milliGRAM(s) Oral (08-29-22 @ 17:09)    spironolactone   200 milliGRAM(s) Oral (08-30-22 @ 05:35)    tamsulosin   0.4 milliGRAM(s) Oral (08-29-22 @ 21:01)      Home Medications:  Last Order Reconciliation Date: 08-08-22 @ 06:30 (Admission Reconciliation)    PHYSICAL EXAM:   Vital Signs last 24 hours:  T(F): 99.8 (08-30-22 @ 09:00), Max: 100.4 (08-30-22 @ 01:00)  HR: 106 (08-30-22 @ 09:00) (100 - 115)  BP: 150/79 (08-30-22 @ 09:00) (139/78 - 164/86)  BP(mean): --  ABP: --  ABP(mean): --  RR: 18 (08-30-22 @ 09:00) (18 - 18)  SpO2: 96% (08-30-22 @ 09:00) (95% - 98%)    I&Os:    08-29-22 @ 07:01  -  08-30-22 @ 07:00  --------------------------------------------------------  IN:    IV PiggyBack: 500 mL    Oral Fluid: 1100 mL  Total IN: 1600 mL    OUT:    T-Tube (mL): 145 mL    Voided (mL): 2100 mL  Total OUT: 2245 mL    Total NET: -645 mL      08-30-22 @ 07:01  -  08-30-22 @ 09:34  --------------------------------------------------------  IN:  Total IN: 0 mL    OUT:    T-Tube (mL): 50 mL    Voided (mL): 400 mL  Total OUT: 450 mL    Total NET: -450 mL        BMI (kg/m2): 29.3 (08-09-22 @ 14:59)  GENERAL: no acute distress  NEURO: alert  HEENT: NCAT, no conjunctival pallor appreciated  CHEST: no respiratory distress, no accessory muscle use  CARDIAC: regular rate, +S1/S2  ABDOMEN: drain in place with sanguinous drainage, distended, nontender, no rebound or guarding  EXTREMITIES: warm, well perfused  SKIN: no lesions noted    DIAGNOSTICS:  WBC      Hg       PLT      Na       K        CO2     BUN      Cr       ALT      AST      TB       ALP  ------   ------   ------   136      3.2      25       10       0.88     15       38       3.1      86       08-30-22 @ 06:13  9.67     7.2      52       ------   ------   ------   ------   ------   ------   ------   ------   ------   08-30-22 @ 06:12  7.68     7.5      51       ------   ------   ------   ------   ------   ------   ------   ------   ------   08-29-22 @ 06:33  ------   ------   ------   133      3.4      28       10       0.91     15       36       3.3      75       08-29-22 @ 06:30  7.44     7.5      47       ------   ------   ------   ------   ------   ------   ------   ------   ------   08-28-22 @ 06:30    PT             INR            MELDwith  MELDw/o  27.8           2.38           --             --             08-30-22 @ 06:13  26.1           2.25           --             --             08-29-22 @ 06:32  27.5           2.35           --             --             08-28-22 @ 06:33  25.9           2.23           --             --             08-27-22 @ 06:36  27.3           2.35           --             --             08-26-22 @ 06:12

## 2022-08-30 NOTE — PROGRESS NOTE ADULT - ASSESSMENT
67 y/o M PMHx cholangitis/cholecystitis (s/p ERCP w/ biliary stent placement 04/2022) and recently diagnosed etOH cirrhosis, presents with RUQ pain following biliary stent removal on 7/20/22. Found to have distended GB with wall thickening and stone in cystic duct, concerning for cholecystitis. Admitted to SICU for monitoring, s/p perc rodney and paracentesis by IR on 8/9. Paracentesis results consistent with SBP. Course now c/b ARF.     s/p Perc Rodney Tube and Paracentesis  Paracentesis cell counts suggestive of Peritonitis  Cultures thus far with E coli and Streptococcus on Perc Rodney Drainage and Ascites drainage  Concerning with cholecystitis with associated perforation    Paracentesis (8/9) 160,713 with 62% PMN  Paracentesis (8/12) 8,344 with 97% PMN  Paracentesis (8/16) 53,930 with 92% PMN  Paracentesis (8/25) with 62,700 nucleated cells but unable to run differential     #Fever  s/p cholecystostomy tube exchange on 8/29.   Febrile on 8/30  ?Reactive following manipulation of gallbladder  U/A (8/30) without pyuria  --Continue Unasyn as below  --Continue to follow CBC with diff  --Continue to follow temperature curve  --Follow up on preliminary blood cultures    #Peritonitis, Cholecystitis, SBP  Ascites Cultures (8/16) Rare E coli and Few Strep anginosis  E coli is still susceptible to Unasyn  CT A/P (8/18) Moderate volume of abdominopelvic fluid with new findings of hemoperitoneum. Clot appears most concentrated within the left lower quadrant, suggesting that a source of bleed is potentially in this location.   CT Chest (8/18) Ground glass opacities in the right upper lobe. Moderate bilateral pleural effusions.   Suspect GGO on CT is representative of pulmonary edema  Repeat Paracentesis (8/25) with 62,700 nucleated cells but unable to run differential   --Continue Unasyn 3g IV Q6H (anticipate Augmentin on discharge)    #Late Latent Syphilis  Denies knowledge of preceding diagnosis  No preceding treatment  Will treat as late latent syphilis  --Continue IM Benzathine Penicillin 2.4 million units Q7Days x 3 doses (End Date: 9/1/22)    #Pre-Liver Transplant Evaluation  --ID clearance for OLT pending resolution of peritonitis but no other contraindications  --Follow up on repeat Quantiferon Gold (delay in QuantiFeron given backorder on reagents)    I will continue to follow. Please feel free to contact me with any further questions.    Bladimir Nix M.D.  [facility] Division of Infectious Disease  8AM-5PM Monday - Friday: Available on [employer] Teams  After Hours and Holidays (or if no response on [employer] Teams): Please contact the Infectious Diseases Office at [phone]    The above assessment and plan were discussed with transplant surgery team

## 2022-08-30 NOTE — PROGRESS NOTE ADULT - SUBJECTIVE AND OBJECTIVE BOX
Interventional Radiology Follow-Up Note    This is a 66y Male s/p cholecystostomy tube exchange on 8/29 in Interventional Radiology with Dr. Chinchilla.     S: Patient seen and examined @ bedside. No complaints offered. Denies abdominal pain.     Medication:     ampicillin/sulbactam  IVPB: (08-30)  fluconAZOLE IVPB: (08-29)  furosemide   Injectable: (08-29)  furosemide   Injectable: (08-29)  furosemide   Injectable: (08-30)  spironolactone: (08-29)  spironolactone: (08-30)  spironolactone: (08-29)  tamsulosin: (08-29)    Vitals:  T(F): 99.8, Max: 100.4 (01:00)  HR: 102  BP: 161/85  RR: 18  SpO2: 96%    Physical Exam:  General: Nontoxic, in NAD, A&O x3.  Abdomen: soft, NTND, no peritoneal signs.  Drain Device: Drain intact attached to bag with bilious output. Dressing clean, dry, intact.    LABS:  WBC -- / Hgb -- / Hct -- / Plt --  Na 136 / K 3.2 / CO2 25 / Cl 99 / BUN 10 / Cr 0.88 / Glucose 117  ALT 15 / AST 38 / Alk Phos 86 / Tbili 3.1  Ptt 36.7 / Pt 27.8 / INR 2.38      24hr Drain output: 95cc    Assessment/Plan:  66y Male s/p cholecystostomy drain on 8/9 s/p multiple paracentesis c/b hemoperitoneum.  Most recently s/p cholecystostomy tube exchange on 8/29.     -h/h stable, monitor h/h; transfuse as needed  -trend vs/labs  -flush drain with 5cc NS daily forward only; DO NOT aspirate  -change dressing q3 days or when dressing is saturated  -regarding outpatient follow up with IR, if the patient is d/c home with drainage catheter they can make an appointment with IR by calling the IR booking office at [phone]; recommend IR follow in 6wks for tube evaluation.  -they will benefit from VNS service to help with drainage catheter care; they should continue same drainage catheter care as an outpatient.  -continue global management per primary team   -Greater than 50% of the encounter was spent counseling and/ or coordination of care on drain care and follow up.   -Please call IR at extension [phone] with any questions, concerns, or issues regarding above.      Teresa Segura PA-C  Available on teams

## 2022-08-30 NOTE — PROGRESS NOTE ADULT - SUBJECTIVE AND OBJECTIVE BOX
Follow Up:      Interval History:    REVIEW OF SYSTEMS  [  ] ROS unobtainable because:    [  ] All other systems negative except as noted below    Constitutional:  [ ] fever [ ] chills  [ ] weight loss  [ ] weakness  Skin:  [ ] rash [ ] phlebitis	  Eyes: [ ] icterus [ ] pain  [ ] discharge	  ENMT: [ ] sore throat  [ ] thrush [ ] ulcers [ ] exudates  Respiratory: [ ] dyspnea [ ] hemoptysis [ ] cough [ ] sputum	  Cardiovascular:  [ ] chest pain [ ] palpitations [ ] edema	  Gastrointestinal:  [ ] nausea [ ] vomiting [ ] diarrhea [ ] constipation [ ] pain	  Genitourinary:  [ ] dysuria [ ] frequency [ ] hematuria [ ] discharge [ ] flank pain  [ ] incontinence  Musculoskeletal:  [ ] myalgias [ ] arthralgias [ ] arthritis  [ ] back pain  Neurological:  [ ] headache [ ] seizures  [ ] confusion/altered mental status    Allergies  No Known Allergies        ANTIMICROBIALS:  ampicillin/sulbactam  IVPB 3 every 6 hours  fluconAZOLE IVPB 400 every 24 hours  penicillin   G benzathine Injectable 2.4 <User Schedule>      OTHER MEDS:  MEDICATIONS  (STANDING):  furosemide   Injectable 80 <User Schedule>  pantoprazole    Tablet 40 before breakfast  polyethylene glycol 3350 17 two times a day  senna 2 at bedtime  spironolactone 200 <User Schedule>  tamsulosin 0.4 at bedtime      Vital Signs Last 24 Hrs  T(C): 37.6 (30 Aug 2022 17:38), Max: 38 (30 Aug 2022 01:00)  T(F): 99.7 (30 Aug 2022 17:38), Max: 100.4 (30 Aug 2022 01:00)  HR: 106 (30 Aug 2022 17:38) (102 - 115)  BP: 159/83 (30 Aug 2022 17:38) (139/78 - 161/85)  BP(mean): --  RR: 18 (30 Aug 2022 17:38) (18 - 18)  SpO2: 95% (30 Aug 2022 17:38) (95% - 96%)    Parameters below as of 30 Aug 2022 17:38  Patient On (Oxygen Delivery Method): room air        PHYSICAL EXAMINATION:  General: Alert and Awake, NAD  HEENT: PERRL, EOMI  Neck: Supple  Cardiac: RRR, No M/R/G  Resp: CTAB, No Wh/Rh/Ra  Abdomen: NBS, NT/ND, No HSM, No rigidity or guarding  MSK: No LE edema. No Calf tenderness  : No milian  Skin: No rashes or lesions. Skin is warm and dry to the touch.   Neuro: Alert and Awake. CN 2-12 Grossly intact. Moves all four extremities spontaneously.  Psych: Calm, Pleasant, Cooperative                          7.2    9.67  )-----------( 52       ( 30 Aug 2022 06:12 )             22.1           136  |  99  |  10  ----------------------------<  117<H>  3.2<L>   |  25  |  0.88    Ca    8.3<L>      30 Aug 2022 06:13  Phos  2.1       Mg     1.5         TPro  6.8  /  Alb  2.9<L>  /  TBili  3.1<H>  /  DBili  x   /  AST  38  /  ALT  15  /  AlkPhos  86        Urinalysis Basic - ( 30 Aug 2022 11:44 )    Color: Light Yellow / Appearance: Clear / S.013 / pH: x  Gluc: x / Ketone: Negative  / Bili: Negative / Urobili: Negative   Blood: x / Protein: Trace / Nitrite: Negative   Leuk Esterase: Negative / RBC: x / WBC x   Sq Epi: x / Non Sq Epi: x / Bacteria: x        MICROBIOLOGY:  v  Peritoneal Peritoneal Fluid  22   No growth  --    polymorphonuclear leukocytes seen  No organisms seen  by cytocentrifuge      Peritoneal Peritoneal Fluid  22   Rare Escherichia coli  Few Streptococcus anginosus  --  Escherichia coli  Streptococcus anginosus      .Blood Blood-Peripheral  22   No Growth Final  --  --      Peritoneal Peritoneal Fluid  22   No growth  --    polymorphonuclear leukocytes seen  No organisms seen  by cytocentrifuge      .Blood Blood  22   No Growth Final  --  --      Peritoneal Peritoneal Fluid  22   Few Escherichia coli  Moderate Streptococcus anginosus "Susceptibilities not performed"  --  Escherichia coli      Catheterized Catheterized  22   10,000 - 49,000 CFU/mL Escherichia coli  --  Escherichia coli      .Blood Blood-Peripheral  22   No Growth Final  --  --      .Blood Blood-Peripheral  22   No Growth Final  --  --        CMV IgG Antibody: 4.00 U/mL (22 @ 00:32)          RADIOLOGY:    <The imaging below has been reviewed and visualized by me independently. Findings as detailed in report below> Follow Up:  Fever    Interval History: s/p cholecystostomy tube exchange on . Febrile overnight. no other focal symptoms.     REVIEW OF SYSTEMS  [  ] ROS unobtainable because:    [ x ] All other systems negative except as noted below    Constitutional:  [ x] fever [ ] chills  [ ] weight loss  [ ] weakness  Skin:  [ ] rash [ ] phlebitis	  Eyes: [ ] icterus [ ] pain  [ ] discharge	  ENMT: [ ] sore throat  [ ] thrush [ ] ulcers [ ] exudates  Respiratory: [ ] dyspnea [ ] hemoptysis [ ] cough [ ] sputum	  Cardiovascular:  [ ] chest pain [ ] palpitations [ ] edema	  Gastrointestinal:  [ ] nausea [ ] vomiting [ ] diarrhea [ ] constipation [ x ] pain	  Genitourinary:  [ ] dysuria [ ] frequency [ ] hematuria [ ] discharge [ ] flank pain  [ ] incontinence  Musculoskeletal:  [ ] myalgias [ ] arthralgias [ ] arthritis  [ ] back pain  Neurological:  [ ] headache [ ] seizures  [ ] confusion/altered mental status    Allergies  No Known Allergies        ANTIMICROBIALS:  ampicillin/sulbactam  IVPB 3 every 6 hours  fluconAZOLE IVPB 400 every 24 hours  penicillin   G benzathine Injectable 2.4 <User Schedule>      OTHER MEDS:  MEDICATIONS  (STANDING):  furosemide   Injectable 80 <User Schedule>  pantoprazole    Tablet 40 before breakfast  polyethylene glycol 3350 17 two times a day  senna 2 at bedtime  spironolactone 200 <User Schedule>  tamsulosin 0.4 at bedtime      Vital Signs Last 24 Hrs  T(C): 37.6 (30 Aug 2022 17:38), Max: 38 (30 Aug 2022 01:00)  T(F): 99.7 (30 Aug 2022 17:38), Max: 100.4 (30 Aug 2022 01:00)  HR: 106 (30 Aug 2022 17:38) (102 - 115)  BP: 159/83 (30 Aug 2022 17:38) (139/78 - 161/85)  BP(mean): --  RR: 18 (30 Aug 2022 17:38) (18 - 18)  SpO2: 95% (30 Aug 2022 17:38) (95% - 96%)    Parameters below as of 30 Aug 2022 17:38  Patient On (Oxygen Delivery Method): room air    PHYSICAL EXAMINATION:  General: Alert and Awake, NAD  Cardiac: RRR, No M/R/G  Resp: CTAB, No Wh/Rh/Ra  Abdomen: +Hepatobiliary Drain, Distended, No HSM, No rigidity or guarding  MSK: No LE edema. No Calf tenderness  Skin: No rashes or lesions. Skin is warm and dry to the touch.   Neuro: Alert and Awake. CN 2-12 Grossly intact. Moves all four extremities spontaneously.  Psych: Calm, Pleasant, Cooperative                          7.2    9.67  )-----------( 52       ( 30 Aug 2022 06:12 )             22.1           136  |  99  |  10  ----------------------------<  117<H>  3.2<L>   |  25  |  0.88    Ca    8.3<L>      30 Aug 2022 06:13  Phos  2.1       Mg     1.5         TPro  6.8  /  Alb  2.9<L>  /  TBili  3.1<H>  /  DBili  x   /  AST  38  /  ALT  15  /  AlkPhos  86        Urinalysis Basic - ( 30 Aug 2022 11:44 )    Color: Light Yellow / Appearance: Clear / S.013 / pH: x  Gluc: x / Ketone: Negative  / Bili: Negative / Urobili: Negative   Blood: x / Protein: Trace / Nitrite: Negative   Leuk Esterase: Negative / RBC: x / WBC x   Sq Epi: x / Non Sq Epi: x / Bacteria: x        MICROBIOLOGY:  v  Peritoneal Peritoneal Fluid  22   No growth  --    polymorphonuclear leukocytes seen  No organisms seen  by cytocentrifuge      Peritoneal Peritoneal Fluid  22   Rare Escherichia coli  Few Streptococcus anginosus  --  Escherichia coli  Streptococcus anginosus      .Blood Blood-Peripheral  22   No Growth Final  --  --      Peritoneal Peritoneal Fluid  22   No growth  --    polymorphonuclear leukocytes seen  No organisms seen  by cytocentrifuge      .Blood Blood  22   No Growth Final  --  --      Peritoneal Peritoneal Fluid  22   Few Escherichia coli  Moderate Streptococcus anginosus "Susceptibilities not performed"  --  Escherichia coli      Catheterized Catheterized  22   10,000 - 49,000 CFU/mL Escherichia coli  --  Escherichia coli      .Blood Blood-Peripheral  22   No Growth Final  --  --      .Blood Blood-Peripheral  22   No Growth Final  --  --    CMV IgG Antibody: 4.00 U/mL (22 @ 00:32)    RADIOLOGY:    <The imaging below has been reviewed and visualized by me independently. Findings as detailed in report below>    < from: CT Abdomen and Pelvis No Cont (22 @ 17:43) >  IMPRESSION:  Slightly malpositioned cholecystostomy tube, as above.    < end of copied text >

## 2022-08-31 ENCOUNTER — NON-APPOINTMENT (OUTPATIENT)
Age: 67
End: 2022-08-31

## 2022-08-31 ENCOUNTER — RESULT REVIEW (OUTPATIENT)
Age: 67
End: 2022-08-31

## 2022-08-31 LAB
ALBUMIN FLD-MCNC: 1.5 G/DL — SIGNIFICANT CHANGE UP
ALBUMIN SERPL ELPH-MCNC: 2.7 G/DL — LOW (ref 3.3–5)
ALP SERPL-CCNC: 80 U/L — SIGNIFICANT CHANGE UP (ref 40–120)
ALT FLD-CCNC: 13 U/L — SIGNIFICANT CHANGE UP (ref 10–45)
ANION GAP SERPL CALC-SCNC: 8 MMOL/L — SIGNIFICANT CHANGE UP (ref 5–17)
APTT BLD: 36.5 SEC — HIGH (ref 27.5–35.5)
AST SERPL-CCNC: 38 U/L — SIGNIFICANT CHANGE UP (ref 10–40)
B PERT IGG+IGM PNL SER: ABNORMAL
BASOPHILS # BLD AUTO: 0.04 K/UL — SIGNIFICANT CHANGE UP (ref 0–0.2)
BASOPHILS NFR BLD AUTO: 0.3 % — SIGNIFICANT CHANGE UP (ref 0–2)
BILIRUB SERPL-MCNC: 3.1 MG/DL — HIGH (ref 0.2–1.2)
BUN SERPL-MCNC: 10 MG/DL — SIGNIFICANT CHANGE UP (ref 7–23)
CALCIUM SERPL-MCNC: 8 MG/DL — LOW (ref 8.4–10.5)
CHLORIDE SERPL-SCNC: 97 MMOL/L — SIGNIFICANT CHANGE UP (ref 96–108)
CO2 SERPL-SCNC: 27 MMOL/L — SIGNIFICANT CHANGE UP (ref 22–31)
COLOR FLD: SIGNIFICANT CHANGE UP
CREAT SERPL-MCNC: 0.82 MG/DL — SIGNIFICANT CHANGE UP (ref 0.5–1.3)
CULTURE RESULTS: NO GROWTH — SIGNIFICANT CHANGE UP
CULTURE RESULTS: SIGNIFICANT CHANGE UP
EGFR: 98 ML/MIN/1.73M2 — SIGNIFICANT CHANGE UP
EOSINOPHIL # BLD AUTO: 0.11 K/UL — SIGNIFICANT CHANGE UP (ref 0–0.5)
EOSINOPHIL NFR BLD AUTO: 0.9 % — SIGNIFICANT CHANGE UP (ref 0–6)
FLUID INTAKE SUBSTANCE CLASS: SIGNIFICANT CHANGE UP
GLUCOSE FLD-MCNC: 39 MG/DL — SIGNIFICANT CHANGE UP
GLUCOSE SERPL-MCNC: 112 MG/DL — HIGH (ref 70–99)
HCT VFR BLD CALC: 21.7 % — LOW (ref 39–50)
HGB BLD-MCNC: 7.1 G/DL — LOW (ref 13–17)
IMM GRANULOCYTES NFR BLD AUTO: 0.8 % — SIGNIFICANT CHANGE UP (ref 0–1.5)
INR BLD: 2.35 RATIO — HIGH (ref 0.88–1.16)
LDH SERPL L TO P-CCNC: SIGNIFICANT CHANGE UP U/L
LYMPHOCYTES # BLD AUTO: 1.47 K/UL — SIGNIFICANT CHANGE UP (ref 1–3.3)
LYMPHOCYTES # BLD AUTO: 11.5 % — LOW (ref 13–44)
LYMPHOCYTES # FLD: 2 % — SIGNIFICANT CHANGE UP
MAGNESIUM SERPL-MCNC: 2 MG/DL — SIGNIFICANT CHANGE UP (ref 1.6–2.6)
MCHC RBC-ENTMCNC: 31 PG — SIGNIFICANT CHANGE UP (ref 27–34)
MCHC RBC-ENTMCNC: 32.7 GM/DL — SIGNIFICANT CHANGE UP (ref 32–36)
MCV RBC AUTO: 94.8 FL — SIGNIFICANT CHANGE UP (ref 80–100)
MONOCYTES # BLD AUTO: 1.07 K/UL — HIGH (ref 0–0.9)
MONOCYTES NFR BLD AUTO: 8.4 % — SIGNIFICANT CHANGE UP (ref 2–14)
MONOS+MACROS # FLD: 4 % — SIGNIFICANT CHANGE UP
NEUTROPHILS # BLD AUTO: 10 K/UL — HIGH (ref 1.8–7.4)
NEUTROPHILS NFR BLD AUTO: 78.1 % — HIGH (ref 43–77)
NEUTROPHILS-BODY FLUID: 94 % — SIGNIFICANT CHANGE UP
NRBC # BLD: 0 /100 WBCS — SIGNIFICANT CHANGE UP (ref 0–0)
PHOSPHATE SERPL-MCNC: 2.2 MG/DL — LOW (ref 2.5–4.5)
PLATELET # BLD AUTO: 60 K/UL — LOW (ref 150–400)
POTASSIUM SERPL-MCNC: 3.3 MMOL/L — LOW (ref 3.5–5.3)
POTASSIUM SERPL-SCNC: 3.3 MMOL/L — LOW (ref 3.5–5.3)
PROT FLD-MCNC: 4.4 G/DL — SIGNIFICANT CHANGE UP
PROT SERPL-MCNC: 6.7 G/DL — SIGNIFICANT CHANGE UP (ref 6–8.3)
PROTHROM AB SERPL-ACNC: 27.3 SEC — HIGH (ref 10.5–13.4)
RBC # BLD: 2.29 M/UL — LOW (ref 4.2–5.8)
RBC # FLD: 19.5 % — HIGH (ref 10.3–14.5)
RCV VOL RI: HIGH /UL (ref 0–0)
SODIUM SERPL-SCNC: 132 MMOL/L — LOW (ref 135–145)
SPECIMEN SOURCE: SIGNIFICANT CHANGE UP
SPECIMEN SOURCE: SIGNIFICANT CHANGE UP
TOTAL NUCLEATED CELL COUNT, BODY FLUID: 3800 /UL — SIGNIFICANT CHANGE UP
TUBE TYPE: SIGNIFICANT CHANGE UP
WBC # BLD: 12.79 K/UL — HIGH (ref 3.8–10.5)
WBC # FLD AUTO: 12.79 K/UL — HIGH (ref 3.8–10.5)

## 2022-08-31 PROCEDURE — 99232 SBSQ HOSP IP/OBS MODERATE 35: CPT

## 2022-08-31 PROCEDURE — 88305 TISSUE EXAM BY PATHOLOGIST: CPT | Mod: 26

## 2022-08-31 PROCEDURE — 88112 CYTOPATH CELL ENHANCE TECH: CPT | Mod: 26

## 2022-08-31 PROCEDURE — 76705 ECHO EXAM OF ABDOMEN: CPT | Mod: 26

## 2022-08-31 PROCEDURE — 49083 ABD PARACENTESIS W/IMAGING: CPT

## 2022-08-31 PROCEDURE — 49082 ABD PARACENTESIS: CPT

## 2022-08-31 PROCEDURE — 99232 SBSQ HOSP IP/OBS MODERATE 35: CPT | Mod: GC

## 2022-08-31 RX ORDER — SODIUM,POTASSIUM PHOSPHATES 278-250MG
1 POWDER IN PACKET (EA) ORAL ONCE
Refills: 0 | Status: COMPLETED | OUTPATIENT
Start: 2022-08-31 | End: 2022-08-31

## 2022-08-31 RX ORDER — POTASSIUM CHLORIDE 20 MEQ
40 PACKET (EA) ORAL
Refills: 0 | Status: COMPLETED | OUTPATIENT
Start: 2022-08-31 | End: 2022-09-01

## 2022-08-31 RX ORDER — FLUCONAZOLE 150 MG/1
400 TABLET ORAL DAILY
Refills: 0 | Status: DISCONTINUED | OUTPATIENT
Start: 2022-09-01 | End: 2022-09-01

## 2022-08-31 RX ORDER — LACTULOSE 10 G/15ML
20 SOLUTION ORAL
Refills: 0 | Status: DISCONTINUED | OUTPATIENT
Start: 2022-08-31 | End: 2022-08-31

## 2022-08-31 RX ORDER — ALBUMIN HUMAN 25 %
50 VIAL (ML) INTRAVENOUS ONCE
Refills: 0 | Status: DISCONTINUED | OUTPATIENT
Start: 2022-08-31 | End: 2022-08-31

## 2022-08-31 RX ORDER — FUROSEMIDE 40 MG
40 TABLET ORAL ONCE
Refills: 0 | Status: COMPLETED | OUTPATIENT
Start: 2022-08-31 | End: 2022-08-31

## 2022-08-31 RX ORDER — ALBUMIN HUMAN 25 %
100 VIAL (ML) INTRAVENOUS EVERY 8 HOURS
Refills: 0 | Status: COMPLETED | OUTPATIENT
Start: 2022-08-31 | End: 2022-09-01

## 2022-08-31 RX ADMIN — AMPICILLIN SODIUM AND SULBACTAM SODIUM 200 GRAM(S): 250; 125 INJECTION, POWDER, FOR SUSPENSION INTRAMUSCULAR; INTRAVENOUS at 23:42

## 2022-08-31 RX ADMIN — PANTOPRAZOLE SODIUM 40 MILLIGRAM(S): 20 TABLET, DELAYED RELEASE ORAL at 08:32

## 2022-08-31 RX ADMIN — TAMSULOSIN HYDROCHLORIDE 0.4 MILLIGRAM(S): 0.4 CAPSULE ORAL at 21:33

## 2022-08-31 RX ADMIN — SENNA PLUS 2 TABLET(S): 8.6 TABLET ORAL at 21:32

## 2022-08-31 RX ADMIN — SPIRONOLACTONE 200 MILLIGRAM(S): 25 TABLET, FILM COATED ORAL at 05:15

## 2022-08-31 RX ADMIN — Medication 40 MILLIEQUIVALENT(S): at 12:39

## 2022-08-31 RX ADMIN — AMPICILLIN SODIUM AND SULBACTAM SODIUM 200 GRAM(S): 250; 125 INJECTION, POWDER, FOR SUSPENSION INTRAMUSCULAR; INTRAVENOUS at 17:44

## 2022-08-31 RX ADMIN — Medication 40 MILLIGRAM(S): at 17:41

## 2022-08-31 RX ADMIN — POLYETHYLENE GLYCOL 3350 17 GRAM(S): 17 POWDER, FOR SOLUTION ORAL at 17:41

## 2022-08-31 RX ADMIN — CHLORHEXIDINE GLUCONATE 1 APPLICATION(S): 213 SOLUTION TOPICAL at 05:15

## 2022-08-31 RX ADMIN — AMPICILLIN SODIUM AND SULBACTAM SODIUM 200 GRAM(S): 250; 125 INJECTION, POWDER, FOR SUSPENSION INTRAMUSCULAR; INTRAVENOUS at 05:15

## 2022-08-31 RX ADMIN — MAGNESIUM OXIDE 400 MG ORAL TABLET 400 MILLIGRAM(S): 241.3 TABLET ORAL at 08:32

## 2022-08-31 RX ADMIN — Medication 50 MILLILITER(S): at 18:28

## 2022-08-31 RX ADMIN — Medication 80 MILLIGRAM(S): at 05:14

## 2022-08-31 RX ADMIN — MAGNESIUM OXIDE 400 MG ORAL TABLET 400 MILLIGRAM(S): 241.3 TABLET ORAL at 17:42

## 2022-08-31 RX ADMIN — AMPICILLIN SODIUM AND SULBACTAM SODIUM 200 GRAM(S): 250; 125 INJECTION, POWDER, FOR SUSPENSION INTRAMUSCULAR; INTRAVENOUS at 12:39

## 2022-08-31 RX ADMIN — Medication 50 MILLILITER(S): at 21:33

## 2022-08-31 RX ADMIN — Medication 1 PACKET(S): at 12:38

## 2022-08-31 RX ADMIN — POLYETHYLENE GLYCOL 3350 17 GRAM(S): 17 POWDER, FOR SOLUTION ORAL at 05:14

## 2022-08-31 NOTE — PROGRESS NOTE ADULT - ASSESSMENT
66 year old male with decompensated cirrhosis and cholangitis s/p ERCP with stent placement (4/2022) s/p stent removal on 7/20 (along sphincterotomy and stone extraction) presenting for one week of RUQ abdominal pain associated with new abdominal distension and decreased appetite.     #Septic shock 2/2 acute cholecystitis vs ascending cholangitis  #E coli and Streptococcus anginosis secondary bacterial peritonitis  #Hemoperitoneum with free locules of air on CT imaging 8/18/2022  #ROBBIE required hemodialysis with renal recovery: initial urine sodium 9, likely indicating prerenal ROBBIE vs less likely HRS given initial presentation as above  #History of cholangitis s/p biliary stent placement April 2022 and removal 7/20/2022, now s/p perc rodney tube  #Decompensated ALD/PARNELL cirrhosis c/b ascites  EV: normal esophagus on ERCP from 4/2022  Ascites: increase in ascites from mild to moderate on current imaging  SBP: paracentesis 8/9/2022 c/w peritonitis given 99K PMNs however elevated LDH more indicative of secondary bacterial peritonitis  HE: none currently       -MELD-Na = 24 on 8/31/2022       -Ascites: yes       -SBP: paracentesis 8/9/2022 reveals likely secondary bacterial peritonitis from suspected gallbladder pathology       -Varices: no mention of varices on EGD/ERCP on 7/20/2022       -Hepatic encephalopathy: none, Ammonia, Serum: 38 umol/L [11 - 55] (08-12-22 @ 00:21)       -HCC: unknown       -LT candidacy and evaluation: listed for transplant            [x] Psychosocial: cleared pending RPP            [ ] Infectious            [x] Cardiology:                    Cardiac cath: n/a                   Stress test: negative noninvasive stress test on 8/19/2022 for inducible ischemia            [ ] Colonoscopy: needed, f/u records from outside GI colonoscopy reports            [x] Prostate: Prostate Specific Antigen: negative at 2.21 ng/mL on 8/12/2022            [x] Dental            [x] PT/Frailty    Recommendations:  -s/p percutaneous cholecystostomy exchange 8/30  -Continue furosemide to 80 mg po daily, spironolactone to 200 mg po daily  -Goal net negative 2L, 24 hrs  -Give vitamin K  -PPI daily for stress prophylaxis  -appreciate ID recommendations regarding ABx duration, planning for meropenem --> Augmentin  -Consider po Abx tomorrow  -Repeat diagnostic para tomorrow 9/1, will consider dc antifungal in cleared infection  -f/u 8/30 BCx   -PT as able    Preliminary note until signed by Attending.    Thank you for involving us in this patient's care.    Kayr Tena MD  Gastroenterology/Hepatology Fellow, PGY-VI    NON-URGENT CONSULTS:  Please email [email or web address]  AT NIGHT AND ON WEEKENDS:  Contact on-call GI fellow via answering service ([phone]) from 5pm-8am and on weekends/holidays  MONDAY-FRIDAY 8AM-5PM:  Pager# [phone] ([facility])  GI Phone# [phone] ([facility])

## 2022-08-31 NOTE — PROCEDURE NOTE - PROCEDURE FINDINGS AND DETAILS
removed 2600cc of dark red colored fluid. 25% 50cc X 1 albumin given
removed 1250 cc of dark colored fluid. Specimen sent for analysis.
Limited abdominal u/s demonstrated moderate volume loculated ascites. Fluid sent for analysis. 2.9L drained. Post procedure limited u/s without complications.
technically successful US guided paracentesis yielding 300cc purulent fluid.    technically successful US and fluoroscopic guided percutaneous cholecystostomy tube placement with return of purulent bile. the catheter was secured to the skin and a dry sterile dressing applied.

## 2022-08-31 NOTE — CONSULT NOTE ADULT - ASSESSMENT
Interventional Radiology    Evaluate for Procedure: Paracentesis     HPI: 66 year old male with decompensated cirrhosis with recurrent ascites  for DX and TX paracentesis.     Allergies:   Medications (Abx/Cardiac/Anticoagulation/Blood Products)    ampicillin/sulbactam  IVPB: 200 mL/Hr IV Intermittent (08-31 @ 12:39)  fluconAZOLE IVPB: 100 mL/Hr IV Intermittent (08-30 @ 17:20)  furosemide   Injectable: 40 milliGRAM(s) IV Push (08-29 @ 17:08)  furosemide   Injectable: 80 milliGRAM(s) IV Push (08-31 @ 05:14)  spironolactone: 100 milliGRAM(s) Oral (08-29 @ 17:09)  spironolactone: 200 milliGRAM(s) Oral (08-31 @ 05:15)  tamsulosin: 0.4 milliGRAM(s) Oral (08-30 @ 21:11)    Data:    T(C): 37.5  HR: 107  BP: 129/75  RR: 18  SpO2: 95%    -WBC 12.79 / HgB 7.1 / Hct 21.7 / Plt 60  -Na 132 / Cl 97 / BUN 10 / Glucose 112  -K 3.3 / CO2 27 / Cr 0.82  -ALT 13 / Alk Phos 80 / T.Bili 3.1  -INR 2.35 / PTT 36.5    Radiology:     Assessment/Plan: 66 year old male with decompensated cirrhosis with recurrent ascites  for DX and TX paracentesis.     - Place order under Maxwell BEAUCHAMP.  - will plan for paracentesis on 8/31.   - covid negative on 8/28.  - Pt Doesn't need to be NPO.

## 2022-08-31 NOTE — PROGRESS NOTE ADULT - SUBJECTIVE AND OBJECTIVE BOX
Follow Up:      Interval History:    REVIEW OF SYSTEMS  [  ] ROS unobtainable because:    [  ] All other systems negative except as noted below    Constitutional:  [ ] fever [ ] chills  [ ] weight loss  [ ] weakness  Skin:  [ ] rash [ ] phlebitis	  Eyes: [ ] icterus [ ] pain  [ ] discharge	  ENMT: [ ] sore throat  [ ] thrush [ ] ulcers [ ] exudates  Respiratory: [ ] dyspnea [ ] hemoptysis [ ] cough [ ] sputum	  Cardiovascular:  [ ] chest pain [ ] palpitations [ ] edema	  Gastrointestinal:  [ ] nausea [ ] vomiting [ ] diarrhea [ ] constipation [ ] pain	  Genitourinary:  [ ] dysuria [ ] frequency [ ] hematuria [ ] discharge [ ] flank pain  [ ] incontinence  Musculoskeletal:  [ ] myalgias [ ] arthralgias [ ] arthritis  [ ] back pain  Neurological:  [ ] headache [ ] seizures  [ ] confusion/altered mental status    Allergies  No Known Allergies        ANTIMICROBIALS:  ampicillin/sulbactam  IVPB 3 every 6 hours  penicillin   G benzathine Injectable 2.4 <User Schedule>      OTHER MEDS:  MEDICATIONS  (STANDING):  furosemide   Injectable 80 <User Schedule>  furosemide   Injectable 40 once  pantoprazole    Tablet 40 before breakfast  polyethylene glycol 3350 17 two times a day  senna 2 at bedtime  spironolactone 200 <User Schedule>  tamsulosin 0.4 at bedtime      Vital Signs Last 24 Hrs  T(C): 37.4 (31 Aug 2022 15:49), Max: 37.6 (30 Aug 2022 17:38)  T(F): 99.3 (31 Aug 2022 15:49), Max: 99.7 (30 Aug 2022 17:38)  HR: 104 (31 Aug 2022 15:49) (102 - 112)  BP: 149/82 (31 Aug 2022 15:49) (129/75 - 159/83)  BP(mean): 104 (31 Aug 2022 15:49) (93 - 104)  RR: 18 (31 Aug 2022 15:49) (16 - 18)  SpO2: 96% (31 Aug 2022 15:49) (95% - 96%)    Parameters below as of 31 Aug 2022 15:49  Patient On (Oxygen Delivery Method): room air        PHYSICAL EXAMINATION:  General: Alert and Awake, NAD  HEENT: PERRL, EOMI  Neck: Supple  Cardiac: RRR, No M/R/G  Resp: CTAB, No Wh/Rh/Ra  Abdomen: NBS, NT/ND, No HSM, No rigidity or guarding  MSK: No LE edema. No Calf tenderness  : No milian  Skin: No rashes or lesions. Skin is warm and dry to the touch.   Neuro: Alert and Awake. CN 2-12 Grossly intact. Moves all four extremities spontaneously.  Psych: Calm, Pleasant, Cooperative                          7.1    12.79 )-----------( 60       ( 31 Aug 2022 06:36 )             21.7           132<L>  |  97  |  10  ----------------------------<  112<H>  3.3<L>   |  27  |  0.82    Ca    8.0<L>      31 Aug 2022 06:35  Phos  2.2       Mg     2.0         TPro  6.7  /  Alb  2.7<L>  /  TBili  3.1<H>  /  DBili  x   /  AST  38  /  ALT  13  /  AlkPhos  80        Urinalysis Basic - ( 30 Aug 2022 11:44 )    Color: Light Yellow / Appearance: Clear / S.013 / pH: x  Gluc: x / Ketone: Negative  / Bili: Negative / Urobili: Negative   Blood: x / Protein: Trace / Nitrite: Negative   Leuk Esterase: Negative / RBC: x / WBC x   Sq Epi: x / Non Sq Epi: x / Bacteria: x        MICROBIOLOGY:  v  Clean Catch Clean Catch (Midstream)  22   No growth  --  --      .Blood Blood-Peripheral  22   No growth to date.  --  --      Peritoneal Peritoneal Fluid  22   No growth at 5 days  --    polymorphonuclear leukocytes seen  No organisms seen  by cytocentrifuge      Peritoneal Peritoneal Fluid  22   Rare Escherichia coli  Few Streptococcus anginosus  --  Escherichia coli  Streptococcus anginosus      .Blood Blood-Peripheral  22   No Growth Final  --  --      Peritoneal Peritoneal Fluid  22   No growth  --    polymorphonuclear leukocytes seen  No organisms seen  by cytocentrifuge      .Blood Blood  22   No Growth Final  --  --      Peritoneal Peritoneal Fluid  22   Few Escherichia coli  Moderate Streptococcus anginosus "Susceptibilities not performed"  --  Escherichia coli      Catheterized Catheterized  22   10,000 - 49,000 CFU/mL Escherichia coli  --  Escherichia coli      .Blood Blood-Peripheral  22   No Growth Final  --  --      .Blood Blood-Peripheral  22   No Growth Final  --  --        CMV IgG Antibody: 4.00 U/mL (22 @ 00:32)          RADIOLOGY:    <The imaging below has been reviewed and visualized by me independently. Findings as detailed in report below> Follow Up:  Fever    Interval History: afebrile. no acute events.     REVIEW OF SYSTEMS  [  ] ROS unobtainable because:    [ x ] All other systems negative except as noted below    Constitutional:  [ x] fever [ ] chills  [ ] weight loss  [ ] weakness  Skin:  [ ] rash [ ] phlebitis	  Eyes: [ ] icterus [ ] pain  [ ] discharge	  ENMT: [ ] sore throat  [ ] thrush [ ] ulcers [ ] exudates  Respiratory: [ ] dyspnea [ ] hemoptysis [ ] cough [ ] sputum	  Cardiovascular:  [ ] chest pain [ ] palpitations [ ] edema	  Gastrointestinal:  [ ] nausea [ ] vomiting [ ] diarrhea [ ] constipation [ x ] pain	  Genitourinary:  [ ] dysuria [ ] frequency [ ] hematuria [ ] discharge [ ] flank pain  [ ] incontinence  Musculoskeletal:  [ ] myalgias [ ] arthralgias [ ] arthritis  [ ] back pain  Neurological:  [ ] headache [ ] seizures  [ ] confusion/altered mental status    Allergies  No Known Allergies        ANTIMICROBIALS:  ampicillin/sulbactam  IVPB 3 every 6 hours  penicillin   G benzathine Injectable 2.4 <User Schedule>      OTHER MEDS:  MEDICATIONS  (STANDING):  furosemide   Injectable 80 <User Schedule>  furosemide   Injectable 40 once  pantoprazole    Tablet 40 before breakfast  polyethylene glycol 3350 17 two times a day  senna 2 at bedtime  spironolactone 200 <User Schedule>  tamsulosin 0.4 at bedtime      Vital Signs Last 24 Hrs  T(C): 37.4 (31 Aug 2022 15:49), Max: 37.6 (30 Aug 2022 17:38)  T(F): 99.3 (31 Aug 2022 15:49), Max: 99.7 (30 Aug 2022 17:38)  HR: 104 (31 Aug 2022 15:49) (102 - 112)  BP: 149/82 (31 Aug 2022 15:49) (129/75 - 159/83)  BP(mean): 104 (31 Aug 2022 15:49) (93 - 104)  RR: 18 (31 Aug 2022 15:49) (16 - 18)  SpO2: 96% (31 Aug 2022 15:49) (95% - 96%)    Parameters below as of 31 Aug 2022 15:49  Patient On (Oxygen Delivery Method): room air    PHYSICAL EXAMINATION:  General: Alert and Awake, NAD  Cardiac: RRR, No M/R/G  Resp: CTAB, No Wh/Rh/Ra  Abdomen: +Hepatobiliary Drain, Distended, No HSM, No rigidity or guarding  MSK: No LE edema. No Calf tenderness  Skin: No rashes or lesions. Skin is warm and dry to the touch.   Neuro: Alert and Awake. CN 2-12 Grossly intact. Moves all four extremities spontaneously.  Psych: Calm, Pleasant, Cooperative                          7.1    12.79 )-----------( 60       ( 31 Aug 2022 06:36 )             21.7           132<L>  |  97  |  10  ----------------------------<  112<H>  3.3<L>   |  27  |  0.82    Ca    8.0<L>      31 Aug 2022 06:35  Phos  2.2       Mg     2.0         TPro  6.7  /  Alb  2.7<L>  /  TBili  3.1<H>  /  DBili  x   /  AST  38  /  ALT  13  /  AlkPhos  80        Urinalysis Basic - ( 30 Aug 2022 11:44 )    Color: Light Yellow / Appearance: Clear / S.013 / pH: x  Gluc: x / Ketone: Negative  / Bili: Negative / Urobili: Negative   Blood: x / Protein: Trace / Nitrite: Negative   Leuk Esterase: Negative / RBC: x / WBC x   Sq Epi: x / Non Sq Epi: x / Bacteria: x        MICROBIOLOGY:  v  Clean Catch Clean Catch (Midstream)  22   No growth  --  --      .Blood Blood-Peripheral  22   No growth to date.  --  --      Peritoneal Peritoneal Fluid  22   No growth at 5 days  --    polymorphonuclear leukocytes seen  No organisms seen  by cytocentrifuge      Peritoneal Peritoneal Fluid  22   Rare Escherichia coli  Few Streptococcus anginosus  --  Escherichia coli  Streptococcus anginosus      .Blood Blood-Peripheral  22   No Growth Final  --  --      Peritoneal Peritoneal Fluid  22   No growth  --    polymorphonuclear leukocytes seen  No organisms seen  by cytocentrifuge      .Blood Blood  22   No Growth Final  --  --      Peritoneal Peritoneal Fluid  22   Few Escherichia coli  Moderate Streptococcus anginosus "Susceptibilities not performed"  --  Escherichia coli      Catheterized Catheterized  22   10,000 - 49,000 CFU/mL Escherichia coli  --  Escherichia coli      .Blood Blood-Peripheral  22   No Growth Final  --  --      .Blood Blood-Peripheral  22   No Growth Final  --  --    CMV IgG Antibody: 4.00 U/mL (22 @ 00:32)    RADIOLOGY:    <The imaging below has been reviewed and visualized by me independently. Findings as detailed in report below>    < from: US Abdomen Limited (22 @ 15:37) >  IMPRESSION:    Moderate volume of complex ascites, most prominent in the left lower   quadrant.    < end of copied text >

## 2022-08-31 NOTE — PROCEDURE NOTE - GENERAL PROCEDURE NAME
Paracentesis
Paracentesis
diagnostic and therapeutic paracentesis
diagnostic and therapeutic paracentesis. percutaneous cholecystostomy tube placement.

## 2022-08-31 NOTE — PROGRESS NOTE ADULT - ASSESSMENT
67 y/o M PMHx cholangitis/cholecystitis (s/p ERCP w/ biliary stent placement 04/2022) and recently diagnosed etOH cirrhosis, presents with RUQ pain following biliary stent removal on 7/20/22. Found to have distended GB with wall thickening and stone in cystic duct, concerning for cholecystitis. Admitted to SICU for monitoring, s/p perc rodney and paracentesis by IR on 8/9. Paracentesis results consistent with SBP. Course now c/b ARF.     s/p Perc Rodney Tube and Paracentesis  Paracentesis cell counts suggestive of Peritonitis  Cultures thus far with E coli and Streptococcus on Perc Rodney Drainage and Ascites drainage  Concerning with cholecystitis with associated perforation    Paracentesis (8/9) 160,713 with 62% PMN  Paracentesis (8/12) 8,344 with 97% PMN  Paracentesis (8/16) 53,930 with 92% PMN  Paracentesis (8/25) with 62,700 nucleated cells but unable to run differential     #Fever  s/p cholecystostomy tube exchange on 8/29.   Febrile on 8/30  ?Reactive following manipulation of gallbladder  U/A (8/30) without pyuria  --Continue Unasyn as below  --Continue to follow CBC with diff  --Continue to follow temperature curve  --Follow up on preliminary blood cultures    #Peritonitis, Cholecystitis, SBP  Ascites Cultures (8/16) Rare E coli and Few Strep anginosis  E coli is still susceptible to Unasyn  CT A/P (8/18) Moderate volume of abdominopelvic fluid with new findings of hemoperitoneum. Clot appears most concentrated within the left lower quadrant, suggesting that a source of bleed is potentially in this location.   CT Chest (8/18) Ground glass opacities in the right upper lobe. Moderate bilateral pleural effusions.   Suspect GGO on CT is representative of pulmonary edema  Repeat Paracentesis (8/25) with 62,700 nucleated cells but unable to run differential   --Planned for repeat paracentesis; findings will guide antibiotic duration  --Continue Unasyn 3g IV Q6H    #Late Latent Syphilis  Denies knowledge of preceding diagnosis  No preceding treatment  Will treat as late latent syphilis  --Continue IM Benzathine Penicillin 2.4 million units Q7Days x 3 doses (End Date: 9/1/22)    #Pre-Liver Transplant Evaluation  --ID clearance for OLT pending resolution of peritonitis but no other contraindications  --Follow up on repeat Quantiferon Gold (delay in QuantiFeron given backorder on reagents)    I will continue to follow. Please feel free to contact me with any further questions.    Bladimir Nix M.D.  [facility] Division of Infectious Disease  8AM-5PM Monday - Friday: Available on [employer] Teams  After Hours and Holidays (or if no response on [employer] Teams): Please contact the Infectious Diseases Office at [phone]    The above assessment and plan were discussed with Pb, transplant surgery PA

## 2022-08-31 NOTE — CONSULT NOTE ADULT - PROVIDER SPECIALTY LIST ADULT
Intervent Radiology
Intervent Radiology
Gastroenterology
Intervent Radiology
Hepatology
Intervent Radiology
Transplant Surgery
Dental
Infectious Disease
Intervent Radiology
Cardiology
SICU
Urology
Intervent Radiology
Nephrology

## 2022-08-31 NOTE — PROCEDURE NOTE - PROCEDURE DATE TIME, MLM
25-Aug-2022 14:55
31-Aug-2022 16:41
12-Aug-2022 16:40
09-Aug-2022 16:52
16-Aug-2022 13:01
11-Aug-2022 13:48

## 2022-08-31 NOTE — PROCEDURE NOTE - NSINFORMCONSENT_GEN_A_CORE
Benefits, risks, and possible complications of procedure explained to patient/caregiver who verbalized understanding and gave verbal consent.
Benefits, risks, and possible complications of procedure explained to patient/caregiver who verbalized understanding and gave written consent.

## 2022-08-31 NOTE — PROCEDURE NOTE - NSPROCNAME_GEN_A_CORE
Interventional Radiology
Interventional Radiology
Urinary Device Placement
Interventional Radiology
Central Line Insertion
Interventional Radiology
Paracentesis

## 2022-08-31 NOTE — PROGRESS NOTE ADULT - SUBJECTIVE AND OBJECTIVE BOX
Chief Complaint:  Patient is a 66y old  Male who presents with a chief complaint of Ascending cholangitis (30 Aug 2022 17:43)     Interval Events:   Afebrile overnight, new leukocytosis   Stable bili with K 3.3  BCx in the lab    Hospital Medications:  ampicillin/sulbactam  IVPB 3 Gram(s) IV Intermittent every 6 hours  chlorhexidine 2% Cloths 1 Application(s) Topical <User Schedule>  fluconAZOLE IVPB 400 milliGRAM(s) IV Intermittent every 24 hours  furosemide   Injectable 80 milliGRAM(s) IV Push <User Schedule>  magnesium oxide 400 milliGRAM(s) Oral two times a day with meals  pantoprazole    Tablet 40 milliGRAM(s) Oral before breakfast  penicillin   G benzathine Injectable 2.4 Million Unit(s) IntraMuscular <User Schedule>  polyethylene glycol 3350 17 Gram(s) Oral two times a day  senna 2 Tablet(s) Oral at bedtime  spironolactone 200 milliGRAM(s) Oral <User Schedule>  tamsulosin 0.4 milliGRAM(s) Oral at bedtime      ROS:   Complete and normal except as mentioned above.    PHYSICAL EXAM:   Vital Signs:  Vital Signs Last 24 Hrs  T(C): 37.5 (31 Aug 2022 09:52), Max: 37.7 (30 Aug 2022 14:00)  T(F): 99.5 (31 Aug 2022 09:52), Max: 99.8 (30 Aug 2022 14:00)  HR: 107 (31 Aug 2022 09:52) (102 - 112)  BP: 129/75 (31 Aug 2022 09:52) (129/75 - 161/85)  BP(mean): 93 (31 Aug 2022 09:52) (93 - 93)  RR: 18 (31 Aug 2022 09:52) (16 - 18)  SpO2: 95% (31 Aug 2022 09:52) (95% - 96%)    Parameters below as of 31 Aug 2022 09:52  Patient On (Oxygen Delivery Method): room air      Daily     Daily Weight in k (31 Aug 2022 05:04)    GENERAL: no acute distress  NEURO: alert  HEENT: NCAT, no conjunctival pallor appreciated  CHEST: no respiratory distress, no accessory muscle use  CARDIAC: regular rate, +S1/S2  ABDOMEN: drain in place with serosanguinous drainage, distended, nontender, no rebound or guarding  EXTREMITIES: warm, well perfused  SKIN: no lesions noted    LABS: reviewed                        7.1    12.79 )-----------( 60       ( 31 Aug 2022 06:36 )             21.7         132<L>  |  97  |  10  ----------------------------<  112<H>  3.3<L>   |  27  |  0.82    Ca    8.0<L>      31 Aug 2022 06:35  Phos  2.2       Mg     2.0         TPro  6.7  /  Alb  2.7<L>  /  TBili  3.1<H>  /  DBili  x   /  AST  38  /  ALT  13  /  AlkPhos  80      LIVER FUNCTIONS - ( 31 Aug 2022 06:35 )  Alb: 2.7 g/dL / Pro: 6.7 g/dL / ALK PHOS: 80 U/L / ALT: 13 U/L / AST: 38 U/L / GGT: x             Interval Diagnostic Studies: see sunrise for full report   Chief Complaint:  Patient is a 66y old  Male who presents with a chief complaint of Ascending cholangitis (30 Aug 2022 17:43)     Interval Events:   Afebrile overnight, new leukocytosis   Stable bili with K 3.3  BCx in the lab    Hospital Medications:  ampicillin/sulbactam  IVPB 3 Gram(s) IV Intermittent every 6 hours  chlorhexidine 2% Cloths 1 Application(s) Topical <User Schedule>  fluconAZOLE IVPB 400 milliGRAM(s) IV Intermittent every 24 hours  furosemide   Injectable 80 milliGRAM(s) IV Push <User Schedule>  magnesium oxide 400 milliGRAM(s) Oral two times a day with meals  pantoprazole    Tablet 40 milliGRAM(s) Oral before breakfast  penicillin   G benzathine Injectable 2.4 Million Unit(s) IntraMuscular <User Schedule>  polyethylene glycol 3350 17 Gram(s) Oral two times a day  senna 2 Tablet(s) Oral at bedtime  spironolactone 200 milliGRAM(s) Oral <User Schedule>  tamsulosin 0.4 milliGRAM(s) Oral at bedtime      ROS:   Complete and normal except as mentioned above.    PHYSICAL EXAM:   Vital Signs:  Vital Signs Last 24 Hrs  T(C): 37.5 (31 Aug 2022 09:52), Max: 37.7 (30 Aug 2022 14:00)  T(F): 99.5 (31 Aug 2022 09:52), Max: 99.8 (30 Aug 2022 14:00)  HR: 107 (31 Aug 2022 09:52) (102 - 112)  BP: 129/75 (31 Aug 2022 09:52) (129/75 - 161/85)  BP(mean): 93 (31 Aug 2022 09:52) (93 - 93)  RR: 18 (31 Aug 2022 09:52) (16 - 18)  SpO2: 95% (31 Aug 2022 09:52) (95% - 96%)    Parameters below as of 31 Aug 2022 09:52  Patient On (Oxygen Delivery Method): room air      Daily     Daily Weight in k (31 Aug 2022 05:04)    GENERAL: no acute distress  NEURO: alert  HEENT: NCAT, +sclera icteric  CHEST: no respiratory distress, no accessory muscle use  CARDIAC: regular rate, +S1/S2  ABDOMEN: drain in place with serosanguinous drainage, distended, nontender, no rebound or guarding, +dependent edema  EXTREMITIES: warm, well perfused, +edema  SKIN: no lesions noted, +jaundiced    LABS: reviewed                        7.1    12.79 )-----------( 60       ( 31 Aug 2022 06:36 )             21.7         132<L>  |  97  |  10  ----------------------------<  112<H>  3.3<L>   |  27  |  0.82    Ca    8.0<L>      31 Aug 2022 06:35  Phos  2.2       Mg     2.0         TPro  6.7  /  Alb  2.7<L>  /  TBili  3.1<H>  /  DBili  x   /  AST  38  /  ALT  13  /  AlkPhos  80      LIVER FUNCTIONS - ( 31 Aug 2022 06:35 )  Alb: 2.7 g/dL / Pro: 6.7 g/dL / ALK PHOS: 80 U/L / ALT: 13 U/L / AST: 38 U/L / GGT: x             Interval Diagnostic Studies: see sunrise for full report

## 2022-08-31 NOTE — PROGRESS NOTE ADULT - ATTENDING COMMENTS
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 Last febrile 8/30 with Tmax 99.5 in past 24h but with recurrent leukocytosis on labs today. Cultures from ascitic fluid (8/9 and 8/16) grew E. coli and Streptococcus anginosis, with repeat ascitic fluid culture from 8/25 pending. Will plan for repeat paracentesis tomorrow for re-evaluation and to trend ascitic fluid PMN count (had partially improved from 8/9 to 8/16 but most recent fluid studies from 8/25 difficult to interpret in context of interval hemoperitoneum that has since stabilized). S/p ceftriaxone (8/8), s/p Zosyn (8/8-16), s/p vancomycin (8/10), and currently on Unasyn (8/16- ) and fluconazole (8/19- ) with Transplant ID following, with tentative plan to switch to Augmentin when ready for discharge. End date for antibiotics not yet determined especially in context of recent fever and recurrent leukocytosis. Anasarca had been improving with diuresis but insufficient in the past 24h. Given stable hemodynamics and renal function, will increase diuretics today as tolerated for goal net negative 2L in the next 24h. He is wait-listed for liver transplant, ABO O with MELD-Na 24 today, but on internal hold pending ID clearance to proceed with transplant.    Laron Harper M.D., Ph.D.  Transplant Hepatology

## 2022-09-01 ENCOUNTER — TRANSCRIPTION ENCOUNTER (OUTPATIENT)
Age: 67
End: 2022-09-01

## 2022-09-01 LAB
ALBUMIN SERPL ELPH-MCNC: 3 G/DL — LOW (ref 3.3–5)
ALBUMIN SERPL ELPH-MCNC: 3.2 G/DL — LOW (ref 3.3–5)
ALP SERPL-CCNC: 78 U/L — SIGNIFICANT CHANGE UP (ref 40–120)
ALP SERPL-CCNC: 80 U/L — SIGNIFICANT CHANGE UP (ref 40–120)
ALT FLD-CCNC: 12 U/L — SIGNIFICANT CHANGE UP (ref 10–45)
ALT FLD-CCNC: 13 U/L — SIGNIFICANT CHANGE UP (ref 10–45)
ANION GAP SERPL CALC-SCNC: 10 MMOL/L — SIGNIFICANT CHANGE UP (ref 5–17)
ANION GAP SERPL CALC-SCNC: 9 MMOL/L — SIGNIFICANT CHANGE UP (ref 5–17)
APTT BLD: 36.3 SEC — HIGH (ref 27.5–35.5)
APTT BLD: 39.6 SEC — HIGH (ref 27.5–35.5)
AST SERPL-CCNC: 33 U/L — SIGNIFICANT CHANGE UP (ref 10–40)
AST SERPL-CCNC: 36 U/L — SIGNIFICANT CHANGE UP (ref 10–40)
BASOPHILS # BLD AUTO: 0.03 K/UL — SIGNIFICANT CHANGE UP (ref 0–0.2)
BASOPHILS NFR BLD AUTO: 0.4 % — SIGNIFICANT CHANGE UP (ref 0–2)
BILIRUB SERPL-MCNC: 2.9 MG/DL — HIGH (ref 0.2–1.2)
BILIRUB SERPL-MCNC: 3.3 MG/DL — HIGH (ref 0.2–1.2)
BUN SERPL-MCNC: 9 MG/DL — SIGNIFICANT CHANGE UP (ref 7–23)
BUN SERPL-MCNC: 9 MG/DL — SIGNIFICANT CHANGE UP (ref 7–23)
CALCIUM SERPL-MCNC: 8.3 MG/DL — LOW (ref 8.4–10.5)
CALCIUM SERPL-MCNC: 8.7 MG/DL — SIGNIFICANT CHANGE UP (ref 8.4–10.5)
CHLORIDE SERPL-SCNC: 99 MMOL/L — SIGNIFICANT CHANGE UP (ref 96–108)
CHLORIDE SERPL-SCNC: 99 MMOL/L — SIGNIFICANT CHANGE UP (ref 96–108)
CO2 SERPL-SCNC: 26 MMOL/L — SIGNIFICANT CHANGE UP (ref 22–31)
CO2 SERPL-SCNC: 26 MMOL/L — SIGNIFICANT CHANGE UP (ref 22–31)
CREAT SERPL-MCNC: 0.78 MG/DL — SIGNIFICANT CHANGE UP (ref 0.5–1.3)
CREAT SERPL-MCNC: 0.79 MG/DL — SIGNIFICANT CHANGE UP (ref 0.5–1.3)
EGFR: 100 ML/MIN/1.73M2 — SIGNIFICANT CHANGE UP
EGFR: 99 ML/MIN/1.73M2 — SIGNIFICANT CHANGE UP
EOSINOPHIL # BLD AUTO: 0.1 K/UL — SIGNIFICANT CHANGE UP (ref 0–0.5)
EOSINOPHIL NFR BLD AUTO: 1.4 % — SIGNIFICANT CHANGE UP (ref 0–6)
GLUCOSE SERPL-MCNC: 102 MG/DL — HIGH (ref 70–99)
GLUCOSE SERPL-MCNC: 123 MG/DL — HIGH (ref 70–99)
GRAM STN FLD: SIGNIFICANT CHANGE UP
HCT VFR BLD CALC: 20.1 % — CRITICAL LOW (ref 39–50)
HCT VFR BLD CALC: 24.3 % — LOW (ref 39–50)
HGB BLD-MCNC: 6.6 G/DL — CRITICAL LOW (ref 13–17)
HGB BLD-MCNC: 8 G/DL — LOW (ref 13–17)
IMM GRANULOCYTES NFR BLD AUTO: 0.5 % — SIGNIFICANT CHANGE UP (ref 0–1.5)
INR BLD: 2.33 RATIO — HIGH (ref 0.88–1.16)
INR BLD: 2.56 RATIO — HIGH (ref 0.88–1.16)
LYMPHOCYTES # BLD AUTO: 1.39 K/UL — SIGNIFICANT CHANGE UP (ref 1–3.3)
LYMPHOCYTES # BLD AUTO: 18.9 % — SIGNIFICANT CHANGE UP (ref 13–44)
MAGNESIUM SERPL-MCNC: 1.6 MG/DL — SIGNIFICANT CHANGE UP (ref 1.6–2.6)
MAGNESIUM SERPL-MCNC: 1.7 MG/DL — SIGNIFICANT CHANGE UP (ref 1.6–2.6)
MCHC RBC-ENTMCNC: 30.4 PG — SIGNIFICANT CHANGE UP (ref 27–34)
MCHC RBC-ENTMCNC: 30.8 PG — SIGNIFICANT CHANGE UP (ref 27–34)
MCHC RBC-ENTMCNC: 32.8 GM/DL — SIGNIFICANT CHANGE UP (ref 32–36)
MCHC RBC-ENTMCNC: 32.9 GM/DL — SIGNIFICANT CHANGE UP (ref 32–36)
MCV RBC AUTO: 92.4 FL — SIGNIFICANT CHANGE UP (ref 80–100)
MCV RBC AUTO: 93.9 FL — SIGNIFICANT CHANGE UP (ref 80–100)
MONOCYTES # BLD AUTO: 0.89 K/UL — SIGNIFICANT CHANGE UP (ref 0–0.9)
MONOCYTES NFR BLD AUTO: 12.1 % — SIGNIFICANT CHANGE UP (ref 2–14)
NEUTROPHILS # BLD AUTO: 4.91 K/UL — SIGNIFICANT CHANGE UP (ref 1.8–7.4)
NEUTROPHILS NFR BLD AUTO: 66.7 % — SIGNIFICANT CHANGE UP (ref 43–77)
NRBC # BLD: 0 /100 WBCS — SIGNIFICANT CHANGE UP (ref 0–0)
NRBC # BLD: 0 /100 WBCS — SIGNIFICANT CHANGE UP (ref 0–0)
PHOSPHATE SERPL-MCNC: 1.5 MG/DL — LOW (ref 2.5–4.5)
PHOSPHATE SERPL-MCNC: 2.1 MG/DL — LOW (ref 2.5–4.5)
PLATELET # BLD AUTO: 59 K/UL — LOW (ref 150–400)
PLATELET # BLD AUTO: 62 K/UL — LOW (ref 150–400)
POTASSIUM SERPL-MCNC: 3.3 MMOL/L — LOW (ref 3.5–5.3)
POTASSIUM SERPL-MCNC: 3.8 MMOL/L — SIGNIFICANT CHANGE UP (ref 3.5–5.3)
POTASSIUM SERPL-SCNC: 3.3 MMOL/L — LOW (ref 3.5–5.3)
POTASSIUM SERPL-SCNC: 3.8 MMOL/L — SIGNIFICANT CHANGE UP (ref 3.5–5.3)
PROT SERPL-MCNC: 6.6 G/DL — SIGNIFICANT CHANGE UP (ref 6–8.3)
PROT SERPL-MCNC: 7.3 G/DL — SIGNIFICANT CHANGE UP (ref 6–8.3)
PROTHROM AB SERPL-ACNC: 27.3 SEC — HIGH (ref 10.5–13.4)
PROTHROM AB SERPL-ACNC: 30 SEC — HIGH (ref 10.5–13.4)
RBC # BLD: 2.14 M/UL — LOW (ref 4.2–5.8)
RBC # BLD: 2.63 M/UL — LOW (ref 4.2–5.8)
RBC # FLD: 19.6 % — HIGH (ref 10.3–14.5)
RBC # FLD: 21.4 % — HIGH (ref 10.3–14.5)
SARS-COV-2 RNA SPEC QL NAA+PROBE: SIGNIFICANT CHANGE UP
SODIUM SERPL-SCNC: 134 MMOL/L — LOW (ref 135–145)
SODIUM SERPL-SCNC: 135 MMOL/L — SIGNIFICANT CHANGE UP (ref 135–145)
SPECIMEN SOURCE: SIGNIFICANT CHANGE UP
WBC # BLD: 7.36 K/UL — SIGNIFICANT CHANGE UP (ref 3.8–10.5)
WBC # BLD: 8.04 K/UL — SIGNIFICANT CHANGE UP (ref 3.8–10.5)
WBC # FLD AUTO: 7.36 K/UL — SIGNIFICANT CHANGE UP (ref 3.8–10.5)
WBC # FLD AUTO: 8.04 K/UL — SIGNIFICANT CHANGE UP (ref 3.8–10.5)

## 2022-09-01 PROCEDURE — 99232 SBSQ HOSP IP/OBS MODERATE 35: CPT | Mod: GC

## 2022-09-01 PROCEDURE — 99231 SBSQ HOSP IP/OBS SF/LOW 25: CPT

## 2022-09-01 PROCEDURE — 99232 SBSQ HOSP IP/OBS MODERATE 35: CPT

## 2022-09-01 RX ORDER — HEPATITIS B VIRUS VACCINE,RECB 20 MCG/ML
20 VIAL (ML) INTRAMUSCULAR ONCE
Refills: 0 | Status: COMPLETED | OUTPATIENT
Start: 2022-09-01 | End: 2022-09-01

## 2022-09-01 RX ORDER — AMPICILLIN SODIUM AND SULBACTAM SODIUM 250; 125 MG/ML; MG/ML
3 INJECTION, POWDER, FOR SUSPENSION INTRAMUSCULAR; INTRAVENOUS ONCE
Refills: 0 | Status: COMPLETED | OUTPATIENT
Start: 2022-09-01 | End: 2022-09-01

## 2022-09-01 RX ORDER — MAGNESIUM OXIDE 400 MG ORAL TABLET 241.3 MG
400 TABLET ORAL
Refills: 0 | Status: DISCONTINUED | OUTPATIENT
Start: 2022-09-01 | End: 2022-09-02

## 2022-09-01 RX ORDER — PNEUMOCOCCAL 20-VALENT CONJUGATE VACCINE 2.2; 2.2; 2.2; 2.2; 2.2; 2.2; 2.2; 2.2; 2.2; 2.2; 2.2; 2.2; 2.2; 2.2; 2.2; 2.2; 4.4; 2.2; 2.2; 2.2 UG/.5ML; UG/.5ML; UG/.5ML; UG/.5ML; UG/.5ML; UG/.5ML; UG/.5ML; UG/.5ML; UG/.5ML; UG/.5ML; UG/.5ML; UG/.5ML; UG/.5ML; UG/.5ML; UG/.5ML; UG/.5ML; UG/.5ML; UG/.5ML; UG/.5ML; UG/.5ML
0.5 INJECTION, SUSPENSION INTRAMUSCULAR ONCE
Refills: 0 | Status: COMPLETED | OUTPATIENT
Start: 2022-09-01 | End: 2022-09-01

## 2022-09-01 RX ORDER — MAGNESIUM OXIDE 400 MG ORAL TABLET 241.3 MG
400 TABLET ORAL ONCE
Refills: 0 | Status: COMPLETED | OUTPATIENT
Start: 2022-09-01 | End: 2022-09-01

## 2022-09-01 RX ORDER — FUROSEMIDE 40 MG
80 TABLET ORAL
Refills: 0 | Status: DISCONTINUED | OUTPATIENT
Start: 2022-09-01 | End: 2022-09-02

## 2022-09-01 RX ADMIN — FLUCONAZOLE 400 MILLIGRAM(S): 150 TABLET ORAL at 12:30

## 2022-09-01 RX ADMIN — SENNA PLUS 2 TABLET(S): 8.6 TABLET ORAL at 21:37

## 2022-09-01 RX ADMIN — PNEUMOCOCCAL 20-VALENT CONJUGATE VACCINE 0.5 MILLILITER(S)
2.2; 2.2; 2.2; 2.2; 2.2; 2.2; 2.2; 2.2; 2.2; 2.2; 2.2; 2.2; 2.2; 2.2; 2.2; 2.2; 4.4; 2.2; 2.2; 2.2 INJECTION, SUSPENSION INTRAMUSCULAR at 20:46

## 2022-09-01 RX ADMIN — Medication 80 MILLIGRAM(S): at 06:02

## 2022-09-01 RX ADMIN — Medication 40 MILLIEQUIVALENT(S): at 06:03

## 2022-09-01 RX ADMIN — TAMSULOSIN HYDROCHLORIDE 0.4 MILLIGRAM(S): 0.4 CAPSULE ORAL at 21:37

## 2022-09-01 RX ADMIN — Medication 20 MICROGRAM(S): at 18:40

## 2022-09-01 RX ADMIN — AMPICILLIN SODIUM AND SULBACTAM SODIUM 200 GRAM(S): 250; 125 INJECTION, POWDER, FOR SUSPENSION INTRAMUSCULAR; INTRAVENOUS at 12:30

## 2022-09-01 RX ADMIN — MAGNESIUM OXIDE 400 MG ORAL TABLET 400 MILLIGRAM(S): 241.3 TABLET ORAL at 17:13

## 2022-09-01 RX ADMIN — Medication 80 MILLIGRAM(S): at 18:29

## 2022-09-01 RX ADMIN — Medication 50 MILLILITER(S): at 06:05

## 2022-09-01 RX ADMIN — PANTOPRAZOLE SODIUM 40 MILLIGRAM(S): 20 TABLET, DELAYED RELEASE ORAL at 08:45

## 2022-09-01 RX ADMIN — SPIRONOLACTONE 200 MILLIGRAM(S): 25 TABLET, FILM COATED ORAL at 06:03

## 2022-09-01 RX ADMIN — CHLORHEXIDINE GLUCONATE 1 APPLICATION(S): 213 SOLUTION TOPICAL at 06:22

## 2022-09-01 RX ADMIN — MAGNESIUM OXIDE 400 MG ORAL TABLET 400 MILLIGRAM(S): 241.3 TABLET ORAL at 08:46

## 2022-09-01 RX ADMIN — MAGNESIUM OXIDE 400 MG ORAL TABLET 400 MILLIGRAM(S): 241.3 TABLET ORAL at 21:37

## 2022-09-01 RX ADMIN — Medication 85 MILLIMOLE(S): at 22:31

## 2022-09-01 RX ADMIN — AMPICILLIN SODIUM AND SULBACTAM SODIUM 200 GRAM(S): 250; 125 INJECTION, POWDER, FOR SUSPENSION INTRAMUSCULAR; INTRAVENOUS at 06:07

## 2022-09-01 RX ADMIN — PENICILLIN G BENZATHINE 2.4 MILLION UNIT(S): 1200000 INJECTION, SUSPENSION INTRAMUSCULAR at 14:00

## 2022-09-01 RX ADMIN — AMPICILLIN SODIUM AND SULBACTAM SODIUM 200 GRAM(S): 250; 125 INJECTION, POWDER, FOR SUSPENSION INTRAMUSCULAR; INTRAVENOUS at 17:13

## 2022-09-01 NOTE — PROGRESS NOTE ADULT - SUBJECTIVE AND OBJECTIVE BOX
Chief Complaint:  Patient is a 66y old  Male who presents with a chief complaint of Ascending cholangitis (31 Aug 2022 16:38)       Interval Events:   Afebrile  s/p LVP with 1.25 cc removed, bloody with 3572 PMNs, corrected for RBCs -->2372  Hgb 6.6, no overt bleeding reported    Hospital Medications:  ampicillin/sulbactam  IVPB 3 Gram(s) IV Intermittent every 6 hours  chlorhexidine 2% Cloths 1 Application(s) Topical <User Schedule>  fluconAZOLE   Tablet 400 milliGRAM(s) Oral daily  furosemide   Injectable 80 milliGRAM(s) IV Push <User Schedule>  magnesium oxide 400 milliGRAM(s) Oral two times a day with meals  pantoprazole    Tablet 40 milliGRAM(s) Oral before breakfast  penicillin   G benzathine Injectable 2.4 Million Unit(s) IntraMuscular <User Schedule>  polyethylene glycol 3350 17 Gram(s) Oral two times a day  senna 2 Tablet(s) Oral at bedtime  spironolactone 200 milliGRAM(s) Oral <User Schedule>  tamsulosin 0.4 milliGRAM(s) Oral at bedtime      ROS:   Complete and normal except as mentioned above.    PHYSICAL EXAM:   Vital Signs:  Vital Signs Last 24 Hrs  T(C): 37.3 (01 Sep 2022 09:00), Max: 37.4 (31 Aug 2022 15:49)  T(F): 99.2 (01 Sep 2022 09:00), Max: 99.3 (31 Aug 2022 15:49)  HR: 104 (01 Sep 2022 09:00) (81 - 107)  BP: 156/81 (01 Sep 2022 09:00) (135/68 - 156/81)  BP(mean): 106 (31 Aug 2022 17:00) (104 - 106)  RR: 18 (01 Sep 2022 09:00) (18 - 18)  SpO2: 96% (01 Sep 2022 09:00) (95% - 96%)    Parameters below as of 01 Sep 2022 09:00  Patient On (Oxygen Delivery Method): room air      Daily     Daily Weight in k.6 (01 Sep 2022 05:00)    GENERAL: no acute distress  NEURO: alert  HEENT: NCAT, +sclera icteric  CHEST: no respiratory distress, no accessory muscle use  CARDIAC: regular rate, +S1/S2  ABDOMEN: drain in place with serosanguinous drainage, distended, nontender, no rebound or guarding, +dependent edema  EXTREMITIES: warm, well perfused, +edema  SKIN: no lesions noted, +jaundiced    LABS: reviewed                        6.6    7.36  )-----------( 59       ( 01 Sep 2022 06:52 )             20.1     09-    135  |  99  |  9   ----------------------------<  102<H>  3.3<L>   |  26  |  0.78    Ca    8.3<L>      01 Sep 2022 06:59  Phos  2.1     -  Mg     1.7         TPro  6.6  /  Alb  3.0<L>  /  TBili  2.9<H>  /  DBili  x   /  AST  33  /  ALT  13  /  AlkPhos  78  09-    LIVER FUNCTIONS - ( 01 Sep 2022 06:59 )  Alb: 3.0 g/dL / Pro: 6.6 g/dL / ALK PHOS: 78 U/L / ALT: 13 U/L / AST: 33 U/L / GGT: x             Interval Diagnostic Studies: see sunrise for full report

## 2022-09-01 NOTE — CHART NOTE - NSCHARTNOTESELECT_GEN_ALL_CORE
Liver Frailty Test/Event Note
Nutrition Services
Nutrition Services
IR/Event Note
Liver Frailty Test/Event Note
Liver Frailty Test/Event Note
Nutrition Services
Nutrition Services
Liver frailty test/Event Note

## 2022-09-01 NOTE — DISCHARGE NOTE PROVIDER - HOSPITAL COURSE
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   -Hemoperitoneum after LVP, required transfusions without intervention  -Cultures from ascitic fluid (8/9 and 8/16) grew E. coli and Streptococcus anginosis, with repeat ascitic fluid cultures from 8/25 and 8/31 with NGTD.   -Ascitic fluid PMN count has improved to 1172 (8/31) after correction for RBCs, still consistent with peritonitis but clinically improving.   -S/p ceftriaxone (8/8), s/p Zosyn (8/8-16), s/p vancomycin (8/10), and currently on Unasyn (8/16-9/1). Fluconazole discontinued (8/19-9/1--hemoperitoneum PPx). now on Augmentin for 14D course starting 9/1  -Anasarca had been improving with diuresis, currently on Lasix 80 mg iv daily and spironolactone 200 mg po daily  -He is now wait-listed for liver transplant, ABO O with MELD-Na ---------on d/c, but on internal hold pending ID clearance to proceed with transplant.        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   -Hemoperitoneum after LVP, required transfusions without intervention  -Cultures from ascitic fluid (8/9 and 8/16) grew E. coli and Streptococcus anginosis, with repeat ascitic fluid cultures from 8/31 and 9/1 with NGTD.   -Ascitic fluid PMN count has improved to 1172 (8/31) after correction for RBCs, still consistent with peritonitis but clinically improving.   -S/p ceftriaxone (8/8), s/p Zosyn (8/8-16), s/p vancomycin (8/10), and currently on Unasyn (8/16-9/1). Fluconazole discontinued (8/19-9/1--hemoperitoneum PPx). now on Augmentin for 14D course starting 9/1  -Anasarca had been improving with diuresis, currently on Lasix 80 mg iv daily and spironolactone 200 mg po daily  -He is now wait-listed for liver transplant, ABO O with MELD-Na 24    Discharged  home with cholecystostomy tube, instructed to record output daily.   VNS to follow for drain care assistance: Flush forward with 10cc Normal Saline DAILY--- DO NOT ASPIRATE    Will f/u outpatient with Dr. Harper next week.

## 2022-09-01 NOTE — DISCHARGE NOTE PROVIDER - NSDCMRMEDTOKEN_GEN_ALL_CORE_FT
rolling walker: 1 each, use as directed.  Z94.4   amoxicillin-clavulanate 875 mg-125 mg oral tablet: 1 tab(s) orally 2 times a day  furosemide 40 mg oral tablet: 2 tab(s) orally once a day   magnesium oxide 400 mg oral tablet: 1 tab(s) orally 3 times a day (with meals)  pantoprazole 40 mg oral delayed release tablet: 1 tab(s) orally once a day (before a meal)  polyethylene glycol 3350 oral powder for reconstitution: 17 gram(s) orally 2 times a day  rolling walker: 1 each, use as directed.  Z94.4  senna leaf extract oral tablet: 2 tab(s) orally once a day (at bedtime)  spironolactone 100 mg oral tablet: 2 tab(s) orally once a day   tamsulosin 0.4 mg oral capsule: 1 cap(s) orally once a day (at bedtime)

## 2022-09-01 NOTE — CHART NOTE - NSCHARTNOTEFT_GEN_A_CORE
Gender: [x] male  [  ] female    Hand  Dynamometer (dominant hand): right [x] attempted   [  ] unable to attempt  attempt #1 (kg): 33  attempt #2 (kg): 32  attempt #3 (kg): 31    5 Chair Stands:  [ x ] attempted   [] unable to attempt  time to complete (seconds): 28.23    3-Position Balance:  [ x ] attempted   [ ] unable to attempt  qnhl-sm-cfxd (0-10 seconds): 10  semi tandem (0-10 seconds): 10  tandem (0-10 seconds): 10    Comments:    LIVER FRAILTY INDEX SCORE: 4.42    Based on suggested cut-offs of the Liver Frailty Index™, a patient with this Liver Frailty Index™ score is considered:   [ ] Frail  [ x ] Pre Frail  [  ] Robust    This Liver Frailty Index™ score falls within the 80 percentile of outpatients with cirrhosis who are listed for liver transplantation.

## 2022-09-01 NOTE — PROGRESS NOTE ADULT - SUBJECTIVE AND OBJECTIVE BOX
Follow Up:      Interval History:    REVIEW OF SYSTEMS  [  ] ROS unobtainable because:    [  ] All other systems negative except as noted below    Constitutional:  [ ] fever [ ] chills  [ ] weight loss  [ ] weakness  Skin:  [ ] rash [ ] phlebitis	  Eyes: [ ] icterus [ ] pain  [ ] discharge	  ENMT: [ ] sore throat  [ ] thrush [ ] ulcers [ ] exudates  Respiratory: [ ] dyspnea [ ] hemoptysis [ ] cough [ ] sputum	  Cardiovascular:  [ ] chest pain [ ] palpitations [ ] edema	  Gastrointestinal:  [ ] nausea [ ] vomiting [ ] diarrhea [ ] constipation [ ] pain	  Genitourinary:  [ ] dysuria [ ] frequency [ ] hematuria [ ] discharge [ ] flank pain  [ ] incontinence  Musculoskeletal:  [ ] myalgias [ ] arthralgias [ ] arthritis  [ ] back pain  Neurological:  [ ] headache [ ] seizures  [ ] confusion/altered mental status    Allergies  No Known Allergies        ANTIMICROBIALS:  ampicillin/sulbactam  IVPB 3 every 6 hours  fluconAZOLE   Tablet 400 daily  penicillin   G benzathine Injectable 2.4 <User Schedule>      OTHER MEDS:  MEDICATIONS  (STANDING):  furosemide   Injectable 80 <User Schedule>  pantoprazole    Tablet 40 before breakfast  polyethylene glycol 3350 17 two times a day  senna 2 at bedtime  spironolactone 200 <User Schedule>  tamsulosin 0.4 at bedtime      Vital Signs Last 24 Hrs  T(C): 37.3 (01 Sep 2022 09:00), Max: 37.4 (31 Aug 2022 15:49)  T(F): 99.2 (01 Sep 2022 09:00), Max: 99.3 (31 Aug 2022 15:49)  HR: 106 (01 Sep 2022 13:15) (81 - 107)  BP: 155/85 (01 Sep 2022 13:15) (135/68 - 156/81)  BP(mean): 106 (31 Aug 2022 17:00) (104 - 106)  RR: 18 (01 Sep 2022 13:15) (18 - 18)  SpO2: 95% (01 Sep 2022 13:15) (95% - 96%)    Parameters below as of 01 Sep 2022 13:15  Patient On (Oxygen Delivery Method): room air        PHYSICAL EXAMINATION:  General: Alert and Awake, NAD  HEENT: PERRL, EOMI  Neck: Supple  Cardiac: RRR, No M/R/G  Resp: CTAB, No Wh/Rh/Ra  Abdomen: NBS, NT/ND, No HSM, No rigidity or guarding  MSK: No LE edema. No Calf tenderness  : No milian  Skin: No rashes or lesions. Skin is warm and dry to the touch.   Neuro: Alert and Awake. CN 2-12 Grossly intact. Moves all four extremities spontaneously.  Psych: Calm, Pleasant, Cooperative                          6.6    7.36  )-----------( 59       ( 01 Sep 2022 06:52 )             20.1       09-01    135  |  99  |  9   ----------------------------<  102<H>  3.3<L>   |  26  |  0.78    Ca    8.3<L>      01 Sep 2022 06:59  Phos  2.1     09-01  Mg     1.7     09-01    TPro  6.6  /  Alb  3.0<L>  /  TBili  2.9<H>  /  DBili  x   /  AST  33  /  ALT  13  /  AlkPhos  78  09-01          MICROBIOLOGY:  v  Peritoneal Peritoneal Fluid  08-31-22 --  --    polymorphonuclear leukocytes seen  No organisms seen  by cytocentrifuge      Clean Catch Clean Catch (Midstream)  08-30-22   No growth  --  --      .Blood Blood-Peripheral  08-30-22   No growth to date.  --  --      Peritoneal Peritoneal Fluid  08-25-22   No growth at 5 days  --    polymorphonuclear leukocytes seen  No organisms seen  by cytocentrifuge      Peritoneal Peritoneal Fluid  08-16-22   Rare Escherichia coli  Few Streptococcus anginosus  --  Escherichia coli  Streptococcus anginosus      .Blood Blood-Peripheral  08-16-22   No Growth Final  --  --      Peritoneal Peritoneal Fluid  08-12-22   No growth  --    polymorphonuclear leukocytes seen  No organisms seen  by cytocentrifuge      .Blood Blood  08-11-22   No Growth Final  --  --      Peritoneal Peritoneal Fluid  08-09-22   Few Escherichia coli  Moderate Streptococcus anginosus "Susceptibilities not performed"  --  Escherichia coli      Catheterized Catheterized  08-08-22   10,000 - 49,000 CFU/mL Escherichia coli  --  Escherichia coli      .Blood Blood-Peripheral  08-08-22   No Growth Final  --  --      .Blood Blood-Peripheral  08-08-22   No Growth Final  --  --        CMV IgG Antibody: 4.00 U/mL (08-12-22 @ 00:32)          RADIOLOGY:    <The imaging below has been reviewed and visualized by me independently. Findings as detailed in report below> Follow Up:  Fever    Interval History: afebrile overnight. no acute events.     REVIEW OF SYSTEMS  [  ] ROS unobtainable because:    [ x ] All other systems negative except as noted below    Constitutional:  [ x] fever [ ] chills  [ ] weight loss  [ ] weakness  Skin:  [ ] rash [ ] phlebitis	  Eyes: [ ] icterus [ ] pain  [ ] discharge	  ENMT: [ ] sore throat  [ ] thrush [ ] ulcers [ ] exudates  Respiratory: [ ] dyspnea [ ] hemoptysis [ ] cough [ ] sputum	  Cardiovascular:  [ ] chest pain [ ] palpitations [ ] edema	  Gastrointestinal:  [ ] nausea [ ] vomiting [ ] diarrhea [ ] constipation [ x ] pain	  Genitourinary:  [ ] dysuria [ ] frequency [ ] hematuria [ ] discharge [ ] flank pain  [ ] incontinence  Musculoskeletal:  [ ] myalgias [ ] arthralgias [ ] arthritis  [ ] back pain  Neurological:  [ ] headache [ ] seizures  [ ] confusion/altered mental status      Allergies  No Known Allergies        ANTIMICROBIALS:  ampicillin/sulbactam  IVPB 3 every 6 hours  fluconAZOLE   Tablet 400 daily  penicillin   G benzathine Injectable 2.4 <User Schedule>      OTHER MEDS:  MEDICATIONS  (STANDING):  furosemide   Injectable 80 <User Schedule>  pantoprazole    Tablet 40 before breakfast  polyethylene glycol 3350 17 two times a day  senna 2 at bedtime  spironolactone 200 <User Schedule>  tamsulosin 0.4 at bedtime      Vital Signs Last 24 Hrs  T(C): 37.3 (01 Sep 2022 09:00), Max: 37.4 (31 Aug 2022 15:49)  T(F): 99.2 (01 Sep 2022 09:00), Max: 99.3 (31 Aug 2022 15:49)  HR: 106 (01 Sep 2022 13:15) (81 - 107)  BP: 155/85 (01 Sep 2022 13:15) (135/68 - 156/81)  BP(mean): 106 (31 Aug 2022 17:00) (104 - 106)  RR: 18 (01 Sep 2022 13:15) (18 - 18)  SpO2: 95% (01 Sep 2022 13:15) (95% - 96%)    Parameters below as of 01 Sep 2022 13:15  Patient On (Oxygen Delivery Method): room air    PHYSICAL EXAMINATION:  General: Alert and Awake, NAD  Cardiac: RRR, No M/R/G  Resp: CTAB, No Wh/Rh/Ra  Abdomen: +Hepatobiliary Drain, Distended, No HSM, No rigidity or guarding  MSK: No LE edema. No Calf tenderness  Skin: No rashes or lesions. Skin is warm and dry to the touch.   Neuro: Alert and Awake. CN 2-12 Grossly intact. Moves all four extremities spontaneously.  Psych: Calm, Pleasant, Cooperative                            6.6    7.36  )-----------( 59       ( 01 Sep 2022 06:52 )             20.1       09-01    135  |  99  |  9   ----------------------------<  102<H>  3.3<L>   |  26  |  0.78    Ca    8.3<L>      01 Sep 2022 06:59  Phos  2.1     09-01  Mg     1.7     09-01    TPro  6.6  /  Alb  3.0<L>  /  TBili  2.9<H>  /  DBili  x   /  AST  33  /  ALT  13  /  AlkPhos  78  09-01          MICROBIOLOGY:  v  Peritoneal Peritoneal Fluid  08-31-22 --  --    polymorphonuclear leukocytes seen  No organisms seen  by cytocentrifuge      Clean Catch Clean Catch (Midstream)  08-30-22   No growth  --  --      .Blood Blood-Peripheral  08-30-22   No growth to date.  --  --      Peritoneal Peritoneal Fluid  08-25-22   No growth at 5 days  --    polymorphonuclear leukocytes seen  No organisms seen  by cytocentrifuge      Peritoneal Peritoneal Fluid  08-16-22   Rare Escherichia coli  Few Streptococcus anginosus  --  Escherichia coli  Streptococcus anginosus      .Blood Blood-Peripheral  08-16-22   No Growth Final  --  --      Peritoneal Peritoneal Fluid  08-12-22   No growth  --    polymorphonuclear leukocytes seen  No organisms seen  by cytocentrifuge      .Blood Blood  08-11-22   No Growth Final  --  --      Peritoneal Peritoneal Fluid  08-09-22   Few Escherichia coli  Moderate Streptococcus anginosus "Susceptibilities not performed"  --  Escherichia coli      Catheterized Catheterized  08-08-22   10,000 - 49,000 CFU/mL Escherichia coli  --  Escherichia coli      .Blood Blood-Peripheral  08-08-22   No Growth Final  --  --      .Blood Blood-Peripheral  08-08-22   No Growth Final  --  --    CMV IgG Antibody: 4.00 U/mL (08-12-22 @ 00:32)    RADIOLOGY:    <The imaging below has been reviewed and visualized by me independently. Findings as detailed in report below>    < from: US Abdomen Limited (08.31.22 @ 15:37) >  IMPRESSION:    Moderate volume of complex ascites, most prominent in the left lower   quadrant.    < end of copied text >

## 2022-09-01 NOTE — PROVIDER CONTACT NOTE (CRITICAL VALUE NOTIFICATION) - ACTION/TREATMENT ORDERED:
JOHN Norwood (transplant team) made aware and will order unit of blood
To draw stat type and screen. Once type and screen results, will administer blood. Pending orders.

## 2022-09-01 NOTE — DISCHARGE NOTE PROVIDER - DETAILS OF MALNUTRITION DIAGNOSIS/DIAGNOSES
This patient has been assessed with a concern for Malnutrition and was treated during this hospitalization for the following Nutrition diagnosis/diagnoses:     -  08/12/2022: Moderate protein-calorie malnutrition

## 2022-09-01 NOTE — PROGRESS NOTE ADULT - ASSESSMENT
65 y/o M PMHx cholangitis/cholecystitis (s/p ERCP w/ biliary stent placement 04/2022) and recently diagnosed etOH cirrhosis, presents with RUQ pain following biliary stent removal on 7/20/22. Found to have distended GB with wall thickening and stone in cystic duct, concerning for cholecystitis. Admitted to SICU for monitoring, s/p perc rodney and paracentesis by IR on 8/9. Paracentesis results consistent with SBP. Course now c/b ARF.     s/p Perc Rodney Tube and Paracentesis  Paracentesis cell counts suggestive of Peritonitis  Cultures thus far with E coli and Streptococcus on Perc Rodney Drainage and Ascites drainage  Concerning with cholecystitis with associated perforation    Paracentesis (8/9) 160,713 with 62% PMN  Paracentesis (8/12) 8,344 with 97% PMN  Paracentesis (8/16) 53,930 with 92% PMN  Paracentesis (8/25) with 62,700 nucleated cells but unable to run differential   Paracentesis (8/31) 3,800 with 94% PMN    Cell counts improving but still technically meeting criteria for peritonitis even when correcting for RBC    #Peritonitis, Cholecystitis, SBP  Ascites Cultures (8/16) Rare E coli and Few Strep anginosis  CT A/P (8/18) Moderate volume of abdominopelvic fluid with new findings of hemoperitoneum. Clot appears most concentrated within the left lower quadrant, suggesting that a source of bleed is potentially in this location.   CT Chest (8/18) Ground glass opacities in the right upper lobe. Moderate bilateral pleural effusions.   Suspect GGO on CT is representative of pulmonary edema  Repeat Paracentesis (8/31) 3,800 with 94% PMN  Cell counts improving but still technically meeting criteria for peritonitis even when correcting for RBC  --Continue Unasyn 3g IV Q6H while admitted; anticipate treating empirically for another 14 days with Augmentin from 8/31 paracentesis    #Fever  s/p cholecystostomy tube exchange on 8/29.   Febrile on 8/30  ?Reactive following manipulation of gallbladder  U/A (8/30) without pyuria  UCx (8/30) No Growth  BCx (8/30) NGTD  COVID19 (8/28, 9/1) Negative  --Continue Unasyn as above  --Follow up on preliminary blood cultures    #Late Latent Syphilis  Denies knowledge of preceding diagnosis  No preceding treatment  Will treat as late latent syphilis  --Continue IM Benzathine Penicillin 2.4 million units Q7Days x 3 doses (End Date: 9/1/22)    #Pre-Liver Transplant Evaluation  --ID clearance for OLT pending resolution of peritonitis but no other contraindications  --Follow up on repeat Quantiferon Gold (delay in QuantiFeron given backorder on reagents)    #Encounter to Vaccinate Patient  COVID19: 3/5/21, 3/26/21 - Would benefit from booster, anticipate waiting for bivalent COVID19 vaccine  Influenza: Will require with next season  Pneumococcal: Recommend PCV20 today  HBV: Recommend Heplisav dose 1 today  HAV: Immune  Tdap: Will require Tdap  Shinglex: Will require Shingrix    I will continue to follow. Please feel free to contact me with any further questions.    Bladimir Nix M.D.  [facility] Division of Infectious Disease  8AM-5PM Monday - Friday: Available on [employer] Teams  After Hours and Holidays (or if no response on [employer] Teams): Please contact the Infectious Diseases Office at [phone]    The above assessment and plan were discussed with Pb transplant surgery PA

## 2022-09-01 NOTE — PROGRESS NOTE ADULT - SUBJECTIVE AND OBJECTIVE BOX
Interventional Radiology Follow-Up Note.     Patient seen and examined @ bedside between 9-10a    This is a 66y Male s/p cholecystostomy drain placed 8/9 w/ perc rodney tube check on 8/29. now s/p para on 8/31 with 1250cc removed.    NO complaint offered.      Medication:     ampicillin/sulbactam  IVPB: (09-01)  fluconAZOLE IVPB: (08-30)  furosemide   Injectable: (09-01)  furosemide   Injectable: (08-31)  spironolactone: (09-01)  tamsulosin: (08-31)    Vitals:   T(F): 99.2, Max: 99.3 (15:49)  HR: 104  BP: 156/81  RR: 18  SpO2: 96%    Physical Exam:  General: Nontoxic, in NAD.  Abdomen:  RLQ para site soft, NTND.  dressing c/d/i , now removed. no hematoma.            LABS:  Na: 135 (09-01 @ 06:59), 132 (08-31 @ 06:35), 136 (08-30 @ 06:13)  K: 3.3 (09-01 @ 06:59), 3.3 (08-31 @ 06:35), 3.2 (08-30 @ 06:13)  Cl: 99 (09-01 @ 06:59), 97 (08-31 @ 06:35), 99 (08-30 @ 06:13)  CO2: 26 (09-01 @ 06:59), 27 (08-31 @ 06:35), 25 (08-30 @ 06:13)  BUN: 9 (09-01 @ 06:59), 10 (08-31 @ 06:35), 10 (08-30 @ 06:13)  Cr: 0.78 (09-01 @ 06:59), 0.82 (08-31 @ 06:35), 0.88 (08-30 @ 06:13)  Glu: 102(09-01 @ 06:59), 112(08-31 @ 06:35), 117(08-30 @ 06:13)    Hgb: 6.6 (09-01 @ 06:52), 7.1 (08-31 @ 06:36), 7.2 (08-30 @ 06:12)  Hct: 20.1 (09-01 @ 06:52), 21.7 (08-31 @ 06:36), 22.1 (08-30 @ 06:12)  WBC: 7.36 (09-01 @ 06:52), 12.79 (08-31 @ 06:36), 9.67 (08-30 @ 06:12)  Plt: 59 (09-01 @ 06:52), 60 (08-31 @ 06:36), 52 (08-30 @ 06:12)    INR: 2.56 09-01-22 @ 06:56, 2.35 08-31-22 @ 06:35, 2.38 08-30-22 @ 06:13  PTT: 39.6 09-01-22 @ 06:56, 36.5 08-31-22 @ 06:35, 36.7 08-30-22 @ 06:13             Bilirubin Total, Serum: 2.9 mg/dL (09-01-22 @ 06:59)    Aspartate Aminotransferase (AST/SGOT): 33 U/L (09-01-22 @ 06:59)  Alanine Aminotransferase (ALT/SGPT): 13 U/L (09-01-22 @ 06:59)  Aspartate Aminotransferase (AST/SGOT): 38 U/L (08-31-22 @ 06:35)  Alanine Aminotransferase (ALT/SGPT): 13 U/L (08-31-22 @ 06:35)      Bilirubin Total, Serum: 2.9 mg/dL *H* (09-01-22 @ 06:59)  Bilirubin Total, Serum: 3.1 mg/dL *H* (08-31-22 @ 06:35)  Bilirubin Total, Serum: 3.1 mg/dL *H* (08-30-22 @ 06:13)  Bilirubin Total, Serum: 3.3 mg/dL *H* (08-29-22 @ 06:30)  Bilirubin Total, Serum: 2.8 mg/dL *H* (08-28-22 @ 06:27)      Culture - Body Fluid with Gram Stain (collected 08-31-22 @ 16:44)  Source: Peritoneal Peritoneal Fluid  Gram Stain (09-01-22 @ 01:27):    polymorphonuclear leukocytes seen    No organisms seen    by cytocentrifuge          Assessment/Plan:  This is a 66y Male s/p cholecystostomy drain placed 8/9 w/ perc rodney tube check on 8/29. now s/p para on 8/31 with 1250cc removed.    # s/p para 8/31 with 1250cc removed    # s/p perc rodney drain placed 8/9, tube check 8/29 good position.   - Flush drain with 5cc NS daily forward only; DO NOT aspirate  - Continue global management per primary team  - Follow up with surgeon to determine surgical candidacy for gallbladder surgery. If instructed by surgeon follow up in 4-6 weeks for cholecystostomy drain evaluation. Otherwise, follow up every 3 months for routine exchange. Call to make appt at [phone]   - Pt will benefit from VNS service to help with drainage catheter care; Pt should continue same drainage catheter care as an outpatient.     [place] at [phone] with any questions, concerns, or issues regarding above.      IR WILL SIGN OFF , RECONSULT PRN Interventional Radiology Follow-Up Note.     Patient seen and examined @ bedside between 9-10a    This is a 66y Male s/p cholecystostomy drain placed 8/9 w/ perc rodney tube check on 8/29. now s/p para on 8/31 with 1250cc removed.    NO complaint offered.      Medication:     ampicillin/sulbactam  IVPB: (09-01)  fluconAZOLE IVPB: (08-30)  furosemide   Injectable: (09-01)  furosemide   Injectable: (08-31)  spironolactone: (09-01)  tamsulosin: (08-31)    Vitals:   T(F): 99.2, Max: 99.3 (15:49)  HR: 104  BP: 156/81  RR: 18  SpO2: 96%    Physical Exam:  General: Nontoxic, in NAD.  Abdomen:  RLQ para site soft, NTND.  dressing c/d/i , now removed. no hematoma.            LABS:  Na: 135 (09-01 @ 06:59), 132 (08-31 @ 06:35), 136 (08-30 @ 06:13)  K: 3.3 (09-01 @ 06:59), 3.3 (08-31 @ 06:35), 3.2 (08-30 @ 06:13)  Cl: 99 (09-01 @ 06:59), 97 (08-31 @ 06:35), 99 (08-30 @ 06:13)  CO2: 26 (09-01 @ 06:59), 27 (08-31 @ 06:35), 25 (08-30 @ 06:13)  BUN: 9 (09-01 @ 06:59), 10 (08-31 @ 06:35), 10 (08-30 @ 06:13)  Cr: 0.78 (09-01 @ 06:59), 0.82 (08-31 @ 06:35), 0.88 (08-30 @ 06:13)  Glu: 102(09-01 @ 06:59), 112(08-31 @ 06:35), 117(08-30 @ 06:13)    Hgb: 6.6 (09-01 @ 06:52), 7.1 (08-31 @ 06:36), 7.2 (08-30 @ 06:12)  Hct: 20.1 (09-01 @ 06:52), 21.7 (08-31 @ 06:36), 22.1 (08-30 @ 06:12)  WBC: 7.36 (09-01 @ 06:52), 12.79 (08-31 @ 06:36), 9.67 (08-30 @ 06:12)  Plt: 59 (09-01 @ 06:52), 60 (08-31 @ 06:36), 52 (08-30 @ 06:12)    INR: 2.56 09-01-22 @ 06:56, 2.35 08-31-22 @ 06:35, 2.38 08-30-22 @ 06:13  PTT: 39.6 09-01-22 @ 06:56, 36.5 08-31-22 @ 06:35, 36.7 08-30-22 @ 06:13             Bilirubin Total, Serum: 2.9 mg/dL (09-01-22 @ 06:59)    Aspartate Aminotransferase (AST/SGOT): 33 U/L (09-01-22 @ 06:59)  Alanine Aminotransferase (ALT/SGPT): 13 U/L (09-01-22 @ 06:59)  Aspartate Aminotransferase (AST/SGOT): 38 U/L (08-31-22 @ 06:35)  Alanine Aminotransferase (ALT/SGPT): 13 U/L (08-31-22 @ 06:35)      Bilirubin Total, Serum: 2.9 mg/dL *H* (09-01-22 @ 06:59)  Bilirubin Total, Serum: 3.1 mg/dL *H* (08-31-22 @ 06:35)  Bilirubin Total, Serum: 3.1 mg/dL *H* (08-30-22 @ 06:13)  Bilirubin Total, Serum: 3.3 mg/dL *H* (08-29-22 @ 06:30)  Bilirubin Total, Serum: 2.8 mg/dL *H* (08-28-22 @ 06:27)      Culture - Body Fluid with Gram Stain (collected 08-31-22 @ 16:44)  Source: Peritoneal Peritoneal Fluid  Gram Stain (09-01-22 @ 01:27):    polymorphonuclear leukocytes seen    No organisms seen    by cytocentrifuge          Assessment/Plan:  This is a 66y Male s/p cholecystostomy drain placed 8/9 w/ perc rodney tube check on 8/29. now s/p para on 8/31 with 1250cc removed.    # h/h critical 6.6/20, SBP stable, getting 1UPRBC ,  management per Transplant hepatology.     # s/p para 8/31 with 1250cc removed    # s/p perc rodney drain placed 8/9, tube check 8/29 good position.   - Flush drain with 5cc NS daily forward only; DO NOT aspirate  - Continue global management per primary team  - Follow up with surgeon to determine surgical candidacy for gallbladder surgery. If instructed by surgeon follow up in 4-6 weeks for cholecystostomy drain evaluation. Otherwise, follow up every 3 months for routine exchange. Call to make appt at [phone]   - Pt will benefit from VNS service to help with drainage catheter care; Pt should continue same drainage catheter care as an outpatient.     Please [place] at [phone] or [phone] with any questions, concerns, or issues regarding above.      IR WILL SIGN OFF , RECONSULT PRN

## 2022-09-01 NOTE — PROGRESS NOTE ADULT - ATTENDING COMMENTS
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 Last febrile 8/30 with Tmax 99.5 in past 24h but with recurrent leukocytosis on labs today. Cultures from ascitic fluid (8/9 and 8/16) grew E. coli and Streptococcus anginosis, with repeat ascitic fluid cultures from 8/25 and 8/31 with NGTD. Ascitic fluid PMN count has improved to 1172 (8/31) after correction for RBCs, still consistent with peritonitis but clinically improving. S/p ceftriaxone (8/8), s/p Zosyn (8/8-16), s/p vancomycin (8/10), and currently on Unasyn (8/16- ). Fluconazole discontinued today (8/19-9/1). Will discuss with Transplant ID re: timing for switch to Augmentin for discharge and duration of treatment, or whether this would still depend on additional serial LVPs and fluid studies. Anasarca had been improving with diuresis, currently on Lasix 80 mg iv daily and spironolactone 200 mg po daily and also received an additional Lasix 40 mg iv x1 yesterday. He is wait-listed for liver transplant, ABO O with MELD-Na 22 today, but on internal hold pending ID clearance to proceed with transplant.    Laron Harper M.D., Ph.D.  Transplant Hepatology

## 2022-09-01 NOTE — DISCHARGE NOTE PROVIDER - NSDCCPCAREPLAN_GEN_ALL_CORE_FT
PRINCIPAL DISCHARGE DIAGNOSIS  Diagnosis: Acute cholecystitis  Assessment and Plan of Treatment: you underwent placement of a tube to help drain your infected gallbladder, which was the cause of your sepsis.  continue to empty and care for drain as instructed. call with any concerns or issues, or changes to your health.      SECONDARY DISCHARGE DIAGNOSES  Diagnosis: Cirrhosis  Assessment and Plan of Treatment: you were placed on the liver transplant waiting list  take all medications as prescribed  DO NOT DRINK ALCOHOL  call with any questions or concerns or return to ER

## 2022-09-01 NOTE — PROVIDER CONTACT NOTE (CRITICAL VALUE NOTIFICATION) - BACKGROUND
Pt 66yoM admit w/ severe sepsis 2/2 acute cholecystitis & peritonitis. PMH decompensated cirrhosis possibly 2/2 ALD/PARNELL, hx of cholangitis s/p ERCP w/ stent & subsequent stent removal, sphincterotomy, balloon sweep removal sludge/stones.
ETOH, cholangitis, MELD score 24

## 2022-09-01 NOTE — PROVIDER CONTACT NOTE (CRITICAL VALUE NOTIFICATION) - ASSESSMENT
Pt A&Ox4, on room air, w/ VS as charted. Pt denies any pain, sleeping. Intact milian & percutaneous cholecystostomy tube.
Pt comfortable in bed

## 2022-09-01 NOTE — DISCHARGE NOTE PROVIDER - CARE PROVIDER_API CALL
Laron Harper (MD)  Gastroenterology; Internal Medicine; Transplant Hepatology  [address] [place] [address]  Phone: [phone]  Fax: [phone]  Follow Up Time:

## 2022-09-01 NOTE — PROGRESS NOTE ADULT - ASSESSMENT
66 year old male with decompensated cirrhosis and cholangitis s/p ERCP with stent placement (4/2022) s/p stent removal on 7/20 (along sphincterotomy and stone extraction) presenting for one week of RUQ abdominal pain associated with new abdominal distension and decreased appetite.     #Septic shock 2/2 acute cholecystitis vs ascending cholangitis  #E coli and Streptococcus anginosis secondary bacterial peritonitis  #Hemoperitoneum with free locules of air on CT imaging 8/18/2022  #ROBBIE required hemodialysis with renal recovery: initial urine sodium 9, likely indicating prerenal ROBBIE vs less likely HRS given initial presentation as above  #History of cholangitis s/p biliary stent placement April 2022 and removal 7/20/2022, now s/p perc rodney tube  #Decompensated ALD/PARNELL cirrhosis c/b ascites  EV: normal esophagus on ERCP from 4/2022  Ascites: increase in ascites from mild to moderate on current imaging  SBP: paracentesis 8/9/2022 c/w peritonitis given 99K PMNs however elevated LDH more indicative of secondary bacterial peritonitis  HE: none currently       -MELD-Na = 23 on 9/1/2022       -Ascites: yes       -SBP: paracentesis 8/9/2022 reveals likely secondary bacterial peritonitis from suspected gallbladder pathology, +SBP on para 8/31       -Varices: no mention of varices on EGD/ERCP on 7/20/2022       -Hepatic encephalopathy: none, Ammonia, Serum: 38 umol/L [11 - 55] (08-12-22 @ 00:21)       -HCC: unknown       -LT candidacy and evaluation: listed for transplant            [x] Psychosocial: cleared pending RPP            [ ] Infectious            [x] Cardiology:                    Cardiac cath: n/a                   Stress test: negative noninvasive stress test on 8/19/2022 for inducible ischemia            [ ] Colonoscopy: needed, f/u records from outside GI colonoscopy reports            [x] Prostate: Prostate Specific Antigen: negative at 2.21 ng/mL on 8/12/2022            [x] Dental            [x] PT/Frailty    Recommendations:  -s/p percutaneous cholecystostomy exchange 8/30  -Continue furosemide to 80 mg po daily, spironolactone to 200 mg po daily  -Goal net negative 2L, 24 hrs  -Give vitamin K  -PPI daily for stress prophylaxis  -appreciate ID recommendations regarding ABx duration, planning for meropenem --> Augmentin  -Consider po Abx tomorrow  -dc fluconazole  -give 1 pRBC  -f/u 8/30 BCx   -PT as able    Preliminary note until signed by Attending.    Thank you for involving us in this patient's care.    Kary Tena MD  Gastroenterology/Hepatology Fellow, PGY-VI    NON-URGENT CONSULTS:  Please email [email or web address]  [email or web address]  AT NIGHT AND ON WEEKENDS:  Contact on-call GI fellow via answering service ([phone]) from 5pm-8am and on weekends/holidays  MONDAY-FRIDAY 8AM-5PM:  Pager# [phone] ([facility])  GI Phone# [phone] ([facility])

## 2022-09-02 ENCOUNTER — TRANSCRIPTION ENCOUNTER (OUTPATIENT)
Age: 67
End: 2022-09-02

## 2022-09-02 VITALS
OXYGEN SATURATION: 96 % | RESPIRATION RATE: 18 BRPM | HEART RATE: 105 BPM | DIASTOLIC BLOOD PRESSURE: 84 MMHG | TEMPERATURE: 99 F | SYSTOLIC BLOOD PRESSURE: 150 MMHG

## 2022-09-02 LAB
ALBUMIN SERPL ELPH-MCNC: 3.3 G/DL — SIGNIFICANT CHANGE UP (ref 3.3–5)
ALP SERPL-CCNC: 82 U/L — SIGNIFICANT CHANGE UP (ref 40–120)
ALT FLD-CCNC: 15 U/L — SIGNIFICANT CHANGE UP (ref 10–45)
ANION GAP SERPL CALC-SCNC: 9 MMOL/L — SIGNIFICANT CHANGE UP (ref 5–17)
APTT BLD: 36.9 SEC — HIGH (ref 27.5–35.5)
AST SERPL-CCNC: 36 U/L — SIGNIFICANT CHANGE UP (ref 10–40)
BASOPHILS # BLD AUTO: 0.03 K/UL — SIGNIFICANT CHANGE UP (ref 0–0.2)
BASOPHILS NFR BLD AUTO: 0.3 % — SIGNIFICANT CHANGE UP (ref 0–2)
BILIRUB SERPL-MCNC: 2.8 MG/DL — HIGH (ref 0.2–1.2)
BUN SERPL-MCNC: 9 MG/DL — SIGNIFICANT CHANGE UP (ref 7–23)
CALCIUM SERPL-MCNC: 8.2 MG/DL — LOW (ref 8.4–10.5)
CHLORIDE SERPL-SCNC: 96 MMOL/L — SIGNIFICANT CHANGE UP (ref 96–108)
CO2 SERPL-SCNC: 27 MMOL/L — SIGNIFICANT CHANGE UP (ref 22–31)
CREAT SERPL-MCNC: 0.92 MG/DL — SIGNIFICANT CHANGE UP (ref 0.5–1.3)
EGFR: 93 ML/MIN/1.73M2 — SIGNIFICANT CHANGE UP
EOSINOPHIL # BLD AUTO: 0.09 K/UL — SIGNIFICANT CHANGE UP (ref 0–0.5)
EOSINOPHIL NFR BLD AUTO: 1 % — SIGNIFICANT CHANGE UP (ref 0–6)
GAMMA INTERFERON BACKGROUND BLD IA-ACNC: 0.02 IU/ML — SIGNIFICANT CHANGE UP
GLUCOSE SERPL-MCNC: 144 MG/DL — HIGH (ref 70–99)
HCT VFR BLD CALC: 22.9 % — LOW (ref 39–50)
HGB BLD-MCNC: 7.6 G/DL — LOW (ref 13–17)
IMM GRANULOCYTES NFR BLD AUTO: 0.6 % — SIGNIFICANT CHANGE UP (ref 0–1.5)
INR BLD: 2.34 RATIO — HIGH (ref 0.88–1.16)
LYMPHOCYTES # BLD AUTO: 1.29 K/UL — SIGNIFICANT CHANGE UP (ref 1–3.3)
LYMPHOCYTES # BLD AUTO: 14.9 % — SIGNIFICANT CHANGE UP (ref 13–44)
M TB IFN-G BLD-IMP: NEGATIVE — SIGNIFICANT CHANGE UP
M TB IFN-G CD4+ BCKGRND COR BLD-ACNC: 0 IU/ML — SIGNIFICANT CHANGE UP
M TB IFN-G CD4+CD8+ BCKGRND COR BLD-ACNC: 0 IU/ML — SIGNIFICANT CHANGE UP
MAGNESIUM SERPL-MCNC: 1.5 MG/DL — LOW (ref 1.6–2.6)
MCHC RBC-ENTMCNC: 30.9 PG — SIGNIFICANT CHANGE UP (ref 27–34)
MCHC RBC-ENTMCNC: 33.2 GM/DL — SIGNIFICANT CHANGE UP (ref 32–36)
MCV RBC AUTO: 93.1 FL — SIGNIFICANT CHANGE UP (ref 80–100)
MONOCYTES # BLD AUTO: 0.96 K/UL — HIGH (ref 0–0.9)
MONOCYTES NFR BLD AUTO: 11.1 % — SIGNIFICANT CHANGE UP (ref 2–14)
NEUTROPHILS # BLD AUTO: 6.24 K/UL — SIGNIFICANT CHANGE UP (ref 1.8–7.4)
NEUTROPHILS NFR BLD AUTO: 72.1 % — SIGNIFICANT CHANGE UP (ref 43–77)
NON-GYNECOLOGICAL CYTOLOGY STUDY: SIGNIFICANT CHANGE UP
NRBC # BLD: 0 /100 WBCS — SIGNIFICANT CHANGE UP (ref 0–0)
PHOSPHATE SERPL-MCNC: 3.5 MG/DL — SIGNIFICANT CHANGE UP (ref 2.5–4.5)
PLATELET # BLD AUTO: 77 K/UL — LOW (ref 150–400)
POTASSIUM SERPL-MCNC: 3.4 MMOL/L — LOW (ref 3.5–5.3)
POTASSIUM SERPL-SCNC: 3.4 MMOL/L — LOW (ref 3.5–5.3)
PROT SERPL-MCNC: 7 G/DL — SIGNIFICANT CHANGE UP (ref 6–8.3)
PROTHROM AB SERPL-ACNC: 27.4 SEC — HIGH (ref 10.5–13.4)
QUANT TB PLUS MITOGEN MINUS NIL: 5.35 IU/ML — SIGNIFICANT CHANGE UP
RBC # BLD: 2.46 M/UL — LOW (ref 4.2–5.8)
RBC # FLD: 21.6 % — HIGH (ref 10.3–14.5)
SODIUM SERPL-SCNC: 132 MMOL/L — LOW (ref 135–145)
WBC # BLD: 8.66 K/UL — SIGNIFICANT CHANGE UP (ref 3.8–10.5)
WBC # FLD AUTO: 8.66 K/UL — SIGNIFICANT CHANGE UP (ref 3.8–10.5)

## 2022-09-02 PROCEDURE — 86762 RUBELLA ANTIBODY: CPT

## 2022-09-02 PROCEDURE — 86381 MITOCHONDRIAL ANTIBODY EACH: CPT

## 2022-09-02 PROCEDURE — 82378 CARCINOEMBRYONIC ANTIGEN: CPT

## 2022-09-02 PROCEDURE — 49083 ABD PARACENTESIS W/IMAGING: CPT

## 2022-09-02 PROCEDURE — 97112 NEUROMUSCULAR REEDUCATION: CPT

## 2022-09-02 PROCEDURE — 86787 VARICELLA-ZOSTER ANTIBODY: CPT

## 2022-09-02 PROCEDURE — G0480: CPT

## 2022-09-02 PROCEDURE — 84166 PROTEIN E-PHORESIS/URINE/CSF: CPT

## 2022-09-02 PROCEDURE — 83036 HEMOGLOBIN GLYCOSYLATED A1C: CPT

## 2022-09-02 PROCEDURE — 84157 ASSAY OF PROTEIN OTHER: CPT

## 2022-09-02 PROCEDURE — 93005 ELECTROCARDIOGRAM TRACING: CPT

## 2022-09-02 PROCEDURE — 74177 CT ABD & PELVIS W/CONTRAST: CPT | Mod: MA

## 2022-09-02 PROCEDURE — 83540 ASSAY OF IRON: CPT

## 2022-09-02 PROCEDURE — 82550 ASSAY OF CK (CPK): CPT

## 2022-09-02 PROCEDURE — 82140 ASSAY OF AMMONIA: CPT

## 2022-09-02 PROCEDURE — 84300 ASSAY OF URINE SODIUM: CPT

## 2022-09-02 PROCEDURE — 82803 BLOOD GASES ANY COMBINATION: CPT

## 2022-09-02 PROCEDURE — 87591 N.GONORRHOEAE DNA AMP PROB: CPT

## 2022-09-02 PROCEDURE — 82607 VITAMIN B-12: CPT

## 2022-09-02 PROCEDURE — 84702 CHORIONIC GONADOTROPIN TEST: CPT

## 2022-09-02 PROCEDURE — 83935 ASSAY OF URINE OSMOLALITY: CPT

## 2022-09-02 PROCEDURE — 83550 IRON BINDING TEST: CPT

## 2022-09-02 PROCEDURE — 93320 DOPPLER ECHO COMPLETE: CPT

## 2022-09-02 PROCEDURE — 84133 ASSAY OF URINE POTASSIUM: CPT

## 2022-09-02 PROCEDURE — 76770 US EXAM ABDO BACK WALL COMP: CPT

## 2022-09-02 PROCEDURE — 86665 EPSTEIN-BARR CAPSID VCA: CPT

## 2022-09-02 PROCEDURE — 36430 TRANSFUSION BLD/BLD COMPNT: CPT

## 2022-09-02 PROCEDURE — 83690 ASSAY OF LIPASE: CPT

## 2022-09-02 PROCEDURE — 84145 PROCALCITONIN (PCT): CPT

## 2022-09-02 PROCEDURE — 84165 PROTEIN E-PHORESIS SERUM: CPT

## 2022-09-02 PROCEDURE — 47490 INCISION OF GALLBLADDER: CPT

## 2022-09-02 PROCEDURE — 82945 GLUCOSE OTHER FLUID: CPT

## 2022-09-02 PROCEDURE — 84132 ASSAY OF SERUM POTASSIUM: CPT

## 2022-09-02 PROCEDURE — 81001 URINALYSIS AUTO W/SCOPE: CPT

## 2022-09-02 PROCEDURE — 83605 ASSAY OF LACTIC ACID: CPT

## 2022-09-02 PROCEDURE — 86900 BLOOD TYPING SEROLOGIC ABO: CPT

## 2022-09-02 PROCEDURE — 85303 CLOT INHIBIT PROT C ACTIVITY: CPT

## 2022-09-02 PROCEDURE — 86850 RBC ANTIBODY SCREEN: CPT

## 2022-09-02 PROCEDURE — P9045: CPT

## 2022-09-02 PROCEDURE — 85384 FIBRINOGEN ACTIVITY: CPT

## 2022-09-02 PROCEDURE — 86901 BLOOD TYPING SEROLOGIC RH(D): CPT

## 2022-09-02 PROCEDURE — 82728 ASSAY OF FERRITIN: CPT

## 2022-09-02 PROCEDURE — 93306 TTE W/DOPPLER COMPLETE: CPT

## 2022-09-02 PROCEDURE — 88305 TISSUE EXAM BY PATHOLOGIST: CPT

## 2022-09-02 PROCEDURE — 86696 HERPES SIMPLEX TYPE 2 TEST: CPT

## 2022-09-02 PROCEDURE — 85307 ASSAY ACTIVATED PROTEIN C: CPT

## 2022-09-02 PROCEDURE — 87635 SARS-COV-2 COVID-19 AMP PRB: CPT

## 2022-09-02 PROCEDURE — 86769 SARS-COV-2 COVID-19 ANTIBODY: CPT

## 2022-09-02 PROCEDURE — 82784 ASSAY IGA/IGD/IGG/IGM EACH: CPT

## 2022-09-02 PROCEDURE — 85018 HEMOGLOBIN: CPT

## 2022-09-02 PROCEDURE — 86923 COMPATIBILITY TEST ELECTRIC: CPT

## 2022-09-02 PROCEDURE — 82565 ASSAY OF CREATININE: CPT

## 2022-09-02 PROCEDURE — 86334 IMMUNOFIX E-PHORESIS SERUM: CPT

## 2022-09-02 PROCEDURE — 36415 COLL VENOUS BLD VENIPUNCTURE: CPT

## 2022-09-02 PROCEDURE — 83090 ASSAY OF HOMOCYSTEINE: CPT

## 2022-09-02 PROCEDURE — 82435 ASSAY OF BLOOD CHLORIDE: CPT

## 2022-09-02 PROCEDURE — 71045 X-RAY EXAM CHEST 1 VIEW: CPT

## 2022-09-02 PROCEDURE — 71250 CT THORAX DX C-: CPT

## 2022-09-02 PROCEDURE — 87389 HIV-1 AG W/HIV-1&-2 AB AG IA: CPT

## 2022-09-02 PROCEDURE — 86780 TREPONEMA PALLIDUM: CPT

## 2022-09-02 PROCEDURE — 86147 CARDIOLIPIN ANTIBODY EA IG: CPT

## 2022-09-02 PROCEDURE — 84153 ASSAY OF PSA TOTAL: CPT

## 2022-09-02 PROCEDURE — 87205 SMEAR GRAM STAIN: CPT

## 2022-09-02 PROCEDURE — C1769: CPT

## 2022-09-02 PROCEDURE — 85300 ANTITHROMBIN III ACTIVITY: CPT

## 2022-09-02 PROCEDURE — 87521 HEPATITIS C PROBE&RVRS TRNSC: CPT

## 2022-09-02 PROCEDURE — 86692 HEPATITIS DELTA AGENT ANTBDY: CPT

## 2022-09-02 PROCEDURE — 84550 ASSAY OF BLOOD/URIC ACID: CPT

## 2022-09-02 PROCEDURE — C8930: CPT

## 2022-09-02 PROCEDURE — 82977 ASSAY OF GGT: CPT

## 2022-09-02 PROCEDURE — 84443 ASSAY THYROID STIM HORMONE: CPT

## 2022-09-02 PROCEDURE — 82248 BILIRUBIN DIRECT: CPT

## 2022-09-02 PROCEDURE — 85306 CLOT INHIBIT PROT S FREE: CPT

## 2022-09-02 PROCEDURE — 83735 ASSAY OF MAGNESIUM: CPT

## 2022-09-02 PROCEDURE — 86704 HEP B CORE ANTIBODY TOTAL: CPT

## 2022-09-02 PROCEDURE — 47531 INJECTION FOR CHOLANGIOGRAM: CPT

## 2022-09-02 PROCEDURE — 86664 EPSTEIN-BARR NUCLEAR ANTIGEN: CPT

## 2022-09-02 PROCEDURE — 82570 ASSAY OF URINE CREATININE: CPT

## 2022-09-02 PROCEDURE — 97535 SELF CARE MNGMENT TRAINING: CPT

## 2022-09-02 PROCEDURE — 81003 URINALYSIS AUTO W/O SCOPE: CPT

## 2022-09-02 PROCEDURE — 86376 MICROSOMAL ANTIBODY EACH: CPT

## 2022-09-02 PROCEDURE — 86708 HEPATITIS A ANTIBODY: CPT

## 2022-09-02 PROCEDURE — 80307 DRUG TEST PRSMV CHEM ANLYZR: CPT

## 2022-09-02 PROCEDURE — 85301 ANTITHROMBIN III ANTIGEN: CPT

## 2022-09-02 PROCEDURE — 86803 HEPATITIS C AB TEST: CPT

## 2022-09-02 PROCEDURE — 76705 ECHO EXAM OF ABDOMEN: CPT

## 2022-09-02 PROCEDURE — 84155 ASSAY OF PROTEIN SERUM: CPT

## 2022-09-02 PROCEDURE — P9047: CPT

## 2022-09-02 PROCEDURE — 96375 TX/PRO/DX INJ NEW DRUG ADDON: CPT

## 2022-09-02 PROCEDURE — 87077 CULTURE AEROBIC IDENTIFY: CPT

## 2022-09-02 PROCEDURE — 82042 OTHER SOURCE ALBUMIN QUAN EA: CPT

## 2022-09-02 PROCEDURE — 82247 BILIRUBIN TOTAL: CPT

## 2022-09-02 PROCEDURE — 86038 ANTINUCLEAR ANTIBODIES: CPT

## 2022-09-02 PROCEDURE — 84100 ASSAY OF PHOSPHORUS: CPT

## 2022-09-02 PROCEDURE — 86592 SYPHILIS TEST NON-TREP QUAL: CPT

## 2022-09-02 PROCEDURE — 85610 PROTHROMBIN TIME: CPT

## 2022-09-02 PROCEDURE — U0003: CPT

## 2022-09-02 PROCEDURE — 87340 HEPATITIS B SURFACE AG IA: CPT

## 2022-09-02 PROCEDURE — 86255 FLUORESCENT ANTIBODY SCREEN: CPT

## 2022-09-02 PROCEDURE — 93325 DOPPLER ECHO COLOR FLOW MAPG: CPT

## 2022-09-02 PROCEDURE — 82947 ASSAY GLUCOSE BLOOD QUANT: CPT

## 2022-09-02 PROCEDURE — 85396 CLOTTING ASSAY WHOLE BLOOD: CPT

## 2022-09-02 PROCEDURE — 82330 ASSAY OF CALCIUM: CPT

## 2022-09-02 PROCEDURE — 85302 CLOT INHIBIT PROT C ANTIGEN: CPT

## 2022-09-02 PROCEDURE — 90739 HEPB VACC 2/4 DOSE ADULT IM: CPT

## 2022-09-02 PROCEDURE — 80061 LIPID PANEL: CPT

## 2022-09-02 PROCEDURE — 80053 COMPREHEN METABOLIC PANEL: CPT

## 2022-09-02 PROCEDURE — 87075 CULTR BACTERIA EXCEPT BLOOD: CPT

## 2022-09-02 PROCEDURE — 94640 AIRWAY INHALATION TREATMENT: CPT

## 2022-09-02 PROCEDURE — 82746 ASSAY OF FOLIC ACID SERUM: CPT

## 2022-09-02 PROCEDURE — 74176 CT ABD & PELVIS W/O CONTRAST: CPT

## 2022-09-02 PROCEDURE — 83615 LACTATE (LD) (LDH) ENZYME: CPT

## 2022-09-02 PROCEDURE — 89051 BODY FLUID CELL COUNT: CPT

## 2022-09-02 PROCEDURE — 90677 PCV20 VACCINE IM: CPT

## 2022-09-02 PROCEDURE — P9016: CPT

## 2022-09-02 PROCEDURE — 83930 ASSAY OF BLOOD OSMOLALITY: CPT

## 2022-09-02 PROCEDURE — 97116 GAIT TRAINING THERAPY: CPT

## 2022-09-02 PROCEDURE — 87102 FUNGUS ISOLATION CULTURE: CPT

## 2022-09-02 PROCEDURE — 97530 THERAPEUTIC ACTIVITIES: CPT

## 2022-09-02 PROCEDURE — 87517 HEPATITIS B DNA QUANT: CPT

## 2022-09-02 PROCEDURE — 84484 ASSAY OF TROPONIN QUANT: CPT

## 2022-09-02 PROCEDURE — 80076 HEPATIC FUNCTION PANEL: CPT

## 2022-09-02 PROCEDURE — 82553 CREATINE MB FRACTION: CPT

## 2022-09-02 PROCEDURE — 86765 RUBEOLA ANTIBODY: CPT

## 2022-09-02 PROCEDURE — C1729: CPT

## 2022-09-02 PROCEDURE — 85025 COMPLETE CBC W/AUTO DIFF WBC: CPT

## 2022-09-02 PROCEDURE — 86146 BETA-2 GLYCOPROTEIN ANTIBODY: CPT

## 2022-09-02 PROCEDURE — 87086 URINE CULTURE/COLONY COUNT: CPT

## 2022-09-02 PROCEDURE — 81240 F2 GENE: CPT

## 2022-09-02 PROCEDURE — 87040 BLOOD CULTURE FOR BACTERIA: CPT

## 2022-09-02 PROCEDURE — 84439 ASSAY OF FREE THYROXINE: CPT

## 2022-09-02 PROCEDURE — 87186 SC STD MICRODIL/AGAR DIL: CPT

## 2022-09-02 PROCEDURE — 86593 SYPHILIS TEST NON-TREP QUANT: CPT

## 2022-09-02 PROCEDURE — 99285 EMERGENCY DEPT VISIT HI MDM: CPT | Mod: 25

## 2022-09-02 PROCEDURE — 97162 PT EVAL MOD COMPLEX 30 MIN: CPT

## 2022-09-02 PROCEDURE — 86695 HERPES SIMPLEX TYPE 1 TEST: CPT

## 2022-09-02 PROCEDURE — 86480 TB TEST CELL IMMUN MEASURE: CPT

## 2022-09-02 PROCEDURE — 87491 CHLMYD TRACH DNA AMP PROBE: CPT

## 2022-09-02 PROCEDURE — 86706 HEP B SURFACE ANTIBODY: CPT

## 2022-09-02 PROCEDURE — U0005: CPT

## 2022-09-02 PROCEDURE — 86682 HELMINTH ANTIBODY: CPT

## 2022-09-02 PROCEDURE — 85730 THROMBOPLASTIN TIME PARTIAL: CPT

## 2022-09-02 PROCEDURE — 80048 BASIC METABOLIC PNL TOTAL CA: CPT

## 2022-09-02 PROCEDURE — 88112 CYTOPATH CELL ENHANCE TECH: CPT

## 2022-09-02 PROCEDURE — 87070 CULTURE OTHR SPECIMN AEROBIC: CPT

## 2022-09-02 PROCEDURE — 99261: CPT

## 2022-09-02 PROCEDURE — 96374 THER/PROPH/DIAG INJ IV PUSH: CPT

## 2022-09-02 PROCEDURE — 84295 ASSAY OF SERUM SODIUM: CPT

## 2022-09-02 PROCEDURE — P9059: CPT

## 2022-09-02 PROCEDURE — 81241 F5 GENE: CPT

## 2022-09-02 PROCEDURE — 99232 SBSQ HOSP IP/OBS MODERATE 35: CPT | Mod: GC

## 2022-09-02 PROCEDURE — 84156 ASSAY OF PROTEIN URINE: CPT

## 2022-09-02 PROCEDURE — 85014 HEMATOCRIT: CPT

## 2022-09-02 PROCEDURE — 86644 CMV ANTIBODY: CPT

## 2022-09-02 PROCEDURE — 85027 COMPLETE CBC AUTOMATED: CPT

## 2022-09-02 PROCEDURE — 84540 ASSAY OF URINE/UREA-N: CPT

## 2022-09-02 PROCEDURE — 86663 EPSTEIN-BARR ANTIBODY: CPT

## 2022-09-02 PROCEDURE — 86790 VIRUS ANTIBODY NOS: CPT

## 2022-09-02 PROCEDURE — 87799 DETECT AGENT NOS DNA QUANT: CPT

## 2022-09-02 PROCEDURE — 86703 HIV-1/HIV-2 1 RESULT ANTBDY: CPT

## 2022-09-02 PROCEDURE — 83010 ASSAY OF HAPTOGLOBIN QUANT: CPT

## 2022-09-02 PROCEDURE — 82962 GLUCOSE BLOOD TEST: CPT

## 2022-09-02 PROCEDURE — 82390 ASSAY OF CERULOPLASMIN: CPT

## 2022-09-02 PROCEDURE — 49465 FLUORO EXAM OF G/COLON TUBE: CPT

## 2022-09-02 PROCEDURE — 82436 ASSAY OF URINE CHLORIDE: CPT

## 2022-09-02 PROCEDURE — 97165 OT EVAL LOW COMPLEX 30 MIN: CPT

## 2022-09-02 RX ORDER — MAGNESIUM OXIDE 400 MG ORAL TABLET 241.3 MG
1 TABLET ORAL
Qty: 90 | Refills: 0
Start: 2022-09-02 | End: 2022-10-01

## 2022-09-02 RX ORDER — SPIRONOLACTONE 25 MG/1
2 TABLET, FILM COATED ORAL
Qty: 60 | Refills: 0
Start: 2022-09-02 | End: 2022-10-01

## 2022-09-02 RX ORDER — FUROSEMIDE 40 MG
2 TABLET ORAL
Qty: 60 | Refills: 0
Start: 2022-09-02 | End: 2022-10-01

## 2022-09-02 RX ORDER — TAMSULOSIN HYDROCHLORIDE 0.4 MG/1
1 CAPSULE ORAL
Qty: 30 | Refills: 0
Start: 2022-09-02 | End: 2022-10-01

## 2022-09-02 RX ORDER — PANTOPRAZOLE SODIUM 20 MG/1
1 TABLET, DELAYED RELEASE ORAL
Qty: 30 | Refills: 0
Start: 2022-09-02 | End: 2022-10-01

## 2022-09-02 RX ORDER — MAGNESIUM SULFATE 500 MG/ML
1 VIAL (ML) INJECTION ONCE
Refills: 0 | Status: COMPLETED | OUTPATIENT
Start: 2022-09-02 | End: 2022-09-02

## 2022-09-02 RX ORDER — SENNA PLUS 8.6 MG/1
2 TABLET ORAL
Qty: 60 | Refills: 0
Start: 2022-09-02 | End: 2022-10-01

## 2022-09-02 RX ORDER — POLYETHYLENE GLYCOL 3350 17 G/17G
17 POWDER, FOR SOLUTION ORAL
Qty: 1020 | Refills: 0
Start: 2022-09-02 | End: 2022-10-01

## 2022-09-02 RX ORDER — FUROSEMIDE 40 MG
80 TABLET ORAL DAILY
Refills: 0 | Status: DISCONTINUED | OUTPATIENT
Start: 2022-09-03 | End: 2022-09-02

## 2022-09-02 RX ADMIN — MAGNESIUM OXIDE 400 MG ORAL TABLET 400 MILLIGRAM(S): 241.3 TABLET ORAL at 08:04

## 2022-09-02 RX ADMIN — Medication 63.75 MILLIMOLE(S): at 09:23

## 2022-09-02 RX ADMIN — Medication 100 GRAM(S): at 07:56

## 2022-09-02 RX ADMIN — MAGNESIUM OXIDE 400 MG ORAL TABLET 400 MILLIGRAM(S): 241.3 TABLET ORAL at 13:33

## 2022-09-02 RX ADMIN — CHLORHEXIDINE GLUCONATE 1 APPLICATION(S): 213 SOLUTION TOPICAL at 05:35

## 2022-09-02 RX ADMIN — Medication 1 TABLET(S): at 05:23

## 2022-09-02 RX ADMIN — Medication 80 MILLIGRAM(S): at 05:24

## 2022-09-02 RX ADMIN — PANTOPRAZOLE SODIUM 40 MILLIGRAM(S): 20 TABLET, DELAYED RELEASE ORAL at 08:03

## 2022-09-02 RX ADMIN — SPIRONOLACTONE 200 MILLIGRAM(S): 25 TABLET, FILM COATED ORAL at 05:24

## 2022-09-02 NOTE — PROGRESS NOTE ADULT - ATTENDING COMMENTS
65 yo M with decompensated cirrhosis secondary to ALD/PARNELL (with last reported alcohol in 4/2022) and history of cholangitis s/p ERCP with stent placement (4/2022) with subsequent repeat ERCP with stent removal, sphincterotomy, and balloon sweep removal of sludge/stones (7/20/22), admitted with severe sepsis with associated oliguric ROBBIE (resolved) and lactic acidosis (resolved) secondary to acute and likely perforated cholecystitis with secondary peritonitis, s/p percutaneous cholecystostomy tube placement (8/9) with exchange (8/29). Last febrile 8/30. Cultures from ascitic fluid (8/9 and 8/16) grew E. coli and Streptococcus anginosis, with repeat ascitic fluid cultures from 8/25 and 8/31 with NGTD. Ascitic fluid PMN count has improved to 1172 (8/31) after correction for RBCs, still consistent with peritonitis but clinically improving. S/p ceftriaxone (8/8), s/p Zosyn (8/8-16), s/p vancomycin (8/10), s/p fluconazole (8/19-9/1), and s/p Unasyn (8/16-9/1), and being discharged home today on Augmentin (9/1- ), with tentative plan for 14-day course per Transplant ID though will also re-check ascitic fluid studies when he undergoes next outpatient LVP to ensure peritonitis has resolved as anticipated (otherwise may need more prolonged antibiotic duration). Anasarca has improved with diuresis and will discharge home on furosemide 80 mg po daily and spironolactone 200 mg po daily. He is wait-listed for liver transplant. ABO O with listed MELD-Na 24 (with re-certification due 9/30) and calculated MELD-Na 23 today. He is on internal hold pending ID clearance to proceed with transplant.    Laron Harper M.D., Ph.D.  Transplant Hepatology

## 2022-09-02 NOTE — PROGRESS NOTE ADULT - REASON FOR ADMISSION
Ascending cholangitis

## 2022-09-02 NOTE — DISCHARGE NOTE NURSING/CASE MANAGEMENT/SOCIAL WORK - NSDCPEFALRISK_GEN_ALL_CORE
For information on Fall & Injury Prevention, visit: [email or web address] OR  [email or web address] OR  https://www.cdc.gov/steadi/patient.html

## 2022-09-02 NOTE — DISCHARGE NOTE NURSING/CASE MANAGEMENT/SOCIAL WORK - PATIENT PORTAL LINK FT
You can access the FollowMyHealth Patient Portal offered by [facility] by registering at the following website: [email or web address]. By joining [employer]’s FollowMyHealth portal, you will also be able to view your health information using other applications (apps) compatible with our system.

## 2022-09-02 NOTE — PROGRESS NOTE ADULT - ATTENDING SUPERVISION STATEMENT
Fellow
Resident
Fellow
Resident
Fellow
Fellow
Resident
Fellow

## 2022-09-02 NOTE — PROGRESS NOTE ADULT - SUBJECTIVE AND OBJECTIVE BOX
Chief Complaint:  Patient is a 66y old  Male who presents with a chief complaint of Ascending cholangitis (01 Sep 2022 15:53)         Interval Events:   Afebrile, HDS  Minimal tenderness at drain site    Hospital Medications:  amoxicillin  875 milliGRAM(s)/clavulanate 1 Tablet(s) Oral two times a day  chlorhexidine 2% Cloths 1 Application(s) Topical <User Schedule>  magnesium oxide 400 milliGRAM(s) Oral three times a day with meals  pantoprazole    Tablet 40 milliGRAM(s) Oral before breakfast  polyethylene glycol 3350 17 Gram(s) Oral two times a day  senna 2 Tablet(s) Oral at bedtime  spironolactone 200 milliGRAM(s) Oral <User Schedule>  tamsulosin 0.4 milliGRAM(s) Oral at bedtime      ROS:   Complete and normal except as mentioned above.    PHYSICAL EXAM:   Vital Signs:  Vital Signs Last 24 Hrs  T(C): 37.7 (02 Sep 2022 05:00), Max: 37.7 (02 Sep 2022 01:00)  T(F): 99.9 (02 Sep 2022 05:00), Max: 99.9 (02 Sep 2022 05:00)  HR: 109 (02 Sep 2022 05:00) (102 - 109)  BP: 142/77 (02 Sep 2022 05:00) (142/77 - 155/85)  BP(mean): --  RR: 18 (02 Sep 2022 05:00) (18 - 18)  SpO2: 96% (02 Sep 2022 05:00) (94% - 98%)    Parameters below as of 02 Sep 2022 05:00  Patient On (Oxygen Delivery Method): room air      Daily     Daily     GENERAL: no acute distress  NEURO: alert  HEENT: NCAT, +sclera icteric  CHEST: no respiratory distress, no accessory muscle use  CARDIAC: regular rate, +S1/S2  ABDOMEN: drain in place with serosanguinous drainage, distended, nontender, no rebound or guarding, +dependent edema  EXTREMITIES: warm, well perfused, +edema  SKIN: no lesions noted, +jaundiced    LABS: reviewed                        7.6    8.66  )-----------( 77       ( 02 Sep 2022 06:48 )             22.9     09-02    132<L>  |  96  |  9   ----------------------------<  144<H>  3.4<L>   |  27  |  0.92    Ca    8.2<L>      02 Sep 2022 06:51  Phos  3.5     09-02  Mg     1.5     09-02    TPro  7.0  /  Alb  3.3  /  TBili  2.8<H>  /  DBili  x   /  AST  36  /  ALT  15  /  AlkPhos  82  09-02    LIVER FUNCTIONS - ( 02 Sep 2022 06:51 )  Alb: 3.3 g/dL / Pro: 7.0 g/dL / ALK PHOS: 82 U/L / ALT: 15 U/L / AST: 36 U/L / GGT: x             Interval Diagnostic Studies: see sunrise for full report

## 2022-09-02 NOTE — PROGRESS NOTE ADULT - ASSESSMENT
66 year old male with decompensated cirrhosis and cholangitis s/p ERCP with stent placement (4/2022) s/p stent removal on 7/20 (along sphincterotomy and stone extraction) presenting for one week of RUQ abdominal pain associated with new abdominal distension and decreased appetite.     #Septic shock 2/2 acute cholecystitis vs ascending cholangitis  #E coli and Streptococcus anginosis secondary bacterial peritonitis  #Hemoperitoneum with free locules of air on CT imaging 8/18/2022  #ROBBIE required hemodialysis with renal recovery: initial urine sodium 9, likely indicating prerenal ROBBIE vs less likely HRS given initial presentation as above  #History of cholangitis s/p biliary stent placement April 2022 and removal 7/20/2022, now s/p perc rodney tube  #Decompensated ALD/PARNELL cirrhosis c/b ascites  EV: normal esophagus on ERCP from 4/2022  Ascites: increase in ascites from mild to moderate on current imaging  SBP: paracentesis 8/9/2022 c/w peritonitis given 99K PMNs however elevated LDH more indicative of secondary bacterial peritonitis  HE: none currently       -MELD-Na = 24 on 9/2/2022       -Ascites: yes       -SBP: paracentesis 8/9/2022 reveals likely secondary bacterial peritonitis from suspected gallbladder pathology, +SBP on para 8/31       -Varices: no mention of varices on EGD/ERCP on 7/20/2022       -Hepatic encephalopathy: none, Ammonia, Serum: 38 umol/L [11 - 55] (08-12-22 @ 00:21)       -HCC: unknown       -LT candidacy and evaluation: listed for transplant            [x] Psychosocial: cleared pending RPP            [ ] Infectious: negative BCx, negative peritoneal fluid; fungal studies from peritoneal fluid pending            [x] Cardiology:                    Cardiac cath: n/a                   Stress test: negative noninvasive stress test on 8/19/2022 for inducible ischemia            [ ] Colonoscopy: needed, f/u records from outside GI colonoscopy reports            [x] Prostate: Prostate Specific Antigen: negative at 2.21 ng/mL on 8/12/2022            [x] Dental            [x] PT/Frailty    Recommendations:  -Appropriate discharge home today with outpatient follow up in 1 week  -s/p percutaneous cholecystostomy exchange 8/30  -Continue furosemide to 80 mg po daily, spironolactone to 200 mg po daily  -f/u fungal studies from peritoneal fluid pending  -PPI daily for stress prophylaxis  -appreciate ID recommendations regarding ABx duration, planning for meropenem --> Augmentin x 14 days  -PT as able    Preliminary note until signed by Attending.    Thank you for involving us in this patient's care.    Kary Tena MD  Gastroenterology/Hepatology Fellow, PGY-VI    NON-URGENT CONSULTS:  Please email [email or web address]  [email or web address]  AT NIGHT AND ON WEEKENDS:  Contact on-call GI fellow via answering service ([phone]) from 5pm-8am and on weekends/holidays  MONDAY-FRIDAY 8AM-5PM:  Pager# [phone] ([facility])  GI Phone# [phone] ([facility])

## 2022-09-02 NOTE — PROGRESS NOTE ADULT - NUTRITIONAL ASSESSMENT
This patient has been assessed with a concern for Malnutrition and has been determined to have a diagnosis/diagnoses of Moderate protein-calorie malnutrition.    This patient is being managed with:   Diet NPO after Midnight-     NPO Start Date: 28-Aug-2022   NPO Start Time: 23:59  Except Medications  Entered: Aug 28 2022 10:31AM    Diet Regular-  1000mL Fluid Restriction ([id number])  Supplement Feeding Modality:  Oral  Ensure Enlive Cans or Servings Per Day:  1       Frequency:  Three Times a day  Entered: Aug 22 2022  9:54AM    
This patient has been assessed with a concern for Malnutrition and has been determined to have a diagnosis/diagnoses of Moderate protein-calorie malnutrition.    This patient is being managed with:   Diet NPO-  Entered: Aug 18 2022  6:49PM      This patient has been assessed with a concern for Malnutrition and has been determined to have a diagnosis/diagnoses of Moderate protein-calorie malnutrition.    This patient is being managed with:   Diet NPO-  Entered: Aug 18 2022  6:49PM    
This patient has been assessed with a concern for Malnutrition and has been determined to have a diagnosis/diagnoses of Moderate protein-calorie malnutrition.    This patient is being managed with:   Diet Regular-  1000mL Fluid Restriction ([id number])  Supplement Feeding Modality:  Oral  Promote Cans or Servings Per Day:  1       Frequency:  Three Times a day  Entered: Aug 20 2022  3:08PM    
This patient has been assessed with a concern for Malnutrition and has been determined to have a diagnosis/diagnoses of Moderate protein-calorie malnutrition.    This patient is being managed with:   Diet Renal Restrictions-  For patients receiving Renal Replacement - No Protein Restr No Conc K No Conc Phos Low Sodium  1000mL Fluid Restriction ([id number])  Entered: Aug 12 2022  9:35AM    Diet NPO-  NPO for Procedure/Test     NPO Start Date: 12-Aug-2022   NPO Start Time: 08:00  Entered: Aug 12 2022  7:50AM    
This patient has been assessed with a concern for Malnutrition and has been determined to have a diagnosis/diagnoses of Moderate protein-calorie malnutrition.    This patient is being managed with:   Diet Renal Restrictions-  For patients receiving Renal Replacement - No Protein Restr No Conc K No Conc Phos Low Sodium  1000mL Fluid Restriction ([id number])  Entered: Aug 12 2022  9:35AM    
This patient has been assessed with a concern for Malnutrition and has been determined to have a diagnosis/diagnoses of Moderate protein-calorie malnutrition.    This patient is being managed with:   Diet Renal Restrictions-  For patients receiving Renal Replacement - No Protein Restr No Conc K No Conc Phos Low Sodium  800mL Fluid Restriction ([id number])  Entered: Aug 15 2022 12:54PM    
This patient has been assessed with a concern for Malnutrition and has been determined to have a diagnosis/diagnoses of Moderate protein-calorie malnutrition.    This patient is being managed with:   Diet NPO after Midnight-     NPO Start Date: 17-Aug-2022   NPO Start Time: 23:59  Entered: Aug 17 2022 12:03PM    Diet Renal Restrictions-  For patients receiving Renal Replacement - No Protein Restr No Conc K No Conc Phos Low Sodium  800mL Fluid Restriction ([id number])  Entered: Aug 15 2022 12:54PM    
This patient has been assessed with a concern for Malnutrition and has been determined to have a diagnosis/diagnoses of Moderate protein-calorie malnutrition.    This patient is being managed with:   Diet Regular-  1000mL Fluid Restriction ([id number])  Supplement Feeding Modality:  Oral  Ensure Enlive Cans or Servings Per Day:  1       Frequency:  Three Times a day  Entered: Aug 22 2022  9:54AM    
This patient has been assessed with a concern for Malnutrition and has been determined to have a diagnosis/diagnoses of Moderate protein-calorie malnutrition.    This patient is being managed with:   Diet Regular-  1000mL Fluid Restriction ([id number])  Supplement Feeding Modality:  Oral  Ensure Enlive Cans or Servings Per Day:  1       Frequency:  Three Times a day  Entered: Aug 22 2022  9:54AM    
This patient has been assessed with a concern for Malnutrition and has been determined to have a diagnosis/diagnoses of Moderate protein-calorie malnutrition.    This patient is being managed with:   Diet Renal Restrictions-  For patients receiving Renal Replacement - No Protein Restr No Conc K No Conc Phos Low Sodium  1000mL Fluid Restriction ([id number])  Entered: Aug 12 2022  9:35AM    
This patient has been assessed with a concern for Malnutrition and has been determined to have a diagnosis/diagnoses of Moderate protein-calorie malnutrition.    This patient is being managed with:   Diet Renal Restrictions-  For patients receiving Renal Replacement - No Protein Restr No Conc K No Conc Phos Low Sodium  1000mL Fluid Restriction ([id number])  Entered: Aug 12 2022  9:35AM    
3572 PMNs, corrected for RBCs -->2372
3572 PMNs, corrected for RBCs -->2372
This patient has been assessed with a concern for Malnutrition and has been determined to have a diagnosis/diagnoses of Moderate protein-calorie malnutrition.    This patient is being managed with:   Diet NPO after Midnight-     NPO Start Date: 17-Aug-2022   NPO Start Time: 23:59  Entered: Aug 17 2022 12:03PM    Diet Renal Restrictions-  For patients receiving Renal Replacement - No Protein Restr No Conc K No Conc Phos Low Sodium  800mL Fluid Restriction ([id number])  Entered: Aug 15 2022 12:54PM    
This patient has been assessed with a concern for Malnutrition and has been determined to have a diagnosis/diagnoses of Moderate protein-calorie malnutrition.    This patient is being managed with:   Diet NPO after Midnight-     NPO Start Date: 24-Aug-2022   NPO Start Time: 23:59  Except Medications  Entered: Aug 24 2022  2:45PM    Diet Regular-  1000mL Fluid Restriction ([id number])  Supplement Feeding Modality:  Oral  Ensure Enlive Cans or Servings Per Day:  1       Frequency:  Three Times a day  Entered: Aug 22 2022  9:54AM    
This patient has been assessed with a concern for Malnutrition and has been determined to have a diagnosis/diagnoses of Moderate protein-calorie malnutrition.    This patient is being managed with:   Diet NPO after Midnight-     NPO Start Date: 28-Aug-2022   NPO Start Time: 23:59  Except Medications  Entered: Aug 28 2022 10:31AM    Diet Regular-  1000mL Fluid Restriction ([id number])  Supplement Feeding Modality:  Oral  Ensure Enlive Cans or Servings Per Day:  1       Frequency:  Three Times a day  Entered: Aug 22 2022  9:54AM    
This patient has been assessed with a concern for Malnutrition and has been determined to have a diagnosis/diagnoses of Moderate protein-calorie malnutrition.    This patient is being managed with:   Diet NPO-  Entered: Aug 18 2022  6:49PM    
This patient has been assessed with a concern for Malnutrition and has been determined to have a diagnosis/diagnoses of Moderate protein-calorie malnutrition.    This patient is being managed with:   Diet Regular-  1000mL Fluid Restriction ([id number])  Supplement Feeding Modality:  Oral  Ensure Enlive Cans or Servings Per Day:  1       Frequency:  Three Times a day  Entered: Aug 22 2022  9:54AM    
This patient has been assessed with a concern for Malnutrition and has been determined to have a diagnosis/diagnoses of Moderate protein-calorie malnutrition.    This patient is being managed with:   Diet Regular-  1000mL Fluid Restriction ([id number])  Supplement Feeding Modality:  Oral  Ensure Enlive Cans or Servings Per Day:  1       Frequency:  Three Times a day  Entered: Aug 22 2022  9:54AM    
This patient has been assessed with a concern for Malnutrition and has been determined to have a diagnosis/diagnoses of Moderate protein-calorie malnutrition.    This patient is being managed with:   Diet Renal Restrictions-  For patients receiving Renal Replacement - No Protein Restr No Conc K No Conc Phos Low Sodium  1000mL Fluid Restriction ([id number])  Entered: Aug 12 2022  9:35AM    
This patient has been assessed with a concern for Malnutrition and has been determined to have a diagnosis/diagnoses of Moderate protein-calorie malnutrition.    This patient is being managed with:   Diet Renal Restrictions-  For patients receiving Renal Replacement - No Protein Restr No Conc K No Conc Phos Low Sodium  1000mL Fluid Restriction ([id number])  Entered: Aug 12 2022  9:35AM    
This patient has been assessed with a concern for Malnutrition and has been determined to have a diagnosis/diagnoses of Moderate protein-calorie malnutrition.    This patient is being managed with:   Diet Renal Restrictions-  For patients receiving Renal Replacement - No Protein Restr No Conc K No Conc Phos Low Sodium  800mL Fluid Restriction ([id number])  Entered: Aug 15 2022 12:54PM    
This patient has been assessed with a concern for Malnutrition and has been determined to have a diagnosis/diagnoses of Moderate protein-calorie malnutrition.    This patient is being managed with:   Diet Regular-  1000mL Fluid Restriction ([id number])  Supplement Feeding Modality:  Oral  Ensure Enlive Cans or Servings Per Day:  1       Frequency:  Three Times a day  Entered: Aug 22 2022  9:54AM    
This patient has been assessed with a concern for Malnutrition and has been determined to have a diagnosis/diagnoses of Moderate protein-calorie malnutrition.    This patient is being managed with:   Diet Renal Restrictions-  For patients receiving Renal Replacement - No Protein Restr No Conc K No Conc Phos Low Sodium  1000mL Fluid Restriction ([id number])  Entered: Aug 12 2022  9:35AM    
This patient has been assessed with a concern for Malnutrition and has been determined to have a diagnosis/diagnoses of Moderate protein-calorie malnutrition.    This patient is being managed with:   Diet Renal Restrictions-  For patients receiving Renal Replacement - No Protein Restr No Conc K No Conc Phos Low Sodium  800mL Fluid Restriction ([id number])  Entered: Aug 15 2022 12:54PM    
This patient has been assessed with a concern for Malnutrition and has been determined to have a diagnosis/diagnoses of Moderate protein-calorie malnutrition.    This patient is being managed with:   Diet Clear Liquid-  Entered: Aug 19 2022  4:25PM    
This patient has been assessed with a concern for Malnutrition and has been determined to have a diagnosis/diagnoses of Moderate protein-calorie malnutrition.    This patient is being managed with:   Diet NPO after Midnight-     NPO Start Date: 17-Aug-2022   NPO Start Time: 23:59  Entered: Aug 17 2022 12:03PM    Diet Renal Restrictions-  For patients receiving Renal Replacement - No Protein Restr No Conc K No Conc Phos Low Sodium  800mL Fluid Restriction ([id number])  Entered: Aug 15 2022 12:54PM    
This patient has been assessed with a concern for Malnutrition and has been determined to have a diagnosis/diagnoses of Moderate protein-calorie malnutrition.    This patient is being managed with:   Diet NPO-  Entered: Aug 18 2022  6:49PM    
This patient has been assessed with a concern for Malnutrition and has been determined to have a diagnosis/diagnoses of Moderate protein-calorie malnutrition.    This patient is being managed with:   Diet Regular-  1000mL Fluid Restriction ([id number])  Supplement Feeding Modality:  Oral  Promote Cans or Servings Per Day:  1       Frequency:  Three Times a day  Entered: Aug 20 2022  3:08PM    
This patient has been assessed with a concern for Malnutrition and has been determined to have a diagnosis/diagnoses of Moderate protein-calorie malnutrition.    This patient is being managed with:   Diet Regular-  1000mL Fluid Restriction ([id number])  Supplement Feeding Modality:  Oral  Ensure Enlive Cans or Servings Per Day:  1       Frequency:  Three Times a day  Entered: Aug 22 2022  9:54AM    
This patient has been assessed with a concern for Malnutrition and has been determined to have a diagnosis/diagnoses of Moderate protein-calorie malnutrition.    This patient is being managed with:   Diet Regular-  Entered: Aug 20 2022 11:23AM    
This patient has been assessed with a concern for Malnutrition and has been determined to have a diagnosis/diagnoses of Moderate protein-calorie malnutrition.    This patient is being managed with:   Diet Regular-  Entered: Aug 20 2022 11:23AM    
This patient has been assessed with a concern for Malnutrition and has been determined to have a diagnosis/diagnoses of Moderate protein-calorie malnutrition.    This patient is being managed with:   Diet Renal Restrictions-  For patients receiving Renal Replacement - No Protein Restr No Conc K No Conc Phos Low Sodium  1000mL Fluid Restriction ([id number])  Entered: Aug 12 2022  9:35AM    
This patient has been assessed with a concern for Malnutrition and has been determined to have a diagnosis/diagnoses of Moderate protein-calorie malnutrition.    This patient is being managed with:   Diet Renal Restrictions-  For patients receiving Renal Replacement - No Protein Restr No Conc K No Conc Phos Low Sodium  1000mL Fluid Restriction ([id number])  Entered: Aug 12 2022  9:35AM    
This patient has been assessed with a concern for Malnutrition and has been determined to have a diagnosis/diagnoses of Moderate protein-calorie malnutrition.    This patient is being managed with:   Diet Renal Restrictions-  For patients receiving Renal Replacement - No Protein Restr No Conc K No Conc Phos Low Sodium  1000mL Fluid Restriction ([id number])  Entered: Aug 12 2022  9:35AM    
This patient has been assessed with a concern for Malnutrition and has been determined to have a diagnosis/diagnoses of Moderate protein-calorie malnutrition.    This patient is being managed with:   Diet Renal Restrictions-  For patients receiving Renal Replacement - No Protein Restr No Conc K No Conc Phos Low Sodium  800mL Fluid Restriction ([id number])  Entered: Aug 15 2022 12:54PM    
This patient has been assessed with a concern for Malnutrition and has been determined to have a diagnosis/diagnoses of Moderate protein-calorie malnutrition.    This patient is being managed with:   Diet Regular-  1000mL Fluid Restriction ([id number])  Supplement Feeding Modality:  Oral  Ensure Enlive Cans or Servings Per Day:  1       Frequency:  Three Times a day  Entered: Aug 22 2022  9:54AM

## 2022-09-04 LAB
CULTURE RESULTS: SIGNIFICANT CHANGE UP
CULTURE RESULTS: SIGNIFICANT CHANGE UP
SPECIMEN SOURCE: SIGNIFICANT CHANGE UP
SPECIMEN SOURCE: SIGNIFICANT CHANGE UP

## 2022-09-05 LAB
CULTURE RESULTS: SIGNIFICANT CHANGE UP
SPECIMEN SOURCE: SIGNIFICANT CHANGE UP

## 2022-09-06 DIAGNOSIS — Z01.812 ENCOUNTER FOR PREPROCEDURAL LABORATORY EXAMINATION: ICD-10-CM

## 2022-09-07 LAB
CULTURE RESULTS: SIGNIFICANT CHANGE UP
SPECIMEN SOURCE: SIGNIFICANT CHANGE UP

## 2022-09-10 ENCOUNTER — EMERGENCY (EMERGENCY)
Facility: HOSPITAL | Age: 67
LOS: 1 days | Discharge: OTHER INSTITUTION | End: 2022-09-10
Attending: EMERGENCY MEDICINE
Payer: COMMERCIAL

## 2022-09-10 ENCOUNTER — INPATIENT (INPATIENT)
Facility: HOSPITAL | Age: 67
LOS: 15 days | Discharge: HOME HEALTH | DRG: 853 | End: 2022-09-26
Attending: INTERNAL MEDICINE | Admitting: INTERNAL MEDICINE
Payer: COMMERCIAL

## 2022-09-10 VITALS
TEMPERATURE: 97 F | HEART RATE: 108 BPM | SYSTOLIC BLOOD PRESSURE: 138 MMHG | OXYGEN SATURATION: 95 % | DIASTOLIC BLOOD PRESSURE: 74 MMHG | RESPIRATION RATE: 18 BRPM

## 2022-09-10 VITALS
SYSTOLIC BLOOD PRESSURE: 138 MMHG | RESPIRATION RATE: 20 BRPM | OXYGEN SATURATION: 96 % | HEART RATE: 121 BPM | WEIGHT: 175.05 LBS | TEMPERATURE: 101 F | DIASTOLIC BLOOD PRESSURE: 74 MMHG

## 2022-09-10 VITALS
RESPIRATION RATE: 18 BRPM | HEIGHT: 64 IN | WEIGHT: 160.06 LBS | TEMPERATURE: 98 F | DIASTOLIC BLOOD PRESSURE: 72 MMHG | OXYGEN SATURATION: 96 % | HEART RATE: 101 BPM | SYSTOLIC BLOOD PRESSURE: 141 MMHG

## 2022-09-10 DIAGNOSIS — Z98.890 OTHER SPECIFIED POSTPROCEDURAL STATES: Chronic | ICD-10-CM

## 2022-09-10 DIAGNOSIS — R10.9 UNSPECIFIED ABDOMINAL PAIN: ICD-10-CM

## 2022-09-10 LAB
ALBUMIN SERPL ELPH-MCNC: 2.9 G/DL — LOW (ref 3.3–5.2)
ALP SERPL-CCNC: 89 U/L — SIGNIFICANT CHANGE UP (ref 40–120)
ALT FLD-CCNC: 16 U/L — SIGNIFICANT CHANGE UP
ANION GAP SERPL CALC-SCNC: 12 MMOL/L — SIGNIFICANT CHANGE UP (ref 5–17)
ANION GAP SERPL CALC-SCNC: 12 MMOL/L — SIGNIFICANT CHANGE UP (ref 5–17)
ANISOCYTOSIS BLD QL: SLIGHT — SIGNIFICANT CHANGE UP
APPEARANCE UR: CLEAR — SIGNIFICANT CHANGE UP
APTT BLD: 37.3 SEC — HIGH (ref 27.5–35.5)
AST SERPL-CCNC: 45 U/L — HIGH
BASE EXCESS BLDV CALC-SCNC: -2.4 MMOL/L — LOW (ref -2–3)
BASOPHILS # BLD AUTO: 0.05 K/UL — SIGNIFICANT CHANGE UP (ref 0–0.2)
BASOPHILS NFR BLD AUTO: 0.8 % — SIGNIFICANT CHANGE UP (ref 0–2)
BILIRUB SERPL-MCNC: 3.7 MG/DL — HIGH (ref 0.4–2)
BILIRUB UR-MCNC: NEGATIVE — SIGNIFICANT CHANGE UP
BUN SERPL-MCNC: 12.8 MG/DL — SIGNIFICANT CHANGE UP (ref 8–20)
BUN SERPL-MCNC: 13.2 MG/DL — SIGNIFICANT CHANGE UP (ref 8–20)
BURR CELLS BLD QL SMEAR: PRESENT — SIGNIFICANT CHANGE UP
CA-I SERPL-SCNC: 1.07 MMOL/L — LOW (ref 1.15–1.33)
CALCIUM SERPL-MCNC: 7.8 MG/DL — LOW (ref 8.4–10.5)
CALCIUM SERPL-MCNC: 8.7 MG/DL — SIGNIFICANT CHANGE UP (ref 8.4–10.5)
CHLORIDE BLDV-SCNC: 92 MMOL/L — LOW (ref 98–107)
CHLORIDE SERPL-SCNC: 89 MMOL/L — LOW (ref 98–107)
CHLORIDE SERPL-SCNC: 90 MMOL/L — LOW (ref 98–107)
CO2 SERPL-SCNC: 19 MMOL/L — LOW (ref 22–29)
CO2 SERPL-SCNC: 21 MMOL/L — LOW (ref 22–29)
COLOR SPEC: YELLOW — SIGNIFICANT CHANGE UP
CREAT SERPL-MCNC: 0.74 MG/DL — SIGNIFICANT CHANGE UP (ref 0.5–1.3)
CREAT SERPL-MCNC: 0.74 MG/DL — SIGNIFICANT CHANGE UP (ref 0.5–1.3)
CULTURE RESULTS: SIGNIFICANT CHANGE UP
DIFF PNL FLD: NEGATIVE — SIGNIFICANT CHANGE UP
EGFR: 101 ML/MIN/1.73M2 — SIGNIFICANT CHANGE UP
EGFR: 101 ML/MIN/1.73M2 — SIGNIFICANT CHANGE UP
EOSINOPHIL # BLD AUTO: 0.03 K/UL — SIGNIFICANT CHANGE UP (ref 0–0.5)
EOSINOPHIL NFR BLD AUTO: 0.5 % — SIGNIFICANT CHANGE UP (ref 0–6)
GAS PNL BLDV: 121 MMOL/L — LOW (ref 136–145)
GAS PNL BLDV: SIGNIFICANT CHANGE UP
GAS PNL BLDV: SIGNIFICANT CHANGE UP
GLUCOSE BLDV-MCNC: 94 MG/DL — SIGNIFICANT CHANGE UP (ref 70–99)
GLUCOSE SERPL-MCNC: 131 MG/DL — HIGH (ref 70–99)
GLUCOSE SERPL-MCNC: 99 MG/DL — SIGNIFICANT CHANGE UP (ref 70–99)
GLUCOSE UR QL: NEGATIVE MG/DL — SIGNIFICANT CHANGE UP
HCO3 BLDV-SCNC: 22 MMOL/L — SIGNIFICANT CHANGE UP (ref 22–29)
HCT VFR BLD CALC: 25.8 % — LOW (ref 39–50)
HCT VFR BLDA CALC: 27 % — SIGNIFICANT CHANGE UP
HGB BLD CALC-MCNC: 9.1 G/DL — LOW (ref 12.6–17.4)
HGB BLD-MCNC: 8.9 G/DL — LOW (ref 13–17)
IMM GRANULOCYTES NFR BLD AUTO: 5.9 % — HIGH (ref 0–1.5)
INR BLD: 2.53 RATIO — HIGH (ref 0.88–1.16)
KETONES UR-MCNC: NEGATIVE — SIGNIFICANT CHANGE UP
LACTATE BLDV-MCNC: 2.8 MMOL/L — HIGH (ref 0.5–2)
LACTATE BLDV-MCNC: 3.9 MMOL/L — HIGH (ref 0.5–2)
LEUKOCYTE ESTERASE UR-ACNC: NEGATIVE — SIGNIFICANT CHANGE UP
LYMPHOCYTES # BLD AUTO: 1.16 K/UL — SIGNIFICANT CHANGE UP (ref 1–3.3)
LYMPHOCYTES # BLD AUTO: 18 % — SIGNIFICANT CHANGE UP (ref 13–44)
MACROCYTES BLD QL: SLIGHT — SIGNIFICANT CHANGE UP
MANUAL SMEAR VERIFICATION: SIGNIFICANT CHANGE UP
MCHC RBC-ENTMCNC: 32.5 PG — SIGNIFICANT CHANGE UP (ref 27–34)
MCHC RBC-ENTMCNC: 34.5 GM/DL — SIGNIFICANT CHANGE UP (ref 32–36)
MCV RBC AUTO: 94.2 FL — SIGNIFICANT CHANGE UP (ref 80–100)
MONOCYTES # BLD AUTO: 0.69 K/UL — SIGNIFICANT CHANGE UP (ref 0–0.9)
MONOCYTES NFR BLD AUTO: 10.7 % — SIGNIFICANT CHANGE UP (ref 2–14)
NEUTROPHILS # BLD AUTO: 4.12 K/UL — SIGNIFICANT CHANGE UP (ref 1.8–7.4)
NEUTROPHILS NFR BLD AUTO: 64.1 % — SIGNIFICANT CHANGE UP (ref 43–77)
NITRITE UR-MCNC: NEGATIVE — SIGNIFICANT CHANGE UP
PCO2 BLDV: 34 MMHG — LOW (ref 42–55)
PH BLDV: 7.42 — SIGNIFICANT CHANGE UP (ref 7.32–7.43)
PH UR: 6 — SIGNIFICANT CHANGE UP (ref 5–8)
PLAT MORPH BLD: NORMAL — SIGNIFICANT CHANGE UP
PLATELET # BLD AUTO: 95 K/UL — LOW (ref 150–400)
PO2 BLDV: 145 MMHG — HIGH (ref 25–45)
POIKILOCYTOSIS BLD QL AUTO: SIGNIFICANT CHANGE UP
POLYCHROMASIA BLD QL SMEAR: SLIGHT — SIGNIFICANT CHANGE UP
POTASSIUM BLDV-SCNC: 4.2 MMOL/L — SIGNIFICANT CHANGE UP (ref 3.5–5.1)
POTASSIUM SERPL-MCNC: 3.8 MMOL/L — SIGNIFICANT CHANGE UP (ref 3.5–5.3)
POTASSIUM SERPL-MCNC: 4.1 MMOL/L — SIGNIFICANT CHANGE UP (ref 3.5–5.3)
POTASSIUM SERPL-SCNC: 3.8 MMOL/L — SIGNIFICANT CHANGE UP (ref 3.5–5.3)
POTASSIUM SERPL-SCNC: 4.1 MMOL/L — SIGNIFICANT CHANGE UP (ref 3.5–5.3)
PROT SERPL-MCNC: 7.9 G/DL — SIGNIFICANT CHANGE UP (ref 6.6–8.7)
PROT UR-MCNC: NEGATIVE — SIGNIFICANT CHANGE UP
PROTHROM AB SERPL-ACNC: 29.6 SEC — HIGH (ref 10.5–13.4)
RAPID RVP RESULT: SIGNIFICANT CHANGE UP
RBC # BLD: 2.74 M/UL — LOW (ref 4.2–5.8)
RBC # FLD: 20.1 % — HIGH (ref 10.3–14.5)
RBC BLD AUTO: ABNORMAL
SAO2 % BLDV: 99.9 % — SIGNIFICANT CHANGE UP
SARS-COV-2 RNA SPEC QL NAA+PROBE: SIGNIFICANT CHANGE UP
SODIUM SERPL-SCNC: 121 MMOL/L — LOW (ref 135–145)
SODIUM SERPL-SCNC: 122 MMOL/L — LOW (ref 135–145)
SP GR SPEC: 1 — LOW (ref 1.01–1.02)
SPECIMEN SOURCE: SIGNIFICANT CHANGE UP
SPHEROCYTES BLD QL SMEAR: SIGNIFICANT CHANGE UP
UROBILINOGEN FLD QL: NEGATIVE MG/DL — SIGNIFICANT CHANGE UP
WBC # BLD: 6.27 K/UL — SIGNIFICANT CHANGE UP (ref 3.8–10.5)
WBC # FLD AUTO: 6.27 K/UL — SIGNIFICANT CHANGE UP (ref 3.8–10.5)

## 2022-09-10 PROCEDURE — 85610 PROTHROMBIN TIME: CPT

## 2022-09-10 PROCEDURE — 84295 ASSAY OF SERUM SODIUM: CPT

## 2022-09-10 PROCEDURE — 82435 ASSAY OF BLOOD CHLORIDE: CPT

## 2022-09-10 PROCEDURE — 82803 BLOOD GASES ANY COMBINATION: CPT

## 2022-09-10 PROCEDURE — 71045 X-RAY EXAM CHEST 1 VIEW: CPT

## 2022-09-10 PROCEDURE — 96375 TX/PRO/DX INJ NEW DRUG ADDON: CPT

## 2022-09-10 PROCEDURE — 85014 HEMATOCRIT: CPT

## 2022-09-10 PROCEDURE — 0225U NFCT DS DNA&RNA 21 SARSCOV2: CPT

## 2022-09-10 PROCEDURE — 81003 URINALYSIS AUTO W/O SCOPE: CPT

## 2022-09-10 PROCEDURE — 99285 EMERGENCY DEPT VISIT HI MDM: CPT | Mod: 25

## 2022-09-10 PROCEDURE — 85730 THROMBOPLASTIN TIME PARTIAL: CPT

## 2022-09-10 PROCEDURE — 82947 ASSAY GLUCOSE BLOOD QUANT: CPT

## 2022-09-10 PROCEDURE — 85025 COMPLETE CBC W/AUTO DIFF WBC: CPT

## 2022-09-10 PROCEDURE — 99291 CRITICAL CARE FIRST HOUR: CPT

## 2022-09-10 PROCEDURE — 93010 ELECTROCARDIOGRAM REPORT: CPT

## 2022-09-10 PROCEDURE — 84132 ASSAY OF SERUM POTASSIUM: CPT

## 2022-09-10 PROCEDURE — 83605 ASSAY OF LACTIC ACID: CPT

## 2022-09-10 PROCEDURE — 74177 CT ABD & PELVIS W/CONTRAST: CPT | Mod: MA

## 2022-09-10 PROCEDURE — 82330 ASSAY OF CALCIUM: CPT

## 2022-09-10 PROCEDURE — 87086 URINE CULTURE/COLONY COUNT: CPT

## 2022-09-10 PROCEDURE — 74177 CT ABD & PELVIS W/CONTRAST: CPT | Mod: 26,MA

## 2022-09-10 PROCEDURE — 87040 BLOOD CULTURE FOR BACTERIA: CPT

## 2022-09-10 PROCEDURE — 93005 ELECTROCARDIOGRAM TRACING: CPT

## 2022-09-10 PROCEDURE — 96374 THER/PROPH/DIAG INJ IV PUSH: CPT | Mod: XU

## 2022-09-10 PROCEDURE — 80053 COMPREHEN METABOLIC PANEL: CPT

## 2022-09-10 PROCEDURE — 71045 X-RAY EXAM CHEST 1 VIEW: CPT | Mod: 26

## 2022-09-10 PROCEDURE — 80048 BASIC METABOLIC PNL TOTAL CA: CPT

## 2022-09-10 PROCEDURE — 85018 HEMOGLOBIN: CPT

## 2022-09-10 PROCEDURE — 36415 COLL VENOUS BLD VENIPUNCTURE: CPT

## 2022-09-10 RX ORDER — ACETAMINOPHEN 500 MG
1000 TABLET ORAL ONCE
Refills: 0 | Status: COMPLETED | OUTPATIENT
Start: 2022-09-10 | End: 2022-09-10

## 2022-09-10 RX ORDER — FUROSEMIDE 40 MG
80 TABLET ORAL DAILY
Refills: 0 | Status: DISCONTINUED | OUTPATIENT
Start: 2022-09-10 | End: 2022-09-11

## 2022-09-10 RX ORDER — PANTOPRAZOLE SODIUM 20 MG/1
40 TABLET, DELAYED RELEASE ORAL
Refills: 0 | Status: DISCONTINUED | OUTPATIENT
Start: 2022-09-10 | End: 2022-09-26

## 2022-09-10 RX ORDER — SENNA PLUS 8.6 MG/1
2 TABLET ORAL AT BEDTIME
Refills: 0 | Status: DISCONTINUED | OUTPATIENT
Start: 2022-09-10 | End: 2022-09-26

## 2022-09-10 RX ORDER — SODIUM CHLORIDE 9 MG/ML
2500 INJECTION, SOLUTION INTRAVENOUS ONCE
Refills: 0 | Status: COMPLETED | OUTPATIENT
Start: 2022-09-10 | End: 2022-09-10

## 2022-09-10 RX ORDER — SPIRONOLACTONE 25 MG/1
200 TABLET, FILM COATED ORAL DAILY
Refills: 0 | Status: DISCONTINUED | OUTPATIENT
Start: 2022-09-10 | End: 2022-09-11

## 2022-09-10 RX ORDER — PIPERACILLIN AND TAZOBACTAM 4; .5 G/20ML; G/20ML
3.38 INJECTION, POWDER, LYOPHILIZED, FOR SOLUTION INTRAVENOUS ONCE
Refills: 0 | Status: COMPLETED | OUTPATIENT
Start: 2022-09-10 | End: 2022-09-10

## 2022-09-10 RX ORDER — POLYETHYLENE GLYCOL 3350 17 G/17G
17 POWDER, FOR SOLUTION ORAL
Refills: 0 | Status: DISCONTINUED | OUTPATIENT
Start: 2022-09-10 | End: 2022-09-26

## 2022-09-10 RX ORDER — PIPERACILLIN AND TAZOBACTAM 4; .5 G/20ML; G/20ML
INJECTION, POWDER, LYOPHILIZED, FOR SOLUTION INTRAVENOUS
Refills: 0 | Status: DISCONTINUED | OUTPATIENT
Start: 2022-09-10 | End: 2022-09-10

## 2022-09-10 RX ORDER — VANCOMYCIN HCL 1 G
1000 VIAL (EA) INTRAVENOUS ONCE
Refills: 0 | Status: COMPLETED | OUTPATIENT
Start: 2022-09-10 | End: 2022-09-10

## 2022-09-10 RX ORDER — INFLUENZA VIRUS VACCINE 15; 15; 15; 15 UG/.5ML; UG/.5ML; UG/.5ML; UG/.5ML
0.5 SUSPENSION INTRAMUSCULAR ONCE
Refills: 0 | Status: DISCONTINUED | OUTPATIENT
Start: 2022-09-10 | End: 2022-09-26

## 2022-09-10 RX ORDER — MAGNESIUM OXIDE 400 MG ORAL TABLET 241.3 MG
400 TABLET ORAL
Refills: 0 | Status: DISCONTINUED | OUTPATIENT
Start: 2022-09-10 | End: 2022-09-26

## 2022-09-10 RX ORDER — PIPERACILLIN AND TAZOBACTAM 4; .5 G/20ML; G/20ML
3.38 INJECTION, POWDER, LYOPHILIZED, FOR SOLUTION INTRAVENOUS EVERY 8 HOURS
Refills: 0 | Status: DISCONTINUED | OUTPATIENT
Start: 2022-09-10 | End: 2022-09-11

## 2022-09-10 RX ORDER — PIPERACILLIN AND TAZOBACTAM 4; .5 G/20ML; G/20ML
3.38 INJECTION, POWDER, LYOPHILIZED, FOR SOLUTION INTRAVENOUS ONCE
Refills: 0 | Status: DISCONTINUED | OUTPATIENT
Start: 2022-09-10 | End: 2022-09-10

## 2022-09-10 RX ORDER — TAMSULOSIN HYDROCHLORIDE 0.4 MG/1
0.4 CAPSULE ORAL AT BEDTIME
Refills: 0 | Status: DISCONTINUED | OUTPATIENT
Start: 2022-09-10 | End: 2022-09-26

## 2022-09-10 RX ORDER — SODIUM CHLORIDE 9 MG/ML
1000 INJECTION INTRAMUSCULAR; INTRAVENOUS; SUBCUTANEOUS
Refills: 0 | Status: DISCONTINUED | OUTPATIENT
Start: 2022-09-10 | End: 2022-09-10

## 2022-09-10 RX ADMIN — SODIUM CHLORIDE 2500 MILLILITER(S): 9 INJECTION, SOLUTION INTRAVENOUS at 12:00

## 2022-09-10 RX ADMIN — SODIUM CHLORIDE 75 MILLILITER(S): 9 INJECTION INTRAMUSCULAR; INTRAVENOUS; SUBCUTANEOUS at 22:52

## 2022-09-10 RX ADMIN — Medication 400 MILLIGRAM(S): at 12:01

## 2022-09-10 RX ADMIN — PIPERACILLIN AND TAZOBACTAM 200 GRAM(S): 4; .5 INJECTION, POWDER, LYOPHILIZED, FOR SOLUTION INTRAVENOUS at 12:19

## 2022-09-10 RX ADMIN — Medication 250 MILLIGRAM(S): at 13:05

## 2022-09-10 RX ADMIN — PIPERACILLIN AND TAZOBACTAM 200 GRAM(S): 4; .5 INJECTION, POWDER, LYOPHILIZED, FOR SOLUTION INTRAVENOUS at 23:01

## 2022-09-10 NOTE — PATIENT PROFILE ADULT - FALL HARM RISK - UNIVERSAL INTERVENTIONS
Bed in lowest position, wheels locked, appropriate side rails in place/Call bell, personal items and telephone in reach/Instruct patient to call for assistance before getting out of bed or chair/Non-slip footwear when patient is out of bed/[place] to call system/Physically safe environment - no spills, clutter or unnecessary equipment/Purposeful Proactive Rounding/Room/bathroom lighting operational, light cord in reach

## 2022-09-10 NOTE — H&P ADULT - HISTORY OF PRESENT ILLNESS
66 year old male with decompensated cirrhosis and cholangitis s/p ERCP with stent placement (4/2022) s/p stent removal on 7/20 (along sphincterotomy and stone extraction) now s/p cholecystostomy tube 8/9 with check 8/29 in correct location. He presents to OSH with bloody discharge from the wound site and abdominal distention.    The patient and daughter reports that yesterday they noticed bleeding around the tube site, minimal but new in onset. This morning they called the nurse who changed the dressing but after she left there was significantly more bloody discharge and he was advised to go to the ED. In [facility] ED the patietn was tachycardic to 122, had a fever to 101, and /70. he denied any fever or chills over the past ew days including today, as well as nause or vomiting, chest pain, or shortness of breath. He feels mildly distended but not unusually so, though his daughter notes his belly feeling tighter than prior.  He was treated with tylenol and IV fluids. he feels very full at the moment so does noth think he can keep up his oral intake.

## 2022-09-10 NOTE — ED PROVIDER NOTE - PHYSICAL EXAMINATION
General: ill appearing in no acute distress. Alert and cooperative.   Head: Normocephalic, atraumatic.  Eyes: PERRLA. No conjunctival injection. No scleral icterus. EOMI  ENMT: Atraumatic external nose and ears.   Neck: Soft and supple. Full ROM without pain. No midline tenderness.  Cardiac: tachycardic rate and regular rhythm. No murmurs.   Resp: Unlabored respiratory effort. Lungs CTAB.   Abd: Soft, non-tender, non-distended. percutaneous drain RUQ no surrounding erythema.   MSK: Spine midline and non-tender.   Skin: Warm and dry.   Neuro: AO x 3. Moves all extremities symmetrically. Motor strength and sensation grossly intact.

## 2022-09-10 NOTE — ED PROVIDER NOTE - ATTENDING CONTRIBUTION TO CARE
Sruthi: I performed a face to face evaluation of this patient and performed a full history and physical examination on the patient.  I agree with the resident's history, physical examination, and plan of the patient unless otherwise noted. My brief assessment is as follows: pmh as documented, hx liver cirrhosis, recent admissiont o [facility] with ercp/stent removal, sphincterotomy, had perf rodney with tube placed, present with small blood at site of cholecystostomy tube where suture brkoe off and pt arrived febrile (though did not have symtpoms of fever). states minimal abd discomfort. no vomiting, no sob/cp, no cough, no urinary symptoms. no other complaints. non toxic, some sceral icterus, mmm, tachycardic, ctab, abd with mild distension, non tender, no rebound/guarding. with cholecystostomy tube with small drainage in bag. +edema b/l LE. labs, ct, abx/fluids, reassess

## 2022-09-10 NOTE — ED PROVIDER NOTE - CLINICAL SUMMARY MEDICAL DECISION MAKING FREE TEXT BOX
66y Male with recent gallbladder perforation s/p percutaneous cholecystostomy tube presenting febrile, tachycardic, RUQ pain around site of drain. Labs, ivf, meds, CT, reassess.

## 2022-09-10 NOTE — ED PROVIDER NOTE - OBJECTIVE STATEMENT
66y Male with history of liver failure, s/p percutaneous cholecystostomy tube 2/2 perforated gallbladder presenting with RUQ pain and bloody discharge from site of drain. Denies headache, chest pain, palpitations, shortness of breath, cough, nausea, vomiting, diarrhea, hematuria, dysuria, dark stools, focal neurologic symptoms. 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 Denies headache, chest pain, palpitations, shortness of breath, cough, nausea, vomiting, diarrhea, hematuria, dysuria, dark stools, focal neurologic symptoms. On wait-list for liver transplant.

## 2022-09-10 NOTE — H&P ADULT - ASSESSMENT
66 year old male with decompensated cirrhosis and cholangitis s/p ERCP with stent placement (4/2022) s/p stent removal on 7/20 (along sphincterotomy and stone extraction) now s/p cholecystostomy tube 8/9 with check 8/29 in correct location. He presents to OSH with bloody discharge from the wound site and abdominal distention found to have potentially dislodged IR cholecystostomy tube and ascities.    Plan:  -admit to Dr. Mo  - IV abx (zosyn)  - gentle IVF (patient reported being too full to keep up PO intake)  - regular diet as tolerated  - restarted home meds including spironolactone, lasix  - will consult IR for tube check in AM (possible contrast injection), with possible paracentesis for significant collection    Transplant Surgery  [phone]

## 2022-09-10 NOTE — H&P ADULT - NSHPLABSRESULTS_GEN_ALL_CORE
----------  LABORATORY DATA:  ----------    Labs from [facility]  WBC  6.27, h&h 8.9/25.8, plt 95    PTT 37.3, PT 29.6, INR 2.5    Na 122, K 4.1, Cl 89, CO2 21 BUN 13.2 Cr. 0.74. alb 2.9 bili (total) 3.7 alk phos 89, ast 45, alt 16  venous lactate 4.1->2.8                    ----------  RADIOGRAPHIC DATA:  ---------  INTERPRETATION: CLINICAL INFORMATION: perc choly drain s/p XCT    COMPARISON: None.    CONTRAST/COMPLICATIONS:  IV Contrast: Omnipaque 350 95 cc administered 0 cc discarded  Oral Contrast: NONE  Complications: None reported at time of study completion    PROCEDURE:  CT of the Abdomen and Pelvis was performed.  Sagittal and coronal reformats were performed.    FINDINGS:  LOWER CHEST: Small pleural effusions and bibasilar subsegmental atelectasis.    LIVER: Cirrhosis.  BILE DUCTS: Normal caliber.  GALLBLADDER: Cholelithiasis. The gallbladder is decompressed with a cholecystostomy tube noted. The tip of the cholecystostomy tube is slightly uncoiled and the radiopaque marker is external to the gallbladder in the liver.  SPLEEN: Within normal limits.  PANCREAS: Within normal limits.  ADRENALS: Within normal limits.  KIDNEYS/URETERS: Within normal limits.    BLADDER: Within normal limits.  REPRODUCTIVE ORGANS: Prostate within normal limits.    BOWEL: No bowel obstruction.  PERITONEUM: There is a 24.5 x 9 cm collection in the anterior lower abdomen. There is a 12.5 x 4.5 cm perihepatic collection. Mild ascites.  VESSELS: Prominent upper abdominal varices.  RETROPERITONEUM/LYMPH NODES: No lymphadenopathy.  ABDOMINAL WALL: Within normal limits.  BONES: Within normal limits.      IMPRESSION:  A large fluid collection in the lower abdomen.    A perihepatic fluid collection measures 12.5 x 4.5 cm.    Cholecystostomy tube with tip slightly uncoiled and radiopaque marker in the liver.    Cirrhotic liver with portal hypertension.  VERTEBRAL BODY ANALYSIS: No Vertebral fracture or low bone density identified.

## 2022-09-10 NOTE — ED ADULT TRIAGE NOTE - CHIEF COMPLAINT QUOTE
C/o wound check. Pt states, "I had a stent placed in my gallbladder three weeks ago and it is bleeding at the site". +Abdominal pain. Hx of CKD and Liver disease.

## 2022-09-10 NOTE — ED PROVIDER NOTE - PROGRESS NOTE DETAILS
labs, CT reviewed. spoke with on call hepatologist at [facility] Dr. Mo. stable on reassessment. will start transfer. -DO Antony Negro: pt fluid status reevaluated s/p fluid bolus with assessment of perfusion and vitals. pt being treated for sepsis though lactate likely also elevated in setting of cirrhosis.

## 2022-09-10 NOTE — H&P ADULT - NSHPPHYSICALEXAM_GEN_ALL_CORE
stretcher
T(C): 36.7 (09-10-22 @ 21:00), Max: 36.7 (09-10-22 @ 20:14)  HR: 114 (09-10-22 @ 21:00) (101 - 114)  BP: 137/83 (09-10-22 @ 21:00) (137/83 - 141/72)  RR: 18 (09-10-22 @ 21:00) (18 - 18)  SpO2: 95% (09-10-22 @ 21:00) (95% - 96%)    Exam:  CONSTITUTIONAL: Well groomed, no apparent distress  EYES: PERRLA and symmetric, EOMI, No conjunctival or scleral injection, non-icteric  RESP: No respiratory distress, no use of accessory muscles  GI: Soft, NT, distended, tight but not rigid, no rebound, no guarding; no palpable masses; no hepatosplenomegaly; no hernia palpated  # rodney tube site CDI (redressed in OSH), drainage serous with bile tinge, no blood, no redness or erythema surrounding site.  LYMPH: No cervical LAD or tenderness; no axillary LAD or tenderness; no inguinal LAD or tenderness  MSK: No digital clubbing or cyanosis; examination of the (head/neck/spine/ribs/pelvis, RUE, LUE, RLE, LLE) without misalignment,   SKIN: No rashes or ulcers noted; no subcutaneous nodules or induration palpable  NEURO: CN II-XII intact; normal reflexes in upper and lower extremities, sensation intact in upper and lower extremities b/l to light touch   PSYCH: Appropriate insight/judgment; A+O x 3, mood and affect appropriate, recent/remote memory intact

## 2022-09-11 LAB
ALBUMIN SERPL ELPH-MCNC: 2.2 G/DL — LOW (ref 3.3–5)
ALBUMIN SERPL ELPH-MCNC: 2.5 G/DL — LOW (ref 3.3–5)
ALP SERPL-CCNC: 63 U/L — SIGNIFICANT CHANGE UP (ref 40–120)
ALP SERPL-CCNC: 64 U/L — SIGNIFICANT CHANGE UP (ref 40–120)
ALT FLD-CCNC: 14 U/L — SIGNIFICANT CHANGE UP (ref 10–45)
ALT FLD-CCNC: 15 U/L — SIGNIFICANT CHANGE UP (ref 10–45)
AMMONIA BLD-MCNC: 31 UMOL/L — SIGNIFICANT CHANGE UP (ref 11–55)
ANION GAP SERPL CALC-SCNC: 11 MMOL/L — SIGNIFICANT CHANGE UP (ref 5–17)
ANION GAP SERPL CALC-SCNC: 8 MMOL/L — SIGNIFICANT CHANGE UP (ref 5–17)
APTT BLD: 41.8 SEC — HIGH (ref 27.5–35.5)
AST SERPL-CCNC: 30 U/L — SIGNIFICANT CHANGE UP (ref 10–40)
AST SERPL-CCNC: 31 U/L — SIGNIFICANT CHANGE UP (ref 10–40)
BASE EXCESS BLDV CALC-SCNC: 0.5 MMOL/L — SIGNIFICANT CHANGE UP (ref -2–3)
BILIRUB SERPL-MCNC: 2.8 MG/DL — HIGH (ref 0.2–1.2)
BILIRUB SERPL-MCNC: 2.9 MG/DL — HIGH (ref 0.2–1.2)
BLD GP AB SCN SERPL QL: NEGATIVE — SIGNIFICANT CHANGE UP
BUN SERPL-MCNC: 11 MG/DL — SIGNIFICANT CHANGE UP (ref 7–23)
BUN SERPL-MCNC: 12 MG/DL — SIGNIFICANT CHANGE UP (ref 7–23)
CA-I SERPL-SCNC: 1.26 MMOL/L — SIGNIFICANT CHANGE UP (ref 1.15–1.33)
CALCIUM SERPL-MCNC: 8.5 MG/DL — SIGNIFICANT CHANGE UP (ref 8.4–10.5)
CALCIUM SERPL-MCNC: 8.7 MG/DL — SIGNIFICANT CHANGE UP (ref 8.4–10.5)
CHLORIDE BLDV-SCNC: 95 MMOL/L — LOW (ref 96–108)
CHLORIDE SERPL-SCNC: 93 MMOL/L — LOW (ref 96–108)
CHLORIDE SERPL-SCNC: 94 MMOL/L — LOW (ref 96–108)
CO2 BLDV-SCNC: 27 MMOL/L — HIGH (ref 22–26)
CO2 SERPL-SCNC: 22 MMOL/L — SIGNIFICANT CHANGE UP (ref 22–31)
CO2 SERPL-SCNC: 24 MMOL/L — SIGNIFICANT CHANGE UP (ref 22–31)
CREAT SERPL-MCNC: 0.75 MG/DL — SIGNIFICANT CHANGE UP (ref 0.5–1.3)
CREAT SERPL-MCNC: 0.82 MG/DL — SIGNIFICANT CHANGE UP (ref 0.5–1.3)
CULTURE RESULTS: SIGNIFICANT CHANGE UP
EGFR: 101 ML/MIN/1.73M2 — SIGNIFICANT CHANGE UP
EGFR: 98 ML/MIN/1.73M2 — SIGNIFICANT CHANGE UP
GAS PNL BLDV: 125 MMOL/L — LOW (ref 136–145)
GAS PNL BLDV: SIGNIFICANT CHANGE UP
GAS PNL BLDV: SIGNIFICANT CHANGE UP
GLUCOSE BLDV-MCNC: 74 MG/DL — SIGNIFICANT CHANGE UP (ref 70–99)
GLUCOSE SERPL-MCNC: 67 MG/DL — LOW (ref 70–99)
GLUCOSE SERPL-MCNC: 79 MG/DL — SIGNIFICANT CHANGE UP (ref 70–99)
HCO3 BLDV-SCNC: 25 MMOL/L — SIGNIFICANT CHANGE UP (ref 22–29)
HCT VFR BLD CALC: 26.2 % — LOW (ref 39–50)
HCT VFR BLDA CALC: 26 % — LOW (ref 39–51)
HGB BLD CALC-MCNC: 8.8 G/DL — LOW (ref 12.6–17.4)
HGB BLD-MCNC: 8.7 G/DL — LOW (ref 13–17)
INR BLD: 2.49 RATIO — HIGH (ref 0.88–1.16)
LACTATE BLDV-MCNC: 2.8 MMOL/L — HIGH (ref 0.5–2)
LACTATE BLDV-MCNC: 3 MMOL/L — HIGH (ref 0.5–2)
MAGNESIUM SERPL-MCNC: 1.7 MG/DL — SIGNIFICANT CHANGE UP (ref 1.6–2.6)
MCHC RBC-ENTMCNC: 31.4 PG — SIGNIFICANT CHANGE UP (ref 27–34)
MCHC RBC-ENTMCNC: 33.2 GM/DL — SIGNIFICANT CHANGE UP (ref 32–36)
MCV RBC AUTO: 94.6 FL — SIGNIFICANT CHANGE UP (ref 80–100)
NRBC # BLD: 0 /100 WBCS — SIGNIFICANT CHANGE UP (ref 0–0)
PCO2 BLDV: 41 MMHG — LOW (ref 42–55)
PH BLDV: 7.4 — SIGNIFICANT CHANGE UP (ref 7.32–7.43)
PHOSPHATE SERPL-MCNC: 2.4 MG/DL — LOW (ref 2.5–4.5)
PLATELET # BLD AUTO: 107 K/UL — LOW (ref 150–400)
PO2 BLDV: 46 MMHG — HIGH (ref 25–45)
POTASSIUM BLDV-SCNC: 3.8 MMOL/L — SIGNIFICANT CHANGE UP (ref 3.5–5.1)
POTASSIUM SERPL-MCNC: 3.8 MMOL/L — SIGNIFICANT CHANGE UP (ref 3.5–5.3)
POTASSIUM SERPL-MCNC: 3.9 MMOL/L — SIGNIFICANT CHANGE UP (ref 3.5–5.3)
POTASSIUM SERPL-SCNC: 3.8 MMOL/L — SIGNIFICANT CHANGE UP (ref 3.5–5.3)
POTASSIUM SERPL-SCNC: 3.9 MMOL/L — SIGNIFICANT CHANGE UP (ref 3.5–5.3)
PROT SERPL-MCNC: 7.2 G/DL — SIGNIFICANT CHANGE UP (ref 6–8.3)
PROT SERPL-MCNC: 7.2 G/DL — SIGNIFICANT CHANGE UP (ref 6–8.3)
PROTHROM AB SERPL-ACNC: 29.2 SEC — HIGH (ref 10.5–13.4)
RBC # BLD: 2.77 M/UL — LOW (ref 4.2–5.8)
RBC # FLD: 20.3 % — HIGH (ref 10.3–14.5)
RH IG SCN BLD-IMP: POSITIVE — SIGNIFICANT CHANGE UP
SAO2 % BLDV: 77.6 % — SIGNIFICANT CHANGE UP (ref 67–88)
SARS-COV-2 RNA SPEC QL NAA+PROBE: SIGNIFICANT CHANGE UP
SODIUM SERPL-SCNC: 125 MMOL/L — LOW (ref 135–145)
SODIUM SERPL-SCNC: 127 MMOL/L — LOW (ref 135–145)
SPECIMEN SOURCE: SIGNIFICANT CHANGE UP
WBC # BLD: 7.83 K/UL — SIGNIFICANT CHANGE UP (ref 3.8–10.5)
WBC # FLD AUTO: 7.83 K/UL — SIGNIFICANT CHANGE UP (ref 3.8–10.5)

## 2022-09-11 PROCEDURE — 83930 ASSAY OF BLOOD OSMOLALITY: CPT

## 2022-09-11 PROCEDURE — 80053 COMPREHEN METABOLIC PANEL: CPT

## 2022-09-11 PROCEDURE — 85730 THROMBOPLASTIN TIME PARTIAL: CPT

## 2022-09-11 PROCEDURE — 86038 ANTINUCLEAR ANTIBODIES: CPT

## 2022-09-11 PROCEDURE — 86900 BLOOD TYPING SEROLOGIC ABO: CPT

## 2022-09-11 PROCEDURE — 82103 ALPHA-1-ANTITRYPSIN TOTAL: CPT

## 2022-09-11 PROCEDURE — 36430 TRANSFUSION BLD/BLD COMPNT: CPT

## 2022-09-11 PROCEDURE — 86255 FLUORESCENT ANTIBODY SCREEN: CPT

## 2022-09-11 PROCEDURE — 85610 PROTHROMBIN TIME: CPT

## 2022-09-11 PROCEDURE — 82746 ASSAY OF FOLIC ACID SERUM: CPT

## 2022-09-11 PROCEDURE — 74177 CT ABD & PELVIS W/CONTRAST: CPT

## 2022-09-11 PROCEDURE — 83735 ASSAY OF MAGNESIUM: CPT

## 2022-09-11 PROCEDURE — 86708 HEPATITIS A ANTIBODY: CPT

## 2022-09-11 PROCEDURE — P9059: CPT

## 2022-09-11 PROCEDURE — 87086 URINE CULTURE/COLONY COUNT: CPT

## 2022-09-11 PROCEDURE — 87641 MR-STAPH DNA AMP PROBE: CPT

## 2022-09-11 PROCEDURE — 84145 PROCALCITONIN (PCT): CPT

## 2022-09-11 PROCEDURE — 86803 HEPATITIS C AB TEST: CPT

## 2022-09-11 PROCEDURE — 96374 THER/PROPH/DIAG INJ IV PUSH: CPT

## 2022-09-11 PROCEDURE — 96375 TX/PRO/DX INJ NEW DRUG ADDON: CPT

## 2022-09-11 PROCEDURE — 80076 HEPATIC FUNCTION PANEL: CPT

## 2022-09-11 PROCEDURE — 85014 HEMATOCRIT: CPT

## 2022-09-11 PROCEDURE — C2625: CPT

## 2022-09-11 PROCEDURE — P9037: CPT

## 2022-09-11 PROCEDURE — 82330 ASSAY OF CALCIUM: CPT

## 2022-09-11 PROCEDURE — 82390 ASSAY OF CERULOPLASMIN: CPT

## 2022-09-11 PROCEDURE — 82435 ASSAY OF BLOOD CHLORIDE: CPT

## 2022-09-11 PROCEDURE — 87040 BLOOD CULTURE FOR BACTERIA: CPT

## 2022-09-11 PROCEDURE — 83690 ASSAY OF LIPASE: CPT

## 2022-09-11 PROCEDURE — 85027 COMPLETE CBC AUTOMATED: CPT

## 2022-09-11 PROCEDURE — 84300 ASSAY OF URINE SODIUM: CPT

## 2022-09-11 PROCEDURE — 36415 COLL VENOUS BLD VENIPUNCTURE: CPT

## 2022-09-11 PROCEDURE — 84100 ASSAY OF PHOSPHORUS: CPT

## 2022-09-11 PROCEDURE — 87150 DNA/RNA AMPLIFIED PROBE: CPT

## 2022-09-11 PROCEDURE — 93308 TTE F-UP OR LMTD: CPT

## 2022-09-11 PROCEDURE — 82728 ASSAY OF FERRITIN: CPT

## 2022-09-11 PROCEDURE — C1769: CPT

## 2022-09-11 PROCEDURE — 80074 ACUTE HEPATITIS PANEL: CPT

## 2022-09-11 PROCEDURE — 82947 ASSAY GLUCOSE BLOOD QUANT: CPT

## 2022-09-11 PROCEDURE — 74330 X-RAY BILE/PANC ENDOSCOPY: CPT

## 2022-09-11 PROCEDURE — 86850 RBC ANTIBODY SCREEN: CPT

## 2022-09-11 PROCEDURE — 99285 EMERGENCY DEPT VISIT HI MDM: CPT

## 2022-09-11 PROCEDURE — 47531 INJECTION FOR CHOLANGIOGRAM: CPT

## 2022-09-11 PROCEDURE — 86381 MITOCHONDRIAL ANTIBODY EACH: CPT

## 2022-09-11 PROCEDURE — 83605 ASSAY OF LACTIC ACID: CPT

## 2022-09-11 PROCEDURE — 82565 ASSAY OF CREATININE: CPT

## 2022-09-11 PROCEDURE — 0225U NFCT DS DNA&RNA 21 SARSCOV2: CPT

## 2022-09-11 PROCEDURE — 84132 ASSAY OF SERUM POTASSIUM: CPT

## 2022-09-11 PROCEDURE — 76705 ECHO EXAM OF ABDOMEN: CPT

## 2022-09-11 PROCEDURE — 97161 PT EVAL LOW COMPLEX 20 MIN: CPT

## 2022-09-11 PROCEDURE — 86901 BLOOD TYPING SEROLOGIC RH(D): CPT

## 2022-09-11 PROCEDURE — P9100: CPT

## 2022-09-11 PROCEDURE — 82607 VITAMIN B-12: CPT

## 2022-09-11 PROCEDURE — 82803 BLOOD GASES ANY COMBINATION: CPT

## 2022-09-11 PROCEDURE — 81001 URINALYSIS AUTO W/SCOPE: CPT

## 2022-09-11 PROCEDURE — 84295 ASSAY OF SERUM SODIUM: CPT

## 2022-09-11 PROCEDURE — 86376 MICROSOMAL ANTIBODY EACH: CPT

## 2022-09-11 PROCEDURE — 83540 ASSAY OF IRON: CPT

## 2022-09-11 PROCEDURE — 83550 IRON BINDING TEST: CPT

## 2022-09-11 PROCEDURE — 80048 BASIC METABOLIC PNL TOTAL CA: CPT

## 2022-09-11 PROCEDURE — 85025 COMPLETE CBC W/AUTO DIFF WBC: CPT

## 2022-09-11 PROCEDURE — 87640 STAPH A DNA AMP PROBE: CPT

## 2022-09-11 PROCEDURE — 49406 IMAGE CATH FLUID PERI/RETRO: CPT

## 2022-09-11 PROCEDURE — 86706 HEP B SURFACE ANTIBODY: CPT

## 2022-09-11 PROCEDURE — 87186 SC STD MICRODIL/AGAR DIL: CPT

## 2022-09-11 PROCEDURE — 85018 HEMOGLOBIN: CPT

## 2022-09-11 PROCEDURE — 82570 ASSAY OF URINE CREATININE: CPT

## 2022-09-11 RX ORDER — FLUCONAZOLE 150 MG/1
400 TABLET ORAL EVERY 24 HOURS
Refills: 0 | Status: DISCONTINUED | OUTPATIENT
Start: 2022-09-12 | End: 2022-09-16

## 2022-09-11 RX ORDER — AMPICILLIN SODIUM AND SULBACTAM SODIUM 250; 125 MG/ML; MG/ML
3 INJECTION, POWDER, FOR SUSPENSION INTRAMUSCULAR; INTRAVENOUS EVERY 6 HOURS
Refills: 0 | Status: DISCONTINUED | OUTPATIENT
Start: 2022-09-12 | End: 2022-09-14

## 2022-09-11 RX ORDER — AMPICILLIN SODIUM AND SULBACTAM SODIUM 250; 125 MG/ML; MG/ML
3 INJECTION, POWDER, FOR SUSPENSION INTRAMUSCULAR; INTRAVENOUS ONCE
Refills: 0 | Status: COMPLETED | OUTPATIENT
Start: 2022-09-11 | End: 2022-09-11

## 2022-09-11 RX ORDER — ACETAMINOPHEN 500 MG
650 TABLET ORAL ONCE
Refills: 0 | Status: COMPLETED | OUTPATIENT
Start: 2022-09-11 | End: 2022-09-11

## 2022-09-11 RX ORDER — FLUCONAZOLE 150 MG/1
TABLET ORAL
Refills: 0 | Status: DISCONTINUED | OUTPATIENT
Start: 2022-09-11 | End: 2022-09-16

## 2022-09-11 RX ORDER — SODIUM CHLORIDE 9 MG/ML
1000 INJECTION INTRAMUSCULAR; INTRAVENOUS; SUBCUTANEOUS
Refills: 0 | Status: DISCONTINUED | OUTPATIENT
Start: 2022-09-11 | End: 2022-09-11

## 2022-09-11 RX ORDER — FLUCONAZOLE 150 MG/1
400 TABLET ORAL ONCE
Refills: 0 | Status: COMPLETED | OUTPATIENT
Start: 2022-09-11 | End: 2022-09-11

## 2022-09-11 RX ORDER — AMPICILLIN SODIUM AND SULBACTAM SODIUM 250; 125 MG/ML; MG/ML
INJECTION, POWDER, FOR SUSPENSION INTRAMUSCULAR; INTRAVENOUS
Refills: 0 | Status: DISCONTINUED | OUTPATIENT
Start: 2022-09-11 | End: 2022-09-14

## 2022-09-11 RX ADMIN — SENNA PLUS 2 TABLET(S): 8.6 TABLET ORAL at 22:07

## 2022-09-11 RX ADMIN — SPIRONOLACTONE 200 MILLIGRAM(S): 25 TABLET, FILM COATED ORAL at 05:27

## 2022-09-11 RX ADMIN — MAGNESIUM OXIDE 400 MG ORAL TABLET 400 MILLIGRAM(S): 241.3 TABLET ORAL at 18:00

## 2022-09-11 RX ADMIN — MAGNESIUM OXIDE 400 MG ORAL TABLET 400 MILLIGRAM(S): 241.3 TABLET ORAL at 08:20

## 2022-09-11 RX ADMIN — MAGNESIUM OXIDE 400 MG ORAL TABLET 400 MILLIGRAM(S): 241.3 TABLET ORAL at 11:41

## 2022-09-11 RX ADMIN — AMPICILLIN SODIUM AND SULBACTAM SODIUM 200 GRAM(S): 250; 125 INJECTION, POWDER, FOR SUSPENSION INTRAMUSCULAR; INTRAVENOUS at 22:08

## 2022-09-11 RX ADMIN — Medication 650 MILLIGRAM(S): at 13:08

## 2022-09-11 RX ADMIN — Medication 80 MILLIGRAM(S): at 05:27

## 2022-09-11 RX ADMIN — PANTOPRAZOLE SODIUM 40 MILLIGRAM(S): 20 TABLET, DELAYED RELEASE ORAL at 05:26

## 2022-09-11 RX ADMIN — PIPERACILLIN AND TAZOBACTAM 25 GRAM(S): 4; .5 INJECTION, POWDER, LYOPHILIZED, FOR SOLUTION INTRAVENOUS at 05:27

## 2022-09-11 RX ADMIN — Medication 650 MILLIGRAM(S): at 14:38

## 2022-09-11 RX ADMIN — PIPERACILLIN AND TAZOBACTAM 25 GRAM(S): 4; .5 INJECTION, POWDER, LYOPHILIZED, FOR SOLUTION INTRAVENOUS at 11:41

## 2022-09-11 RX ADMIN — FLUCONAZOLE 100 MILLIGRAM(S): 150 TABLET ORAL at 22:07

## 2022-09-11 RX ADMIN — TAMSULOSIN HYDROCHLORIDE 0.4 MILLIGRAM(S): 0.4 CAPSULE ORAL at 22:07

## 2022-09-11 RX ADMIN — SODIUM CHLORIDE 50 MILLILITER(S): 9 INJECTION INTRAMUSCULAR; INTRAVENOUS; SUBCUTANEOUS at 13:26

## 2022-09-11 NOTE — PRE-ANESTHESIA EVALUATION ADULT - NSANTHPMHFT_GEN_ALL_CORE
66 year old male with decompensated cirrhosis and cholangitis s/p ERCP with stent placement (4/2022) s/p stent removal on 7/20 (along sphincterotomy and stone extraction) now s/p cholecystostomy tube 8/9 with check 8/29 in correct location. He presents to OSH with bloody discharge from the wound site and abdominal distention.    Pt has had multiple abdominal drainage before including paracentesis without issues with anesthesia. Currently AAOx3, denies CP, SOB

## 2022-09-11 NOTE — PROGRESS NOTE ADULT - ASSESSMENT
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 Currently Listed for OLT           [] Decompensated ETOH/PARNELL Cirrhosis, listed for OLT  - daily labs  - Hyponatremia: 1L fluid restricted, hold lasix 80/aldactone 200mg   - Repeat BMP tonight     [] Abdominal collection  - s/p drainage by IR x 2, C tube repositioned  - f/u bld/urine cxs  - anbx: changed to unasyn (last admission grew e coli and strep anginosis in drain cx, tx with unasyn), Added fluc   - repeat CBC tonight   - monitor Fever curve

## 2022-09-11 NOTE — PRE PROCEDURE NOTE - PRE PROCEDURE EVALUATION
Interventional Radiology    HPI: 66y Male with recent imaging from OSH showing multiple large abdominal collections. IR to perform multiple abdominal abscess drainages, possible re-positioning/exchange of percutaneous cholecystostomy tube.    Allergies:   Medications (Abx/Cardiac/Anticoagulation/Blood Products)    furosemide    Tablet: 80 milliGRAM(s) Oral (09-11 @ 05:27)  piperacillin/tazobactam IVPB.: 200 mL/Hr IV Intermittent (09-10 @ 23:01)  piperacillin/tazobactam IVPB..: 25 mL/Hr IV Intermittent (09-11 @ 11:41)  spironolactone: 200 milliGRAM(s) Oral (09-11 @ 05:27)    Data:  162.6  72.6  T(C): 37.2  HR: 113  BP: 138/73  RR: 18  SpO2: 96%    Exam  General: No acute distress  Chest: Non labored breathing  Abdomen: Non-distended  Extremities: No swelling, warm    -WBC 7.83 / HgB 8.7 / Hct 26.2 / Plt 107  -Na 125 / Cl 93 / BUN 11 / Glucose 79  -K 3.9 / CO2 24 / Cr 0.75  -ALT 15 / Alk Phos 64 / T.Bili 2.9  -INR2.49    Imaging: CT abdomen/pelvis reviewed from OSH     Plan:   66y Male with recent imaging from OSH showing multiple large abdominal collections. IR to perform multiple abdominal abscess drainages, possible re-positioning/exchange of percutaneous cholecystostomy tube.  -- Relevant imaging and labs were reviewed.   -- Risks, benefits, and alternatives were explained to the patient and informed consent was obtained.

## 2022-09-11 NOTE — PROGRESS NOTE ADULT - SUBJECTIVE AND OBJECTIVE BOX
Transplant Surgery - Multidisciplinary Rounds  --------------------------------------------------------------  Present: Patient seen and examined with multidisciplinary Transplant team including Transplant Surgeon Dr. Velasco, Hepatologist Dr. Mo, JOHN Mckeon and unit RN during rounds.    Disciplines not in attendance will be notified of the plan.     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 Currently Listed for OLT     Recent admission with:   -Severe sepsis with associated oliguric ROBBIE that required short course of HD (resolved) and lactic acidosis (resolved) secondary to acute and likely perforated cholecystitis with secondary peritonitis. s/p percutaneous cholecystostomy tube placement (8/9) with exchange (8/29).   -Hemoperitoneum after LVP, required transfusions without intervention  -Cultures from ascitic fluid (8/9 and 8/16) grew E. coli and Streptococcus anginosis, treated with unasyn  -Ascitic fluid PMN count has improved to 1172 (8/31) after correction for RBCs, still consistent with peritonitis but clinically improving.   - Listed for OLT with MELD-Na 24    Presented to OSH with bleeding around C tube, abd pain and large abdominal collection. [facility]red [facility] for further management.     Interval Events:  - Febrile, bld/urine cxs sent  - IR consulted, s/p drainage of perihepatic collection (drain placed), s/p drainage of abd collection (drain in place), C tube repositioned  - Hyponatremia: fluid restricted; dced lactulose and aldactone     Potential Discharge date: pending clinical improvement  Education:  Medications  Plan of care:  See Below    MEDICATIONS  (STANDING):  ampicillin/sulbactam  IVPB 3 Gram(s) IV Intermittent once  ampicillin/sulbactam  IVPB      fluconAZOLE IVPB      fluconAZOLE IVPB 400 milliGRAM(s) IV Intermittent once  influenza   Vaccine 0.5 milliLiter(s) IntraMuscular once  magnesium oxide 400 milliGRAM(s) Oral three times a day with meals  pantoprazole    Tablet 40 milliGRAM(s) Oral before breakfast  polyethylene glycol 3350 17 Gram(s) Oral two times a day  senna 2 Tablet(s) Oral at bedtime  tamsulosin 0.4 milliGRAM(s) Oral at bedtime    Vital Signs Last 24 Hrs  T(C): 36.9 (11 Sep 2022 18:20), Max: 38.4 (11 Sep 2022 12:37)  T(F): 98.5 (11 Sep 2022 18:20), Max: 101.1 (11 Sep 2022 12:37)  HR: 103 (11 Sep 2022 18:20) (97 - 124)  BP: 120/68 (11 Sep 2022 18:20) (115/72 - 146/82)  BP(mean): 87 (11 Sep 2022 17:32) (82 - 89)  RR: 19 (11 Sep 2022 18:20) (14 - 19)  SpO2: 97% (11 Sep 2022 18:20) (93% - 100%)    I&O's Summary    10 Sep 2022 07:01  -  11 Sep 2022 07:00  --------------------------------------------------------  IN: 360 mL / OUT: 460 mL / NET: -100 mL    11 Sep 2022 07:01  -  11 Sep 2022 21:34  --------------------------------------------------------  IN: 600 mL / OUT: 1600 mL / NET: -1000 mL                        8.7    7.83  )-----------( 107      ( 11 Sep 2022 07:37 )             26.2     09-11    127<L>  |  94<L>  |  12  ----------------------------<  67<L>  3.8   |  22  |  0.82    Ca    8.5      11 Sep 2022 15:14  Phos  2.4     09-11  Mg     1.7     09-11    TPro  7.2  /  Alb  2.2<L>  /  TBili  2.8<H>  /  DBili  x   /  AST  30  /  ALT  14  /  AlkPhos  63  09-11    Review of systems  Gen: No weight changes, fatigue, fevers/chills, weakness  Skin: No rashes  Head/Eyes/Ears/Mouth: No headache; Normal hearing; Normal vision w/o blurriness; No sinus pain/discomfort, sore throat  Respiratory: No dyspnea, cough, wheezing, hemoptysis  CV: No chest pain, PND, orthopnea  GI: C/O mild abdominal pain at surgical site. no diarrhea, constipation, nausea, vomiting, melena, hematochezia  : No increased frequency, dysuria, hematuria, nocturia  MSK: No joint pain/swelling; no back pain; no edema  Neuro: No dizziness/lightheadedness, weakness, seizures, numbness, tingling  Heme: No easy bruising or bleeding  Endo: No heat/cold intolerance  Psych: No significant nervousness, anxiety, stress, depression  All other systems were reviewed and are negative, except as noted.    PHYSICAL EXAM:  Constitutional: Well developed / well nourished  Eyes: anicteric   ENMT: nc/at, no thrush  Neck: supple,  Respiratory: CTA B/L  Cardiovascular: RRR  Gastrointestinal: Soft abdomen, mild distended, c tube to bag  Genitourinary: Voiding spontaneously  Extremities: SCD's in place and working bilaterally  Vascular: Palpable dp pulses bilaterally.   Neurological: A&O x3  Skin: no rashes, ulcerations, lesions  Musculoskeletal: Moving all extremities  Psychiatric: Responsive

## 2022-09-11 NOTE — PRE PROCEDURE NOTE - GENERAL PROCEDURE NAME
multiple abdominal abscess drainages, possible re-positioning/exchange of percutaneous cholecystostomy tube

## 2022-09-12 LAB
ALBUMIN SERPL ELPH-MCNC: 2.2 G/DL — LOW (ref 3.3–5)
ALBUMIN SERPL ELPH-MCNC: 2.2 G/DL — LOW (ref 3.3–5)
ALP SERPL-CCNC: 60 U/L — SIGNIFICANT CHANGE UP (ref 40–120)
ALP SERPL-CCNC: 64 U/L — SIGNIFICANT CHANGE UP (ref 40–120)
ALT FLD-CCNC: 12 U/L — SIGNIFICANT CHANGE UP (ref 10–45)
ALT FLD-CCNC: 14 U/L — SIGNIFICANT CHANGE UP (ref 10–45)
ANION GAP SERPL CALC-SCNC: 9 MMOL/L — SIGNIFICANT CHANGE UP (ref 5–17)
ANION GAP SERPL CALC-SCNC: 9 MMOL/L — SIGNIFICANT CHANGE UP (ref 5–17)
APTT BLD: 39.1 SEC — HIGH (ref 27.5–35.5)
AST SERPL-CCNC: 23 U/L — SIGNIFICANT CHANGE UP (ref 10–40)
AST SERPL-CCNC: 26 U/L — SIGNIFICANT CHANGE UP (ref 10–40)
BILIRUB SERPL-MCNC: 2.5 MG/DL — HIGH (ref 0.2–1.2)
BILIRUB SERPL-MCNC: 2.5 MG/DL — HIGH (ref 0.2–1.2)
BUN SERPL-MCNC: 13 MG/DL — SIGNIFICANT CHANGE UP (ref 7–23)
BUN SERPL-MCNC: 13 MG/DL — SIGNIFICANT CHANGE UP (ref 7–23)
CALCIUM SERPL-MCNC: 8.3 MG/DL — LOW (ref 8.4–10.5)
CALCIUM SERPL-MCNC: 8.4 MG/DL — SIGNIFICANT CHANGE UP (ref 8.4–10.5)
CHLORIDE SERPL-SCNC: 93 MMOL/L — LOW (ref 96–108)
CHLORIDE SERPL-SCNC: 96 MMOL/L — SIGNIFICANT CHANGE UP (ref 96–108)
CO2 SERPL-SCNC: 23 MMOL/L — SIGNIFICANT CHANGE UP (ref 22–31)
CO2 SERPL-SCNC: 23 MMOL/L — SIGNIFICANT CHANGE UP (ref 22–31)
CREAT SERPL-MCNC: 0.84 MG/DL — SIGNIFICANT CHANGE UP (ref 0.5–1.3)
CREAT SERPL-MCNC: 0.88 MG/DL — SIGNIFICANT CHANGE UP (ref 0.5–1.3)
CULTURE RESULTS: NO GROWTH — SIGNIFICANT CHANGE UP
EGFR: 96 ML/MIN/1.73M2 — SIGNIFICANT CHANGE UP
EGFR: 97 ML/MIN/1.73M2 — SIGNIFICANT CHANGE UP
GLUCOSE SERPL-MCNC: 111 MG/DL — HIGH (ref 70–99)
GLUCOSE SERPL-MCNC: 156 MG/DL — HIGH (ref 70–99)
HCT VFR BLD CALC: 23.2 % — LOW (ref 39–50)
HCT VFR BLD CALC: 27 % — LOW (ref 39–50)
HGB BLD-MCNC: 7.6 G/DL — LOW (ref 13–17)
HGB BLD-MCNC: 8.7 G/DL — LOW (ref 13–17)
INR BLD: 2.46 RATIO — HIGH (ref 0.88–1.16)
INR BLD: 2.52 RATIO — HIGH (ref 0.88–1.16)
MAGNESIUM SERPL-MCNC: 1.8 MG/DL — SIGNIFICANT CHANGE UP (ref 1.6–2.6)
MCHC RBC-ENTMCNC: 30.6 PG — SIGNIFICANT CHANGE UP (ref 27–34)
MCHC RBC-ENTMCNC: 30.8 PG — SIGNIFICANT CHANGE UP (ref 27–34)
MCHC RBC-ENTMCNC: 32.2 GM/DL — SIGNIFICANT CHANGE UP (ref 32–36)
MCHC RBC-ENTMCNC: 32.8 GM/DL — SIGNIFICANT CHANGE UP (ref 32–36)
MCV RBC AUTO: 93.9 FL — SIGNIFICANT CHANGE UP (ref 80–100)
MCV RBC AUTO: 95.1 FL — SIGNIFICANT CHANGE UP (ref 80–100)
NRBC # BLD: 0 /100 WBCS — SIGNIFICANT CHANGE UP (ref 0–0)
NRBC # BLD: 0 /100 WBCS — SIGNIFICANT CHANGE UP (ref 0–0)
PHOSPHATE SERPL-MCNC: 2.4 MG/DL — LOW (ref 2.5–4.5)
PLATELET # BLD AUTO: 101 K/UL — LOW (ref 150–400)
PLATELET # BLD AUTO: 92 K/UL — LOW (ref 150–400)
POTASSIUM SERPL-MCNC: 3.5 MMOL/L — SIGNIFICANT CHANGE UP (ref 3.5–5.3)
POTASSIUM SERPL-MCNC: 3.5 MMOL/L — SIGNIFICANT CHANGE UP (ref 3.5–5.3)
POTASSIUM SERPL-SCNC: 3.5 MMOL/L — SIGNIFICANT CHANGE UP (ref 3.5–5.3)
POTASSIUM SERPL-SCNC: 3.5 MMOL/L — SIGNIFICANT CHANGE UP (ref 3.5–5.3)
PROT SERPL-MCNC: 6.7 G/DL — SIGNIFICANT CHANGE UP (ref 6–8.3)
PROT SERPL-MCNC: 6.8 G/DL — SIGNIFICANT CHANGE UP (ref 6–8.3)
PROTHROM AB SERPL-ACNC: 28.8 SEC — HIGH (ref 10.5–13.4)
PROTHROM AB SERPL-ACNC: 29.5 SEC — HIGH (ref 10.5–13.4)
RBC # BLD: 2.47 M/UL — LOW (ref 4.2–5.8)
RBC # BLD: 2.84 M/UL — LOW (ref 4.2–5.8)
RBC # FLD: 19 % — HIGH (ref 10.3–14.5)
RBC # FLD: 20.4 % — HIGH (ref 10.3–14.5)
SODIUM SERPL-SCNC: 125 MMOL/L — LOW (ref 135–145)
SODIUM SERPL-SCNC: 128 MMOL/L — LOW (ref 135–145)
SPECIMEN SOURCE: SIGNIFICANT CHANGE UP
WBC # BLD: 10.41 K/UL — SIGNIFICANT CHANGE UP (ref 3.8–10.5)
WBC # BLD: 8.94 K/UL — SIGNIFICANT CHANGE UP (ref 3.8–10.5)
WBC # FLD AUTO: 10.41 K/UL — SIGNIFICANT CHANGE UP (ref 3.8–10.5)
WBC # FLD AUTO: 8.94 K/UL — SIGNIFICANT CHANGE UP (ref 3.8–10.5)

## 2022-09-12 PROCEDURE — 99232 SBSQ HOSP IP/OBS MODERATE 35: CPT | Mod: GC

## 2022-09-12 PROCEDURE — 99231 SBSQ HOSP IP/OBS SF/LOW 25: CPT

## 2022-09-12 PROCEDURE — 99255 IP/OBS CONSLTJ NEW/EST HI 80: CPT

## 2022-09-12 RX ADMIN — POLYETHYLENE GLYCOL 3350 17 GRAM(S): 17 POWDER, FOR SOLUTION ORAL at 18:15

## 2022-09-12 RX ADMIN — AMPICILLIN SODIUM AND SULBACTAM SODIUM 200 GRAM(S): 250; 125 INJECTION, POWDER, FOR SUSPENSION INTRAMUSCULAR; INTRAVENOUS at 14:41

## 2022-09-12 RX ADMIN — AMPICILLIN SODIUM AND SULBACTAM SODIUM 200 GRAM(S): 250; 125 INJECTION, POWDER, FOR SUSPENSION INTRAMUSCULAR; INTRAVENOUS at 13:22

## 2022-09-12 RX ADMIN — AMPICILLIN SODIUM AND SULBACTAM SODIUM 200 GRAM(S): 250; 125 INJECTION, POWDER, FOR SUSPENSION INTRAMUSCULAR; INTRAVENOUS at 20:58

## 2022-09-12 RX ADMIN — SENNA PLUS 2 TABLET(S): 8.6 TABLET ORAL at 20:59

## 2022-09-12 RX ADMIN — FLUCONAZOLE 100 MILLIGRAM(S): 150 TABLET ORAL at 18:15

## 2022-09-12 RX ADMIN — POLYETHYLENE GLYCOL 3350 17 GRAM(S): 17 POWDER, FOR SOLUTION ORAL at 05:14

## 2022-09-12 RX ADMIN — PANTOPRAZOLE SODIUM 40 MILLIGRAM(S): 20 TABLET, DELAYED RELEASE ORAL at 05:15

## 2022-09-12 RX ADMIN — MAGNESIUM OXIDE 400 MG ORAL TABLET 400 MILLIGRAM(S): 241.3 TABLET ORAL at 13:22

## 2022-09-12 RX ADMIN — AMPICILLIN SODIUM AND SULBACTAM SODIUM 200 GRAM(S): 250; 125 INJECTION, POWDER, FOR SUSPENSION INTRAMUSCULAR; INTRAVENOUS at 04:26

## 2022-09-12 RX ADMIN — MAGNESIUM OXIDE 400 MG ORAL TABLET 400 MILLIGRAM(S): 241.3 TABLET ORAL at 18:15

## 2022-09-12 RX ADMIN — TAMSULOSIN HYDROCHLORIDE 0.4 MILLIGRAM(S): 0.4 CAPSULE ORAL at 20:59

## 2022-09-12 NOTE — CONSULT NOTE ADULT - ASSESSMENT
65 y/o M PMHx cholangitis/cholecystitis (s/p ERCP w/ biliary stent placement 04/2022) and recently diagnosed etOH cirrhosis, had RUQ pain following biliary stent removal on 7/20/22, then  Found to have a distended GB is s/p Choley tube placement 8/9  now admitted with concerns for multiple large abdominal collections and choley tube repositioning. ID consulted for antibiotic management.    -febrile to 101  -lactate 2.8  recent ecoli and strep anginosis from previous sbp tx with unasyn  -multiple abscess collections with now purelent drainage cultures pending  -blood cx, urine cx P  -on unasyn and diflucan recently added    Plan   65 y/o M PMHx cholangitis/cholecystitis (s/p ERCP w/ biliary stent placement 04/2022) and recently diagnosed etOH cirrhosis, had RUQ pain following biliary stent removal on 7/20/22, then  Found to have a distended GB is s/p Choley tube placement 8/9  now admitted with concerns for multiple large abdominal collections and choley tube repositioning. ID consulted for antibiotic management.    -febrile to 101  -lactate 2.8  recent ecoli and strep anginosis from previous sbp tx with unasyn  -multiple abscess collections with now purelent drainage cultures pending  -blood cx, urine cx P  -on unasyn and diflucan recently added    Plan  Continue Unasyn for now, now that source control is achieved will expect improvement with Unasyn as prior isolates were very sensitive to Unasyn  Doubt role of diflucan  Follow up pending cultures  ID to follow  D/w Dr. Nix 65 y/o M PMHx cholangitis/cholecystitis (s/p ERCP w/ biliary stent placement 04/2022) and recently diagnosed etOH cirrhosis, had RUQ pain following biliary stent removal on 7/20/22, then  Found to have a distended GB is s/p Choley tube placement 8/9  now admitted with concerns for multiple large abdominal collections and choley tube repositioning. ID consulted for antibiotic management.    -febrile to 101  -lactate 2.8  recent ecoli and strep anginosis from previous sbp tx with unasyn  -multiple abscess collections with now purelent drainage cultures pending  -blood cx, urine cx P  -on unasyn and diflucan recently added    #Fever, Peritonitis, Cholecystitis  Continue Unasyn for now, now that source control is achieved will expect improvement with Unasyn as prior isolates were very sensitive to Unasyn  Doubt role of diflucan  Follow up pending cultures  ID to follow  D/w Dr. Nix

## 2022-09-12 NOTE — CHART NOTE - NSCHARTNOTEFT_GEN_A_CORE
Liver Frailty Index  1. Gender: Male  2. Dominant hand  strength (kg): attempt 1. 27 kg attempt 2. 24 kg attempt 3. 22 kg  Av.33 kg  3. Time to do 5 chair stands 28.78 seconds  4. Seconds holding 3 position balance: Side: 10 sec  SemiTandem: 10 sec Tandem: 10 sec  Total: 30 seconds  Results Liver Frailty Index is 4.69  Decimal precision: 2  Based on suggested cut-offs of the Liver Frailty Index, a patient with this Liver Frailty Index score is considered Frail.  This Liver Frailty Index score falls within the 84 percentile of outpatients with cirrhosis who are listed for liver transplantation

## 2022-09-12 NOTE — PROGRESS NOTE ADULT - ASSESSMENT
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 Currently Listed for OLT     #Decompensated ETOH/PARNELL Cirrhosis, listed for OLT  #Abdominal collection, infected hematoma s/p IR drainage x 2 with C tube repositioning   #Hyponatremia  MELD-Na: 26, 9/12  - HE: none currently  - EV: normal esophagus on ERCP from 7/2022  - Ascites: moderate 8/31 US  - SBP: paracentesis 8/9/2022 reveals likely secondary bacterial peritonitis from suspected gallbladder pathology, +SBP on para 8/31  - HCC:  No lesions on CT 8/26      Plan  - ASA 81 daily  - Flush drains BID  - CT AP  - Daily CMP, CBC, coags  - f/u BCx, UCx  - Fluconazole, Unasyn  - Hyponatremia: 1L fluid restricted  - Holding lasix 80/aldactone 200mg  [x] Psychosocial: cleared pending RPP  [x ] Infectious  [x] Cardiology:        Cardiac cath: n/a       Stress test: negative noninvasive stress test on 8/19/2022 for inducible ischemia  [ ] Colonoscopy: needed, f/u records from outside GI colonoscopy reports  [x] Prostate: Prostate Specific Antigen: negative at 2.21 ng/mL on 8/12/2022  [x] Dental  [x] PT/Frailty    Preliminary note until signed by Attending.    Thank you for involving us in this patient's care.    Kary Tena MD  Gastroenterology/Hepatology Fellow, PGY-VI    NON-URGENT CONSULTS:  Please email [email or web address]  AT NIGHT AND ON WEEKENDS:  Contact on-call GI fellow via answering service ([phone]) from 5pm-8am and on weekends/holidays  MONDAY-FRIDAY 8AM-5PM:  Pager# [phone] ([facility])  GI Phone# [phone] ([facility])

## 2022-09-12 NOTE — PROGRESS NOTE ADULT - SUBJECTIVE AND OBJECTIVE BOX
Chief Complaint:  Patient is a 66y old  Male who presents with a chief complaint of Bleeding around cholecystostomy tube (12 Sep 2022 14:13)       Interval Events:   Tm 101.1 yesterday, remains HDS      Hospital Medications:  ampicillin/sulbactam  IVPB 3 Gram(s) IV Intermittent every 6 hours  ampicillin/sulbactam  IVPB      fluconAZOLE IVPB      fluconAZOLE IVPB 400 milliGRAM(s) IV Intermittent every 24 hours  influenza   Vaccine 0.5 milliLiter(s) IntraMuscular once  magnesium oxide 400 milliGRAM(s) Oral three times a day with meals  pantoprazole    Tablet 40 milliGRAM(s) Oral before breakfast  polyethylene glycol 3350 17 Gram(s) Oral two times a day  senna 2 Tablet(s) Oral at bedtime  tamsulosin 0.4 milliGRAM(s) Oral at bedtime      ROS:   Complete and normal except as mentioned above.    PHYSICAL EXAM:   Vital Signs:  Vital Signs Last 24 Hrs  T(C): 37.2 (12 Sep 2022 13:00), Max: 37.3 (12 Sep 2022 09:00)  T(F): 99 (12 Sep 2022 13:00), Max: 99.1 (12 Sep 2022 09:00)  HR: 99 (12 Sep 2022 13:00) (97 - 119)  BP: 126/71 (12 Sep 2022 13:00) (116/64 - 131/75)  BP(mean): 87 (11 Sep 2022 17:32) (82 - 89)  RR: 18 (12 Sep 2022 13:00) (14 - 19)  SpO2: 96% (12 Sep 2022 13:00) (95% - 100%)    Parameters below as of 12 Sep 2022 13:00  Patient On (Oxygen Delivery Method): room air      Daily Height in cm: 162.56 (11 Sep 2022 16:00)    Daily     Constitutional: Well developed / well nourished  HEENT: no scleral icterus  Respiratory: normal respiratory effort  Cardiovascular: regular rate  Gastrointestinal: Soft abdomen, mild distended, IR drains with sanguinous purulent substance, c tube to bag  Genitourinary: Voiding spontaneously  Extremities: SCD's in place and working bilaterally  Neurological: A&O x3  Skin: no rashes, ulcerations, lesions          LABS: reviewed                        8.7    8.94  )-----------( 92       ( 12 Sep 2022 06:13 )             27.0     09-12    128<L>  |  96  |  13  ----------------------------<  111<H>  3.5   |  23  |  0.84    Ca    8.3<L>      12 Sep 2022 06:13  Phos  2.4     09-12  Mg     1.8     09-12    TPro  6.8  /  Alb  2.2<L>  /  TBili  2.5<H>  /  DBili  x   /  AST  23  /  ALT  14  /  AlkPhos  64  09-12    LIVER FUNCTIONS - ( 12 Sep 2022 06:13 )  Alb: 2.2 g/dL / Pro: 6.8 g/dL / ALK PHOS: 64 U/L / ALT: 14 U/L / AST: 23 U/L / GGT: x             Interval Diagnostic Studies: see sunrise for full report

## 2022-09-12 NOTE — PROGRESS NOTE ADULT - SUBJECTIVE AND OBJECTIVE BOX
Interventional Radiology Follow-Up Note.     Patient seen and examined @ bedside between 8-9a    This is a 66y Male, 9/11 s/p perihepatic collection drainage, abdominal collection drainage, & cholecystostomy tube re-positioning in IR with Dr. Yin     NO complaint offered.      Medication:     ampicillin/sulbactam  IVPB: (09-11)  ampicillin/sulbactam  IVPB: (09-12)  fluconAZOLE IVPB: (09-11)  furosemide    Tablet: (09-11)  piperacillin/tazobactam IVPB.: (09-10)  piperacillin/tazobactam IVPB..: (09-11)  spironolactone: (09-11)  tamsulosin: (09-11)    Vitals:   T(F): 99.1, Max: 101.1 (12:37)  HR: 101  BP: 125/76  RR: 18  SpO2: 96%    Physical Exam:  General: Nontoxic, in NAD.  Abdomen: soft, NTND.  drains x 3        24hr output:   RLQ Drain (mL): 65 mL purulent/dark heme tinged,  Flushed with 5cc NS w/o difficulty. Dressing clean, dry, intact.     RUQ Drain (mL): 20 mL  purulent/dark heme tinged,  Flushed with 5cc NS w/o difficulty. Dressing clean, dry, intact.     Perc rodney drain: 25cc yellow,  Flushed with 5cc NS w/o difficulty. Dressing clean, dry, intact.           LABS:  WBC 8.94 / Hgb 8.7 / Hct 27.0 / Plt 92  Na 128 / K 3.5 / CO2 23 / Cl 96 / BUN 13 / Cr 0.84 / Glucose 111  ALT 14 / AST 23 / Alk Phos 64 / Tbili 2.5  Ptt -- / Pt 28.8 / INR 2.46      Assessment/Plan:  66 year old male with decompensated cirrhosis and cholangitis s/p ERCP with stent placement (4/2022) s/p stent removal on 7/20 (along sphincterotomy and stone extraction) now s/p cholecystostomy tube 8/9 with check 8/29 in correct location. He presents to OSH with bloody discharge from the wound site and abdominal distention found to have potentially dislodged IR cholecystostomy tube and ascites.  Recent OSH imaging showing multiple large abdominal collections.   9/11 IR  s/p perihepatic collection drain, abdominal collection drain, & perc rodney re-positioning       -  Flush drains with 5cc NS daily forward only; DO NOT aspirate  -  Change dressing q3 days or when dressing is saturated.  -  Monitor h/h; transfuse as needed  -  Trend vs/labs  - Continue global management per primary team  -  If the patient is d/c home with drainage catheter, pt can make an appointment with IR by calling the IR booking office at [phone]; recommend IR follow in 10-14days for tube evaluation.  -  if drain outputs decrease to 10cc/24hr , recommend ct non contrast abd pelv for eval and contact IR.   -  Pt will benefit from VNS service to help with drainage catheter care; Pt should continue same drainage catheter care as an outpatient.  -  IR will follow        Please call IR at [phone] with any questions, concerns, or issues regarding above.

## 2022-09-12 NOTE — PHYSICAL THERAPY INITIAL EVALUATION ADULT - LIVES WITH, PROFILE
- Keep wound clean and dry. Can cover with bandage and antibiotic ointment  - Monitor for signs of infection such as increased or spreading redness, increased drainage, fevers.  - May take Tylenol or Ibuprofen as needed for discomfort.  - Return in 5 days for suture removal. Return sooner for signs of infection.    Patient Education     Suture Care    Stitches (sutures) are used to close wounds. Sutures also help stop bleeding and speed healing. To help your wound heal, follow the tips on this handout.  Some sutures need to be removed by a healthcare provider. Others dissolve on their own. Sometimes strips of tape are used. You’ll be told what kind of sutures you have.   Keep sutures clean  · Avoid doing things that could cause dirt or sweat to get on your sutures. If needed, cover your sutures with a bandage (dressing) to protect them.  · Don’t pick at scabs. They help protect the wound.  · Don’t wash the area around your sutures unless your healthcare provider says it’s OK. Then, follow his or her instructions for washing and drying.  Keep sutures dry  · Keep your sutures out of water.  · Take a sponge bath to avoid getting your sutures wound wet, unless your healthcare provider tells you otherwise.  · Ask your provider when can you take a shower or bathe.  · Ask your provider about the best way to keep your sutures dry when bathing or showering.  · If sutures get damp, pat them dry.  Changing your dressing  Leave the dressing in place until you are told to remove it or change it. Change it only as directed, using clean hands:  · After the first ___hours, change your dressing every ___hours.  · Change your dressing if it gets wet or dirty.  Other tips  · To help wounds on an arm or leg heal, use the affected limb as little as possible.  · To help reduce swelling and throbbing, raise the area with sutures above your heart.  · To help prevent itching, cover sutures with gauze. If sutures itch, try not to scratch  them.  · For pain relief, try acetaminophen or ibuprofen. Don’t use aspirin. It can increase bleeding.  When to seek medical care  Call your healthcare provider if you notice any of the following signs:  · Increased soreness, pain, or tenderness after 24 hours  · A red streak, increased redness, or puffiness near the wound  · White, yellowish, or bad smelling discharge from the wound  · Bleeding that can’t be stopped by applying pressure  · Steri-Strips fall off or stitches dissolve before the wound heals  · Fever over 100.4°F (38.0°C)   Date Last Reviewed: 7/1/2016  © 4869-2882 [email or web address] [address] [place] [address], [place] [address]. All rights reserved. This information is not intended as a substitute for professional medical care. Always follow your healthcare professional's instructions.            daughter in private home, no stairs to enter

## 2022-09-12 NOTE — PHYSICAL THERAPY INITIAL EVALUATION ADULT - PERTINENT HX OF CURRENT PROBLEM, REHAB EVAL
as per chart review: decompensated cirrhosis and cholangitis s/p ERCP with stent placement (4/2022) s/p stent removal on 7/20 (along sphincterotomy and stone extraction) now s/p cholecystostomy tube 8/9 with check 8/29 in correct location. He presents to OSH with bloody discharge from the wound site and abdominal distention found to have potentially dislodged IR cholecystostomy tube and ascities. Currently Listed for OLT

## 2022-09-12 NOTE — CONSULT NOTE ADULT - ATTENDING COMMENTS
Patient with recent prolonged admission for peritonitis secondary to perforated cholecystitis  Now with concern for multiple abdominal wall collections in context of malpositioned cholecystotomy tube  Agree with coverage with Unasyn  Would follow IR cultures    I will continue to follow. Please feel free to contact me with any further questions.    Bladimir Nix M.D.  [facility] Division of Infectious Disease  8AM-5PM Monday - Friday: Available on [employer] Teams  After Hours and Holidays (or if no response on [employer] Teams): Please contact the Infectious Diseases Office at [phone]    The above assessment and plan were discussed with transplant surgery PA

## 2022-09-12 NOTE — CONSULT NOTE ADULT - SUBJECTIVE AND OBJECTIVE BOX
Patient is a 66y old  Male who presents with a chief complaint of Bleeding around cholecystostomy tube (12 Sep 2022 11:24)    HPI:  66 year old male with decompensated cirrhosis and cholangitis s/p ERCP with stent placement (4/2022) s/p stent removal on 7/20 (along sphincterotomy and stone extraction) now s/p cholecystostomy tube 8/9 with check 8/29 in correct location. He presents to OSH with bloody discharge from the wound site and abdominal distention.    The patient and daughter reports that yesterday they noticed bleeding around the tube site, minimal but new in onset. This morning they called the nurse who changed the dressing but after she left there was significantly more bloody discharge and he was advised to go to the ED. In [facility] ED the patietn was tachycardic to 122, had a fever to 101, and /70. he denied any fever or chills over the past ew days including today, as well as nause or vomiting, chest pain, or shortness of breath. He feels mildly distended but not unusually so, though his daughter notes his belly feeling tighter than prior.  He was treated with tylenol and IV fluids. he feels very full at the moment so does not think he can keep up his oral intake.   (10 Sep 2022 22:00)       REVIEW OF SYSTEMS  [  ] ROS unobtainable because:    [ x ] All other systems negative except as noted below    Constitutional:  [ ] fever [ ] chills  [ ] weight loss  [ ]night sweat  [ ]poor appetite/PO intake [ ]fatigue   Skin:  [ ] rash [ ] phlebitis	  Eyes: [ ] icterus [ ] pain  [ ] discharge	  ENMT: [ ] sore throat  [ ] thrush [ ] ulcers [ ] exudates [ ]anosmia  Respiratory: [ ] dyspnea [ ] hemoptysis [ ] cough [ ] sputum	  Cardiovascular:  [ ] chest pain [ ] palpitations [ ] edema	  Gastrointestinal:  [ ] nausea [ ] vomiting [ ] diarrhea [ ] constipation [ ] pain	  Genitourinary:  [ ] dysuria [ ] frequency [ ] hematuria [ ] discharge [ ] flank pain  [ ] incontinence  Musculoskeletal:  [ ] myalgias [ ] arthralgias [ ] arthritis  [ ] back pain  Neurological:  [ ] headache [ ] weakness [ ] seizures  [ ] confusion/altered mental status    prior hospital charts reviewed [V]  primary team notes reviewed [V]  other consultant notes reviewed [V]    PAST MEDICAL & SURGICAL HISTORY:  H/O acute cholangitis      2019 novel coronavirus disease (COVID-19)  12/24/2021  no hospitalization, no treatment      S/P ERCP  4/2022      H/O colonoscopy          SOCIAL HISTORY:  - Denied smoking/vaping/alcohol/recreational drug use    FAMILY HISTORY:      Allergies  No Known Allergies        ANTIMICROBIALS:  ampicillin/sulbactam  IVPB 3 every 6 hours  ampicillin/sulbactam  IVPB    fluconAZOLE IVPB    fluconAZOLE IVPB 400 every 24 hours      ANTIMICROBIALS (past 90 days):  MEDICATIONS  (STANDING):    ampicillin/sulbactam  IVPB   200 mL/Hr IV Intermittent (09-11-22 @ 22:08)    ampicillin/sulbactam  IVPB   200 mL/Hr IV Intermittent (09-12-22 @ 13:22)   200 mL/Hr IV Intermittent (09-12-22 @ 04:26)    fluconAZOLE IVPB   100 mL/Hr IV Intermittent (09-11-22 @ 22:07)    piperacillin/tazobactam IVPB.   200 mL/Hr IV Intermittent (09-10-22 @ 23:01)    piperacillin/tazobactam IVPB..   25 mL/Hr IV Intermittent (09-11-22 @ 11:41)   25 mL/Hr IV Intermittent (09-11-22 @ 05:27)        OTHER MEDS:   MEDICATIONS  (STANDING):  influenza   Vaccine 0.5 once  pantoprazole    Tablet 40 before breakfast  polyethylene glycol 3350 17 two times a day  senna 2 at bedtime  tamsulosin 0.4 at bedtime      VITALS:  Vital Signs Last 24 Hrs  T(F): 99.1 (09-12-22 @ 09:00), Max: 101.1 (09-11-22 @ 12:37)    Vital Signs Last 24 Hrs  HR: 101 (09-12-22 @ 09:00) (97 - 119)  BP: 125/76 (09-12-22 @ 09:00) (115/72 - 131/75)  RR: 18 (09-12-22 @ 09:00)  SpO2: 96% (09-12-22 @ 09:00) (95% - 100%)  Wt(kg): --    EXAM:    GA: NAD, AOx3  HEENT: oral cavity no lesion  CV: nl S1/S2, no RMG  Lungs: CTAB, No distress  Abd: BS+, soft, nontender, no rebounding pain  Ext: no edema  Neuro: No focal deficits  Skin: Intact  IV: no phlebitis    Labs:                        8.7    8.94  )-----------( 92       ( 12 Sep 2022 06:13 )             27.0     09-12    128<L>  |  96  |  13  ----------------------------<  111<H>  3.5   |  23  |  0.84    Ca    8.3<L>      12 Sep 2022 06:13  Phos  2.4     09-12  Mg     1.8     09-12    TPro  6.8  /  Alb  2.2<L>  /  TBili  2.5<H>  /  DBili  x   /  AST  23  /  ALT  14  /  AlkPhos  64  09-12      WBC Trend:  WBC Count: 8.94 (09-12-22 @ 06:13)  WBC Count: 10.41 (09-12-22 @ 00:19)  WBC Count: 7.83 (09-11-22 @ 07:37)      Auto Neutrophil #: 6.24 K/uL (09-02-22 @ 06:48)  Auto Neutrophil #: 4.91 K/uL (09-01-22 @ 06:52)  Auto Neutrophil #: 10.00 K/uL (08-31-22 @ 06:36)  Auto Neutrophil #: 5.42 K/uL (08-29-22 @ 06:33)  Auto Neutrophil #: 4.80 K/uL (08-28-22 @ 06:30)      Creatine Trend:  Creatinine, Serum: 0.84 (09-12)  Creatinine, Serum: 0.88 (09-12)  Creatinine, Serum: 0.82 (09-11)  Creatinine, Serum: 0.75 (09-11)      Liver Biochemical Testing Trend:  Alanine Aminotransferase (ALT/SGPT): 14 (09-12)  Alanine Aminotransferase (ALT/SGPT): 12 (09-12)  Alanine Aminotransferase (ALT/SGPT): 14 (09-11)  Alanine Aminotransferase (ALT/SGPT): 15 (09-11)  Alanine Aminotransferase (ALT/SGPT): 15 (09-02)  Aspartate Aminotransferase (AST/SGOT): 23 (09-12-22 @ 06:13)  Aspartate Aminotransferase (AST/SGOT): 26 (09-12-22 @ 00:19)  Aspartate Aminotransferase (AST/SGOT): 30 (09-11-22 @ 15:14)  Aspartate Aminotransferase (AST/SGOT): 31 (09-11-22 @ 07:37)  Aspartate Aminotransferase (AST/SGOT): 36 (09-02-22 @ 06:51)  Bilirubin Total, Serum: 2.5 (09-12)  Bilirubin Total, Serum: 2.5 (09-12)  Bilirubin Total, Serum: 2.8 (09-11)  Bilirubin Total, Serum: 2.9 (09-11)  Bilirubin Total, Serum: 2.8 (09-02)      Trend LDH    MICROBIOLOGY:    MRSA PCR Result.: NotDetec (04-12-22 @ 16:38)      Culture - Fungal, Body Fluid (collected 31 Aug 2022 16:44)  Source: Peritoneal Peritoneal Fluid  Preliminary Report:    No growth    Culture - Body Fluid with Gram Stain (collected 31 Aug 2022 16:44)  Source: Peritoneal Peritoneal Fluid  Final Report:    No growth at 5 days    Culture - Urine (collected 30 Aug 2022 11:44)  Source: Clean Catch Clean Catch (Midstream)  Final Report:    No growth    Culture - Blood (collected 30 Aug 2022 10:32)  Source: .Blood Blood-Peripheral  Final Report:    No Growth Final    Culture - Blood (collected 30 Aug 2022 10:32)  Source: .Blood Blood-Peripheral  Final Report:    No Growth Final    Culture - Fungal, Body Fluid (collected 25 Aug 2022 16:45)  Source: Peritoneal Peritoneal Fluid  Preliminary Report:    No growth    Culture - Body Fluid with Gram Stain (collected 25 Aug 2022 16:45)  Source: Peritoneal Peritoneal Fluid  Final Report:    No growth at 5 days    Culture - Fungal, Body Fluid (collected 16 Aug 2022 18:42)  Source: Peritoneal Peritoneal Fluid  Preliminary Report:    No growth    Culture - Body Fluid with Gram Stain (collected 16 Aug 2022 18:42)  Source: Peritoneal Peritoneal Fluid  Final Report:    Rare Escherichia coli    Few Streptococcus anginosus  Organism: Escherichia coli  Streptococcus anginosus  Organism: Streptococcus anginosus    Sensitivities:      -  Ceftriaxone: S <=0.25      -  Clindamycin: S <=0.06      -  Erythromycin: R [id number]      -  Levofloxacin: S 0.5      -  Penicillin: S <=0.03      -  Vancomycin: S 0.25      Method Type: ELANA  Organism: Escherichia coli    Sensitivities:      -  Amikacin: S <=16      -  Amoxicillin/Clavulanic Acid: S <=8/4      -  Ampicillin: S <=8 These ampicillin results predict results for amoxicillin      -  Ampicillin/Sulbactam: S <=4/2 Enterobacter, Klebsiella aerogenes, Citrobacter, and Serratia may develop resistance during prolonged therapy (3-4 days)      -  Aztreonam: S <=4      -  Cefazolin: S <=2 Enterobacter, Klebsiella aerogenes, Citrobacter, and Serratia may develop resistance during prolonged therapy (3-4 days)      -  Cefepime: S <=2      -  Cefoxitin: S <=8      -  Ceftriaxone: S <=1 Enterobacter, Klebsiella aerogenes, Citrobacter, and Serratia may develop resistance during prolonged therapy      -  Ciprofloxacin: R >2      -  Ertapenem: S <=0.5      -  Gentamicin: S <=2      -  Imipenem: S <=1      -  Levofloxacin: R >4      -  Meropenem: S <=1      -  Piperacillin/Tazobactam: S <=8      -  Tobramycin: S <=2      -  Trimethoprim/Sulfamethoxazole: S <=0.5/9.5      Method Type: ELANA    Culture - Blood (collected 16 Aug 2022 06:05)  Source: .Blood Blood-Peripheral  Final Report:    No Growth Final      HIV-1/2 Combo Result: Nonreact (08-12-22 @ 00:00)                      COVID-19 PCR: NotDetec (09-11-22 @ 13:49)  COVID-19 PCR: NotDetec (09-01-22 @ 07:05)  COVID-19 PCR: NotDetec (08-28-22 @ 11:26)  COVID-19 PCR: NotDetec (08-24-22 @ 15:58)  COVID-19 PCR: NotDetec (08-17-22 @ 13:06)  COVID-19 PCR: NotDetec (08-14-22 @ 06:42)  COVID-19 PCR: NotDetec (08-08-22 @ 01:02)    COVID-19 Chuy Domain AB Interp: Positive (08-11-22 @ 23:59)  COVID-19 Nucleocapsid ALICIA AB Interp: Positive (08-11-22 @ 23:59)      Blood Gas Venous - Lactate: 3.0 (09-11 @ 07:45)  Blood Gas Venous - Lactate: 2.8 (09-11 @ 07:45)    A1C with Estimated Average Glucose Result: 4.9 % (08-12-22 @ 00:35)      CSF:        RADIOLOGY:  imaging reviewed                     Patient is a 66y old  Male who presents with a chief complaint of Bleeding around cholecystostomy tube (12 Sep 2022 11:24)    HPI:    66 year old male with decompensated cirrhosis and cholangitis s/p ERCP with stent placement (4/2022) s/p stent removal on 7/20 (along sphincterotomy and stone extraction) now s/p cholecystostomy tube 8/9 with check 8/29 in correct location. He presents to OSH with bloody discharge from the wound site and abdominal distention.    The patient and daughter reports that yesterday they noticed bleeding around the tube site, minimal but new in onset. This morning they called the nurse who changed the dressing but after she left there was significantly more bloody discharge and he was advised to go to the ED. In [facility] ED the patietn was tachycardic to 122, had a fever to 101, and /70. he denied any fever or chills over the past ew days including today, as well as nause or vomiting, chest pain, or shortness of breath. He feels mildly distended but not unusually so, though his daughter notes his belly feeling tighter than prior.  He was treated with tylenol and IV fluids. he feels very full at the moment so does not think he can keep up his oral intake.   (10 Sep 2022 22:00)       REVIEW OF SYSTEMS  [  ] ROS unobtainable because:    [ x ] All other systems negative except as noted below    Constitutional:  [ ] fever [ ] chills  [ ] weight loss  [ ]night sweat  [ ]poor appetite/PO intake [ ]fatigue   Skin:  [ ] rash [ ] phlebitis	  Eyes: [ ] icterus [ ] pain  [ ] discharge	  ENMT: [ ] sore throat  [ ] thrush [ ] ulcers [ ] exudates [ ]anosmia  Respiratory: [ ] dyspnea [ ] hemoptysis [ ] cough [ ] sputum	  Cardiovascular:  [ ] chest pain [ ] palpitations [ ] edema	  Gastrointestinal:  [ ] nausea [ ] vomiting [ ] diarrhea [ ] constipation [ ] pain	  Genitourinary:  [ ] dysuria [ ] frequency [ ] hematuria [ ] discharge [ ] flank pain  [ ] incontinence  Musculoskeletal:  [ ] myalgias [ ] arthralgias [ ] arthritis  [ ] back pain  Neurological:  [ ] headache [ ] weakness [ ] seizures  [ ] confusion/altered mental status    prior hospital charts reviewed [V]  primary team notes reviewed [V]  other consultant notes reviewed [V]    PAST MEDICAL & SURGICAL HISTORY:  H/O acute cholangitis      2019 novel coronavirus disease (COVID-19)  12/24/2021  no hospitalization, no treatment      S/P ERCP  4/2022      H/O colonoscopy          SOCIAL HISTORY:  - Denied smoking/vaping/alcohol/recreational drug use    FAMILY HISTORY:      Allergies  No Known Allergies        ANTIMICROBIALS:  ampicillin/sulbactam  IVPB 3 every 6 hours  ampicillin/sulbactam  IVPB    fluconAZOLE IVPB    fluconAZOLE IVPB 400 every 24 hours      ANTIMICROBIALS (past 90 days):  MEDICATIONS  (STANDING):    ampicillin/sulbactam  IVPB   200 mL/Hr IV Intermittent (09-11-22 @ 22:08)    ampicillin/sulbactam  IVPB   200 mL/Hr IV Intermittent (09-12-22 @ 13:22)   200 mL/Hr IV Intermittent (09-12-22 @ 04:26)    fluconAZOLE IVPB   100 mL/Hr IV Intermittent (09-11-22 @ 22:07)    piperacillin/tazobactam IVPB.   200 mL/Hr IV Intermittent (09-10-22 @ 23:01)    piperacillin/tazobactam IVPB..   25 mL/Hr IV Intermittent (09-11-22 @ 11:41)   25 mL/Hr IV Intermittent (09-11-22 @ 05:27)        OTHER MEDS:   MEDICATIONS  (STANDING):  influenza   Vaccine 0.5 once  pantoprazole    Tablet 40 before breakfast  polyethylene glycol 3350 17 two times a day  senna 2 at bedtime  tamsulosin 0.4 at bedtime      VITALS:  Vital Signs Last 24 Hrs  T(F): 99.1 (09-12-22 @ 09:00), Max: 101.1 (09-11-22 @ 12:37)    Vital Signs Last 24 Hrs  HR: 101 (09-12-22 @ 09:00) (97 - 119)  BP: 125/76 (09-12-22 @ 09:00) (115/72 - 131/75)  RR: 18 (09-12-22 @ 09:00)  SpO2: 96% (09-12-22 @ 09:00) (95% - 100%)  Wt(kg): --    EXAM:    GA: NAD, AOx3  HEENT: oral cavity no lesion  CV: nl S1/S2, no RMG  Lungs: CTAB, No distress  Abd: BS+, soft, nontender, no rebounding pain  Ext: no edema  Neuro: No focal deficits  Skin: Intact  IV: no phlebitis    Labs:                        8.7    8.94  )-----------( 92       ( 12 Sep 2022 06:13 )             27.0     09-12    128<L>  |  96  |  13  ----------------------------<  111<H>  3.5   |  23  |  0.84    Ca    8.3<L>      12 Sep 2022 06:13  Phos  2.4     09-12  Mg     1.8     09-12    TPro  6.8  /  Alb  2.2<L>  /  TBili  2.5<H>  /  DBili  x   /  AST  23  /  ALT  14  /  AlkPhos  64  09-12      WBC Trend:  WBC Count: 8.94 (09-12-22 @ 06:13)  WBC Count: 10.41 (09-12-22 @ 00:19)  WBC Count: 7.83 (09-11-22 @ 07:37)      Auto Neutrophil #: 6.24 K/uL (09-02-22 @ 06:48)  Auto Neutrophil #: 4.91 K/uL (09-01-22 @ 06:52)  Auto Neutrophil #: 10.00 K/uL (08-31-22 @ 06:36)  Auto Neutrophil #: 5.42 K/uL (08-29-22 @ 06:33)  Auto Neutrophil #: 4.80 K/uL (08-28-22 @ 06:30)      Creatine Trend:  Creatinine, Serum: 0.84 (09-12)  Creatinine, Serum: 0.88 (09-12)  Creatinine, Serum: 0.82 (09-11)  Creatinine, Serum: 0.75 (09-11)      Liver Biochemical Testing Trend:  Alanine Aminotransferase (ALT/SGPT): 14 (09-12)  Alanine Aminotransferase (ALT/SGPT): 12 (09-12)  Alanine Aminotransferase (ALT/SGPT): 14 (09-11)  Alanine Aminotransferase (ALT/SGPT): 15 (09-11)  Alanine Aminotransferase (ALT/SGPT): 15 (09-02)  Aspartate Aminotransferase (AST/SGOT): 23 (09-12-22 @ 06:13)  Aspartate Aminotransferase (AST/SGOT): 26 (09-12-22 @ 00:19)  Aspartate Aminotransferase (AST/SGOT): 30 (09-11-22 @ 15:14)  Aspartate Aminotransferase (AST/SGOT): 31 (09-11-22 @ 07:37)  Aspartate Aminotransferase (AST/SGOT): 36 (09-02-22 @ 06:51)  Bilirubin Total, Serum: 2.5 (09-12)  Bilirubin Total, Serum: 2.5 (09-12)  Bilirubin Total, Serum: 2.8 (09-11)  Bilirubin Total, Serum: 2.9 (09-11)  Bilirubin Total, Serum: 2.8 (09-02)      Trend LDH    MICROBIOLOGY:    MRSA PCR Result.: NotDetec (04-12-22 @ 16:38)      Culture - Fungal, Body Fluid (collected 31 Aug 2022 16:44)  Source: Peritoneal Peritoneal Fluid  Preliminary Report:    No growth    Culture - Body Fluid with Gram Stain (collected 31 Aug 2022 16:44)  Source: Peritoneal Peritoneal Fluid  Final Report:    No growth at 5 days    Culture - Urine (collected 30 Aug 2022 11:44)  Source: Clean Catch Clean Catch (Midstream)  Final Report:    No growth    Culture - Blood (collected 30 Aug 2022 10:32)  Source: .Blood Blood-Peripheral  Final Report:    No Growth Final    Culture - Blood (collected 30 Aug 2022 10:32)  Source: .Blood Blood-Peripheral  Final Report:    No Growth Final    Culture - Fungal, Body Fluid (collected 25 Aug 2022 16:45)  Source: Peritoneal Peritoneal Fluid  Preliminary Report:    No growth    Culture - Body Fluid with Gram Stain (collected 25 Aug 2022 16:45)  Source: Peritoneal Peritoneal Fluid  Final Report:    No growth at 5 days    Culture - Fungal, Body Fluid (collected 16 Aug 2022 18:42)  Source: Peritoneal Peritoneal Fluid  Preliminary Report:    No growth    Culture - Body Fluid with Gram Stain (collected 16 Aug 2022 18:42)  Source: Peritoneal Peritoneal Fluid  Final Report:    Rare Escherichia coli    Few Streptococcus anginosus  Organism: Escherichia coli  Streptococcus anginosus  Organism: Streptococcus anginosus    Sensitivities:      -  Ceftriaxone: S <=0.25      -  Clindamycin: S <=0.06      -  Erythromycin: R [id number]      -  Levofloxacin: S 0.5      -  Penicillin: S <=0.03      -  Vancomycin: S 0.25      Method Type: ELANA  Organism: Escherichia coli    Sensitivities:      -  Amikacin: S <=16      -  Amoxicillin/Clavulanic Acid: S <=8/4      -  Ampicillin: S <=8 These ampicillin results predict results for amoxicillin      -  Ampicillin/Sulbactam: S <=4/2 Enterobacter, Klebsiella aerogenes, Citrobacter, and Serratia may develop resistance during prolonged therapy (3-4 days)      -  Aztreonam: S <=4      -  Cefazolin: S <=2 Enterobacter, Klebsiella aerogenes, Citrobacter, and Serratia may develop resistance during prolonged therapy (3-4 days)      -  Cefepime: S <=2      -  Cefoxitin: S <=8      -  Ceftriaxone: S <=1 Enterobacter, Klebsiella aerogenes, Citrobacter, and Serratia may develop resistance during prolonged therapy      -  Ciprofloxacin: R >2      -  Ertapenem: S <=0.5      -  Gentamicin: S <=2      -  Imipenem: S <=1      -  Levofloxacin: R >4      -  Meropenem: S <=1      -  Piperacillin/Tazobactam: S <=8      -  Tobramycin: S <=2      -  Trimethoprim/Sulfamethoxazole: S <=0.5/9.5      Method Type: ELANA    Culture - Blood (collected 16 Aug 2022 06:05)  Source: .Blood Blood-Peripheral  Final Report:    No Growth Final      HIV-1/2 Combo Result: Nonreact (08-12-22 @ 00:00)                      COVID-19 PCR: NotDetec (09-11-22 @ 13:49)  COVID-19 PCR: NotDetec (09-01-22 @ 07:05)  COVID-19 PCR: NotDetec (08-28-22 @ 11:26)  COVID-19 PCR: NotDetec (08-24-22 @ 15:58)  COVID-19 PCR: NotDetec (08-17-22 @ 13:06)  COVID-19 PCR: NotDetec (08-14-22 @ 06:42)  COVID-19 PCR: NotDetec (08-08-22 @ 01:02)    COVID-19 Chuy Domain AB Interp: Positive (08-11-22 @ 23:59)  COVID-19 Nucleocapsid ALICIA AB Interp: Positive (08-11-22 @ 23:59)      Blood Gas Venous - Lactate: 3.0 (09-11 @ 07:45)  Blood Gas Venous - Lactate: 2.8 (09-11 @ 07:45)    A1C with Estimated Average Glucose Result: 4.9 % (08-12-22 @ 00:35)      CSF:        RADIOLOGY:    <The imaging below has been reviewed and visualized by me independently. Findings as detailed in report below>    < from: US Abdomen Limited (08.31.22 @ 15:37) >  IMPRESSION:    Moderate volume of complex ascites, most prominent in the left lower   quadrant.    < end of copied text >

## 2022-09-13 ENCOUNTER — OUTPATIENT (OUTPATIENT)
Dept: OUTPATIENT SERVICES | Facility: HOSPITAL | Age: 67
LOS: 1 days | Discharge: HOME | End: 2022-09-13
Payer: MEDICARE

## 2022-09-13 ENCOUNTER — APPOINTMENT (OUTPATIENT)
Dept: HEPATOLOGY | Facility: CLINIC | Age: 67
End: 2022-09-13

## 2022-09-13 DIAGNOSIS — Z98.890 OTHER SPECIFIED POSTPROCEDURAL STATES: Chronic | ICD-10-CM

## 2022-09-13 LAB
ALBUMIN SERPL ELPH-MCNC: 1.9 G/DL — LOW (ref 3.3–5)
ALP SERPL-CCNC: 77 U/L — SIGNIFICANT CHANGE UP (ref 40–120)
ALT FLD-CCNC: 13 U/L — SIGNIFICANT CHANGE UP (ref 10–45)
ANION GAP SERPL CALC-SCNC: 9 MMOL/L — SIGNIFICANT CHANGE UP (ref 5–17)
AST SERPL-CCNC: 22 U/L — SIGNIFICANT CHANGE UP (ref 10–40)
BASOPHILS # BLD AUTO: 0.05 K/UL — SIGNIFICANT CHANGE UP (ref 0–0.2)
BASOPHILS NFR BLD AUTO: 0.5 % — SIGNIFICANT CHANGE UP (ref 0–2)
BILIRUB SERPL-MCNC: 2.1 MG/DL — HIGH (ref 0.2–1.2)
BUN SERPL-MCNC: 11 MG/DL — SIGNIFICANT CHANGE UP (ref 7–23)
CALCIUM SERPL-MCNC: 8.4 MG/DL — SIGNIFICANT CHANGE UP (ref 8.4–10.5)
CHLORIDE SERPL-SCNC: 96 MMOL/L — SIGNIFICANT CHANGE UP (ref 96–108)
CO2 SERPL-SCNC: 25 MMOL/L — SIGNIFICANT CHANGE UP (ref 22–31)
CREAT SERPL-MCNC: 0.68 MG/DL — SIGNIFICANT CHANGE UP (ref 0.5–1.3)
EGFR: 104 ML/MIN/1.73M2 — SIGNIFICANT CHANGE UP
EOSINOPHIL # BLD AUTO: 0.1 K/UL — SIGNIFICANT CHANGE UP (ref 0–0.5)
EOSINOPHIL NFR BLD AUTO: 1 % — SIGNIFICANT CHANGE UP (ref 0–6)
GLUCOSE SERPL-MCNC: 110 MG/DL — HIGH (ref 70–99)
HCT VFR BLD CALC: 26.1 % — LOW (ref 39–50)
HGB BLD-MCNC: 8.6 G/DL — LOW (ref 13–17)
IMM GRANULOCYTES NFR BLD AUTO: 5.4 % — HIGH (ref 0–1.5)
INR BLD: 2.34 RATIO — HIGH (ref 0.88–1.16)
LYMPHOCYTES # BLD AUTO: 1.84 K/UL — SIGNIFICANT CHANGE UP (ref 1–3.3)
LYMPHOCYTES # BLD AUTO: 18.8 % — SIGNIFICANT CHANGE UP (ref 13–44)
MAGNESIUM SERPL-MCNC: 1.9 MG/DL — SIGNIFICANT CHANGE UP (ref 1.6–2.6)
MCHC RBC-ENTMCNC: 30.4 PG — SIGNIFICANT CHANGE UP (ref 27–34)
MCHC RBC-ENTMCNC: 33 GM/DL — SIGNIFICANT CHANGE UP (ref 32–36)
MCV RBC AUTO: 92.2 FL — SIGNIFICANT CHANGE UP (ref 80–100)
MONOCYTES # BLD AUTO: 1.08 K/UL — HIGH (ref 0–0.9)
MONOCYTES NFR BLD AUTO: 11 % — SIGNIFICANT CHANGE UP (ref 2–14)
NEUTROPHILS # BLD AUTO: 6.21 K/UL — SIGNIFICANT CHANGE UP (ref 1.8–7.4)
NEUTROPHILS NFR BLD AUTO: 63.3 % — SIGNIFICANT CHANGE UP (ref 43–77)
NRBC # BLD: 0 /100 WBCS — SIGNIFICANT CHANGE UP (ref 0–0)
PHOSPHATE SERPL-MCNC: 2 MG/DL — LOW (ref 2.5–4.5)
PLATELET # BLD AUTO: 89 K/UL — LOW (ref 150–400)
POTASSIUM SERPL-MCNC: 3.2 MMOL/L — LOW (ref 3.5–5.3)
POTASSIUM SERPL-SCNC: 3.2 MMOL/L — LOW (ref 3.5–5.3)
PROT SERPL-MCNC: 6.6 G/DL — SIGNIFICANT CHANGE UP (ref 6–8.3)
PROTHROM AB SERPL-ACNC: 27.1 SEC — HIGH (ref 10.5–13.4)
RBC # BLD: 2.83 M/UL — LOW (ref 4.2–5.8)
RBC # FLD: 19.6 % — HIGH (ref 10.3–14.5)
SODIUM SERPL-SCNC: 130 MMOL/L — LOW (ref 135–145)
WBC # BLD: 9.81 K/UL — SIGNIFICANT CHANGE UP (ref 3.8–10.5)
WBC # FLD AUTO: 9.81 K/UL — SIGNIFICANT CHANGE UP (ref 3.8–10.5)

## 2022-09-13 PROCEDURE — 99232 SBSQ HOSP IP/OBS MODERATE 35: CPT | Mod: GC

## 2022-09-13 PROCEDURE — 74177 CT ABD & PELVIS W/CONTRAST: CPT | Mod: 26

## 2022-09-13 PROCEDURE — 99231 SBSQ HOSP IP/OBS SF/LOW 25: CPT

## 2022-09-13 RX ORDER — FUROSEMIDE 40 MG
40 TABLET ORAL DAILY
Refills: 0 | Status: DISCONTINUED | OUTPATIENT
Start: 2022-09-13 | End: 2022-09-20

## 2022-09-13 RX ORDER — SPIRONOLACTONE 25 MG/1
100 TABLET, FILM COATED ORAL DAILY
Refills: 0 | Status: DISCONTINUED | OUTPATIENT
Start: 2022-09-13 | End: 2022-09-20

## 2022-09-13 RX ADMIN — AMPICILLIN SODIUM AND SULBACTAM SODIUM 200 GRAM(S): 250; 125 INJECTION, POWDER, FOR SUSPENSION INTRAMUSCULAR; INTRAVENOUS at 12:05

## 2022-09-13 RX ADMIN — MAGNESIUM OXIDE 400 MG ORAL TABLET 400 MILLIGRAM(S): 241.3 TABLET ORAL at 17:27

## 2022-09-13 RX ADMIN — POLYETHYLENE GLYCOL 3350 17 GRAM(S): 17 POWDER, FOR SOLUTION ORAL at 06:12

## 2022-09-13 RX ADMIN — AMPICILLIN SODIUM AND SULBACTAM SODIUM 200 GRAM(S): 250; 125 INJECTION, POWDER, FOR SUSPENSION INTRAMUSCULAR; INTRAVENOUS at 23:53

## 2022-09-13 RX ADMIN — AMPICILLIN SODIUM AND SULBACTAM SODIUM 200 GRAM(S): 250; 125 INJECTION, POWDER, FOR SUSPENSION INTRAMUSCULAR; INTRAVENOUS at 00:46

## 2022-09-13 RX ADMIN — AMPICILLIN SODIUM AND SULBACTAM SODIUM 200 GRAM(S): 250; 125 INJECTION, POWDER, FOR SUSPENSION INTRAMUSCULAR; INTRAVENOUS at 06:11

## 2022-09-13 RX ADMIN — FLUCONAZOLE 100 MILLIGRAM(S): 150 TABLET ORAL at 20:13

## 2022-09-13 RX ADMIN — MAGNESIUM OXIDE 400 MG ORAL TABLET 400 MILLIGRAM(S): 241.3 TABLET ORAL at 08:57

## 2022-09-13 RX ADMIN — PANTOPRAZOLE SODIUM 40 MILLIGRAM(S): 20 TABLET, DELAYED RELEASE ORAL at 06:11

## 2022-09-13 RX ADMIN — AMPICILLIN SODIUM AND SULBACTAM SODIUM 200 GRAM(S): 250; 125 INJECTION, POWDER, FOR SUSPENSION INTRAMUSCULAR; INTRAVENOUS at 17:27

## 2022-09-13 RX ADMIN — MAGNESIUM OXIDE 400 MG ORAL TABLET 400 MILLIGRAM(S): 241.3 TABLET ORAL at 12:04

## 2022-09-13 RX ADMIN — SPIRONOLACTONE 100 MILLIGRAM(S): 25 TABLET, FILM COATED ORAL at 14:26

## 2022-09-13 RX ADMIN — TAMSULOSIN HYDROCHLORIDE 0.4 MILLIGRAM(S): 0.4 CAPSULE ORAL at 22:41

## 2022-09-13 RX ADMIN — Medication 40 MILLIGRAM(S): at 14:26

## 2022-09-13 RX ADMIN — Medication 1 TABLET(S): at 17:27

## 2022-09-13 NOTE — DIETITIAN NUTRITION RISK NOTIFICATION - PHYSICAL ASSESSMENT SCAPULA
Anesthesia Post Evaluation    Patient: Estelle ROMO    Procedure(s) Performed: Procedure(s) (LRB):  MYRINGOTOMY WITH INSERTION OF PE TUBES (Bilateral)  ADENOIDECTOMY (Bilateral)    Final Anesthesia Type: general  Patient location during evaluation: PACU  Patient participation: No - Unable to Participate, Other Reason (see comments) (child)  Level of consciousness: awake  Post-procedure vital signs: reviewed and stable  Pain management: adequate  Airway patency: patent  PONV status at discharge: No PONV  Anesthetic complications: no      Cardiovascular status: blood pressure returned to baseline  Respiratory status: unassisted, spontaneous ventilation and room air  Hydration status: euvolemic  Follow-up not needed.        Visit Vitals    BP (!) 117/55    Pulse 110    Temp 36.7 °C (98.1 °F) (Skin)    Resp 26    Wt 12.7 kg (28 lb)    SpO2 98%       Pain/Berkley Score: Pain Assessment Performed: Yes (3/29/2017 11:25 AM)  Pain Assessment Performed: Yes (3/29/2017 12:36 PM)  Presence of Pain: non-verbal indicators absent (3/29/2017 12:36 PM)  Berkley Score: 10 (3/29/2017 12:48 PM)      
moderate

## 2022-09-13 NOTE — DIETITIAN INITIAL EVALUATION ADULT - ORAL INTAKE PTA/DIET HISTORY
Pt reports decreased appetite and PO intake x ~1 month PTA due to changes in taste with medications. Confirms NKFA. Reports following a low salt diet at home. Pt reports not taking any vitamins or nutritional supplements PTA but states drinking Ensure during hospitalizations.

## 2022-09-13 NOTE — PROGRESS NOTE ADULT - SUBJECTIVE AND OBJECTIVE BOX
Interventional Radiology Follow-Up Note.     Patient seen and examined @ bedside between 8-9a    This is a 66y Male, 9/11 s/p perihepatic collection drainage, abdominal collection drainage, & cholecystostomy tube re-positioning in IR with Dr. Yin         Medication:     ampicillin/sulbactam  IVPB: (09-11)  ampicillin/sulbactam  IVPB: (09-13)  fluconAZOLE IVPB: (09-11)  fluconAZOLE IVPB: (09-12)  furosemide    Tablet: (09-13)  spironolactone: (09-13)  tamsulosin: (09-12)    Vitals:   T(F): 98.3, Max: 99.1 (09:00)  HR: 99  BP: 157/86  RR: 18  SpO2: 96%      Physical Exam:  General: Nontoxic, in NAD.  Abdomen: soft, NTND.  drains x 3        24hr output:   RLQ Drain (mL): 365 cc purulent/dark heme tinged,  Flushed with 5cc NS w/o difficulty. Dressing clean, dry, intact.     RUQ Drain (mL): 185 cc purulent/dark heme tinged,  Flushed with 5cc NS w/o difficulty. Dressing clean, dry, intact.     Perc rodney drain: 110 cc yellow,  Flushed with 5cc NS w/o difficulty. Dressing clean, dry, intact.          LABS:  Na: 130 (09-13 @ 06:32), 128 (09-12 @ 06:13), 125 (09-12 @ 00:19), 127 (09-11 @ 15:14), 125 (09-11 @ 07:37)  K: 3.2 (09-13 @ 06:32), 3.5 (09-12 @ 06:13), 3.5 (09-12 @ 00:19), 3.8 (09-11 @ 15:14), 3.9 (09-11 @ 07:37)  Cl: 96 (09-13 @ 06:32), 96 (09-12 @ 06:13), 93 (09-12 @ 00:19), 94 (09-11 @ 15:14), 93 (09-11 @ 07:37)  CO2: 25 (09-13 @ 06:32), 23 (09-12 @ 06:13), 23 (09-12 @ 00:19), 22 (09-11 @ 15:14), 24 (09-11 @ 07:37)  BUN: 11 (09-13 @ 06:32), 13 (09-12 @ 06:13), 13 (09-12 @ 00:19), 12 (09-11 @ 15:14), 11 (09-11 @ 07:37)  Cr: 0.68 (09-13 @ 06:32), 0.84 (09-12 @ 06:13), 0.88 (09-12 @ 00:19), 0.82 (09-11 @ 15:14), 0.75 (09-11 @ 07:37)  Glu: 110(09-13 @ 06:32), 111(09-12 @ 06:13), 156(09-12 @ 00:19), 67(09-11 @ 15:14), 79(09-11 @ 07:37)    Hgb: 8.6 (09-13 @ 06:29), 8.7 (09-12 @ 06:13), 7.6 (09-12 @ 00:19), 8.7 (09-11 @ 07:37)  Hct: 26.1 (09-13 @ 06:29), 27.0 (09-12 @ 06:13), 23.2 (09-12 @ 00:19), 26.2 (09-11 @ 07:37)  WBC: 9.81 (09-13 @ 06:29), 8.94 (09-12 @ 06:13), 10.41 (09-12 @ 00:19), 7.83 (09-11 @ 07:37)  Plt: 89 (09-13 @ 06:29), 92 (09-12 @ 06:13), 101 (09-12 @ 00:19), 107 (09-11 @ 07:37)    INR: 2.34 09-13-22 @ 06:32, 2.46 09-12-22 @ 06:13, 2.52 09-12-22 @ 00:19, 2.49 09-11-22 @ 07:37  PTT: 39.1 09-12-22 @ 00:19, 41.8 09-11-22 @ 07:37          LIVER FUNCTIONS - ( 13 Sep 2022 06:32 )  Alb: 1.9 g/dL / Pro: 6.6 g/dL / ALK PHOS: 77 U/L / ALT: 13 U/L / AST: 22 U/L / GGT: x               Bilirubin Total, Serum: 2.1 mg/dL (09-13-22 @ 06:32)    Aspartate Aminotransferase (AST/SGOT): 22 U/L (09-13-22 @ 06:32)  Alanine Aminotransferase (ALT/SGPT): 13 U/L (09-13-22 @ 06:32)  Aspartate Aminotransferase (AST/SGOT): 23 U/L (09-12-22 @ 06:13)  Alanine Aminotransferase (ALT/SGPT): 14 U/L (09-12-22 @ 06:13)  Aspartate Aminotransferase (AST/SGOT): 26 U/L (09-12-22 @ 00:19)  Alanine Aminotransferase (ALT/SGPT): 12 U/L (09-12-22 @ 00:19)      Bilirubin Total, Serum: 2.1 mg/dL *H* (09-13-22 @ 06:32)  Bilirubin Total, Serum: 2.5 mg/dL *H* (09-12-22 @ 06:13)  Bilirubin Total, Serum: 2.5 mg/dL *H* (09-12-22 @ 00:19)  Bilirubin Total, Serum: 2.8 mg/dL *H* (09-11-22 @ 15:14)  Bilirubin Total, Serum: 2.9 mg/dL *H* (09-11-22 @ 07:37)          Assessment/Plan:  66 year old male with decompensated cirrhosis and cholangitis s/p ERCP with stent placement (4/2022) s/p stent removal on 7/20 (along sphincterotomy and stone extraction) now s/p cholecystostomy tube 8/9 with check 8/29 in correct location. He presents to OSH with bloody discharge from the wound site and abdominal distention found to have potentially dislodged IR cholecystostomy tube and ascites.  Recent OSH imaging showing multiple large abdominal collections.   9/11 IR  s/p perihepatic collection drain, abdominal collection drain, & perc rodney re-positioning       -  Flush drains with 5cc NS daily forward only; DO NOT aspirate  -  Change dressing q3 days or when dressing is saturated.  -  Monitor h/h; transfuse as needed  -  Trend vs/labs  - Continue global management per primary team  -  If the patient is d/c home with drainage catheter, pt can make an appointment with IR by calling the IR booking office at [phone]; recommend IR follow in 10-14days for tube evaluation.  -  if drain outputs decrease to 10cc/24hr , recommend ct non contrast abd pelv for eval and contact IR.   -  Pt will benefit from VNS service to help with drainage catheter care; Pt should continue same drainage catheter care as an outpatient.  -  IR will follow        Please call IR at [phone] with any questions, concerns, or issues regarding above.

## 2022-09-13 NOTE — DIETITIAN INITIAL EVALUATION ADULT - CONTINUE CURRENT NUTRITION CARE PLAN
1. Continue low salt diet. Will continue to monitor as able and adjust as needed. 2. Recommend Ensure Enlive 3xday to optimize kcal and protein intake. Spoke to team./yes

## 2022-09-13 NOTE — DIETITIAN INITIAL EVALUATION ADULT - REASON FOR ADMISSION
Pt 65 y/o M with PMH as per chart: "covid-19, decompensated cirrhosis and cholangitis S/P ERCP with stent placement (4/2022), S/P stent removal on (07/20), S/P cholecystostomy tube (08/09) with check (08/29) in correct location. He presented to OSH with bloody discharge from the wound site and abdominal distention,  found to have potentially dislodged IR cholecystostomy tube and moderate ascites, S/P perihepatic collection drainage, abdominal collection drainage, cholecystostomy tube re-positioning (09/11)."     Currently Listed for OLT. MELD-Na: 26, (09/12). Pt seen for nutrition liver transplant evaluation on previous admission (08/12/2022).   Pt seen by PT for frailty assessments: (09/01/2022) pt scored 4.42 pre- frail -> (09/12) pt scored 4.69 frail.

## 2022-09-13 NOTE — DIETITIAN INITIAL EVALUATION ADULT - ADD RECOMMEND
1. Will continue to monitor PO intake, weight, labs, skin, GI status, and diet as able. 2. Encourage PO intake and honor food preferences. 3. Recommend Multivitamin if medically feasible to optimize nutrient intake. 4. Thoroughly reviewed importance of low salt diet and adequate kcal and protein intake with recommendations to optimize - made aware RD remains available. 5. Malnutrition notification placed in chart.

## 2022-09-13 NOTE — PROGRESS NOTE ADULT - ASSESSMENT
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 Currently Listed for OLT     #Decompensated ETOH/PARNELL Cirrhosis, listed for OLT  #Abdominal collection, infected hematoma s/p IR drainage x 2 with C tube repositioning   #Hyponatremia  MELD-Na: 24, 9/13  - HE: none currently  - EV: normal esophagus on ERCP from 7/2022  - Ascites: moderate 8/31 US  - SBP: paracentesis 8/9/2022 reveals likely secondary bacterial peritonitis from suspected gallbladder pathology, +SBP on para 8/31  - HCC:  No lesions on CT 8/26      Plan  - ASA 81 daily  - Flush drains BID  - CT AP  - Daily CMP, CBC, coags  - f/u BCx negative  - Fluconazole, Unasyn  - Hyponatremia: 1L fluid restricted  - Holding lasix 80/aldactone 200mg  - Physical therapy  [x] Psychosocial: cleared pending RPP  [x ] Infectious  [x] Cardiology:        Cardiac cath: n/a       Stress test: negative noninvasive stress test on 8/19/2022 for inducible ischemia  [ ] Colonoscopy: needed, f/u records from outside GI colonoscopy reports  [x] Prostate: Prostate Specific Antigen: negative at 2.21 ng/mL on 8/12/2022  [x] Dental  [x] PT/Frailty    Preliminary note until signed by Attending.    Thank you for involving us in this patient's care.    Kary Tena MD  Gastroenterology/Hepatology Fellow, PGY-VI    NON-URGENT CONSULTS:  Please email [email or web address]  [email or web address]  AT NIGHT AND ON WEEKENDS:  Contact on-call GI fellow via answering service ([phone]) from 5pm-8am and on weekends/holidays  MONDAY-FRIDAY 8AM-5PM:  Pager# [phone] ([facility])  GI Phone# [phone] ([facility])

## 2022-09-13 NOTE — DIETITIAN INITIAL EVALUATION ADULT - REASON INDICATOR FOR ASSESSMENT
Nutrition consult received for assessment.  Information obtained from: medical record, previous RD notes, transplant team during rounds, and pt.

## 2022-09-13 NOTE — DIETITIAN INITIAL EVALUATION ADULT - ENERGY INTAKE
Fair (50-75%) Pt reports improving appetite and PO intake in house. Agreed to Ensure Enlive. Denies difficulty chewing/swallowing. Denies nausea or vomiting. Reports loose BM due to lactulose, last today (09/13).

## 2022-09-13 NOTE — DIETITIAN INITIAL EVALUATION ADULT - PERTINENT LABORATORY DATA
(09/13) Na 130, K+ 3.2, , Phos 2.0, TPro  6.6  /  Alb  1.9<L>  /  TBili  2.1<H>  /  DBili  x   /  AST  22  /  ALT  13  /  AlkPhos  77     A1C with Estimated Average Glucose Result: 4.9 % (08-12-22 @ 00:35)

## 2022-09-13 NOTE — DIETITIAN INITIAL EVALUATION ADULT - OTHER INFO
Pt reports 12 pounds weight loss x ~1 month due to decreased PO intake, from 155 to 143 pounds; followed by weight gain due to fluid accumulation. Weight as per previous RD note  (08/11/2022) 156.6 pounds with edema. Weight as per flow sheets (09/11) 160 pounds -?accuracy as pt with edema and ascites.     Thoroughly reviewed importance of low salt diet and adequate kcal and protein intake with recommendations to optimize. Discussed the benefits of low salt diet, foods high in salt and amount of salt recommended per day. Recommended small frequent meals by ordering nutrient-dense snacks and leaving non-perishable food away from tray for later consumption during the day or between meals, to start with protein, and sips of supplement throughout the day; reviewed foods with protein and menu order procedures in hospital. All questions answered - made aware RD remains available.

## 2022-09-14 LAB
ALBUMIN SERPL ELPH-MCNC: 2.2 G/DL — LOW (ref 3.3–5)
ALP SERPL-CCNC: 99 U/L — SIGNIFICANT CHANGE UP (ref 40–120)
ALT FLD-CCNC: 14 U/L — SIGNIFICANT CHANGE UP (ref 10–45)
ANION GAP SERPL CALC-SCNC: 8 MMOL/L — SIGNIFICANT CHANGE UP (ref 5–17)
AST SERPL-CCNC: 24 U/L — SIGNIFICANT CHANGE UP (ref 10–40)
BASOPHILS # BLD AUTO: 0.08 K/UL — SIGNIFICANT CHANGE UP (ref 0–0.2)
BASOPHILS NFR BLD AUTO: 0.8 % — SIGNIFICANT CHANGE UP (ref 0–2)
BILIRUB SERPL-MCNC: 2.2 MG/DL — HIGH (ref 0.2–1.2)
BUN SERPL-MCNC: 8 MG/DL — SIGNIFICANT CHANGE UP (ref 7–23)
CALCIUM SERPL-MCNC: 8.4 MG/DL — SIGNIFICANT CHANGE UP (ref 8.4–10.5)
CHLORIDE SERPL-SCNC: 96 MMOL/L — SIGNIFICANT CHANGE UP (ref 96–108)
CO2 SERPL-SCNC: 26 MMOL/L — SIGNIFICANT CHANGE UP (ref 22–31)
CREAT SERPL-MCNC: 0.62 MG/DL — SIGNIFICANT CHANGE UP (ref 0.5–1.3)
CULTURE RESULTS: SIGNIFICANT CHANGE UP
EGFR: 107 ML/MIN/1.73M2 — SIGNIFICANT CHANGE UP
EOSINOPHIL # BLD AUTO: 0.08 K/UL — SIGNIFICANT CHANGE UP (ref 0–0.5)
EOSINOPHIL NFR BLD AUTO: 0.8 % — SIGNIFICANT CHANGE UP (ref 0–6)
GLUCOSE SERPL-MCNC: 107 MG/DL — HIGH (ref 70–99)
GRAM STN FLD: SIGNIFICANT CHANGE UP
HCT VFR BLD CALC: 29.3 % — LOW (ref 39–50)
HGB BLD-MCNC: 9.8 G/DL — LOW (ref 13–17)
IMM GRANULOCYTES NFR BLD AUTO: 4.9 % — HIGH (ref 0–1.5)
INR BLD: 2.17 RATIO — HIGH (ref 0.88–1.16)
LYMPHOCYTES # BLD AUTO: 2.54 K/UL — SIGNIFICANT CHANGE UP (ref 1–3.3)
LYMPHOCYTES # BLD AUTO: 25.7 % — SIGNIFICANT CHANGE UP (ref 13–44)
MAGNESIUM SERPL-MCNC: 1.9 MG/DL — SIGNIFICANT CHANGE UP (ref 1.6–2.6)
MCHC RBC-ENTMCNC: 31.1 PG — SIGNIFICANT CHANGE UP (ref 27–34)
MCHC RBC-ENTMCNC: 33.4 GM/DL — SIGNIFICANT CHANGE UP (ref 32–36)
MCV RBC AUTO: 93 FL — SIGNIFICANT CHANGE UP (ref 80–100)
METHOD TYPE: SIGNIFICANT CHANGE UP
MONOCYTES # BLD AUTO: 1.1 K/UL — HIGH (ref 0–0.9)
MONOCYTES NFR BLD AUTO: 11.1 % — SIGNIFICANT CHANGE UP (ref 2–14)
NEUTROPHILS # BLD AUTO: 5.59 K/UL — SIGNIFICANT CHANGE UP (ref 1.8–7.4)
NEUTROPHILS NFR BLD AUTO: 56.7 % — SIGNIFICANT CHANGE UP (ref 43–77)
NRBC # BLD: 0 /100 WBCS — SIGNIFICANT CHANGE UP (ref 0–0)
PHOSPHATE SERPL-MCNC: 2 MG/DL — LOW (ref 2.5–4.5)
PLATELET # BLD AUTO: 97 K/UL — LOW (ref 150–400)
POTASSIUM SERPL-MCNC: 3.3 MMOL/L — LOW (ref 3.5–5.3)
POTASSIUM SERPL-SCNC: 3.3 MMOL/L — LOW (ref 3.5–5.3)
PROT SERPL-MCNC: 7.3 G/DL — SIGNIFICANT CHANGE UP (ref 6–8.3)
PROTHROM AB SERPL-ACNC: 25.2 SEC — HIGH (ref 10.5–13.4)
RBC # BLD: 3.15 M/UL — LOW (ref 4.2–5.8)
RBC # FLD: 19.5 % — HIGH (ref 10.3–14.5)
S MARCESCENS DNA BLD POS NAA+NON-PROBE: SIGNIFICANT CHANGE UP
SODIUM SERPL-SCNC: 130 MMOL/L — LOW (ref 135–145)
SPECIMEN SOURCE: SIGNIFICANT CHANGE UP
WBC # BLD: 9.87 K/UL — SIGNIFICANT CHANGE UP (ref 3.8–10.5)
WBC # FLD AUTO: 9.87 K/UL — SIGNIFICANT CHANGE UP (ref 3.8–10.5)

## 2022-09-14 PROCEDURE — 99232 SBSQ HOSP IP/OBS MODERATE 35: CPT | Mod: GC

## 2022-09-14 PROCEDURE — 99232 SBSQ HOSP IP/OBS MODERATE 35: CPT

## 2022-09-14 PROCEDURE — 99233 SBSQ HOSP IP/OBS HIGH 50: CPT

## 2022-09-14 RX ORDER — POTASSIUM CHLORIDE 20 MEQ
40 PACKET (EA) ORAL ONCE
Refills: 0 | Status: COMPLETED | OUTPATIENT
Start: 2022-09-14 | End: 2022-09-14

## 2022-09-14 RX ORDER — CEFEPIME 1 G/1
1000 INJECTION, POWDER, FOR SOLUTION INTRAMUSCULAR; INTRAVENOUS EVERY 8 HOURS
Refills: 0 | Status: DISCONTINUED | OUTPATIENT
Start: 2022-09-14 | End: 2022-09-26

## 2022-09-14 RX ORDER — CEFEPIME 1 G/1
INJECTION, POWDER, FOR SOLUTION INTRAMUSCULAR; INTRAVENOUS
Refills: 0 | Status: DISCONTINUED | OUTPATIENT
Start: 2022-09-14 | End: 2022-09-26

## 2022-09-14 RX ORDER — SODIUM,POTASSIUM PHOSPHATES 278-250MG
1 POWDER IN PACKET (EA) ORAL
Refills: 0 | Status: COMPLETED | OUTPATIENT
Start: 2022-09-14 | End: 2022-09-14

## 2022-09-14 RX ORDER — METRONIDAZOLE 500 MG
500 TABLET ORAL
Refills: 0 | Status: DISCONTINUED | OUTPATIENT
Start: 2022-09-14 | End: 2022-09-26

## 2022-09-14 RX ORDER — CEFEPIME 1 G/1
1000 INJECTION, POWDER, FOR SOLUTION INTRAMUSCULAR; INTRAVENOUS ONCE
Refills: 0 | Status: COMPLETED | OUTPATIENT
Start: 2022-09-14 | End: 2022-09-14

## 2022-09-14 RX ADMIN — FLUCONAZOLE 100 MILLIGRAM(S): 150 TABLET ORAL at 22:47

## 2022-09-14 RX ADMIN — Medication 40 MILLIGRAM(S): at 05:44

## 2022-09-14 RX ADMIN — Medication 1 PACKET(S): at 12:27

## 2022-09-14 RX ADMIN — MAGNESIUM OXIDE 400 MG ORAL TABLET 400 MILLIGRAM(S): 241.3 TABLET ORAL at 18:55

## 2022-09-14 RX ADMIN — CEFEPIME 100 MILLIGRAM(S): 1 INJECTION, POWDER, FOR SOLUTION INTRAMUSCULAR; INTRAVENOUS at 22:11

## 2022-09-14 RX ADMIN — TAMSULOSIN HYDROCHLORIDE 0.4 MILLIGRAM(S): 0.4 CAPSULE ORAL at 22:10

## 2022-09-14 RX ADMIN — SPIRONOLACTONE 100 MILLIGRAM(S): 25 TABLET, FILM COATED ORAL at 05:43

## 2022-09-14 RX ADMIN — AMPICILLIN SODIUM AND SULBACTAM SODIUM 200 GRAM(S): 250; 125 INJECTION, POWDER, FOR SUSPENSION INTRAMUSCULAR; INTRAVENOUS at 12:31

## 2022-09-14 RX ADMIN — MAGNESIUM OXIDE 400 MG ORAL TABLET 400 MILLIGRAM(S): 241.3 TABLET ORAL at 08:52

## 2022-09-14 RX ADMIN — AMPICILLIN SODIUM AND SULBACTAM SODIUM 200 GRAM(S): 250; 125 INJECTION, POWDER, FOR SUSPENSION INTRAMUSCULAR; INTRAVENOUS at 05:41

## 2022-09-14 RX ADMIN — Medication 1 TABLET(S): at 12:31

## 2022-09-14 RX ADMIN — MAGNESIUM OXIDE 400 MG ORAL TABLET 400 MILLIGRAM(S): 241.3 TABLET ORAL at 12:29

## 2022-09-14 RX ADMIN — CEFEPIME 100 MILLIGRAM(S): 1 INJECTION, POWDER, FOR SOLUTION INTRAMUSCULAR; INTRAVENOUS at 18:54

## 2022-09-14 RX ADMIN — Medication 40 MILLIEQUIVALENT(S): at 12:26

## 2022-09-14 RX ADMIN — Medication 100 MILLIGRAM(S): at 19:27

## 2022-09-14 RX ADMIN — PANTOPRAZOLE SODIUM 40 MILLIGRAM(S): 20 TABLET, DELAYED RELEASE ORAL at 05:41

## 2022-09-14 RX ADMIN — Medication 1 PACKET(S): at 18:55

## 2022-09-14 NOTE — PROGRESS NOTE ADULT - ASSESSMENT
66 year old male PMH cholangitis/cholecystitis (s/p ERCP w/ biliary stent placement 04/2022) and recently diagnosed etOH cirrhosis, had RUQ pain following biliary stent removal on 7/20/22, then  Found to have a distended GB is s/p Choley tube placement 8/9  now admitted with concerns for multiple large abdominal collections and choley tube repositioning. ID consulted for antibiotic management.    On 9/11 s/p perihepatic collection drainage, abdominal collection drainage, cholecystostomy tube re-positioning  Abdominal Drainage 1 (9/11) Moderate E coli (now Augmentin resistant)  Abdominal Drainage 2 (9/11) Moderate E coli (now Augmentin resistant)  Blood Cultures (9/11) 1/4 Serratia (based on BCID PCR)    CT A/P (9/13) Near complete resolution of right upper quadrant collection and Large anterior peritoneal collection is decreased in size.    #Serratia Bacteremia, Peritonitis, Positive Culture Finding (Ascites)  --Stop Unasyn  --Recommend Cefepime 1g IV Q8H  --Recommend Metronidazole 500 mg PO/IV Q12H  --Continue to follow CBC with diff  --Continue to follow temperature curve  --Follow up on preliminary blood cultures    #Pre-OLT  ID Clearance for OLT pending resolution of peritonitis     #Late Latent Syphilis  Denies knowledge of preceding diagnosis  No preceding treatment  Will treat as late latent syphilis  s/p IM Benzathine Penicillin 2.4 million units Q7Days x 3 doses    #Encounter to Vaccinate Patient  COVID19: 3/5/21, 3/26/21 - Would benefit from booster, anticipate waiting for bivalent COVID19 vaccine  Influenza: Will require with next season  Pneumococcal: PCV20 9/1/22  HBV: Heplisav dose 1 9/1/22. Dose 2 in 1 month.   HAV: Immune  Tdap: Will require Tdap  Shinglex: Will require Shingrix    I will continue to follow. Please feel free to contact me with any further questions.    Bladimir Nix M.D.  [facility] Division of Infectious Disease  8AM-5PM Monday - Friday: Available on [employer] Teams  After Hours and Holidays (or if no response on [employer] Teams): Please contact the Infectious Diseases Office at [phone]    The above assessment and plan were discussed with Bruna, transplant surgery PA

## 2022-09-14 NOTE — PROGRESS NOTE ADULT - SUBJECTIVE AND OBJECTIVE BOX
Interventional Radiology Follow-Up Note    Patient seen and examined @ bedside    This is a 66 year old Male, s/p perihepatic collection drainage, abdominal collection drainage, & cholecystostomy tube re-positioning on 9/11/22 in IR with Dr. Yin    No complaint offered.    Medication:  ampicillin/sulbactam  IVPB: (09-14)  fluconAZOLE IVPB: (09-13)  furosemide    Tablet: (09-14)  spironolactone: (09-14)  tamsulosin: (09-13)    Vitals:   T(F): 98.2, Max: 99.6 (21:00)  HR: 94  BP: 143/86  RR: 18  SpO2: 97%    Physical Exam:  General: Nontoxic, in NAD.  Abdomen: soft, NTND.  drains x 3   Drain Device: Drains intact attached to gravity bags.  Flushed with 5cc NS w/o difficulty. Dressing clean, dry, intact       24hr output:   RLQ Drain (mL): 510 cc purulent/dark heme tinged,  Flushed with 5cc NS w/o difficulty. Dressing clean, dry, intact    RUQ Drain (mL): 45 cc purulent/dark heme tinged,  Flushed with 5cc NS w/o difficulty. Dressing clean, dry, intact    Perc rodney drain: 90 cc yellow,  Flushed with 5cc NS w/o difficulty. Dressing clean, dry, intact.     LABS:  Na: 130 (09-14 @ 06:43), 130 (09-13 @ 06:32), 128 (09-12 @ 06:13), 125 (09-12 @ 00:19), 127 (09-11 @ 15:14)  K: 3.3 (09-14 @ 06:43), 3.2 (09-13 @ 06:32), 3.5 (09-12 @ 06:13), 3.5 (09-12 @ 00:19), 3.8 (09-11 @ 15:14)  Cl: 96 (09-14 @ 06:43), 96 (09-13 @ 06:32), 96 (09-12 @ 06:13), 93 (09-12 @ 00:19), 94 (09-11 @ 15:14)  CO2: 26 (09-14 @ 06:43), 25 (09-13 @ 06:32), 23 (09-12 @ 06:13), 23 (09-12 @ 00:19), 22 (09-11 @ 15:14)  BUN: 8 (09-14 @ 06:43), 11 (09-13 @ 06:32), 13 (09-12 @ 06:13), 13 (09-12 @ 00:19), 12 (09-11 @ 15:14)  Cr: 0.62 (09-14 @ 06:43), 0.68 (09-13 @ 06:32), 0.84 (09-12 @ 06:13), 0.88 (09-12 @ 00:19), 0.82 (09-11 @ 15:14)  Glu: 107(09-14 @ 06:43), 110(09-13 @ 06:32), 111(09-12 @ 06:13), 156(09-12 @ 00:19), 67(09-11 @ 15:14)  Hgb: 9.8 (09-14 @ 06:53), 8.6 (09-13 @ 06:29), 8.7 (09-12 @ 06:13), 7.6 (09-12 @ 00:19)  Hct: 29.3 (09-14 @ 06:53), 26.1 (09-13 @ 06:29), 27.0 (09-12 @ 06:13), 23.2 (09-12 @ 00:19)  WBC: 9.87 (09-14 @ 06:53), 9.81 (09-13 @ 06:29), 8.94 (09-12 @ 06:13), 10.41 (09-12 @ 00:19)  Plt: 97 (09-14 @ 06:53), 89 (09-13 @ 06:29), 92 (09-12 @ 06:13), 101 (09-12 @ 00:19)  INR: 2.17 09-14-22 @ 06:53, 2.34 09-13-22 @ 06:32, 2.46 09-12-22 @ 06:13, 2.52 09-12-22 @ 00:19  PTT: 39.1 09-12-22 @ 00:19    LIVER FUNCTIONS - ( 14 Sep 2022 06:43 )  Alb: 2.2 g/dL / Pro: 7.3 g/dL / ALK PHOS: 99 U/L / ALT: 14 U/L / AST: 24 U/L / GGT: x         Bilirubin Total, Serum: 2.2 mg/dL (09-14-22 @ 06:43)  Aspartate Aminotransferase (AST/SGOT): 24 U/L (09-14-22 @ 06:43)  Alanine Aminotransferase (ALT/SGPT): 14 U/L (09-14-22 @ 06:43)  Aspartate Aminotransferase (AST/SGOT): 22 U/L (09-13-22 @ 06:32)  Alanine Aminotransferase (ALT/SGPT): 13 U/L (09-13-22 @ 06:32)  Bilirubin Total, Serum: 2.2 mg/dL *H* (09-14-22 @ 06:43)  Bilirubin Total, Serum: 2.1 mg/dL *H* (09-13-22 @ 06:32)  Bilirubin Total, Serum: 2.5 mg/dL *H* (09-12-22 @ 06:13)  Bilirubin Total, Serum: 2.5 mg/dL *H* (09-12-22 @ 00:19)  Bilirubin Total, Serum: 2.8 mg/dL *H* (09-11-22 @ 15:14)  Bilirubin Total, Serum: 2.9 mg/dL *H* (09-11-22 @ 07:37)      Assessment/Plan:  66 year old male with decompensated cirrhosis and cholangitis s/p ERCP with stent placement (4/2022) s/p stent removal on 7/20 (along sphincterotomy and stone extraction) now s/p cholecystostomy tube 8/9 with check 8/29 in correct location. He presents to OSH with bloody discharge from the wound site and abdominal distention found to have potentially dislodged IR cholecystostomy tube and ascites.  Recent OSH imaging showing multiple large abdominal collections.   9/11 IR  s/p perihepatic collection drain, abdominal collection drain, & perc rodney re-positioning       -  Flush drains with 5cc NS daily forward only; DO NOT aspirate  -  Change dressing q3 days or when dressing is saturated.  -  Monitor h/h; transfuse as needed  -  Trend vs/labs  - Continue global management per primary team, IR will follow  -  If the patient is d/c home with drainage catheter, pt can make an appointment with IR by calling the IR booking office at [phone]; recommend IR follow in 10-14  Days for tube evaluation.  -  If drain outputs decrease to 10cc/24hr , recommend ct non contrast abd pelvis for eval and contact IR.   -  Pt will benefit from VNS service to help with drainage catheter care; Pt should continue same drainage catheter care as an outpatient.    Please call IR at [phone] with any questions, concerns, or issues regarding above.      Also available on TEAMS

## 2022-09-14 NOTE — PROGRESS NOTE ADULT - SUBJECTIVE AND OBJECTIVE BOX
Chief Complaint:  Patient is a 66y old  Male who presents with a chief complaint of Bleeding around cholecystostomy tube (14 Sep 2022 10:11)       Interval Events:   Remains afebrile and HDS  Augmentin resistant E coli in abscess fluid  CT showing improvement in fluid collections    Hospital Medications:  ampicillin/sulbactam  IVPB 3 Gram(s) IV Intermittent every 6 hours  ampicillin/sulbactam  IVPB      fluconAZOLE IVPB      fluconAZOLE IVPB 400 milliGRAM(s) IV Intermittent every 24 hours  furosemide    Tablet 40 milliGRAM(s) Oral daily  influenza   Vaccine 0.5 milliLiter(s) IntraMuscular once  magnesium oxide 400 milliGRAM(s) Oral three times a day with meals  multivitamin 1 Tablet(s) Oral daily  pantoprazole    Tablet 40 milliGRAM(s) Oral before breakfast  polyethylene glycol 3350 17 Gram(s) Oral two times a day  potassium chloride    Tablet ER 40 milliEquivalent(s) Oral once  potassium phosphate / sodium phosphate Powder (PHOS-NaK) 1 Packet(s) Oral <User Schedule>  senna 2 Tablet(s) Oral at bedtime  spironolactone 100 milliGRAM(s) Oral daily  tamsulosin 0.4 milliGRAM(s) Oral at bedtime      ROS:   Complete and normal except as mentioned above.    PHYSICAL EXAM:   Vital Signs:  Vital Signs Last 24 Hrs  T(C): 36.8 (14 Sep 2022 09:00), Max: 37.6 (13 Sep 2022 21:00)  T(F): 98.2 (14 Sep 2022 09:00), Max: 99.6 (13 Sep 2022 21:00)  HR: 94 (14 Sep 2022 09:00) (94 - 99)  BP: 143/86 (14 Sep 2022 09:00) (106/62 - 157/86)  BP(mean): --  RR: 18 (14 Sep 2022 09:00) (18 - 18)  SpO2: 97% (14 Sep 2022 09:00) (94% - 97%)    Parameters below as of 14 Sep 2022 09:00  Patient On (Oxygen Delivery Method): room air      Daily     Daily     Constitutional: Well developed / well nourished  HEENT: no scleral icterus  Respiratory: normal respiratory effort  Cardiovascular: regular rate  Gastrointestinal: Soft abdomen, mild distended, IR drains with sanguinous purulent substance, c tube to bag  Genitourinary: Voiding spontaneously  Extremities: SCD's in place and working bilaterally  Neurological: A&O x3  Skin: no rashes, ulcerations, lesions    LABS: reviewed                        9.8    9.87  )-----------( 97       ( 14 Sep 2022 06:53 )             29.3     09-14    130<L>  |  96  |  8   ----------------------------<  107<H>  3.3<L>   |  26  |  0.62    Ca    8.4      14 Sep 2022 06:43  Phos  2.0     09-14  Mg     1.9     09-14    TPro  7.3  /  Alb  2.2<L>  /  TBili  2.2<H>  /  DBili  x   /  AST  24  /  ALT  14  /  AlkPhos  99  09-14    LIVER FUNCTIONS - ( 14 Sep 2022 06:43 )  Alb: 2.2 g/dL / Pro: 7.3 g/dL / ALK PHOS: 99 U/L / ALT: 14 U/L / AST: 24 U/L / GGT: x             Interval Diagnostic Studies: see sunrise for full report

## 2022-09-14 NOTE — PROGRESS NOTE ADULT - SUBJECTIVE AND OBJECTIVE BOX
Follow Up:      Interval History:    REVIEW OF SYSTEMS  [  ] ROS unobtainable because:    [  ] All other systems negative except as noted below    Constitutional:  [ ] fever [ ] chills  [ ] weight loss  [ ] weakness  Skin:  [ ] rash [ ] phlebitis	  Eyes: [ ] icterus [ ] pain  [ ] discharge	  ENMT: [ ] sore throat  [ ] thrush [ ] ulcers [ ] exudates  Respiratory: [ ] dyspnea [ ] hemoptysis [ ] cough [ ] sputum	  Cardiovascular:  [ ] chest pain [ ] palpitations [ ] edema	  Gastrointestinal:  [ ] nausea [ ] vomiting [ ] diarrhea [ ] constipation [ ] pain	  Genitourinary:  [ ] dysuria [ ] frequency [ ] hematuria [ ] discharge [ ] flank pain  [ ] incontinence  Musculoskeletal:  [ ] myalgias [ ] arthralgias [ ] arthritis  [ ] back pain  Neurological:  [ ] headache [ ] seizures  [ ] confusion/altered mental status    Allergies  No Known Allergies        ANTIMICROBIALS:  cefepime   IVPB    cefepime   IVPB 1000 once  cefepime   IVPB 1000 every 8 hours  fluconAZOLE IVPB    fluconAZOLE IVPB 400 every 24 hours  metroNIDAZOLE  IVPB 500 two times a day      OTHER MEDS:  MEDICATIONS  (STANDING):  furosemide    Tablet 40 daily  influenza   Vaccine 0.5 once  pantoprazole    Tablet 40 before breakfast  polyethylene glycol 3350 17 two times a day  senna 2 at bedtime  spironolactone 100 daily  tamsulosin 0.4 at bedtime      Vital Signs Last 24 Hrs  T(C): 36.9 (14 Sep 2022 17:00), Max: 37.6 (13 Sep 2022 21:00)  T(F): 98.4 (14 Sep 2022 17:00), Max: 99.6 (13 Sep 2022 21:00)  HR: 100 (14 Sep 2022 17:00) (92 - 100)  BP: 145/82 (14 Sep 2022 17:00) (106/62 - 151/78)  BP(mean): --  RR: 18 (14 Sep 2022 17:00) (18 - 18)  SpO2: 97% (14 Sep 2022 17:00) (93% - 97%)    Parameters below as of 14 Sep 2022 17:00  Patient On (Oxygen Delivery Method): room air        PHYSICAL EXAMINATION:  General: Alert and Awake, NAD  HEENT: PERRL, EOMI  Neck: Supple  Cardiac: RRR, No M/R/G  Resp: CTAB, No Wh/Rh/Ra  Abdomen: NBS, NT/ND, No HSM, No rigidity or guarding  MSK: No LE edema. No Calf tenderness  : No milian  Skin: No rashes or lesions. Skin is warm and dry to the touch.   Neuro: Alert and Awake. CN 2-12 Grossly intact. Moves all four extremities spontaneously.  Psych: Calm, Pleasant, Cooperative                          9.8    9.87  )-----------( 97       ( 14 Sep 2022 06:53 )             29.3       09-14    130<L>  |  96  |  8   ----------------------------<  107<H>  3.3<L>   |  26  |  0.62    Ca    8.4      14 Sep 2022 06:43  Phos  2.0     09-14  Mg     1.9     09-14    TPro  7.3  /  Alb  2.2<L>  /  TBili  2.2<H>  /  DBili  x   /  AST  24  /  ALT  14  /  AlkPhos  99  09-14          MICROBIOLOGY:  v  .Abscess perihepatic collection  09-11-22   Moderate Escherichia coli  --  Escherichia coli      .Abscess abdominal collection  09-11-22   Moderate Escherichia coli  --  Escherichia coli      .Blood Blood  09-11-22   Growth in anaerobic bottle: Gram Negative Rods  ***Blood Panel PCR results on this specimen are available  approximately 3 hours after the Gram stain result.***  Gram stain, PCR, and/or culture results may not always  correspond due to difference in methodologies.  ************************************************************  This PCR assay was performed by multiplex PCR. This  Assay tests for 66 bacterial and resistance gene targets.  Please refer to the [facility] test directory  at [email or web address] for details.  --  Blood Culture PCR      Clean Catch Clean Catch (Midstream)  09-11-22   No growth  --  --      Peritoneal Peritoneal Fluid  08-31-22   No growth at 5 days  --    polymorphonuclear leukocytes seen  No organisms seen  by cytocentrifuge      Clean Catch Clean Catch (Midstream)  08-30-22   No growth  --  --      .Blood Blood-Peripheral  08-30-22   No Growth Final  --  --      Peritoneal Peritoneal Fluid  08-25-22   No growth at 5 days  --    polymorphonuclear leukocytes seen  No organisms seen  by cytocentrifuge      Peritoneal Peritoneal Fluid  08-16-22   Rare Escherichia coli  Few Streptococcus anginosus  --  Escherichia coli  Streptococcus anginosus      .Blood Blood-Peripheral  08-16-22   No Growth Final  --  --        CMV IgG Antibody: 4.00 U/mL (08-12-22 @ 00:32)          RADIOLOGY:    <The imaging below has been reviewed and visualized by me independently. Findings as detailed in report below> Follow Up:  peritonitis, bacteremia    Interval History:  blood cultures resulted with serratia. minimal abdominal pain.     REVIEW OF SYSTEMS  [  ] ROS unobtainable because:    [ x ] All other systems negative except as noted below    Constitutional:  [ ] fever [ ] chills  [ ] weight loss  [ ] weakness  Skin:  [ ] rash [ ] phlebitis	  Eyes: [ ] icterus [ ] pain  [ ] discharge	  ENMT: [ ] sore throat  [ ] thrush [ ] ulcers [ ] exudates  Respiratory: [ ] dyspnea [ ] hemoptysis [ ] cough [ ] sputum	  Cardiovascular:  [ ] chest pain [ ] palpitations [ ] edema	  Gastrointestinal:  [ ] nausea [ ] vomiting [ ] diarrhea [ ] constipation [ x ] pain	  Genitourinary:  [ ] dysuria [ ] frequency [ ] hematuria [ ] discharge [ ] flank pain  [ ] incontinence  Musculoskeletal:  [ ] myalgias [ ] arthralgias [ ] arthritis  [ ] back pain  Neurological:  [ ] headache [ ] seizures  [ ] confusion/altered mental status    Allergies  No Known Allergies        ANTIMICROBIALS:  cefepime   IVPB    cefepime   IVPB 1000 once  cefepime   IVPB 1000 every 8 hours  fluconAZOLE IVPB    fluconAZOLE IVPB 400 every 24 hours  metroNIDAZOLE  IVPB 500 two times a day      OTHER MEDS:  MEDICATIONS  (STANDING):  furosemide    Tablet 40 daily  influenza   Vaccine 0.5 once  pantoprazole    Tablet 40 before breakfast  polyethylene glycol 3350 17 two times a day  senna 2 at bedtime  spironolactone 100 daily  tamsulosin 0.4 at bedtime      Vital Signs Last 24 Hrs  T(C): 36.9 (14 Sep 2022 17:00), Max: 37.6 (13 Sep 2022 21:00)  T(F): 98.4 (14 Sep 2022 17:00), Max: 99.6 (13 Sep 2022 21:00)  HR: 100 (14 Sep 2022 17:00) (92 - 100)  BP: 145/82 (14 Sep 2022 17:00) (106/62 - 151/78)  BP(mean): --  RR: 18 (14 Sep 2022 17:00) (18 - 18)  SpO2: 97% (14 Sep 2022 17:00) (93% - 97%)    Parameters below as of 14 Sep 2022 17:00  Patient On (Oxygen Delivery Method): room air        PHYSICAL EXAMINATION:  General: Alert and Awake, NAD, jaundice  Cardiac: RRR, No M/R/G  Resp: CTAB, No Wh/Rh/Ra  Abdomen: RUQ Cholecystotomy tube with yellow drainage, Lower pelvic drain with blood tinged output. additional abdominal drain with yellow drainage. NBS, NT/ND, No HSM, No rigidity or guarding  MSK: No LE edema. No Calf tenderness  : No milian  Skin: No rashes or lesions. Skin is warm and dry to the touch.   Neuro: Alert and Awake. CN 2-12 Grossly intact. Moves all four extremities spontaneously.  Psych: Calm, Pleasant, Cooperative                          9.8    9.87  )-----------( 97       ( 14 Sep 2022 06:53 )             29.3       09-14    130<L>  |  96  |  8   ----------------------------<  107<H>  3.3<L>   |  26  |  0.62    Ca    8.4      14 Sep 2022 06:43  Phos  2.0     09-14  Mg     1.9     09-14    TPro  7.3  /  Alb  2.2<L>  /  TBili  2.2<H>  /  DBili  x   /  AST  24  /  ALT  14  /  AlkPhos  99  09-14          MICROBIOLOGY:  v  .Abscess perihepatic collection  09-11-22   Moderate Escherichia coli  --  Escherichia coli      .Abscess abdominal collection  09-11-22   Moderate Escherichia coli  --  Escherichia coli      .Blood Blood  09-11-22   Growth in anaerobic bottle: Gram Negative Rods  ***Blood Panel PCR results on this specimen are available  approximately 3 hours after the Gram stain result.***  Gram stain, PCR, and/or culture results may not always  correspond due to difference in methodologies.  ************************************************************  This PCR assay was performed by multiplex PCR. This  Assay tests for 66 bacterial and resistance gene targets.  Please refer to the [facility] test directory  at [email or web address] for details.  --  Blood Culture PCR      Clean Catch Clean Catch (Midstream)  09-11-22   No growth  --  --      Peritoneal Peritoneal Fluid  08-31-22   No growth at 5 days  --    polymorphonuclear leukocytes seen  No organisms seen  by cytocentrifuge      Clean Catch Clean Catch (Midstream)  08-30-22   No growth  --  --      .Blood Blood-Peripheral  08-30-22   No Growth Final  --  --      Peritoneal Peritoneal Fluid  08-25-22   No growth at 5 days  --    polymorphonuclear leukocytes seen  No organisms seen  by cytocentrifuge      Peritoneal Peritoneal Fluid  08-16-22   Rare Escherichia coli  Few Streptococcus anginosus  --  Escherichia coli  Streptococcus anginosus      .Blood Blood-Peripheral  08-16-22   No Growth Final  --  --        CMV IgG Antibody: 4.00 U/mL (08-12-22 @ 00:32)          RADIOLOGY:    <The imaging below has been reviewed and visualized by me independently. Findings as detailed in report below>    < from: CT Abdomen and Pelvis w/ IV Cont (09.13.22 @ 19:20) >  Impression:  -Near complete resolution of right upper quadrant collection.  -Large anterior peritoneal collection is decreased in size.    < end of copied text >

## 2022-09-14 NOTE — PROGRESS NOTE ADULT - ASSESSMENT
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 Currently Listed for OLT     #Decompensated ETOH/PARNELL Cirrhosis, listed for OLT  #Abdominal collection, infected hematoma s/p IR drainage x 2 with C tube repositioning   #Hyponatremia  MELD-Na: 24, 9/14  - HE: none currently  - EV: normal esophagus on ERCP from 7/2022  - Ascites: moderate 8/31 US  - SBP: paracentesis 8/9/2022 reveals likely secondary bacterial peritonitis from suspected gallbladder pathology, +SBP on para 8/31  - HCC:  No lesions on CT 8/26      Plan  - Follow up ID, Augmentin resistant E coli abscess  - ASA 81 daily  - Drain management as per IR  - Repeat CTAP next week  - Daily CMP, CBC, coags  - f/u BCx negative  - Fluconazole   - Hyponatremia: 1L fluid restricted  - Lasix 40, aldactone 100  - Physical therapy  [x] Psychosocial: cleared pending RPP  [x ] Infectious  [x] Cardiology:        Cardiac cath: n/a       Stress test: negative noninvasive stress test on 8/19/2022 for inducible ischemia  [ ] Colonoscopy: needed, f/u records from outside GI colonoscopy reports  [x] Prostate: Prostate Specific Antigen: negative at 2.21 ng/mL on 8/12/2022  [x] Dental  [x] PT/Frailty    Preliminary note until signed by Attending.    Thank you for involving us in this patient's care.    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 Currently Listed for OLT     #Decompensated ETOH/PARNELL Cirrhosis, listed for OLT  #Abdominal collection, infected hematoma s/p IR drainage x 2 with C tube repositioning   #Hyponatremia  MELD-Na: 24, 9/14  - HE: none currently  - EV: normal esophagus on ERCP from 7/2022  - Ascites: moderate 8/31 US  - SBP: paracentesis 8/9/2022 reveals likely secondary bacterial peritonitis from suspected gallbladder pathology, +SBP on para 8/31  - HCC:  No lesions on CT 8/26      Plan  - CTAP with improving fluid collections  - Follow up ID, Augmentin resistant E coli abscess  - ASA 81 daily  - Drain management as per IR  - Repeat CTAP next week  - Daily CMP, CBC, coags  - f/u BCx negative  - Fluconazole   - Hyponatremia: 1L fluid restricted  - Lasix 40, aldactone 100  - Physical therapy  [x] Psychosocial: cleared pending RPP  [x ] Infectious  [x] Cardiology:        Cardiac cath: n/a       Stress test: negative noninvasive stress test on 8/19/2022 for inducible ischemia  [ ] Colonoscopy: needed, f/u records from outside GI colonoscopy reports  [x] Prostate: Prostate Specific Antigen: negative at 2.21 ng/mL on 8/12/2022  [x] Dental  [x] PT/Frailty    Preliminary note until signed by Attending.    Thank you for involving us in this patient's care.    Kary Tena MD  Gastroenterology/Hepatology Fellow, PGY-VI    NON-URGENT CONSULTS:  Please email [email or web address]  AT NIGHT AND ON WEEKENDS:  Contact on-call GI fellow via answering service ([phone]) from 5pm-8am and on weekends/holidays  MONDAY-FRIDAY 8AM-5PM:  Pager# [phone] ([facility])  GI Phone# [phone] ([facility])

## 2022-09-15 ENCOUNTER — NON-APPOINTMENT (OUTPATIENT)
Age: 67
End: 2022-09-15

## 2022-09-15 LAB
ALBUMIN SERPL ELPH-MCNC: 2.1 G/DL — LOW (ref 3.3–5)
ALP SERPL-CCNC: 81 U/L — SIGNIFICANT CHANGE UP (ref 40–120)
ALT FLD-CCNC: 11 U/L — SIGNIFICANT CHANGE UP (ref 10–45)
ANION GAP SERPL CALC-SCNC: 6 MMOL/L — SIGNIFICANT CHANGE UP (ref 5–17)
ANISOCYTOSIS BLD QL: SLIGHT — SIGNIFICANT CHANGE UP
AST SERPL-CCNC: 25 U/L — SIGNIFICANT CHANGE UP (ref 10–40)
BASOPHILS # BLD AUTO: 0 K/UL — SIGNIFICANT CHANGE UP (ref 0–0.2)
BASOPHILS NFR BLD AUTO: 0 % — SIGNIFICANT CHANGE UP (ref 0–2)
BILIRUB SERPL-MCNC: 2.1 MG/DL — HIGH (ref 0.2–1.2)
BUN SERPL-MCNC: 8 MG/DL — SIGNIFICANT CHANGE UP (ref 7–23)
CALCIUM SERPL-MCNC: 8.6 MG/DL — SIGNIFICANT CHANGE UP (ref 8.4–10.5)
CHLORIDE SERPL-SCNC: 99 MMOL/L — SIGNIFICANT CHANGE UP (ref 96–108)
CO2 SERPL-SCNC: 26 MMOL/L — SIGNIFICANT CHANGE UP (ref 22–31)
CREAT SERPL-MCNC: 0.65 MG/DL — SIGNIFICANT CHANGE UP (ref 0.5–1.3)
DACRYOCYTES BLD QL SMEAR: SLIGHT — SIGNIFICANT CHANGE UP
EGFR: 105 ML/MIN/1.73M2 — SIGNIFICANT CHANGE UP
EOSINOPHIL # BLD AUTO: 0 K/UL — SIGNIFICANT CHANGE UP (ref 0–0.5)
EOSINOPHIL NFR BLD AUTO: 0 % — SIGNIFICANT CHANGE UP (ref 0–6)
GLUCOSE SERPL-MCNC: 101 MG/DL — HIGH (ref 70–99)
HCT VFR BLD CALC: 28.3 % — LOW (ref 39–50)
HGB BLD-MCNC: 9.3 G/DL — LOW (ref 13–17)
HYPOCHROMIA BLD QL: SLIGHT — SIGNIFICANT CHANGE UP
INR BLD: 2.21 RATIO — HIGH (ref 0.88–1.16)
LYMPHOCYTES # BLD AUTO: 1.72 K/UL — SIGNIFICANT CHANGE UP (ref 1–3.3)
LYMPHOCYTES # BLD AUTO: 21 % — SIGNIFICANT CHANGE UP (ref 13–44)
MAGNESIUM SERPL-MCNC: 1.7 MG/DL — SIGNIFICANT CHANGE UP (ref 1.6–2.6)
MANUAL SMEAR VERIFICATION: SIGNIFICANT CHANGE UP
MCHC RBC-ENTMCNC: 30.8 PG — SIGNIFICANT CHANGE UP (ref 27–34)
MCHC RBC-ENTMCNC: 32.9 GM/DL — SIGNIFICANT CHANGE UP (ref 32–36)
MCV RBC AUTO: 93.7 FL — SIGNIFICANT CHANGE UP (ref 80–100)
MONOCYTES # BLD AUTO: 0.49 K/UL — SIGNIFICANT CHANGE UP (ref 0–0.9)
MONOCYTES NFR BLD AUTO: 6 % — SIGNIFICANT CHANGE UP (ref 2–14)
NEUTROPHILS # BLD AUTO: 5.96 K/UL — SIGNIFICANT CHANGE UP (ref 1.8–7.4)
NEUTROPHILS NFR BLD AUTO: 72 % — SIGNIFICANT CHANGE UP (ref 43–77)
NEUTS BAND # BLD: 1 % — SIGNIFICANT CHANGE UP (ref 0–8)
NRBC # BLD: 1 /100 — HIGH (ref 0–0)
OVALOCYTES BLD QL SMEAR: SLIGHT — SIGNIFICANT CHANGE UP
PHOSPHATE SERPL-MCNC: 2.1 MG/DL — LOW (ref 2.5–4.5)
PLAT MORPH BLD: NORMAL — SIGNIFICANT CHANGE UP
PLATELET # BLD AUTO: 100 K/UL — LOW (ref 150–400)
POIKILOCYTOSIS BLD QL AUTO: SLIGHT — SIGNIFICANT CHANGE UP
POTASSIUM SERPL-MCNC: 3.8 MMOL/L — SIGNIFICANT CHANGE UP (ref 3.5–5.3)
POTASSIUM SERPL-SCNC: 3.8 MMOL/L — SIGNIFICANT CHANGE UP (ref 3.5–5.3)
PROT SERPL-MCNC: 7.1 G/DL — SIGNIFICANT CHANGE UP (ref 6–8.3)
PROTHROM AB SERPL-ACNC: 25.6 SEC — HIGH (ref 10.5–13.4)
RBC # BLD: 3.02 M/UL — LOW (ref 4.2–5.8)
RBC # FLD: 19.7 % — HIGH (ref 10.3–14.5)
RBC BLD AUTO: ABNORMAL
SMUDGE CELLS # BLD: PRESENT — SIGNIFICANT CHANGE UP
SODIUM SERPL-SCNC: 131 MMOL/L — LOW (ref 135–145)
WBC # BLD: 8.17 K/UL — SIGNIFICANT CHANGE UP (ref 3.8–10.5)
WBC # FLD AUTO: 8.17 K/UL — SIGNIFICANT CHANGE UP (ref 3.8–10.5)

## 2022-09-15 PROCEDURE — 99232 SBSQ HOSP IP/OBS MODERATE 35: CPT | Mod: GC

## 2022-09-15 PROCEDURE — 99231 SBSQ HOSP IP/OBS SF/LOW 25: CPT

## 2022-09-15 PROCEDURE — 99232 SBSQ HOSP IP/OBS MODERATE 35: CPT

## 2022-09-15 RX ORDER — SODIUM,POTASSIUM PHOSPHATES 278-250MG
1 POWDER IN PACKET (EA) ORAL
Refills: 0 | Status: COMPLETED | OUTPATIENT
Start: 2022-09-15 | End: 2022-09-16

## 2022-09-15 RX ADMIN — TAMSULOSIN HYDROCHLORIDE 0.4 MILLIGRAM(S): 0.4 CAPSULE ORAL at 21:43

## 2022-09-15 RX ADMIN — Medication 100 MILLIGRAM(S): at 05:24

## 2022-09-15 RX ADMIN — CEFEPIME 100 MILLIGRAM(S): 1 INJECTION, POWDER, FOR SOLUTION INTRAMUSCULAR; INTRAVENOUS at 13:14

## 2022-09-15 RX ADMIN — CEFEPIME 100 MILLIGRAM(S): 1 INJECTION, POWDER, FOR SOLUTION INTRAMUSCULAR; INTRAVENOUS at 21:44

## 2022-09-15 RX ADMIN — SPIRONOLACTONE 100 MILLIGRAM(S): 25 TABLET, FILM COATED ORAL at 05:25

## 2022-09-15 RX ADMIN — MAGNESIUM OXIDE 400 MG ORAL TABLET 400 MILLIGRAM(S): 241.3 TABLET ORAL at 12:13

## 2022-09-15 RX ADMIN — MAGNESIUM OXIDE 400 MG ORAL TABLET 400 MILLIGRAM(S): 241.3 TABLET ORAL at 10:09

## 2022-09-15 RX ADMIN — POLYETHYLENE GLYCOL 3350 17 GRAM(S): 17 POWDER, FOR SOLUTION ORAL at 05:25

## 2022-09-15 RX ADMIN — Medication 100 MILLIGRAM(S): at 17:33

## 2022-09-15 RX ADMIN — POLYETHYLENE GLYCOL 3350 17 GRAM(S): 17 POWDER, FOR SOLUTION ORAL at 17:33

## 2022-09-15 RX ADMIN — PANTOPRAZOLE SODIUM 40 MILLIGRAM(S): 20 TABLET, DELAYED RELEASE ORAL at 05:42

## 2022-09-15 RX ADMIN — Medication 1 TABLET(S): at 12:12

## 2022-09-15 RX ADMIN — Medication 40 MILLIGRAM(S): at 05:25

## 2022-09-15 RX ADMIN — CEFEPIME 100 MILLIGRAM(S): 1 INJECTION, POWDER, FOR SOLUTION INTRAMUSCULAR; INTRAVENOUS at 05:25

## 2022-09-15 RX ADMIN — Medication 1 PACKET(S): at 17:33

## 2022-09-15 RX ADMIN — FLUCONAZOLE 100 MILLIGRAM(S): 150 TABLET ORAL at 18:17

## 2022-09-15 RX ADMIN — SENNA PLUS 2 TABLET(S): 8.6 TABLET ORAL at 21:43

## 2022-09-15 RX ADMIN — MAGNESIUM OXIDE 400 MG ORAL TABLET 400 MILLIGRAM(S): 241.3 TABLET ORAL at 17:32

## 2022-09-15 NOTE — PROGRESS NOTE ADULT - SUBJECTIVE AND OBJECTIVE BOX
Interventional Radiology Follow-Up Note     Patient seen and examined @ bedside     This is a 66 year old Male, s/p perihepatic collection drainage, abdominal collection drainage, & cholecystostomy tube re-positioning on 9/11/22 in IR with Dr. Yin.     No complaint offered.    Medication:  ampicillin/sulbactam  IVPB: (09-14)  cefepime   IVPB: (09-14)  cefepime   IVPB: (09-15)  fluconAZOLE IVPB: (09-14)  furosemide    Tablet: (09-15)  metroNIDAZOLE  IVPB: (09-15)  spironolactone: (09-15)  tamsulosin: (09-14)    Vitals:   T(F): 98.3, Max: 98.5 (01:00)  HR: 91  BP: 136/82  RR: 18  SpO2: 97%    Physical Exam:  General: Nontoxic, in NAD.  Abdomen: soft, NTND.  drains x 3   Drain Device: Drains intact attached to gravity bags.  Flushed with 5cc NS w/o difficulty. Dressing clean, dry, intact     24hr output:   RLQ Drain (mL): 246 cc purulent/dark heme tinged,  Flushed with 5cc NS w/o difficulty. Dressing clean, dry, intact    RUQ Drain (mL): 25 cc purulent/dark heme tinged,  Flushed with 5cc NS w/o difficulty. Dressing clean, dry, intact    Perc rodney drain: 81 cc yellow,  Flushed with 5cc NS w/o difficulty. Dressing clean, dry, intact.       LABS:  Na: 131 (09-15 @ 05:50), 130 (09-14 @ 06:43), 130 (09-13 @ 06:32)  K: 3.8 (09-15 @ 05:50), 3.3 (09-14 @ 06:43), 3.2 (09-13 @ 06:32)  Cl: 99 (09-15 @ 05:50), 96 (09-14 @ 06:43), 96 (09-13 @ 06:32)  CO2: 26 (09-15 @ 05:50), 26 (09-14 @ 06:43), 25 (09-13 @ 06:32)  BUN: 8 (09-15 @ 05:50), 8 (09-14 @ 06:43), 11 (09-13 @ 06:32)  Cr: 0.65 (09-15 @ 05:50), 0.62 (09-14 @ 06:43), 0.68 (09-13 @ 06:32)  Glu: 101(09-15 @ 05:50), 107(09-14 @ 06:43), 110(09-13 @ 06:32)  Hgb: 9.3 (09-15 @ 05:50), 9.8 (09-14 @ 06:53), 8.6 (09-13 @ 06:29)  Hct: 28.3 (09-15 @ 05:50), 29.3 (09-14 @ 06:53), 26.1 (09-13 @ 06:29)  WBC: 8.17 (09-15 @ 05:50), 9.87 (09-14 @ 06:53), 9.81 (09-13 @ 06:29)  Plt: 100 (09-15 @ 05:50), 97 (09-14 @ 06:53), 89 (09-13 @ 06:29)  INR: 2.21 09-15-22 @ 05:50, 2.17 09-14-22 @ 06:53, 2.34 09-13-22 @ 06:32      LIVER FUNCTIONS - ( 15 Sep 2022 05:50 )  Alb: 2.1 g/dL / Pro: 7.1 g/dL / ALK PHOS: 81 U/L / ALT: 11 U/L / AST: 25 U/L / GGT: x         Bilirubin Total, Serum: 2.1 mg/dL (09-15-22 @ 05:50)  Aspartate Aminotransferase (AST/SGOT): 25 U/L (09-15-22 @ 05:50)  Alanine Aminotransferase (ALT/SGPT): 11 U/L (09-15-22 @ 05:50)  Aspartate Aminotransferase (AST/SGOT): 24 U/L (09-14-22 @ 06:43)  Alanine Aminotransferase (ALT/SGPT): 14 U/L (09-14-22 @ 06:43)  Bilirubin Total, Serum: 2.1 mg/dL *H* (09-15-22 @ 05:50)  Bilirubin Total, Serum: 2.2 mg/dL *H* (09-14-22 @ 06:43)  Bilirubin Total, Serum: 2.1 mg/dL *H* (09-13-22 @ 06:32)  Bilirubin Total, Serum: 2.5 mg/dL *H* (09-12-22 @ 06:13)  Bilirubin Total, Serum: 2.5 mg/dL *H* (09-12-22 @ 00:19)  Bilirubin Total, Serum: 2.8 mg/dL *H* (09-11-22 @ 15:14)  Bilirubin Total, Serum: 2.9 mg/dL *H* (09-11-22 @ 07:37)      Assessment/Plan:  66 year old male with decompensated cirrhosis and cholangitis s/p ERCP with stent placement (4/2022) s/p stent removal on 7/20 (along sphincterotomy and stone extraction) now s/p cholecystostomy tube 8/9 with check 8/29 in correct location. He presents to OSH with bloody discharge from the wound site and abdominal distention found to have potentially dislodged IR cholecystostomy tube and ascites. Recent OSH imaging showing multiple large abdominal collections.   9/11 IR  s/p perihepatic collection drain, abdominal collection drain, & perc rodney re-positioning       -  Flush drains with 5cc NS daily forward only; DO NOT aspirate  -  Change dressing q3 days or when dressing is saturated.  -  Monitor h/h; transfuse as needed  -  Trend vs/labs  - Continue global management per primary team, IR will follow  -  If the patient is d/c home with drainage catheter, pt can make an appointment with IR by calling the IR booking office at [phone]; recommend IR follow in 10-14  Days for tube evaluation.  -  If drain outputs decrease to 10cc/24hr , recommend repeat ct non contrast abd pelvis for eval and contact IR.   -  Pt will benefit from VNS service to help with drainage catheter care; Pt should continue same drainage catheter care as an outpatient.    Please call IR at [phone] with any questions, concerns, or issues regarding above.      Also available on TEAMS

## 2022-09-15 NOTE — PROGRESS NOTE ADULT - ASSESSMENT
WORK UP  On 9/11 s/p perihepatic collection drainage, abdominal collection drainage, cholecystostomy tube re-positioning  Abdominal Drainage 1 (9/11) Moderate E coli (now Augmentin resistant)  Abdominal Drainage 2 (9/11) Moderate E coli (now Augmentin resistant)  Blood Cultures (9/11) 1/4 Serratia (based on BCID PCR)    CT A/P (9/13) Near complete resolution of right upper quadrant collection and Large anterior peritoneal collection is decreased in size.    DIAGNOSIS and IMPRESSION  66 year old male PMH cholangitis/cholecystitis (s/p ERCP w/ biliary stent placement 04/2022) and recently diagnosed etOH cirrhosis, had RUQ pain following biliary stent removal on 7/20/22, then  Found to have a distended GB is s/p Choley tube placement 8/9  now admitted with concerns for multiple large abdominal collections and choley tube repositioning. ID consulted for antibiotic management.    #Serratia Bacteremia, Peritonitis, Positive Culture Finding (Ascites)  --c/w Cefepime 1g IV Q8H  --c/w Metronidazole 500 mg PO/IV Q12H  --Continue to follow CBC with diff  --Continue to follow temperature curve  --Follow up on BCx (9/11) sensitivity, BCx (9/15)    #Pre-OLT  ID Clearance for OLT pending resolution of peritonitis     #Late Latent Syphilis  Denies knowledge of preceding diagnosis  No preceding treatment  Will treat as late latent syphilis  s/p IM Benzathine Penicillin 2.4 million units Q7Days x 3 doses    #Encounter to Vaccinate Patient  COVID19: 3/5/21, 3/26/21 - Would benefit from booster, anticipate waiting for bivalent COVID19 vaccine  Influenza: Will require with next season  Pneumococcal: PCV20 9/1/22  HBV: Heplisav dose 1 9/1/22. Dose 2 in 1 month.   HAV: Immune  Tdap: Will require Tdap  Shinglex: Will require Shingrix      Above recommendations are Preliminary until Attending's Addendum which includes Final Recommendations      Efren Walker DO, PGY-4   ID fellow  [employer] Teams Preferred  After 5pm/weekends call [phone]   WORK UP  On 9/11 s/p perihepatic collection drainage, abdominal collection drainage, cholecystostomy tube re-positioning  Abdominal Drainage 1 (9/11) Moderate E coli (now Augmentin resistant)  Abdominal Drainage 2 (9/11) Moderate E coli (now Augmentin resistant)  Blood Cultures (9/11) 1/4 Serratia (based on BCID PCR)    CT A/P (9/13) Near complete resolution of right upper quadrant collection and Large anterior peritoneal collection is decreased in size.    DIAGNOSIS and IMPRESSION  66 year old male PMH cholangitis/cholecystitis (s/p ERCP w/ biliary stent placement 04/2022) and recently diagnosed etOH cirrhosis, had RUQ pain following biliary stent removal on 7/20/22, then  Found to have a distended GB is s/p Choley tube placement 8/9  now admitted with concerns for multiple large abdominal collections and choley tube repositioning. ID consulted for antibiotic management.    #Serratia Bacteremia, Peritonitis, Positive Culture Finding (Ascites)  --c/w Cefepime 1g IV Q8H  --c/w Metronidazole 500 mg PO/IV Q12H  --Continue to follow CBC with diff  --Continue to follow temperature curve  --Follow up on BCx (9/11) sensitivity, BCx (9/15)    #Pre-OLT  ID Clearance for OLT pending resolution of peritonitis     #Late Latent Syphilis  Denies knowledge of preceding diagnosis  Likely late latent syphilis  s/p IM Benzathine Penicillin 2.4 million units Q7Days x 3 doses    #Encounter to Vaccinate Patient  COVID19: 3/5/21, 3/26/21 - Would benefit from bivalent COVID19 vaccine  Influenza: Will require   Pneumococcal: PCV20 9/1/22  HBV: Heplisav dose 1 9/1/22. Dose 2 in 1 month.   HAV: Immune  Tdap: Will require Tdap  Shinglex: Will require Shingrix    Above recommendations are Preliminary until Attending's Addendum which includes Final Recommendations      Efren Walker DO, PGY-4   ID fellow  [employer] Teams Preferred  After 5pm/weekends call [phone]

## 2022-09-15 NOTE — PROGRESS NOTE ADULT - SUBJECTIVE AND OBJECTIVE BOX
Chief Complaint:  Patient is a 66y old  Male who presents with a chief complaint of Bleeding around cholecystostomy tube (15 Sep 2022 10:01)       Interval Events:   Started on cefepime/flagyl, unasyn discontinued   Reporting decreasing edema in LE  Remains afebrile     Hospital Medications:  cefepime   IVPB      cefepime   IVPB 1000 milliGRAM(s) IV Intermittent every 8 hours  fluconAZOLE IVPB      fluconAZOLE IVPB 400 milliGRAM(s) IV Intermittent every 24 hours  furosemide    Tablet 40 milliGRAM(s) Oral daily  influenza   Vaccine 0.5 milliLiter(s) IntraMuscular once  magnesium oxide 400 milliGRAM(s) Oral three times a day with meals  metroNIDAZOLE  IVPB 500 milliGRAM(s) IV Intermittent two times a day  multivitamin 1 Tablet(s) Oral daily  pantoprazole    Tablet 40 milliGRAM(s) Oral before breakfast  polyethylene glycol 3350 17 Gram(s) Oral two times a day  potassium phosphate / sodium phosphate Powder (PHOS-NaK) 1 Packet(s) Oral two times a day  senna 2 Tablet(s) Oral at bedtime  spironolactone 100 milliGRAM(s) Oral daily  tamsulosin 0.4 milliGRAM(s) Oral at bedtime      ROS:   Complete and normal except as mentioned above.    PHYSICAL EXAM:   Vital Signs:  Vital Signs Last 24 Hrs  T(C): 36.8 (15 Sep 2022 05:00), Max: 36.9 (14 Sep 2022 17:00)  T(F): 98.3 (15 Sep 2022 05:00), Max: 98.5 (15 Sep 2022 01:00)  HR: 91 (15 Sep 2022 05:00) (86 - 100)  BP: 136/82 (15 Sep 2022 05:00) (129/83 - 146/86)  BP(mean): --  RR: 18 (15 Sep 2022 05:00) (18 - 18)  SpO2: 97% (15 Sep 2022 05:00) (93% - 97%)    Parameters below as of 15 Sep 2022 05:00  Patient On (Oxygen Delivery Method): room air      Daily     Daily     Constitutional: Well developed / well nourished  HEENT: no scleral icterus  Respiratory: normal respiratory effort  Cardiovascular: regular rate  Gastrointestinal: Soft abdomen, mild distended, IR drains with dark sanguinous substance, c tube to bag  Genitourinary: Voiding spontaneously  Extremities: SCD's in place and working bilaterally  Neurological: A&O x3  Skin: no rashes, ulcerations, lesions    LABS: reviewed                        9.3    8.17  )-----------( 100      ( 15 Sep 2022 05:50 )             28.3     09-15    131<L>  |  99  |  8   ----------------------------<  101<H>  3.8   |  26  |  0.65    Ca    8.6      15 Sep 2022 05:50  Phos  2.1     09-15  Mg     1.7     09-15    TPro  7.1  /  Alb  2.1<L>  /  TBili  2.1<H>  /  DBili  x   /  AST  25  /  ALT  11  /  AlkPhos  81  09-15    LIVER FUNCTIONS - ( 15 Sep 2022 05:50 )  Alb: 2.1 g/dL / Pro: 7.1 g/dL / ALK PHOS: 81 U/L / ALT: 11 U/L / AST: 25 U/L / GGT: x             Interval Diagnostic Studies: see sunrise for full report

## 2022-09-15 NOTE — PROGRESS NOTE ADULT - ASSESSMENT
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 Currently Listed for OLT     #Decompensated ETOH/PARNELL Cirrhosis, listed for OLT  #Abdominal collection, infected hematoma s/p IR drainage x 2 with C tube repositioning c/b Augmentin resistant E coli  #Serratia bacteremia 9/11, repeat Cx negative  #Hyponatremia  MELD-Na: 23, 9/15  - HE: none currently  - EV: normal esophagus on ERCP from 7/2022  - Ascites: moderate 8/31 US  - SBP: paracentesis 8/9/2022 reveals likely secondary bacterial peritonitis from suspected gallbladder pathology, +SBP on para 8/31  - HCC:  No lesions on CT 8/26      Plan  - On cefepime, metronidazole  - Unasyn discontinued  - ID on board, appreciate recs  - ASA 81 daily  - Drain management as per IR  - Repeat CTAP next week  - Daily CMP, CBC, coags  - f/u repeat BCx negative  - Fluconazole   - Hyponatremia: 1L fluid restricted  - Lasix 40, aldactone 100  - Physical therapy  [x] Psychosocial: cleared pending RPP  [x ] Infectious  [x] Cardiology:        Cardiac cath: n/a       Stress test: negative noninvasive stress test on 8/19/2022 for inducible ischemia  [ ] Colonoscopy: needed, f/u records from outside GI colonoscopy reports  [x] Prostate: Prostate Specific Antigen: negative at 2.21 ng/mL on 8/12/2022  [x] Dental  [x] PT/Frailty    Preliminary note until signed by Attending.    Thank you for involving us in this patient's care.    Kary Tena MD  Gastroenterology/Hepatology Fellow, PGY-VI    NON-URGENT CONSULTS:  Please email [email or web address]  AT NIGHT AND ON WEEKENDS:  Contact on-call GI fellow via answering service ([phone]) from 5pm-8am and on weekends/holidays  MONDAY-FRIDAY 8AM-5PM:  Pager# [phone] ([facility])  GI Phone# [phone] ([facility])

## 2022-09-15 NOTE — PROGRESS NOTE ADULT - SUBJECTIVE AND OBJECTIVE BOX
Follow Up:  Bacteremia     Interval History/ROS: No complaints. Denies any abdominal pain, fever or chills.       REVIEW OF SYSTEMS  [  ] ROS unobtainable because:    [ x ] All other systems negative except as noted below    Constitutional:  [ ] fever [ ] chills  [ ] weight loss  [ ]night sweat  [ ]poor appetite/PO intake [ ]fatigue   Skin:  [ ] rash [ ] phlebitis	  Eyes: [ ] icterus [ ] pain  [ ] discharge	  ENMT: [ ] sore throat  [ ] thrush [ ] ulcers [ ] exudates [ ]anosmia  Respiratory: [ ] dyspnea [ ] hemoptysis [ ] cough [ ] sputum	  Cardiovascular:  [ ] chest pain [ ] palpitations [ ] edema	  Gastrointestinal:  [ ] nausea [ ] vomiting [ ] diarrhea [ ] constipation [ ] pain	  Genitourinary:  [ ] dysuria [ ] frequency [ ] hematuria [ ] discharge [ ] flank pain  [ ] incontinence  Musculoskeletal:  [ ] myalgias [ ] arthralgias [ ] arthritis  [ ] back pain  Neurological:  [ ] headache [ ] weakness [ ] seizures  [ ] confusion/altered mental status    Allergies  No Known Allergies        ANTIMICROBIALS:    cefepime   IVPB    cefepime   IVPB 1000 every 8 hours  fluconAZOLE IVPB    fluconAZOLE IVPB 400 every 24 hours  metroNIDAZOLE  IVPB 500 two times a day        OTHER MEDS: MEDICATIONS  (STANDING):  furosemide    Tablet 40 daily  influenza   Vaccine 0.5 once  pantoprazole    Tablet 40 before breakfast  polyethylene glycol 3350 17 two times a day  senna 2 at bedtime  spironolactone 100 daily  tamsulosin 0.4 at bedtime      Vital Signs Last 24 Hrs  T(F): 98.3 (09-15-22 @ 05:00), Max: 101.1 (09-11-22 @ 12:37)    Vital Signs Last 24 Hrs  HR: 91 (09-15-22 @ 05:00) (86 - 100)  BP: 136/82 (09-15-22 @ 05:00) (129/83 - 146/86)  RR: 18 (09-15-22 @ 05:00)  SpO2: 97% (09-15-22 @ 05:00) (93% - 97%)  Wt(kg): --    EXAM:    GA: NAD  HEENT: oral cavity no lesion  CV: nl S1/S2, no RMG  Lungs: CTAB, no distress  Abd: BS+, soft, nontender, no rebounding pain +GRICELDA drain x 3  Ext: no edema  Neuro: No focal deficits  Skin: Intact  IV: no phlebitis    Labs:                        9.3    8.17  )-----------( 100      ( 15 Sep 2022 05:50 )             28.3     09-15    131<L>  |  99  |  8   ----------------------------<  101<H>  3.8   |  26  |  0.65    Ca    8.6      15 Sep 2022 05:50  Phos  2.1     09-15  Mg     1.7     09-15    TPro  7.1  /  Alb  2.1<L>  /  TBili  2.1<H>  /  DBili  x   /  AST  25  /  ALT  11  /  AlkPhos  81  09-15      WBC Trend:  WBC Count: 8.17 (09-15-22 @ 05:50)  WBC Count: 9.87 (09-14-22 @ 06:53)  WBC Count: 9.81 (09-13-22 @ 06:29)  WBC Count: 8.94 (09-12-22 @ 06:13)      Creatine Trend:  Creatinine, Serum: 0.65 (09-15)  Creatinine, Serum: 0.62 (09-14)  Creatinine, Serum: 0.68 (09-13)  Creatinine, Serum: 0.84 (09-12)      Liver Biochemical Testing Trend:  Alanine Aminotransferase (ALT/SGPT): 11 (09-15)  Alanine Aminotransferase (ALT/SGPT): 14 (09-14)  Alanine Aminotransferase (ALT/SGPT): 13 (09-13)  Alanine Aminotransferase (ALT/SGPT): 14 (09-12)  Alanine Aminotransferase (ALT/SGPT): 12 (09-12)  Aspartate Aminotransferase (AST/SGOT): 25 (09-15-22 @ 05:50)  Aspartate Aminotransferase (AST/SGOT): 24 (09-14-22 @ 06:43)  Aspartate Aminotransferase (AST/SGOT): 22 (09-13-22 @ 06:32)  Aspartate Aminotransferase (AST/SGOT): 23 (09-12-22 @ 06:13)  Aspartate Aminotransferase (AST/SGOT): 26 (09-12-22 @ 00:19)  Bilirubin Total, Serum: 2.1 (09-15)  Bilirubin Total, Serum: 2.2 (09-14)  Bilirubin Total, Serum: 2.1 (09-13)  Bilirubin Total, Serum: 2.5 (09-12)  Bilirubin Total, Serum: 2.5 (09-12)      Trend LDH          MICROBIOLOGY:    MRSA PCR Result.: Bradley (04-12-22 @ 16:38)      Culture - Abscess with Gram Stain (collected 11 Sep 2022 17:02)  Source: .Abscess perihepatic collection  Preliminary Report:    Moderate Escherichia coli  Organism: Escherichia coli  Organism: Escherichia coli    Sensitivities:      -  Amikacin: S <=16      -  Amoxicillin/Clavulanic Acid: R >16/8      -  Ampicillin: R >16 These ampicillin results predict results for amoxicillin      -  Ampicillin/Sulbactam: S 8/4 Enterobacter, Klebsiella aerogenes, Citrobacter, and Serratia may develop resistance during prolonged therapy (3-4 days)      -  Aztreonam: S <=4      -  Cefazolin: R 8 Enterobacter, Klebsiella aerogenes, Citrobacter, and Serratia may develop resistance during prolonged therapy (3-4 days)      -  Cefepime: S <=2      -  Cefoxitin: S <=8      -  Ceftriaxone: S <=1 Enterobacter, Klebsiella aerogenes, Citrobacter, and Serratia may develop resistance during prolonged therapy      -  Ciprofloxacin: R >2      -  Ertapenem: S <=0.5      -  Gentamicin: S <=2      -  Imipenem: S <=1      -  Levofloxacin: R >4      -  Meropenem: S <=1      -  Piperacillin/Tazobactam: S <=8      -  Tobramycin: S <=2      -  Trimethoprim/Sulfamethoxazole: S <=0.5/9.5      Method Type: ELANA    Culture - Abscess with Gram Stain (collected 11 Sep 2022 17:01)  Source: .Abscess abdominal collection  Preliminary Report:    Moderate Escherichia coli  Organism: Escherichia coli  Organism: Escherichia coli    Sensitivities:      -  Amikacin: S <=16      -  Amoxicillin/Clavulanic Acid: I 16/8      -  Ampicillin: R >16 These ampicillin results predict results for amoxicillin      -  Ampicillin/Sulbactam: S 8/4 Enterobacter, Klebsiella aerogenes, Citrobacter, and Serratia may develop resistance during prolonged therapy (3-4 days)      -  Aztreonam: S <=4      -  Cefazolin: I 4 Enterobacter, Klebsiella aerogenes, Citrobacter, and Serratia may develop resistance during prolonged therapy (3-4 days)      -  Cefepime: S <=2      -  Cefoxitin: S <=8      -  Ceftriaxone: S <=1 Enterobacter, Klebsiella aerogenes, Citrobacter, and Serratia may develop resistance during prolonged therapy      -  Ciprofloxacin: R >2      -  Ertapenem: S <=0.5      -  Gentamicin: S <=2      -  Imipenem: S <=1      -  Levofloxacin: R >4      -  Meropenem: S <=1      -  Piperacillin/Tazobactam: S <=8      -  Tobramycin: S <=2      -  Trimethoprim/Sulfamethoxazole: S <=0.5/9.5      Method Type: ELANA    Culture - Blood (collected 11 Sep 2022 16:20)  Source: .Blood Blood  Preliminary Report:    No growth to date.    Culture - Blood (collected 11 Sep 2022 16:20)  Source: .Blood Blood  Preliminary Report:    Growth in anaerobic bottle: Gram Negative Rods    ***Blood Panel PCR results on this specimen are available    approximately 3 hours after the Gram stain result.***    Gram stain, PCR, and/or culture results may not always    correspond due to difference in methodologies.    ************************************************************    This PCR assay was performed by multiplex PCR. This    Assay tests for 66 bacterial and resistance gene targets.    Please refer to the [facility] test directory    at [email or web address] for details.  Organism: Blood Culture PCR  Organism: Blood Culture PCR    Sensitivities:      -  Serratia marcescens: Detec      Method Type: PCR    Culture - Urine (collected 11 Sep 2022 16:18)  Source: Clean Catch Clean Catch (Midstream)  Final Report:    No growth    Culture - Fungal, Body Fluid (collected 31 Aug 2022 16:44)  Source: Peritoneal Peritoneal Fluid  Preliminary Report:    No growth    Culture - Body Fluid with Gram Stain (collected 31 Aug 2022 16:44)  Source: Peritoneal Peritoneal Fluid  Final Report:    No growth at 5 days    Culture - Urine (collected 30 Aug 2022 11:44)  Source: Clean Catch Clean Catch (Midstream)  Final Report:    No growth    Culture - Blood (collected 30 Aug 2022 10:32)  Source: .Blood Blood-Peripheral  Final Report:    No Growth Final    Culture - Blood (collected 30 Aug 2022 10:32)  Source: .Blood Blood-Peripheral  Final Report:    No Growth Final      HIV-1/2 Combo Result: Nonreact (08-12-22 @ 00:00)    COVID-19 PCR: NotDetec (09-11-22 @ 13:49)  COVID-19 PCR: NotDetec (09-01-22 @ 07:05)  COVID-19 PCR: NotDetec (08-28-22 @ 11:26)  COVID-19 PCR: NotDetec (08-24-22 @ 15:58)  COVID-19 PCR: NotDetec (08-17-22 @ 13:06)  COVID-19 PCR: NotDetec (08-14-22 @ 06:42)  COVID-19 PCR: NotDetec (08-08-22 @ 01:02)    COVID-19 Chuy Domain AB Interp: Positive (08-11-22 @ 23:59)  COVID-19 Nucleocapsid ALICIA AB Interp: Positive (08-11-22 @ 23:59)      RADIOLOGY:  imaging below personally reviewed      CT Abdomen and Pelvis w/ IV Cont:   ACC: [id number] EXAM:  CT ABDOMEN AND PELVIS IC                          PROCEDURE DATE:  09/13/2022          INTERPRETATION:  Clinical Information: Intra-abdominal collections    Comparison: 08/26/2022    Technique: Noncontrast CT abdomen and pelviswith Axial, sagittal,   coronal reconstructions.    Findings:    Imaged chest: Moderate bilateral pleural effusions and compressive   atelectasis are unchanged. Mild global cardiomegaly.  Hepatobiliary: Cirrhosis. Status post cholecystostomy tube in good   position. Biliary tree unremarkable..  Spleen: Unremarkable.  Pancreas: Unremarkable.  Adrenal glands: Unremarkable.  Urinary: Kidneys and ureters unremarkable. Urinary bladder wall is   thickened.  Reproductive organs: unremarkable.  Gastrointestinal: Ascending colon diverticulosis.  Peritoneum: Right upper quadrant fluid collection is nearly completely   resolved status post IR drainage catheter. Large lower anterior   peritoneal fluid collection is decreased in size status post IR drainage   catheter. Small ascites.  Vasculature: There is a splenorenal shunt.  Retroperitoneum: Unremarkable.  Subcutaneous tissues: Unremarkable.  Neuromusculoskeletal: Degenerative changes.    Impression:  -Near complete resolution of right upper quadrant collection.  -Large anterior peritoneal collection is decreased in size.    --- End of Report ---     Follow Up:  Bacteremia     Interval History/ROS: No complaints. Denies any abdominal pain, fever or chills.       REVIEW OF SYSTEMS  [  ] ROS unobtainable because:    [ x ] All other systems negative except as noted below    Constitutional:  [ ] fever [ ] chills  [ ] weight loss  [ ]night sweat  [ ]poor appetite/PO intake [ ]fatigue   Skin:  [ ] rash [ ] phlebitis	  Eyes: [ ] icterus [ ] pain  [ ] discharge	  ENMT: [ ] sore throat  [ ] thrush [ ] ulcers [ ] exudates [ ]anosmia  Respiratory: [ ] dyspnea [ ] hemoptysis [ ] cough [ ] sputum	  Cardiovascular:  [ ] chest pain [ ] palpitations [ ] edema	  Gastrointestinal:  [ ] nausea [ ] vomiting [ ] diarrhea [ ] constipation [ ] pain	  Genitourinary:  [ ] dysuria [ ] frequency [ ] hematuria [ ] discharge [ ] flank pain  [ ] incontinence  Musculoskeletal:  [ ] myalgias [ ] arthralgias [ ] arthritis  [ ] back pain  Neurological:  [ ] headache [ ] weakness [ ] seizures  [ ] confusion/altered mental status    Allergies  No Known Allergies        ANTIMICROBIALS:    cefepime   IVPB    cefepime   IVPB 1000 every 8 hours  fluconAZOLE IVPB    fluconAZOLE IVPB 400 every 24 hours  metroNIDAZOLE  IVPB 500 two times a day        OTHER MEDS: MEDICATIONS  (STANDING):  furosemide    Tablet 40 daily  influenza   Vaccine 0.5 once  pantoprazole    Tablet 40 before breakfast  polyethylene glycol 3350 17 two times a day  senna 2 at bedtime  spironolactone 100 daily  tamsulosin 0.4 at bedtime      Vital Signs Last 24 Hrs  T(F): 98.3 (09-15-22 @ 05:00), Max: 101.1 (09-11-22 @ 12:37)    Vital Signs Last 24 Hrs  HR: 91 (09-15-22 @ 05:00) (86 - 100)  BP: 136/82 (09-15-22 @ 05:00) (129/83 - 146/86)  RR: 18 (09-15-22 @ 05:00)  SpO2: 97% (09-15-22 @ 05:00) (93% - 97%)  Wt(kg): --    EXAM:    GA: NAD  HEENT: oral cavity no lesion  CV: nl S1/S2, no RMG  Lungs: CTAB, no distress  Abd: BS+, soft, nontender, no rebounding pain +GRICELDA drain x 3  Ext: no edema  Neuro: No focal deficits  Skin: Intact  IV: no phlebitis    Labs:                        9.3    8.17  )-----------( 100      ( 15 Sep 2022 05:50 )             28.3     09-15    131<L>  |  99  |  8   ----------------------------<  101<H>  3.8   |  26  |  0.65    Ca    8.6      15 Sep 2022 05:50  Phos  2.1     09-15  Mg     1.7     09-15    TPro  7.1  /  Alb  2.1<L>  /  TBili  2.1<H>  /  DBili  x   /  AST  25  /  ALT  11  /  AlkPhos  81  09-15      WBC Trend:  WBC Count: 8.17 (09-15-22 @ 05:50)  WBC Count: 9.87 (09-14-22 @ 06:53)  WBC Count: 9.81 (09-13-22 @ 06:29)  WBC Count: 8.94 (09-12-22 @ 06:13)      Creatine Trend:  Creatinine, Serum: 0.65 (09-15)  Creatinine, Serum: 0.62 (09-14)  Creatinine, Serum: 0.68 (09-13)  Creatinine, Serum: 0.84 (09-12)      Liver Biochemical Testing Trend:  Alanine Aminotransferase (ALT/SGPT): 11 (09-15)  Alanine Aminotransferase (ALT/SGPT): 14 (09-14)  Alanine Aminotransferase (ALT/SGPT): 13 (09-13)  Alanine Aminotransferase (ALT/SGPT): 14 (09-12)  Alanine Aminotransferase (ALT/SGPT): 12 (09-12)  Aspartate Aminotransferase (AST/SGOT): 25 (09-15-22 @ 05:50)  Aspartate Aminotransferase (AST/SGOT): 24 (09-14-22 @ 06:43)  Aspartate Aminotransferase (AST/SGOT): 22 (09-13-22 @ 06:32)  Aspartate Aminotransferase (AST/SGOT): 23 (09-12-22 @ 06:13)  Aspartate Aminotransferase (AST/SGOT): 26 (09-12-22 @ 00:19)  Bilirubin Total, Serum: 2.1 (09-15)  Bilirubin Total, Serum: 2.2 (09-14)  Bilirubin Total, Serum: 2.1 (09-13)  Bilirubin Total, Serum: 2.5 (09-12)  Bilirubin Total, Serum: 2.5 (09-12)      Trend LDH    MICROBIOLOGY:    MRSA PCR Result.: Bradley (04-12-22 @ 16:38)    Culture - Abscess with Gram Stain (collected 11 Sep 2022 17:02)  Source: .Abscess perihepatic collection  Preliminary Report:    Moderate Escherichia coli  Organism: Escherichia coli  Organism: Escherichia coli    Sensitivities:      -  Amikacin: S <=16      -  Amoxicillin/Clavulanic Acid: R >16/8      -  Ampicillin: R >16 These ampicillin results predict results for amoxicillin      -  Ampicillin/Sulbactam: S 8/4 Enterobacter, Klebsiella aerogenes, Citrobacter, and Serratia may develop resistance during prolonged therapy (3-4 days)      -  Aztreonam: S <=4      -  Cefazolin: R 8 Enterobacter, Klebsiella aerogenes, Citrobacter, and Serratia may develop resistance during prolonged therapy (3-4 days)      -  Cefepime: S <=2      -  Cefoxitin: S <=8      -  Ceftriaxone: S <=1 Enterobacter, Klebsiella aerogenes, Citrobacter, and Serratia may develop resistance during prolonged therapy      -  Ciprofloxacin: R >2      -  Ertapenem: S <=0.5      -  Gentamicin: S <=2      -  Imipenem: S <=1      -  Levofloxacin: R >4      -  Meropenem: S <=1      -  Piperacillin/Tazobactam: S <=8      -  Tobramycin: S <=2      -  Trimethoprim/Sulfamethoxazole: S <=0.5/9.5      Method Type: ELANA    Culture - Abscess with Gram Stain (collected 11 Sep 2022 17:01)  Source: .Abscess abdominal collection  Preliminary Report:    Moderate Escherichia coli  Organism: Escherichia coli  Organism: Escherichia coli    Sensitivities:      -  Amikacin: S <=16      -  Amoxicillin/Clavulanic Acid: I 16/8      -  Ampicillin: R >16 These ampicillin results predict results for amoxicillin      -  Ampicillin/Sulbactam: S 8/4 Enterobacter, Klebsiella aerogenes, Citrobacter, and Serratia may develop resistance during prolonged therapy (3-4 days)      -  Aztreonam: S <=4      -  Cefazolin: I 4 Enterobacter, Klebsiella aerogenes, Citrobacter, and Serratia may develop resistance during prolonged therapy (3-4 days)      -  Cefepime: S <=2      -  Cefoxitin: S <=8      -  Ceftriaxone: S <=1 Enterobacter, Klebsiella aerogenes, Citrobacter, and Serratia may develop resistance during prolonged therapy      -  Ciprofloxacin: R >2      -  Ertapenem: S <=0.5      -  Gentamicin: S <=2      -  Imipenem: S <=1      -  Levofloxacin: R >4      -  Meropenem: S <=1      -  Piperacillin/Tazobactam: S <=8      -  Tobramycin: S <=2      -  Trimethoprim/Sulfamethoxazole: S <=0.5/9.5      Method Type: ELANA    Culture - Blood (collected 11 Sep 2022 16:20)  Source: .Blood Blood  Preliminary Report:    No growth to date.    Culture - Blood (collected 11 Sep 2022 16:20)  Source: .Blood Blood  Preliminary Report:    Growth in anaerobic bottle: Gram Negative Rods    ***Blood Panel PCR results on this specimen are available    approximately 3 hours after the Gram stain result.***    Gram stain, PCR, and/or culture results may not always    correspond due to difference in methodologies.    ************************************************************    This PCR assay was performed by multiplex PCR. This    Assay tests for 66 bacterial and resistance gene targets.    Please refer to the [facility] test directory    at [email or web address] for details.  Organism: Blood Culture PCR  Organism: Blood Culture PCR    Sensitivities:      -  Serratia marcescens: Detec      Method Type: PCR    Culture - Urine (collected 11 Sep 2022 16:18)  Source: Clean Catch Clean Catch (Midstream)  Final Report:    No growth    Culture - Fungal, Body Fluid (collected 31 Aug 2022 16:44)  Source: Peritoneal Peritoneal Fluid  Preliminary Report:    No growth    Culture - Body Fluid with Gram Stain (collected 31 Aug 2022 16:44)  Source: Peritoneal Peritoneal Fluid  Final Report:    No growth at 5 days    Culture - Urine (collected 30 Aug 2022 11:44)  Source: Clean Catch Clean Catch (Midstream)  Final Report:    No growth    Culture - Blood (collected 30 Aug 2022 10:32)  Source: .Blood Blood-Peripheral  Final Report:    No Growth Final    Culture - Blood (collected 30 Aug 2022 10:32)  Source: .Blood Blood-Peripheral  Final Report:    No Growth Final      HIV-1/2 Combo Result: Nonreact (08-12-22 @ 00:00)    COVID-19 PCR: NotDetec (09-11-22 @ 13:49)  COVID-19 PCR: NotDetec (09-01-22 @ 07:05)  COVID-19 PCR: NotDetec (08-28-22 @ 11:26)  COVID-19 PCR: NotDetec (08-24-22 @ 15:58)  COVID-19 PCR: NotDetec (08-17-22 @ 13:06)  COVID-19 PCR: NotDetec (08-14-22 @ 06:42)  COVID-19 PCR: NotDetec (08-08-22 @ 01:02)    COVID-19 Chuy Domain AB Interp: Positive (08-11-22 @ 23:59)  COVID-19 Nucleocapsid ALICIA AB Interp: Positive (08-11-22 @ 23:59)      RADIOLOGY:  imaging below personally reviewed      CT Abdomen and Pelvis w/ IV Cont:   ACC: [id number] EXAM:  CT ABDOMEN AND PELVIS IC                          PROCEDURE DATE:  09/13/2022          INTERPRETATION:  Clinical Information: Intra-abdominal collections    Comparison: 08/26/2022    Technique: Noncontrast CT abdomen and pelviswith Axial, sagittal,   coronal reconstructions.    Findings:    Imaged chest: Moderate bilateral pleural effusions and compressive   atelectasis are unchanged. Mild global cardiomegaly.  Hepatobiliary: Cirrhosis. Status post cholecystostomy tube in good   position. Biliary tree unremarkable..  Spleen: Unremarkable.  Pancreas: Unremarkable.  Adrenal glands: Unremarkable.  Urinary: Kidneys and ureters unremarkable. Urinary bladder wall is   thickened.  Reproductive organs: unremarkable.  Gastrointestinal: Ascending colon diverticulosis.  Peritoneum: Right upper quadrant fluid collection is nearly completely   resolved status post IR drainage catheter. Large lower anterior   peritoneal fluid collection is decreased in size status post IR drainage   catheter. Small ascites.  Vasculature: There is a splenorenal shunt.  Retroperitoneum: Unremarkable.  Subcutaneous tissues: Unremarkable.  Neuromusculoskeletal: Degenerative changes.    Impression:  -Near complete resolution of right upper quadrant collection.  -Large anterior peritoneal collection is decreased in size.    --- End of Report ---

## 2022-09-16 LAB
ALBUMIN SERPL ELPH-MCNC: 2 G/DL — LOW (ref 3.3–5)
ALP SERPL-CCNC: 82 U/L — SIGNIFICANT CHANGE UP (ref 40–120)
ALT FLD-CCNC: 11 U/L — SIGNIFICANT CHANGE UP (ref 10–45)
ANION GAP SERPL CALC-SCNC: 4 MMOL/L — LOW (ref 5–17)
AST SERPL-CCNC: 24 U/L — SIGNIFICANT CHANGE UP (ref 10–40)
BASOPHILS # BLD AUTO: 0.09 K/UL — SIGNIFICANT CHANGE UP (ref 0–0.2)
BASOPHILS NFR BLD AUTO: 0.9 % — SIGNIFICANT CHANGE UP (ref 0–2)
BILIRUB SERPL-MCNC: 2 MG/DL — HIGH (ref 0.2–1.2)
BUN SERPL-MCNC: 6 MG/DL — LOW (ref 7–23)
CALCIUM SERPL-MCNC: 8.5 MG/DL — SIGNIFICANT CHANGE UP (ref 8.4–10.5)
CHLORIDE SERPL-SCNC: 101 MMOL/L — SIGNIFICANT CHANGE UP (ref 96–108)
CO2 SERPL-SCNC: 26 MMOL/L — SIGNIFICANT CHANGE UP (ref 22–31)
CREAT SERPL-MCNC: 0.67 MG/DL — SIGNIFICANT CHANGE UP (ref 0.5–1.3)
CULTURE RESULTS: SIGNIFICANT CHANGE UP
CULTURE RESULTS: SIGNIFICANT CHANGE UP
EGFR: 104 ML/MIN/1.73M2 — SIGNIFICANT CHANGE UP
EOSINOPHIL # BLD AUTO: 0.1 K/UL — SIGNIFICANT CHANGE UP (ref 0–0.5)
EOSINOPHIL NFR BLD AUTO: 1 % — SIGNIFICANT CHANGE UP (ref 0–6)
GLUCOSE SERPL-MCNC: 83 MG/DL — SIGNIFICANT CHANGE UP (ref 70–99)
HCT VFR BLD CALC: 27.8 % — LOW (ref 39–50)
HGB BLD-MCNC: 9.1 G/DL — LOW (ref 13–17)
IMM GRANULOCYTES NFR BLD AUTO: 6 % — HIGH (ref 0–0.9)
INR BLD: 2.28 RATIO — HIGH (ref 0.88–1.16)
LYMPHOCYTES # BLD AUTO: 2.38 K/UL — SIGNIFICANT CHANGE UP (ref 1–3.3)
LYMPHOCYTES # BLD AUTO: 22.7 % — SIGNIFICANT CHANGE UP (ref 13–44)
MAGNESIUM SERPL-MCNC: 1.8 MG/DL — SIGNIFICANT CHANGE UP (ref 1.6–2.6)
MANUAL SMEAR VERIFICATION: SIGNIFICANT CHANGE UP
MCHC RBC-ENTMCNC: 31.5 PG — SIGNIFICANT CHANGE UP (ref 27–34)
MCHC RBC-ENTMCNC: 32.7 GM/DL — SIGNIFICANT CHANGE UP (ref 32–36)
MCV RBC AUTO: 96.2 FL — SIGNIFICANT CHANGE UP (ref 80–100)
MONOCYTES # BLD AUTO: 0.99 K/UL — HIGH (ref 0–0.9)
MONOCYTES NFR BLD AUTO: 9.4 % — SIGNIFICANT CHANGE UP (ref 2–14)
NEUTROPHILS # BLD AUTO: 6.3 K/UL — SIGNIFICANT CHANGE UP (ref 1.8–7.4)
NEUTROPHILS NFR BLD AUTO: 60 % — SIGNIFICANT CHANGE UP (ref 43–77)
NRBC # BLD: 0 /100 WBCS — SIGNIFICANT CHANGE UP (ref 0–0)
PHOSPHATE SERPL-MCNC: 2.4 MG/DL — LOW (ref 2.5–4.5)
PLAT MORPH BLD: NORMAL — SIGNIFICANT CHANGE UP
PLATELET # BLD AUTO: 102 K/UL — LOW (ref 150–400)
POTASSIUM SERPL-MCNC: 3.9 MMOL/L — SIGNIFICANT CHANGE UP (ref 3.5–5.3)
POTASSIUM SERPL-SCNC: 3.9 MMOL/L — SIGNIFICANT CHANGE UP (ref 3.5–5.3)
PROT SERPL-MCNC: 7 G/DL — SIGNIFICANT CHANGE UP (ref 6–8.3)
PROTHROM AB SERPL-ACNC: 26.7 SEC — HIGH (ref 10.5–13.4)
RBC # BLD: 2.89 M/UL — LOW (ref 4.2–5.8)
RBC # FLD: 19.9 % — HIGH (ref 10.3–14.5)
RBC BLD AUTO: SIGNIFICANT CHANGE UP
SODIUM SERPL-SCNC: 131 MMOL/L — LOW (ref 135–145)
SPECIMEN SOURCE: SIGNIFICANT CHANGE UP
SPECIMEN SOURCE: SIGNIFICANT CHANGE UP
WBC # BLD: 10.49 K/UL — SIGNIFICANT CHANGE UP (ref 3.8–10.5)
WBC # FLD AUTO: 10.49 K/UL — SIGNIFICANT CHANGE UP (ref 3.8–10.5)

## 2022-09-16 PROCEDURE — 99232 SBSQ HOSP IP/OBS MODERATE 35: CPT

## 2022-09-16 PROCEDURE — 99231 SBSQ HOSP IP/OBS SF/LOW 25: CPT

## 2022-09-16 PROCEDURE — 99232 SBSQ HOSP IP/OBS MODERATE 35: CPT | Mod: GC

## 2022-09-16 RX ORDER — FLUCONAZOLE 150 MG/1
400 TABLET ORAL DAILY
Refills: 0 | Status: DISCONTINUED | OUTPATIENT
Start: 2022-09-16 | End: 2022-09-26

## 2022-09-16 RX ADMIN — Medication 100 MILLIGRAM(S): at 17:43

## 2022-09-16 RX ADMIN — POLYETHYLENE GLYCOL 3350 17 GRAM(S): 17 POWDER, FOR SOLUTION ORAL at 17:43

## 2022-09-16 RX ADMIN — TAMSULOSIN HYDROCHLORIDE 0.4 MILLIGRAM(S): 0.4 CAPSULE ORAL at 21:27

## 2022-09-16 RX ADMIN — FLUCONAZOLE 400 MILLIGRAM(S): 150 TABLET ORAL at 18:53

## 2022-09-16 RX ADMIN — Medication 100 MILLIGRAM(S): at 05:55

## 2022-09-16 RX ADMIN — MAGNESIUM OXIDE 400 MG ORAL TABLET 400 MILLIGRAM(S): 241.3 TABLET ORAL at 08:30

## 2022-09-16 RX ADMIN — SENNA PLUS 2 TABLET(S): 8.6 TABLET ORAL at 21:27

## 2022-09-16 RX ADMIN — SPIRONOLACTONE 100 MILLIGRAM(S): 25 TABLET, FILM COATED ORAL at 05:57

## 2022-09-16 RX ADMIN — POLYETHYLENE GLYCOL 3350 17 GRAM(S): 17 POWDER, FOR SOLUTION ORAL at 05:56

## 2022-09-16 RX ADMIN — MAGNESIUM OXIDE 400 MG ORAL TABLET 400 MILLIGRAM(S): 241.3 TABLET ORAL at 17:43

## 2022-09-16 RX ADMIN — Medication 40 MILLIGRAM(S): at 05:57

## 2022-09-16 RX ADMIN — CEFEPIME 100 MILLIGRAM(S): 1 INJECTION, POWDER, FOR SOLUTION INTRAMUSCULAR; INTRAVENOUS at 05:55

## 2022-09-16 RX ADMIN — CEFEPIME 100 MILLIGRAM(S): 1 INJECTION, POWDER, FOR SOLUTION INTRAMUSCULAR; INTRAVENOUS at 13:12

## 2022-09-16 RX ADMIN — MAGNESIUM OXIDE 400 MG ORAL TABLET 400 MILLIGRAM(S): 241.3 TABLET ORAL at 13:11

## 2022-09-16 RX ADMIN — CEFEPIME 100 MILLIGRAM(S): 1 INJECTION, POWDER, FOR SOLUTION INTRAMUSCULAR; INTRAVENOUS at 21:27

## 2022-09-16 RX ADMIN — PANTOPRAZOLE SODIUM 40 MILLIGRAM(S): 20 TABLET, DELAYED RELEASE ORAL at 06:00

## 2022-09-16 RX ADMIN — Medication 1 PACKET(S): at 05:56

## 2022-09-16 RX ADMIN — Medication 1 TABLET(S): at 13:11

## 2022-09-16 NOTE — PROGRESS NOTE ADULT - ASSESSMENT
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 Currently Listed for OLT     #Decompensated ETOH/PARNELL Cirrhosis, listed for OLT  #Abdominal collection, infected hematoma s/p IR drainage x 2 with C tube repositioning c/b Augmentin resistant E coli  #Serratia bacteremia 9/11, repeat Cx negative  #Hyponatremia  MELD-Na: 22, 9/16  - HE: none currently  - EV: normal esophagus on ERCP from 7/2022  - Ascites: moderate 8/31 US  - SBP: paracentesis 8/9/2022 reveals likely secondary bacterial peritonitis from suspected gallbladder pathology, +SBP on para 8/31  - HCC:  No lesions on CT 8/26      Plan  - f/u serratia sensitivities  - On cefepime, metronidazole  - Unasyn discontinued  - ID on board, appreciate recs  - ASA 81 daily  - Drain management as per IR  - Repeat CTAP next week  - Daily CMP, CBC, coags  - f/u repeat BCx    - Fluconazole, switch to PO  - Hyponatremia: 1L fluid restricted  - Lasix 40, aldactone 100  - Physical therapy  [x] Psychosocial: cleared pending RPP  [x ] Infectious  [x] Cardiology:        Cardiac cath: n/a       Stress test: negative noninvasive stress test on 8/19/2022 for inducible ischemia  [ ] Colonoscopy: needed, f/u records from outside GI colonoscopy reports  [x] Prostate: Prostate Specific Antigen: negative at 2.21 ng/mL on 8/12/2022  [x] Dental  [x] PT/Frailty    Preliminary note until signed by Attending.    Thank you for involving us in this patient's care.    Kary Tena MD  Gastroenterology/Hepatology Fellow, PGY-VI    NON-URGENT CONSULTS:  Please email [email or web address]  AT NIGHT AND ON WEEKENDS:  Contact on-call GI fellow via answering service ([phone]) from 5pm-8am and on weekends/holidays  MONDAY-FRIDAY 8AM-5PM:  Pager# [phone] ([facility])  GI Phone# [phone] ([facility])

## 2022-09-16 NOTE — PROGRESS NOTE ADULT - SUBJECTIVE AND OBJECTIVE BOX
Chief Complaint:  Patient is a 66y old  Male who presents with a chief complaint of Bleeding around cholecystostomy tube (15 Sep 2022 10:24)     Interval Events:   Afebrile, remains HDS    Hospital Medications:  cefepime   IVPB      cefepime   IVPB 1000 milliGRAM(s) IV Intermittent every 8 hours  fluconAZOLE IVPB 400 milliGRAM(s) IV Intermittent every 24 hours  fluconAZOLE IVPB      furosemide    Tablet 40 milliGRAM(s) Oral daily  influenza   Vaccine 0.5 milliLiter(s) IntraMuscular once  magnesium oxide 400 milliGRAM(s) Oral three times a day with meals  metroNIDAZOLE  IVPB 500 milliGRAM(s) IV Intermittent two times a day  multivitamin 1 Tablet(s) Oral daily  pantoprazole    Tablet 40 milliGRAM(s) Oral before breakfast  polyethylene glycol 3350 17 Gram(s) Oral two times a day  senna 2 Tablet(s) Oral at bedtime  spironolactone 100 milliGRAM(s) Oral daily  tamsulosin 0.4 milliGRAM(s) Oral at bedtime      ROS:   Complete and normal except as mentioned above.    PHYSICAL EXAM:   Vital Signs:  Vital Signs Last 24 Hrs  T(C): 36.7 (16 Sep 2022 09:00), Max: 37 (15 Sep 2022 21:00)  T(F): 98 (16 Sep 2022 09:00), Max: 98.6 (15 Sep 2022 21:00)  HR: 97 (16 Sep 2022 09:00) (85 - 100)  BP: 137/75 (16 Sep 2022 09:00) (137/75 - 149/84)  BP(mean): --  RR: 18 (16 Sep 2022 09:00) (18 - 18)  SpO2: 95% (16 Sep 2022 09:00) (95% - 98%)    Parameters below as of 16 Sep 2022 09:00  Patient On (Oxygen Delivery Method): room air      Daily     Daily     Constitutional: Well developed / well nourished  HEENT: no scleral icterus  Respiratory: normal respiratory effort  Cardiovascular: regular rate  Gastrointestinal: Soft abdomen, mild distended, IR drains with sanguinous substance, c tube to bag  Genitourinary: Voiding spontaneously  Extremities: SCD's in place and working bilaterally  Neurological: A&O x3  Skin: no rashes, ulcerations, lesions    LABS: reviewed                        9.1    10.49 )-----------( 102      ( 16 Sep 2022 07:09 )             27.8     09-16    131<L>  |  101  |  6<L>  ----------------------------<  83  3.9   |  26  |  0.67    Ca    8.5      16 Sep 2022 06:58  Phos  2.4     09-16  Mg     1.8     09-16    TPro  7.0  /  Alb  2.0<L>  /  TBili  2.0<H>  /  DBili  x   /  AST  24  /  ALT  11  /  AlkPhos  82  09-16    LIVER FUNCTIONS - ( 16 Sep 2022 06:58 )  Alb: 2.0 g/dL / Pro: 7.0 g/dL / ALK PHOS: 82 U/L / ALT: 11 U/L / AST: 24 U/L / GGT: x             Interval Diagnostic Studies: see sunrise for full report

## 2022-09-16 NOTE — CHART NOTE - NSCHARTNOTEFT_GEN_A_CORE
Nutrition Follow Up Note  Patient seen for malnutrition follow up    Pt 65 y/o M with PMH as per chart: "covid-19, decompensated cirrhosis currently listed for OLT, cholangitis S/P ERCP with stent placement (4/2022), S/P stent removal on (07/20), S/P cholecystostomy tube (08/09) with check (08/29) in correct location. He presented to OSH with bloody discharge from the wound site and abdominal distention,  found to have potentially dislodged IR cholecystostomy tube and moderate ascites, S/P perihepatic collection drainage, abdominal collection drainage, cholecystostomy tube re-positioning (09/11); currently listed for OLT - MELD-Na: 23, 9/15."     Source: [] Patient       [x] EMR        [] RN        [] Family at bedside       [] Other:    -If unable to interview patient: [] Trach/Vent/BiPAP  [] Disoriented/confused/inappropriate to interview as per chart     Diet Order:   Diet, Regular:   Low Sodium  Supplement Feeding Modality:  Oral  Ensure Enlive Cans or Servings Per Day:  1       Frequency:  Three Times a day (09-13-22)      Weights:   Daily     Nutritionally Pertinent MEDICATIONS  (STANDING):  cefepime   IVPB  cefepime   IVPB  fluconAZOLE IVPB  fluconAZOLE IVPB  furosemide    Tablet  magnesium oxide  metroNIDAZOLE  IVPB  multivitamin  pantoprazole    Tablet  polyethylene glycol 3350  senna  spironolactone  tamsulosin    Pertinent Labs: 09-16 @ 06:58: Na 131<L>, BUN 6<L>, Cr 0.67, BG 83, K+ 3.9, Phos 2.4<L>, Mg 1.8, Alk Phos 82, ALT/SGPT 11, AST/SGOT 24, HbA1c --    A1C with Estimated Average Glucose Result: 4.9 % (08-12-22 @ 00:35)    Finger Sticks:      Skin per nursing documentation:   Edema as per flow sheets:     Estimated Needs:   [] no change since previous assessment  [] recalculated:     Previous Nutrition Diagnosis:   Nutrition Diagnosis is: [] ongoing  [] resolved [] not applicable     New Nutrition Diagnosis: [] Not applicable    Nutrition Care Plan:  [] In Progress            Achieved          Not applicable    Nutrition Interventions:     Education Provided:       [] Yes  [] No    Recommendations:          Monitoring and Evaluation:   Continue to monitor nutritional intake, tolerance to diet prescription, weights, labs, skin integrity    RD remains available upon request and will follow up per protocol  Yvette Herrera MS RDN CDN [facility] CCTD #[phone] Nutrition Follow Up Note  Patient seen for malnutrition follow up    Pt 67 y/o M with PMH as per chart: "covid-19, decompensated cirrhosis and cholangitis S/P ERCP with stent placement (4/2022), S/P stent removal on (07/20), S/P cholecystostomy tube (08/09) with check (08/29) in correct location. He presented to OSH with bloody discharge from the wound site and abdominal distention,  found to have potentially dislodged IR cholecystostomy tube and moderate ascites, S/P perihepatic collection drainage, abdominal collection drainage, cholecystostomy tube re-positioning (09/11); currently listed for OLT - MELD-Na: 23, 9/15."     Pt seen by PT for frailty assessments: (09/01/2022) pt scored 4.42 pre- frail -> (09/12) pt scored 4.69 frail.    Source: [x] Patient       [x] EMR        [] RN        [x] Family at bedside, wife      [] Other:    -If unable to interview patient: [] Trach/Vent/BiPAP  [] Disoriented/confused/inappropriate to interview as per chart     Wife at bedside confirmed information. Pt reports improved appetite and PO intake, states consuming ~75% of his meals. Reports drinking Ensure 1xday - agreed to 3bottles/day. Denies difficulty chewing/swallowing. Denies nausea or vomiting. Reports loose BM, last today (09/16).     Reviewed importance of adequate kcal and protein intake with recommendations to optimize. Pt and wife deny having further questions/concerns about diet and nutrition - made aware RD remains available.     Diet Order:   Diet, Regular:   Low Sodium  Supplement Feeding Modality:  Oral  Ensure Enlive Cans or Servings Per Day:  1        Pt receiving Ensure Plus High Protein (nutritionally similar to Ensure Enlive - currently unavailable from ; 350 kcal and 20 g protein).     Weights: (09/10) 160 pounds -?accuracy due to edema and ascites, no further weights to assess.    Pt reported current dry weight of 143 pounds in previous admission.     Nutritionally Pertinent MEDICATIONS  (STANDING):  cefepime   IVPB  cefepime   IVPB  fluconAZOLE IVPB  fluconAZOLE IVPB  furosemide    Tablet  magnesium oxide  metroNIDAZOLE  IVPB  multivitamin  pantoprazole    Tablet  polyethylene glycol 3350  senna  spironolactone  tamsulosin    Pertinent Labs: (09-16 @ 06:58): Na 131<L>, BUN 6<L>, Cr 0.67, BG 83, K+ 3.9, Phos 2.4<L>, Mg 1.8, Alk Phos 82, ALT/SGPT 11, AST/SGOT 24    A1C with Estimated Average Glucose Result: 4.9 % (08-12-22 @ 00:35)    Skin per nursing documentation: no pressure injuries.   Edema as per flow sheets: +1 lázaro. leg and +2 lázaro. ankle and foot.     Estimated Needs:   [x] no change since previous assessment  [] recalculated:     Previous Nutrition Diagnosis: Malnutrition; severe (in setting of increased nutrient needs for cirrhosis)     Nutrition Diagnosis is: [x] ongoing - being addressed with PO intake encouragement and nutritional supplement.     New Nutrition Diagnosis: [x] Not applicable    Nutrition Care Plan:  [x] In Progress         Nutrition Interventions:     Education Provided:       [x] Yes  [] No    Recommendations:      1. Continue low salt diet. Will continue to monitor as able and adjust as needed.   2. Recommend Ensure Enlive 3xday to optimize kcal and protein intake.   3. Encourage PO intake and honor food preferences.   4. Continue Multivitamin as medically feasible to optimize nutrient intake.   5. Reviewed importance of low salt diet and adequate kcal and protein intake with recommendations to optimize.   6. Obtain current weight as able to identify changes if any.     Monitoring and Evaluation:   Continue to monitor nutritional intake, tolerance to diet prescription, weights, labs, skin integrity    RD remains available upon request and will follow up per protocol  Yvette Herrera MS RDN CDN [facility] CCTD #[phone]

## 2022-09-16 NOTE — PROGRESS NOTE ADULT - SUBJECTIVE AND OBJECTIVE BOX
Follow Up:  Bacteremia     Interval History/ROS: No complaints. Denies any abdominal pain, fever or chills.       REVIEW OF SYSTEMS  [  ] ROS unobtainable because:    [ x ] All other systems negative except as noted below    Constitutional:  [ ] fever [ ] chills  [ ] weight loss  [ ]night sweat  [ ]poor appetite/PO intake [ ]fatigue   Skin:  [ ] rash [ ] phlebitis	  Eyes: [ ] icterus [ ] pain  [ ] discharge	  ENMT: [ ] sore throat  [ ] thrush [ ] ulcers [ ] exudates [ ]anosmia  Respiratory: [ ] dyspnea [ ] hemoptysis [ ] cough [ ] sputum	  Cardiovascular:  [ ] chest pain [ ] palpitations [ ] edema	  Gastrointestinal:  [ ] nausea [ ] vomiting [ ] diarrhea [ ] constipation [ ] pain	  Genitourinary:  [ ] dysuria [ ] frequency [ ] hematuria [ ] discharge [ ] flank pain  [ ] incontinence  Musculoskeletal:  [ ] myalgias [ ] arthralgias [ ] arthritis  [ ] back pain  Neurological:  [ ] headache [ ] weakness [ ] seizures  [ ] confusion/altered mental status    Allergies  No Known Allergies      ANTIMICROBIALS:    cefepime   IVPB    cefepime   IVPB 1000 every 8 hours  fluconAZOLE IVPB 400 every 24 hours  fluconAZOLE IVPB    metroNIDAZOLE  IVPB 500 two times a day        OTHER MEDS: MEDICATIONS  (STANDING):  furosemide    Tablet 40 daily  influenza   Vaccine 0.5 once  pantoprazole    Tablet 40 before breakfast  polyethylene glycol 3350 17 two times a day  senna 2 at bedtime  spironolactone 100 daily  tamsulosin 0.4 at bedtime      Vital Signs Last 24 Hrs  T(F): 98 (09-16-22 @ 09:00), Max: 101.1 (09-11-22 @ 12:37)    Vital Signs Last 24 Hrs  HR: 97 (09-16-22 @ 09:00) (85 - 100)  BP: 137/75 (09-16-22 @ 09:00) (137/75 - 149/84)  RR: 18 (09-16-22 @ 09:00)  SpO2: 95% (09-16-22 @ 09:00) (95% - 98%)  Wt(kg): --    EXAM:    GA: NAD  HEENT: oral cavity no lesion  CV: nl S1/S2, no RMG  Lungs: CTAB, no distress  Abd: BS+, soft, nontender, no rebounding pain +GRICELDA drain x 3  Ext: no edema  Neuro: No focal deficits  Skin: Intact  IV: no phlebitis    Labs:                        9.1    10.49 )-----------( 102      ( 16 Sep 2022 07:09 )             27.8     09-16    131<L>  |  101  |  6<L>  ----------------------------<  83  3.9   |  26  |  0.67    Ca    8.5      16 Sep 2022 06:58  Phos  2.4     09-16  Mg     1.8     09-16    TPro  7.0  /  Alb  2.0<L>  /  TBili  2.0<H>  /  DBili  x   /  AST  24  /  ALT  11  /  AlkPhos  82  09-16      WBC Trend:  WBC Count: 10.49 (09-16-22 @ 07:09)  WBC Count: 8.17 (09-15-22 @ 05:50)  WBC Count: 9.87 (09-14-22 @ 06:53)  WBC Count: 9.81 (09-13-22 @ 06:29)      Creatine Trend:  Creatinine, Serum: 0.67 (09-16)  Creatinine, Serum: 0.65 (09-15)  Creatinine, Serum: 0.62 (09-14)  Creatinine, Serum: 0.68 (09-13)      Liver Biochemical Testing Trend:  Alanine Aminotransferase (ALT/SGPT): 11 (09-16)  Alanine Aminotransferase (ALT/SGPT): 11 (09-15)  Alanine Aminotransferase (ALT/SGPT): 14 (09-14)  Alanine Aminotransferase (ALT/SGPT): 13 (09-13)  Alanine Aminotransferase (ALT/SGPT): 14 (09-12)  Aspartate Aminotransferase (AST/SGOT): 24 (09-16-22 @ 06:58)  Aspartate Aminotransferase (AST/SGOT): 25 (09-15-22 @ 05:50)  Aspartate Aminotransferase (AST/SGOT): 24 (09-14-22 @ 06:43)  Aspartate Aminotransferase (AST/SGOT): 22 (09-13-22 @ 06:32)  Aspartate Aminotransferase (AST/SGOT): 23 (09-12-22 @ 06:13)  Bilirubin Total, Serum: 2.0 (09-16)  Bilirubin Total, Serum: 2.1 (09-15)  Bilirubin Total, Serum: 2.2 (09-14)  Bilirubin Total, Serum: 2.1 (09-13)  Bilirubin Total, Serum: 2.5 (09-12)      Trend LDH    MICROBIOLOGY:    MRSA PCR Result.: Averytosin (04-12-22 @ 16:38)      Culture - Abscess with Gram Stain (collected 11 Sep 2022 17:02)  Source: .Abscess perihepatic collection  Preliminary Report:    Moderate Escherichia coli  Organism: Escherichia coli  Organism: Escherichia coli    Sensitivities:      -  Amikacin: S <=16      -  Amoxicillin/Clavulanic Acid: R >16/8      -  Ampicillin: R >16 These ampicillin results predict results for amoxicillin      -  Ampicillin/Sulbactam: S 8/4 Enterobacter, Klebsiella aerogenes, Citrobacter, and Serratia may develop resistance during prolonged therapy (3-4 days)      -  Aztreonam: S <=4      -  Cefazolin: R 8 Enterobacter, Klebsiella aerogenes, Citrobacter, and Serratia may develop resistance during prolonged therapy (3-4 days)      -  Cefepime: S <=2      -  Cefoxitin: S <=8      -  Ceftriaxone: S <=1 Enterobacter, Klebsiella aerogenes, Citrobacter, and Serratia may develop resistance during prolonged therapy      -  Ciprofloxacin: R >2      -  Ertapenem: S <=0.5      -  Gentamicin: S <=2      -  Imipenem: S <=1      -  Levofloxacin: R >4      -  Meropenem: S <=1      -  Piperacillin/Tazobactam: S <=8      -  Tobramycin: S <=2      -  Trimethoprim/Sulfamethoxazole: S <=0.5/9.5      Method Type: ELANA    Culture - Abscess with Gram Stain (collected 11 Sep 2022 17:01)  Source: .Abscess abdominal collection  Preliminary Report:    Moderate Escherichia coli  Organism: Escherichia coli  Organism: Escherichia coli    Sensitivities:      -  Amikacin: S <=16      -  Amoxicillin/Clavulanic Acid: I 16/8      -  Ampicillin: R >16 These ampicillin results predict results for amoxicillin      -  Ampicillin/Sulbactam: S 8/4 Enterobacter, Klebsiella aerogenes, Citrobacter, and Serratia may develop resistance during prolonged therapy (3-4 days)      -  Aztreonam: S <=4      -  Cefazolin: I 4 Enterobacter, Klebsiella aerogenes, Citrobacter, and Serratia may develop resistance during prolonged therapy (3-4 days)      -  Cefepime: S <=2      -  Cefoxitin: S <=8      -  Ceftriaxone: S <=1 Enterobacter, Klebsiella aerogenes, Citrobacter, and Serratia may develop resistance during prolonged therapy      -  Ciprofloxacin: R >2      -  Ertapenem: S <=0.5      -  Gentamicin: S <=2      -  Imipenem: S <=1      -  Levofloxacin: R >4      -  Meropenem: S <=1      -  Piperacillin/Tazobactam: S <=8      -  Tobramycin: S <=2      -  Trimethoprim/Sulfamethoxazole: S <=0.5/9.5      Method Type: ELANA    Culture - Blood (collected 11 Sep 2022 16:20)  Source: .Blood Blood  Preliminary Report:    No growth to date.    Culture - Blood (collected 11 Sep 2022 16:20)  Source: .Blood Blood  Preliminary Report:    Growth in anaerobic bottle: Serratia marcescens Susceptibility to follow.    ***Blood Panel PCR results on this specimen are available    approximately 3 hours after the Gram stain result.***    Gram stain, PCR, and/or culture results may not always    correspond due to difference in methodologies.    ************************************************************    This PCR assay was performed by multiplex PCR. This    Assay tests for 66 bacterial and resistance gene targets.    Please refer to the [facility] test directory    at [email or web address] for details.  Organism: Blood Culture PCR  Organism: Blood Culture PCR    Sensitivities:      -  Serratia marcescens: Detec      Method Type: PCR    Culture - Urine (collected 11 Sep 2022 16:18)  Source: Clean Catch Clean Catch (Midstream)  Final Report:    No growth    Culture - Fungal, Body Fluid (collected 31 Aug 2022 16:44)  Source: Peritoneal Peritoneal Fluid  Preliminary Report:    No growth    Culture - Body Fluid with Gram Stain (collected 31 Aug 2022 16:44)  Source: Peritoneal Peritoneal Fluid  Final Report:    No growth at 5 days    Culture - Urine (collected 30 Aug 2022 11:44)  Source: Clean Catch Clean Catch (Midstream)  Final Report:    No growth    Culture - Blood (collected 30 Aug 2022 10:32)  Source: .Blood Blood-Peripheral  Final Report:    No Growth Final    Culture - Blood (collected 30 Aug 2022 10:32)  Source: .Blood Blood-Peripheral  Final Report:    No Growth Final      HIV-1/2 Combo Result: Nonreact (08-12-22 @ 00:00)    COVID-19 PCR: NotDetec (09-11-22 @ 13:49)  COVID-19 PCR: NotDetec (09-01-22 @ 07:05)  COVID-19 PCR: NotDetec (08-28-22 @ 11:26)  COVID-19 PCR: NotDetec (08-24-22 @ 15:58)  COVID-19 PCR: NotDetec (08-17-22 @ 13:06)  COVID-19 PCR: NotDetec (08-14-22 @ 06:42)  COVID-19 PCR: NotDetec (08-08-22 @ 01:02)    COVID-19 Chuy Domain AB Interp: Positive (08-11-22 @ 23:59)  COVID-19 Nucleocapsid ALICIA AB Interp: Positive (08-11-22 @ 23:59)    RADIOLOGY:  imaging below personally reviewed      CT Abdomen and Pelvis w/ IV Cont:   ACC: [id number] EXAM:  CT ABDOMEN AND PELVIS IC                          PROCEDURE DATE:  09/13/2022          INTERPRETATION:  Clinical Information: Intra-abdominal collections    Comparison: 08/26/2022    Technique: Noncontrast CT abdomen and pelviswith Axial, sagittal,   coronal reconstructions.    Findings:    Imaged chest: Moderate bilateral pleural effusions and compressive   atelectasis are unchanged. Mild global cardiomegaly.  Hepatobiliary: Cirrhosis. Status post cholecystostomy tube in good   position. Biliary tree unremarkable..  Spleen: Unremarkable.  Pancreas: Unremarkable.  Adrenal glands: Unremarkable.  Urinary: Kidneys and ureters unremarkable. Urinary bladder wall is   thickened.  Reproductive organs: unremarkable.  Gastrointestinal: Ascending colon diverticulosis.  Peritoneum: Right upper quadrant fluid collection is nearly completely   resolved status post IR drainage catheter. Large lower anterior   peritoneal fluid collection is decreased in size status post IR drainage   catheter. Small ascites.  Vasculature: There is a splenorenal shunt.  Retroperitoneum: Unremarkable.  Subcutaneous tissues: Unremarkable.  Neuromusculoskeletal: Degenerative changes.    Impression:  -Near complete resolution of right upper quadrant collection.  -Large anterior peritoneal collection is decreased in size.

## 2022-09-16 NOTE — PROGRESS NOTE ADULT - ASSESSMENT
WORK UP  On 9/11 s/p perihepatic collection drainage, abdominal collection drainage, cholecystostomy tube re-positioning  Abdominal Drainage 1 (9/11) Moderate E coli (now Augmentin resistant)  Abdominal Drainage 2 (9/11) Moderate E coli (now Augmentin resistant)  Blood Cultures (9/11) 1/4 Serratia (based on BCID PCR)    CT A/P (9/13) Near complete resolution of right upper quadrant collection and Large anterior peritoneal collection is decreased in size.    DIAGNOSIS and IMPRESSION  66 year old male PMH cholangitis/cholecystitis (s/p ERCP w/ biliary stent placement 04/2022) and recently diagnosed etOH cirrhosis, had RUQ pain following biliary stent removal on 7/20/22, then  Found to have a distended GB is s/p Choley tube placement 8/9  now admitted with concerns for multiple large abdominal collections and choley tube repositioning. ID consulted for antibiotic management.    #Serratia Bacteremia, Peritonitis, Positive Culture Finding (Ascites)  --c/w Cefepime 1g IV Q8H  --c/w Metronidazole 500 mg PO/IV Q12H for empiric anaerobic coverage  --Continue to follow CBC with diff  --Continue to follow temperature curve  --Follow up on BCx (9/11) sensitivity, BCx (9/15)    #Pre-OLT  ID Clearance for OLT pending resolution of peritonitis     #Late Latent Syphilis  Denies knowledge of preceding diagnosis  Likely late latent syphilis  s/p IM Benzathine Penicillin 2.4 million units Q7Days x 3 doses    #Encounter to Vaccinate Patient  COVID19: 3/5/21, 3/26/21 - Would benefit from bivalent COVID19 vaccine  Influenza: Will require   Pneumococcal: PCV20 9/1/22  HBV: Heplisav dose 1 9/1/22. Dose 2 in 1 month.   HAV: Immune  Tdap: Will require Tdap  Shinglex: Will require Shingrix        PT TO BE SEEN. PRELIM NOTE  PENDING RECS. PLEASE WAIT FOR FINAL RECS AFTER DISCUSSION WITH ATTENDINGTyrese Walker DO, PGY-4   ID fellow  [employer] Teams Preferred  After 5pm/weekends call [phone]   WORK UP  On 9/11 s/p perihepatic collection drainage, abdominal collection drainage, cholecystostomy tube re-positioning  Abdominal Drainage 1 (9/11) Moderate E coli (now Augmentin resistant)  Abdominal Drainage 2 (9/11) Moderate E coli (now Augmentin resistant)  Blood Cultures (9/11) 1/4 Serratia (based on BCID PCR)    CT A/P (9/13) Near complete resolution of right upper quadrant collection and Large anterior peritoneal collection is decreased in size.    DIAGNOSIS and IMPRESSION  66 year old male PMH cholangitis/cholecystitis (s/p ERCP w/ biliary stent placement 04/2022) and recently diagnosed etOH cirrhosis, had RUQ pain following biliary stent removal on 7/20/22, then  Found to have a distended GB is s/p Choley tube placement 8/9  now admitted with concerns for multiple large abdominal collections and choley tube repositioning. ID consulted for antibiotic management.    #Serratia Bacteremia, Peritonitis, Positive Culture Finding (Ascites)  --c/w Cefepime 1g IV Q8H  --c/w Metronidazole 500 mg PO/IV Q12H for empiric anaerobic coverage  --Continue to follow CBC with diff  --Continue to follow temperature curve  --Follow up on BCx (9/11) sensitivity, BCx (9/15)    #Pre-OLT  ID Clearance for OLT pending resolution of peritonitis     #Late Latent Syphilis  Denies knowledge of preceding diagnosis  Likely late latent syphilis  s/p IM Benzathine Penicillin 2.4 million units Q7Days x 3 doses    #Encounter to Vaccinate Patient  COVID19: 3/5/21, 3/26/21 - Would benefit from bivalent COVID19 vaccine  Influenza: Will require   Pneumococcal: PCV20 9/1/22  HBV: Heplisav dose 1 9/1/22. Dose 2 in 1 month.   HAV: Immune  Tdap: Will require Tdap  Shinglex: Will require Shingrix    Efren Louis Walker DO, PGY-4   ID fellow  [employer] Teams Preferred  After 5pm/weekends call [phone]

## 2022-09-16 NOTE — PROGRESS NOTE ADULT - SUBJECTIVE AND OBJECTIVE BOX
Interventional Radiology Follow-Up Note     Patient seen and examined @ bedside     This is a 66 year old Male, s/p perihepatic collection drainage, abdominal collection drainage, & cholecystostomy tube re-positioning on 9/11/22 in IR with Dr. Yin.       Medication:     ampicillin/sulbactam  IVPB: (09-14)  cefepime   IVPB: (09-14)  cefepime   IVPB: (09-16)  fluconAZOLE IVPB: (09-15)  furosemide    Tablet: (09-16)  metroNIDAZOLE  IVPB: (09-16)  spironolactone: (09-16)  tamsulosin: (09-15)    Vitals:   T(F): 98, Max: 98.6 (21:00)  HR: 97  BP: 137/75  RR: 18  SpO2: 95%    Physical Exam:  General: Nontoxic, in NAD.  Abdomen: soft, NTND.  drains x 3   Drain Device: Drains intact attached to gravity bags.  Flushed with 5cc NS w/o difficulty. Dressing clean, dry, intact     24hr output:   RLQ Drain (mL): 200cc purulent/dark heme tinged,  Flushed with 5cc NS w/o difficulty. Dressing clean, dry, intact    RUQ Drain (mL): 20 cc purulent/dark heme tinged,  Flushed with 5cc NS w/o difficulty. Dressing clean, dry, intact, in bag not recorded on sunrise ****    Perc rodney drain: 100cc yellow,  Flushed with 5cc NS w/o difficulty. Dressing clean, dry, intact.           LABS:  Na: 131 (09-16 @ 06:58), 131 (09-15 @ 05:50), 130 (09-14 @ 06:43)  K: 3.9 (09-16 @ 06:58), 3.8 (09-15 @ 05:50), 3.3 (09-14 @ 06:43)  Cl: 101 (09-16 @ 06:58), 99 (09-15 @ 05:50), 96 (09-14 @ 06:43)  CO2: 26 (09-16 @ 06:58), 26 (09-15 @ 05:50), 26 (09-14 @ 06:43)  BUN: 6 (09-16 @ 06:58), 8 (09-15 @ 05:50), 8 (09-14 @ 06:43)  Cr: 0.67 (09-16 @ 06:58), 0.65 (09-15 @ 05:50), 0.62 (09-14 @ 06:43)  Glu: 83(09-16 @ 06:58), 101(09-15 @ 05:50), 107(09-14 @ 06:43)    Hgb: 9.1 (09-16 @ 07:09), 9.3 (09-15 @ 05:50), 9.8 (09-14 @ 06:53)  Hct: 27.8 (09-16 @ 07:09), 28.3 (09-15 @ 05:50), 29.3 (09-14 @ 06:53)  WBC: 10.49 (09-16 @ 07:09), 8.17 (09-15 @ 05:50), 9.87 (09-14 @ 06:53)  Plt: 102 (09-16 @ 07:09), 100 (09-15 @ 05:50), 97 (09-14 @ 06:53)    INR: 2.28 09-16-22 @ 07:09, 2.21 09-15-22 @ 05:50, 2.17 09-14-22 @ 06:53  PTT:      LIVER FUNCTIONS - ( 16 Sep 2022 06:58 )  Alb: 2.0 g/dL / Pro: 7.0 g/dL / ALK PHOS: 82 U/L / ALT: 11 U/L / AST: 24 U/L / GGT: x                    Assessment/Plan:  66 year old male with decompensated cirrhosis and cholangitis s/p ERCP with stent placement (4/2022) s/p stent removal on 7/20 (along sphincterotomy and stone extraction) now s/p cholecystostomy tube 8/9 with check 8/29 in correct location. He presents to OSH with bloody discharge from the wound site and abdominal distention found to have potentially dislodged IR cholecystostomy tube and ascites. Recent OSH imaging showing multiple large abdominal collections.   9/11 IR  s/p perihepatic collection drain, abdominal collection drain, & perc rodney re-positioning       -  Flush drains with 5cc NS daily forward only; DO NOT aspirate  -  Change dressing q3 days or when dressing is saturated.  -  Monitor h/h; transfuse as needed  -  Trend vs/labs  - Continue global management per primary team, IR will follow  -  If the patient is d/c home with drainage catheter, pt can make an appointment with IR by calling the IR booking office at [phone]; recommend IR follow in 10-14  Days for tube evaluation.  -  If drain outputs decrease to 10cc/24hr , recommend repeat ct non contrast abd pelvis for eval and contact IR.   -  Pt will benefit from VNS service to help with drainage catheter care; Pt should continue same drainage catheter care as an outpatient.    Please call IR at [phone] with any questions, concerns, or issues regarding above.      Also available on TEAMS

## 2022-09-17 LAB
-  AMIKACIN: SIGNIFICANT CHANGE UP
-  AMOXICILLIN/CLAVULANIC ACID: SIGNIFICANT CHANGE UP
-  AMPICILLIN/SULBACTAM: SIGNIFICANT CHANGE UP
-  AMPICILLIN: SIGNIFICANT CHANGE UP
-  AZTREONAM: SIGNIFICANT CHANGE UP
-  CEFAZOLIN: SIGNIFICANT CHANGE UP
-  CEFEPIME: SIGNIFICANT CHANGE UP
-  CEFOTAXIME: SIGNIFICANT CHANGE UP
-  CEFOXITIN: SIGNIFICANT CHANGE UP
-  CEFTAZIDIME: SIGNIFICANT CHANGE UP
-  CEFTRIAXONE: SIGNIFICANT CHANGE UP
-  CEFUROXIME: SIGNIFICANT CHANGE UP
-  CIPROFLOXACIN: SIGNIFICANT CHANGE UP
-  ERTAPENEM: SIGNIFICANT CHANGE UP
-  GENTAMICIN: SIGNIFICANT CHANGE UP
-  LEVOFLOXACIN: SIGNIFICANT CHANGE UP
-  MEROPENEM: SIGNIFICANT CHANGE UP
-  MINOCYCLINE: SIGNIFICANT CHANGE UP
-  PIPERACILLIN/TAZOBACTAM: SIGNIFICANT CHANGE UP
-  TIGECYCLINE: SIGNIFICANT CHANGE UP
-  TOBRAMYCIN: SIGNIFICANT CHANGE UP
-  TRIMETHOPRIM/SULFAMETHOXAZOLE: SIGNIFICANT CHANGE UP
ALBUMIN SERPL ELPH-MCNC: 2.3 G/DL — LOW (ref 3.3–5)
ALP SERPL-CCNC: 115 U/L — SIGNIFICANT CHANGE UP (ref 40–120)
ALT FLD-CCNC: 12 U/L — SIGNIFICANT CHANGE UP (ref 10–45)
ANION GAP SERPL CALC-SCNC: 7 MMOL/L — SIGNIFICANT CHANGE UP (ref 5–17)
AST SERPL-CCNC: 26 U/L — SIGNIFICANT CHANGE UP (ref 10–40)
BILIRUB SERPL-MCNC: 2.2 MG/DL — HIGH (ref 0.2–1.2)
BUN SERPL-MCNC: 7 MG/DL — SIGNIFICANT CHANGE UP (ref 7–23)
CALCIUM SERPL-MCNC: 8.9 MG/DL — SIGNIFICANT CHANGE UP (ref 8.4–10.5)
CHLORIDE SERPL-SCNC: 100 MMOL/L — SIGNIFICANT CHANGE UP (ref 96–108)
CO2 SERPL-SCNC: 25 MMOL/L — SIGNIFICANT CHANGE UP (ref 22–31)
CREAT SERPL-MCNC: 0.61 MG/DL — SIGNIFICANT CHANGE UP (ref 0.5–1.3)
CULTURE RESULTS: SIGNIFICANT CHANGE UP
CULTURE RESULTS: SIGNIFICANT CHANGE UP
EGFR: 107 ML/MIN/1.73M2 — SIGNIFICANT CHANGE UP
GLUCOSE SERPL-MCNC: 114 MG/DL — HIGH (ref 70–99)
HCT VFR BLD CALC: 31.5 % — LOW (ref 39–50)
HGB BLD-MCNC: 10 G/DL — LOW (ref 13–17)
INR BLD: 2.34 RATIO — HIGH (ref 0.88–1.16)
MAGNESIUM SERPL-MCNC: 1.9 MG/DL — SIGNIFICANT CHANGE UP (ref 1.6–2.6)
MCHC RBC-ENTMCNC: 31 PG — SIGNIFICANT CHANGE UP (ref 27–34)
MCHC RBC-ENTMCNC: 31.7 GM/DL — LOW (ref 32–36)
MCV RBC AUTO: 97.5 FL — SIGNIFICANT CHANGE UP (ref 80–100)
METHOD TYPE: SIGNIFICANT CHANGE UP
NRBC # BLD: 0 /100 WBCS — SIGNIFICANT CHANGE UP (ref 0–0)
ORGANISM # SPEC MICROSCOPIC CNT: SIGNIFICANT CHANGE UP
PHOSPHATE SERPL-MCNC: 2.2 MG/DL — LOW (ref 2.5–4.5)
PLATELET # BLD AUTO: 118 K/UL — LOW (ref 150–400)
POTASSIUM SERPL-MCNC: 3.9 MMOL/L — SIGNIFICANT CHANGE UP (ref 3.5–5.3)
POTASSIUM SERPL-SCNC: 3.9 MMOL/L — SIGNIFICANT CHANGE UP (ref 3.5–5.3)
PROT SERPL-MCNC: 7.7 G/DL — SIGNIFICANT CHANGE UP (ref 6–8.3)
PROTHROM AB SERPL-ACNC: 27.1 SEC — HIGH (ref 10.5–13.4)
RBC # BLD: 3.23 M/UL — LOW (ref 4.2–5.8)
RBC # FLD: 19.9 % — HIGH (ref 10.3–14.5)
SODIUM SERPL-SCNC: 132 MMOL/L — LOW (ref 135–145)
SPECIMEN SOURCE: SIGNIFICANT CHANGE UP
SPECIMEN SOURCE: SIGNIFICANT CHANGE UP
WBC # BLD: 11.23 K/UL — HIGH (ref 3.8–10.5)
WBC # FLD AUTO: 11.23 K/UL — HIGH (ref 3.8–10.5)

## 2022-09-17 PROCEDURE — 99232 SBSQ HOSP IP/OBS MODERATE 35: CPT | Mod: GC

## 2022-09-17 RX ORDER — SODIUM,POTASSIUM PHOSPHATES 278-250MG
1 POWDER IN PACKET (EA) ORAL ONCE
Refills: 0 | Status: COMPLETED | OUTPATIENT
Start: 2022-09-17 | End: 2022-09-17

## 2022-09-17 RX ADMIN — Medication 100 MILLIGRAM(S): at 05:41

## 2022-09-17 RX ADMIN — MAGNESIUM OXIDE 400 MG ORAL TABLET 400 MILLIGRAM(S): 241.3 TABLET ORAL at 13:22

## 2022-09-17 RX ADMIN — TAMSULOSIN HYDROCHLORIDE 0.4 MILLIGRAM(S): 0.4 CAPSULE ORAL at 21:26

## 2022-09-17 RX ADMIN — SPIRONOLACTONE 100 MILLIGRAM(S): 25 TABLET, FILM COATED ORAL at 05:41

## 2022-09-17 RX ADMIN — CEFEPIME 100 MILLIGRAM(S): 1 INJECTION, POWDER, FOR SOLUTION INTRAMUSCULAR; INTRAVENOUS at 05:40

## 2022-09-17 RX ADMIN — PANTOPRAZOLE SODIUM 40 MILLIGRAM(S): 20 TABLET, DELAYED RELEASE ORAL at 05:41

## 2022-09-17 RX ADMIN — Medication 1 PACKET(S): at 17:17

## 2022-09-17 RX ADMIN — MAGNESIUM OXIDE 400 MG ORAL TABLET 400 MILLIGRAM(S): 241.3 TABLET ORAL at 09:23

## 2022-09-17 RX ADMIN — POLYETHYLENE GLYCOL 3350 17 GRAM(S): 17 POWDER, FOR SOLUTION ORAL at 05:42

## 2022-09-17 RX ADMIN — FLUCONAZOLE 400 MILLIGRAM(S): 150 TABLET ORAL at 13:21

## 2022-09-17 RX ADMIN — Medication 40 MILLIGRAM(S): at 05:41

## 2022-09-17 RX ADMIN — CEFEPIME 100 MILLIGRAM(S): 1 INJECTION, POWDER, FOR SOLUTION INTRAMUSCULAR; INTRAVENOUS at 21:26

## 2022-09-17 RX ADMIN — Medication 1 TABLET(S): at 13:21

## 2022-09-17 RX ADMIN — SENNA PLUS 2 TABLET(S): 8.6 TABLET ORAL at 21:26

## 2022-09-17 RX ADMIN — CEFEPIME 100 MILLIGRAM(S): 1 INJECTION, POWDER, FOR SOLUTION INTRAMUSCULAR; INTRAVENOUS at 13:22

## 2022-09-17 NOTE — PROGRESS NOTE ADULT - ASSESSMENT
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 Currently Listed for OLT     #Decompensated ETOH/PARNELL Cirrhosis, listed for OLT  #Abdominal collection, infected hematoma s/p IR drainage x 2 with C tube repositioning c/b Augmentin resistant E coli  #Serratia bacteremia 9/11, repeat Cx negative  #Hyponatremia  MELD-Na: 22, 9/16  - HE: none currently  - EV: normal esophagus on ERCP from 7/2022  - Ascites: moderate 8/31 US  - SBP: paracentesis 8/9/2022 reveals likely secondary bacterial peritonitis from suspected gallbladder pathology, +SBP on para 8/31  - HCC:  No lesions on CT 8/26      Plan  - serratia sens to cefepime.  - On cefepime, metronidazole  - ID on board, appreciate recs  - ASA 81 daily  - Drain management as per IR  - Repeat CTAP next week  - Daily CMP, CBC, coags  - f/u repeat BCx    - Continue Fluconazole  - Hyponatremia: 1L fluid restricted  - Lasix 40, aldactone 100  - Physical therapy  [x] Psychosocial: cleared pending RPP  [x ] Infectious  [x] Cardiology:        Cardiac cath: n/a       Stress test: negative noninvasive stress test on 8/19/2022 for inducible ischemia  [ ] Colonoscopy: needed, f/u records from outside GI colonoscopy reports  [x] Prostate: Prostate Specific Antigen: negative at 2.21 ng/mL on 8/12/2022  [x] Dental  [x] PT/Frailty    Preliminary note until signed by Attending.

## 2022-09-17 NOTE — PROGRESS NOTE ADULT - SUBJECTIVE AND OBJECTIVE BOX
Chief Complaint:  Patient is a 66y old  Male who presents with a chief complaint of Bleeding around cholecystostomy tube (15 Sep 2022 10:24)     Interval Events:   -Afebrile, remains HDS  -No events overnight.   -C tube output 100cc  - RLQ drain 65cc, LUQ drain 40cc.      MEDICATIONS  (STANDING):  cefepime   IVPB      cefepime   IVPB 1000 milliGRAM(s) IV Intermittent every 8 hours  fluconAZOLE   Tablet 400 milliGRAM(s) Oral daily  furosemide    Tablet 40 milliGRAM(s) Oral daily  influenza   Vaccine 0.5 milliLiter(s) IntraMuscular once  magnesium oxide 400 milliGRAM(s) Oral three times a day with meals  metroNIDAZOLE  IVPB 500 milliGRAM(s) IV Intermittent two times a day  multivitamin 1 Tablet(s) Oral daily  pantoprazole    Tablet 40 milliGRAM(s) Oral before breakfast  polyethylene glycol 3350 17 Gram(s) Oral two times a day  senna 2 Tablet(s) Oral at bedtime  spironolactone 100 milliGRAM(s) Oral daily  tamsulosin 0.4 milliGRAM(s) Oral at bedtime    MEDICATIONS  (PRN):      PAST MEDICAL & SURGICAL HISTORY:  H/O acute cholangitis      2019 novel coronavirus disease (COVID-19)  12/24/2021  no hospitalization, no treatment      S/P ERCP  4/2022      H/O colonoscopy          Vital Signs Last 24 Hrs  T(C): 37 (17 Sep 2022 08:28), Max: 37.2 (16 Sep 2022 17:06)  T(F): 98.6 (17 Sep 2022 08:28), Max: 99 (16 Sep 2022 17:06)  HR: 96 (17 Sep 2022 08:28) (92 - 105)  BP: 133/87 (17 Sep 2022 08:28) (133/87 - 149/89)  BP(mean): --  RR: 18 (17 Sep 2022 08:28) (18 - 18)  SpO2: 97% (17 Sep 2022 08:28) (95% - 98%)    Parameters below as of 17 Sep 2022 08:28  Patient On (Oxygen Delivery Method): room air        I&O's Summary    16 Sep 2022 07:01  -  17 Sep 2022 07:00  --------------------------------------------------------  IN: 150 mL / OUT: 2005 mL / NET: -1855 mL    17 Sep 2022 07:01  -  17 Sep 2022 12:38  --------------------------------------------------------  IN: 240 mL / OUT: 1000 mL / NET: -760 mL                              10.0   11.23 )-----------( 118      ( 17 Sep 2022 06:52 )             31.5     09-17    132<L>  |  100  |  7   ----------------------------<  114<H>  3.9   |  25  |  0.61    Ca    8.9      17 Sep 2022 06:51  Phos  2.2     09-17  Mg     1.9     09-17    TPro  7.7  /  Alb  2.3<L>  /  TBili  2.2<H>  /  DBili  x   /  AST  26  /  ALT  12  /  AlkPhos  115  09-17          Culture - Blood (collected 09-15-22 @ 05:57)  Source: .Blood Blood  Preliminary Report (09-16-22 @ 12:02):    No growth to date.    Culture - Blood (collected 09-15-22 @ 05:57)  Source: .Blood Blood  Preliminary Report (09-16-22 @ 12:02):    No growth to date.    Culture - Abscess with Gram Stain (collected 09-11-22 @ 17:02)  Source: .Abscess perihepatic collection  Final Report (09-16-22 @ 16:31):    Moderate Escherichia coli  Organism: Escherichia coli (09-16-22 @ 16:31)  Organism: Escherichia coli (09-16-22 @ 16:31)    Culture - Abscess with Gram Stain (collected 09-11-22 @ 17:01)  Source: .Abscess abdominal collection  Final Report (09-16-22 @ 16:33):    Moderate Escherichia coli  Organism: Escherichia coli (09-16-22 @ 16:33)  Organism: Escherichia coli (09-16-22 @ 16:33)    Culture - Blood (collected 09-11-22 @ 16:20)  Source: .Blood Blood  Final Report (09-17-22 @ 01:01):    No Growth Final    Culture - Blood (collected 09-11-22 @ 16:20)  Source: .Blood Blood  Gram Stain (09-14-22 @ 08:34):    Growth in anaerobic bottle: Gram Negative Rods  Final Report (09-17-22 @ 08:26):    Growth in anaerobic bottle: Serratia marcescens    ***Blood Panel PCR results on this specimen are available    approximately 3 hours after the Gram stain result.***    Gram stain, PCR, and/or culture results may not always    correspond due to difference in methodologies.    ************************************************************    This PCR assay was performed by multiplex PCR. This    Assay tests for 66 bacterial and resistance gene targets.    Please refer to the [facility] test directory    at [email or web address] for details.  Organism: Blood Culture PCR  Serratia marcescens (09-17-22 @ 08:26)  Organism: Serratia marcescens (09-17-22 @ 08:26)  Organism: Blood Culture PCR (09-17-22 @ 08:26)    Culture - Urine (collected 09-11-22 @ 16:18)  Source: Clean Catch Clean Catch (Midstream)  Final Report (09-12-22 @ 20:31):    No growth      ROS:   Complete and normal except as mentioned above.      Physical Exam:  Constitutional: Well developed / well nourished  HEENT: no scleral icterus  Respiratory: normal respiratory effort  Cardiovascular: regular rate  Gastrointestinal: Soft abdomen, mild distended, IR drains with sanguinous substance, c tube to bag  Genitourinary: Voiding spontaneously  Extremities: SCD's in place and working bilaterally  Neurological: A&O x3  Skin: no rashes, ulcerations, lesions

## 2022-09-18 LAB
ALBUMIN SERPL ELPH-MCNC: 2 G/DL — LOW (ref 3.3–5)
ALP SERPL-CCNC: 94 U/L — SIGNIFICANT CHANGE UP (ref 40–120)
ALT FLD-CCNC: 12 U/L — SIGNIFICANT CHANGE UP (ref 10–45)
ANION GAP SERPL CALC-SCNC: 7 MMOL/L — SIGNIFICANT CHANGE UP (ref 5–17)
AST SERPL-CCNC: 26 U/L — SIGNIFICANT CHANGE UP (ref 10–40)
BILIRUB SERPL-MCNC: 2.1 MG/DL — HIGH (ref 0.2–1.2)
BUN SERPL-MCNC: 7 MG/DL — SIGNIFICANT CHANGE UP (ref 7–23)
CALCIUM SERPL-MCNC: 8.6 MG/DL — SIGNIFICANT CHANGE UP (ref 8.4–10.5)
CHLORIDE SERPL-SCNC: 102 MMOL/L — SIGNIFICANT CHANGE UP (ref 96–108)
CO2 SERPL-SCNC: 24 MMOL/L — SIGNIFICANT CHANGE UP (ref 22–31)
CREAT SERPL-MCNC: 0.56 MG/DL — SIGNIFICANT CHANGE UP (ref 0.5–1.3)
EGFR: 109 ML/MIN/1.73M2 — SIGNIFICANT CHANGE UP
GLUCOSE SERPL-MCNC: 93 MG/DL — SIGNIFICANT CHANGE UP (ref 70–99)
HCT VFR BLD CALC: 28.8 % — LOW (ref 39–50)
HGB BLD-MCNC: 9.2 G/DL — LOW (ref 13–17)
INR BLD: 2.39 RATIO — HIGH (ref 0.88–1.16)
MAGNESIUM SERPL-MCNC: 1.8 MG/DL — SIGNIFICANT CHANGE UP (ref 1.6–2.6)
MCHC RBC-ENTMCNC: 31 PG — SIGNIFICANT CHANGE UP (ref 27–34)
MCHC RBC-ENTMCNC: 31.9 GM/DL — LOW (ref 32–36)
MCV RBC AUTO: 97 FL — SIGNIFICANT CHANGE UP (ref 80–100)
NRBC # BLD: 0 /100 WBCS — SIGNIFICANT CHANGE UP (ref 0–0)
PHOSPHATE SERPL-MCNC: 2.5 MG/DL — SIGNIFICANT CHANGE UP (ref 2.5–4.5)
PLATELET # BLD AUTO: 104 K/UL — LOW (ref 150–400)
POTASSIUM SERPL-MCNC: 3.9 MMOL/L — SIGNIFICANT CHANGE UP (ref 3.5–5.3)
POTASSIUM SERPL-SCNC: 3.9 MMOL/L — SIGNIFICANT CHANGE UP (ref 3.5–5.3)
PROT SERPL-MCNC: 7.1 G/DL — SIGNIFICANT CHANGE UP (ref 6–8.3)
PROTHROM AB SERPL-ACNC: 28 SEC — HIGH (ref 10.5–13.4)
RBC # BLD: 2.97 M/UL — LOW (ref 4.2–5.8)
RBC # FLD: 19.9 % — HIGH (ref 10.3–14.5)
SARS-COV-2 RNA SPEC QL NAA+PROBE: SIGNIFICANT CHANGE UP
SODIUM SERPL-SCNC: 133 MMOL/L — LOW (ref 135–145)
WBC # BLD: 9.64 K/UL — SIGNIFICANT CHANGE UP (ref 3.8–10.5)
WBC # FLD AUTO: 9.64 K/UL — SIGNIFICANT CHANGE UP (ref 3.8–10.5)

## 2022-09-18 PROCEDURE — 99232 SBSQ HOSP IP/OBS MODERATE 35: CPT

## 2022-09-18 RX ADMIN — CEFEPIME 100 MILLIGRAM(S): 1 INJECTION, POWDER, FOR SOLUTION INTRAMUSCULAR; INTRAVENOUS at 22:29

## 2022-09-18 RX ADMIN — SPIRONOLACTONE 100 MILLIGRAM(S): 25 TABLET, FILM COATED ORAL at 05:08

## 2022-09-18 RX ADMIN — MAGNESIUM OXIDE 400 MG ORAL TABLET 400 MILLIGRAM(S): 241.3 TABLET ORAL at 09:19

## 2022-09-18 RX ADMIN — MAGNESIUM OXIDE 400 MG ORAL TABLET 400 MILLIGRAM(S): 241.3 TABLET ORAL at 12:19

## 2022-09-18 RX ADMIN — Medication 100 MILLIGRAM(S): at 05:06

## 2022-09-18 RX ADMIN — CEFEPIME 100 MILLIGRAM(S): 1 INJECTION, POWDER, FOR SOLUTION INTRAMUSCULAR; INTRAVENOUS at 05:06

## 2022-09-18 RX ADMIN — Medication 40 MILLIGRAM(S): at 05:08

## 2022-09-18 RX ADMIN — MAGNESIUM OXIDE 400 MG ORAL TABLET 400 MILLIGRAM(S): 241.3 TABLET ORAL at 17:23

## 2022-09-18 RX ADMIN — TAMSULOSIN HYDROCHLORIDE 0.4 MILLIGRAM(S): 0.4 CAPSULE ORAL at 22:30

## 2022-09-18 RX ADMIN — PANTOPRAZOLE SODIUM 40 MILLIGRAM(S): 20 TABLET, DELAYED RELEASE ORAL at 05:07

## 2022-09-18 RX ADMIN — Medication 100 MILLIGRAM(S): at 17:23

## 2022-09-18 RX ADMIN — CEFEPIME 100 MILLIGRAM(S): 1 INJECTION, POWDER, FOR SOLUTION INTRAMUSCULAR; INTRAVENOUS at 14:14

## 2022-09-18 RX ADMIN — FLUCONAZOLE 400 MILLIGRAM(S): 150 TABLET ORAL at 12:19

## 2022-09-18 RX ADMIN — Medication 1 TABLET(S): at 12:20

## 2022-09-18 RX ADMIN — POLYETHYLENE GLYCOL 3350 17 GRAM(S): 17 POWDER, FOR SOLUTION ORAL at 17:23

## 2022-09-18 NOTE — PROGRESS NOTE ADULT - SUBJECTIVE AND OBJECTIVE BOX
Chief Complaint:  Patient is a 66y old  Male who presents with a chief complaint of Bleeding around cholecystostomy tube (15 Sep 2022 10:24)     Interval Events:   -Afebrile, remains HDS  -No events overnight.   -C tube output 90ml  - RLQ drain 60ml, RUQ drain 10ml      MEDICATIONS  (STANDING):  cefepime   IVPB      cefepime   IVPB 1000 milliGRAM(s) IV Intermittent every 8 hours  fluconAZOLE   Tablet 400 milliGRAM(s) Oral daily  furosemide    Tablet 40 milliGRAM(s) Oral daily  influenza   Vaccine 0.5 milliLiter(s) IntraMuscular once  magnesium oxide 400 milliGRAM(s) Oral three times a day with meals  metroNIDAZOLE  IVPB 500 milliGRAM(s) IV Intermittent two times a day  multivitamin 1 Tablet(s) Oral daily  pantoprazole    Tablet 40 milliGRAM(s) Oral before breakfast  polyethylene glycol 3350 17 Gram(s) Oral two times a day  senna 2 Tablet(s) Oral at bedtime  spironolactone 100 milliGRAM(s) Oral daily  tamsulosin 0.4 milliGRAM(s) Oral at bedtime    MEDICATIONS  (PRN):      PAST MEDICAL & SURGICAL HISTORY:  H/O acute cholangitis      2019 novel coronavirus disease (COVID-19)  12/24/2021  no hospitalization, no treatment      S/P ERCP  4/2022      H/O colonoscopy          Vital Signs Last 24 Hrs  T(C): 37.1 (18 Sep 2022 08:58), Max: 37.1 (18 Sep 2022 08:58)  T(F): 98.7 (18 Sep 2022 08:58), Max: 98.7 (18 Sep 2022 08:58)  HR: 96 (18 Sep 2022 08:58) (93 - 99)  BP: 134/77 (18 Sep 2022 08:58) (131/86 - 148/81)  BP(mean): --  RR: 18 (18 Sep 2022 08:58) (18 - 18)  SpO2: 96% (18 Sep 2022 08:58) (96% - 98%)    Parameters below as of 18 Sep 2022 08:58  Patient On (Oxygen Delivery Method): room air        I&O's Summary    17 Sep 2022 07:01  -  18 Sep 2022 07:00  --------------------------------------------------------  IN: 870 mL / OUT: 2500 mL / NET: -1630 mL    18 Sep 2022 07:01  -  18 Sep 2022 12:47  --------------------------------------------------------  IN: 0 mL / OUT: 650 mL / NET: -650 mL                              9.2    9.64  )-----------( 104      ( 18 Sep 2022 06:34 )             28.8     09-18    133<L>  |  102  |  7   ----------------------------<  93  3.9   |  24  |  0.56    Ca    8.6      18 Sep 2022 06:34  Phos  2.5     09-18  Mg     1.8     09-18    TPro  7.1  /  Alb  2.0<L>  /  TBili  2.1<H>  /  DBili  x   /  AST  26  /  ALT  12  /  AlkPhos  94  09-18          Culture - Blood (collected 09-15-22 @ 05:57)  Source: .Blood Blood  Preliminary Report (09-16-22 @ 12:02):    No growth to date.    Culture - Blood (collected 09-15-22 @ 05:57)  Source: .Blood Blood  Preliminary Report (09-16-22 @ 12:02):    No growth to date.    Culture - Abscess with Gram Stain (collected 09-11-22 @ 17:02)  Source: .Abscess perihepatic collection  Final Report (09-16-22 @ 16:31):    Moderate Escherichia coli  Organism: Escherichia coli (09-16-22 @ 16:31)  Organism: Escherichia coli (09-16-22 @ 16:31)    Culture - Abscess with Gram Stain (collected 09-11-22 @ 17:01)  Source: .Abscess abdominal collection  Final Report (09-16-22 @ 16:33):    Moderate Escherichia coli  Organism: Escherichia coli (09-16-22 @ 16:33)  Organism: Escherichia coli (09-16-22 @ 16:33)    Culture - Blood (collected 09-11-22 @ 16:20)  Source: .Blood Blood  Final Report (09-17-22 @ 01:01):    No Growth Final    Culture - Blood (collected 09-11-22 @ 16:20)  Source: .Blood Blood  Gram Stain (09-14-22 @ 08:34):    Growth in anaerobic bottle: Gram Negative Rods  Final Report (09-17-22 @ 08:26):    Growth in anaerobic bottle: Serratia marcescens    ***Blood Panel PCR results on this specimen are available    approximately 3 hours after the Gram stain result.***    Gram stain, PCR, and/or culture results may not always    correspond due to difference in methodologies.    ************************************************************    This PCR assay was performed by multiplex PCR. This    Assay tests for 66 bacterial and resistance gene targets.    Please refer to the [facility] test directory    at [email or web address] for details.  Organism: Blood Culture PCR  Serratia marcescens (09-17-22 @ 08:26)  Organism: Serratia marcescens (09-17-22 @ 08:26)  Organism: Blood Culture PCR (09-17-22 @ 08:26)    Culture - Urine (collected 09-11-22 @ 16:18)  Source: Clean Catch Clean Catch (Midstream)  Final Report (09-12-22 @ 20:31):    No growth          ROS:   Complete and normal except as mentioned above.      Physical Exam:  Constitutional: Well developed / well nourished  HEENT: no scleral icterus  Respiratory: normal respiratory effort  Cardiovascular: regular rate  Gastrointestinal: Soft abdomen, mild distended, IR drains with sanguinous substance, c tube to bag  Genitourinary: Voiding spontaneously  Extremities: SCD's in place and working bilaterally  Neurological: A&O x3  Skin: no rashes, ulcerations, lesions

## 2022-09-18 NOTE — PROGRESS NOTE ADULT - ASSESSMENT
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 Currently Listed for OLT     #Decompensated ETOH/PARNELL Cirrhosis, listed for OLT  #Abdominal collection, infected hematoma s/p IR drainage x 2 with C tube repositioning c/b Augmentin resistant E coli  #Serratia bacteremia 9/11, repeat BCx 9/15 negative  #Hyponatremia  MELD-Na: 22, 9/18  - HE: none currently  - EV: normal esophagus on ERCP from 7/2022  - Ascites: moderate 8/31 US  - SBP: paracentesis 8/9/2022 reveals likely secondary bacterial peritonitis from suspected gallbladder pathology, +SBP on para 8/31  - HCC:  No lesions on CT 8/26      Plan  - serratia sens to cefepime.  - On cefepime, metronidazole  - ID on board, appreciate recs  - ASA 81 daily  - Drain management as per IR RUQ drain output 10ml.24 hours -> IR consult placed for drain removal   - Repeat CTAP this week  - Daily CMP, CBC, coags  - Continue Fluconazole  - Lasix 40, aldactone 100  - Physical therapy  [x] Psychosocial: cleared pending RPP  [x ] Infectious  [x] Cardiology:        Cardiac cath: n/a       Stress test: negative noninvasive stress test on 8/19/2022 for inducible ischemia  [ ] Colonoscopy: needed, f/u records from outside GI colonoscopy reports  [x] Prostate: Prostate Specific Antigen: negative at 2.21 ng/mL on 8/12/2022  [x] Dental  [x] PT/Frailty    Preliminary note until signed by Attending.

## 2022-09-18 NOTE — PROVIDER CONTACT NOTE (MEDICATION) - ACTION/TREATMENT ORDERED:
Explain to pt importance of medication. pt verbalized understanding and insist to refuse. NP Deanna GREGORY made aware.

## 2022-09-19 LAB
ALBUMIN SERPL ELPH-MCNC: 2.1 G/DL — LOW (ref 3.3–5)
ALP SERPL-CCNC: 83 U/L — SIGNIFICANT CHANGE UP (ref 40–120)
ALT FLD-CCNC: 10 U/L — SIGNIFICANT CHANGE UP (ref 10–45)
ANION GAP SERPL CALC-SCNC: 2 MMOL/L — LOW (ref 5–17)
AST SERPL-CCNC: 21 U/L — SIGNIFICANT CHANGE UP (ref 10–40)
BASOPHILS # BLD AUTO: 0.04 K/UL — SIGNIFICANT CHANGE UP (ref 0–0.2)
BASOPHILS NFR BLD AUTO: 0.5 % — SIGNIFICANT CHANGE UP (ref 0–2)
BILIRUB SERPL-MCNC: 2.3 MG/DL — HIGH (ref 0.2–1.2)
BUN SERPL-MCNC: 8 MG/DL — SIGNIFICANT CHANGE UP (ref 7–23)
CALCIUM SERPL-MCNC: 8.5 MG/DL — SIGNIFICANT CHANGE UP (ref 8.4–10.5)
CHLORIDE SERPL-SCNC: 102 MMOL/L — SIGNIFICANT CHANGE UP (ref 96–108)
CO2 SERPL-SCNC: 27 MMOL/L — SIGNIFICANT CHANGE UP (ref 22–31)
CREAT SERPL-MCNC: 0.61 MG/DL — SIGNIFICANT CHANGE UP (ref 0.5–1.3)
EGFR: 107 ML/MIN/1.73M2 — SIGNIFICANT CHANGE UP
EOSINOPHIL # BLD AUTO: 0.11 K/UL — SIGNIFICANT CHANGE UP (ref 0–0.5)
EOSINOPHIL NFR BLD AUTO: 1.3 % — SIGNIFICANT CHANGE UP (ref 0–6)
GLUCOSE SERPL-MCNC: 123 MG/DL — HIGH (ref 70–99)
HCT VFR BLD CALC: 28.6 % — LOW (ref 39–50)
HGB BLD-MCNC: 9 G/DL — LOW (ref 13–17)
IMM GRANULOCYTES NFR BLD AUTO: 2.2 % — HIGH (ref 0–0.9)
INR BLD: 2.52 RATIO — HIGH (ref 0.88–1.16)
LYMPHOCYTES # BLD AUTO: 1.71 K/UL — SIGNIFICANT CHANGE UP (ref 1–3.3)
LYMPHOCYTES # BLD AUTO: 19.8 % — SIGNIFICANT CHANGE UP (ref 13–44)
MAGNESIUM SERPL-MCNC: 1.8 MG/DL — SIGNIFICANT CHANGE UP (ref 1.6–2.6)
MCHC RBC-ENTMCNC: 30.4 PG — SIGNIFICANT CHANGE UP (ref 27–34)
MCHC RBC-ENTMCNC: 31.5 GM/DL — LOW (ref 32–36)
MCV RBC AUTO: 96.6 FL — SIGNIFICANT CHANGE UP (ref 80–100)
MONOCYTES # BLD AUTO: 0.77 K/UL — SIGNIFICANT CHANGE UP (ref 0–0.9)
MONOCYTES NFR BLD AUTO: 8.9 % — SIGNIFICANT CHANGE UP (ref 2–14)
NEUTROPHILS # BLD AUTO: 5.81 K/UL — SIGNIFICANT CHANGE UP (ref 1.8–7.4)
NEUTROPHILS NFR BLD AUTO: 67.3 % — SIGNIFICANT CHANGE UP (ref 43–77)
NRBC # BLD: 0 /100 WBCS — SIGNIFICANT CHANGE UP (ref 0–0)
PHOSPHATE SERPL-MCNC: 2.4 MG/DL — LOW (ref 2.5–4.5)
PLATELET # BLD AUTO: 98 K/UL — LOW (ref 150–400)
POTASSIUM SERPL-MCNC: 4.1 MMOL/L — SIGNIFICANT CHANGE UP (ref 3.5–5.3)
POTASSIUM SERPL-SCNC: 4.1 MMOL/L — SIGNIFICANT CHANGE UP (ref 3.5–5.3)
PROT SERPL-MCNC: 7.1 G/DL — SIGNIFICANT CHANGE UP (ref 6–8.3)
PROTHROM AB SERPL-ACNC: 29.3 SEC — HIGH (ref 10.5–13.4)
RBC # BLD: 2.96 M/UL — LOW (ref 4.2–5.8)
RBC # FLD: 20.1 % — HIGH (ref 10.3–14.5)
SODIUM SERPL-SCNC: 131 MMOL/L — LOW (ref 135–145)
WBC # BLD: 8.63 K/UL — SIGNIFICANT CHANGE UP (ref 3.8–10.5)
WBC # FLD AUTO: 8.63 K/UL — SIGNIFICANT CHANGE UP (ref 3.8–10.5)

## 2022-09-19 PROCEDURE — 99232 SBSQ HOSP IP/OBS MODERATE 35: CPT

## 2022-09-19 PROCEDURE — 99232 SBSQ HOSP IP/OBS MODERATE 35: CPT | Mod: GC

## 2022-09-19 PROCEDURE — 99251: CPT

## 2022-09-19 RX ADMIN — Medication 40 MILLIGRAM(S): at 05:23

## 2022-09-19 RX ADMIN — Medication 1 TABLET(S): at 13:00

## 2022-09-19 RX ADMIN — POLYETHYLENE GLYCOL 3350 17 GRAM(S): 17 POWDER, FOR SOLUTION ORAL at 05:23

## 2022-09-19 RX ADMIN — MAGNESIUM OXIDE 400 MG ORAL TABLET 400 MILLIGRAM(S): 241.3 TABLET ORAL at 12:59

## 2022-09-19 RX ADMIN — POLYETHYLENE GLYCOL 3350 17 GRAM(S): 17 POWDER, FOR SOLUTION ORAL at 17:18

## 2022-09-19 RX ADMIN — TAMSULOSIN HYDROCHLORIDE 0.4 MILLIGRAM(S): 0.4 CAPSULE ORAL at 21:38

## 2022-09-19 RX ADMIN — Medication 100 MILLIGRAM(S): at 05:23

## 2022-09-19 RX ADMIN — MAGNESIUM OXIDE 400 MG ORAL TABLET 400 MILLIGRAM(S): 241.3 TABLET ORAL at 17:18

## 2022-09-19 RX ADMIN — FLUCONAZOLE 400 MILLIGRAM(S): 150 TABLET ORAL at 12:59

## 2022-09-19 RX ADMIN — SENNA PLUS 2 TABLET(S): 8.6 TABLET ORAL at 21:38

## 2022-09-19 RX ADMIN — CEFEPIME 100 MILLIGRAM(S): 1 INJECTION, POWDER, FOR SOLUTION INTRAMUSCULAR; INTRAVENOUS at 21:38

## 2022-09-19 RX ADMIN — CEFEPIME 100 MILLIGRAM(S): 1 INJECTION, POWDER, FOR SOLUTION INTRAMUSCULAR; INTRAVENOUS at 14:17

## 2022-09-19 RX ADMIN — Medication 100 MILLIGRAM(S): at 17:19

## 2022-09-19 RX ADMIN — SPIRONOLACTONE 100 MILLIGRAM(S): 25 TABLET, FILM COATED ORAL at 05:23

## 2022-09-19 RX ADMIN — CEFEPIME 100 MILLIGRAM(S): 1 INJECTION, POWDER, FOR SOLUTION INTRAMUSCULAR; INTRAVENOUS at 05:09

## 2022-09-19 RX ADMIN — PANTOPRAZOLE SODIUM 40 MILLIGRAM(S): 20 TABLET, DELAYED RELEASE ORAL at 05:23

## 2022-09-19 NOTE — PROCEDURE NOTE - GENERAL PROCEDURE NAME
perihepatic collection drainage, abdominal collection drainage, cholecystostomy tube re-positioning
abdominal drainage catheters evaluation/ removal/ upsizing.

## 2022-09-19 NOTE — PROCEDURE NOTE - PROCEDURE FINDINGS AND DETAILS
successful john-hepatic collection drainage, 10F tube placed, 150cc sanguinous output  successful abdominal collection drainage, 10F tube placed, 200cc sanguinous output  successful re-positioning of cholecystostomy tube
1) Perihepatic abscess drain evaluation shows minimal residual cavity, given minimal output drain was removed over a wire.   2) Lower abdominal abscess drain evaluation demonstrated large residual cavity the catheter was exchanged/ upsized to a 14Fr biliary type catheter under fluoroscopic guidance.   3)Cholecystostomy tube check shows patent cystic/ CBD. multiple filling defects within the gallbladder.

## 2022-09-19 NOTE — PROGRESS NOTE ADULT - ASSESSMENT
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 Currently Listed for OLT     #Decompensated ETOH/PARNELL Cirrhosis, listed for OLT  #Abdominal collection, infected hematoma s/p IR drainage x 2 with C tube repositioning c/b Augmentin resistant E coli  #Serratia bacteremia 9/11, repeat BCx 9/15 negative  #Hyponatremia  MELD-Na: 22, 9/18  - HE: none currently  - EV: normal esophagus on ERCP from 7/2022  - Ascites: moderate 8/31 US  - SBP: paracentesis 8/9/2022 reveals likely secondary bacterial peritonitis from suspected gallbladder pathology, +SBP on para 8/31  - HCC:  No lesions on CT 8/26      Plan  - Plans to dc Rt upper abdominal drain  - F/u ID: long term plan for Abx  - serratia sens to cefepime.  - On cefepime, metronidazole  - ID on board, appreciate recs  - ASA 81 daily  - Drain management as per IR RUQ drain output 10ml.24 hours -> IR consult placed for drain removal   - Repeat CTAP   - Daily CMP, CBC, coags  - Continue Fluconazole  - Lasix 40, aldactone 100  - Physical therapy  [x] Psychosocial: cleared pending RPP  [x ] Infectious  [x] Cardiology:        Cardiac cath: n/a       Stress test: negative noninvasive stress test on 8/19/2022 for inducible ischemia  [ ] Colonoscopy: needed, f/u records from outside GI colonoscopy reports  [x] Prostate: Prostate Specific Antigen: negative at 2.21 ng/mL on 8/12/2022  [x] Dental  [x] PT/Frailty    Preliminary note until signed by Attending.    Preliminary note until signed by Attending.    Thank you for involving us in this patient's care.    Kary Tena MD  Gastroenterology/Hepatology Fellow, PGY-VI    NON-URGENT CONSULTS:  Please email [email or web address]  AT NIGHT AND ON WEEKENDS:  Contact on-call GI fellow via answering service ([phone]) from 5pm-8am and on weekends/holidays  MONDAY-FRIDAY 8AM-5PM:  Pager# [phone] ([facility])  GI Phone# [phone] ([facility])

## 2022-09-19 NOTE — PROCEDURE NOTE - PLAN
flushed lower abdominal drain with 10cc NS q12hr  flush cholecystotomy tube with 5cc NS daily.  change dressing q3 days.  monitor outputs.
f/u gram stain and culture

## 2022-09-19 NOTE — PRE PROCEDURE NOTE - PRE PROCEDURE EVALUATION
Interventional Radiology    HPI: 66 year old male with decompensated cirrhosis and cholangitis s/p ERCP with stent placement (4/2022) s/p stent removal on 7/20 (along sphincterotomy and stone extraction) now s/p cholecystostomy tube 8/9 with check 8/29 in correct location. He presents to OSH with bloody discharge from the wound site and abdominal distention found to have potentially dislodged IR cholecystostomy tube and ascites. Recent OSH imaging showing multiple large abdominal collections.   9/11 IR  s/p perihepatic collection drain, abdominal collection drain, & perc rodney re-positioning. CT from 9/13 shows improved perihepatic collection, persistent large lower abdominal collection however, decreased in size. IR requested for drain studies.     Allergies:   Medications (Abx/Cardiac/Anticoagulation/Blood Products)    cefepime   IVPB: 100 mL/Hr IV Intermittent (09-19 @ 05:09)  fluconAZOLE   Tablet: 400 milliGRAM(s) Oral (09-18 @ 12:19)  furosemide    Tablet: 40 milliGRAM(s) Oral (09-19 @ 05:23)  metroNIDAZOLE  IVPB: 100 mL/Hr IV Intermittent (09-19 @ 05:23)  spironolactone: 100 milliGRAM(s) Oral (09-19 @ 05:23)  tamsulosin: 0.4 milliGRAM(s) Oral (09-18 @ 22:30)    Data:    T(C): 36.7  HR: 96  BP: 130/80  RR: 18  SpO2: 97%    Exam  General: No acute distress  Chest: Non labored breathing    -WBC 8.63 / HgB 9.0 / Hct 28.6 / Plt 98  -Na 131 / Cl 102 / BUN 8 / Glucose 123  -K 4.1 / CO2 27 / Cr 0.61  -ALT 10 / Alk Phos 83 / T.Bili 2.3  -INR2.52    Imaging: reviewed     Plan: 67y Male presents for abdominal abscess drainage catheters check +/- exchange/ removal and cholecystostomy tube check.   -Risks/Benefits/alternatives explained with the patient and/or healthcare proxy and witnessed informed consent obtained.

## 2022-09-19 NOTE — PROCEDURE NOTE - SPECIMEN OBTAINED
Fluid sent for gram stain and culture
None
[FreeTextEntry1] : APAP Compliance, Date: 08/12/2021 - 09/10/2021\par Usage: 28 days (93.3% Compliance)\par Percent of Days with Usage >= 4 hours: 90% \par Average Use: 6 hours 39 minutes 27 seconds\par Pressure Setting 6-15 cm H2O \par Auto-CPAP Peak Avg Pressure: 13.5 cmH2O\par Avg Device Pressure <= 90% of the time: 13.7 cmH2O\par Average Time in Large Leak per day: 1 min 19 secs\par Avg AHI: 1.9\par \par APAP Compliance, Date: 05/12/2021 - 06/10/2021\par Usage: 29 days (96.7% Compliance)\par Percent of Days with Usage >= 4 hours: 93.3% \par Average Use: 7 hours 11 minutes\par Pressure Setting 6-15 cm H2O \par Auto-CPAP Peak Avg Pressure: 13.2 cmH2O\par Avg Device Pressure <= 90% of the time: 13.4 cmH2O\par Average Time in Large Leak per day: 1 min 46 secs\par Avg AHI: 1.6\par \par APAP Compliance, Date: 01/18/2021 - 02/16/2021\par Usage: 30 days (100.0% Compliance)\par Percent of Days with Usage >= 4 hours: 90% \par Average Use: 5 hours 21 minutes\par Pressure Setting 4-15 cm H2O \par Auto-CPAP Peak Avg Pressure: 11.7 cmH2O\par Avg Device Pressure <= 90% of the time: 12.0 cmH2O\par Average Time in Large Leak per day: 1 min 20 secs\par Avg AHI: 2.9\par \par PSG, Date: 03/06/2018, AHI: [30.2], [REM AHI: [0]], lowest saturation [85]%], PLM Index: [9.5]\par \par HST [10/24/2017], AHI: [66.3], lowest saturation [63]%

## 2022-09-19 NOTE — PROGRESS NOTE ADULT - SUBJECTIVE AND OBJECTIVE BOX
Interventional Radiology Follow-Up Note    Patient seen and examined @ bedside     This is a 66 year old Male, s/p perihepatic collection drainage, abdominal collection drainage, & cholecystostomy tube re-positioning on 9/11/22 in IR with Dr. Yin.      No complaint offered.    Medication:  cefepime   IVPB: (09-19)  fluconAZOLE   Tablet: (09-18)  furosemide    Tablet: (09-19)  metroNIDAZOLE  IVPB: (09-19)  spironolactone: (09-19)  tamsulosin: (09-18)    Vitals:  T(F): 98.1, Max: 99 (01:00)  HR: 96  BP: 130/80  RR: 18  SpO2: 97%    Physical Exam:  General: Nontoxic, in NAD.  Abdomen: soft, NTND.  drains x 3   Drain Device: Drains intact attached to gravity bags.  Flushed with 5cc NS w/o difficulty. Dressing clean, dry, intact     24hr output:   RLQ Drain (mL): 30cc dark output  Flushed with 5cc NS w/o difficulty. Dressing clean, dry, intact    RUQ Drain (mL): 21 cc dark output,  Flushed with 5cc NS w/o difficulty. Dressing clean, dry, intact    Perc rodney drain: 30cc Flushed with 5cc NS w/o difficulty. Dressing clean, dry, intact      LABS:  Na: 131 (09-19 @ 06:13), 133 (09-18 @ 06:34), 132 (09-17 @ 06:51)  K: 4.1 (09-19 @ 06:13), 3.9 (09-18 @ 06:34), 3.9 (09-17 @ 06:51)  Cl: 102 (09-19 @ 06:13), 102 (09-18 @ 06:34), 100 (09-17 @ 06:51)  CO2: 27 (09-19 @ 06:13), 24 (09-18 @ 06:34), 25 (09-17 @ 06:51)  BUN: 8 (09-19 @ 06:13), 7 (09-18 @ 06:34), 7 (09-17 @ 06:51)  Cr: 0.61 (09-19 @ 06:13), 0.56 (09-18 @ 06:34), 0.61 (09-17 @ 06:51)  Glu: 123(09-19 @ 06:13), 93(09-18 @ 06:34), 114(09-17 @ 06:51)  Hgb: 9.0 (09-19 @ 06:13), 9.2 (09-18 @ 06:34), 10.0 (09-17 @ 06:52)  Hct: 28.6 (09-19 @ 06:13), 28.8 (09-18 @ 06:34), 31.5 (09-17 @ 06:52)  WBC: 8.63 (09-19 @ 06:13), 9.64 (09-18 @ 06:34), 11.23 (09-17 @ 06:52)  Plt: 98 (09-19 @ 06:13), 104 (09-18 @ 06:34), 118 (09-17 @ 06:52)  INR: 2.52 09-19-22 @ 06:13, 2.39 09-18-22 @ 06:34, 2.34 09-17-22 @ 06:52      LIVER FUNCTIONS - ( 19 Sep 2022 06:13 )  Alb: 2.1 g/dL / Pro: 7.1 g/dL / ALK PHOS: 83 U/L / ALT: 10 U/L / AST: 21 U/L / GGT: x         Bilirubin Total, Serum: 2.3 mg/dL (09-19-22 @ 06:13)  Aspartate Aminotransferase (AST/SGOT): 21 U/L (09-19-22 @ 06:13)  Alanine Aminotransferase (ALT/SGPT): 10 U/L (09-19-22 @ 06:13)  Aspartate Aminotransferase (AST/SGOT): 26 U/L (09-18-22 @ 06:34)  Alanine Aminotransferase (ALT/SGPT): 12 U/L (09-18-22 @ 06:34)  Bilirubin Total, Serum: 2.3 mg/dL *H* (09-19-22 @ 06:13)  Bilirubin Total, Serum: 2.1 mg/dL *H* (09-18-22 @ 06:34)  Bilirubin Total, Serum: 2.2 mg/dL *H* (09-17-22 @ 06:51)  Bilirubin Total, Serum: 2.0 mg/dL *H* (09-16-22 @ 06:58)  Bilirubin Total, Serum: 2.1 mg/dL *H* (09-15-22 @ 05:50)      Assessment/Plan:  66 year old male with decompensated cirrhosis and cholangitis s/p ERCP with stent placement (4/2022) s/p stent removal on 7/20 (along sphincterotomy and stone extraction) now s/p cholecystostomy tube 8/9 with check 8/29 in correct location. He presents to OSH with bloody discharge from the wound site and abdominal distention found to have potentially dislodged IR cholecystostomy tube and ascites. Recent OSH imaging showing multiple large abdominal collections.   9/11 IR  s/p perihepatic collection drain, abdominal collection drain, & perc rodney re-positioning       - Plan for drain evaluation in IR today  -  Flush drains with 5cc NS daily forward only; DO NOT aspirate  -  Change dressing q3 days or when dressing is saturated.  -  Monitor h/h; transfuse as needed  -  Trend vs/labs  - Continue global management per primary team, IR will follow  -  If the patient is d/c home with drainage catheter, pt can make an appointment with IR by calling the IR booking office at [phone]; recommend IR follow in 10-14  Days for tube evaluation.  -  If drain outputs decrease to 10cc/24hr , recommend repeat ct non contrast abd pelvis for eval and contact IR.   -  Pt will benefit from VNS service to help with drainage catheter care; Pt should continue same drainage catheter care as an outpatient.    Please call IR at [phone] with any questions, concerns, or issues regarding above.      Also available on TEAMS

## 2022-09-19 NOTE — PROGRESS NOTE ADULT - SUBJECTIVE AND OBJECTIVE BOX
Chief Complaint:  Patient is a 67y old  Male who presents with a chief complaint of Bleeding around cholecystostomy tube (19 Sep 2022 09:59)    Interval Events:   Remains Afebrile, HDS    Hospital Medications:  cefepime   IVPB      cefepime   IVPB 1000 milliGRAM(s) IV Intermittent every 8 hours  fluconAZOLE   Tablet 400 milliGRAM(s) Oral daily  furosemide    Tablet 40 milliGRAM(s) Oral daily  influenza   Vaccine 0.5 milliLiter(s) IntraMuscular once  magnesium oxide 400 milliGRAM(s) Oral three times a day with meals  metroNIDAZOLE  IVPB 500 milliGRAM(s) IV Intermittent two times a day  multivitamin 1 Tablet(s) Oral daily  pantoprazole    Tablet 40 milliGRAM(s) Oral before breakfast  polyethylene glycol 3350 17 Gram(s) Oral two times a day  senna 2 Tablet(s) Oral at bedtime  spironolactone 100 milliGRAM(s) Oral daily  tamsulosin 0.4 milliGRAM(s) Oral at bedtime      ROS:   Complete and normal except as mentioned above.    PHYSICAL EXAM:   Vital Signs:  Vital Signs Last 24 Hrs  T(C): 36.7 (19 Sep 2022 09:18), Max: 37.2 (19 Sep 2022 01:00)  T(F): 98.1 (19 Sep 2022 09:18), Max: 99 (19 Sep 2022 01:00)  HR: 96 (19 Sep 2022 09:18) (94 - 101)  BP: 130/80 (19 Sep 2022 09:18) (128/72 - 160/88)  BP(mean): --  RR: 18 (19 Sep 2022 09:18) (18 - 18)  SpO2: 97% (19 Sep 2022 09:18) (96% - 98%)    Parameters below as of 19 Sep 2022 08:55  Patient On (Oxygen Delivery Method): room air      Daily     Daily     Constitutional: Well developed / well nourished  HEENT: no scleral icterus  Respiratory: normal respiratory effort  Cardiovascular: regular rate  Gastrointestinal: Soft abdomen, mild distended, IR drains with sanguinous substance, c tube to bag  Genitourinary: Voiding spontaneously  Extremities: SCD's in place and working bilaterally  Neurological: A&O x3  Skin: no rashes, ulcerations, lesions    LABS: reviewed                        9.0    8.63  )-----------( 98       ( 19 Sep 2022 06:13 )             28.6     09-19    131<L>  |  102  |  8   ----------------------------<  123<H>  4.1   |  27  |  0.61    Ca    8.5      19 Sep 2022 06:13  Phos  2.4     09-19  Mg     1.8     09-19    TPro  7.1  /  Alb  2.1<L>  /  TBili  2.3<H>  /  DBili  x   /  AST  21  /  ALT  10  /  AlkPhos  83  09-19    LIVER FUNCTIONS - ( 19 Sep 2022 06:13 )  Alb: 2.1 g/dL / Pro: 7.1 g/dL / ALK PHOS: 83 U/L / ALT: 10 U/L / AST: 21 U/L / GGT: x             Interval Diagnostic Studies: see sunrise for full report   Chief Complaint:  Patient is a 67y old  Male who presents with a chief complaint of Bleeding around cholecystostomy tube (19 Sep 2022 09:59)    Interval Events:   Remains Afebrile, HDS. No abdominal pain and says distension is better than previously. Planned for IR intervention.    Hospital Medications:  cefepime   IVPB      cefepime   IVPB 1000 milliGRAM(s) IV Intermittent every 8 hours  fluconAZOLE   Tablet 400 milliGRAM(s) Oral daily  furosemide    Tablet 40 milliGRAM(s) Oral daily  influenza   Vaccine 0.5 milliLiter(s) IntraMuscular once  magnesium oxide 400 milliGRAM(s) Oral three times a day with meals  metroNIDAZOLE  IVPB 500 milliGRAM(s) IV Intermittent two times a day  multivitamin 1 Tablet(s) Oral daily  pantoprazole    Tablet 40 milliGRAM(s) Oral before breakfast  polyethylene glycol 3350 17 Gram(s) Oral two times a day  senna 2 Tablet(s) Oral at bedtime  spironolactone 100 milliGRAM(s) Oral daily  tamsulosin 0.4 milliGRAM(s) Oral at bedtime      ROS:   Complete and normal except as mentioned above.    PHYSICAL EXAM:   Vital Signs:  Vital Signs Last 24 Hrs  T(C): 36.7 (19 Sep 2022 09:18), Max: 37.2 (19 Sep 2022 01:00)  T(F): 98.1 (19 Sep 2022 09:18), Max: 99 (19 Sep 2022 01:00)  HR: 96 (19 Sep 2022 09:18) (94 - 101)  BP: 130/80 (19 Sep 2022 09:18) (128/72 - 160/88)  BP(mean): --  RR: 18 (19 Sep 2022 09:18) (18 - 18)  SpO2: 97% (19 Sep 2022 09:18) (96% - 98%)    Parameters below as of 19 Sep 2022 08:55  Patient On (Oxygen Delivery Method): room air      Daily     Daily     Constitutional: Well developed / well nourished  HEENT: no scleral icterus  Respiratory: normal respiratory effort  Cardiovascular: regular rate  Gastrointestinal: Soft abdomen, mild distended, IR drains with sanguinous substance, c tube to bag  Genitourinary: Voiding spontaneously  Extremities: SCD's in place and working bilaterally  Neurological: A&O x3  Skin: no rashes, ulcerations, lesions    LABS: reviewed                        9.0    8.63  )-----------( 98       ( 19 Sep 2022 06:13 )             28.6     09-19    131<L>  |  102  |  8   ----------------------------<  123<H>  4.1   |  27  |  0.61    Ca    8.5      19 Sep 2022 06:13  Phos  2.4     09-19  Mg     1.8     09-19    TPro  7.1  /  Alb  2.1<L>  /  TBili  2.3<H>  /  DBili  x   /  AST  21  /  ALT  10  /  AlkPhos  83  09-19    LIVER FUNCTIONS - ( 19 Sep 2022 06:13 )  Alb: 2.1 g/dL / Pro: 7.1 g/dL / ALK PHOS: 83 U/L / ALT: 10 U/L / AST: 21 U/L / GGT: x             Interval Diagnostic Studies: see sunrise for full report

## 2022-09-20 LAB
ALBUMIN SERPL ELPH-MCNC: 2.1 G/DL — LOW (ref 3.3–5)
ALP SERPL-CCNC: 80 U/L — SIGNIFICANT CHANGE UP (ref 40–120)
ALT FLD-CCNC: 11 U/L — SIGNIFICANT CHANGE UP (ref 10–45)
ANION GAP SERPL CALC-SCNC: 5 MMOL/L — SIGNIFICANT CHANGE UP (ref 5–17)
AST SERPL-CCNC: 31 U/L — SIGNIFICANT CHANGE UP (ref 10–40)
BASOPHILS # BLD AUTO: 0.05 K/UL — SIGNIFICANT CHANGE UP (ref 0–0.2)
BASOPHILS NFR BLD AUTO: 0.6 % — SIGNIFICANT CHANGE UP (ref 0–2)
BILIRUB SERPL-MCNC: 2.4 MG/DL — HIGH (ref 0.2–1.2)
BUN SERPL-MCNC: 8 MG/DL — SIGNIFICANT CHANGE UP (ref 7–23)
CALCIUM SERPL-MCNC: 8.6 MG/DL — SIGNIFICANT CHANGE UP (ref 8.4–10.5)
CHLORIDE SERPL-SCNC: 101 MMOL/L — SIGNIFICANT CHANGE UP (ref 96–108)
CO2 SERPL-SCNC: 25 MMOL/L — SIGNIFICANT CHANGE UP (ref 22–31)
CREAT SERPL-MCNC: 0.58 MG/DL — SIGNIFICANT CHANGE UP (ref 0.5–1.3)
CULTURE RESULTS: SIGNIFICANT CHANGE UP
CULTURE RESULTS: SIGNIFICANT CHANGE UP
EGFR: 108 ML/MIN/1.73M2 — SIGNIFICANT CHANGE UP
EOSINOPHIL # BLD AUTO: 0.08 K/UL — SIGNIFICANT CHANGE UP (ref 0–0.5)
EOSINOPHIL NFR BLD AUTO: 1 % — SIGNIFICANT CHANGE UP (ref 0–6)
GLUCOSE SERPL-MCNC: 88 MG/DL — SIGNIFICANT CHANGE UP (ref 70–99)
HCT VFR BLD CALC: 26.8 % — LOW (ref 39–50)
HGB BLD-MCNC: 8.7 G/DL — LOW (ref 13–17)
IMM GRANULOCYTES NFR BLD AUTO: 1.6 % — HIGH (ref 0–0.9)
INR BLD: 2.48 RATIO — HIGH (ref 0.88–1.16)
LYMPHOCYTES # BLD AUTO: 2 K/UL — SIGNIFICANT CHANGE UP (ref 1–3.3)
LYMPHOCYTES # BLD AUTO: 24.6 % — SIGNIFICANT CHANGE UP (ref 13–44)
MAGNESIUM SERPL-MCNC: 1.7 MG/DL — SIGNIFICANT CHANGE UP (ref 1.6–2.6)
MCHC RBC-ENTMCNC: 31.5 PG — SIGNIFICANT CHANGE UP (ref 27–34)
MCHC RBC-ENTMCNC: 32.5 GM/DL — SIGNIFICANT CHANGE UP (ref 32–36)
MCV RBC AUTO: 97.1 FL — SIGNIFICANT CHANGE UP (ref 80–100)
MONOCYTES # BLD AUTO: 0.93 K/UL — HIGH (ref 0–0.9)
MONOCYTES NFR BLD AUTO: 11.4 % — SIGNIFICANT CHANGE UP (ref 2–14)
NEUTROPHILS # BLD AUTO: 4.94 K/UL — SIGNIFICANT CHANGE UP (ref 1.8–7.4)
NEUTROPHILS NFR BLD AUTO: 60.8 % — SIGNIFICANT CHANGE UP (ref 43–77)
NRBC # BLD: 0 /100 WBCS — SIGNIFICANT CHANGE UP (ref 0–0)
PHOSPHATE SERPL-MCNC: 2.6 MG/DL — SIGNIFICANT CHANGE UP (ref 2.5–4.5)
PLATELET # BLD AUTO: 95 K/UL — LOW (ref 150–400)
POTASSIUM SERPL-MCNC: 4 MMOL/L — SIGNIFICANT CHANGE UP (ref 3.5–5.3)
POTASSIUM SERPL-SCNC: 4 MMOL/L — SIGNIFICANT CHANGE UP (ref 3.5–5.3)
PROT SERPL-MCNC: 7.3 G/DL — SIGNIFICANT CHANGE UP (ref 6–8.3)
PROTHROM AB SERPL-ACNC: 29 SEC — HIGH (ref 10.5–13.4)
RBC # BLD: 2.76 M/UL — LOW (ref 4.2–5.8)
RBC # FLD: 19.9 % — HIGH (ref 10.3–14.5)
SODIUM SERPL-SCNC: 131 MMOL/L — LOW (ref 135–145)
SPECIMEN SOURCE: SIGNIFICANT CHANGE UP
SPECIMEN SOURCE: SIGNIFICANT CHANGE UP
WBC # BLD: 8.13 K/UL — SIGNIFICANT CHANGE UP (ref 3.8–10.5)
WBC # FLD AUTO: 8.13 K/UL — SIGNIFICANT CHANGE UP (ref 3.8–10.5)

## 2022-09-20 PROCEDURE — 99232 SBSQ HOSP IP/OBS MODERATE 35: CPT | Mod: GC

## 2022-09-20 PROCEDURE — 99232 SBSQ HOSP IP/OBS MODERATE 35: CPT

## 2022-09-20 RX ORDER — FUROSEMIDE 40 MG
20 TABLET ORAL ONCE
Refills: 0 | Status: COMPLETED | OUTPATIENT
Start: 2022-09-20 | End: 2022-09-20

## 2022-09-20 RX ORDER — SPIRONOLACTONE 25 MG/1
150 TABLET, FILM COATED ORAL DAILY
Refills: 0 | Status: DISCONTINUED | OUTPATIENT
Start: 2022-09-21 | End: 2022-09-23

## 2022-09-20 RX ORDER — FUROSEMIDE 40 MG
60 TABLET ORAL DAILY
Refills: 0 | Status: DISCONTINUED | OUTPATIENT
Start: 2022-09-21 | End: 2022-09-23

## 2022-09-20 RX ORDER — SPIRONOLACTONE 25 MG/1
50 TABLET, FILM COATED ORAL ONCE
Refills: 0 | Status: COMPLETED | OUTPATIENT
Start: 2022-09-20 | End: 2022-09-20

## 2022-09-20 RX ADMIN — POLYETHYLENE GLYCOL 3350 17 GRAM(S): 17 POWDER, FOR SOLUTION ORAL at 12:21

## 2022-09-20 RX ADMIN — CEFEPIME 100 MILLIGRAM(S): 1 INJECTION, POWDER, FOR SOLUTION INTRAMUSCULAR; INTRAVENOUS at 22:40

## 2022-09-20 RX ADMIN — PANTOPRAZOLE SODIUM 40 MILLIGRAM(S): 20 TABLET, DELAYED RELEASE ORAL at 05:36

## 2022-09-20 RX ADMIN — SPIRONOLACTONE 50 MILLIGRAM(S): 25 TABLET, FILM COATED ORAL at 13:45

## 2022-09-20 RX ADMIN — Medication 40 MILLIGRAM(S): at 05:34

## 2022-09-20 RX ADMIN — Medication 1 TABLET(S): at 12:21

## 2022-09-20 RX ADMIN — Medication 20 MILLIGRAM(S): at 13:45

## 2022-09-20 RX ADMIN — MAGNESIUM OXIDE 400 MG ORAL TABLET 400 MILLIGRAM(S): 241.3 TABLET ORAL at 08:35

## 2022-09-20 RX ADMIN — SPIRONOLACTONE 100 MILLIGRAM(S): 25 TABLET, FILM COATED ORAL at 05:34

## 2022-09-20 RX ADMIN — CEFEPIME 100 MILLIGRAM(S): 1 INJECTION, POWDER, FOR SOLUTION INTRAMUSCULAR; INTRAVENOUS at 13:26

## 2022-09-20 RX ADMIN — CEFEPIME 100 MILLIGRAM(S): 1 INJECTION, POWDER, FOR SOLUTION INTRAMUSCULAR; INTRAVENOUS at 05:34

## 2022-09-20 RX ADMIN — MAGNESIUM OXIDE 400 MG ORAL TABLET 400 MILLIGRAM(S): 241.3 TABLET ORAL at 12:20

## 2022-09-20 RX ADMIN — TAMSULOSIN HYDROCHLORIDE 0.4 MILLIGRAM(S): 0.4 CAPSULE ORAL at 21:34

## 2022-09-20 RX ADMIN — MAGNESIUM OXIDE 400 MG ORAL TABLET 400 MILLIGRAM(S): 241.3 TABLET ORAL at 17:06

## 2022-09-20 RX ADMIN — FLUCONAZOLE 400 MILLIGRAM(S): 150 TABLET ORAL at 12:21

## 2022-09-20 RX ADMIN — Medication 100 MILLIGRAM(S): at 08:35

## 2022-09-20 RX ADMIN — POLYETHYLENE GLYCOL 3350 17 GRAM(S): 17 POWDER, FOR SOLUTION ORAL at 17:07

## 2022-09-20 RX ADMIN — Medication 100 MILLIGRAM(S): at 21:33

## 2022-09-20 RX ADMIN — SENNA PLUS 2 TABLET(S): 8.6 TABLET ORAL at 21:34

## 2022-09-20 NOTE — PROGRESS NOTE ADULT - SUBJECTIVE AND OBJECTIVE BOX
Interventional Radiology Follow-Up Note.    Patient seen and examined @ bedside.    This is a 66 year old Male, s/p perihepatic collection drain placement and abdominal collection drain placement on 9/11/22. Cholecystostomy tube placement on 8/9/22 (last exchanged on 9/11/22) in IR with Dr. Yin. Perihepatic collection resolved and drain was removed on 9/19/22.    No complaint offered.      Medication:    cefepime   IVPB: (09-20)  fluconAZOLE   Tablet: (09-19)  furosemide    Tablet: (09-20)  metroNIDAZOLE  IVPB: (09-20)  spironolactone: (09-20)  tamsulosin: (09-19)    Vitals:  T(F): 98.1, Max: 98.9 (17:34)  HR: 66  BP: 112/73  RR: 18  SpO2: 96%    Physical Exam:  General: Nontoxic, in NAD.  Abdomen: soft, NTND.   Drain Device: Drain intact attached to ____.  Flushed with 5cc NS w/o difficulty, no leaking. Dressing clean, dry, intact.   24hr Drain output: ____          LABS:  Na: 131 (09-20 @ 06:18), 131 (09-19 @ 06:13), 133 (09-18 @ 06:34)  K: 4.0 (09-20 @ 06:18), 4.1 (09-19 @ 06:13), 3.9 (09-18 @ 06:34)  Cl: 101 (09-20 @ 06:18), 102 (09-19 @ 06:13), 102 (09-18 @ 06:34)  CO2: 25 (09-20 @ 06:18), 27 (09-19 @ 06:13), 24 (09-18 @ 06:34)  BUN: 8 (09-20 @ 06:18), 8 (09-19 @ 06:13), 7 (09-18 @ 06:34)  Cr: 0.58 (09-20 @ 06:18), 0.61 (09-19 @ 06:13), 0.56 (09-18 @ 06:34)  Glu: 88(09-20 @ 06:18), 123(09-19 @ 06:13), 93(09-18 @ 06:34)    Hgb: 8.7 (09-20 @ 06:25), 9.0 (09-19 @ 06:13), 9.2 (09-18 @ 06:34)  Hct: 26.8 (09-20 @ 06:25), 28.6 (09-19 @ 06:13), 28.8 (09-18 @ 06:34)  WBC: 8.13 (09-20 @ 06:25), 8.63 (09-19 @ 06:13), 9.64 (09-18 @ 06:34)  Plt: 95 (09-20 @ 06:25), 98 (09-19 @ 06:13), 104 (09-18 @ 06:34)    INR: 2.48 09-20-22 @ 06:25, 2.52 09-19-22 @ 06:13, 2.39 09-18-22 @ 06:34  PTT:           LIVER FUNCTIONS - ( 20 Sep 2022 06:18 )  Alb: 2.1 g/dL / Pro: 7.3 g/dL / ALK PHOS: 80 U/L / ALT: 11 U/L / AST: 31 U/L / GGT: x                 Bilirubin Total, Serum: 2.4 mg/dL (09-20-22 @ 06:18)    Aspartate Aminotransferase (AST/SGOT): 31 U/L (09-20-22 @ 06:18)  Alanine Aminotransferase (ALT/SGPT): 11 U/L (09-20-22 @ 06:18)  Aspartate Aminotransferase (AST/SGOT): 21 U/L (09-19-22 @ 06:13)  Alanine Aminotransferase (ALT/SGPT): 10 U/L (09-19-22 @ 06:13)      Bilirubin Total, Serum: 2.4 mg/dL *H* (09-20-22 @ 06:18)  Bilirubin Total, Serum: 2.3 mg/dL *H* (09-19-22 @ 06:13)  Bilirubin Total, Serum: 2.1 mg/dL *H* (09-18-22 @ 06:34)          Assessment/Plan:  67y Male admitted with Abdominal pain    Pt most recently s/p perihepatic collection drain placement and abdominal collection drain placement on 9/11/22. Cholecystostomy tube placement on 8/9/22 (last exchanged on 9/11/22) in IR with Dr. Yin. Perihepatic collection resolved and drain was removed on 9/19/22..     - Flush drain with 5cc normal saline daily forward only; DO NOT aspirate  - Change dressing q3 days or when dressing is saturated.  - Trend vs/labs  - Continue global management per primary team  - If the patient is d/c home with drainage catheter, pt can make an appointment with IR by calling the IR booking office at [phone]; recommend IR follow in _____wks/months after discharge for tube evaluation.  - Pt will benefit from VNS service to help with drainage catheter care; Pt should continue same drainage catheter care as an outpatient.  - Greater than 50% of the encounter was spent counseling and/ or coordination of care on drain care and follow up.      Please call IR at [phone] with any questions, concerns, or issues regarding above.    Also available on [employer] TEAMS.   Interventional Radiology Follow-Up Note.    Patient seen and examined @ bedside.    This is a 66 year old Male s/p perihepatic collection drain placement and abdominal collection drain placement on 9/11/22. Cholecystostomy tube placement on 8/9/22 (last exchanged on 9/11/22) in IR with Dr. Yin. Perihepatic collection resolved and drain was removed on 9/19/22.    No complaint offered.      Medication:  cefepime   IVPB: (09-20)  fluconAZOLE   Tablet: (09-19)  furosemide    Tablet: (09-20)  metroNIDAZOLE  IVPB: (09-20)  spironolactone: (09-20)  tamsulosin: (09-19)    Vitals:  T(F): 98.1, Max: 98.9 (17:34)  HR: 66  BP: 112/73  RR: 18  SpO2: 96%    Physical Exam:  General: Nontoxic, in NAD.  Abdomen: soft, NTND.   Drain Device: Drain intact attached to ____.  Flushed with 5cc NS w/o difficulty, no leaking. Dressing clean, dry, intact.   24hr Drain output: ____    Drain Device: Drain intact attached to ____.  Flushed with 5cc NS w/o difficulty, no leaking. Dressing clean, dry, intact.   24hr Drain output: ____        LABS:  Na: 131 (09-20 @ 06:18), 131 (09-19 @ 06:13), 133 (09-18 @ 06:34)  K: 4.0 (09-20 @ 06:18), 4.1 (09-19 @ 06:13), 3.9 (09-18 @ 06:34)  Cl: 101 (09-20 @ 06:18), 102 (09-19 @ 06:13), 102 (09-18 @ 06:34)  CO2: 25 (09-20 @ 06:18), 27 (09-19 @ 06:13), 24 (09-18 @ 06:34)  BUN: 8 (09-20 @ 06:18), 8 (09-19 @ 06:13), 7 (09-18 @ 06:34)  Cr: 0.58 (09-20 @ 06:18), 0.61 (09-19 @ 06:13), 0.56 (09-18 @ 06:34)  Glu: 88(09-20 @ 06:18), 123(09-19 @ 06:13), 93(09-18 @ 06:34)    Hgb: 8.7 (09-20 @ 06:25), 9.0 (09-19 @ 06:13), 9.2 (09-18 @ 06:34)  Hct: 26.8 (09-20 @ 06:25), 28.6 (09-19 @ 06:13), 28.8 (09-18 @ 06:34)  WBC: 8.13 (09-20 @ 06:25), 8.63 (09-19 @ 06:13), 9.64 (09-18 @ 06:34)  Plt: 95 (09-20 @ 06:25), 98 (09-19 @ 06:13), 104 (09-18 @ 06:34)    INR: 2.48 09-20-22 @ 06:25, 2.52 09-19-22 @ 06:13, 2.39 09-18-22 @ 06:34  PTT:           LIVER FUNCTIONS - ( 20 Sep 2022 06:18 )  Alb: 2.1 g/dL / Pro: 7.3 g/dL / ALK PHOS: 80 U/L / ALT: 11 U/L / AST: 31 U/L / GGT: x                 Bilirubin Total, Serum: 2.4 mg/dL (09-20-22 @ 06:18)    Aspartate Aminotransferase (AST/SGOT): 31 U/L (09-20-22 @ 06:18)  Alanine Aminotransferase (ALT/SGPT): 11 U/L (09-20-22 @ 06:18)  Aspartate Aminotransferase (AST/SGOT): 21 U/L (09-19-22 @ 06:13)  Alanine Aminotransferase (ALT/SGPT): 10 U/L (09-19-22 @ 06:13)      Bilirubin Total, Serum: 2.4 mg/dL *H* (09-20-22 @ 06:18)  Bilirubin Total, Serum: 2.3 mg/dL *H* (09-19-22 @ 06:13)  Bilirubin Total, Serum: 2.1 mg/dL *H* (09-18-22 @ 06:34)          Assessment/Plan:  66 year old male with decompensated cirrhosis and cholangitis s/p ERCP with stent placement (4/2022) s/p stent removal on 7/20 (along sphincterotomy and stone extraction) now s/p cholecystostomy tube 8/9 with check 8/29 in correct location. He presents to OSH with bloody discharge from the wound site and abdominal distention found to have potentially dislodged IR cholecystostomy tube and ascites. Recent OSH imaging showing multiple large abdominal collections.    Pt most recently s/p perihepatic collection drain placement and abdominal collection drain placement on 9/11/22. Cholecystostomy tube placement on 8/9/22 (last exchanged on 9/11/22) in IR with Dr. Yin. Perihepatic collection resolved and drain was removed on 9/19/22..     - 9/19/22 Procedure Findings and Details: (1) Perihepatic abscess drain evaluation shows minimal residual cavity, given minimal output drain was removed over a wire. (2) Lower abdominal abscess drain evaluation demonstrated large residual cavity the catheter was exchanged/ upsized to a 14Fr biliary type catheter under fluoroscopic guidance. (3)Cholecystostomy tube check shows patent cystic/ CBD. Multiple filling defects within the gallbladder.    - Flush drains with 5cc normal saline daily forward only; DO NOT aspirate  - Change dressing q3 days or when dressing is saturated.  - Trend vs/labs  - Continue global management per primary team  - If abscess drain outputs decrease to 10cc/24hr while inpatient, recommend repeat drain evaluation with IR.  - If the patient is d/c home with drainage catheter, pt can make an appointment with IR by calling the IR booking office at [phone]; recommend IR follow in 7-10 days after discharge for abscess tube evaluation.  - Pt will benefit from VNS service to help with drainage catheter care; Pt should continue same drainage catheter care as an outpatient.  - Greater than 50% of the encounter was spent counseling and/ or coordination of care on drain care and follow up.      Please call IR at [phone] with any questions, concerns, or issues regarding above.    Also available on [employer] TEAMS.   Interventional Radiology Follow-Up Note.    Patient seen and examined @ bedside.    This is a 66 year old Male s/p perihepatic collection drain placement and abdominal collection drain placement on 9/11/22. Cholecystostomy tube placement on 8/9/22 (last exchanged on 9/11/22) in IR with Dr. Yin. Perihepatic collection resolved and drain was removed on 9/19/22.    No complaint offered.      Medication:  cefepime   IVPB: (09-20)  fluconAZOLE   Tablet: (09-19)  furosemide    Tablet: (09-20)  metroNIDAZOLE  IVPB: (09-20)  spironolactone: (09-20)  tamsulosin: (09-19)    Vitals:  T(F): 98.1, Max: 98.9 (17:34)  HR: 66  BP: 112/73  RR: 18  SpO2: 96%    Physical Exam:  General: Nontoxic, in NAD.  Abdomen: soft, NTND.   Abdominal drain Device: Drain intact attached to gravity bag.  Flushed with 5cc NS w/o difficulty, no leaking, +return of fluid into bag. Dressing clean, dry, intact.   24hr Drain output: 110cc    Cholecystostomy Device: Drain intact attached to gravity bag.  Flushed with 5cc NS w/o difficulty, no leaking, +return of fluid into bag. Dressing clean, dry, intact.   24hr Drain output: 10cc         LABS:  Na: 131 (09-20 @ 06:18), 131 (09-19 @ 06:13), 133 (09-18 @ 06:34)  K: 4.0 (09-20 @ 06:18), 4.1 (09-19 @ 06:13), 3.9 (09-18 @ 06:34)  Cl: 101 (09-20 @ 06:18), 102 (09-19 @ 06:13), 102 (09-18 @ 06:34)  CO2: 25 (09-20 @ 06:18), 27 (09-19 @ 06:13), 24 (09-18 @ 06:34)  BUN: 8 (09-20 @ 06:18), 8 (09-19 @ 06:13), 7 (09-18 @ 06:34)  Cr: 0.58 (09-20 @ 06:18), 0.61 (09-19 @ 06:13), 0.56 (09-18 @ 06:34)  Glu: 88(09-20 @ 06:18), 123(09-19 @ 06:13), 93(09-18 @ 06:34)    Hgb: 8.7 (09-20 @ 06:25), 9.0 (09-19 @ 06:13), 9.2 (09-18 @ 06:34)  Hct: 26.8 (09-20 @ 06:25), 28.6 (09-19 @ 06:13), 28.8 (09-18 @ 06:34)  WBC: 8.13 (09-20 @ 06:25), 8.63 (09-19 @ 06:13), 9.64 (09-18 @ 06:34)  Plt: 95 (09-20 @ 06:25), 98 (09-19 @ 06:13), 104 (09-18 @ 06:34)    INR: 2.48 09-20-22 @ 06:25, 2.52 09-19-22 @ 06:13, 2.39 09-18-22 @ 06:34  PTT:           LIVER FUNCTIONS - ( 20 Sep 2022 06:18 )  Alb: 2.1 g/dL / Pro: 7.3 g/dL / ALK PHOS: 80 U/L / ALT: 11 U/L / AST: 31 U/L / GGT: x                 Bilirubin Total, Serum: 2.4 mg/dL (09-20-22 @ 06:18)    Aspartate Aminotransferase (AST/SGOT): 31 U/L (09-20-22 @ 06:18)  Alanine Aminotransferase (ALT/SGPT): 11 U/L (09-20-22 @ 06:18)  Aspartate Aminotransferase (AST/SGOT): 21 U/L (09-19-22 @ 06:13)  Alanine Aminotransferase (ALT/SGPT): 10 U/L (09-19-22 @ 06:13)      Bilirubin Total, Serum: 2.4 mg/dL *H* (09-20-22 @ 06:18)  Bilirubin Total, Serum: 2.3 mg/dL *H* (09-19-22 @ 06:13)  Bilirubin Total, Serum: 2.1 mg/dL *H* (09-18-22 @ 06:34)          Assessment/Plan:  66 year old male with decompensated cirrhosis and cholangitis s/p ERCP with stent placement (4/2022) s/p stent removal on 7/20 (along sphincterotomy and stone extraction) now s/p cholecystostomy tube 8/9 with check 8/29 in correct location. He presents to OSH with bloody discharge from the wound site and abdominal distention found to have potentially dislodged IR cholecystostomy tube and ascites. Recent OSH imaging showing multiple large abdominal collections.    Pt most recently s/p perihepatic collection drain placement and abdominal collection drain placement on 9/11/22. Cholecystostomy tube placement on 8/9/22 (last exchanged on 9/11/22) in IR with Dr. Yin. Perihepatic collection resolved and drain was removed on 9/19/22..     - 9/19/22 Procedure Findings and Details: (1) Perihepatic abscess drain evaluation shows minimal residual cavity, given minimal output drain was removed over a wire. (2) Lower abdominal abscess drain evaluation demonstrated large residual cavity the catheter was exchanged/ upsized to a 14Fr biliary type catheter under fluoroscopic guidance. (3)Cholecystostomy tube check shows patent cystic/ CBD. Multiple filling defects within the gallbladder.    - Leukocytosis resolved, continue abx per primary team.  - Flush drains with 5cc normal saline daily forward only; DO NOT aspirate  - Change dressing q3 days or when dressing is saturated.  - Trend vs/labs  - Continue global management per primary team  - If abscess drain outputs decrease to 10cc/24hr while inpatient, recommend repeat drain evaluation with IR.  ----------------  - Outpatient follow up: Abscess drain evaluation appointment made for 9/30/22 @10:30AM. COVID-19 test prior on 9/27 @ 1:30PM. Call the IR booking office at [phone] with any questions.  - Cholecystostomy drain outpatient follow up: recommend IR follow every 3 months for routine exchange. Call the IR booking office at [phone] for appointment.  - Pt will benefit from VNS service to help with drainage catheter care; Pt should continue same drainage catheter care as an outpatient.  - Greater than 50% of the encounter was spent counseling and/ or coordination of care on drain care and follow up.      Please call IR at [phone] with any questions, concerns, or issues regarding above.    Also available on [employer] TEAMS.

## 2022-09-20 NOTE — PROGRESS NOTE ADULT - SUBJECTIVE AND OBJECTIVE BOX
Chief Complaint:  Patient is a 67y old  Male who presents with a chief complaint of Bleeding around cholecystostomy tube (20 Sep 2022 10:01)      Interval Events:   Drain removed yesterday; lower drain exchanged/upsized to 14Fr    Hospital Medications:  cefepime   IVPB      cefepime   IVPB 1000 milliGRAM(s) IV Intermittent every 8 hours  fluconAZOLE   Tablet 400 milliGRAM(s) Oral daily  furosemide    Tablet 40 milliGRAM(s) Oral daily  influenza   Vaccine 0.5 milliLiter(s) IntraMuscular once  magnesium oxide 400 milliGRAM(s) Oral three times a day with meals  metroNIDAZOLE  IVPB 500 milliGRAM(s) IV Intermittent two times a day  multivitamin 1 Tablet(s) Oral daily  pantoprazole    Tablet 40 milliGRAM(s) Oral before breakfast  polyethylene glycol 3350 17 Gram(s) Oral two times a day  senna 2 Tablet(s) Oral at bedtime  spironolactone 100 milliGRAM(s) Oral daily  tamsulosin 0.4 milliGRAM(s) Oral at bedtime      ROS:   Complete and normal except as mentioned above.    PHYSICAL EXAM:   Vital Signs:  Vital Signs Last 24 Hrs  T(C): 36.7 (20 Sep 2022 09:00), Max: 37.2 (19 Sep 2022 17:34)  T(F): 98.1 (20 Sep 2022 09:00), Max: 98.9 (19 Sep 2022 17:34)  HR: 66 (20 Sep 2022 09:00) (66 - 101)  BP: 112/73 (20 Sep 2022 09:00) (112/73 - 151/79)  BP(mean): --  RR: 18 (20 Sep 2022 09:00) (18 - 18)  SpO2: 96% (20 Sep 2022 09:00) (95% - 99%)    Parameters below as of 20 Sep 2022 09:00  Patient On (Oxygen Delivery Method): room air      Daily     Daily     Constitutional: Well developed / well nourished  HEENT: no scleral icterus  Respiratory: normal respiratory effort  Cardiovascular: regular rate  Gastrointestinal: Soft abdomen, mild distended, IR drains with sanguinous substance, c tube to bag  Genitourinary: Voiding spontaneously  Extremities: SCD's in place and working bilaterally  Neurological: A&O x3  Skin: no rashes, ulcerations, lesions    LABS: reviewed                        8.7    8.13  )-----------( 95       ( 20 Sep 2022 06:25 )             26.8     09-20    131<L>  |  101  |  8   ----------------------------<  88  4.0   |  25  |  0.58    Ca    8.6      20 Sep 2022 06:18  Phos  2.6     09-20  Mg     1.7     09-20    TPro  7.3  /  Alb  2.1<L>  /  TBili  2.4<H>  /  DBili  x   /  AST  31  /  ALT  11  /  AlkPhos  80  09-20    LIVER FUNCTIONS - ( 20 Sep 2022 06:18 )  Alb: 2.1 g/dL / Pro: 7.3 g/dL / ALK PHOS: 80 U/L / ALT: 11 U/L / AST: 31 U/L / GGT: x             Interval Diagnostic Studies: see sunrise for full report   Chief Complaint:  Patient is a 67y old  Male who presents with a chief complaint of Bleeding around cholecystostomy tube (20 Sep 2022 10:01)      Interval Events:   Drain removed yesterday; lower drain exchanged/upsized to 14Fr    Hospital Medications:  cefepime   IVPB      cefepime   IVPB 1000 milliGRAM(s) IV Intermittent every 8 hours  fluconAZOLE   Tablet 400 milliGRAM(s) Oral daily  furosemide    Tablet 40 milliGRAM(s) Oral daily  influenza   Vaccine 0.5 milliLiter(s) IntraMuscular once  magnesium oxide 400 milliGRAM(s) Oral three times a day with meals  metroNIDAZOLE  IVPB 500 milliGRAM(s) IV Intermittent two times a day  multivitamin 1 Tablet(s) Oral daily  pantoprazole    Tablet 40 milliGRAM(s) Oral before breakfast  polyethylene glycol 3350 17 Gram(s) Oral two times a day  senna 2 Tablet(s) Oral at bedtime  spironolactone 100 milliGRAM(s) Oral daily  tamsulosin 0.4 milliGRAM(s) Oral at bedtime      ROS:   Complete and normal except as mentioned above.    PHYSICAL EXAM:   Vital Signs:  Vital Signs Last 24 Hrs  T(C): 36.7 (20 Sep 2022 09:00), Max: 37.2 (19 Sep 2022 17:34)  T(F): 98.1 (20 Sep 2022 09:00), Max: 98.9 (19 Sep 2022 17:34)  HR: 66 (20 Sep 2022 09:00) (66 - 101)  BP: 112/73 (20 Sep 2022 09:00) (112/73 - 151/79)  BP(mean): --  RR: 18 (20 Sep 2022 09:00) (18 - 18)  SpO2: 96% (20 Sep 2022 09:00) (95% - 99%)    Parameters below as of 20 Sep 2022 09:00  Patient On (Oxygen Delivery Method): room air      Daily     Daily     Constitutional: Well developed / well nourished  HEENT: no scleral icterus  Respiratory: normal respiratory effort  Cardiovascular: regular rate  Gastrointestinal: Soft abdomen, mild distended, IR drains with sanguinous substance, c tube to bag, +abdominal wall edema  Genitourinary: Voiding spontaneously  Extremities: SCD's in place and working bilaterally  Neurological: A&O x3  Skin: no rashes, ulcerations, lesions    LABS: reviewed                        8.7    8.13  )-----------( 95       ( 20 Sep 2022 06:25 )             26.8     09-20    131<L>  |  101  |  8   ----------------------------<  88  4.0   |  25  |  0.58    Ca    8.6      20 Sep 2022 06:18  Phos  2.6     09-20  Mg     1.7     09-20    TPro  7.3  /  Alb  2.1<L>  /  TBili  2.4<H>  /  DBili  x   /  AST  31  /  ALT  11  /  AlkPhos  80  09-20    LIVER FUNCTIONS - ( 20 Sep 2022 06:18 )  Alb: 2.1 g/dL / Pro: 7.3 g/dL / ALK PHOS: 80 U/L / ALT: 11 U/L / AST: 31 U/L / GGT: x             Interval Diagnostic Studies: see sunrise for full report

## 2022-09-20 NOTE — PROGRESS NOTE ADULT - ASSESSMENT
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 Currently Listed for OLT     #Decompensated ETOH/PARNELL Cirrhosis, listed for OLT  #Abdominal collection, infected hematoma s/p IR drainage x 2 with C tube repositioning c/b Augmentin resistant E coli  #Serratia bacteremia 9/11, repeat BCx 9/15 negative  #Hyponatremia  MELD-Na: 24, 9/20  - HE: none currently  - EV: normal esophagus on ERCP from 7/2022  - Ascites: moderate 8/31 US  - SBP: paracentesis 8/9/2022 reveals likely secondary bacterial peritonitis from suspected gallbladder pathology, +SBP on para 8/31  - HCC:  No lesions on CT 8/26      Plan  - Increase lasix to 60 mg/aldactone 150  - F/u ID: long term plan for Abx  - serratia sens to cefepime  - On cefepime, metronidazole  - ID on board, appreciate recs  - ASA 81 daily  - Drain management as per IR RUQ drain output 10ml.24 hours ->removed 9/19  - Repeat CTAP later this week  - Daily CMP, CBC, coags  - Continue Fluconazole  - Physical therapy  [x] Psychosocial: cleared pending RPP  [x ] Infectious  [x] Cardiology:        Cardiac cath: n/a       Stress test: negative noninvasive stress test on 8/19/2022 for inducible ischemia  [ ] Colonoscopy: needed, f/u records from outside GI colonoscopy reports  [x] Prostate: Prostate Specific Antigen: negative at 2.21 ng/mL on 8/12/2022  [x] Dental  [x] PT/Frailty    Preliminary note until signed by Attending.    Preliminary note until signed by Attending.    Thank you for involving us in this patient's care.    Kary Tena MD  Gastroenterology/Hepatology Fellow, PGY-VI    NON-URGENT CONSULTS:  Please email [email or web address]  [email or web address]  AT NIGHT AND ON WEEKENDS:  Contact on-call GI fellow via answering service ([phone]) from 5pm-8am and on weekends/holidays  MONDAY-FRIDAY 8AM-5PM:  Pager# [phone] ([facility])  GI Phone# [phone] ([facility])

## 2022-09-21 ENCOUNTER — TRANSCRIPTION ENCOUNTER (OUTPATIENT)
Age: 67
End: 2022-09-21

## 2022-09-21 LAB
ALBUMIN SERPL ELPH-MCNC: 2.2 G/DL — LOW (ref 3.3–5)
ALP SERPL-CCNC: 91 U/L — SIGNIFICANT CHANGE UP (ref 40–120)
ALT FLD-CCNC: 12 U/L — SIGNIFICANT CHANGE UP (ref 10–45)
ANION GAP SERPL CALC-SCNC: 6 MMOL/L — SIGNIFICANT CHANGE UP (ref 5–17)
AST SERPL-CCNC: 27 U/L — SIGNIFICANT CHANGE UP (ref 10–40)
BASOPHILS # BLD AUTO: 0.05 K/UL — SIGNIFICANT CHANGE UP (ref 0–0.2)
BASOPHILS NFR BLD AUTO: 0.7 % — SIGNIFICANT CHANGE UP (ref 0–2)
BILIRUB SERPL-MCNC: 2.3 MG/DL — HIGH (ref 0.2–1.2)
BUN SERPL-MCNC: 8 MG/DL — SIGNIFICANT CHANGE UP (ref 7–23)
CALCIUM SERPL-MCNC: 8.6 MG/DL — SIGNIFICANT CHANGE UP (ref 8.4–10.5)
CHLORIDE SERPL-SCNC: 99 MMOL/L — SIGNIFICANT CHANGE UP (ref 96–108)
CO2 SERPL-SCNC: 25 MMOL/L — SIGNIFICANT CHANGE UP (ref 22–31)
CREAT SERPL-MCNC: 0.63 MG/DL — SIGNIFICANT CHANGE UP (ref 0.5–1.3)
EGFR: 106 ML/MIN/1.73M2 — SIGNIFICANT CHANGE UP
EOSINOPHIL # BLD AUTO: 0.06 K/UL — SIGNIFICANT CHANGE UP (ref 0–0.5)
EOSINOPHIL NFR BLD AUTO: 0.8 % — SIGNIFICANT CHANGE UP (ref 0–6)
GLUCOSE SERPL-MCNC: 79 MG/DL — SIGNIFICANT CHANGE UP (ref 70–99)
HCT VFR BLD CALC: 27.4 % — LOW (ref 39–50)
HGB BLD-MCNC: 8.9 G/DL — LOW (ref 13–17)
IMM GRANULOCYTES NFR BLD AUTO: 0.8 % — SIGNIFICANT CHANGE UP (ref 0–0.9)
INR BLD: 2.43 RATIO — HIGH (ref 0.88–1.16)
LYMPHOCYTES # BLD AUTO: 2.22 K/UL — SIGNIFICANT CHANGE UP (ref 1–3.3)
LYMPHOCYTES # BLD AUTO: 29.7 % — SIGNIFICANT CHANGE UP (ref 13–44)
MAGNESIUM SERPL-MCNC: 1.7 MG/DL — SIGNIFICANT CHANGE UP (ref 1.6–2.6)
MCHC RBC-ENTMCNC: 31.4 PG — SIGNIFICANT CHANGE UP (ref 27–34)
MCHC RBC-ENTMCNC: 32.5 GM/DL — SIGNIFICANT CHANGE UP (ref 32–36)
MCV RBC AUTO: 96.8 FL — SIGNIFICANT CHANGE UP (ref 80–100)
MONOCYTES # BLD AUTO: 0.95 K/UL — HIGH (ref 0–0.9)
MONOCYTES NFR BLD AUTO: 12.7 % — SIGNIFICANT CHANGE UP (ref 2–14)
NEUTROPHILS # BLD AUTO: 4.13 K/UL — SIGNIFICANT CHANGE UP (ref 1.8–7.4)
NEUTROPHILS NFR BLD AUTO: 55.3 % — SIGNIFICANT CHANGE UP (ref 43–77)
NRBC # BLD: 0 /100 WBCS — SIGNIFICANT CHANGE UP (ref 0–0)
PHOSPHATE SERPL-MCNC: 2.5 MG/DL — SIGNIFICANT CHANGE UP (ref 2.5–4.5)
PLATELET # BLD AUTO: 89 K/UL — LOW (ref 150–400)
POTASSIUM SERPL-MCNC: 3.9 MMOL/L — SIGNIFICANT CHANGE UP (ref 3.5–5.3)
POTASSIUM SERPL-SCNC: 3.9 MMOL/L — SIGNIFICANT CHANGE UP (ref 3.5–5.3)
PROT SERPL-MCNC: 7.5 G/DL — SIGNIFICANT CHANGE UP (ref 6–8.3)
PROTHROM AB SERPL-ACNC: 28.2 SEC — HIGH (ref 10.5–13.4)
RBC # BLD: 2.83 M/UL — LOW (ref 4.2–5.8)
RBC # FLD: 20 % — HIGH (ref 10.3–14.5)
SODIUM SERPL-SCNC: 130 MMOL/L — LOW (ref 135–145)
WBC # BLD: 7.47 K/UL — SIGNIFICANT CHANGE UP (ref 3.8–10.5)
WBC # FLD AUTO: 7.47 K/UL — SIGNIFICANT CHANGE UP (ref 3.8–10.5)

## 2022-09-21 PROCEDURE — 74177 CT ABD & PELVIS W/CONTRAST: CPT | Mod: 26

## 2022-09-21 PROCEDURE — 99232 SBSQ HOSP IP/OBS MODERATE 35: CPT | Mod: GC

## 2022-09-21 RX ADMIN — CEFEPIME 100 MILLIGRAM(S): 1 INJECTION, POWDER, FOR SOLUTION INTRAMUSCULAR; INTRAVENOUS at 22:40

## 2022-09-21 RX ADMIN — Medication 100 MILLIGRAM(S): at 08:11

## 2022-09-21 RX ADMIN — Medication 1 TABLET(S): at 11:20

## 2022-09-21 RX ADMIN — SPIRONOLACTONE 150 MILLIGRAM(S): 25 TABLET, FILM COATED ORAL at 05:43

## 2022-09-21 RX ADMIN — SENNA PLUS 2 TABLET(S): 8.6 TABLET ORAL at 22:40

## 2022-09-21 RX ADMIN — CEFEPIME 100 MILLIGRAM(S): 1 INJECTION, POWDER, FOR SOLUTION INTRAMUSCULAR; INTRAVENOUS at 13:16

## 2022-09-21 RX ADMIN — PANTOPRAZOLE SODIUM 40 MILLIGRAM(S): 20 TABLET, DELAYED RELEASE ORAL at 05:34

## 2022-09-21 RX ADMIN — MAGNESIUM OXIDE 400 MG ORAL TABLET 400 MILLIGRAM(S): 241.3 TABLET ORAL at 11:21

## 2022-09-21 RX ADMIN — MAGNESIUM OXIDE 400 MG ORAL TABLET 400 MILLIGRAM(S): 241.3 TABLET ORAL at 08:11

## 2022-09-21 RX ADMIN — TAMSULOSIN HYDROCHLORIDE 0.4 MILLIGRAM(S): 0.4 CAPSULE ORAL at 22:40

## 2022-09-21 RX ADMIN — MAGNESIUM OXIDE 400 MG ORAL TABLET 400 MILLIGRAM(S): 241.3 TABLET ORAL at 17:25

## 2022-09-21 RX ADMIN — FLUCONAZOLE 400 MILLIGRAM(S): 150 TABLET ORAL at 11:20

## 2022-09-21 RX ADMIN — CEFEPIME 100 MILLIGRAM(S): 1 INJECTION, POWDER, FOR SOLUTION INTRAMUSCULAR; INTRAVENOUS at 05:44

## 2022-09-21 RX ADMIN — Medication 60 MILLIGRAM(S): at 05:43

## 2022-09-21 RX ADMIN — Medication 100 MILLIGRAM(S): at 20:10

## 2022-09-21 RX ADMIN — POLYETHYLENE GLYCOL 3350 17 GRAM(S): 17 POWDER, FOR SOLUTION ORAL at 17:46

## 2022-09-21 RX ADMIN — POLYETHYLENE GLYCOL 3350 17 GRAM(S): 17 POWDER, FOR SOLUTION ORAL at 05:33

## 2022-09-21 NOTE — PROGRESS NOTE ADULT - SUBJECTIVE AND OBJECTIVE BOX
Chief Complaint:  Patient is a 67y old  Male who presents with a chief complaint of Bleeding around cholecystostomy tube (21 Sep 2022 06:42)    Interval Events:   Remains afebrile and HDS  Minimal output from drains ~ 90 cc in 24 hrs    Hospital Medications:  cefepime   IVPB      cefepime   IVPB 1000 milliGRAM(s) IV Intermittent every 8 hours  fluconAZOLE   Tablet 400 milliGRAM(s) Oral daily  furosemide    Tablet 60 milliGRAM(s) Oral daily  influenza   Vaccine 0.5 milliLiter(s) IntraMuscular once  magnesium oxide 400 milliGRAM(s) Oral three times a day with meals  metroNIDAZOLE  IVPB 500 milliGRAM(s) IV Intermittent two times a day  multivitamin 1 Tablet(s) Oral daily  pantoprazole    Tablet 40 milliGRAM(s) Oral before breakfast  polyethylene glycol 3350 17 Gram(s) Oral two times a day  senna 2 Tablet(s) Oral at bedtime  spironolactone 150 milliGRAM(s) Oral daily  tamsulosin 0.4 milliGRAM(s) Oral at bedtime      ROS:   Complete and normal except as mentioned above.  PHYSICAL EXAM:   Vital Signs:  Vital Signs Last 24 Hrs  T(C): 36.7 (21 Sep 2022 09:00), Max: 37.3 (20 Sep 2022 17:00)  T(F): 98 (21 Sep 2022 09:00), Max: 99.2 (20 Sep 2022 17:00)  HR: 89 (21 Sep 2022 09:00) (89 - 106)  BP: 126/73 (21 Sep 2022 09:00) (122/72 - 137/82)  BP(mean): --  RR: 18 (21 Sep 2022 09:00) (18 - 18)  SpO2: 97% (21 Sep 2022 09:00) (97% - 97%)    Parameters below as of 21 Sep 2022 09:00  Patient On (Oxygen Delivery Method): room air      Daily     Daily     Constitutional: Well developed / well nourished  HEENT: no scleral icterus  Respiratory: normal respiratory effort  Cardiovascular: regular rate  Gastrointestinal: Soft abdomen, mild distended, IR drains with minimal sanguinous substance, c tube to bag, +abdominal wall edema  Genitourinary: Voiding spontaneously  Extremities: SCD's in place and working bilaterally  Neurological: A&O x3  Skin: no rashes, ulcerations, lesions    LABS: reviewed                        8.9    7.47  )-----------( 89       ( 21 Sep 2022 06:39 )             27.4     09-21    130<L>  |  99  |  8   ----------------------------<  79  3.9   |  25  |  0.63    Ca    8.6      21 Sep 2022 06:40  Phos  2.5     09-21  Mg     1.7     09-21    TPro  7.5  /  Alb  2.2<L>  /  TBili  2.3<H>  /  DBili  x   /  AST  27  /  ALT  12  /  AlkPhos  91  09-21    LIVER FUNCTIONS - ( 21 Sep 2022 06:40 )  Alb: 2.2 g/dL / Pro: 7.5 g/dL / ALK PHOS: 91 U/L / ALT: 12 U/L / AST: 27 U/L / GGT: x             Interval Diagnostic Studies: see sunrise for full report

## 2022-09-21 NOTE — DISCHARGE NOTE PROVIDER - NSDCFUADDINST_GEN_ALL_CORE_FT
- Flush drains with 5cc normal saline daily forward only; DO NOT aspirate  - Change dressing q3 days or when dressing is saturated.  - record daily output

## 2022-09-21 NOTE — DISCHARGE NOTE PROVIDER - HOSPITAL COURSE
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 Currently Listed for OLT     Recent admission with:   -Severe sepsis with associated oliguric ROBBIE that required short course of HD (resolved) and lactic acidosis (resolved) secondary to acute and likely perforated cholecystitis with secondary peritonitis. s/p percutaneous cholecystostomy tube placement (8/9) with exchange (8/29).   -Hemoperitoneum after LVP, required transfusions without intervention  -Cultures from ascitic fluid (8/9 and 8/16) grew E. coli and Streptococcus anginosis, treated with unasyn  -Ascitic fluid PMN count has improved to 1172 (8/31) after correction for RBCs, still consistent with peritonitis but clinically improving.   - Listed for OLT with MELD-Na 24    Presented to OSH with bleeding around C tube, abd pain and large abdominal collection.   He underwent IR drainage of abd abscess w/ 2 drains left in place, cultures  grew ecoli and strep angiosus.  Blood cultures grew serratia.  IR removed      marcescens bacteremia on September 11 and right-sided intra-abdominal abscesses,s/p RUQ drain (9/11-19) with small residual perihepatic collection seen on repeat CT (9/21) still with RLQ drain (9/11- ) with low volume output and near resolution on the repeat CT.  IR recommending to keep the drain in place until output <10 mL/day.   He is S/p Zosyn (9/10), s/p Unasyn (9/11-14), and currently on cefepime (9/14- ), metronidazole (9/14- ), and fluconazole (9/11- ), with plan to continue IV antibiotics through the weekend and then likely transition to Bactrim for discharge home per Transplant ID Dr. Guajardo.   Mentating at his baseline and renal function stable.  Edema has improved with increased diuresis and will continue furosemide 60 mg po daily and spironolactone 150 mg po daily.     Abscess drain evaluation appointment made for 9/30/22 @10:30AM. COVID-19 test prior on 9/27 @ 1:30PM. Call the IR booking office at [phone] with any questions. 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 Currently Listed for OLT     Recent admission with:   -Severe sepsis with associated oliguric ROBBIE that required short course of HD (resolved) and lactic acidosis (resolved) secondary to acute and likely perforated cholecystitis with secondary peritonitis. s/p percutaneous cholecystostomy tube placement (8/9) with exchange (8/29).   -Hemoperitoneum after LVP, required transfusions without intervention  -Cultures from ascitic fluid (8/9 and 8/16) grew E. coli and Streptococcus anginosis, treated with unasyn  -Ascitic fluid PMN count has improved to 1172 (8/31) after correction for RBCs, still consistent with peritonitis but clinically improving.   - Listed for OLT with MELD-Na 24    Presented to OSH with bleeding around C tube, abd pain and large abdominal collection.         He had serratia marcescens bacteremia on September 11 and right-sided intra-abdominal abscesses grew ecoli he is s/p RUQ drain (9/11-19) with small residual perihepatic collection seen on repeat CT (9/21) still with RLQ drain (9/11- with low volume output and near resolution on the repeat CT.  IR recommending to keep the drain in place until output <10 mL/day. Drain study scheduled w/ IR for 9/30   He is S/p Zosyn (9/10), s/p Unasyn (9/11-14), and  on cefepime (9/14- 9/25), metronidazole (9/14-9/26 ), and fluconazole (9/11- 9/26),    transition to Bactrim DS bid x 7 dats on  discharge home per Transplant ID     Edema has improved with increased diuresis and will continue furosemide 40 PO bid  and spironolactone 100 mg po BID.     Abscess drain evaluation appointment made for 9/30/22 @10:30AM. COVID-19 test prior on 9/27 @ 1:30PM. Call the IR booking office at [phone] with any questions.   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 Currently Listed for OLT     Recent admission with:   -Severe sepsis with associated oliguric ROBBIE that required short course of HD (resolved) and lactic acidosis (resolved) secondary to acute and likely perforated cholecystitis with secondary peritonitis. s/p percutaneous cholecystostomy tube placement (8/9) with exchange (8/29).   -Hemoperitoneum after LVP, required transfusions without intervention  -Cultures from ascitic fluid (8/9 and 8/16) grew E. coli and Streptococcus anginosis, treated with unasyn  -Ascitic fluid PMN count has improved to 1172 (8/31) after correction for RBCs, still consistent with peritonitis but clinically improving.   - Listed for OLT with MELD-Na 24    Presented to OSH with bleeding around C tube, abd pain and large abdominal collection.         He had serratia marcescens bacteremia on September 11 and right-sided intra-abdominal abscesses grew ecoli he is s/p RUQ drain (9/11-19) with small residual perihepatic collection seen on repeat CT (9/21) still with RLQ drain (9/11- with low volume output and near resolution on the repeat CT.  IR recommending to keep the drain in place until output <10 mL/day. Drain study scheduled w/ IR for 9/30   He is S/p Zosyn (9/10), s/p Unasyn (9/11-14), and  on cefepime (9/14- 9/25), metronidazole (9/14-9/26 ), and fluconazole (9/11- 9/26),    transition to Bactrim DS bid x 7 dats on  discharge home per Transplant ID     Edema has improved with increased diuresis and will continue furosemide 40 PO bid  and spironolactone 100 mg po BID.   Antibiotics  Bactrim DS 1 tab bid x 7 days   Bactrim DS 1 tab once a day start on 10/2    Abscess drain evaluation appointment made for 9/30/22 @10:30AM. COVID-19 test prior on 9/27 @ 1:30PM. Call the IR booking office at [phone] with any questions.   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 Currently Listed for OLT     Recent admission with:   -Severe sepsis with associated oliguric ROBBIE that required short course of HD (resolved) and lactic acidosis (resolved) secondary to acute and likely perforated cholecystitis with secondary peritonitis. s/p percutaneous cholecystostomy tube placement (8/9) with exchange (8/29).   -Hemoperitoneum after LVP, required transfusions without intervention  -Cultures from ascitic fluid (8/9 and 8/16) grew E. coli and Streptococcus anginosis, treated with unasyn  -Ascitic fluid PMN count has improved to 1172 (8/31) after correction for RBCs, still consistent with peritonitis but clinically improving.   - Listed for OLT with MELD-Na 24    Presented to OSH with bleeding around C tube, abd pain and large abdominal collection.         He had serratia marcescens bacteremia on September 11 and right-sided intra-abdominal abscesses grew ecoli he is s/p RUQ drain (9/11-19) with small residual perihepatic collection seen on repeat CT (9/21) still with RLQ drain (9/11- with low volume output and near resolution on the repeat CT.  IR recommending to keep the drain in place until output <10 mL/day. Drain study scheduled w/ IR for 9/30   He is S/p Zosyn (9/10), s/p Unasyn (9/11-14), and  on cefepime (9/14- 9/25), metronidazole (9/14-9/26 ), and fluconazole (9/11- 9/26),    transition to Bactrim DS bid x 7 dats on  discharge home per Transplant ID     Edema has improved with increased diuresis and will continue furosemide 40 PO bid  and spironolactone 100 mg po BID.   Antibiotics  Bactrim DS 1 tab bid x 7 days   Bactrim DS 1 tab once a day start on 10/2    Flush record output and flush drain daily w/ 5 ml     Abscess drain evaluation appointment made for 9/30/22 @10:30AM. COVID-19 test prior on 9/27 @ 1:30PM. Call the IR booking office at [phone] with any questions.

## 2022-09-21 NOTE — DISCHARGE NOTE PROVIDER - CARE PROVIDER_API CALL
Laron Harper)  Gastroenterology; Internal Medicine; Transplant Hepatology  [address] [place] [address]  Phone: [phone]  Fax: [phone]  Established Patient  Follow Up Time:     Demetris Nicholas)  Diagnostic Radiology  [address] at the [facility], [address] [place] [address]  Phone: [phone]  Fax: [phone]  Follow Up Time:

## 2022-09-21 NOTE — DISCHARGE NOTE PROVIDER - NSDCFUADDAPPT_GEN_ALL_CORE_FT
- Interventional Radiology Outpatient follow up: Abscess drain evaluation appointment made for 9/30/22 @10:30AM. COVID-19 test prior on 9/27 @ 1:30PM. Call the IR booking office at [phone] with any questions.  - Interventional Radiology Cholecystostomy drain outpatient follow up: recommend IR follow every 3 months for routine exchange. Call the IR booking office at [phone] for appointment. - Interventional Radiology Outpatient follow up: Abscess drain evaluation appointment made for 9/30/22 @10:30AM. COVID-19 test prior on 9/27 @ 1:30PM. Call the IR booking office at [phone] with any questions.  - Interventional Radiology Cholecystostomy drain outpatient follow up: recommend IR follow every 3 months for routine exchange. Call the IR booking office at [phone] for appointment.    Please Call transplant clinic to schedule a follow up appointment this Friday 9/30/22 with Dr. Harper.  [phone]

## 2022-09-21 NOTE — DISCHARGE NOTE PROVIDER - NSDCCPTREATMENT_GEN_ALL_CORE_FT
PRINCIPAL PROCEDURE  Procedure: Abdominal abscess drainage  Findings and Treatment: You had an abdominal abscess drain placed on 9/11/22 in Interventional Radiology (IR).   Monitor site for any sign or symptoms of infection (painful red skin, green/ yellow foul smelling discharge from the insertion site, fever, chills, leakage around drain site).   Flush drain with 5mL daily. If you meet resistance upon flushing, STOP and contact IR.   Empty drainage bag or bulb daily and record output. Once output is <10mL in a 24hr period or if there is a sudden decrease in output please contact IR to schedule  an appointment.  Drain care:  -Disconnect tubing (tube attached to bag/ bulb)  from the catheter (catheter going into skin)  -Clean catheter with alcohol swab  -Twist on the flush syringe to the catheter (be sure to push the air out of syringe)  -Flush catheter with 5mL (DO NOT pull back on syringe). If you meet resistance upon flushing, STOP and contact IR.   -Disconnect syringe from catheter and reconnect drainage bag or bulb.  Dressing care:  -Wash hands thoroughly with water and soap for at least 20 seconds.   -take off old dressing and clean around drain site with gauze soaked with warm water  -After cleaning the site, dry and replace dressing with a new gauze and tegaderm.   -Place one piece of gauze underneath the drain and another piece of gauze on top of drain.   -Apply tegaderm (clear dressing) on top.  -Change dressing every 3 days or when soiled

## 2022-09-21 NOTE — DISCHARGE NOTE PROVIDER - NSDCMRMEDTOKEN_GEN_ALL_CORE_FT
amoxicillin-clavulanate 875 mg-125 mg oral tablet: 1 tab(s) orally 2 times a day  furosemide 40 mg oral tablet: 2 tab(s) orally once a day   magnesium oxide 400 mg oral tablet: 1 tab(s) orally 3 times a day (with meals)  pantoprazole 40 mg oral delayed release tablet: 1 tab(s) orally once a day (before a meal)  polyethylene glycol 3350 oral powder for reconstitution: 17 gram(s) orally 2 times a day  rolling walker: 1 each, use as directed.  Z94.4  senna leaf extract oral tablet: 2 tab(s) orally once a day (at bedtime)  spironolactone 100 mg oral tablet: 2 tab(s) orally once a day   tamsulosin 0.4 mg oral capsule: 1 cap(s) orally once a day (at bedtime)   furosemide 40 mg oral tablet: 1 tab(s) orally 2 times a day  magnesium oxide 400 mg oral tablet: 1 tab(s) orally 3 times a day (with meals)  pantoprazole 40 mg oral delayed release tablet: 1 tab(s) orally once a day (before a meal)  polyethylene glycol 3350 oral powder for reconstitution: 17 gram(s) orally 2 times a day  senna leaf extract oral tablet: 2 tab(s) orally once a day (at bedtime)  spironolactone 25 mg oral tablet: 4 tab(s) orally 2 times a day  sulfamethoxazole-trimethoprim 800 mg-160 mg oral tablet: 1 tab(s) orally 2 times a day  tamsulosin 0.4 mg oral capsule: 1 cap(s) orally once a day (at bedtime)   furosemide 40 mg oral tablet: 1 tab(s) orally 2 times a day  magnesium oxide 400 mg oral tablet: 1 tab(s) orally 3 times a day (with meals)  pantoprazole 40 mg oral delayed release tablet: 1 tab(s) orally once a day (before a meal)  polyethylene glycol 3350 oral powder for reconstitution: 17 gram(s) orally 2 times a day  senna leaf extract oral tablet: 2 tab(s) orally once a day (at bedtime)  spironolactone 25 mg oral tablet: 4 tab(s) orally 2 times a day  sulfamethoxazole-trimethoprim 800 mg-160 mg oral tablet: 1 tab(s) orally 2 times a day for 7 days  Then start 1 tab once a day on 10/2  tamsulosin 0.4 mg oral capsule: 1 cap(s) orally once a day (at bedtime)

## 2022-09-21 NOTE — DISCHARGE NOTE PROVIDER - NSDCCPCAREPLAN_GEN_ALL_CORE_FT
PRINCIPAL DISCHARGE DIAGNOSIS  Diagnosis: Bacteremia  Assessment and Plan of Treatment: Take all antibiotics as ordered.  Call you Health care provider upon arrival home to make a one week follow up appointment.  If you develop fever, chills, malaise, or change in mental status call your Health Care Provider or go to the Emergency Department.  Nutrition is important, eat small frequent meals to help ensure you get adequate calories.  Do not stay in bed all day!  Increase your activity daily as tolerated.

## 2022-09-21 NOTE — DISCHARGE NOTE PROVIDER - CARE PROVIDERS DIRECT ADDRESSES
[email or web address]

## 2022-09-21 NOTE — DISCHARGE NOTE PROVIDER - NSDCFUSCHEDAPPT_GEN_ALL_CORE_FT
Demetris Nicholas  [facility]  Scheduled Appointment: 09/30/2022    [facility]  HEPATOLOGY [address]  Scheduled Appointment: 10/04/2022     Demetris Nicholas  [facility]  Scheduled Appointment: 09/27/2022    Demetris Nicholas  [facility]  Scheduled Appointment: 09/30/2022    [facility]  HEPATOLOGY [address]  Scheduled Appointment: 10/04/2022

## 2022-09-22 PROBLEM — Z87.19 PERSONAL HISTORY OF OTHER DISEASES OF THE DIGESTIVE SYSTEM: Chronic | Status: ACTIVE | Noted: 2022-09-10

## 2022-09-22 LAB
ALBUMIN SERPL ELPH-MCNC: 2.1 G/DL — LOW (ref 3.3–5)
ALP SERPL-CCNC: 91 U/L — SIGNIFICANT CHANGE UP (ref 40–120)
ALT FLD-CCNC: 10 U/L — SIGNIFICANT CHANGE UP (ref 10–45)
ANION GAP SERPL CALC-SCNC: 6 MMOL/L — SIGNIFICANT CHANGE UP (ref 5–17)
AST SERPL-CCNC: 25 U/L — SIGNIFICANT CHANGE UP (ref 10–40)
BASOPHILS # BLD AUTO: 0.05 K/UL — SIGNIFICANT CHANGE UP (ref 0–0.2)
BASOPHILS NFR BLD AUTO: 0.7 % — SIGNIFICANT CHANGE UP (ref 0–2)
BILIRUB SERPL-MCNC: 2.2 MG/DL — HIGH (ref 0.2–1.2)
BUN SERPL-MCNC: 8 MG/DL — SIGNIFICANT CHANGE UP (ref 7–23)
CALCIUM SERPL-MCNC: 8.8 MG/DL — SIGNIFICANT CHANGE UP (ref 8.4–10.5)
CHLORIDE SERPL-SCNC: 101 MMOL/L — SIGNIFICANT CHANGE UP (ref 96–108)
CO2 SERPL-SCNC: 24 MMOL/L — SIGNIFICANT CHANGE UP (ref 22–31)
CREAT SERPL-MCNC: 0.62 MG/DL — SIGNIFICANT CHANGE UP (ref 0.5–1.3)
EGFR: 106 ML/MIN/1.73M2 — SIGNIFICANT CHANGE UP
EOSINOPHIL # BLD AUTO: 0.06 K/UL — SIGNIFICANT CHANGE UP (ref 0–0.5)
EOSINOPHIL NFR BLD AUTO: 0.9 % — SIGNIFICANT CHANGE UP (ref 0–6)
GLUCOSE SERPL-MCNC: 95 MG/DL — SIGNIFICANT CHANGE UP (ref 70–99)
HCT VFR BLD CALC: 26.3 % — LOW (ref 39–50)
HGB BLD-MCNC: 8.6 G/DL — LOW (ref 13–17)
IMM GRANULOCYTES NFR BLD AUTO: 0.7 % — SIGNIFICANT CHANGE UP (ref 0–0.9)
INR BLD: 2.43 RATIO — HIGH (ref 0.88–1.16)
LYMPHOCYTES # BLD AUTO: 1.83 K/UL — SIGNIFICANT CHANGE UP (ref 1–3.3)
LYMPHOCYTES # BLD AUTO: 26.1 % — SIGNIFICANT CHANGE UP (ref 13–44)
MAGNESIUM SERPL-MCNC: 1.6 MG/DL — SIGNIFICANT CHANGE UP (ref 1.6–2.6)
MCHC RBC-ENTMCNC: 32 PG — SIGNIFICANT CHANGE UP (ref 27–34)
MCHC RBC-ENTMCNC: 32.7 GM/DL — SIGNIFICANT CHANGE UP (ref 32–36)
MCV RBC AUTO: 97.8 FL — SIGNIFICANT CHANGE UP (ref 80–100)
MONOCYTES # BLD AUTO: 0.91 K/UL — HIGH (ref 0–0.9)
MONOCYTES NFR BLD AUTO: 13 % — SIGNIFICANT CHANGE UP (ref 2–14)
NEUTROPHILS # BLD AUTO: 4.11 K/UL — SIGNIFICANT CHANGE UP (ref 1.8–7.4)
NEUTROPHILS NFR BLD AUTO: 58.6 % — SIGNIFICANT CHANGE UP (ref 43–77)
NRBC # BLD: 0 /100 WBCS — SIGNIFICANT CHANGE UP (ref 0–0)
PHOSPHATE SERPL-MCNC: 2.7 MG/DL — SIGNIFICANT CHANGE UP (ref 2.5–4.5)
PLATELET # BLD AUTO: 80 K/UL — LOW (ref 150–400)
POTASSIUM SERPL-MCNC: 3.7 MMOL/L — SIGNIFICANT CHANGE UP (ref 3.5–5.3)
POTASSIUM SERPL-SCNC: 3.7 MMOL/L — SIGNIFICANT CHANGE UP (ref 3.5–5.3)
PROT SERPL-MCNC: 7.3 G/DL — SIGNIFICANT CHANGE UP (ref 6–8.3)
PROTHROM AB SERPL-ACNC: 28.5 SEC — HIGH (ref 10.5–13.4)
RBC # BLD: 2.69 M/UL — LOW (ref 4.2–5.8)
RBC # FLD: 20.4 % — HIGH (ref 10.3–14.5)
SODIUM SERPL-SCNC: 131 MMOL/L — LOW (ref 135–145)
WBC # BLD: 7.01 K/UL — SIGNIFICANT CHANGE UP (ref 3.8–10.5)
WBC # FLD AUTO: 7.01 K/UL — SIGNIFICANT CHANGE UP (ref 3.8–10.5)

## 2022-09-22 PROCEDURE — 99232 SBSQ HOSP IP/OBS MODERATE 35: CPT | Mod: GC

## 2022-09-22 PROCEDURE — 99232 SBSQ HOSP IP/OBS MODERATE 35: CPT

## 2022-09-22 RX ORDER — MAGNESIUM SULFATE 500 MG/ML
1 VIAL (ML) INJECTION ONCE
Refills: 0 | Status: COMPLETED | OUTPATIENT
Start: 2022-09-22 | End: 2022-09-22

## 2022-09-22 RX ADMIN — Medication 100 MILLIGRAM(S): at 20:55

## 2022-09-22 RX ADMIN — POLYETHYLENE GLYCOL 3350 17 GRAM(S): 17 POWDER, FOR SOLUTION ORAL at 11:42

## 2022-09-22 RX ADMIN — POLYETHYLENE GLYCOL 3350 17 GRAM(S): 17 POWDER, FOR SOLUTION ORAL at 17:07

## 2022-09-22 RX ADMIN — PANTOPRAZOLE SODIUM 40 MILLIGRAM(S): 20 TABLET, DELAYED RELEASE ORAL at 06:07

## 2022-09-22 RX ADMIN — MAGNESIUM OXIDE 400 MG ORAL TABLET 400 MILLIGRAM(S): 241.3 TABLET ORAL at 17:07

## 2022-09-22 RX ADMIN — SPIRONOLACTONE 150 MILLIGRAM(S): 25 TABLET, FILM COATED ORAL at 06:07

## 2022-09-22 RX ADMIN — TAMSULOSIN HYDROCHLORIDE 0.4 MILLIGRAM(S): 0.4 CAPSULE ORAL at 21:13

## 2022-09-22 RX ADMIN — Medication 1 TABLET(S): at 11:45

## 2022-09-22 RX ADMIN — CEFEPIME 100 MILLIGRAM(S): 1 INJECTION, POWDER, FOR SOLUTION INTRAMUSCULAR; INTRAVENOUS at 21:14

## 2022-09-22 RX ADMIN — Medication 100 MILLIGRAM(S): at 09:21

## 2022-09-22 RX ADMIN — Medication 60 MILLIGRAM(S): at 06:07

## 2022-09-22 RX ADMIN — MAGNESIUM OXIDE 400 MG ORAL TABLET 400 MILLIGRAM(S): 241.3 TABLET ORAL at 11:46

## 2022-09-22 RX ADMIN — FLUCONAZOLE 400 MILLIGRAM(S): 150 TABLET ORAL at 11:45

## 2022-09-22 RX ADMIN — MAGNESIUM OXIDE 400 MG ORAL TABLET 400 MILLIGRAM(S): 241.3 TABLET ORAL at 08:53

## 2022-09-22 RX ADMIN — CEFEPIME 100 MILLIGRAM(S): 1 INJECTION, POWDER, FOR SOLUTION INTRAMUSCULAR; INTRAVENOUS at 06:08

## 2022-09-22 RX ADMIN — Medication 100 GRAM(S): at 13:19

## 2022-09-22 RX ADMIN — CEFEPIME 100 MILLIGRAM(S): 1 INJECTION, POWDER, FOR SOLUTION INTRAMUSCULAR; INTRAVENOUS at 14:23

## 2022-09-22 RX ADMIN — SENNA PLUS 2 TABLET(S): 8.6 TABLET ORAL at 21:13

## 2022-09-22 NOTE — PROGRESS NOTE ADULT - SUBJECTIVE AND OBJECTIVE BOX
Chief Complaint:  Patient is a 67y old  Male who presents with a chief complaint of Bleeding around cholecystostomy tube (22 Sep 2022 11:12)         Interval Events:   CTAP showing near complete resolution of collection in anterior pelvis     Hospital Medications:  cefepime   IVPB      cefepime   IVPB 1000 milliGRAM(s) IV Intermittent every 8 hours  fluconAZOLE   Tablet 400 milliGRAM(s) Oral daily  furosemide    Tablet 60 milliGRAM(s) Oral daily  influenza   Vaccine 0.5 milliLiter(s) IntraMuscular once  magnesium oxide 400 milliGRAM(s) Oral three times a day with meals  metroNIDAZOLE  IVPB 500 milliGRAM(s) IV Intermittent two times a day  multivitamin 1 Tablet(s) Oral daily  pantoprazole    Tablet 40 milliGRAM(s) Oral before breakfast  polyethylene glycol 3350 17 Gram(s) Oral two times a day  senna 2 Tablet(s) Oral at bedtime  spironolactone 150 milliGRAM(s) Oral daily  tamsulosin 0.4 milliGRAM(s) Oral at bedtime      ROS:   Complete and normal except as mentioned above.    PHYSICAL EXAM:   Vital Signs:  Vital Signs Last 24 Hrs  T(C): 36.8 (22 Sep 2022 13:00), Max: 37.3 (21 Sep 2022 17:00)  T(F): 98.2 (22 Sep 2022 13:00), Max: 99.1 (21 Sep 2022 17:00)  HR: 86 (22 Sep 2022 13:00) (86 - 100)  BP: 138/76 (22 Sep 2022 13:00) (122/78 - 141/78)  BP(mean): --  RR: 18 (22 Sep 2022 13:00) (18 - 18)  SpO2: 98% (22 Sep 2022 13:00) (95% - 98%)    Parameters below as of 22 Sep 2022 13:00  Patient On (Oxygen Delivery Method): room air      Daily     Daily     Constitutional: Well developed / well nourished  HEENT: no scleral icterus  Respiratory: normal respiratory effort  Cardiovascular: regular rate  Gastrointestinal: Soft abdomen, mild distended, IR drains with minimal sanguinous substance, c tube to bag, +abdominal wall edema  Genitourinary: Voiding spontaneously  Extremities: SCD's in place and working bilaterally  Neurological: A&O x3  Skin: no rashes, ulcerations, lesions    LABS: reviewed                        8.6    7.01  )-----------( 80       ( 22 Sep 2022 07:05 )             26.3     09-22    131<L>  |  101  |  8   ----------------------------<  95  3.7   |  24  |  0.62    Ca    8.8      22 Sep 2022 07:00  Phos  2.7     09-22  Mg     1.6     09-22    TPro  7.3  /  Alb  2.1<L>  /  TBili  2.2<H>  /  DBili  x   /  AST  25  /  ALT  10  /  AlkPhos  91  09-22    LIVER FUNCTIONS - ( 22 Sep 2022 07:00 )  Alb: 2.1 g/dL / Pro: 7.3 g/dL / ALK PHOS: 91 U/L / ALT: 10 U/L / AST: 25 U/L / GGT: x             Interval Diagnostic Studies: see sunrise for full report

## 2022-09-22 NOTE — PROGRESS NOTE ADULT - SUBJECTIVE AND OBJECTIVE BOX
Interventional Radiology Follow-Up Note    This is a 66 year old Male s/p perihepatic collection drain placement and abdominal collection drain placement on 9/11/22. Cholecystostomy tube placement on 8/9/22 (last exchanged on 9/11/22) in IR with Dr. Yin. Perihepatic collection resolved and drain was removed on 9/19/22.    S: Patient seen and examined at bedside. Offers no complaints at this time.     Medication:     cefepime   IVPB: (09-22)  fluconAZOLE   Tablet: (09-21)  furosemide    Tablet: (09-20)  furosemide    Tablet: (09-22)  metroNIDAZOLE  IVPB: (09-22)  spironolactone: (09-22)  spironolactone: (09-20)  tamsulosin: (09-21)    Vitals:   T(F): 98.1, Max: 99.1 (17:00)  HR: 91  BP: 122/78  RR: 18  SpO2: 97%    Physical Exam:  General: Nontoxic, in NAD, A&Ox3  Abdomen: soft, NTND.   Abdominal drain Device: Drain intact attached to gravity bag.  Flushed with 5cc NS w/o difficulty, no leaking, +return of fluid into bag. Dressing clean, dry, intact.   24hr Drain output: 40cc    Cholecystostomy Device: Drain intact attached to gravity bag.  Flushed with 5cc NS w/o difficulty, no leaking, +return of fluid into bag. Dressing clean, dry, intact.   24hr Drain output: 35cc     LABS:  WBC 7.01 / Hgb 8.6 / Hct 26.3 / Plt 80  Na -- / K -- / CO2 -- / Cl -- / BUN -- / Cr -- / Glucose --  ALT -- / AST -- / Alk Phos -- / Tbili --  Ptt -- / Pt 28.5 / INR 2.43      Assessment/Plan:   66 year old male with decompensated cirrhosis and cholangitis s/p ERCP with stent placement (4/2022) s/p stent removal on 7/20 (along sphincterotomy and stone extraction) now s/p cholecystostomy tube 8/9 with check 8/29 in correct location. He presents to OSH with bloody discharge from the wound site and abdominal distention found to have potentially dislodged IR cholecystostomy tube and ascites. Recent OSH imaging showing multiple large abdominal collections.    Pt most recently s/p perihepatic collection drain placement and abdominal collection drain placement on 9/11/22. Cholecystostomy tube placement on 8/9/22 (last exchanged on 9/11/22) in IR with Dr. Yin. Perihepatic collection resolved and drain was removed on 9/19/22..     - Leukocytosis resolved, continue abx per primary team.  -CT abd/pelv reviewed from yesterday. Near complete resolution of abdominal collection. Recommend continued observation given output 40cc over last 24hrs.   - Flush drains with 5cc normal saline daily forward only; DO NOT aspirate  - Change dressing q3 days or when dressing is saturated.  - Trend vs/labs  - Continue global management per primary team  - If abscess drain outputs decrease to 10cc/24hr while inpatient, recommend repeat drain evaluation with IR.  ----------------  - Outpatient follow up: Abscess drain evaluation appointment made for 9/30/22 @10:30AM. COVID-19 test prior on 9/27 @ 1:30PM. Call the IR booking office at [phone] with any questions.  - Cholecystostomy drain outpatient follow up: recommend IR follow every 3 months for routine exchange. Call the IR booking office at [phone] for appointment.  - Pt will benefit from VNS service to help with drainage catheter care; Pt should continue same drainage catheter care as an outpatient.  - Greater than 50% of the encounter was spent counseling and/ or coordination of care on drain care and follow up.   -Case discussed with Dr. Harper and hepatology team.     Teresa Segura PA-C  Available on teams

## 2022-09-22 NOTE — CHART NOTE - NSCHARTNOTEFT_GEN_A_CORE
Liver Frailty Index  1. Gender: Male  2. Dominant hand  strength (kg): attempt 1. 25 kg attempt 2. 24 kg attempt 3. 25 kg  Av.67 kg  3. Time to do 5 chair stands 10.80 seconds  4. Seconds holding 3 position balance: Side: 10 sec  SemiTandem: 10 sec Tandem: 10 sec  Total: 30 seconds  Results Liver Frailty Index is 3.94  Decimal precision: 2  Based on suggested cut-offs of the Liver Frailty Index, a patient with this Liver Frailty Index score is considered Pre-frail.  This Liver Frailty Index score falls within the 61 percentile of outpatients with cirrhosis who are listed for liver transplantation

## 2022-09-22 NOTE — PROGRESS NOTE ADULT - SUBJECTIVE AND OBJECTIVE BOX
INFECTIOUS DISEASES FOLLOW UP-- So Guajardo  [phone]    This is a follow up note for this  67yMale with  Abdominal pain        ROS:  CONSTITUTIONAL:  No fever, good appetite  CARDIOVASCULAR:  No chest pain or palpitations  RESPIRATORY:  No dyspnea  GASTROINTESTINAL:  No nausea, vomiting, diarrhea, or abdominal pain  GENITOURINARY:  No dysuria  NEUROLOGIC:  No headache,     Allergies    No Known Allergies    Intolerances        ANTIBIOTICS/RELEVANT:  antimicrobials  cefepime   IVPB      cefepime   IVPB 1000 milliGRAM(s) IV Intermittent every 8 hours  fluconAZOLE   Tablet 400 milliGRAM(s) Oral daily  metroNIDAZOLE  IVPB 500 milliGRAM(s) IV Intermittent two times a day    immunologic:  influenza   Vaccine 0.5 milliLiter(s) IntraMuscular once    OTHER:  furosemide    Tablet 60 milliGRAM(s) Oral daily  magnesium oxide 400 milliGRAM(s) Oral three times a day with meals  multivitamin 1 Tablet(s) Oral daily  pantoprazole    Tablet 40 milliGRAM(s) Oral before breakfast  polyethylene glycol 3350 17 Gram(s) Oral two times a day  senna 2 Tablet(s) Oral at bedtime  spironolactone 150 milliGRAM(s) Oral daily  tamsulosin 0.4 milliGRAM(s) Oral at bedtime      Objective:  Vital Signs Last 24 Hrs  T(C): 37.1 (22 Sep 2022 17:00), Max: 37.2 (22 Sep 2022 01:00)  T(F): 98.8 (22 Sep 2022 17:00), Max: 99 (22 Sep 2022 01:00)  HR: 94 (22 Sep 2022 17:00) (86 - 96)  BP: 149/81 (22 Sep 2022 17:00) (122/78 - 149/81)  BP(mean): --  RR: 18 (22 Sep 2022 17:00) (18 - 18)  SpO2: 97% (22 Sep 2022 17:00) (95% - 98%)    Parameters below as of 22 Sep 2022 17:00  Patient On (Oxygen Delivery Method): room air        PHYSICAL EXAM:  Constitutional:no acute distress  Eyes:ROSA, EOMI  Ear/Nose/Throat: no oral lesions, 	  Respiratory: clear BL  Cardiovascular: S1S2  Gastrointestinal:soft, (+) BS, no tenderness  two drains in place RUQ with minimal output  lower mid abdomen drain with small amount of seropurulent drainage  Extremities:no e/e/c  No Lymphadenopathy  IV sites not inflammed.    LABS:                        8.6    7.01  )-----------( 80       ( 22 Sep 2022 07:05 )             26.3     09-22    131<L>  |  101  |  8   ----------------------------<  95  3.7   |  24  |  0.62    Ca    8.8      22 Sep 2022 07:00  Phos  2.7     09-22  Mg     1.6     09-22    TPro  7.3  /  Alb  2.1<L>  /  TBili  2.2<H>  /  DBili  x   /  AST  25  /  ALT  10  /  AlkPhos  91  09-22    PT/INR - ( 22 Sep 2022 07:05 )   PT: 28.5 sec;   INR: 2.43 ratio               MICROBIOLOGY:    Culture - Abscess with Gram Stain (09.11.22 @ 17:02)    -  Amikacin: S <=16    -  Amoxicillin/Clavulanic Acid: R >16/8    -  Ampicillin: R >16 These ampicillin results predict results for amoxicillin    -  Ampicillin/Sulbactam: S 8/4 Enterobacter, Klebsiella aerogenes, Citrobacter, and Serratia may develop resistance during prolonged therapy (3-4 days)    -  Aztreonam: S <=4    -  Cefazolin: R 8 Enterobacter, Klebsiella aerogenes, Citrobacter, and Serratia may develop resistance during prolonged therapy (3-4 days)    -  Cefepime: S <=2    -  Cefoxitin: S <=8    -  Ceftriaxone: S <=1 Enterobacter, Klebsiella aerogenes, Citrobacter, and Serratia may develop resistance during prolonged therapy    -  Ciprofloxacin: R >2    -  Ertapenem: S <=0.5    -  Gentamicin: S <=2    -  Imipenem: S <=1    -  Levofloxacin: R >4    -  Meropenem: S <=1    -  Piperacillin/Tazobactam: S <=8    -  Tobramycin: S <=2    -  Trimethoprim/Sulfamethoxazole: S <=0.5/9.5    Specimen Source: .Abscess perihepatic collection    Culture Results:   Moderate Escherichia coli    Organism Identification: Escherichia coli    Organism: Escherichia coli    Method Type: ELANA    Culture - Blood (09.15.22 @ 05:57)    Specimen Source: .Blood Blood    Culture Results:   No Growth Final            RECENT CULTURES:      RADIOLOGY & ADDITIONAL STUDIES:  < from: CT Abdomen and Pelvis w/ IV Cont (09.21.22 @ 16:11) >  IMPRESSION:  Cirrhosis and portal hypertension.    Near complete resolution of collection in anterior pelvis status post   percutaneous drainage.    Small persistent collection right subphrenic space.    Persistent ascites and pleural effusions.    < end of copied text >

## 2022-09-22 NOTE — CHART NOTE - NSCHARTNOTEFT_GEN_A_CORE
Nutrition Follow Up Note  Patient seen for: malnutrition follow up.    Chart reviewed, events noted. 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 Currently Listed for OLT. MELD-Na 24 ().    Pt seen by PT for frailty assessments: (2022) pt scored 4.42 pre- frail -> () pt scored 4.69 frail.    Source: [x] Patient       [x] EMR        [] RN        [x] Family at bedside       [] Other:    -If unable to interview patient: [] Trach/Vent/BiPAP  [] Disoriented/confused/inappropriate to interview    Diet Order:   Diet, Regular:   1000mL Fluid Restriction ([id number])  Low Sodium  Supplement Feeding Modality:  Oral  Ensure Enlive Cans or Servings Per Day:  1       Frequency:  Three Times a day (22)    - Is current order appropriate/adequate? [x] Yes  []  No:     - PO intake :   [x] >75%  Adequate    [] 50-75%  Fair       [] <50%  Poor    - Nutrition-related concerns:   - Pt reports improved appetite, states he is consuming >80% of meals. Endorses occasional intake of Ensures, states they often cause a bowel movement after consumption. Noted pt also ordered for bowel regimen and antibiotic regimen. Denies any N/V or abdominal discomfort.     GI:  Last BM 9/20 x 1.   Bowel Regimen? [x] Yes - Miralax, Senna    Weights:   Daily Weight in k.7 ()   - Diuretic therapies noted, will continue to monitor    Nutritionally Pertinent MEDICATIONS  (STANDING):  cefepime   IVPB  cefepime   IVPB  fluconAZOLE   Tablet  furosemide    Tablet  magnesium oxide  metroNIDAZOLE  IVPB  multivitamin  pantoprazole    Tablet  polyethylene glycol 3350  senna  spironolactone  tamsulosin    Pertinent Labs:  @ 07:00: Na 131<L>, BUN 8, Cr 0.62, BG 95, K+ 3.7, Phos 2.7, Mg 1.6, Alk Phos 91, ALT/SGPT 10, AST/SGOT 25, HbA1c --    A1C with Estimated Average Glucose Result: 4.9 % (22 @ 00:35)    Finger Sticks: n/a     Skin per nursing documentation: No noted pressure injuries as per documentation.   Edema: 2+ bilateral foot     Estimated Needs:   [x] no change since previous assessment  [] recalculated:     Previous Nutrition Diagnosis: Severe Malnutrition   Nutrition Diagnosis is: [x] ongoing  [] resolved [] not applicable     New Nutrition Diagnosis: [x] Not applicable    Nutrition Care Plan:  [x] In Progress - being addressed with PO encouragement, oral nutrition supplements, micronutrient supplementation  [] Achieved  [] Not applicable    Nutrition Interventions:     Education Provided:       [x] Yes: Reviewed importance of adequate kcal and protein intake with recommendations to optimize. Reviewed recommendations for low sodium diet with fluid restriction. Pt and wife deny having further questions/concerns about diet and nutrition - made aware RD remains available.     Recommendations:      1. Continue low sodium diet, defer fluid restriction to team.  2. Continue Ensure Supplement 3 x daily.  3. Continue multivitamin supplementation unless otherwise contraindicated.  4. Provide encouragement with PO intake, menu selections, and assistance with meals as needed.  5. Reinforce nutrition education as needed - RD remains available.      Monitoring and Evaluation:   Continue to monitor nutritional intake, tolerance to diet prescription, weights, labs, skin integrity    RD remains available upon request and will follow up per protocol    Mandy Medina MS, RD, CDN, [facility] #[phone]

## 2022-09-22 NOTE — PROGRESS NOTE ADULT - ASSESSMENT
66 year old male PMH cholangitis/cholecystitis (s/p ERCP w/ biliary stent placement 04/2022) and recently diagnosed etOH cirrhosis, had RUQ pain following biliary stent removal on 7/20/22, then  Found to have a distended GB is s/p Choley tube placement 8/9  now admitted with concerns for multiple large abdominal collections and choley tube repositioning. ID consulted for antibiotic management.    #Serratia Bacteremia, Peritonitis, Positive Culture Finding (Ascites)  --c/w Cefepime 1g IV Q8H 9/14 -->  --c/w Metronidazole 500 mg PO/IV Q12H for empiric anaerobic coverage    - complete 14 day course 9/27      #Pre-OLT  ID Clearance for OLT pending resolution of peritonitis     #Late Latent Syphilis  Denies knowledge of preceding diagnosis  Likely late latent syphilis  s/p IM Benzathine Penicillin 2.4 million units Q7Days x 3 doses    #Encounter to Vaccinate Patient  COVID19: 3/5/21, 3/26/21 - Would benefit from bivalent COVID19 vaccine  Influenza: Will require   Pneumococcal: PCV20 9/1/22  HBV: Heplisav dose 1 9/1/22. Dose 2 in 1 month.   HAV: Immune  Tdap: Will require Tdap  Shinglex: Will require Shingrix

## 2022-09-22 NOTE — PROGRESS NOTE ADULT - ASSESSMENT
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 Currently Listed for OLT     #Decompensated ETOH/PARNELL Cirrhosis, listed for OLT  #Abdominal collection, infected hematoma s/p IR drainage x 2 with C tube repositioning c/b Augmentin resistant E coli  #Serratia bacteremia 9/11, repeat BCx 9/15 negative  #Hyponatremia  MELD-Na: 23 9/22  - HE: none currently  - EV: normal esophagus on ERCP from 7/2022  - Ascites: moderate 8/31 US  - SBP: paracentesis 8/9/2022 reveals likely secondary bacterial peritonitis from suspected gallbladder pathology, +SBP on para 8/31  - HCC:  No lesions on CT 8/26      Plan  - Continue lasix  60 mg/aldactone 150  - Drain management per IR, continue observation; if abscess drain output decreases to 10cc/24hr while inpatient, recommending repeat drain evaluation with IR. OP follow up appt also made for 9/30/22 at 10:30 AM and needs covid testing on 9/27. Recommending IR follow of cholecystostomy drain every 3 mos   - ID: long term plan for Abx, per ID, likely 2 week course, but to review  - Follow up final ID recs  - serratia sens to cefepime  - On cefepime, metronidazole  - ASA 81 daily  - Drain management as per IR RUQ drain output 10ml.24 hours ->removed 9/19  - Daily CMP, CBC, coags  - Continue Fluconazole  - Physical therapy  [x] Psychosocial: cleared pending RPP  [x ] Infectious  [x] Cardiology:        Cardiac cath: n/a       Stress test: negative noninvasive stress test on 8/19/2022 for inducible ischemia  [ ] Colonoscopy: needed, f/u records from outside GI colonoscopy reports  [x] Prostate: Prostate Specific Antigen: negative at 2.21 ng/mL on 8/12/2022  [x] Dental  [x] PT/Frailty    Preliminary note until signed by Attending.    Thank you for involving us in this patient's care.    Kary Tena MD  Gastroenterology/Hepatology Fellow, PGY-VI    NON-URGENT CONSULTS:  Please email [email or web address]  AT NIGHT AND ON WEEKENDS:  Contact on-call GI fellow via answering service ([phone]) from 5pm-8am and on weekends/holidays  MONDAY-FRIDAY 8AM-5PM:  Pager# [phone] ([facility])  GI Phone# [phone] ([facility])

## 2022-09-22 NOTE — PROGRESS NOTE ADULT - ASSESSMENT
WORK UP  On 9/11 s/p perihepatic collection drainage, abdominal collection drainage, cholecystostomy tube re-positioning  Abdominal Drainage 1 (9/11) Moderate E coli (now Augmentin resistant)  Abdominal Drainage 2 (9/11) Moderate E coli (now Augmentin resistant)  Blood Cultures (9/11) 1/4 Serratia (based on BCID PCR)    CT A/P (9/13) Near complete resolution of right upper quadrant collection and Large anterior peritoneal collection is decreased in size.    DIAGNOSIS and IMPRESSION  66 year old male PMH cholangitis/cholecystitis (s/p ERCP w/ biliary stent placement 04/2022) and recently diagnosed etOH cirrhosis, had RUQ pain following biliary stent removal on 7/20/22, then  Found to have a distended GB is s/p Choley tube placement 8/9  now admitted with concerns for multiple large abdominal collections and choley tube repositioning. ID consulted for antibiotic management.    #Serratia Bacteremia, Peritonitis, Positive Culture Finding (Ascites)  --c/w Cefepime 1g IV Q8H  --c/w Metronidazole 500 mg PO/IV Q12H for empiric anaerobic coverage  --complete 14 days of antibiotics for the bacteremia after which could change to oral Bactrim DS bid additional week until drain is removed    #Pre-OLT  ID Clearance for OLT pending resolution of peritonitis     #Late Latent Syphilis  Denies knowledge of preceding diagnosis  Likely late latent syphilis  s/p IM Benzathine Penicillin 2.4 million units Q7Days x 3 doses    #Encounter to Vaccinate Patient  COVID19: 3/5/21, 3/26/21 - Would benefit from bivalent COVID19 vaccine  Influenza: Will require   Pneumococcal: PCV20 9/1/22  HBV: Heplisav dose 1 9/1/22. Dose 2 in 1 month.   HAV: Immune  Tdap: Will require Tdap  Shinglex: Will require Shingrix    Anton Guajardo MD  Can be called via Teams  After 5pm/weekends [phone]     WORK UP  On 9/11 s/p perihepatic collection drainage, abdominal collection drainage, cholecystostomy tube re-positioning  Abdominal Drainage 1 (9/11) Moderate E coli (now Augmentin resistant)  Abdominal Drainage 2 (9/11) Moderate E coli (now Augmentin resistant)  Blood Cultures (9/11) 1/4 Serratia (based on BCID PCR)    CT A/P (9/13) Near complete resolution of right upper quadrant collection and Large anterior peritoneal collection is decreased in size.    DIAGNOSIS and IMPRESSION  66 year old male PMH cholangitis/cholecystitis (s/p ERCP w/ biliary stent placement 04/2022) and recently diagnosed etOH cirrhosis, had RUQ pain following biliary stent removal on 7/20/22, then  Found to have a distended GB is s/p Choley tube placement 8/9  now admitted with concerns for multiple large abdominal collections and choley tube repositioning. ID consulted for antibiotic management.    #Serratia Bacteremia, Peritonitis, Positive Culture Finding (Ascites)    --complete 14 days of antibiotics CEfepime/Flagyl for the bacteremia after which could change to oral Bactrim DS bid additional week until drain is removed    #Pre-OLT  ID Clearance for OLT pending resolution of peritonitis     #Late Latent Syphilis  Denies knowledge of preceding diagnosis  Likely late latent syphilis  s/p IM Benzathine Penicillin 2.4 million units Q7Days x 3 doses    #Encounter to Vaccinate Patient  COVID19: 3/5/21, 3/26/21 - Would benefit from bivalent COVID19 vaccine  Influenza: Will require   Pneumococcal: PCV20 9/1/22  HBV: Heplisav dose 1 9/1/22. Dose 2 in 1 month.   HAV: Immune  Tdap: Will require Tdap  Shinglex: Will require Shingrix    Anton Guajardo MD  Can be called via Teams  After 5pm/weekends [phone]

## 2022-09-22 NOTE — PROGRESS NOTE ADULT - SUBJECTIVE AND OBJECTIVE BOX
Follow Up:  biliary drainage    Interval History/ROS:  feels better, alert and comfortable    Allergies  No Known Allergies    ANTIMICROBIALS:  cefepime   IVPB    cefepime   IVPB 1000 every 8 hours  fluconAZOLE   Tablet 400 daily  metroNIDAZOLE  IVPB 500 two times a day    OTHER MEDS:  MEDICATIONS  (STANDING):  furosemide    Tablet 60 daily  influenza   Vaccine 0.5 once  pantoprazole    Tablet 40 before breakfast  polyethylene glycol 3350 17 two times a day  senna 2 at bedtime  spironolactone 150 daily  tamsulosin 0.4 at bedtime    Vital Signs Last 24 Hrs  T(C): 37.1 (22 Sep 2022 17:00), Max: 37.2 (22 Sep 2022 01:00)  T(F): 98.8 (22 Sep 2022 17:00), Max: 99 (22 Sep 2022 01:00)  HR: 94 (22 Sep 2022 17:00) (86 - 96)  BP: 149/81 (22 Sep 2022 17:00) (122/78 - 149/81)  BP(mean): --  RR: 18 (22 Sep 2022 17:00) (18 - 18)  SpO2: 97% (22 Sep 2022 17:00) (95% - 98%)    Parameters below as of 22 Sep 2022 17:00  Patient On (Oxygen Delivery Method): room air        PHYSICAL EXAM:  General: NAD, Non-toxic  Neurology: A&Ox3, nonfocal  Respiratory: Clear to auscultation bilaterally  CV: RRR, S1S2, no murmurs, rubs or gallops  Abdominal: Soft, Non-tender, non-distended, drains right abd  Extremities: No edema,  Line Sites: Clear  Skin: No rash                        8.6    7.01  )-----------( 80       ( 22 Sep 2022 07:05 )             26.3     09-22    131<L>  |  101  |  8   ----------------------------<  95  3.7   |  24  |  0.62    Ca    8.8      22 Sep 2022 07:00  Phos  2.7     09-22  Mg     1.6     09-22    TPro  7.3  /  Alb  2.1<L>  /  TBili  2.2<H>  /  DBili  x   /  AST  25  /  ALT  10  /  AlkPhos  91  09-22      MICROBIOLOGY:  .Blood Blood  09-15-22   No Growth Final  --  --      .Abscess perihepatic collection  09-11-22   Moderate Escherichia coli  --  Escherichia coli      .Abscess abdominal collection  09-11-22   Moderate Escherichia coli  --  Escherichia coli      .Blood Blood  09-11-22   Growth in anaerobic bottle: Serratia marcescens        Clean Catch Clean Catch (Midstream)  09-11-22   No growth  --  --      Clean Catch Clean Catch (Midstream)  09-10-22   <10,000 CFU/mL Normal Urogenital Malu  --  --      .Blood Blood-Peripheral  09-10-22   No Growth Final  --  --      .Blood Blood-Peripheral  09-10-22   No Growth Final  --  --      Peritoneal Peritoneal Fluid  08-31-22   No growth at 5 days  --    polymorphonuclear leukocytes seen  No organisms seen  by cytocentrifuge      Clean Catch Clean Catch (Midstream)  08-30-22   No growth  --  --      .Blood Blood-Peripheral  08-30-22   No Growth Final  --  --      Peritoneal Peritoneal Fluid  08-25-22   No growth at 5 days  --    polymorphonuclear leukocytes seen  No organisms seen  by cytocentrifuge    CMV IgG Antibody: 4.00 U/mL (08-12-22 @ 00:32)      RADIOLOGY:  < from: CT Abdomen and Pelvis w/ IV Cont (09.21.22 @ 16:11) >  PROCEDURE:  CT of the Abdomen and Pelvis was performed.  Sagittal and coronal reformats were performed.    FINDINGS:  LOWER CHEST: Within normal limits.    LIVER: Within normal limits.  BILE DUCTS: Normal caliber.  GALLBLADDER: Percutaneous cholecystostomy.  SPLEEN: Within normal limits.  PANCREAS: Within normal limits.  ADRENALS: Within normal limits.  KIDNEYS/URETERS: Within normal limits.    BLADDER: Incompletely distended.  REPRODUCTIVE ORGANS: Prostate within normal limits.    BOWEL: No bowel obstruction. Persistent mural thickening in the jejunum   suggesting enteritis, unchanged. Appendix small appendicoliths, otherwise   normal.  PERITONEUM: Mild ascites, unchanged. Diffuse mesenteric edema. Small   persistent collection in right subphrenic space status post removal of   percutaneous drain (3, 39) measures 5.5 x 1.5 cm. Percutaneous drain   remains within the collection in the anterior pelvis which is nearly   completely resolved.  VESSELS: Perigastric and periumbilical varices. Splenorenal shunt.  RETROPERITONEUM/LYMPH NODES: No lymphadenopathy.  ABDOMINAL WALL: Within normal limits.  BONES: Within normal limits.    IMPRESSION:  Cirrhosis and portal hypertension.    Near complete resolution of collection in anterior pelvis status post   percutaneous drainage.    Small persistent collection right subphrenic space.    Persistent ascites and pleural effusions.    < end of copied text >      Edin Lee MD; Division of Infectious Disease; Pager: [phone]; nights and weekends: [phone]

## 2022-09-23 ENCOUNTER — TRANSCRIPTION ENCOUNTER (OUTPATIENT)
Age: 67
End: 2022-09-23

## 2022-09-23 LAB
ALBUMIN SERPL ELPH-MCNC: 2.2 G/DL — LOW (ref 3.3–5)
ALP SERPL-CCNC: 80 U/L — SIGNIFICANT CHANGE UP (ref 40–120)
ALT FLD-CCNC: 10 U/L — SIGNIFICANT CHANGE UP (ref 10–45)
ANION GAP SERPL CALC-SCNC: 5 MMOL/L — SIGNIFICANT CHANGE UP (ref 5–17)
AST SERPL-CCNC: 23 U/L — SIGNIFICANT CHANGE UP (ref 10–40)
BASOPHILS # BLD AUTO: 0.05 K/UL — SIGNIFICANT CHANGE UP (ref 0–0.2)
BASOPHILS NFR BLD AUTO: 0.8 % — SIGNIFICANT CHANGE UP (ref 0–2)
BILIRUB SERPL-MCNC: 2.1 MG/DL — HIGH (ref 0.2–1.2)
BUN SERPL-MCNC: 9 MG/DL — SIGNIFICANT CHANGE UP (ref 7–23)
CALCIUM SERPL-MCNC: 8.8 MG/DL — SIGNIFICANT CHANGE UP (ref 8.4–10.5)
CHLORIDE SERPL-SCNC: 101 MMOL/L — SIGNIFICANT CHANGE UP (ref 96–108)
CO2 SERPL-SCNC: 25 MMOL/L — SIGNIFICANT CHANGE UP (ref 22–31)
CREAT SERPL-MCNC: 0.63 MG/DL — SIGNIFICANT CHANGE UP (ref 0.5–1.3)
EGFR: 106 ML/MIN/1.73M2 — SIGNIFICANT CHANGE UP
EOSINOPHIL # BLD AUTO: 0.11 K/UL — SIGNIFICANT CHANGE UP (ref 0–0.5)
EOSINOPHIL NFR BLD AUTO: 1.7 % — SIGNIFICANT CHANGE UP (ref 0–6)
GLUCOSE SERPL-MCNC: 102 MG/DL — HIGH (ref 70–99)
HCT VFR BLD CALC: 24.5 % — LOW (ref 39–50)
HGB BLD-MCNC: 8.1 G/DL — LOW (ref 13–17)
IMM GRANULOCYTES NFR BLD AUTO: 0.6 % — SIGNIFICANT CHANGE UP (ref 0–0.9)
INR BLD: 2.36 RATIO — HIGH (ref 0.88–1.16)
LYMPHOCYTES # BLD AUTO: 2.04 K/UL — SIGNIFICANT CHANGE UP (ref 1–3.3)
LYMPHOCYTES # BLD AUTO: 31.6 % — SIGNIFICANT CHANGE UP (ref 13–44)
MAGNESIUM SERPL-MCNC: 1.9 MG/DL — SIGNIFICANT CHANGE UP (ref 1.6–2.6)
MCHC RBC-ENTMCNC: 31.2 PG — SIGNIFICANT CHANGE UP (ref 27–34)
MCHC RBC-ENTMCNC: 33.1 GM/DL — SIGNIFICANT CHANGE UP (ref 32–36)
MCV RBC AUTO: 94.2 FL — SIGNIFICANT CHANGE UP (ref 80–100)
MONOCYTES # BLD AUTO: 0.88 K/UL — SIGNIFICANT CHANGE UP (ref 0–0.9)
MONOCYTES NFR BLD AUTO: 13.6 % — SIGNIFICANT CHANGE UP (ref 2–14)
NEUTROPHILS # BLD AUTO: 3.34 K/UL — SIGNIFICANT CHANGE UP (ref 1.8–7.4)
NEUTROPHILS NFR BLD AUTO: 51.7 % — SIGNIFICANT CHANGE UP (ref 43–77)
NRBC # BLD: 0 /100 WBCS — SIGNIFICANT CHANGE UP (ref 0–0)
PHOSPHATE SERPL-MCNC: 2.6 MG/DL — SIGNIFICANT CHANGE UP (ref 2.5–4.5)
PLATELET # BLD AUTO: 84 K/UL — LOW (ref 150–400)
POTASSIUM SERPL-MCNC: 3.4 MMOL/L — LOW (ref 3.5–5.3)
POTASSIUM SERPL-SCNC: 3.4 MMOL/L — LOW (ref 3.5–5.3)
PROT SERPL-MCNC: 7.2 G/DL — SIGNIFICANT CHANGE UP (ref 6–8.3)
PROTHROM AB SERPL-ACNC: 27.4 SEC — HIGH (ref 10.5–13.4)
RBC # BLD: 2.6 M/UL — LOW (ref 4.2–5.8)
RBC # FLD: 20 % — HIGH (ref 10.3–14.5)
SODIUM SERPL-SCNC: 131 MMOL/L — LOW (ref 135–145)
WBC # BLD: 6.46 K/UL — SIGNIFICANT CHANGE UP (ref 3.8–10.5)
WBC # FLD AUTO: 6.46 K/UL — SIGNIFICANT CHANGE UP (ref 3.8–10.5)

## 2022-09-23 PROCEDURE — 99232 SBSQ HOSP IP/OBS MODERATE 35: CPT | Mod: GC

## 2022-09-23 PROCEDURE — 99231 SBSQ HOSP IP/OBS SF/LOW 25: CPT

## 2022-09-23 RX ORDER — SPIRONOLACTONE 25 MG/1
100 TABLET, FILM COATED ORAL DAILY
Refills: 0 | Status: DISCONTINUED | OUTPATIENT
Start: 2022-09-24 | End: 2022-09-25

## 2022-09-23 RX ORDER — FUROSEMIDE 40 MG
40 TABLET ORAL DAILY
Refills: 0 | Status: DISCONTINUED | OUTPATIENT
Start: 2022-09-24 | End: 2022-09-24

## 2022-09-23 RX ORDER — POTASSIUM CHLORIDE 20 MEQ
40 PACKET (EA) ORAL ONCE
Refills: 0 | Status: COMPLETED | OUTPATIENT
Start: 2022-09-23 | End: 2022-09-23

## 2022-09-23 RX ADMIN — Medication 60 MILLIGRAM(S): at 05:37

## 2022-09-23 RX ADMIN — Medication 100 MILLIGRAM(S): at 21:18

## 2022-09-23 RX ADMIN — MAGNESIUM OXIDE 400 MG ORAL TABLET 400 MILLIGRAM(S): 241.3 TABLET ORAL at 12:36

## 2022-09-23 RX ADMIN — Medication 100 MILLIGRAM(S): at 08:28

## 2022-09-23 RX ADMIN — Medication 40 MILLIEQUIVALENT(S): at 08:50

## 2022-09-23 RX ADMIN — CEFEPIME 100 MILLIGRAM(S): 1 INJECTION, POWDER, FOR SOLUTION INTRAMUSCULAR; INTRAVENOUS at 05:38

## 2022-09-23 RX ADMIN — SPIRONOLACTONE 150 MILLIGRAM(S): 25 TABLET, FILM COATED ORAL at 05:38

## 2022-09-23 RX ADMIN — CEFEPIME 100 MILLIGRAM(S): 1 INJECTION, POWDER, FOR SOLUTION INTRAMUSCULAR; INTRAVENOUS at 21:18

## 2022-09-23 RX ADMIN — FLUCONAZOLE 400 MILLIGRAM(S): 150 TABLET ORAL at 12:36

## 2022-09-23 RX ADMIN — CEFEPIME 100 MILLIGRAM(S): 1 INJECTION, POWDER, FOR SOLUTION INTRAMUSCULAR; INTRAVENOUS at 14:10

## 2022-09-23 RX ADMIN — MAGNESIUM OXIDE 400 MG ORAL TABLET 400 MILLIGRAM(S): 241.3 TABLET ORAL at 17:48

## 2022-09-23 RX ADMIN — PANTOPRAZOLE SODIUM 40 MILLIGRAM(S): 20 TABLET, DELAYED RELEASE ORAL at 05:38

## 2022-09-23 RX ADMIN — Medication 1 TABLET(S): at 12:36

## 2022-09-23 RX ADMIN — TAMSULOSIN HYDROCHLORIDE 0.4 MILLIGRAM(S): 0.4 CAPSULE ORAL at 21:17

## 2022-09-23 RX ADMIN — SENNA PLUS 2 TABLET(S): 8.6 TABLET ORAL at 21:17

## 2022-09-23 RX ADMIN — MAGNESIUM OXIDE 400 MG ORAL TABLET 400 MILLIGRAM(S): 241.3 TABLET ORAL at 08:28

## 2022-09-23 NOTE — DISCHARGE NOTE NURSING/CASE MANAGEMENT/SOCIAL WORK - NSDCFUADDAPPT_GEN_ALL_CORE_FT
- Interventional Radiology Outpatient follow up: Abscess drain evaluation appointment made for 9/30/22 @10:30AM. COVID-19 test prior on 9/27 @ 1:30PM. Call the IR booking office at [phone] with any questions.  - Interventional Radiology Cholecystostomy drain outpatient follow up: recommend IR follow every 3 months for routine exchange. Call the IR booking office at [phone] for appointment.

## 2022-09-23 NOTE — PROGRESS NOTE ADULT - SUBJECTIVE AND OBJECTIVE BOX
Interventional Radiology Follow-Up Note.    Patient seen and examined @ bedside.    This is a 66 year old Male s/p perihepatic collection drain placement and abdominal collection drain placement on 9/11/22. Cholecystostomy tube placement on 8/9/22 (last exchanged on 9/11/22) in IR with Dr. Yin. Perihepatic collection resolved and drain was removed on 9/19/22.    No complaint offered.      Medication:    cefepime   IVPB: (09-23)  fluconAZOLE   Tablet: (09-22)  furosemide    Tablet: (09-23)  metroNIDAZOLE  IVPB: (09-23)  spironolactone: (09-23)  tamsulosin: (09-22)    Vitals:  T(F): 98, Max: 98.9 (05:32)  HR: 95  BP: 115/71  RR: 18  SpO2: 98%    Physical Exam:  General: Nontoxic, in NAD.  Abdomen: soft, NTND.   Abdominal drain Device: Drain intact attached to gravity bag.  Flushed with 5cc NS w/o difficulty, no leaking, +return of fluid into bag. Dressing clean, dry, intact.   24hr Drain output: 25cc    Cholecystostomy Device: Drain intact attached to gravity bag.  Flushed with 5cc NS w/o difficulty, no leaking, +return of fluid into bag. Dressing clean, dry, intact.   24hr Drain output: 60cc        LABS:  Na: 131 (09-23 @ 06:31), 131 (09-22 @ 07:00), 130 (09-21 @ 06:40)  K: 3.4 (09-23 @ 06:31), 3.7 (09-22 @ 07:00), 3.9 (09-21 @ 06:40)  Cl: 101 (09-23 @ 06:31), 101 (09-22 @ 07:00), 99 (09-21 @ 06:40)  CO2: 25 (09-23 @ 06:31), 24 (09-22 @ 07:00), 25 (09-21 @ 06:40)  BUN: 9 (09-23 @ 06:31), 8 (09-22 @ 07:00), 8 (09-21 @ 06:40)  Cr: 0.63 (09-23 @ 06:31), 0.62 (09-22 @ 07:00), 0.63 (09-21 @ 06:40)  Glu: 102(09-23 @ 06:31), 95(09-22 @ 07:00), 79(09-21 @ 06:40)    Hgb: 8.1 (09-23 @ 06:33), 8.6 (09-22 @ 07:05), 8.9 (09-21 @ 06:39)  Hct: 24.5 (09-23 @ 06:33), 26.3 (09-22 @ 07:05), 27.4 (09-21 @ 06:39)  WBC: 6.46 (09-23 @ 06:33), 7.01 (09-22 @ 07:05), 7.47 (09-21 @ 06:39)  Plt: 84 (09-23 @ 06:33), 80 (09-22 @ 07:05), 89 (09-21 @ 06:39)    INR: 2.36 09-23-22 @ 06:27, 2.43 09-22-22 @ 07:05, 2.43 09-21-22 @ 06:39    LIVER FUNCTIONS - ( 23 Sep 2022 06:31 )  Alb: 2.2 g/dL / Pro: 7.2 g/dL / ALK PHOS: 80 U/L / ALT: 10 U/L / AST: 23 U/L / GGT: x           Bilirubin Total, Serum: 2.1 mg/dL (09-23-22 @ 06:31)    Aspartate Aminotransferase (AST/SGOT): 23 U/L (09-23-22 @ 06:31)  Alanine Aminotransferase (ALT/SGPT): 10 U/L (09-23-22 @ 06:31)  Aspartate Aminotransferase (AST/SGOT): 25 U/L (09-22-22 @ 07:00)  Alanine Aminotransferase (ALT/SGPT): 10 U/L (09-22-22 @ 07:00)      Bilirubin Total, Serum: 2.1 mg/dL *H* (09-23-22 @ 06:31)  Bilirubin Total, Serum: 2.2 mg/dL *H* (09-22-22 @ 07:00)  Bilirubin Total, Serum: 2.3 mg/dL *H* (09-21-22 @ 06:40)          Assessment/Plan:   67 year old male with decompensated cirrhosis and cholangitis s/p ERCP with stent placement (4/2022) s/p stent removal on 7/20 (along sphincterotomy and stone extraction) now s/p cholecystostomy tube 8/9 with check 8/29 in correct location. He presents to OSH with bloody discharge from the wound site and abdominal distention found to have potentially dislodged IR cholecystostomy tube and ascites. Recent OSH imaging showing multiple large abdominal collections.    Pt most recently s/p perihepatic collection drain placement and abdominal collection drain placement on 9/11/22. Cholecystostomy tube placement on 8/9/22 (last exchanged on 9/11/22) in IR with Dr. Yin. Perihepatic collection resolved and drain was removed on 9/19/22..     - Leukocytosis resolved, continue abx per primary team.  - 9/21 CT abd/pelv shows near complete resolution of abdominal collection- recommend continued observation given output 25cc over last 24hrs.   - If abscess drain outputs decrease to 10cc/24hr x 2 days while inpatient, recommend repeat drain evaluation with IR.  - Flush drains with 5cc normal saline daily forward only; DO NOT aspirate  - Change dressing q3 days or when dressing is saturated.  - Trend vs/labs  - Continue global management per primary team    ----------------  - Outpatient follow up: Abscess drain evaluation appointment made for 9/30/22 @10:30AM. COVID-19 test prior on 9/27 @ 1:30PM. Call the IR booking office at [phone] with any questions.  - Cholecystostomy drain outpatient follow up: recommend IR follow every 3 months for routine exchange. Call the IR booking office at [phone] for appointment.  - Pt will benefit from VNS service to help with drainage catheter care; Pt should continue same drainage catheter care as an outpatient.  - Greater than 50% of the encounter was spent counseling and/ or coordination of care on drain care and follow up.      Please call IR at [phone] with any questions, concerns, or issues regarding above.    Also available on [employer] TE[employer].

## 2022-09-23 NOTE — DISCHARGE NOTE NURSING/CASE MANAGEMENT/SOCIAL WORK - PATIENT PORTAL LINK FT
You can access the FollowMyHealth Patient Portal offered by [facility] by registering at the following website: [email or web address]. By joining [facility]’s FollowMyHealth portal, you will also be able to view your health information using other applications (apps) compatible with our system.

## 2022-09-23 NOTE — PROGRESS NOTE ADULT - ASSESSMENT
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 Currently Listed for OLT     #Decompensated ETOH/PARNELL Cirrhosis, listed for OLT  #Abdominal collection, infected hematoma s/p IR drainage x 2 with C tube repositioning c/b Augmentin resistant E coli  #Serratia bacteremia 9/11, repeat BCx 9/15 negative  #Hyponatremia  MELD-Na: 23 9/22  - HE: none currently  - EV: normal esophagus on ERCP from 7/2022  - Ascites: moderate 8/31 US  - SBP: paracentesis 8/9/2022 reveals likely secondary bacterial peritonitis from suspected gallbladder pathology, +SBP on para 8/31  - HCC:  No lesions on CT 8/26      Plan  - Decrease lasix to 40/aldactone 100    - Drain management per IR, continue observation; if abscess drain output decreases to 10cc/24hr while inpatient, recommending repeat drain evaluation with IR. OP follow up appt also made for 9/30/22 at 10:30 AM and needs covid testing on 9/27. Recommending IR follow of cholecystostomy drain every 3 mos   - ID on board, appreciate recs: 14 days Abx Cefepime/flagyl for bacteremia after which can do po Bactrim DS BID for an additional week until the drain is removed  - serratia sens to cefepime  - On cefepime, metronidazole  - ASA 81 daily  - Drain management as per IR RUQ drain output 10ml.24 hours ->removed 9/19  - Daily CMP, CBC, coags  - Continue Fluconazole  - Physical therapy  [x] Psychosocial: cleared pending RPP  [x ] Infectious  [x] Cardiology:        Cardiac cath: n/a       Stress test: negative noninvasive stress test on 8/19/2022 for inducible ischemia  [ ] Colonoscopy: needed, f/u records from outside GI colonoscopy reports  [x] Prostate: Prostate Specific Antigen: negative at 2.21 ng/mL on 8/12/2022  [x] Dental  [x] PT/Frailty    Preliminary note until signed by Attending.    Thank you for involving us in this patient's care.    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 Currently Listed for OLT     #Decompensated ETOH/PARNELL Cirrhosis, listed for OLT  #Abdominal collection, infected hematoma s/p IR drainage x 2 with C tube repositioning c/b Augmentin resistant E coli  #Serratia bacteremia 9/11, repeat BCx 9/15 negative  #Hyponatremia  MELD-Na: 23 9/23  - HE: none currently  - EV: normal esophagus on ERCP from 7/2022  - Ascites: moderate 8/31 US  - SBP: paracentesis 8/9/2022 reveals likely secondary bacterial peritonitis from suspected gallbladder pathology, +SBP on para 8/31  - HCC:  No lesions on CT 8/26      Plan  - Decrease lasix to 40/aldactone 100    - Drain management per IR, continue observation; if abscess drain output decreases to 10cc/24hr while inpatient, recommending repeat drain evaluation with IR. OP follow up appt also made for 9/30/22 at 10:30 AM and needs covid testing on 9/27. Recommending IR follow of cholecystostomy drain every 3 mos   - ID on board, appreciate recs: 14 days Abx Cefepime/flagyl for bacteremia after which can do po Bactrim DS BID for an additional week until the drain is removed  - serratia sens to cefepime  - On cefepime, metronidazole  - ASA 81 daily  - Drain management as per IR RUQ drain output 10ml.24 hours ->removed 9/19  - Daily CMP, CBC, coags  - Continue Fluconazole  - Physical therapy  [x] Psychosocial: cleared pending RPP  [x ] Infectious  [x] Cardiology:        Cardiac cath: n/a       Stress test: negative noninvasive stress test on 8/19/2022 for inducible ischemia  [ ] Colonoscopy: needed, f/u records from outside GI colonoscopy reports  [x] Prostate: Prostate Specific Antigen: negative at 2.21 ng/mL on 8/12/2022  [x] Dental  [x] PT/Frailty    Preliminary note until signed by Attending.    Thank you for involving us in this patient's care.    Kary Tena MD  Gastroenterology/Hepatology Fellow, PGY-VI    NON-URGENT CONSULTS:  Please email [email or web address]  AT NIGHT AND ON WEEKENDS:  Contact on-call GI fellow via answering service ([phone]) from 5pm-8am and on weekends/holidays  MONDAY-FRIDAY 8AM-5PM:  Pager# [phone] ([facility])  GI Phone# [phone] ([facility])

## 2022-09-23 NOTE — DISCHARGE NOTE NURSING/CASE MANAGEMENT/SOCIAL WORK - NSDCPEFALRISK_GEN_ALL_CORE
For information on Fall & Injury Prevention, visit: [email or web address] OR  [email or web address] OR  https://www.cdc.gov/steadi/patient.html

## 2022-09-23 NOTE — DISCHARGE NOTE NURSING/CASE MANAGEMENT/SOCIAL WORK - NSDCVIVACCINE_GEN_ALL_CORE_FT
HepB-CpG; 01-Sep-2022 18:40; Silva Hurley (RN); [employer] [id number] (Exp. Date: 30-Jan-2023); IntraMuscular; Deltoid Right.; 20 MICROGram(s); VIS (VIS Published: 15-Oct-2021, VIS Presented: 01-Sep-2022);   Pneumococcal conjugate vaccine 20-valent (PCV20), polysaccharide [id number] conjugate, adjuvant, preserv; 01-Sep-2022 20:46; Hilario Valero (RN); [employer]; [id number] (Exp. Date: 01-Jul-2023); IntraMuscular; Deltoid Left.; 0.5 milliLiter(s); VIS (VIS Published: 04-Feb-2022, VIS Presented: 01-Sep-2022);

## 2022-09-23 NOTE — PROGRESS NOTE ADULT - SUBJECTIVE AND OBJECTIVE BOX
Chief Complaint:  Patient is a 67y old  Male who presents with a chief complaint of Bleeding around cholecystostomy tube (22 Sep 2022 18:50)       Interval Events:   Stable and afebrile  RLQ/mid abdominal collection drainage: 25 cc/24hrs  RUQ/rodney drain 60 cc/24 hrs output    Hospital Medications:  cefepime   IVPB      cefepime   IVPB 1000 milliGRAM(s) IV Intermittent every 8 hours  fluconAZOLE   Tablet 400 milliGRAM(s) Oral daily  influenza   Vaccine 0.5 milliLiter(s) IntraMuscular once  magnesium oxide 400 milliGRAM(s) Oral three times a day with meals  metroNIDAZOLE  IVPB 500 milliGRAM(s) IV Intermittent two times a day  multivitamin 1 Tablet(s) Oral daily  pantoprazole    Tablet 40 milliGRAM(s) Oral before breakfast  polyethylene glycol 3350 17 Gram(s) Oral two times a day  senna 2 Tablet(s) Oral at bedtime  tamsulosin 0.4 milliGRAM(s) Oral at bedtime      ROS:   Complete and normal except as mentioned above.    PHYSICAL EXAM:   Vital Signs:  Vital Signs Last 24 Hrs  T(C): 36.7 (23 Sep 2022 09:02), Max: 37.2 (23 Sep 2022 05:32)  T(F): 98 (23 Sep 2022 09:02), Max: 98.9 (23 Sep 2022 05:32)  HR: 95 (23 Sep 2022 09:02) (86 - 95)  BP: 115/71 (23 Sep 2022 09:02) (115/71 - 149/81)  BP(mean): --  RR: 18 (23 Sep 2022 09:02) (18 - 18)  SpO2: 98% (23 Sep 2022 09:02) (97% - 98%)    Parameters below as of 23 Sep 2022 09:02  Patient On (Oxygen Delivery Method): room air      Daily     Daily     Constitutional: Well developed / well nourished  HEENT: no scleral icterus  Respiratory: normal respiratory effort  Cardiovascular: regular rate  Gastrointestinal: Soft abdomen, mild distended, IR drains with minimal sanguinous substance, c tube to bag, +abdominal wall edema  Genitourinary: Voiding spontaneously  Extremities: SCD's in place and working bilaterally  Neurological: A&O x3  Skin: no rashes, ulcerations, lesions    LABS: reviewed                        8.1    6.46  )-----------( 84       ( 23 Sep 2022 06:33 )             24.5     09-23    131<L>  |  101  |  9   ----------------------------<  102<H>  3.4<L>   |  25  |  0.63    Ca    8.8      23 Sep 2022 06:31  Phos  2.6     09-23  Mg     1.9     09-23    TPro  7.2  /  Alb  2.2<L>  /  TBili  2.1<H>  /  DBili  x   /  AST  23  /  ALT  10  /  AlkPhos  80  09-23    LIVER FUNCTIONS - ( 23 Sep 2022 06:31 )  Alb: 2.2 g/dL / Pro: 7.2 g/dL / ALK PHOS: 80 U/L / ALT: 10 U/L / AST: 23 U/L / GGT: x             Interval Diagnostic Studies: see sunrise for full report

## 2022-09-24 LAB
ALBUMIN SERPL ELPH-MCNC: 2.1 G/DL — LOW (ref 3.3–5)
ALP SERPL-CCNC: 84 U/L — SIGNIFICANT CHANGE UP (ref 40–120)
ALT FLD-CCNC: 10 U/L — SIGNIFICANT CHANGE UP (ref 10–45)
ANION GAP SERPL CALC-SCNC: 7 MMOL/L — SIGNIFICANT CHANGE UP (ref 5–17)
AST SERPL-CCNC: 27 U/L — SIGNIFICANT CHANGE UP (ref 10–40)
BASOPHILS # BLD AUTO: 0.05 K/UL — SIGNIFICANT CHANGE UP (ref 0–0.2)
BASOPHILS NFR BLD AUTO: 0.8 % — SIGNIFICANT CHANGE UP (ref 0–2)
BILIRUB SERPL-MCNC: 2 MG/DL — HIGH (ref 0.2–1.2)
BUN SERPL-MCNC: 9 MG/DL — SIGNIFICANT CHANGE UP (ref 7–23)
CALCIUM SERPL-MCNC: 8.9 MG/DL — SIGNIFICANT CHANGE UP (ref 8.4–10.5)
CHLORIDE SERPL-SCNC: 102 MMOL/L — SIGNIFICANT CHANGE UP (ref 96–108)
CO2 SERPL-SCNC: 24 MMOL/L — SIGNIFICANT CHANGE UP (ref 22–31)
CREAT SERPL-MCNC: 0.56 MG/DL — SIGNIFICANT CHANGE UP (ref 0.5–1.3)
CULTURE RESULTS: SIGNIFICANT CHANGE UP
EGFR: 109 ML/MIN/1.73M2 — SIGNIFICANT CHANGE UP
EOSINOPHIL # BLD AUTO: 0.07 K/UL — SIGNIFICANT CHANGE UP (ref 0–0.5)
EOSINOPHIL NFR BLD AUTO: 1.1 % — SIGNIFICANT CHANGE UP (ref 0–6)
GLUCOSE SERPL-MCNC: 97 MG/DL — SIGNIFICANT CHANGE UP (ref 70–99)
HCT VFR BLD CALC: 25.9 % — LOW (ref 39–50)
HGB BLD-MCNC: 8.4 G/DL — LOW (ref 13–17)
IMM GRANULOCYTES NFR BLD AUTO: 0.3 % — SIGNIFICANT CHANGE UP (ref 0–0.9)
INR BLD: 2.47 RATIO — HIGH (ref 0.88–1.16)
LYMPHOCYTES # BLD AUTO: 1.99 K/UL — SIGNIFICANT CHANGE UP (ref 1–3.3)
LYMPHOCYTES # BLD AUTO: 31.8 % — SIGNIFICANT CHANGE UP (ref 13–44)
MAGNESIUM SERPL-MCNC: 1.6 MG/DL — SIGNIFICANT CHANGE UP (ref 1.6–2.6)
MCHC RBC-ENTMCNC: 31.3 PG — SIGNIFICANT CHANGE UP (ref 27–34)
MCHC RBC-ENTMCNC: 32.4 GM/DL — SIGNIFICANT CHANGE UP (ref 32–36)
MCV RBC AUTO: 96.6 FL — SIGNIFICANT CHANGE UP (ref 80–100)
MONOCYTES # BLD AUTO: 0.86 K/UL — SIGNIFICANT CHANGE UP (ref 0–0.9)
MONOCYTES NFR BLD AUTO: 13.7 % — SIGNIFICANT CHANGE UP (ref 2–14)
NEUTROPHILS # BLD AUTO: 3.27 K/UL — SIGNIFICANT CHANGE UP (ref 1.8–7.4)
NEUTROPHILS NFR BLD AUTO: 52.3 % — SIGNIFICANT CHANGE UP (ref 43–77)
NRBC # BLD: 0 /100 WBCS — SIGNIFICANT CHANGE UP (ref 0–0)
PHOSPHATE SERPL-MCNC: 2.5 MG/DL — SIGNIFICANT CHANGE UP (ref 2.5–4.5)
PLATELET # BLD AUTO: 82 K/UL — LOW (ref 150–400)
POTASSIUM SERPL-MCNC: 4 MMOL/L — SIGNIFICANT CHANGE UP (ref 3.5–5.3)
POTASSIUM SERPL-SCNC: 4 MMOL/L — SIGNIFICANT CHANGE UP (ref 3.5–5.3)
PROT SERPL-MCNC: 7.6 G/DL — SIGNIFICANT CHANGE UP (ref 6–8.3)
PROTHROM AB SERPL-ACNC: 28.9 SEC — HIGH (ref 10.5–13.4)
RBC # BLD: 2.68 M/UL — LOW (ref 4.2–5.8)
RBC # FLD: 20.1 % — HIGH (ref 10.3–14.5)
SARS-COV-2 RNA SPEC QL NAA+PROBE: SIGNIFICANT CHANGE UP
SODIUM SERPL-SCNC: 133 MMOL/L — LOW (ref 135–145)
SODIUM UR-SCNC: 40 MMOL/L — SIGNIFICANT CHANGE UP
SPECIMEN SOURCE: SIGNIFICANT CHANGE UP
WBC # BLD: 6.26 K/UL — SIGNIFICANT CHANGE UP (ref 3.8–10.5)
WBC # FLD AUTO: 6.26 K/UL — SIGNIFICANT CHANGE UP (ref 3.8–10.5)

## 2022-09-24 PROCEDURE — 99232 SBSQ HOSP IP/OBS MODERATE 35: CPT

## 2022-09-24 RX ORDER — ALBUMIN HUMAN 25 %
50 VIAL (ML) INTRAVENOUS EVERY 6 HOURS
Refills: 0 | Status: DISCONTINUED | OUTPATIENT
Start: 2022-09-24 | End: 2022-09-26

## 2022-09-24 RX ORDER — FUROSEMIDE 40 MG
40 TABLET ORAL
Refills: 0 | Status: DISCONTINUED | OUTPATIENT
Start: 2022-09-24 | End: 2022-09-26

## 2022-09-24 RX ADMIN — CEFEPIME 100 MILLIGRAM(S): 1 INJECTION, POWDER, FOR SOLUTION INTRAMUSCULAR; INTRAVENOUS at 21:38

## 2022-09-24 RX ADMIN — MAGNESIUM OXIDE 400 MG ORAL TABLET 400 MILLIGRAM(S): 241.3 TABLET ORAL at 11:56

## 2022-09-24 RX ADMIN — Medication 100 MILLIGRAM(S): at 19:35

## 2022-09-24 RX ADMIN — Medication 50 MILLILITER(S): at 10:35

## 2022-09-24 RX ADMIN — SPIRONOLACTONE 100 MILLIGRAM(S): 25 TABLET, FILM COATED ORAL at 05:00

## 2022-09-24 RX ADMIN — Medication 100 MILLIGRAM(S): at 08:12

## 2022-09-24 RX ADMIN — MAGNESIUM OXIDE 400 MG ORAL TABLET 400 MILLIGRAM(S): 241.3 TABLET ORAL at 08:09

## 2022-09-24 RX ADMIN — CEFEPIME 100 MILLIGRAM(S): 1 INJECTION, POWDER, FOR SOLUTION INTRAMUSCULAR; INTRAVENOUS at 05:01

## 2022-09-24 RX ADMIN — CEFEPIME 100 MILLIGRAM(S): 1 INJECTION, POWDER, FOR SOLUTION INTRAMUSCULAR; INTRAVENOUS at 13:01

## 2022-09-24 RX ADMIN — TAMSULOSIN HYDROCHLORIDE 0.4 MILLIGRAM(S): 0.4 CAPSULE ORAL at 21:38

## 2022-09-24 RX ADMIN — SENNA PLUS 2 TABLET(S): 8.6 TABLET ORAL at 21:38

## 2022-09-24 RX ADMIN — Medication 40 MILLIGRAM(S): at 13:00

## 2022-09-24 RX ADMIN — FLUCONAZOLE 400 MILLIGRAM(S): 150 TABLET ORAL at 11:56

## 2022-09-24 RX ADMIN — Medication 50 MILLILITER(S): at 23:21

## 2022-09-24 RX ADMIN — Medication 40 MILLIGRAM(S): at 05:00

## 2022-09-24 RX ADMIN — MAGNESIUM OXIDE 400 MG ORAL TABLET 400 MILLIGRAM(S): 241.3 TABLET ORAL at 17:36

## 2022-09-24 RX ADMIN — Medication 1 TABLET(S): at 11:56

## 2022-09-24 RX ADMIN — Medication 50 MILLILITER(S): at 17:36

## 2022-09-24 RX ADMIN — PANTOPRAZOLE SODIUM 40 MILLIGRAM(S): 20 TABLET, DELAYED RELEASE ORAL at 05:00

## 2022-09-24 NOTE — PROGRESS NOTE ADULT - SUBJECTIVE AND OBJECTIVE BOX
Chief Complaint:  Patient is a 67y old  Male who presents with a chief complaint of Bleeding around cholecystostomy tube (22 Sep 2022 18:50)       Interval Events:   Stable and afebrile  LFTs stable  Mid abd drain 55 ml/ rodney drain 30 ml        MEDICATIONS  (STANDING):  albumin human 25% IVPB 50 milliLiter(s) IV Intermittent every 6 hours  cefepime   IVPB      cefepime   IVPB 1000 milliGRAM(s) IV Intermittent every 8 hours  fluconAZOLE   Tablet 400 milliGRAM(s) Oral daily  furosemide    Tablet 40 milliGRAM(s) Oral two times a day  influenza   Vaccine 0.5 milliLiter(s) IntraMuscular once  magnesium oxide 400 milliGRAM(s) Oral three times a day with meals  metroNIDAZOLE  IVPB 500 milliGRAM(s) IV Intermittent two times a day  multivitamin 1 Tablet(s) Oral daily  pantoprazole    Tablet 40 milliGRAM(s) Oral before breakfast  polyethylene glycol 3350 17 Gram(s) Oral two times a day  senna 2 Tablet(s) Oral at bedtime  spironolactone 100 milliGRAM(s) Oral daily  tamsulosin 0.4 milliGRAM(s) Oral at bedtime            ROS:   Complete and normal except as mentioned above.    Vital Signs Last 24 Hrs  T(C): 36.8 (24 Sep 2022 13:00), Max: 37.2 (23 Sep 2022 17:00)  T(F): 98.3 (24 Sep 2022 13:00), Max: 99 (23 Sep 2022 17:00)  HR: 99 (24 Sep 2022 13:00) (83 - 99)  BP: 123/76 (24 Sep 2022 13:00) (120/66 - 134/79)  BP(mean): --  RR: 18 (24 Sep 2022 13:00) (18 - 18)  SpO2: 98% (24 Sep 2022 13:00) (95% - 98%)    Parameters below as of 24 Sep 2022 13:00  Patient On (Oxygen Delivery Method): room air        I&O's Summary    23 Sep 2022 07:01  -  24 Sep 2022 07:00  --------------------------------------------------------  IN: 1090 mL / OUT: 1335 mL / NET: -245 mL    24 Sep 2022 07:01  -  24 Sep 2022 12:38  --------------------------------------------------------  IN: 100 mL / OUT: 1450 mL / NET: -1350 mL        Constitutional: Well developed / well nourished  HEENT: no scleral icterus  Respiratory: normal respiratory effort  Cardiovascular: regular rate  Gastrointestinal: Soft abdomen, mild distended, IR drains with minimal sanguinous substance, c tube to bag, +abdominal wall edema  Genitourinary: Voiding spontaneously  Extremities: SCD's in place and working bilaterally  Neurological: A&O x3  Skin: no rashes, ulcerations, lesions      LABS:                        8.4    6.26  )-----------( 82       ( 24 Sep 2022 06:25 )             25.9     09-24    133<L>  |  102  |  9   ----------------------------<  97  4.0   |  24  |  0.56    Ca    8.9      24 Sep 2022 06:25  Phos  2.5     09-24  Mg     1.6     09-24    TPro  7.6  /  Alb  2.1<L>  /  TBili  2.0<H>  /  DBili  x   /  AST  27  /  ALT  10  /  AlkPhos  84  09-24    PT/INR - ( 24 Sep 2022 06:25 )   PT: 28.9 sec;   INR: 2.47 ratio             CAPILLARY BLOOD GLUCOSE        LIVER FUNCTIONS - ( 24 Sep 2022 06:25 )  Alb: 2.1 g/dL / Pro: 7.6 g/dL / ALK PHOS: 84 U/L / ALT: 10 U/L / AST: 27 U/L / GGT: x                 Cultures:

## 2022-09-24 NOTE — PROGRESS NOTE ADULT - NS ATTEND AMEND GEN_ALL_CORE FT
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  History of cholangitis status post ERCP with stent placement in April 2022 with subsequent repeat ERCP with stent removal/sphincterotomy and balloon sweep with removal of sludge/stones in July 20, 2022.  Most recently he was admitted with severe sepsis secondary to acute and likely perforated cholecystitis, status post percutaneous cholecystostomy tube placement on August 9 and most recently repositioned by IR on 9/11.  He had E. coli and Streptococcus angnonosis peritonitis treated with IV and p.o. antibiotics.  He is currently readmitted due to CrCl marcescens bacteremia on September 11 and right-sided intra-abdominal abscesses,s/p RUQ drain (9/11-19) with small residual perihepatic collection seen on repeat CT (9/21) still with RLQ drain (9/11- ) with low volume output and near resolution on the repeat CT.  IR recommending to keep the drain in place until output <10 mL/day.   He is S/p Zosyn (9/10), s/p Unasyn (9/11-14), and currently on cefepime (9/14- ), metronidazole (9/14- ), and fluconazole (9/11- ), with plan to continue IV antibiotics through the weekend and then likely transition to Bactrim for discharge home per Transplant ID Dr. Guajardo.   Mentating at his baseline and renal function stable.  Edema has improved with increased diuresis and will continue furosemide 60 mg po daily and spironolactone 150 mg po daily. He remains wait-listed for liver transplant.   ABO O with listed at MELD-Na 24.  He is on internal hold pending ID clearance to proceed with transplant.  Continue with PT/OT. 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  History of cholangitis status post ERCP with stent placement in April 2022 with subsequent repeat ERCP with stent removal/sphincterotomy and balloon sweep with removal of sludge/stones in July 20, 2022.  Most recently he was admitted with severe sepsis secondary to acute and likely perforated cholecystitis, status post percutaneous cholecystostomy tube placement on August 9 and most recently repositioned by IR on 9/11.  He had E. coli and Streptococcus angnonosis peritonitis treated with IV and p.o. antibiotics.  He is currently readmitted due to serratia marcescens bacteremia on September 11 and right-sided intra-abdominal abscesses,s/p RUQ drain (9/11-19) with small residual perihepatic collection seen on repeat CT (9/21) still with RLQ drain (9/11- ) with low volume output and near resolution on the repeat CT.  IR recommending to keep the drain in place until output <10 mL/day.   He is S/p Zosyn (9/10), s/p Unasyn (9/11-14), and currently on cefepime (9/14- ), metronidazole (9/14- ), and fluconazole (9/11- ), with plan to continue IV antibiotics through the weekend and then likely transition to Bactrim for discharge home per Transplant ID Dr. Guajardo.   Mentating at his baseline and renal function stable.  Edema has improved with increased diuresis and will continue furosemide 60 mg po daily and spironolactone 150 mg po daily. He remains wait-listed for liver transplant.   ABO O with listed at MELD-Na 24.  He is on internal hold pending ID clearance to proceed with transplant.  Continue with PT/OT.

## 2022-09-24 NOTE — PROGRESS NOTE ADULT - ASSESSMENT
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 Currently Listed for OLT     #Decompensated ETOH/PARNELL Cirrhosis, listed for OLT  #Abdominal collection, infected hematoma s/p IR drainage x 2 with C tube repositioning c/b Augmentin resistant E coli  #Serratia bacteremia 9/11, repeat BCx 9/15 negative  #Hyponatremia  MELD-Na: 23 9/23  - HE: none currently  - EV: normal esophagus on ERCP from 7/2022  - Ascites: moderate 8/31 US  - SBP: paracentesis 8/9/2022 reveals likely secondary bacterial peritonitis from suspected gallbladder pathology, +SBP on para 8/31  - HCC:  No lesions on CT 8/26      Plan  - Inc  lasix to 40 bid  - start albumin 25% q6h  - check urine Na  - keep aldactone 100    - Drain management per IR, continue observation; if abscess drain output decreases to 10cc/24hr while inpatient, recommending repeat drain evaluation with IR. OP follow up appt also made for 9/30/22 at 10:30 AM and needs covid testing on 9/27. Recommending IR follow of cholecystostomy drain every 3 mos   - ID on board, appreciate recs: 14 days Abx Cefepime/flagyl for bacteremia after which can do po Bactrim DS BID for an additional week until the drain is removed  - serratia sens to cefepime  - On cefepime, metronidazole  - ASA 81 daily  - Drain management as per IR RUQ drain output 10ml.24 hours ->removed 9/19  - Daily CMP, CBC, coags  - Continue Fluconazole  - Physical therapy  [x] Psychosocial: cleared pending RPP  [x ] Infectious  [x] Cardiology:        Cardiac cath: n/a       Stress test: negative noninvasive stress test on 8/19/2022 for inducible ischemia  [ ] Colonoscopy: needed, f/u records from outside GI colonoscopy reports  [x] Prostate: Prostate Specific Antigen: negative at 2.21 ng/mL on 8/12/2022  [x] Dental  [x] PT/Frailty    Preliminary note until signed by Attending.    Thank you for involving us in this patient's care.    Kary Tena MD  Gastroenterology/Hepatology Fellow, PGY-VI    NON-URGENT CONSULTS:  Please email [email or web address]  AT NIGHT AND ON WEEKENDS:  Contact on-call GI fellow via answering service ([phone]) from 5pm-8am and on weekends/holidays  MONDAY-FRIDAY 8AM-5PM:  Pager# [phone] ([facility])  GI Phone# [phone] ([facility])

## 2022-09-25 LAB
ALBUMIN SERPL ELPH-MCNC: 2.5 G/DL — LOW (ref 3.3–5)
ALP SERPL-CCNC: 92 U/L — SIGNIFICANT CHANGE UP (ref 40–120)
ALT FLD-CCNC: 10 U/L — SIGNIFICANT CHANGE UP (ref 10–45)
ANION GAP SERPL CALC-SCNC: 7 MMOL/L — SIGNIFICANT CHANGE UP (ref 5–17)
AST SERPL-CCNC: 25 U/L — SIGNIFICANT CHANGE UP (ref 10–40)
BILIRUB SERPL-MCNC: 2.2 MG/DL — HIGH (ref 0.2–1.2)
BUN SERPL-MCNC: 10 MG/DL — SIGNIFICANT CHANGE UP (ref 7–23)
CALCIUM SERPL-MCNC: 8.9 MG/DL — SIGNIFICANT CHANGE UP (ref 8.4–10.5)
CHLORIDE SERPL-SCNC: 100 MMOL/L — SIGNIFICANT CHANGE UP (ref 96–108)
CO2 SERPL-SCNC: 25 MMOL/L — SIGNIFICANT CHANGE UP (ref 22–31)
CREAT SERPL-MCNC: 0.61 MG/DL — SIGNIFICANT CHANGE UP (ref 0.5–1.3)
EGFR: 107 ML/MIN/1.73M2 — SIGNIFICANT CHANGE UP
GLUCOSE SERPL-MCNC: 104 MG/DL — HIGH (ref 70–99)
HCT VFR BLD CALC: 26.1 % — LOW (ref 39–50)
HGB BLD-MCNC: 8.5 G/DL — LOW (ref 13–17)
INR BLD: 2.55 RATIO — HIGH (ref 0.88–1.16)
MAGNESIUM SERPL-MCNC: 1.5 MG/DL — LOW (ref 1.6–2.6)
MCHC RBC-ENTMCNC: 31.8 PG — SIGNIFICANT CHANGE UP (ref 27–34)
MCHC RBC-ENTMCNC: 32.6 GM/DL — SIGNIFICANT CHANGE UP (ref 32–36)
MCV RBC AUTO: 97.8 FL — SIGNIFICANT CHANGE UP (ref 80–100)
NRBC # BLD: 0 /100 WBCS — SIGNIFICANT CHANGE UP (ref 0–0)
PHOSPHATE SERPL-MCNC: 2.5 MG/DL — SIGNIFICANT CHANGE UP (ref 2.5–4.5)
PLATELET # BLD AUTO: 78 K/UL — LOW (ref 150–400)
POTASSIUM SERPL-MCNC: 3.9 MMOL/L — SIGNIFICANT CHANGE UP (ref 3.5–5.3)
POTASSIUM SERPL-SCNC: 3.9 MMOL/L — SIGNIFICANT CHANGE UP (ref 3.5–5.3)
PROT SERPL-MCNC: 7.3 G/DL — SIGNIFICANT CHANGE UP (ref 6–8.3)
PROTHROM AB SERPL-ACNC: 29.6 SEC — HIGH (ref 10.5–13.4)
RBC # BLD: 2.67 M/UL — LOW (ref 4.2–5.8)
RBC # FLD: 20.2 % — HIGH (ref 10.3–14.5)
SODIUM SERPL-SCNC: 132 MMOL/L — LOW (ref 135–145)
WBC # BLD: 5.72 K/UL — SIGNIFICANT CHANGE UP (ref 3.8–10.5)
WBC # FLD AUTO: 5.72 K/UL — SIGNIFICANT CHANGE UP (ref 3.8–10.5)

## 2022-09-25 PROCEDURE — 99232 SBSQ HOSP IP/OBS MODERATE 35: CPT

## 2022-09-25 RX ORDER — SPIRONOLACTONE 25 MG/1
100 TABLET, FILM COATED ORAL
Refills: 0 | Status: DISCONTINUED | OUTPATIENT
Start: 2022-09-25 | End: 2022-09-26

## 2022-09-25 RX ADMIN — SPIRONOLACTONE 100 MILLIGRAM(S): 25 TABLET, FILM COATED ORAL at 05:08

## 2022-09-25 RX ADMIN — Medication 50 MILLILITER(S): at 11:53

## 2022-09-25 RX ADMIN — POLYETHYLENE GLYCOL 3350 17 GRAM(S): 17 POWDER, FOR SOLUTION ORAL at 05:07

## 2022-09-25 RX ADMIN — MAGNESIUM OXIDE 400 MG ORAL TABLET 400 MILLIGRAM(S): 241.3 TABLET ORAL at 08:00

## 2022-09-25 RX ADMIN — CEFEPIME 100 MILLIGRAM(S): 1 INJECTION, POWDER, FOR SOLUTION INTRAMUSCULAR; INTRAVENOUS at 05:07

## 2022-09-25 RX ADMIN — TAMSULOSIN HYDROCHLORIDE 0.4 MILLIGRAM(S): 0.4 CAPSULE ORAL at 21:55

## 2022-09-25 RX ADMIN — Medication 100 MILLIGRAM(S): at 07:58

## 2022-09-25 RX ADMIN — CEFEPIME 100 MILLIGRAM(S): 1 INJECTION, POWDER, FOR SOLUTION INTRAMUSCULAR; INTRAVENOUS at 21:56

## 2022-09-25 RX ADMIN — MAGNESIUM OXIDE 400 MG ORAL TABLET 400 MILLIGRAM(S): 241.3 TABLET ORAL at 11:52

## 2022-09-25 RX ADMIN — SPIRONOLACTONE 100 MILLIGRAM(S): 25 TABLET, FILM COATED ORAL at 18:00

## 2022-09-25 RX ADMIN — Medication 50 MILLILITER(S): at 23:54

## 2022-09-25 RX ADMIN — SENNA PLUS 2 TABLET(S): 8.6 TABLET ORAL at 21:56

## 2022-09-25 RX ADMIN — Medication 50 MILLILITER(S): at 18:03

## 2022-09-25 RX ADMIN — MAGNESIUM OXIDE 400 MG ORAL TABLET 400 MILLIGRAM(S): 241.3 TABLET ORAL at 18:01

## 2022-09-25 RX ADMIN — FLUCONAZOLE 400 MILLIGRAM(S): 150 TABLET ORAL at 11:52

## 2022-09-25 RX ADMIN — PANTOPRAZOLE SODIUM 40 MILLIGRAM(S): 20 TABLET, DELAYED RELEASE ORAL at 05:15

## 2022-09-25 RX ADMIN — Medication 50 MILLILITER(S): at 05:10

## 2022-09-25 RX ADMIN — Medication 100 MILLIGRAM(S): at 20:08

## 2022-09-25 RX ADMIN — Medication 40 MILLIGRAM(S): at 13:51

## 2022-09-25 RX ADMIN — Medication 1 TABLET(S): at 11:52

## 2022-09-25 RX ADMIN — CEFEPIME 100 MILLIGRAM(S): 1 INJECTION, POWDER, FOR SOLUTION INTRAMUSCULAR; INTRAVENOUS at 13:51

## 2022-09-25 RX ADMIN — Medication 40 MILLIGRAM(S): at 05:08

## 2022-09-25 NOTE — PROGRESS NOTE ADULT - ASSESSMENT
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 Currently Listed for OLT       #Decompensated ETOH/PARNELL Cirrhosis, listed for OLT  #Abdominal collection, infected hematoma s/p IR drainage x 2 with C tube repositioning c/b Augmentin resistant E coli  #Serratia bacteremia 9/11, repeat BCx 9/15 negative  #Hyponatremia  MELD-Na: 23 9/23  - HE: none currently  - EV: normal esophagus on ERCP from 7/2022  - Ascites: moderate 8/31 US  - SBP: paracentesis 8/9/2022 reveals likely secondary bacterial peritonitis from suspected gallbladder pathology, +SBP on para 8/31  - HCC:  No lesions on CT 8/26      Plan  - Keep  lasix  40 bid  - Inc aldactone 100 BID   - start albumin 25% q6h  - Repeat  urine Na w am labs tomorrow   - Drain management per IR, continue observation; if abscess drain output decreases to 10cc/24hr while inpatient, recommending repeat drain evaluation with IR. OP follow up appt also made for 9/30/22 at 10:30 AM and needs covid testing on 9/27. Recommending IR follow of cholecystostomy drain every 3 mos   - ID on board, appreciate recs: 14 days Abx Cefepime/flagyl for bacteremia after which can do po Bactrim DS BID for an additional week until the drain is removed  - serratia sens to cefepime   - On cefepime end 9/25, on metronidazole  - ASA 81 daily  - Drain management as per IR RUQ drain output 10ml.24 hours ->removed 9/19  - Daily CMP, CBC, coags  - Continue Fluconazole  - Physical therapy  [x] Psychosocial: cleared pending RPP  [x ] Infectious  [x] Cardiology:        Cardiac cath: n/a       Stress test: negative noninvasive stress test on 8/19/2022 for inducible ischemia  [ ] Colonoscopy: needed, f/u records from outside GI colonoscopy reports  [x] Prostate: Prostate Specific Antigen: negative at 2.21 ng/mL on 8/12/2022  [x] Dental  [x] PT/Frailty    Preliminary note until signed by Attending.    Thank you for involving us in this patient's care.      NON-URGENT CONSULTS:  Please email [email or web address]  AT NIGHT AND ON WEEKENDS:  Contact on-call GI fellow via answering service ([phone]) from 5pm-8am and on weekends/holidays  MONDAY-FRIDAY 8AM-5PM:  Pager# [phone] ([facility])  GI Phone# [phone] ([facility])

## 2022-09-25 NOTE — PROGRESS NOTE ADULT - NS ATTEND AMEND GEN_ALL_CORE FT
I have made amendments to the documentation where necessary. 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  History of cholangitis status post ERCP with stent placement in April 2022 with subsequent repeat ERCP with stent removal/sphincterotomy and balloon sweep with removal of sludge/stones in July 20, 2022.  Most recently he was admitted with severe sepsis secondary to acute and likely perforated cholecystitis, status post percutaneous cholecystostomy tube placement on August 9 and most recently repositioned by IR on 9/11.  He had E. coli and Streptococcus angnonosis peritonitis treated with IV and p.o. antibiotics.  He is currently readmitted due to CrCl marcescens bacteremia on September 11 and right-sided intra-abdominal abscesses,s/p RUQ drain (9/11-19) with small residual perihepatic collection seen on repeat CT (9/21) still with RLQ drain (9/11- ) with low volume output and near resolution on the repeat CT.  IR recommending to keep the drain in place until output <10 mL/day.   He is S/p Zosyn (9/10), s/p Unasyn (9/11-14), and currently on cefepime (9/14- ), metronidazole (9/14- ), and fluconazole (9/11- ), with plan to continue IV antibiotics through the weekend and then likely transition to Bactrim for discharge home per Transplant ID Dr. Guajardo.   Mentating at his baseline and renal function stable.  Edema has improved with increased diuresis and will continue furosemide 60 mg po daily and spironolactone 150 mg po daily. He remains wait-listed for liver transplant.   ABO O with listed at MELD-Na 24.  He is on internal hold pending ID clearance to proceed with transplant.  Continue with PT/OT.

## 2022-09-25 NOTE — PROGRESS NOTE ADULT - SUBJECTIVE AND OBJECTIVE BOX
Chief Complaint:  Patient is a 67y old  Male who presents with a chief complaint of Bleeding around cholecystostomy tube (22 Sep 2022 18:50)       Interval Events:   Stable and afebrile  LFTs stable  Mid abd drain 20 ml/ rodney drain 60 ml        MEDICATIONS  (STANDING):  albumin human 25% IVPB 50 milliLiter(s) IV Intermittent every 6 hours  cefepime   IVPB 1000 milliGRAM(s) IV Intermittent every 8 hours  cefepime   IVPB      fluconAZOLE   Tablet 400 milliGRAM(s) Oral daily  furosemide    Tablet 40 milliGRAM(s) Oral two times a day  influenza   Vaccine 0.5 milliLiter(s) IntraMuscular once  magnesium oxide 400 milliGRAM(s) Oral three times a day with meals  metroNIDAZOLE  IVPB 500 milliGRAM(s) IV Intermittent two times a day  multivitamin 1 Tablet(s) Oral daily  pantoprazole    Tablet 40 milliGRAM(s) Oral before breakfast  polyethylene glycol 3350 17 Gram(s) Oral two times a day  senna 2 Tablet(s) Oral at bedtime  spironolactone 100 milliGRAM(s) Oral two times a day  tamsulosin 0.4 milliGRAM(s) Oral at bedtime    MEDICATIONS  (PRN):        ROS:   Complete and normal except as mentioned above.        Vital Signs Last 24 Hrs  T(C): 37.1 (25 Sep 2022 09:00), Max: 37.6 (24 Sep 2022 21:08)  T(F): 98.8 (25 Sep 2022 09:00), Max: 99.6 (24 Sep 2022 21:08)  HR: 90 (25 Sep 2022 09:00) (90 - 100)  BP: 129/72 (25 Sep 2022 09:00) (123/76 - 135/74)  BP(mean): --  RR: 18 (25 Sep 2022 09:00) (18 - 18)  SpO2: 95% (25 Sep 2022 09:00) (94% - 98%)    Parameters below as of 25 Sep 2022 09:00  Patient On (Oxygen Delivery Method): room air        I&O's Summary    24 Sep 2022 07:01  -  25 Sep 2022 07:00  --------------------------------------------------------  IN: 340 mL / OUT: 1730 mL / NET: -1390 mL      Constitutional: Well developed / well nourished  HEENT: no scleral icterus  Respiratory: normal respiratory effort  Cardiovascular: regular rate  Gastrointestinal: Soft abdomen, mild distended, IR drains with minimal sanguinous substance, c tube to bag, +abdominal wall edema  Genitourinary: Voiding spontaneously  Extremities: SCD's in place and working bilaterally  Neurological: A&O x3  Skin: no rashes, ulcerations, lesions          LABS:                        8.5    5.72  )-----------( 78       ( 25 Sep 2022 06:44 )             26.1     09-25    132<L>  |  100  |  10  ----------------------------<  104<H>  3.9   |  25  |  0.61    Ca    8.9      25 Sep 2022 06:44  Phos  2.5     09-25  Mg     1.5     09-25    TPro  7.3  /  Alb  2.5<L>  /  TBili  2.2<H>  /  DBili  x   /  AST  25  /  ALT  10  /  AlkPhos  92  09-25    PT/INR - ( 25 Sep 2022 06:45 )   PT: 29.6 sec;   INR: 2.55 ratio             CAPILLARY BLOOD GLUCOSE        LIVER FUNCTIONS - ( 25 Sep 2022 06:44 )  Alb: 2.5 g/dL / Pro: 7.3 g/dL / ALK PHOS: 92 U/L / ALT: 10 U/L / AST: 25 U/L / GGT: x                 Cultures:

## 2022-09-26 VITALS
HEART RATE: 92 BPM | RESPIRATION RATE: 18 BRPM | TEMPERATURE: 98 F | DIASTOLIC BLOOD PRESSURE: 76 MMHG | OXYGEN SATURATION: 97 % | SYSTOLIC BLOOD PRESSURE: 123 MMHG

## 2022-09-26 LAB
ALBUMIN SERPL ELPH-MCNC: 3 G/DL — LOW (ref 3.3–5)
ALP SERPL-CCNC: 82 U/L — SIGNIFICANT CHANGE UP (ref 40–120)
ALT FLD-CCNC: 9 U/L — LOW (ref 10–45)
ANION GAP SERPL CALC-SCNC: 7 MMOL/L — SIGNIFICANT CHANGE UP (ref 5–17)
AST SERPL-CCNC: 26 U/L — SIGNIFICANT CHANGE UP (ref 10–40)
BASOPHILS # BLD AUTO: 0.04 K/UL — SIGNIFICANT CHANGE UP (ref 0–0.2)
BASOPHILS NFR BLD AUTO: 0.7 % — SIGNIFICANT CHANGE UP (ref 0–2)
BILIRUB SERPL-MCNC: 2.4 MG/DL — HIGH (ref 0.2–1.2)
BUN SERPL-MCNC: 11 MG/DL — SIGNIFICANT CHANGE UP (ref 7–23)
CALCIUM SERPL-MCNC: 9.5 MG/DL — SIGNIFICANT CHANGE UP (ref 8.4–10.5)
CHLORIDE SERPL-SCNC: 100 MMOL/L — SIGNIFICANT CHANGE UP (ref 96–108)
CO2 SERPL-SCNC: 25 MMOL/L — SIGNIFICANT CHANGE UP (ref 22–31)
CREAT SERPL-MCNC: 0.63 MG/DL — SIGNIFICANT CHANGE UP (ref 0.5–1.3)
EGFR: 106 ML/MIN/1.73M2 — SIGNIFICANT CHANGE UP
EOSINOPHIL # BLD AUTO: 0.07 K/UL — SIGNIFICANT CHANGE UP (ref 0–0.5)
EOSINOPHIL NFR BLD AUTO: 1.2 % — SIGNIFICANT CHANGE UP (ref 0–6)
GLUCOSE SERPL-MCNC: 85 MG/DL — SIGNIFICANT CHANGE UP (ref 70–99)
HCT VFR BLD CALC: 25.2 % — LOW (ref 39–50)
HGB BLD-MCNC: 8.3 G/DL — LOW (ref 13–17)
IMM GRANULOCYTES NFR BLD AUTO: 0.5 % — SIGNIFICANT CHANGE UP (ref 0–0.9)
INR BLD: 2.56 RATIO — HIGH (ref 0.88–1.16)
LYMPHOCYTES # BLD AUTO: 2.09 K/UL — SIGNIFICANT CHANGE UP (ref 1–3.3)
LYMPHOCYTES # BLD AUTO: 35.5 % — SIGNIFICANT CHANGE UP (ref 13–44)
MAGNESIUM SERPL-MCNC: 1.6 MG/DL — SIGNIFICANT CHANGE UP (ref 1.6–2.6)
MCHC RBC-ENTMCNC: 32.2 PG — SIGNIFICANT CHANGE UP (ref 27–34)
MCHC RBC-ENTMCNC: 32.9 GM/DL — SIGNIFICANT CHANGE UP (ref 32–36)
MCV RBC AUTO: 97.7 FL — SIGNIFICANT CHANGE UP (ref 80–100)
MONOCYTES # BLD AUTO: 0.7 K/UL — SIGNIFICANT CHANGE UP (ref 0–0.9)
MONOCYTES NFR BLD AUTO: 11.9 % — SIGNIFICANT CHANGE UP (ref 2–14)
NEUTROPHILS # BLD AUTO: 2.96 K/UL — SIGNIFICANT CHANGE UP (ref 1.8–7.4)
NEUTROPHILS NFR BLD AUTO: 50.2 % — SIGNIFICANT CHANGE UP (ref 43–77)
NRBC # BLD: 0 /100 WBCS — SIGNIFICANT CHANGE UP (ref 0–0)
PHOSPHATE SERPL-MCNC: 2.6 MG/DL — SIGNIFICANT CHANGE UP (ref 2.5–4.5)
PLATELET # BLD AUTO: 72 K/UL — LOW (ref 150–400)
POTASSIUM SERPL-MCNC: 3.8 MMOL/L — SIGNIFICANT CHANGE UP (ref 3.5–5.3)
POTASSIUM SERPL-SCNC: 3.8 MMOL/L — SIGNIFICANT CHANGE UP (ref 3.5–5.3)
PROT SERPL-MCNC: 7.9 G/DL — SIGNIFICANT CHANGE UP (ref 6–8.3)
PROTHROM AB SERPL-ACNC: 30 SEC — HIGH (ref 10.5–13.4)
RBC # BLD: 2.58 M/UL — LOW (ref 4.2–5.8)
RBC # FLD: 20.4 % — HIGH (ref 10.3–14.5)
SODIUM SERPL-SCNC: 132 MMOL/L — LOW (ref 135–145)
WBC # BLD: 5.89 K/UL — SIGNIFICANT CHANGE UP (ref 3.8–10.5)
WBC # FLD AUTO: 5.89 K/UL — SIGNIFICANT CHANGE UP (ref 3.8–10.5)

## 2022-09-26 PROCEDURE — P9016: CPT

## 2022-09-26 PROCEDURE — 97116 GAIT TRAINING THERAPY: CPT

## 2022-09-26 PROCEDURE — 82947 ASSAY GLUCOSE BLOOD QUANT: CPT

## 2022-09-26 PROCEDURE — 85025 COMPLETE CBC W/AUTO DIFF WBC: CPT

## 2022-09-26 PROCEDURE — G0463: CPT

## 2022-09-26 PROCEDURE — 87205 SMEAR GRAM STAIN: CPT

## 2022-09-26 PROCEDURE — P9047: CPT

## 2022-09-26 PROCEDURE — 99232 SBSQ HOSP IP/OBS MODERATE 35: CPT

## 2022-09-26 PROCEDURE — 84300 ASSAY OF URINE SODIUM: CPT

## 2022-09-26 PROCEDURE — 87077 CULTURE AEROBIC IDENTIFY: CPT

## 2022-09-26 PROCEDURE — 85027 COMPLETE CBC AUTOMATED: CPT

## 2022-09-26 PROCEDURE — 86900 BLOOD TYPING SEROLOGIC ABO: CPT

## 2022-09-26 PROCEDURE — U0003: CPT

## 2022-09-26 PROCEDURE — 84100 ASSAY OF PHOSPHORUS: CPT

## 2022-09-26 PROCEDURE — 36415 COLL VENOUS BLD VENIPUNCTURE: CPT

## 2022-09-26 PROCEDURE — 87086 URINE CULTURE/COLONY COUNT: CPT

## 2022-09-26 PROCEDURE — U0005: CPT

## 2022-09-26 PROCEDURE — 97530 THERAPEUTIC ACTIVITIES: CPT

## 2022-09-26 PROCEDURE — C1769: CPT

## 2022-09-26 PROCEDURE — 87040 BLOOD CULTURE FOR BACTERIA: CPT

## 2022-09-26 PROCEDURE — 86901 BLOOD TYPING SEROLOGIC RH(D): CPT

## 2022-09-26 PROCEDURE — 82435 ASSAY OF BLOOD CHLORIDE: CPT

## 2022-09-26 PROCEDURE — 83735 ASSAY OF MAGNESIUM: CPT

## 2022-09-26 PROCEDURE — 86850 RBC ANTIBODY SCREEN: CPT

## 2022-09-26 PROCEDURE — 85730 THROMBOPLASTIN TIME PARTIAL: CPT

## 2022-09-26 PROCEDURE — 85610 PROTHROMBIN TIME: CPT

## 2022-09-26 PROCEDURE — 84295 ASSAY OF SERUM SODIUM: CPT

## 2022-09-26 PROCEDURE — 47531 INJECTION FOR CHOLANGIOGRAM: CPT

## 2022-09-26 PROCEDURE — 36430 TRANSFUSION BLD/BLD COMPNT: CPT

## 2022-09-26 PROCEDURE — 85014 HEMATOCRIT: CPT

## 2022-09-26 PROCEDURE — 83605 ASSAY OF LACTIC ACID: CPT

## 2022-09-26 PROCEDURE — 87186 SC STD MICRODIL/AGAR DIL: CPT

## 2022-09-26 PROCEDURE — 82330 ASSAY OF CALCIUM: CPT

## 2022-09-26 PROCEDURE — 99232 SBSQ HOSP IP/OBS MODERATE 35: CPT | Mod: GC

## 2022-09-26 PROCEDURE — 74177 CT ABD & PELVIS W/CONTRAST: CPT

## 2022-09-26 PROCEDURE — 82803 BLOOD GASES ANY COMBINATION: CPT

## 2022-09-26 PROCEDURE — 87150 DNA/RNA AMPLIFIED PROBE: CPT

## 2022-09-26 PROCEDURE — 82140 ASSAY OF AMMONIA: CPT

## 2022-09-26 PROCEDURE — P9059: CPT

## 2022-09-26 PROCEDURE — 97161 PT EVAL LOW COMPLEX 20 MIN: CPT

## 2022-09-26 PROCEDURE — 87070 CULTURE OTHR SPECIMN AEROBIC: CPT

## 2022-09-26 PROCEDURE — C1729: CPT

## 2022-09-26 PROCEDURE — 86923 COMPATIBILITY TEST ELECTRIC: CPT

## 2022-09-26 PROCEDURE — 49406 IMAGE CATH FLUID PERI/RETRO: CPT

## 2022-09-26 PROCEDURE — 84132 ASSAY OF SERUM POTASSIUM: CPT

## 2022-09-26 PROCEDURE — 85018 HEMOGLOBIN: CPT

## 2022-09-26 PROCEDURE — 80053 COMPREHEN METABOLIC PANEL: CPT

## 2022-09-26 RX ORDER — SENNA PLUS 8.6 MG/1
2 TABLET ORAL
Qty: 0 | Refills: 0 | DISCHARGE
Start: 2022-09-26

## 2022-09-26 RX ORDER — FUROSEMIDE 40 MG
1 TABLET ORAL
Qty: 60 | Refills: 0
Start: 2022-09-26 | End: 2022-10-25

## 2022-09-26 RX ORDER — PANTOPRAZOLE SODIUM 20 MG/1
1 TABLET, DELAYED RELEASE ORAL
Qty: 30 | Refills: 0
Start: 2022-09-26 | End: 2022-10-25

## 2022-09-26 RX ORDER — PANTOPRAZOLE SODIUM 20 MG/1
1 TABLET, DELAYED RELEASE ORAL
Qty: 0 | Refills: 0 | DISCHARGE
Start: 2022-09-26

## 2022-09-26 RX ORDER — TAMSULOSIN HYDROCHLORIDE 0.4 MG/1
1 CAPSULE ORAL
Qty: 30 | Refills: 0
Start: 2022-09-26 | End: 2022-10-25

## 2022-09-26 RX ORDER — MAGNESIUM OXIDE 400 MG ORAL TABLET 241.3 MG
1 TABLET ORAL
Qty: 90 | Refills: 0
Start: 2022-09-26 | End: 2022-10-25

## 2022-09-26 RX ORDER — SPIRONOLACTONE 25 MG/1
4 TABLET, FILM COATED ORAL
Qty: 240 | Refills: 0
Start: 2022-09-26 | End: 2022-10-25

## 2022-09-26 RX ORDER — FUROSEMIDE 40 MG
1 TABLET ORAL
Qty: 0 | Refills: 0 | DISCHARGE
Start: 2022-09-26 | End: 2022-10-25

## 2022-09-26 RX ORDER — SPIRONOLACTONE 25 MG/1
4 TABLET, FILM COATED ORAL
Qty: 0 | Refills: 0 | DISCHARGE
Start: 2022-09-26

## 2022-09-26 RX ORDER — FUROSEMIDE 40 MG
1 TABLET ORAL
Qty: 0 | Refills: 0 | DISCHARGE
Start: 2022-09-26

## 2022-09-26 RX ADMIN — MAGNESIUM OXIDE 400 MG ORAL TABLET 400 MILLIGRAM(S): 241.3 TABLET ORAL at 17:09

## 2022-09-26 RX ADMIN — FLUCONAZOLE 400 MILLIGRAM(S): 150 TABLET ORAL at 12:28

## 2022-09-26 RX ADMIN — POLYETHYLENE GLYCOL 3350 17 GRAM(S): 17 POWDER, FOR SOLUTION ORAL at 17:09

## 2022-09-26 RX ADMIN — Medication 1 TABLET(S): at 17:10

## 2022-09-26 RX ADMIN — PANTOPRAZOLE SODIUM 40 MILLIGRAM(S): 20 TABLET, DELAYED RELEASE ORAL at 05:43

## 2022-09-26 RX ADMIN — MAGNESIUM OXIDE 400 MG ORAL TABLET 400 MILLIGRAM(S): 241.3 TABLET ORAL at 08:26

## 2022-09-26 RX ADMIN — MAGNESIUM OXIDE 400 MG ORAL TABLET 400 MILLIGRAM(S): 241.3 TABLET ORAL at 12:28

## 2022-09-26 RX ADMIN — Medication 100 MILLIGRAM(S): at 08:05

## 2022-09-26 RX ADMIN — Medication 1 TABLET(S): at 12:28

## 2022-09-26 RX ADMIN — Medication 50 MILLILITER(S): at 05:42

## 2022-09-26 RX ADMIN — Medication 50 MILLILITER(S): at 12:30

## 2022-09-26 RX ADMIN — Medication 40 MILLIGRAM(S): at 05:43

## 2022-09-26 RX ADMIN — SPIRONOLACTONE 100 MILLIGRAM(S): 25 TABLET, FILM COATED ORAL at 17:10

## 2022-09-26 RX ADMIN — SPIRONOLACTONE 100 MILLIGRAM(S): 25 TABLET, FILM COATED ORAL at 05:43

## 2022-09-26 RX ADMIN — Medication 1 TABLET(S): at 12:30

## 2022-09-26 RX ADMIN — Medication 50 MILLILITER(S): at 17:08

## 2022-09-26 RX ADMIN — Medication 40 MILLIGRAM(S): at 12:28

## 2022-09-26 NOTE — PROGRESS NOTE ADULT - SUBJECTIVE AND OBJECTIVE BOX
Chief Complaint:  Patient is a 67y old  Male who presents with a chief complaint of Bleeding around cholecystostomy tube (26 Sep 2022 15:08)       Interval Events:   Afebrile, HDS.     Hospital Medications:  albumin human 25% IVPB 50 milliLiter(s) IV Intermittent every 6 hours  fluconAZOLE   Tablet 400 milliGRAM(s) Oral daily  furosemide    Tablet 40 milliGRAM(s) Oral two times a day  influenza   Vaccine 0.5 milliLiter(s) IntraMuscular once  magnesium oxide 400 milliGRAM(s) Oral three times a day with meals  metroNIDAZOLE  IVPB 500 milliGRAM(s) IV Intermittent two times a day  multivitamin 1 Tablet(s) Oral daily  pantoprazole    Tablet 40 milliGRAM(s) Oral before breakfast  polyethylene glycol 3350 17 Gram(s) Oral two times a day  senna 2 Tablet(s) Oral at bedtime  spironolactone 100 milliGRAM(s) Oral two times a day  tamsulosin 0.4 milliGRAM(s) Oral at bedtime  trimethoprim  160 mG/sulfamethoxazole 800 mG 1 Tablet(s) Oral two times a day      ROS:   Complete and normal except as mentioned above.    PHYSICAL EXAM:   Vital Signs:  Vital Signs Last 24 Hrs  T(C): 36.9 (26 Sep 2022 12:00), Max: 37.4 (25 Sep 2022 21:00)  T(F): 98.4 (26 Sep 2022 12:00), Max: 99.4 (25 Sep 2022 21:00)  HR: 90 (26 Sep 2022 12:00) (87 - 92)  BP: 126/71 (26 Sep 2022 12:00) (122/72 - 147/79)  BP(mean): --  RR: 18 (26 Sep 2022 12:00) (18 - 18)  SpO2: 98% (26 Sep 2022 12:00) (94% - 99%)    Parameters below as of 26 Sep 2022 12:00  Patient On (Oxygen Delivery Method): room air      Daily     Daily     Constitutional: Well developed / well nourished  HEENT: no scleral icterus  Respiratory: normal respiratory effort  Cardiovascular: regular rate  Gastrointestinal: Soft abdomen, mild distended, IR drains with minimal sanguinous substance, c tube to bag, +abdominal wall edema improving  Genitourinary: Voiding spontaneously  Extremities: SCD's in place and working bilaterally  Neurological: A&O x3  Skin: no rashes, ulcerations, lesions    LABS: reviewed                        8.3    5.89  )-----------( 72       ( 26 Sep 2022 06:17 )             25.2     09-26    132<L>  |  100  |  11  ----------------------------<  85  3.8   |  25  |  0.63    Ca    9.5      26 Sep 2022 06:18  Phos  2.6     09-26  Mg     1.6     09-26    TPro  7.9  /  Alb  3.0<L>  /  TBili  2.4<H>  /  DBili  x   /  AST  26  /  ALT  9<L>  /  AlkPhos  82  09-26    LIVER FUNCTIONS - ( 26 Sep 2022 06:18 )  Alb: 3.0 g/dL / Pro: 7.9 g/dL / ALK PHOS: 82 U/L / ALT: 9 U/L / AST: 26 U/L / GGT: x             Interval Diagnostic Studies: see sunrise for full report

## 2022-09-26 NOTE — PROGRESS NOTE ADULT - NUTRITIONAL ASSESSMENT
This patient has been assessed with a concern for Malnutrition and has been determined to have a diagnosis/diagnoses of Severe protein-calorie malnutrition.    This patient is being managed with:   Diet Regular-  Low Sodium  Supplement Feeding Modality:  Oral  Ensure Enlive Cans or Servings Per Day:  1       Frequency:  Three Times a day  Entered: Sep 13 2022  2:59PM    
This patient has been assessed with a concern for Malnutrition and has been determined to have a diagnosis/diagnoses of Severe protein-calorie malnutrition.    This patient is being managed with:   Diet Regular-  1000mL Fluid Restriction ([id number])  Low Sodium  Supplement Feeding Modality:  Oral  Ensure Enlive Cans or Servings Per Day:  1       Frequency:  Three Times a day  Entered: Sep 18 2022  3:46PM    
This patient has been assessed with a concern for Malnutrition and has been determined to have a diagnosis/diagnoses of Severe protein-calorie malnutrition.    This patient is being managed with:   Diet Regular-  Low Sodium  Supplement Feeding Modality:  Oral  Ensure Enlive Cans or Servings Per Day:  1       Frequency:  Three Times a day  Entered: Sep 13 2022  2:59PM    
This patient has been assessed with a concern for Malnutrition and has been determined to have a diagnosis/diagnoses of Severe protein-calorie malnutrition.    This patient is being managed with:   Diet Regular-  1000mL Fluid Restriction ([id number])  Low Sodium  Supplement Feeding Modality:  Oral  Ensure Enlive Cans or Servings Per Day:  1       Frequency:  Three Times a day  Entered: Sep 18 2022  3:46PM    
This patient has been assessed with a concern for Malnutrition and has been determined to have a diagnosis/diagnoses of Severe protein-calorie malnutrition.    This patient is being managed with:   Diet Regular-  Low Sodium  Supplement Feeding Modality:  Oral  Ensure Enlive Cans or Servings Per Day:  1       Frequency:  Three Times a day  Entered: Sep 13 2022  2:59PM    
This patient has been assessed with a concern for Malnutrition and has been determined to have a diagnosis/diagnoses of Severe protein-calorie malnutrition.    This patient is being managed with:   Diet Regular-  1000mL Fluid Restriction ([id number])  Low Sodium  Supplement Feeding Modality:  Oral  Ensure Enlive Cans or Servings Per Day:  1       Frequency:  Three Times a day  Entered: Sep 18 2022  3:46PM    
This patient has been assessed with a concern for Malnutrition and has been determined to have a diagnosis/diagnoses of Severe protein-calorie malnutrition.    This patient is being managed with:   Diet Regular-  1000mL Fluid Restriction ([id number])  Low Sodium  Supplement Feeding Modality:  Oral  Ensure Enlive Cans or Servings Per Day:  1       Frequency:  Three Times a day  Entered: Sep 18 2022  3:46PM

## 2022-09-26 NOTE — PROGRESS NOTE ADULT - ATTENDING COMMENTS
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    Listed for OLT but not ready for transplant.  Will need prolonged antibiotic course.   Repeat CT 9/13/22 shows resolved RUQ fluid collection, improving second collection.   Antibiotics switched to cefepime/flagyl based on serratia bacteremia, persistently positive drain cultures  Continue lasix 40 mg daily, aldactone 100 mg daily.
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    Listed for OLT but not ready for transplant.  Will need prolonged antibiotic course.   Repeat CT 9/13/22 shows resolved RUQ fluid collection, improving second collection.   Antibiotics switched to cefepime/flagyl based on serratia bacteremia, persistently positive drain cultures  Continue lasix 40 mg daily, aldactone 100 mg daily.
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    Awaiting cultures from fluid collections, likely has infected hemoperitoneum.  Will need prolonged antibiotic course.  Continue Unasyn (9/12- ), ID thais
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    Listed for OLT but not ready for transplant.  Will need prolonged antibiotic course.   Repeat CT 9/13/22 shows resolved RUQ fluid collection, improving second collection.   Antibiotics switched to cefepime/flagyl based on serratia bacteremia, persistently positive drain cultures  Continue lasix 40 mg daily, aldactone 100 mg daily.  Plan on repeat CT middle of next week
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    Awaiting cultures from fluid collections, likely has infected hemoperitoneum.  Will need prolonged antibiotic course.  Continue Unasyn (9/12- ), ID following.  Repeat CT today.  Restart lasix 40 mg daily, aldactone 100 mg daily.
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    Listed for OLT but not ready for transplant. On internal hold pending clearance by ID.  S/p 2 weeks cefepime/flagyl. switched to bactrim DS po BID per ID.  RLQ drain with output around 25 cc/day, f/u IR for drain removal.  Continue lasix 40 mg BID, aldactone 100 mg BID.  Follow closely in liver clinic this Friday.
65 yo M with decompensated cirrhosis secondary to ALD/PARNELL (with last reported alcohol in 4/2022) and history of cholangitis s/p ERCP with stent placement (4/2022) with subsequent repeat ERCP with stent removal, sphincterotomy, and balloon sweep removal of sludge/stones (7/20/22), recently admitted with severe sepsis secondary to acute and likely perforated cholecystitis (s/p percutaneous cholecystostomy tube placement 8/9 and most recently repositioned by IR on 9/11), with E. coli and Streptococcus anginosis peritonitis treated with IV and then PO antibiotics, re-admitted due to Serratia marcescens bacteremia (9/11) and right-sided intraabdominal abscesses (s/p RUQ and RLQ drains placed by IR 9/11, with fluid culture with E. coli 9/11). Given low output, RLQ drain was removed today. Given residual abscess, RUQ drain was upsized today by IR. Continuing antibiotics as per Transplant ID. S/p Zosyn (9/10), s/p Unasyn (9/11-14), and currently on cefepime (9/14- ), metronidazole (9/14- ), and fluconazole (9/11- ). Mentating at his baseline and renal function stable on furosemide 40 mg po daily and spironolactone 100 mg po daily. He remains wait-listed for liver transplant. ABO O with calculated MELD-Na 24 today. He is on internal hold pending ID clearance to proceed with transplant.    Laron Harper M.D., Ph.D.  Transplant Hepatology
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    Listed for OLT but not ready for transplant.  Will need prolonged antibiotic course.   Repeat CT 9/13/22 shows resolved RUQ fluid collection, improving second collection. Plan on repeat CT middle of this week.  Antibiotics switched to cefepime/flagyl based on serratia bacteremia, persistently positive drain cultures  Continue lasix 40 mg daily, aldactone 100 mg daily.  discuss with IR regarding removal of RUQ drain that is not putting out much
67 yo M with decompensated cirrhosis secondary to ALD/PARNELL (with last reported alcohol in 4/2022) and history of cholangitis s/p ERCP with stent placement (4/2022) with subsequent repeat ERCP with stent removal, sphincterotomy, and balloon sweep removal of sludge/stones (7/20/22), recently admitted with severe sepsis secondary to acute and likely perforated cholecystitis (s/p percutaneous cholecystostomy tube placement 8/9 and most recently repositioned by IR on 9/11), with E. coli and Streptococcus anginosis peritonitis treated with IV and then PO antibiotics, re-admitted due to Serratia marcescens bacteremia (9/11) and right-sided intraabdominal abscesses, s/p RUQ drain (9/11-19) with small residual perihepatic collection seen on repeat CT today and still with RLQ drain (9/11- ) with low volume output and near resolution on the repeat CT today. Will discuss with IR re: timing for RLQ drain removal. S/p Zosyn (9/10), s/p Unasyn (9/11-14), and currently on cefepime (9/14- ), metronidazole (9/14- ), and fluconazole (9/11- ) while awaiting further Transplant ID input on antibiotic plan and duration including for when he is ready to be discharged home. May need PICC for home IV antibiotics pending ID input. Mentating at his baseline and renal function stable. Abdominal wall edema improving with increased diuresis (net negative 1.5L in the past 24h), but still with pleural effusions and small ascites on CT today. Continue furosemide 60 mg po daily and spironolactone 150 mg po daily. He remains wait-listed for liver transplant. ABO O with calculated MELD-Na 24 today (with re-certification due 10/20/22). He is on internal hold pending ID clearance to proceed with transplant.    Laron Harper M.D., Ph.D.  Transplant Hepatology
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    Listed for OLT but not ready for transplant.  Awaiting cultures from fluid collections, likely has infected hemoperitoneum.  Will need prolonged antibiotic course. Maybe can switch to PO bactrim for PO option on discharge.  Continue Unasyn (9/12- ), ID following.  Repeat CT 9/13/22 shows resolved RUQ fluid collection, improving second collection. May be able to dc RUQ fluid collection drain soon.  Continue lasix 40 mg daily, aldactone 100 mg daily.
67 yo M with decompensated cirrhosis secondary to ALD/PARNELL (with last reported alcohol in 4/2022) and history of cholangitis s/p ERCP with stent placement (4/2022) with subsequent repeat ERCP with stent removal, sphincterotomy, and balloon sweep removal of sludge/stones (7/20/22), recently admitted with severe sepsis secondary to acute and likely perforated cholecystitis (s/p percutaneous cholecystostomy tube placement 8/9 and most recently repositioned by IR on 9/11), with E. coli and Streptococcus anginosis peritonitis treated with IV and then PO antibiotics, re-admitted due to Serratia marcescens bacteremia (9/11) and right-sided intraabdominal abscesses, s/p RUQ drain (9/11-19) with small residual perihepatic collection seen on repeat CT (9/21) and still with RLQ drain (9/11- ) with rear resolution on the repeat CT. RLQ drain output 25 mL/24h. IR plans to remove the drain once output has decreased to <10 mL/day. S/p Zosyn (9/10), s/p Unasyn (9/11-14), and currently on cefepime (9/14- ), metronidazole (9/14- ), and fluconazole (9/11- ), with plan to complete 14-day course of IV antibiotics per Transplant ID and then transition to Bactrim for discharge home next week. Abdominal wall and peripheral edema have improved and will re-decrease furosemide back to 40 mg po daily and spironolactone back to 100 mg po daily to avoid overdiuresis. He remains wait-listed for liver transplant (listed at MELD-Na 24), ABO O with calculated MELD-Na 23 today.    Laron Harper M.D., Ph.D.  Transplant Hepatology
67 yo M with decompensated cirrhosis secondary to ALD/PARNELL (with last reported alcohol in 4/2022) and history of cholangitis s/p ERCP with stent placement (4/2022) with subsequent repeat ERCP with stent removal, sphincterotomy, and balloon sweep removal of sludge/stones (7/20/22), recently admitted with severe sepsis secondary to acute and likely perforated cholecystitis (s/p percutaneous cholecystostomy tube placement 8/9 and most recently repositioned by IR on 9/11), with E. coli and Streptococcus anginosis peritonitis treated with IV and then PO antibiotics, re-admitted due to Serratia marcescens bacteremia (9/11) and right-sided intraabdominal abscesses, s/p RUQ drain (9/11-19) with small residual perihepatic collection seen on repeat CT (9/21) still with RLQ drain (9/11- ) with low volume output and near resolution on the repeat CT. IR recommending to keep the drain in place until output <10 mL/day. S/p Zosyn (9/10), s/p Unasyn (9/11-14), and currently on cefepime (9/14- ), metronidazole (9/14- ), and fluconazole (9/11- ), with plan to continue IV antibiotics through the weekend and then likely transition to Bactrim for discharge home per Transplant ID Dr. Guajardo. Mentating at his baseline and renal function stable. Edema has improved with increased diuresis and will continue furosemide 60 mg po daily and spironolactone 150 mg po daily. He remains wait-listed for liver transplant. ABO O with calculated MELD-Na 23 today (listed at MELD-Na 24). He is on internal hold pending ID clearance to proceed with transplant.    Laron Harper M.D., Ph.D.  Transplant Hepatology
Patient with preceding peritonitis secondary to perforated cholecystitis   Now with recurred infected fluid collections  s/p IR drainage with E coli on cultures which is Augmentin resistant  Blood Cultures with Serratia  Continue Cefepime pending Serratia susceptibilities  Continue Metronidazole for empiric anaerobic coverage    I will be away over this upcoming weekend. Please contact the Infectious Diseases Office with any further questions or concerns.     Bladimir Nix M.D.  [facility] Division of Infectious Disease  8AM-5PM Monday - Friday: Available on [employer] Teams  After Hours and Holidays (or if no response on [employer] Teams): Please contact the Infectious Diseases Office at [phone]     The above assessment and plan were discussed with Nisha transplant surgery MICHAEL
Patient with preceding peritonitis secondary to perforated cholecystitis   Now with recurred infected fluid collections  s/p IR drainage with E coli on cultures which is Augmentin resistant  Blood Cultures with Serratia  Continue Cefepime pending Serratia susceptibilities  Continue Metronidazole for empiric anaerobic coverage    I will continue to follow. Please feel free to contact me with any further questions.    Bladimir Nix M.D.  [facility] Division of Infectious Disease  8AM-5PM Monday - Friday: Available on [employer] Teams  After Hours and Holidays (or if no response on [employer] Teams): Please contact the Infectious Diseases Office at [phone]    The above assessment and plan were discussed with Bruna, Transplant Surgery PA
67 yo M with decompensated cirrhosis secondary to ALD/PARNELL (with last reported alcohol in 4/2022) and history of cholangitis s/p ERCP with stent placement (4/2022) with subsequent repeat ERCP with stent removal, sphincterotomy, and balloon sweep removal of sludge/stones (7/20/22), recently admitted with severe sepsis secondary to acute and likely perforated cholecystitis (s/p percutaneous cholecystostomy tube placement 8/9 and most recently repositioned by IR on 9/11), with E. coli and Streptococcus anginosis peritonitis treated with IV and then PO antibiotics, re-admitted due to Serratia marcescens bacteremia (9/11) and right-sided intraabdominal abscesses (s/p RUQ and RLQ drains placed by IR 9/11, with fluid culture with E. coli 9/11). Yesterday, IR removed the RLQ drain and upsized the RUQ drain. Will plan to re-image with CT in a few days. S/p Zosyn (9/10), s/p Unasyn (9/11-14), and currently on cefepime (9/14- ), metronidazole (9/14- ), and fluconazole (9/11- ) while awaiting further Transplant ID input on antibiotic plan and duration including for when he is ready to be discharged home. May need PICC for home IV antibiotics pending ID input. Mentating at his baseline and renal function stable, but still with some ascites and abdominal wall edema. Increased furosemide to 60 mg po daily and increased spironolactone to 150 mg po daily. He remains wait-listed for liver transplant. ABO O with calculated MELD-Na 24 today (with re-certification due 10/19/22). He is on internal hold pending ID clearance to proceed with transplant.    Laron Harper M.D., Ph.D.  Transplant Hepatology

## 2022-09-26 NOTE — PROGRESS NOTE ADULT - ASSESSMENT
66 year old male PMH cholangitis/cholecystitis (s/p ERCP w/ biliary stent placement 04/2022) and recently diagnosed etOH cirrhosis, had RUQ pain following biliary stent removal on 7/20/22, then  Found to have a distended GB is s/p Choley tube placement 8/9  now admitted with concerns for multiple large abdominal collections and choley tube repositioning. ID consulted for antibiotic management.    On 9/11 s/p perihepatic collection drainage, abdominal collection drainage, cholecystostomy tube re-positioning  Abdominal Drainage 1 (9/11) Moderate E coli (now Augmentin resistant)  Abdominal Drainage 2 (9/11) Moderate E coli (now Augmentin resistant)  Blood Cultures (9/11) 1/4 Serratia (based on BCID PCR)    CT A/P (9/13) Near complete resolution of right upper quadrant collection and Large anterior peritoneal collection is decreased in size.    CT A/P (9/21) Near complete resolution of collection in anterior pelvis status post percutaneous drainage and Small persistent collection right subphrenic space.    #Serratia Bacteremia, Peritonitis, Positive Culture Finding (Ascites)  --Recommend Bactrim DS BID x 7 days; subsequently recommend Bactrim Q24H for SBP PPx  --Continue to follow CBC with diff  --Continue to follow temperature curve    #Pre-OLT  No absolute ID contraindication for OLT    #Late Latent Syphilis  s/p IM Benzathine Penicillin 2.4 million units Q7Days x 3 doses    #Encounter to Vaccinate Patient  COVID19: 3/5/21, 3/26/21 - Would benefit from booster, anticipate waiting for bivalent COVID19 vaccine  Influenza: Will require with next season  Pneumococcal: PCV20 9/1/22  HBV: Heplisav dose 1 9/1/22. Dose 2 in 1 month.   HAV: Immune  Tdap: Will require Tdap  Shinglex: Will require Shingrix    I will follow the patient as needed. Please feel free to contact me with any further questions or concerns.    Bladimir Nix M.D.  [facility] Division of Infectious Disease  8AM-5PM Monday - Friday: Available on [employer] Teams  After Hours and Holidays (or if no response on [employer] Teams): Please contact the Infectious Diseases Office at [phone]     The above assessment and plan were discussed with transplant surgery NP

## 2022-09-26 NOTE — PROGRESS NOTE ADULT - ATTENDING SUPERVISION STATEMENT
Fellow

## 2022-09-26 NOTE — PROGRESS NOTE ADULT - ASSESSMENT
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 Currently Listed for OLT       #Decompensated ETOH/PARNELL Cirrhosis, listed for OLT  #Abdominal collection, infected hematoma s/p IR drainage x 2 with C tube repositioning c/b Augmentin resistant E coli  #Serratia bacteremia 9/11, repeat BCx 9/15 negative  #Hyponatremia  MELD-Na: 23 9/23  - HE: none currently  - EV: normal esophagus on ERCP from 7/2022  - Ascites: moderate 8/31 US  - SBP: paracentesis 8/9/2022 reveals likely secondary bacterial peritonitis from suspected gallbladder pathology, +SBP on para 8/31  - HCC:  No lesions on CT 8/26      Plan  - lasix  40 bid  - aldactone 100 BID   - albumin 25% q6h  - Drain management per IR, continue observation; if abscess drain output decreases to 10cc/24hr while inpatient, recommending repeat drain evaluation with IR. OP follow up appt also made for 9/30/22 at 10:30 AM and needs covid testing on 9/27. Recommending IR follow of cholecystostomy drain every 3 mos   - ID on board, appreciate recs: 14 days Abx Cefepime/flagyl for bacteremia after which can do po Bactrim DS BID for an additional week until the drain is removed  - serratia sens to cefepime   - On cefepime end 9/25, on metronidazole  - ASA 81 daily  - Drain management as per IR RUQ drain output 10ml.24 hours ->removed 9/19  - Daily CMP, CBC, coags  - Continue Fluconazole  - Physical therapy  - Appropriate for discharge home  [x] Psychosocial: cleared pending RPP  [x ] Infectious  [x] Cardiology:        Cardiac cath: n/a       Stress test: negative noninvasive stress test on 8/19/2022 for inducible ischemia  [ ] Colonoscopy: needed, f/u records from outside GI colonoscopy reports  [x] Prostate: Prostate Specific Antigen: negative at 2.21 ng/mL on 8/12/2022  [x] Dental  [x] PT/Frailty    Preliminary note until signed by Attending.    Thank you for involving us in this patient's care.      NON-URGENT CONSULTS:  Please email [email or web address]  [email or web address]  AT NIGHT AND ON WEEKENDS:  Contact on-call GI fellow via answering service ([phone]) from 5pm-8am and on weekends/holidays  MONDAY-FRIDAY 8AM-5PM:  Pager# [phone] ([facility])  GI Phone# [phone] ([facility])     65 yo M with decompensated cirrhosis possibly secondary to ALD/PARNELL (with last reported alcohol in 4/2022) and history of cholangitis s/p ERCP with stent placement (4/2022) with subsequent repeat ERCP with stent removal, sphincterotomy, and balloon sweep removal of sludge/stones (7/20/22). Currently Listed for OLT       #Decompensated ETOH/PARNELL Cirrhosis, listed for OLT  #Abdominal collection, infected hematoma s/p IR drainage x 2 with C tube repositioning c/b Augmentin resistant E coli  #Serratia bacteremia 9/11, repeat BCx 9/15 negative  #Hyponatremia  MELD-Na: 23 9/23  - HE: none currently  - EV: normal esophagus on ERCP from 7/2022  - Ascites: moderate 8/31 US  - SBP: paracentesis 8/9/2022 reveals likely secondary bacterial peritonitis from suspected gallbladder pathology, +SBP on para 8/31  - HCC:  No lesions on CT 8/26      Plan  - lasix 40 bid  - aldactone 100 BID   - Drain management per IR, continue observation; if abscess drain output decreases to 10cc/24hr while inpatient, recommending repeat drain evaluation with IR. OP follow up appt also made for 9/30/22 at 10:30 AM and needs covid testing on 9/27. Recommending IR follow of cholecystostomy drain every 3 mos   - ID on board, appreciate recs: 14 days Abx Cefepime/flagyl for bacteremia after which can do po Bactrim DS BID for an additional week until the drain is removed  - serratia sens to cefepime   - ASA 81 daily  - Drain management as per IR RUQ drain output 10ml.24 hours ->removed 9/19  - Daily CMP, CBC, coags  - Continue Fluconazole  - Physical therapy  - Appropriate for discharge home  [x] Psychosocial: cleared pending RPP  [x ] Infectious  [x] Cardiology:        Cardiac cath: n/a       Stress test: negative noninvasive stress test on 8/19/2022 for inducible ischemia  [ ] Colonoscopy: needed, f/u records from outside GI colonoscopy reports  [x] Prostate: Prostate Specific Antigen: negative at 2.21 ng/mL on 8/12/2022  [x] Dental  [x] PT/Frailty    Preliminary note until signed by Attending.    Thank you for involving us in this patient's care.      NON-URGENT CONSULTS:  Please email [email or web address]  AT NIGHT AND ON WEEKENDS:  Contact on-call GI fellow via answering service ([phone]) from 5pm-8am and on weekends/holidays  MONDAY-FRIDAY 8AM-5PM:  Pager# [phone] ([facility])  GI Phone# [phone] ([facility])

## 2022-09-26 NOTE — PROGRESS NOTE ADULT - SUBJECTIVE AND OBJECTIVE BOX
Interventional Radiology Follow-Up Note    This is a 66 year old Male s/p perihepatic collection drain placement and abdominal collection drain placement on 9/11/22. Cholecystostomy tube placement on 8/9/22 (last exchanged on 9/11/22) in IR with Dr. Yin. Perihepatic collection resolved and drain was removed on 9/19/22.    S: Patient seen and examined at bedside. Offers no complaints at this time.     Medication:     cefepime   IVPB: (09-25)  fluconAZOLE   Tablet: (09-26)  furosemide    Tablet: (09-26)  metroNIDAZOLE  IVPB: (09-26)  spironolactone: (09-25)  spironolactone: (09-26)  tamsulosin: (09-25)  trimethoprim  160 mG/sulfamethoxazole 800 mG: (09-26)    Vitals:   T(F): 98.4, Max: 99.4 (21:00)  HR: 90  BP: 126/71  RR: 18  SpO2: 98%    Physical Exam:  General: Nontoxic, in NAD, A&Ox3  Abdomen: soft, NTND.   Abdominal drain Device: Drain intact attached to gravity bag.  Flushed with 5cc NS w/o difficulty, no leaking, +return of fluid into bag. Dressing clean, dry, intact.   24hr Drain output: 25cc    Cholecystostomy Device: Drain intact attached to gravity bag.  Flushed with 5cc NS w/o difficulty, no leaking, +return of fluid into bag. Dressing clean, dry, intact.   24hr Drain output: 55cc     LABS:  WBC -- / Hgb -- / Hct -- / Plt --  Na 132 / K 3.8 / CO2 25 / Cl 100 / BUN 11 / Cr 0.63 / Glucose 85  ALT 9 / AST 26 / Alk Phos 82 / Tbili 2.4  Ptt -- / Pt 30.0 / INR 2.56      Assessment/Plan:  66 year old male with decompensated cirrhosis and cholangitis s/p ERCP with stent placement (4/2022) s/p stent removal on 7/20 (along sphincterotomy and stone extraction) now s/p cholecystostomy tube 8/9 with check 8/29 in correct location. He presents to OSH with bloody discharge from the wound site and abdominal distention found to have potentially dislodged IR cholecystostomy tube and ascites. Recent OSH imaging showing multiple large abdominal collections.    Pt most recently s/p perihepatic collection drain placement and abdominal collection drain placement on 9/11/22. Cholecystostomy tube placement on 8/9/22 (last exchanged on 9/11/22) in IR with Dr. Yin. Perihepatic collection resolved and drain was removed on 9/19/22..     - Leukocytosis resolved, continue abx per primary team.  -CT abd/pelv from 9/22. Near complete resolution of abdominal collection. Recommend continued observation given output 25cc over last 24hrs.   - Flush drains with 5cc normal saline daily forward only; DO NOT aspirate  - Change dressing q3 days or when dressing is saturated.  - Trend vs/labs  - Continue global management per primary team  - If abscess drain outputs decrease to 10cc/24hr while inpatient, recommend repeat drain evaluation with IR.  ----------------  - Outpatient follow up: Abscess drain evaluation appointment made for 9/30/22 @10:30AM. COVID-19 test prior on 9/27 @ 1:30PM. Call the IR booking office at [phone] with any questions.  - Cholecystostomy drain outpatient follow up: recommend IR follow every 3 months for routine exchange. Call the IR booking office at [phone] for appointment.  - Pt will benefit from VNS service to help with drainage catheter care; Pt should continue same drainage catheter care as an outpatient.  - Greater than 50% of the encounter was spent counseling and/ or coordination of care on drain care and follow up.   -Case discussed with Dr. Harper and hepatology team.       Teresa Segura PA-C  Available on teams

## 2022-09-26 NOTE — PROGRESS NOTE ADULT - PROVIDER SPECIALTY LIST ADULT
Intervent Radiology
Transplant Hepatology
Transplant ID
Intervent Radiology
Intervent Radiology
Transplant Hepatology
Transplant ID
Infectious Disease
Infectious Disease
Intervent Radiology
Intervent Radiology
Transplant Hepatology
Transplant ID
Intervent Radiology
Transplant Hepatology
Transplant ID

## 2022-09-26 NOTE — PROGRESS NOTE ADULT - SUBJECTIVE AND OBJECTIVE BOX
Follow Up:      Interval History:    REVIEW OF SYSTEMS  [  ] ROS unobtainable because:    [  ] All other systems negative except as noted below    Constitutional:  [ ] fever [ ] chills  [ ] weight loss  [ ] weakness  Skin:  [ ] rash [ ] phlebitis	  Eyes: [ ] icterus [ ] pain  [ ] discharge	  ENMT: [ ] sore throat  [ ] thrush [ ] ulcers [ ] exudates  Respiratory: [ ] dyspnea [ ] hemoptysis [ ] cough [ ] sputum	  Cardiovascular:  [ ] chest pain [ ] palpitations [ ] edema	  Gastrointestinal:  [ ] nausea [ ] vomiting [ ] diarrhea [ ] constipation [ ] pain	  Genitourinary:  [ ] dysuria [ ] frequency [ ] hematuria [ ] discharge [ ] flank pain  [ ] incontinence  Musculoskeletal:  [ ] myalgias [ ] arthralgias [ ] arthritis  [ ] back pain  Neurological:  [ ] headache [ ] seizures  [ ] confusion/altered mental status    Allergies  No Known Allergies        ANTIMICROBIALS:  fluconAZOLE   Tablet 400 daily  metroNIDAZOLE  IVPB 500 two times a day  trimethoprim  160 mG/sulfamethoxazole 800 mG 1 two times a day      OTHER MEDS:  MEDICATIONS  (STANDING):  furosemide    Tablet 40 two times a day  influenza   Vaccine 0.5 once  pantoprazole    Tablet 40 before breakfast  polyethylene glycol 3350 17 two times a day  senna 2 at bedtime  spironolactone 100 two times a day  tamsulosin 0.4 at bedtime      Vital Signs Last 24 Hrs  T(C): 36.9 (26 Sep 2022 12:00), Max: 37.4 (25 Sep 2022 21:00)  T(F): 98.4 (26 Sep 2022 12:00), Max: 99.4 (25 Sep 2022 21:00)  HR: 90 (26 Sep 2022 12:00) (87 - 92)  BP: 126/71 (26 Sep 2022 12:00) (122/72 - 147/79)  BP(mean): --  RR: 18 (26 Sep 2022 12:00) (18 - 18)  SpO2: 98% (26 Sep 2022 12:00) (94% - 99%)    Parameters below as of 26 Sep 2022 12:00  Patient On (Oxygen Delivery Method): room air        PHYSICAL EXAMINATION:  General: Alert and Awake, NAD  HEENT: PERRL, EOMI  Neck: Supple  Cardiac: RRR, No M/R/G  Resp: CTAB, No Wh/Rh/Ra  Abdomen: NBS, NT/ND, No HSM, No rigidity or guarding  MSK: No LE edema. No Calf tenderness  : No milian  Skin: No rashes or lesions. Skin is warm and dry to the touch.   Neuro: Alert and Awake. CN 2-12 Grossly intact. Moves all four extremities spontaneously.  Psych: Calm, Pleasant, Cooperative                          8.3    5.89  )-----------( 72       ( 26 Sep 2022 06:17 )             25.2       09-26    132<L>  |  100  |  11  ----------------------------<  85  3.8   |  25  |  0.63    Ca    9.5      26 Sep 2022 06:18  Phos  2.6     09-26  Mg     1.6     09-26    TPro  7.9  /  Alb  3.0<L>  /  TBili  2.4<H>  /  DBili  x   /  AST  26  /  ALT  9<L>  /  AlkPhos  82  09-26          MICROBIOLOGY:  v  .Blood Blood  09-15-22   No Growth Final  --  --      .Abscess perihepatic collection  09-11-22   Moderate Escherichia coli  --  Escherichia coli      .Abscess abdominal collection  09-11-22   Moderate Escherichia coli  --  Escherichia coli      .Blood Blood  09-11-22   Growth in anaerobic bottle: Serratia marcescens  ***Blood Panel PCR results on this specimen are available  approximately 3 hours after the Gram stain result.***  Gram stain, PCR, and/or culture results may not always  correspond due to difference in methodologies.  ************************************************************  This PCR assay was performed by multiplex PCR. This  Assay tests for 66 bacterial and resistance gene targets.  Please refer to the [facility] test directory  at [email or web address] for details.  --  Blood Culture PCR  Serratia marcescens      Clean Catch Clean Catch (Midstream)  09-11-22   No growth  --  --      Clean Catch Clean Catch (Midstream)  09-10-22   <10,000 CFU/mL Normal Urogenital Malu  --  --      .Blood Blood-Peripheral  09-10-22   No Growth Final  --  --      .Blood Blood-Peripheral  09-10-22   No Growth Final  --  --      Peritoneal Peritoneal Fluid  08-31-22   No growth at 5 days  --    polymorphonuclear leukocytes seen  No organisms seen  by cytocentrifuge      Clean Catch Clean Catch (Midstream)  08-30-22   No growth  --  --      .Blood Blood-Peripheral  08-30-22   No Growth Final  --  --        CMV IgG Antibody: 4.00 U/mL (08-12-22 @ 00:32)          RADIOLOGY:    <The imaging below has been reviewed and visualized by me independently. Findings as detailed in report below> Follow Up:  peritonitis    Interval History: afebrile.     REVIEW OF SYSTEMS  [  ] ROS unobtainable because:    [ x ] All other systems negative except as noted below    Constitutional:  [ ] fever [ ] chills  [ ] weight loss  [ ] weakness  Skin:  [ ] rash [ ] phlebitis	  Eyes: [ ] icterus [ ] pain  [ ] discharge	  ENMT: [ ] sore throat  [ ] thrush [ ] ulcers [ ] exudates  Respiratory: [ ] dyspnea [ ] hemoptysis [ ] cough [ ] sputum	  Cardiovascular:  [ ] chest pain [ ] palpitations [ ] edema	  Gastrointestinal:  [ ] nausea [ ] vomiting [ ] diarrhea [ ] constipation [ ] pain	  Genitourinary:  [ ] dysuria [ ] frequency [ ] hematuria [ ] discharge [ ] flank pain  [ ] incontinence  Musculoskeletal:  [ ] myalgias [ ] arthralgias [ ] arthritis  [ ] back pain  Neurological:  [ ] headache [ ] seizures  [ ] confusion/altered mental status    Allergies  No Known Allergies        ANTIMICROBIALS:  fluconAZOLE   Tablet 400 daily  metroNIDAZOLE  IVPB 500 two times a day  trimethoprim  160 mG/sulfamethoxazole 800 mG 1 two times a day      OTHER MEDS:  MEDICATIONS  (STANDING):  furosemide    Tablet 40 two times a day  influenza   Vaccine 0.5 once  pantoprazole    Tablet 40 before breakfast  polyethylene glycol 3350 17 two times a day  senna 2 at bedtime  spironolactone 100 two times a day  tamsulosin 0.4 at bedtime      Vital Signs Last 24 Hrs  T(C): 36.9 (26 Sep 2022 12:00), Max: 37.4 (25 Sep 2022 21:00)  T(F): 98.4 (26 Sep 2022 12:00), Max: 99.4 (25 Sep 2022 21:00)  HR: 90 (26 Sep 2022 12:00) (87 - 92)  BP: 126/71 (26 Sep 2022 12:00) (122/72 - 147/79)  BP(mean): --  RR: 18 (26 Sep 2022 12:00) (18 - 18)  SpO2: 98% (26 Sep 2022 12:00) (94% - 99%)    Parameters below as of 26 Sep 2022 12:00  Patient On (Oxygen Delivery Method): room air    PHYSICAL EXAMINATION:  General: Alert and Awake, NAD, jaundice  Cardiac: RRR, No M/R/G  Resp: CTAB, No Wh/Rh/Ra  Abdomen: RUQ Cholecystotomy tube with yellow drainage, Lower pelvic drain with blood tinged output. additional abdominal drain with yellow drainage. NBS, NT/ND, No HSM, No rigidity or guarding  MSK: No LE edema. No Calf tenderness  : No milian  Skin: No rashes or lesions. Skin is warm and dry to the touch.   Neuro: Alert and Awake. CN 2-12 Grossly intact. Moves all four extremities spontaneously.  Psych: Calm, Pleasant, Cooperative                            8.3    5.89  )-----------( 72       ( 26 Sep 2022 06:17 )             25.2       09-26    132<L>  |  100  |  11  ----------------------------<  85  3.8   |  25  |  0.63    Ca    9.5      26 Sep 2022 06:18  Phos  2.6     09-26  Mg     1.6     09-26    TPro  7.9  /  Alb  3.0<L>  /  TBili  2.4<H>  /  DBili  x   /  AST  26  /  ALT  9<L>  /  AlkPhos  82  09-26          MICROBIOLOGY:  v  .Blood Blood  09-15-22   No Growth Final  --  --      .Abscess perihepatic collection  09-11-22   Moderate Escherichia coli  --  Escherichia coli      .Abscess abdominal collection  09-11-22   Moderate Escherichia coli  --  Escherichia coli      .Blood Blood  09-11-22   Growth in anaerobic bottle: Serratia marcescens  ***Blood Panel PCR results on this specimen are available  approximately 3 hours after the Gram stain result.***  Gram stain, PCR, and/or culture results may not always  correspond due to difference in methodologies.  ************************************************************  This PCR assay was performed by multiplex PCR. This  Assay tests for 66 bacterial and resistance gene targets.  Please refer to the [facility] test directory  at [email or web address] for details.  --  Blood Culture PCR  Serratia marcescens      Clean Catch Clean Catch (Midstream)  09-11-22   No growth  --  --      Clean Catch Clean Catch (Midstream)  09-10-22   <10,000 CFU/mL Normal Urogenital Malu  --  --      .Blood Blood-Peripheral  09-10-22   No Growth Final  --  --      .Blood Blood-Peripheral  09-10-22   No Growth Final  --  --      Peritoneal Peritoneal Fluid  08-31-22   No growth at 5 days  --    polymorphonuclear leukocytes seen  No organisms seen  by cytocentrifuge      Clean Catch Clean Catch (Midstream)  08-30-22   No growth  --  --      .Blood Blood-Peripheral  08-30-22   No Growth Final  --  --        CMV IgG Antibody: 4.00 U/mL (08-12-22 @ 00:32)          RADIOLOGY:    <The imaging below has been reviewed and visualized by me independently. Findings as detailed in report below>    < from: CT Abdomen and Pelvis w/ IV Cont (09.21.22 @ 16:11) >  IMPRESSION:  Cirrhosis and portal hypertension.    Near complete resolution of collection in anterior pelvis status post   percutaneous drainage.    Small persistent collection right subphrenic space.    Persistent ascites and pleural effusions.    < end of copied text >

## 2022-09-26 NOTE — PROGRESS NOTE ADULT - REASON FOR ADMISSION
Bleeding around cholecystostomy tube

## 2022-09-26 NOTE — PROGRESS NOTE ADULT - PA/NP ONLY VISIT
PA/NP only visit

## 2022-09-28 ENCOUNTER — OUTPATIENT (OUTPATIENT)
Dept: OUTPATIENT SERVICES | Facility: HOSPITAL | Age: 67
LOS: 1 days | End: 2022-09-28
Payer: COMMERCIAL

## 2022-09-28 DIAGNOSIS — Z11.52 ENCOUNTER FOR SCREENING FOR COVID-19: ICD-10-CM

## 2022-09-28 DIAGNOSIS — Z98.890 OTHER SPECIFIED POSTPROCEDURAL STATES: Chronic | ICD-10-CM

## 2022-09-28 LAB — SARS-COV-2 RNA SPEC QL NAA+PROBE: SIGNIFICANT CHANGE UP

## 2022-09-28 PROCEDURE — U0005: CPT

## 2022-09-28 PROCEDURE — C9803: CPT

## 2022-09-28 PROCEDURE — U0003: CPT

## 2022-09-30 ENCOUNTER — RESULT REVIEW (OUTPATIENT)
Age: 67
End: 2022-09-30

## 2022-09-30 ENCOUNTER — TRANSCRIPTION ENCOUNTER (OUTPATIENT)
Age: 67
End: 2022-09-30

## 2022-09-30 ENCOUNTER — OUTPATIENT (OUTPATIENT)
Dept: OUTPATIENT SERVICES | Facility: HOSPITAL | Age: 67
LOS: 1 days | End: 2022-09-30
Payer: COMMERCIAL

## 2022-09-30 VITALS
SYSTOLIC BLOOD PRESSURE: 110 MMHG | OXYGEN SATURATION: 96 % | TEMPERATURE: 98 F | RESPIRATION RATE: 18 BRPM | DIASTOLIC BLOOD PRESSURE: 78 MMHG | HEART RATE: 83 BPM

## 2022-09-30 DIAGNOSIS — K65.1 PERITONEAL ABSCESS: ICD-10-CM

## 2022-09-30 DIAGNOSIS — Z98.890 OTHER SPECIFIED POSTPROCEDURAL STATES: Chronic | ICD-10-CM

## 2022-09-30 DIAGNOSIS — K76.89 OTHER SPECIFIED DISEASES OF LIVER: ICD-10-CM

## 2022-09-30 DIAGNOSIS — R18.8 OTHER ASCITES: ICD-10-CM

## 2022-09-30 DIAGNOSIS — Z46.82 ENCOUNTER FOR FITTING AND ADJUSTMENT OF NON-VASCULAR CATHETER: ICD-10-CM

## 2022-09-30 DIAGNOSIS — Z46.59 ENCOUNTER FOR FITTING AND ADJUSTMENT OF OTHER GASTROINTESTINAL APPLIANCE AND DEVICE: ICD-10-CM

## 2022-09-30 PROCEDURE — C1729: CPT

## 2022-09-30 PROCEDURE — 76080 X-RAY EXAM OF FISTULA: CPT

## 2022-09-30 PROCEDURE — 47536 EXCHANGE BILIARY DRG CATH: CPT

## 2022-09-30 PROCEDURE — C1769: CPT

## 2022-09-30 PROCEDURE — 49424 ASSESS CYST CONTRAST INJECT: CPT

## 2022-09-30 PROCEDURE — 76080 X-RAY EXAM OF FISTULA: CPT | Mod: 26

## 2022-09-30 PROCEDURE — 49424 ASSESS CYST CONTRAST INJECT: CPT | Mod: 59

## 2022-09-30 NOTE — PRE PROCEDURE NOTE - PRE PROCEDURE EVALUATION
Interventional Radiology    HPI: 67 year old male with decompensated cirrhosis and cholangitis s/p ERCP with stent placement (4/2022) s/p stent removal on 7/20 (along sphincterotomy and stone extraction) now s/p cholecystostomy tube 8/9 with check 8/29 in correct location. He presents to OSH with bloody discharge from the wound site and abdominal distention found to have potentially dislodged IR cholecystostomy tube and ascites. Recent OSH imaging showing multiple large abdominal collections.    Pt most recently s/p perihepatic collection drain placement and abdominal collection drain placement on 9/11/22. Cholecystostomy tube placement on 8/9/22 (last exchanged on 9/11/22) in IR with Dr. Yin. Perihepatic collection resolved and drain was removed on 9/19/22..     Patient reports today that current abscess drain left in place has had 15cc output each day for the last 72hrs. Will perform Abscess drain and Perc Shasha drain evaluation today    Allergies:   Medications (Abx/Cardiac/Anticoagulation/Blood Products)      Data:    T(C): 36.6  HR: 83  BP: 110/78  RR: 18  SpO2: 96%    Exam  General: No acute distress  Chest: Non labored breathing  Abdomen: Non-distended  Extremities: No swelling, warm    -WBC 5.89 / HgB 8.3 / Hct 25.2 / Plt 72  -Na 132 / Cl 100 / BUN 11 / Glucose 85  -K 3.8 / CO2 25 / Cr 0.63  -ALT 9 / Alk Phos 82 / T.Bili 2.4  -INR2.56    Plan: 67y Male presents for Abscess drain and Perc Shasha drain evaluation  -Risks/Benefits/alternatives explained with the patient and/or healthcare proxy and witnessed informed consent obtained.

## 2022-09-30 NOTE — ASU DISCHARGE PLAN (ADULT/PEDIATRIC) - NS MD DC FALL RISK RISK
For information on Fall & Injury Prevention, visit: [email or web address] OR  [email or web address] OR  https://www.cdc.gov/steadi/patient.html

## 2022-09-30 NOTE — ASU PREOP CHECKLIST - STERILIZATION AFFIRMATION
[facility] [place] [address],   [address]  [place] [address]  Phone: [phone]  Fax: [phone]              Flex Perrin  1957    Encounter Date: 8/25/2017    Kendell Wilson M.D.          PROGRESS NOTE:  Subjective:   Flex Perrin is a 59 y.o. male who presents today For follow-up of his history of aortic valve replacement and ascending aortic aneurysm repair March 2017    He'll follow-up with vascular medicine who recommended CT and echocardiogram, unfortunately does not have insurance so had to pay out of pocket for his CT which fortunately shows stable repair of his aortic aneurysm    Past Medical History   Diagnosis Date   • Aneurysm of aorta (CMS-[facility])    • Aortic stenosis      Past Surgical History   Procedure Laterality Date   • Aortic valve replacement  3/22/2017     Procedure: AORTIC VALVE REPLACEMENT ;  Surgeon: Janel White M.D.;  Location: SURGERY [facility];  Service:    • Aortic ascending dissection  3/22/2017     Procedure: AORTIC ASCENDING ANEURYSM REPAIR;  Surgeon: Janel White M.D.;  Location: SURGERY [facility];  Service:    • Mark  3/22/2017     Procedure: MARK;  Surgeon: Janel White M.D.;  Location: SURGERY [facility];  Service:      Family History   Problem Relation Age of Onset   • Heart Disease Paternal Grandmother      anuerym and AVR     History   Smoking status   • Former Smoker -- 1.00 packs/day for 20 years   • Types: Cigarettes   Smokeless tobacco   • Never Used     No Known Allergies  Outpatient Encounter Prescriptions as of 8/25/2017   Medication Sig Dispense Refill   • aspirin EC 81 MG EC tablet Take 1 Tab by mouth every day. 30 Tab 3   • vitamin D 2000 UNIT Tab Take 1 Tab by mouth every day. 60 Tab 3     No facility-administered encounter medications on file as of 8/25/2017.     Review of Systems   Constitutional: Negative for fever and chills.   HENT: Negative for sore throat.    "  Eyes: Negative for blurred vision.   Respiratory: Negative for cough and shortness of breath.    Cardiovascular: Negative for chest pain, palpitations, claudication, leg swelling and PND.   Gastrointestinal: Negative for nausea and abdominal pain.   Musculoskeletal: Negative for falls.   Skin: Negative for rash.   Neurological: Negative for dizziness, focal weakness, loss of consciousness, weakness and headaches.   Endo/Heme/Allergies: Does not bruise/bleed easily.        Objective:   /70 mmHg  Pulse 81  Ht 1.778 m (5' 10\")  Wt 100.245 kg (221 lb)  BMI 31.71 kg/m2  SpO2 94%    Physical Exam   Constitutional: No distress.   HENT:   Mouth/Throat: Oropharynx is clear and moist.   Eyes: No scleral icterus.   Neck: Neck supple. No JVD present.   Cardiovascular: Normal rate, regular rhythm, normal heart sounds and intact distal pulses.  Exam reveals no gallop and no friction rub.    No murmur heard.  Pulmonary/Chest: Effort normal. He has no rales.   Abdominal: Soft. Bowel sounds are normal. There is no tenderness.   Musculoskeletal: He exhibits no edema.   Neurological: He is alert.   Skin: No rash noted. He is not diaphoretic.   Psychiatric: He has a normal mood and affect.     Labs show mild dyslipidemia with low HDL but also low LDL    We reviewed the images of his recent CTA which shows stable repair of his aneurysm    Assessment:     1. Thoracic aortic aneurysm without rupture (CMS-HCC)     2. Aortic valve stenosis with insufficiency, unspecified etiology     3. History of heart valve replacement with bioprosthetic valve [Z95.4]     4. Long term (current) use of anticoagulants [Z79.01]     5. Coronary artery disease due to lipid rich plaque     6. Dyslipidemia         Medical Decision Making:  Today's Assessment / Status / Plan:     It was my pleasure to meet with Mr. Perrin.    We discussed and he would prefer just to see cardiology as a can see him here in [place] I agree with Dr. currie that his " dyslipidemia with LDL in the 60s is quite good I would prefer he took a low to moderate dose statin which she is in agreement as he does have mild coronary artery calcifications as well as mild plaque on his coronary angiogram.    Given the cost of the echocardiogram we can defer that for a year after his surgery as a CT is reassuring    I will see Mr. Perrin back in 1 year time and encouraged him to follow up with us over the phone or e-mail using my MyChart as issues arise.    It is my pleasure to participate in the care of Mr. Perrin.  Please do not hesitate to contact me with questions or concerns.    Kendell Wilson MD PhD FACC  Cardiologist [facility]        Delmar Salcedo M.D.  [address] [place] [id number]  VIA Facsimile: [phone]                    n/a

## 2022-09-30 NOTE — ASU DISCHARGE PLAN (ADULT/PEDIATRIC) - ASU DC SPECIAL INSTRUCTIONSFT
Abscess drain and Perc Shasha drain evaluation      Discharge Instructions  - You have had a drain checked.  - Keep the area clean and dry.  - Do not soak in a tub or pool with the drain, however you may shower with the drain and dressing covered in plastic wrap.  - Do not put traction on the drain and be careful that the drain does not get accidentally dislodged or kinked.  - Record output daily from the drain. Empty the bag as needed.  - You may resume your normal diet.  - You may resume your normal medications.  - It is normal to experience some pain over the site for the next few days. You may take apply ice to the area (20 minutes on, 20 minutes off) and take Tylenol for that pain. Do not take more frequently than every 6 hours and do not exceed more than 3000mg of Tylenol in a 24 hour period.    Notify your primary physician and/or Interventional Radiology IMMEDIATELY if you experience any of the following       - Fever of 100.4F  or 38C       - Chills or Rigors/ Shakes       - Swelling and/or Redness in the area of the puncture site       - Worsening Pain       - Blood soaked bandages or worsening bleeding       - Lightheadedness and/or dizziness upon standing       - Chest Pain/ Tightness       - Shortness of Breath       - Difficulty walking    If you have a problem that you believe requires IMMEDIATE attention, please go to your NEAREST Emergency Room. If you believe your problem can safely wait until you speak to a physician, please call Interventional Radiology for any concerns.    During Normal Weekday Business Hours- You can contact the Interventional Radiology department during normal business hours via telephone.  During Evenings and Weekends- If you need to contact Interventional Radiology during off hours, do so by calling the hospital and requesting to be connected to the Interventional Radiologist on call.

## 2022-09-30 NOTE — ASU PATIENT PROFILE, ADULT - FALL HARM RISK - HARM RISK INTERVENTIONS

## 2022-09-30 NOTE — PROCEDURE NOTE - PROCEDURE FINDINGS AND DETAILS
14fr abscess drain contract injection demonstrated small amorphic cavity with no communication to nearby structures. drain left in place due to output from drain (15cc per day) as reported by patient    8.5Fr perc rodney drain exchange performed over wire with fluoroscopy guidance. Contrast injection demonstrated numerous filling defects consistent with stones. Full report to follow.

## 2022-10-01 LAB
CULTURE RESULTS: SIGNIFICANT CHANGE UP
SPECIMEN SOURCE: SIGNIFICANT CHANGE UP

## 2022-10-03 ENCOUNTER — NON-APPOINTMENT (OUTPATIENT)
Age: 67
End: 2022-10-03

## 2022-10-04 ENCOUNTER — APPOINTMENT (OUTPATIENT)
Dept: HEPATOLOGY | Facility: CLINIC | Age: 67
End: 2022-10-04

## 2022-10-06 DIAGNOSIS — F10.20 ALCOHOL DEPENDENCE, UNCOMPLICATED: ICD-10-CM

## 2022-10-14 ENCOUNTER — OUTPATIENT (OUTPATIENT)
Dept: OUTPATIENT SERVICES | Facility: HOSPITAL | Age: 67
LOS: 1 days | End: 2022-10-14
Payer: COMMERCIAL

## 2022-10-14 DIAGNOSIS — Z11.52 ENCOUNTER FOR SCREENING FOR COVID-19: ICD-10-CM

## 2022-10-14 DIAGNOSIS — Z98.890 OTHER SPECIFIED POSTPROCEDURAL STATES: Chronic | ICD-10-CM

## 2022-10-14 LAB — SARS-COV-2 RNA SPEC QL NAA+PROBE: SIGNIFICANT CHANGE UP

## 2022-10-14 PROCEDURE — C9803: CPT

## 2022-10-14 PROCEDURE — U0003: CPT

## 2022-10-14 PROCEDURE — U0005: CPT

## 2022-10-18 ENCOUNTER — OUTPATIENT (OUTPATIENT)
Dept: OUTPATIENT SERVICES | Facility: HOSPITAL | Age: 67
LOS: 1 days | End: 2022-10-18
Payer: COMMERCIAL

## 2022-10-18 ENCOUNTER — TRANSCRIPTION ENCOUNTER (OUTPATIENT)
Age: 67
End: 2022-10-18

## 2022-10-18 VITALS
OXYGEN SATURATION: 96 % | RESPIRATION RATE: 20 BRPM | SYSTOLIC BLOOD PRESSURE: 103 MMHG | DIASTOLIC BLOOD PRESSURE: 69 MMHG | HEART RATE: 93 BPM | TEMPERATURE: 98 F

## 2022-10-18 DIAGNOSIS — R18.8 OTHER ASCITES: ICD-10-CM

## 2022-10-18 DIAGNOSIS — K65.1 PERITONEAL ABSCESS: ICD-10-CM

## 2022-10-18 DIAGNOSIS — Z98.890 OTHER SPECIFIED POSTPROCEDURAL STATES: Chronic | ICD-10-CM

## 2022-10-18 DIAGNOSIS — Z46.82 ENCOUNTER FOR FITTING AND ADJUSTMENT OF NON-VASCULAR CATHETER: ICD-10-CM

## 2022-10-18 DIAGNOSIS — K63.2 FISTULA OF INTESTINE: ICD-10-CM

## 2022-10-18 PROCEDURE — 49424 ASSESS CYST CONTRAST INJECT: CPT

## 2022-10-18 PROCEDURE — 76080 X-RAY EXAM OF FISTULA: CPT

## 2022-10-18 PROCEDURE — 76080 X-RAY EXAM OF FISTULA: CPT | Mod: 26

## 2022-10-18 NOTE — PROCEDURE NOTE - PROCEDURE FINDINGS AND DETAILS
hand injection of contrast demonstrating moderate amorphus residual cavity; drain remains in place; full report to follow.

## 2022-10-18 NOTE — ASU DISCHARGE PLAN (ADULT/PEDIATRIC) - NURSING INSTRUCTIONS
Please feel free to contact us at [phone] if any problems arise. After 6PM, Monday through Friday, on weekends and on holidays, please call [phone] and ask for the radiology resident on call to be paged.

## 2022-10-18 NOTE — ASU PATIENT PROFILE, ADULT - NSICDXPASTMEDICALHX_GEN_ALL_CORE_FT
PAST MEDICAL HISTORY:  2019 novel coronavirus disease (COVID-19) 12/24/2021  no hospitalization, no treatment    H/O acute cholangitis     History of liver failure

## 2022-10-18 NOTE — ASU DISCHARGE PLAN (ADULT/PEDIATRIC) - NS MD DC FALL RISK RISK
For information on Fall & Injury Prevention, visit: [email or web address] OR  [email or web address] OR  https://www.cdc.gov/steadi/patient.html

## 2022-10-24 ENCOUNTER — OUTPATIENT (OUTPATIENT)
Dept: OUTPATIENT SERVICES | Facility: HOSPITAL | Age: 67
LOS: 1 days | End: 2022-10-24
Payer: COMMERCIAL

## 2022-10-24 DIAGNOSIS — Z98.890 OTHER SPECIFIED POSTPROCEDURAL STATES: Chronic | ICD-10-CM

## 2022-10-24 DIAGNOSIS — Z11.52 ENCOUNTER FOR SCREENING FOR COVID-19: ICD-10-CM

## 2022-10-24 LAB — SARS-COV-2 RNA SPEC QL NAA+PROBE: SIGNIFICANT CHANGE UP

## 2022-10-24 PROCEDURE — U0003: CPT

## 2022-10-24 PROCEDURE — C9803: CPT

## 2022-10-24 PROCEDURE — U0005: CPT

## 2022-10-25 ENCOUNTER — APPOINTMENT (OUTPATIENT)
Dept: HEPATOLOGY | Facility: CLINIC | Age: 67
End: 2022-10-25

## 2022-10-25 VITALS
OXYGEN SATURATION: 100 % | HEART RATE: 90 BPM | WEIGHT: 128 LBS | RESPIRATION RATE: 16 BRPM | DIASTOLIC BLOOD PRESSURE: 58 MMHG | HEIGHT: 65 IN | BODY MASS INDEX: 21.33 KG/M2 | SYSTOLIC BLOOD PRESSURE: 104 MMHG | TEMPERATURE: 97.7 F

## 2022-10-25 DIAGNOSIS — K76.89 OTHER SPECIFIED DISEASES OF LIVER: ICD-10-CM

## 2022-10-25 DIAGNOSIS — R18.8 OTHER ASCITES: ICD-10-CM

## 2022-10-25 LAB
AFP-TM SERPL-MCNC: 2.8 NG/ML
ALBUMIN SERPL ELPH-MCNC: 3.2 G/DL
ALP BLD-CCNC: 217 U/L
ALT SERPL-CCNC: 21 U/L
ANION GAP SERPL CALC-SCNC: 11 MMOL/L
AST SERPL-CCNC: 37 U/L
BILIRUB SERPL-MCNC: 1.9 MG/DL
BUN SERPL-MCNC: 19 MG/DL
CALCIUM SERPL-MCNC: 9.5 MG/DL
CHLORIDE SERPL-SCNC: 95 MMOL/L
CO2 SERPL-SCNC: 20 MMOL/L
CREAT SERPL-MCNC: 0.98 MG/DL
EGFR: 86 ML/MIN/1.73M2
GLUCOSE SERPL-MCNC: 119 MG/DL
INR PPP: 1.43 RATIO
POTASSIUM SERPL-SCNC: 4.5 MMOL/L
PROT SERPL-MCNC: 8.3 G/DL
PT BLD: 16.7 SEC
SODIUM SERPL-SCNC: 125 MMOL/L

## 2022-10-25 PROCEDURE — 99215 OFFICE O/P EST HI 40 MIN: CPT

## 2022-10-26 ENCOUNTER — NON-APPOINTMENT (OUTPATIENT)
Age: 67
End: 2022-10-26

## 2022-10-26 LAB
BASOPHILS # BLD AUTO: 0.03 K/UL
BASOPHILS NFR BLD AUTO: 0.5 %
EOSINOPHIL # BLD AUTO: 0.06 K/UL
EOSINOPHIL NFR BLD AUTO: 0.9 %
HCT VFR BLD CALC: 27.4 %
HGB BLD-MCNC: 9.6 G/DL
IMM GRANULOCYTES NFR BLD AUTO: 0.5 %
LYMPHOCYTES # BLD AUTO: 1.15 K/UL
LYMPHOCYTES NFR BLD AUTO: 17.7 %
MAN DIFF?: NORMAL
MCHC RBC-ENTMCNC: 34.2 PG
MCHC RBC-ENTMCNC: 35 GM/DL
MCV RBC AUTO: 97.5 FL
MONOCYTES # BLD AUTO: 0.56 K/UL
MONOCYTES NFR BLD AUTO: 8.6 %
NEUTROPHILS # BLD AUTO: 4.66 K/UL
NEUTROPHILS NFR BLD AUTO: 71.8 %
PLATELET # BLD AUTO: 69 K/UL
RBC # BLD: 2.81 M/UL
RBC # FLD: 16.6 %
WBC # FLD AUTO: 6.49 K/UL

## 2022-10-27 ENCOUNTER — TRANSCRIPTION ENCOUNTER (OUTPATIENT)
Age: 67
End: 2022-10-27

## 2022-10-27 ENCOUNTER — RESULT REVIEW (OUTPATIENT)
Age: 67
End: 2022-10-27

## 2022-10-27 ENCOUNTER — OUTPATIENT (OUTPATIENT)
Dept: OUTPATIENT SERVICES | Facility: HOSPITAL | Age: 67
LOS: 1 days | End: 2022-10-27
Payer: COMMERCIAL

## 2022-10-27 VITALS
TEMPERATURE: 99 F | HEART RATE: 81 BPM | SYSTOLIC BLOOD PRESSURE: 119 MMHG | DIASTOLIC BLOOD PRESSURE: 65 MMHG | OXYGEN SATURATION: 99 %

## 2022-10-27 DIAGNOSIS — Z98.890 OTHER SPECIFIED POSTPROCEDURAL STATES: Chronic | ICD-10-CM

## 2022-10-27 DIAGNOSIS — K65.1 PERITONEAL ABSCESS: ICD-10-CM

## 2022-10-27 PROCEDURE — 49424 ASSESS CYST CONTRAST INJECT: CPT

## 2022-10-27 PROCEDURE — 76080 X-RAY EXAM OF FISTULA: CPT

## 2022-10-27 PROCEDURE — 76080 X-RAY EXAM OF FISTULA: CPT | Mod: 26

## 2022-10-27 NOTE — ASU DISCHARGE PLAN (ADULT/PEDIATRIC) - NS MD DC FALL RISK RISK
For information on Fall & Injury Prevention, visit: [email or web address] OR  [email or web address] OR  https://www.cdc.gov/steadi/patient.html

## 2022-10-27 NOTE — ASU DISCHARGE PLAN (ADULT/PEDIATRIC) - ASU DC SPECIAL INSTRUCTIONSFT
Drain Check and removal.    Discharge Instructions  - You have had a drain checked and removed.  - Keep the area clean and dry.          Notify your primary physician and/or Interventional Radiology IMMEDIATELY if you experience any of the following       - Fever of 101F or 38C       - Chills or Rigors/ Shakes       - Swelling and/or Redness in the area of the puncture site       - Worsening Pain       - Blood soaked bandages or worsening bleeding       - Lightheadedness and/or dizziness upon standing       - Chest Pain/ Tightness       - Shortness of Breath       - Difficulty walking    If you have a problem that you believe requires IMMEDIATE attention, please go to your NEAREST Emergency Room. If you believe your problem can safely wait until you speak to a physician, please call Interventional Radiology for any concerns.    Please feel free to contact us at [phone] if any problems arise. After 6PM, Monday through Friday, on weekends and on holidays, please call [phone] and ask for the radiology resident on call to be paged.

## 2022-10-27 NOTE — PROCEDURE NOTE - PROCEDURE FINDINGS AND DETAILS
drainage catheter evaluation demonstrated a collapsed cavity. Drain was removed. A clean and dry dressing was placed

## 2022-10-27 NOTE — ASU DISCHARGE PLAN (ADULT/PEDIATRIC) - NURSING INSTRUCTIONS
Please feel free to contact us at [phone] if any problems arise. After 6PM, Monday through Friday, on weekends and on holidays, please call [phone] and ask for the radiology resident on call to be paged.
(0) No aphasia; normal

## 2022-10-27 NOTE — PRE PROCEDURE NOTE - PRE PROCEDURE EVALUATION
Interventional Radiology    HPI:  67 year old male with decompensated cirrhosis and cholangitis s/p ERCP with stent placement (4/2022) s/p stent removal on 7/20 (along sphincterotomy and stone extraction) now s/p cholecystostomy tube 8/9 with check 8/29 in correct location. He presents to OSH with bloody discharge from the wound site and abdominal distention found to have potentially dislodged IR cholecystostomy tube and ascites. Recent OSH imaging showing multiple large abdominal collections.    Pt most recently s/p perihepatic collection drain placement and abdominal collection drain placement on 9/11/22. Cholecystostomy tube placement on 8/9/22 (last exchanged on 9/11/22) in IR with Dr. Yin. Perihepatic collection resolved and drain was removed on 9/19/22..     Patient reports today that current abscess drain left in place has had 2.5 ML output each day for the last 1 week . Will perform Abscess drain evaluation possible removal. Pt reports flushing daily.  Pt denies fever, chills, abdominal pain,nv.    Allergies:   Medications (Abx/Cardiac/Anticoagulation/Blood Products)      Data:    T(C): 37.1  HR: 81  BP: 119/65  RR: --  SpO2: 99%    Abscess of peritoneum    No pertinent past medical history    H/O acute cholangitis    2019 novel coronavirus disease (COVID-19)    History of liver failure    No significant past surgical history    S/P ERCP    H/O colonoscopy    SysAdmin_VstLnk        Exam  General: No acute distress  Chest: Non labored breathing          Imaging:     Plan: 67y Male presents for abdominal drain evaluation and possible removal.   -Risks/Benefits/alternatives explained with the patient and witnessed informed consent obtained.

## 2022-10-27 NOTE — ASU PATIENT PROFILE, ADULT - FALL HARM RISK - UNIVERSAL INTERVENTIONS
Bed in lowest position, wheels locked, appropriate side rails in place/Call bell, personal items and telephone in reach/Instruct patient to call for assistance before getting out of bed or chair/Non-slip footwear when patient is out of bed/[place] to call system/Physically safe environment - no spills, clutter or unnecessary equipment/Purposeful Proactive Rounding/Room/bathroom lighting operational, light cord in reach

## 2022-10-28 ENCOUNTER — NON-APPOINTMENT (OUTPATIENT)
Age: 67
End: 2022-10-28

## 2022-10-30 PROBLEM — K76.89 PERIHEPATIC FLUID COLLECTION: Status: ACTIVE | Noted: 2022-09-20

## 2022-10-30 PROBLEM — R18.8 ABDOMINAL FLUID COLLECTION: Status: ACTIVE | Noted: 2022-09-20

## 2022-11-01 LAB
ALBUMIN SERPL ELPH-MCNC: 3.3 G/DL
ALP BLD-CCNC: 196 U/L
ALT SERPL-CCNC: 24 U/L
ANION GAP SERPL CALC-SCNC: 13 MMOL/L
AST SERPL-CCNC: 35 U/L
BASOPHILS # BLD AUTO: 0.03 K/UL
BASOPHILS NFR BLD AUTO: 0.6 %
BILIRUB SERPL-MCNC: 1.6 MG/DL
BUN SERPL-MCNC: 18 MG/DL
CALCIUM SERPL-MCNC: 9.4 MG/DL
CHLORIDE SERPL-SCNC: 94 MMOL/L
CO2 SERPL-SCNC: 21 MMOL/L
CREAT SERPL-MCNC: 0.98 MG/DL
EGFR: 86 ML/MIN/1.73M2
EOSINOPHIL # BLD AUTO: 0.1 K/UL
EOSINOPHIL NFR BLD AUTO: 2 %
GLUCOSE SERPL-MCNC: 89 MG/DL
HCT VFR BLD CALC: 28.9 %
HGB BLD-MCNC: 10 G/DL
IMM GRANULOCYTES NFR BLD AUTO: 0.4 %
INR PPP: 1.36 RATIO
LYMPHOCYTES # BLD AUTO: 1.23 K/UL
LYMPHOCYTES NFR BLD AUTO: 24.5 %
MAN DIFF?: NORMAL
MCHC RBC-ENTMCNC: 34.6 GM/DL
MCHC RBC-ENTMCNC: 34.7 PG
MCV RBC AUTO: 100.3 FL
MONOCYTES # BLD AUTO: 0.5 K/UL
MONOCYTES NFR BLD AUTO: 9.9 %
NEUTROPHILS # BLD AUTO: 3.15 K/UL
NEUTROPHILS NFR BLD AUTO: 62.6 %
PLATELET # BLD AUTO: 81 K/UL
POTASSIUM SERPL-SCNC: 4.5 MMOL/L
PROT SERPL-MCNC: 8.5 G/DL
PT BLD: 15.8 SEC
RBC # BLD: 2.88 M/UL
RBC # FLD: 16.8 %
SODIUM SERPL-SCNC: 128 MMOL/L
WBC # FLD AUTO: 5.03 K/UL

## 2022-11-02 DIAGNOSIS — Z46.82 ENCOUNTER FOR FITTING AND ADJUSTMENT OF NON-VASCULAR CATHETER: ICD-10-CM

## 2022-11-02 DIAGNOSIS — K65.1 PERITONEAL ABSCESS: ICD-10-CM

## 2022-11-04 LAB — PHOSPHATIDYETHANOL (PETH), WHOLE BLOOD: NEGATIVE NG/ML

## 2022-11-07 ENCOUNTER — NON-APPOINTMENT (OUTPATIENT)
Age: 67
End: 2022-11-07

## 2022-11-07 LAB — PHOSPHATIDYETHANOL (PETH), WHOLE BLOOD: NEGATIVE NG/ML

## 2022-11-08 ENCOUNTER — NON-APPOINTMENT (OUTPATIENT)
Age: 67
End: 2022-11-08

## 2022-11-09 ENCOUNTER — APPOINTMENT (OUTPATIENT)
Dept: INFECTIOUS DISEASE | Facility: CLINIC | Age: 67
End: 2022-11-09

## 2022-11-22 ENCOUNTER — APPOINTMENT (OUTPATIENT)
Dept: INFECTIOUS DISEASE | Facility: CLINIC | Age: 67
End: 2022-11-22

## 2022-11-22 ENCOUNTER — INPATIENT (INPATIENT)
Facility: HOSPITAL | Age: 67
LOS: 2 days | Discharge: HOME | DRG: 443 | End: 2022-11-25
Attending: STUDENT IN AN ORGANIZED HEALTH CARE EDUCATION/TRAINING PROGRAM | Admitting: STUDENT IN AN ORGANIZED HEALTH CARE EDUCATION/TRAINING PROGRAM
Payer: MEDICARE

## 2022-11-22 VITALS
HEART RATE: 67 BPM | RESPIRATION RATE: 18 BRPM | SYSTOLIC BLOOD PRESSURE: 126 MMHG | OXYGEN SATURATION: 100 % | DIASTOLIC BLOOD PRESSURE: 63 MMHG | TEMPERATURE: 98 F

## 2022-11-22 DIAGNOSIS — Z98.890 OTHER SPECIFIED POSTPROCEDURAL STATES: Chronic | ICD-10-CM

## 2022-11-22 DIAGNOSIS — G93.41 METABOLIC ENCEPHALOPATHY: ICD-10-CM

## 2022-11-22 DIAGNOSIS — K74.60 UNSPECIFIED CIRRHOSIS OF LIVER: ICD-10-CM

## 2022-11-22 DIAGNOSIS — K72.90 HEPATIC FAILURE, UNSPECIFIED WITHOUT COMA: ICD-10-CM

## 2022-11-22 DIAGNOSIS — Z29.9 ENCOUNTER FOR PROPHYLACTIC MEASURES, UNSPECIFIED: ICD-10-CM

## 2022-11-22 DIAGNOSIS — Z87.19 PERSONAL HISTORY OF OTHER DISEASES OF THE DIGESTIVE SYSTEM: ICD-10-CM

## 2022-11-22 PROCEDURE — 99223 1ST HOSP IP/OBS HIGH 75: CPT | Mod: GC

## 2022-11-22 RX ORDER — LANOLIN ALCOHOL/MO/W.PET/CERES
3 CREAM (GRAM) TOPICAL AT BEDTIME
Refills: 0 | Status: DISCONTINUED | OUTPATIENT
Start: 2022-11-22 | End: 2022-11-25

## 2022-11-22 NOTE — PATIENT PROFILE ADULT - NSTRANSFERBELONGINGSDISPO_GEN_A_NUR
Pt has been called and offered appt and has accepted.    Melida Best RN  [facility]     with patient

## 2022-11-22 NOTE — H&P ADULT - NSHPSOCIALHISTORY_GEN_ALL_CORE
Lives w/daughter and wife at home. Stopped drinking since April 2022 but extensive hx. No smoking/no recreational drug use.

## 2022-11-22 NOTE — PATIENT PROFILE ADULT - HAVE YOU HAD COVID IN THE LAST 60 DAYS?
Patient cancelled appointment on 11-10-17.    Patient is having teeth extracted and will call back to reschedule when she can.  
No

## 2022-11-22 NOTE — H&P ADULT - ASSESSMENT
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

## 2022-11-22 NOTE — PATIENT PROFILE ADULT - CAREGIVER RELATION TO PATIENT
bilateral lower extremity was ROM was WNL (within normal limits)/bilateral upper extremity ROM was WFL (within functional limits)/bilateral UE's WFL due to sternal precautions daughter

## 2022-11-22 NOTE — H&P ADULT - PROBLEM SELECTOR PLAN 2
s/p ERCP with stent placement (4/2022) with subsequent repeat ERCP with stent removal, sphincterotomy, and balloon sweep removal of sludge/stones (7/20/22)    -has drain in gallbladder lumen currently draining 100cc  -per patient he drains it himself every other day  -CTM

## 2022-11-22 NOTE — H&P ADULT - NSHPREVIEWOFSYSTEMS_GEN_ALL_CORE
REVIEW OF SYSTEMS:  CONSTITUTIONAL: (-) weakness, (-) fevers (-) chills  EYES/ENT: (- ) visual changes;  (-) vertigo (-) throat pain   NECK: (- )pain (-) stiffness  RESPIRATORY: (-)cough,  (-) wheezing, (-) hemoptysis; (-) shortness of breath  CARDIOVASCULAR: (-) chest pain (-) palpitations  GASTROINTESTINAL: (-) abdominal (-) epigastric pain. (-) nausea, vomiting, or hematemesis; (-) diarrhea or constipation.   GENITOURINARY: (-) dysuria, frequency or hematuria  NEUROLOGICAL: (-) numbness or weakness  SKIN: (-) itching, rashes  [x] neg except as above in hpi REVIEW OF SYSTEMS:  CONSTITUTIONAL: (-) weakness, (-) fevers (-) chills  EYES/ENT: (- ) visual changes;  (-) vertigo (-) throat pain   NECK: (- )pain (-) stiffness  RESPIRATORY: (-)cough,  (-) wheezing, (-) hemoptysis; (-) shortness of breath  CARDIOVASCULAR: (-) chest pain (-) palpitations  GASTROINTESTINAL: (-) abdominal (-) epigastric pain. (-) nausea, vomiting, or hematemesis; (-) diarrhea or constipation.   GENITOURINARY: (-) dysuria, frequency or hematuria  NEUROLOGICAL: (+) confusion (-) numbness or weakness  PSYCH: (-) anxiety or depression  SKIN: (-) itching, rashes  ENDO: (-) heat or cold intolerance   [x] neg except as above in hpi

## 2022-11-22 NOTE — H&P ADULT - NSHPLABSRESULTS_GEN_ALL_CORE
OSH record    WBC3.66  h/h 11.2/31.5  platelets 83    PT/INR 13.5/1.3  ptt 28.8    Na 134; K 4.2; Cl 104; Bicarb 23; BUN 19, Cr 1.09, Glucose 90  Tbili 2.4  Albumin 2.6  Ammonia 168    CTH noncon - no bleed, arachnoid cyst noted  CT A&P w/contrast catheter in gallbladder lumen, no other intraabdominal pathologies Personally reviewed labs, imaging and outside hospital records    OSH record    WBC3.66  h/h 11.2/31.5  platelets 83    PT/INR 13.5/1.3  ptt 28.8    Na 134; K 4.2; Cl 104; Bicarb 23; BUN 19, Cr 1.09, Glucose 90  Tbili 2.4  Albumin 2.6  Ammonia 168    CTH noncon - no bleed, arachnoid cyst noted  CT A&P w/contrast catheter in gallbladder lumen, no other intraabdominal pathologies

## 2022-11-22 NOTE — H&P ADULT - HISTORY OF PRESENT ILLNESS
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 Per patient starting two days ago he started feeling off where he was unable to perform his daily routines. Yesterday, the patient's wife found the patient to be confused with changes in speech and difficulty ambulating. He was later brought to the ED. During that time the wife stated the patient has no recent f/c, cough/congestion, sob, cp, abd pain, diarrhea/constipation.     At OSH patient had CTH noncon which demonstrated no bleed, CTA&P revealed no intraabdominal pathologies and confirmed stent placement. Labs were significant for thrombocytopenia to 83, elevated PT/INR to 13.5/1.3, Tbili 24, and Ammonia to 168. He was transferred to [facility] for further care

## 2022-11-22 NOTE — PATIENT PROFILE ADULT - FALL HARM RISK - HARM RISK INTERVENTIONS

## 2022-11-22 NOTE — PATIENT PROFILE ADULT - FUNCTIONAL ASSESSMENT - DAILY ACTIVITY 2.
10/4:  MST triggered for unable to answer. Chart reviewed. Pt adm with PNA,  PE, and encephalopathy. Weights reviewed: (5/10/21) 134#, (10/3/21) 135#. Weight stable past 5 months. Pt called. Patient stated my appetite is very good.  No nutrition intervention indicated at this time. RD available via consult. Will follow per policy.    3 = A little assistance

## 2022-11-22 NOTE — H&P ADULT - ATTENDING COMMENTS
66yo M w/ PMHx of cirrhosis presents as transfer from [facility] for hepatic encephalopathy due to elevated ammonia at outside hospital, prior workup there also showed negative head CT, pt arrived here A/Ox3 but acknowledged that he had episodes of confusion while there, start lactulose TID, hepatology/transplant consult, rest of plan as outlined above

## 2022-11-22 NOTE — H&P ADULT - PROBLEM SELECTOR PLAN 3
2/2 ETOH/PARNELL Cirrhosis listed for OLT    -MELD-NA 11/22 17  -at home on lasix 40mg bid and aldactone 100mg bid  -will hold diuretics for now  -daily cmp, cbc, coags  -will consult transplant liver team

## 2022-11-22 NOTE — H&P ADULT - NSHPPHYSICALEXAM_GEN_ALL_CORE
T(C): 36.8 (11-22-22 @ 22:15), Max: 36.8 (11-22-22 @ 22:15)  T(F): 98.2 (11-22-22 @ 22:15), Max: 98.2 (11-22-22 @ 22:15)  HR: 67 (11-22-22 @ 22:15) (67 - 67)  BP: 126/63 (11-22-22 @ 22:15) (126/63 - 126/63)  RR: 18 (11-22-22 @ 22:15) (18 - 18)  SpO2: 100% (11-22-22 @ 22:15) (100% - 100%)  Wt(kg): --    GENERAL: NAD, well-developed, speaking full sentences w/o distress  HEENT:  Atraumatic, Normocephalic, EOMI, PERRLA, conjunctiva and sclera clear, oral mucosa moist, clear w/o any exudate   NECK: Supple, No JVD  CHEST/LUNG: Clear to auscultation bilaterally; No wheeze  HEART: RRR; No murmurs, rubs, or gallops  ABDOMEN: Soft, Nontender, Nondistended; Bowel sounds present, noted tube placed in RUQ draining 100cc bilious fluid  EXTREMITIES:  2+ Peripheral Pulses, No clubbing, cyanosis, or edema  PSYCH: AAOx3, normal affect   NEUROLOGY: +asterixis b/l, non-focal, moving all extremities  SKIN: No rashes or lesions T(C): 36.8 (11-22-22 @ 22:15), Max: 36.8 (11-22-22 @ 22:15)  T(F): 98.2 (11-22-22 @ 22:15), Max: 98.2 (11-22-22 @ 22:15)  HR: 67 (11-22-22 @ 22:15) (67 - 67)  BP: 126/63 (11-22-22 @ 22:15) (126/63 - 126/63)  RR: 18 (11-22-22 @ 22:15) (18 - 18)  SpO2: 100% (11-22-22 @ 22:15) (100% - 100%)  Wt(kg): --    constitutional: NAD, well-developed, speaking full sentences w/o distress  Eyes:  EOMI, conjunctiva and sclera clear  ENMT: oral mucosa moist, clear w/o any exudate   NECK: Supple, No JVD  resp: Clear to auscultation bilaterally; No wheeze  cv: RRR; No murmurs, rubs, or gallops  ABDOMEN: Soft, Nontender, Nondistended; Bowel sounds present, noted tube placed in RUQ draining 100cc bilious fluid  EXTREMITIES:  2+ Peripheral Pulses, No clubbing, cyanosis, or edema  PSYCH: AAOx3, normal affect   NEUROLOGY: +asterixis b/l, non-focal, moving all extremities  SKIN: No rashes or lesions

## 2022-11-22 NOTE — H&P ADULT - PROBLEM SELECTOR PLAN 1
Initially presented with confusion for past 2 days which affected patient's ADLs. Patient was brought to ER by wife to OSH. Workup was notable for elevated ammonia, neg CTH noncon, and patient was transferred to [facility] for further management as patient seen by transplant team. Noted asterixis on exam and patient remarked it was new, however patient is currently A&Ox3.    -likely 2/2 hepatic encephalopathy  - patient was confused, has elevated ammonia level in the setting of advanced liver cirrhosis  - will start Rifaximin 400mg po TID, Lactulose TID  - Aspiration precautions

## 2022-11-23 LAB
ALBUMIN SERPL ELPH-MCNC: 2.6 G/DL — LOW (ref 3.3–5)
ALP SERPL-CCNC: 117 U/L — SIGNIFICANT CHANGE UP (ref 40–120)
ALT FLD-CCNC: 15 U/L — SIGNIFICANT CHANGE UP (ref 10–45)
ANION GAP SERPL CALC-SCNC: 8 MMOL/L — SIGNIFICANT CHANGE UP (ref 5–17)
APTT BLD: 31.5 SEC — SIGNIFICANT CHANGE UP (ref 27.5–35.5)
AST SERPL-CCNC: 31 U/L — SIGNIFICANT CHANGE UP (ref 10–40)
BILIRUB SERPL-MCNC: 1.7 MG/DL — HIGH (ref 0.2–1.2)
BUN SERPL-MCNC: 15 MG/DL — SIGNIFICANT CHANGE UP (ref 7–23)
CALCIUM SERPL-MCNC: 9.1 MG/DL — SIGNIFICANT CHANGE UP (ref 8.4–10.5)
CHLORIDE SERPL-SCNC: 104 MMOL/L — SIGNIFICANT CHANGE UP (ref 96–108)
CO2 SERPL-SCNC: 19 MMOL/L — LOW (ref 22–31)
CREAT SERPL-MCNC: 1.05 MG/DL — SIGNIFICANT CHANGE UP (ref 0.5–1.3)
EGFR: 79 ML/MIN/1.73M2 — SIGNIFICANT CHANGE UP
GLUCOSE SERPL-MCNC: 95 MG/DL — SIGNIFICANT CHANGE UP (ref 70–99)
HCT VFR BLD CALC: 30.1 % — LOW (ref 39–50)
HGB BLD-MCNC: 10.3 G/DL — LOW (ref 13–17)
INR BLD: 1.44 RATIO — HIGH (ref 0.88–1.16)
MAGNESIUM SERPL-MCNC: 1.8 MG/DL — SIGNIFICANT CHANGE UP (ref 1.6–2.6)
MCHC RBC-ENTMCNC: 34.2 GM/DL — SIGNIFICANT CHANGE UP (ref 32–36)
MCHC RBC-ENTMCNC: 34.4 PG — HIGH (ref 27–34)
MCV RBC AUTO: 100.7 FL — HIGH (ref 80–100)
MELD SCORE WITH DIALYSIS: 29 POINTS — SIGNIFICANT CHANGE UP
MELD SCORE WITHOUT DIALYSIS: 18 POINTS — SIGNIFICANT CHANGE UP
NRBC # BLD: 0 /100 WBCS — SIGNIFICANT CHANGE UP (ref 0–0)
PHOSPHATE SERPL-MCNC: 3.3 MG/DL — SIGNIFICANT CHANGE UP (ref 2.5–4.5)
PLATELET # BLD AUTO: 83 K/UL — LOW (ref 150–400)
POTASSIUM SERPL-MCNC: 3.9 MMOL/L — SIGNIFICANT CHANGE UP (ref 3.5–5.3)
POTASSIUM SERPL-SCNC: 3.9 MMOL/L — SIGNIFICANT CHANGE UP (ref 3.5–5.3)
PROT SERPL-MCNC: 6.7 G/DL — SIGNIFICANT CHANGE UP (ref 6–8.3)
PROTHROM AB SERPL-ACNC: 16.8 SEC — HIGH (ref 10.5–13.4)
RBC # BLD: 2.99 M/UL — LOW (ref 4.2–5.8)
RBC # FLD: 14.6 % — HIGH (ref 10.3–14.5)
SODIUM SERPL-SCNC: 131 MMOL/L — LOW (ref 135–145)
WBC # BLD: 4.19 K/UL — SIGNIFICANT CHANGE UP (ref 3.8–10.5)
WBC # FLD AUTO: 4.19 K/UL — SIGNIFICANT CHANGE UP (ref 3.8–10.5)

## 2022-11-23 PROCEDURE — 99233 SBSQ HOSP IP/OBS HIGH 50: CPT | Mod: GC

## 2022-11-23 PROCEDURE — 99222 1ST HOSP IP/OBS MODERATE 55: CPT | Mod: GC

## 2022-11-23 PROCEDURE — 93975 VASCULAR STUDY: CPT | Mod: 26

## 2022-11-23 RX ORDER — ASPIRIN/CALCIUM CARB/MAGNESIUM 324 MG
81 TABLET ORAL DAILY
Refills: 0 | Status: DISCONTINUED | OUTPATIENT
Start: 2022-11-23 | End: 2022-11-25

## 2022-11-23 RX ORDER — LACTULOSE 10 G/15ML
20 SOLUTION ORAL THREE TIMES A DAY
Refills: 0 | Status: DISCONTINUED | OUTPATIENT
Start: 2022-11-23 | End: 2022-11-25

## 2022-11-23 RX ORDER — RIFAXIMIN 200 MG/1
1 TABLET ORAL
Qty: 30 | Refills: 0
Start: 2022-11-23

## 2022-11-23 RX ORDER — LACTULOSE 10 G/15ML
10 SOLUTION ORAL
Qty: 900 | Refills: 0
Start: 2022-11-23 | End: 2022-12-24

## 2022-11-23 RX ORDER — FUROSEMIDE 40 MG
40 TABLET ORAL
Refills: 0 | Status: DISCONTINUED | OUTPATIENT
Start: 2022-11-23 | End: 2022-11-25

## 2022-11-23 RX ORDER — SPIRONOLACTONE 25 MG/1
100 TABLET, FILM COATED ORAL
Refills: 0 | Status: DISCONTINUED | OUTPATIENT
Start: 2022-11-23 | End: 2022-11-25

## 2022-11-23 RX ORDER — LACTULOSE 10 G/15ML
30 SOLUTION ORAL
Qty: 100 | Refills: 0
Start: 2022-11-23

## 2022-11-23 RX ORDER — LACTULOSE 10 G/15ML
2 SOLUTION ORAL
Qty: 15 | Refills: 0
Start: 2022-11-23

## 2022-11-23 RX ORDER — FUROSEMIDE 40 MG
40 TABLET ORAL DAILY
Refills: 0 | Status: DISCONTINUED | OUTPATIENT
Start: 2022-11-23 | End: 2022-11-23

## 2022-11-23 RX ADMIN — Medication 40 MILLIGRAM(S): at 13:39

## 2022-11-23 RX ADMIN — LACTULOSE 20 GRAM(S): 10 SOLUTION ORAL at 06:02

## 2022-11-23 RX ADMIN — Medication 3 MILLIGRAM(S): at 00:29

## 2022-11-23 RX ADMIN — SPIRONOLACTONE 100 MILLIGRAM(S): 25 TABLET, FILM COATED ORAL at 18:34

## 2022-11-23 RX ADMIN — LACTULOSE 20 GRAM(S): 10 SOLUTION ORAL at 13:38

## 2022-11-23 RX ADMIN — Medication 81 MILLIGRAM(S): at 18:34

## 2022-11-23 NOTE — CONSULT NOTE ADULT - SUBJECTIVE AND OBJECTIVE BOX
Chief Complaint:  Patient is a 67y old  Male who presents with a chief complaint of AMS (23 Nov 2022 14:58)      HPI:TESHA STEINER is a 67y Male    PMHX/PSHX:  No pertinent past medical history    H/O acute cholangitis    2019 novel coronavirus disease (COVID-19)    History of liver failure    No significant past surgical history    S/P ERCP    H/O colonoscopy      Allergies:  No Known Allergies      Home Medications: reviewed  Hospital Medications:  furosemide    Tablet 40 milliGRAM(s) Oral daily  lactulose Syrup 20 Gram(s) Oral three times a day  melatonin 3 milliGRAM(s) Oral at bedtime PRN  rifAXIMin 550 milliGRAM(s) Oral two times a day      Social History:   Tob: Denies  EtOH: Denies  Illicit Drugs: Denies    Family history:  No pertinent family history in first degree relatives      Denies family history of colon cancer/polyps, stomach cancer/polyps, pancreatic cancer/masses, liver cancer/disease, ovarian cancer and endometrial cancer.    ROS:   General:  No  fevers, chills, night sweats, fatigue  Eyes:  Good vision, no reported pain  ENT:  No sore throat, pain, runny nose  CV:  No pain, palpitations  Pulm:  No dyspnea, cough  GI:  See HPI, otherwise negative  :  No  incontinence, nocturia  Muscle:  No pain, weakness  Neuro:  No memory problems  Psych:  No insomnia, mood problems, depression  Endocrine:  No polyuria, polydipsia, cold/heat intolerance  Heme:  No petechiae, ecchymosis, easy bruisability  Skin:  No rash    PHYSICAL EXAM:   Vital Signs:  Vital Signs Last 24 Hrs  T(C): 36.9 (23 Nov 2022 04:44), Max: 36.9 (23 Nov 2022 04:44)  T(F): 98.4 (23 Nov 2022 04:44), Max: 98.4 (23 Nov 2022 04:44)  HR: 80 (23 Nov 2022 09:57) (67 - 80)  BP: 117/76 (23 Nov 2022 09:57) (117/62 - 126/63)  BP(mean): --  RR: 18 (23 Nov 2022 04:44) (18 - 18)  SpO2: 100% (23 Nov 2022 09:57) (99% - 100%)    Parameters below as of 23 Nov 2022 09:57  Patient On (Oxygen Delivery Method): room air      Daily     Daily     GENERAL: no acute distress  NEURO: alert  HEENT: anicteric sclera, no conjunctival pallor appreciated  CHEST: no respiratory distress, no accessory muscle use  CARDIAC: regular rate, rhythm  ABDOMEN: soft, non-tender, non-distended, no rebound or guarding  EXTREMITIES: warm, well perfused, no edema  SKIN: no lesions noted    LABS: reviewed                        10.3   4.19  )-----------( 83       ( 23 Nov 2022 07:09 )             30.1     11-23    131<L>  |  104  |  15  ----------------------------<  95  3.9   |  19<L>  |  1.05    Ca    9.1      23 Nov 2022 07:23  Phos  3.3     11-23  Mg     1.8     11-23    TPro  6.7  /  Alb  2.6<L>  /  TBili  1.7<H>  /  DBili  x   /  AST  31  /  ALT  15  /  AlkPhos  117  11-23    LIVER FUNCTIONS - ( 23 Nov 2022 07:23 )  Alb: 2.6 g/dL / Pro: 6.7 g/dL / ALK PHOS: 117 U/L / ALT: 15 U/L / AST: 31 U/L / GGT: x               Diagnostic Studies: see sunrise for full report         Chief Complaint:  Patient is a 67y old  Male who presents with a chief complaint of AMS (23 Nov 2022 14:58)      HPI:TESHA STEINER is a 67y Male with 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 Transplant hepatology consulted for PSE, now resolved.    Patient interviewed with daughter at bedside and history corroborated with chart. Patient started feeling off for the past couple of days. His family, wife and daughter noticed that he was confused and was having difficulty performing tasks around the house and labor that he usually engages in, including unstable gait. He was taken to the ED at an OSH due to proximity where he reportedly CT head non-con demonstrated no bleed, CTA&P revealed no intraabdominal pathologies and confirmed stent placement. Labs were significant for thrombocytopenia to 83, elevated PT/INR to 13.5/1.3, Tbili 24, and Ammonia to 168. He was transferred to [facility] for further care.    Patient denying n/v/d or constipation, no bloody red/black stools, fevers or chills. Denies ever having an episode of confusion, confirmed by daughter.      PMHX/PSHX:  No pertinent past medical history    H/O acute cholangitis    2019 novel coronavirus disease (COVID-19)    History of liver failure    No significant past surgical history    S/P ERCP    H/O colonoscopy      Allergies:  No Known Allergies      Home Medications: reviewed  Hospital Medications:  furosemide    Tablet 40 milliGRAM(s) Oral daily  lactulose Syrup 20 Gram(s) Oral three times a day  melatonin 3 milliGRAM(s) Oral at bedtime PRN  rifAXIMin 550 milliGRAM(s) Oral two times a day      Social History:   Tob: Denies  EtOH: Denies  Illicit Drugs: Denies    Family history:  No pertinent family history in first degree relatives    ROS:   Complete and normal except as mentioned above.    PHYSICAL EXAM:   Vital Signs:  Vital Signs Last 24 Hrs  T(C): 36.9 (23 Nov 2022 04:44), Max: 36.9 (23 Nov 2022 04:44)  T(F): 98.4 (23 Nov 2022 04:44), Max: 98.4 (23 Nov 2022 04:44)  HR: 80 (23 Nov 2022 09:57) (67 - 80)  BP: 117/76 (23 Nov 2022 09:57) (117/62 - 126/63)  BP(mean): --  RR: 18 (23 Nov 2022 04:44) (18 - 18)  SpO2: 100% (23 Nov 2022 09:57) (99% - 100%)    Parameters below as of 23 Nov 2022 09:57  Patient On (Oxygen Delivery Method): room air      Daily     Daily     GENERAL: no acute distress  NEURO: aox3, no asterixis  HEENT: anicteric sclera, no conjunctival pallor appreciated  CHEST: no respiratory distress, no accessory muscle use  CARDIAC: regular rate, rhythm  ABDOMEN: soft, non-tender, non-distended, no rebound or guarding, RUQ draining dark bilious fluid  EXTREMITIES: warm, well perfused, no edema  SKIN: no lesions noted    LABS: reviewed                        10.3   4.19  )-----------( 83       ( 23 Nov 2022 07:09 )             30.1     11-23    131<L>  |  104  |  15  ----------------------------<  95  3.9   |  19<L>  |  1.05    Ca    9.1      23 Nov 2022 07:23  Phos  3.3     11-23  Mg     1.8     11-23    TPro  6.7  /  Alb  2.6<L>  /  TBili  1.7<H>  /  DBili  x   /  AST  31  /  ALT  15  /  AlkPhos  117  11-23    LIVER FUNCTIONS - ( 23 Nov 2022 07:23 )  Alb: 2.6 g/dL / Pro: 6.7 g/dL / ALK PHOS: 117 U/L / ALT: 15 U/L / AST: 31 U/L / GGT: x               Diagnostic Studies: see sunrise for full report

## 2022-11-23 NOTE — PROGRESS NOTE ADULT - PROBLEM SELECTOR PLAN 3
2/2 ETOH/PARNELL Cirrhosis listed for OLT    -MELD-NA 11/22 17  -at home on lasix 40mg bid and aldactone 100mg bid  -will hold diuretics for now  -daily cmp, cbc, coags  -will consult transplant liver team 2/2 ETOH/PARNELL Cirrhosis listed for OLT    -MELD-NA 11/22 17  -at home on lasix 40mg bid and aldactone 100mg bid  -will hold diuretics for now  -daily cmp, cbc, coags  -appreciate transplant liver team reccs

## 2022-11-23 NOTE — PHYSICAL THERAPY INITIAL EVALUATION ADULT - PERTINENT HX OF CURRENT PROBLEM, REHAB EVAL
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 Per patient starting two days ago he started feeling off where he was unable to perform his daily routines. Yesterday, the patient's wife found the patient to be confused with changes in speech and difficulty ambulating. He was later brought to the ED.   At OSH patient had CTH noncon which demonstrated no bleed, CTA&P revealed no intraabdominal pathologies and confirmed stent placement. Labs were significant for thrombocytopenia to 83, elevated PT/INR to 13.5/1.3, Tbili 24, and Ammonia to 168. He was transferred to [facility] for further care

## 2022-11-23 NOTE — PHYSICAL THERAPY INITIAL EVALUATION ADULT - GENERAL OBSERVATIONS, REHAB EVAL
Supine in bed with +IVL BUE and +rodney drain. Supine in bed with +IVL BUE, +rodney drain, wife and daughter at bedside.

## 2022-11-23 NOTE — PROGRESS NOTE ADULT - SUBJECTIVE AND OBJECTIVE BOX
DATE OF SERVICE: 11-23-22 @ 07:21    Patient is a 67y old  Male who presents with a chief complaint of AMS (22 Nov 2022 23:26)      SUBJECTIVE / OVERNIGHT EVENTS:  Overnight, admitted for AMS 2/2 hepatic encephalopathy. Started on rifaximin and lactulose. GI consulted.   Pt seen and examined at bedside.    ROS negative except as above.    MEDICATIONS  (STANDING):  lactulose Syrup 20 Gram(s) Oral three times a day  rifAXIMin 400 milliGRAM(s) Oral three times a day    MEDICATIONS  (PRN):  melatonin 3 milliGRAM(s) Oral at bedtime PRN Insomnia      Vital Signs Last 24 Hrs  T(C): 36.9 (23 Nov 2022 04:44), Max: 36.9 (23 Nov 2022 04:44)  T(F): 98.4 (23 Nov 2022 04:44), Max: 98.4 (23 Nov 2022 04:44)  HR: 73 (23 Nov 2022 04:44) (67 - 73)  BP: 117/62 (23 Nov 2022 04:44) (117/62 - 126/63)  BP(mean): --  RR: 18 (23 Nov 2022 04:44) (18 - 18)  SpO2: 99% (23 Nov 2022 04:44) (99% - 100%)    Parameters below as of 23 Nov 2022 04:44  Patient On (Oxygen Delivery Method): room air      CAPILLARY BLOOD GLUCOSE        I&O's Summary      PHYSICAL EXAM:  GENERAL: NAD, well-developed  HEAD:  Atraumatic, Normocephalic  EYES: EOMI, PERRLA, conjunctiva and sclera clear  NECK: Supple, No JVD  CHEST/LUNG: Clear to auscultation bilaterally; No wheeze  HEART: Regular rate and rhythm; No murmurs, rubs, or gallops  ABDOMEN: Soft, Nontender, Nondistended; Bowel sounds present  EXTREMITIES:  2+ Peripheral Pulses, No clubbing, cyanosis, or edema  PSYCH: AAOx3  NEUROLOGY: non-focal  SKIN: No rashes or lesions    LABS:                    MICRO:      RADIOLOGY & ADDITIONAL TESTS:      Consultant(s) Notes Reviewed:         DATE OF SERVICE: 11-23-22 @ 07:21    Patient is a 67y old  Male who presents with a chief complaint of AMS (22 Nov 2022 23:26)      SUBJECTIVE / OVERNIGHT EVENTS:  Overnight, admitted for AMS 2/2 hepatic encephalopathy. Started on rifaximin and lactulose. GI consulted.   Pt seen and examined at bedside. Markedly improved mental status, AOx3, can spell WORLD backwards, knows president.     ROS negative except as above.    MEDICATIONS  (STANDING):  lactulose Syrup 20 Gram(s) Oral three times a day  rifAXIMin 400 milliGRAM(s) Oral three times a day    MEDICATIONS  (PRN):  melatonin 3 milliGRAM(s) Oral at bedtime PRN Insomnia      Vital Signs Last 24 Hrs  T(C): 36.9 (23 Nov 2022 04:44), Max: 36.9 (23 Nov 2022 04:44)  T(F): 98.4 (23 Nov 2022 04:44), Max: 98.4 (23 Nov 2022 04:44)  HR: 73 (23 Nov 2022 04:44) (67 - 73)  BP: 117/62 (23 Nov 2022 04:44) (117/62 - 126/63)  BP(mean): --  RR: 18 (23 Nov 2022 04:44) (18 - 18)  SpO2: 99% (23 Nov 2022 04:44) (99% - 100%)    Parameters below as of 23 Nov 2022 04:44  Patient On (Oxygen Delivery Method): room air      CAPILLARY BLOOD GLUCOSE        I&O's Summary      PHYSICAL EXAM:  GENERAL: NAD, well-developed  HEAD:  Atraumatic, Normocephalic  EYES: EOMI, PERRLA, conjunctiva and sclera clear  NECK: Supple, No JVD  CHEST/LUNG: Clear to auscultation bilaterally; No wheeze  HEART: Regular rate and rhythm; No murmurs, rubs, or gallops  ABDOMEN: Soft, Nontender, Nondistended; Bowel sounds present. percutaneous rodney bag in place, no purulence or erythema at entry site  EXTREMITIES:  2+ Peripheral Pulses, No clubbing, cyanosis, or edema  PSYCH: AAOx3  NEUROLOGY: non-focal  SKIN: No rashes or lesions    LABS:                    MICRO:      RADIOLOGY & ADDITIONAL TESTS:      Consultant(s) Notes Reviewed:

## 2022-11-23 NOTE — PROGRESS NOTE ADULT - PROBLEM SELECTOR PLAN 1
Initially presented with confusion for past 2 days which affected patient's ADLs. Patient was brought to ER by wife to OSH. Workup was notable for elevated ammonia, neg CTH noncon, and patient was transferred to [facility] for further management as patient seen by transplant team. Noted asterixis on exam and patient remarked it was new, however patient is currently A&Ox3.    -likely 2/2 hepatic encephalopathy  - patient was confused, has elevated ammonia level in the setting of advanced liver cirrhosis  - will start Rifaximin 400mg po TID, Lactulose TID  - Aspiration precautions Initially presented with confusion for past 2 days which affected patient's ADLs. Patient was brought to ER by wife to OSH. Workup was notable for elevated ammonia, neg CTH noncon, and patient was transferred to [facility] for further management as patient seen by transplant team. Noted asterixis on exam and patient remarked it was new, however patient is currently A&Ox3.    -likely 2/2 hepatic encephalopathy  - patient was confused, has elevated ammonia level in the setting of advanced liver cirrhosis  - c/w rifaximin 550mg po BID, Lactulose TID  - Aspiration precautions  - US liver doppler  - lasix 40 bid and spironolactone 100 bid  - BCx, UA/UCx, CXR  - Diagnostic/therapeutic para if able  - SBP PPx after para and pancx

## 2022-11-23 NOTE — PHYSICAL THERAPY INITIAL EVALUATION ADULT - ADDITIONAL COMMENTS
Pt lives in a private home with 5 steps to enter and b/l handrails. Pt is ind in all functional mobility with no AD.

## 2022-11-23 NOTE — CONSULT NOTE ADULT - ASSESSMENT
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 Currently Listed for OLT     #PSE, resolved  #Decompensated ETOH/PARNELL Cirrhosis, listed for OLT  #Abdominal collection, infected hematoma s/p IR drainage x 2 with C tube repositioning c/b Augmentin resistant E coli  #Serratia bacteremia 9/11, repeat BCx 9/15 negative  #Hyponatremia  MELD-Na: 23 9/23  - HE: resolved  - EV: normal esophagus on ERCP from 7/2022  - Ascites: moderate 8/31 US  - SBP: paracentesis 8/9/2022 reveals likely secondary bacterial peritonitis from suspected gallbladder pathology, +SBP on para 8/31  - HCC:  No lesions on CT 8/26      Plan  - Lactulose q6h  - US liver doppler  - Diagnostic/therapeutic para  - BCx, UA/UCx  - Continue home lasix 40 bid and aldactone 100 BID   - ASA 81 daily  - Daily CMP, CBC, coags  - Physical therapy    -LT evaluation  [x] Psychosocial: cleared pending RPP  [x ] Infectious  [x] Cardiology:        Cardiac cath: n/a       Stress test: negative noninvasive stress test on 8/19/2022 for inducible ischemia  [ ] Colonoscopy: needed, f/u records from outside GI colonoscopy reports  [x] Prostate: Prostate Specific Antigen: negative at 2.21 ng/mL on 8/12/2022  [x] Dental  [x] PT/Frailty    Preliminary note until signed by Attending.    Thank you for involving us in this patient's care.      NON-URGENT CONSULTS:  Please email [email or web address]  AT NIGHT AND ON WEEKENDS:  Contact on-call GI fellow via answering service ([phone]) from 5pm-8am and on weekends/holidays  MONDAY-FRIDAY 8AM-5PM:  Pager# [phone] ([facility])  GI Phone# [phone] ([facility])   TESHA STEINER is a 67y Male with 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 Transplant hepatology consulted for PSE, now resolved.    #PSE, resolved  #Decompensated ETOH/PARNELL Cirrhosis, listed for OLT  - HE: resolved  - EV: normal esophagus on ERCP from 7/2022  - Ascites: moderate 8/31 US  - SBP: paracentesis 8/9/2022 reveals likely secondary bacterial peritonitis from suspected gallbladder pathology, +SBP on para 8/31  - HCC:  No lesions on CT 8/26      Plan  - Lactulose q6h  - US liver doppler  - May benefit from CTAP  - Diagnostic/therapeutic para if able  - SBP PPx after para and pancx  - BCx, UA/UCx, CXR  - Continue home lasix 40 bid and aldactone 100 BID   - ASA 81 daily  - Daily CMP, CBC, coags  - Physical therapy    -LT evaluation  MeldNa: 18 on 11/23/22; ABO O+  [x] Psychosocial: cleared pending RPP  [x ] Infectious  [x] Cardiology:        Cardiac cath: n/a       Stress test: negative noninvasive stress test on 8/19/2022 for inducible ischemia  [ ] Colonoscopy: needed, f/u records from outside GI colonoscopy reports  [x] Prostate: Prostate Specific Antigen: negative at 2.21 ng/mL on 8/12/2022  [x] Dental  [x] PT/Frailty    Preliminary note until signed by Attending.    Thank you for involving us in this patient's care.      NON-URGENT CONSULTS:  Please email [email or web address]  AT NIGHT AND ON WEEKENDS:  Contact on-call GI fellow via answering service ([phone]) from 5pm-8am and on weekends/holidays  MONDAY-FRIDAY 8AM-5PM:  Pager# [phone] ([facility])  GI Phone# [phone] ([facility])

## 2022-11-24 LAB
ANION GAP SERPL CALC-SCNC: 8 MMOL/L — SIGNIFICANT CHANGE UP (ref 5–17)
APPEARANCE UR: CLEAR — SIGNIFICANT CHANGE UP
BILIRUB UR-MCNC: NEGATIVE — SIGNIFICANT CHANGE UP
BUN SERPL-MCNC: 15 MG/DL — SIGNIFICANT CHANGE UP (ref 7–23)
CALCIUM SERPL-MCNC: 9.3 MG/DL — SIGNIFICANT CHANGE UP (ref 8.4–10.5)
CHLORIDE SERPL-SCNC: 102 MMOL/L — SIGNIFICANT CHANGE UP (ref 96–108)
CO2 SERPL-SCNC: 20 MMOL/L — LOW (ref 22–31)
COLOR SPEC: YELLOW — SIGNIFICANT CHANGE UP
CREAT SERPL-MCNC: 0.82 MG/DL — SIGNIFICANT CHANGE UP (ref 0.5–1.3)
DIFF PNL FLD: NEGATIVE — SIGNIFICANT CHANGE UP
EGFR: 97 ML/MIN/1.73M2 — SIGNIFICANT CHANGE UP
GLUCOSE SERPL-MCNC: 79 MG/DL — SIGNIFICANT CHANGE UP (ref 70–99)
GLUCOSE UR QL: NEGATIVE — SIGNIFICANT CHANGE UP
HCT VFR BLD CALC: 32.8 % — LOW (ref 39–50)
HGB BLD-MCNC: 11.5 G/DL — LOW (ref 13–17)
KETONES UR-MCNC: NEGATIVE — SIGNIFICANT CHANGE UP
LEUKOCYTE ESTERASE UR-ACNC: NEGATIVE — SIGNIFICANT CHANGE UP
MAGNESIUM SERPL-MCNC: 1.7 MG/DL — SIGNIFICANT CHANGE UP (ref 1.6–2.6)
MCHC RBC-ENTMCNC: 35 PG — HIGH (ref 27–34)
MCHC RBC-ENTMCNC: 35.1 GM/DL — SIGNIFICANT CHANGE UP (ref 32–36)
MCV RBC AUTO: 99.7 FL — SIGNIFICANT CHANGE UP (ref 80–100)
NITRITE UR-MCNC: NEGATIVE — SIGNIFICANT CHANGE UP
NRBC # BLD: 0 /100 WBCS — SIGNIFICANT CHANGE UP (ref 0–0)
PH UR: 5.5 — SIGNIFICANT CHANGE UP (ref 5–8)
PHOSPHATE SERPL-MCNC: 3.3 MG/DL — SIGNIFICANT CHANGE UP (ref 2.5–4.5)
PLATELET # BLD AUTO: 78 K/UL — LOW (ref 150–400)
POTASSIUM SERPL-MCNC: 4.2 MMOL/L — SIGNIFICANT CHANGE UP (ref 3.5–5.3)
POTASSIUM SERPL-SCNC: 4.2 MMOL/L — SIGNIFICANT CHANGE UP (ref 3.5–5.3)
PROT UR-MCNC: SIGNIFICANT CHANGE UP
RBC # BLD: 3.29 M/UL — LOW (ref 4.2–5.8)
RBC # FLD: 14.5 % — SIGNIFICANT CHANGE UP (ref 10.3–14.5)
SODIUM SERPL-SCNC: 130 MMOL/L — LOW (ref 135–145)
SP GR SPEC: 1.02 — SIGNIFICANT CHANGE UP (ref 1.01–1.02)
UROBILINOGEN FLD QL: ABNORMAL
WBC # BLD: 5.05 K/UL — SIGNIFICANT CHANGE UP (ref 3.8–10.5)
WBC # FLD AUTO: 5.05 K/UL — SIGNIFICANT CHANGE UP (ref 3.8–10.5)

## 2022-11-24 PROCEDURE — 99232 SBSQ HOSP IP/OBS MODERATE 35: CPT

## 2022-11-24 PROCEDURE — 99232 SBSQ HOSP IP/OBS MODERATE 35: CPT | Mod: GC

## 2022-11-24 RX ADMIN — SPIRONOLACTONE 100 MILLIGRAM(S): 25 TABLET, FILM COATED ORAL at 05:57

## 2022-11-24 RX ADMIN — LACTULOSE 20 GRAM(S): 10 SOLUTION ORAL at 05:58

## 2022-11-24 RX ADMIN — Medication 40 MILLIGRAM(S): at 13:12

## 2022-11-24 RX ADMIN — SPIRONOLACTONE 100 MILLIGRAM(S): 25 TABLET, FILM COATED ORAL at 18:06

## 2022-11-24 RX ADMIN — LACTULOSE 20 GRAM(S): 10 SOLUTION ORAL at 21:58

## 2022-11-24 RX ADMIN — Medication 40 MILLIGRAM(S): at 05:57

## 2022-11-24 RX ADMIN — Medication 81 MILLIGRAM(S): at 13:12

## 2022-11-24 NOTE — PROGRESS NOTE ADULT - PROBLEM SELECTOR PLAN 2
s/p ERCP with stent placement (4/2022) with subsequent repeat ERCP with stent removal, sphincterotomy, and balloon sweep removal of sludge/stones (7/20/22)    -has drain in gallbladder lumen currently draining 100cc  -per patient he drains it himself every other day  -CTM s/p ERCP with stent placement (4/2022) with subsequent repeat ERCP with stent removal, sphincterotomy, and balloon sweep removal of sludge/stones (7/20/22)    - has drain in gallbladder lumen currently draining 100cc  - per patient he drains it himself every other day  - CTM

## 2022-11-24 NOTE — PROGRESS NOTE ADULT - SUBJECTIVE AND OBJECTIVE BOX
DATE OF SERVICE: 22 @ 07:09    Patient is a 67y old  Male who presents with a chief complaint of AMS (2022 14:58)      SUBJECTIVE / OVERNIGHT EVENTS:  Overnight, no events.   Pt seen and examined at bedside.    ROS negative except as above.    MEDICATIONS  (STANDING):  aspirin  chewable 81 milliGRAM(s) Oral daily  furosemide    Tablet 40 milliGRAM(s) Oral two times a day  lactulose Syrup 20 Gram(s) Oral three times a day  rifAXIMin 550 milliGRAM(s) Oral two times a day  spironolactone 100 milliGRAM(s) Oral two times a day    MEDICATIONS  (PRN):  melatonin 3 milliGRAM(s) Oral at bedtime PRN Insomnia      Vital Signs Last 24 Hrs  T(C): 37.1 (2022 04:33), Max: 37.1 (2022 04:33)  T(F): 98.8 (2022 04:33), Max: 98.8 (2022 04:33)  HR: 73 (2022 04:33) (72 - 80)  BP: 101/60 (2022 04:33) (101/60 - 117/76)  BP(mean): --  RR: 18 (2022 04:33) (17 - 18)  SpO2: 100% (2022 04:33) (100% - 100%)    Parameters below as of 2022 04:33  Patient On (Oxygen Delivery Method): room air      CAPILLARY BLOOD GLUCOSE        I&O's Summary    2022 07:01  -  2022 07:00  --------------------------------------------------------  IN: 1050 mL / OUT: 435 mL / NET: 615 mL        PHYSICAL EXAM:  GENERAL: NAD, well-developed  HEAD:  Atraumatic, Normocephalic  EYES: EOMI, PERRLA, conjunctiva and sclera clear  NECK: Supple, No JVD  CHEST/LUNG: Clear to auscultation bilaterally; No wheeze  HEART: Regular rate and rhythm; No murmurs, rubs, or gallops  ABDOMEN: Soft, Nontender, Nondistended; Bowel sounds present  EXTREMITIES:  2+ Peripheral Pulses, No clubbing, cyanosis, or edema  PSYCH: AAOx3  NEUROLOGY: non-focal  SKIN: No rashes or lesions    LABS:                        10.3   4.19  )-----------( 83       ( 2022 07:09 )             30.1     11-23    131<L>  |  104  |  15  ----------------------------<  95  3.9   |  19<L>  |  1.05    Ca    9.1      2022 07:23  Phos  3.3     11-  Mg     1.8     -    TPro  6.7  /  Alb  2.6<L>  /  TBili  1.7<H>  /  DBili  x   /  AST  31  /  ALT  15  /  AlkPhos  117  11-23    PT/INR - ( 2022 07:09 )   PT: 16.8 sec;   INR: 1.44 ratio         PTT - ( 2022 07:09 )  PTT:31.5 sec      Urinalysis Basic - ( 2022 05:37 )    Color: Yellow / Appearance: Clear / S.022 / pH: x  Gluc: x / Ketone: Negative  / Bili: Negative / Urobili: 2 mg/dL   Blood: x / Protein: Trace / Nitrite: Negative   Leuk Esterase: Negative / RBC: x / WBC x   Sq Epi: x / Non Sq Epi: x / Bacteria: x        MICRO:      RADIOLOGY & ADDITIONAL TESTS:      Consultant(s) Notes Reviewed:         DATE OF SERVICE: 22 @ 07:09    Patient is a 67y old  Male who presents with a chief complaint of AMS (2022 14:58)      SUBJECTIVE / OVERNIGHT EVENTS:  Overnight, no events.   Pt seen and examined at bedside. Does not report any confusion. Awaiting bed in transplant hepatology service.     ROS negative except as above.    MEDICATIONS  (STANDING):  aspirin  chewable 81 milliGRAM(s) Oral daily  furosemide    Tablet 40 milliGRAM(s) Oral two times a day  lactulose Syrup 20 Gram(s) Oral three times a day  rifAXIMin 550 milliGRAM(s) Oral two times a day  spironolactone 100 milliGRAM(s) Oral two times a day    MEDICATIONS  (PRN):  melatonin 3 milliGRAM(s) Oral at bedtime PRN Insomnia      Vital Signs Last 24 Hrs  T(C): 37.1 (2022 04:33), Max: 37.1 (2022 04:33)  T(F): 98.8 (2022 04:33), Max: 98.8 (2022 04:33)  HR: 73 (2022 04:33) (72 - 80)  BP: 101/60 (2022 04:33) (101/60 - 117/76)  BP(mean): --  RR: 18 (2022 04:33) (17 - 18)  SpO2: 100% (2022 04:33) (100% - 100%)    Parameters below as of 2022 04:33  Patient On (Oxygen Delivery Method): room air      CAPILLARY BLOOD GLUCOSE        I&O's Summary    2022 07:01  -  2022 07:00  --------------------------------------------------------  IN: 1050 mL / OUT: 435 mL / NET: 615 mL        PHYSICAL EXAM:  GENERAL: NAD, well-developed  HEAD:  Atraumatic, Normocephalic  EYES: EOMI, PERRLA, conjunctiva and sclera clear  NECK: Supple, No JVD  CHEST/LUNG: Clear to auscultation bilaterally; No wheeze  HEART: Regular rate and rhythm; No murmurs, rubs, or gallops  ABDOMEN: mildly distended, +perc rodney bag in place with no erythema or pus at insertion site  EXTREMITIES:  2+ Peripheral Pulses, No clubbing, cyanosis, or edema  PSYCH: AAOx3  NEUROLOGY: non-focal, no asterixis  SKIN: No rashes or lesions    LABS:                        10.3   4.19  )-----------( 83       ( 2022 07:09 )             30.1         131<L>  |  104  |  15  ----------------------------<  95  3.9   |  19<L>  |  1.05    Ca    9.1      2022 07:23  Phos  3.3       Mg     1.8         TPro  6.7  /  Alb  2.6<L>  /  TBili  1.7<H>  /  DBili  x   /  AST  31  /  ALT  15  /  AlkPhos  117  -    PT/INR - ( 2022 07:09 )   PT: 16.8 sec;   INR: 1.44 ratio         PTT - ( 2022 07:09 )  PTT:31.5 sec      Urinalysis Basic - ( 2022 05:37 )    Color: Yellow / Appearance: Clear / S.022 / pH: x  Gluc: x / Ketone: Negative  / Bili: Negative / Urobili: 2 mg/dL   Blood: x / Protein: Trace / Nitrite: Negative   Leuk Esterase: Negative / RBC: x / WBC x   Sq Epi: x / Non Sq Epi: x / Bacteria: x        MICRO:      RADIOLOGY & ADDITIONAL TESTS:      Consultant(s) Notes Reviewed:

## 2022-11-24 NOTE — PROGRESS NOTE ADULT - SUBJECTIVE AND OBJECTIVE BOX
Transplant Hepatology Progress Note    Interval Events: no events overnight; feels better; alert and oriented x 3    MEDICATIONS  (STANDING):  aspirin  chewable 81 milliGRAM(s) Oral daily  furosemide    Tablet 40 milliGRAM(s) Oral two times a day  lactulose Syrup 20 Gram(s) Oral three times a day  rifAXIMin 550 milliGRAM(s) Oral two times a day  spironolactone 100 milliGRAM(s) Oral two times a day    MEDICATIONS  (PRN):  melatonin 3 milliGRAM(s) Oral at bedtime PRN Insomnia      PAST MEDICAL & SURGICAL HISTORY:  H/O acute cholangitis      2019 novel coronavirus disease (COVID-19)  12/24/2021  no hospitalization, no treatment      History of liver failure      S/P ERCP  4/2022      H/O colonoscopy          Vital Signs Last 24 Hrs  T(C): 36.9 (24 Nov 2022 11:01), Max: 37.1 (24 Nov 2022 04:33)  T(F): 98.4 (24 Nov 2022 11:01), Max: 98.8 (24 Nov 2022 04:33)  HR: 75 (24 Nov 2022 11:01) (72 - 75)  BP: 112/68 (24 Nov 2022 11:01) (101/60 - 114/69)  BP(mean): --  RR: 18 (24 Nov 2022 11:01) (17 - 18)  SpO2: 100% (24 Nov 2022 11:01) (100% - 100%)    Parameters below as of 24 Nov 2022 11:01  Patient On (Oxygen Delivery Method): room air        I&O's Summary    23 Nov 2022 07:01  -  24 Nov 2022 07:00  --------------------------------------------------------  IN: 1050 mL / OUT: 435 mL / NET: 615 mL    24 Nov 2022 07:01  -  24 Nov 2022 18:01  --------------------------------------------------------  IN: 240 mL / OUT: 220 mL / NET: 20 mL                              11.5   5.05  )-----------( 78       ( 24 Nov 2022 06:58 )             32.8     11-24    130<L>  |  102  |  15  ----------------------------<  79  4.2   |  20<L>  |  0.82    Ca    9.3      24 Nov 2022 06:58  Phos  3.3     11-24  Mg     1.7     11-24    TPro  6.7  /  Alb  2.6<L>  /  TBili  1.7<H>  /  DBili  x   /  AST  31  /  ALT  15  /  AlkPhos  117  11-23        Review of systems  Gen: No weight changes, fatigue, fevers/chills, weakness  Skin: No rashes  Head/Eyes/Ears/Mouth: No headache; Normal hearing; Normal vision w/o blurriness; No sinus pain/discomfort, sore throat  Respiratory: No dyspnea, cough, wheezing, hemoptysis  CV: No chest pain, PND, orthopnea  GI: C/O mild abdominal pain at surgical site. no diarrhea, constipation, nausea, vomiting, melena, hematochezia  : No increased frequency, dysuria, hematuria, nocturia  MSK: No joint pain/swelling; no back pain; no edema  Neuro: No dizziness/lightheadedness, weakness, seizures, numbness, tingling  Heme: No easy bruising or bleeding  Endo: No heat/cold intolerance  Psych: No significant nervousness, anxiety, stress, depression  All other systems were reviewed and are negative, except as noted.      PHYSICAL EXAM:  Constitutional: Well developed / well nourished  Eyes:  Anicteric   ENMT: nc/at, no thrush  Neck: supple   Respiratory: CTA B/L  Cardiovascular: RRR  Gastrointestinal: Soft abdomen, ND, c tube in place, bile in bag  Extremities: SCD's in place and working bilaterally  Vascular: Palpable dp pulses bilaterally.   Neurological: A&O x3  Skin: no rashes, ulcerations, lesions  Musculoskeletal: Moving all extremities  Psychiatric: Responsive

## 2022-11-24 NOTE — PROGRESS NOTE ADULT - PROBLEM SELECTOR PLAN 3
2/2 ETOH/PARNELL Cirrhosis listed for OLT    -MELD-NA 11/22 17  -at home on lasix 40mg bid and aldactone 100mg bid  -will hold diuretics for now  -daily cmp, cbc, coags  -appreciate transplant liver team reccs 2/2 ETOH/PARNELL Cirrhosis listed for OLT    -MELD-NA 11/22 17  -at home on lasix 40mg bid and aldactone 100mg bid  -will hold diuretics for now  -daily cmp, cbc, coags  -appreciate transplant liver team reccs  - will transfer pt to transplant hepatology team when bed becomes available 2/2 ETOH/PARNELL Cirrhosis listed for OLT    - MELD-NA 11/23 18  - c/w home lasix 40mg bid and aldactone 100mg bid  - daily cmp, cbc, coags  - appreciate transplant liver team reccs  - will transfer pt to transplant hepatology team when bed becomes available  - diagnostic/therapeutic para is able

## 2022-11-24 NOTE — PROGRESS NOTE ADULT - PROBLEM SELECTOR PLAN 1
Initially presented with confusion for past 2 days which affected patient's ADLs. Patient was brought to ER by wife to OSH. Workup was notable for elevated ammonia, neg CTH noncon, and patient was transferred to [facility] for further management as patient seen by transplant team. Noted asterixis on exam and patient remarked it was new, however patient is currently A&Ox3.    -likely 2/2 hepatic encephalopathy  - patient was confused, has elevated ammonia level in the setting of advanced liver cirrhosis  - c/w rifaximin 550mg po BID, Lactulose TID  - Aspiration precautions  - US liver doppler  - lasix 40 bid and spironolactone 100 bid  - BCx, UA/UCx, CXR  - Diagnostic/therapeutic para if able  - SBP PPx after para and pancx Initially presented with confusion for past 2 days which affected patient's ADLs. Patient was brought to ER by wife to OSH. Workup was notable for elevated ammonia, neg CTH noncon, and patient was transferred to [facility] for further management as patient seen by transplant team. Noted asterixis on exam and patient remarked it was new, however patient is currently A&Ox3.    - likely 2/2 hepatic encephalopathy  - patient was confused on presentation, now back to baseline mental status, AOx3  - c/w rifaximin 550mg po BID, Lactulose TID  - Aspiration precautions  - US liver doppler showing no PVT  - lasix 40 bid and spironolactone 100 bid  - BCx, UA/UCx, CXR  - Diagnostic/therapeutic para if able  - SBP PPx after para and pancx Initially presented with confusion for past 2 days which affected patient's ADLs. Patient was brought to ER by wife to OSH. Workup was notable for elevated ammonia, neg CTH noncon, and patient was transferred to [facility] for further management as patient seen by transplant team. Noted asterixis on exam and patient remarked it was new, however patient is currently A&Ox3.    - likely 2/2 hepatic encephalopathy  - patient was confused on presentation, now back to baseline mental status, AOx3  - c/w rifaximin 550mg po BID, Lactulose TID  - Aspiration precautions  - US liver doppler showing no PVT  - lasix 40 bid and spironolactone 100 bid  - BCx, UA/UCx, CXR  - Diagnostic/therapeutic para if able  - SBP PPx with ceftriaxone after para and pancx

## 2022-11-25 ENCOUNTER — TRANSCRIPTION ENCOUNTER (OUTPATIENT)
Age: 67
End: 2022-11-25

## 2022-11-25 VITALS
HEART RATE: 74 BPM | SYSTOLIC BLOOD PRESSURE: 104 MMHG | TEMPERATURE: 98 F | RESPIRATION RATE: 18 BRPM | DIASTOLIC BLOOD PRESSURE: 64 MMHG | OXYGEN SATURATION: 100 %

## 2022-11-25 LAB
ANION GAP SERPL CALC-SCNC: 8 MMOL/L — SIGNIFICANT CHANGE UP (ref 5–17)
BUN SERPL-MCNC: 21 MG/DL — SIGNIFICANT CHANGE UP (ref 7–23)
CALCIUM SERPL-MCNC: 8.8 MG/DL — SIGNIFICANT CHANGE UP (ref 8.4–10.5)
CHLORIDE SERPL-SCNC: 100 MMOL/L — SIGNIFICANT CHANGE UP (ref 96–108)
CO2 SERPL-SCNC: 23 MMOL/L — SIGNIFICANT CHANGE UP (ref 22–31)
CREAT SERPL-MCNC: 0.92 MG/DL — SIGNIFICANT CHANGE UP (ref 0.5–1.3)
CULTURE RESULTS: NO GROWTH — SIGNIFICANT CHANGE UP
EGFR: 92 ML/MIN/1.73M2 — SIGNIFICANT CHANGE UP
GLUCOSE SERPL-MCNC: 98 MG/DL — SIGNIFICANT CHANGE UP (ref 70–99)
HCT VFR BLD CALC: 28.2 % — LOW (ref 39–50)
HGB BLD-MCNC: 9.9 G/DL — LOW (ref 13–17)
MAGNESIUM SERPL-MCNC: 1.7 MG/DL — SIGNIFICANT CHANGE UP (ref 1.6–2.6)
MCHC RBC-ENTMCNC: 34.6 PG — HIGH (ref 27–34)
MCHC RBC-ENTMCNC: 35.1 GM/DL — SIGNIFICANT CHANGE UP (ref 32–36)
MCV RBC AUTO: 98.6 FL — SIGNIFICANT CHANGE UP (ref 80–100)
NRBC # BLD: 0 /100 WBCS — SIGNIFICANT CHANGE UP (ref 0–0)
PHOSPHATE SERPL-MCNC: 3.7 MG/DL — SIGNIFICANT CHANGE UP (ref 2.5–4.5)
PLATELET # BLD AUTO: 69 K/UL — LOW (ref 150–400)
POTASSIUM SERPL-MCNC: 3.7 MMOL/L — SIGNIFICANT CHANGE UP (ref 3.5–5.3)
POTASSIUM SERPL-SCNC: 3.7 MMOL/L — SIGNIFICANT CHANGE UP (ref 3.5–5.3)
RBC # BLD: 2.86 M/UL — LOW (ref 4.2–5.8)
RBC # FLD: 14.3 % — SIGNIFICANT CHANGE UP (ref 10.3–14.5)
SODIUM SERPL-SCNC: 131 MMOL/L — LOW (ref 135–145)
SPECIMEN SOURCE: SIGNIFICANT CHANGE UP
WBC # BLD: 4.36 K/UL — SIGNIFICANT CHANGE UP (ref 3.8–10.5)
WBC # FLD AUTO: 4.36 K/UL — SIGNIFICANT CHANGE UP (ref 3.8–10.5)

## 2022-11-25 PROCEDURE — 85027 COMPLETE CBC AUTOMATED: CPT

## 2022-11-25 PROCEDURE — 99239 HOSP IP/OBS DSCHRG MGMT >30: CPT | Mod: GC

## 2022-11-25 PROCEDURE — 97161 PT EVAL LOW COMPLEX 20 MIN: CPT

## 2022-11-25 PROCEDURE — 87040 BLOOD CULTURE FOR BACTERIA: CPT

## 2022-11-25 PROCEDURE — 93975 VASCULAR STUDY: CPT

## 2022-11-25 PROCEDURE — 85610 PROTHROMBIN TIME: CPT

## 2022-11-25 PROCEDURE — 76705 ECHO EXAM OF ABDOMEN: CPT | Mod: 26

## 2022-11-25 PROCEDURE — 84100 ASSAY OF PHOSPHORUS: CPT

## 2022-11-25 PROCEDURE — 81003 URINALYSIS AUTO W/O SCOPE: CPT

## 2022-11-25 PROCEDURE — 83735 ASSAY OF MAGNESIUM: CPT

## 2022-11-25 PROCEDURE — 85730 THROMBOPLASTIN TIME PARTIAL: CPT

## 2022-11-25 PROCEDURE — 36415 COLL VENOUS BLD VENIPUNCTURE: CPT

## 2022-11-25 PROCEDURE — 76705 ECHO EXAM OF ABDOMEN: CPT

## 2022-11-25 PROCEDURE — 99232 SBSQ HOSP IP/OBS MODERATE 35: CPT | Mod: GC

## 2022-11-25 PROCEDURE — 87086 URINE CULTURE/COLONY COUNT: CPT

## 2022-11-25 PROCEDURE — 80048 BASIC METABOLIC PNL TOTAL CA: CPT

## 2022-11-25 PROCEDURE — 80053 COMPREHEN METABOLIC PANEL: CPT

## 2022-11-25 RX ADMIN — Medication 81 MILLIGRAM(S): at 12:08

## 2022-11-25 RX ADMIN — SPIRONOLACTONE 100 MILLIGRAM(S): 25 TABLET, FILM COATED ORAL at 06:05

## 2022-11-25 RX ADMIN — LACTULOSE 20 GRAM(S): 10 SOLUTION ORAL at 06:05

## 2022-11-25 RX ADMIN — Medication 40 MILLIGRAM(S): at 06:05

## 2022-11-25 RX ADMIN — LACTULOSE 20 GRAM(S): 10 SOLUTION ORAL at 13:44

## 2022-11-25 RX ADMIN — Medication 40 MILLIGRAM(S): at 13:44

## 2022-11-25 NOTE — DISCHARGE NOTE NURSING/CASE MANAGEMENT/SOCIAL WORK - NSDCPNINST_GEN_ALL_CORE
call md for any discomforts felt-- according to patient he changes dressing of the GRICELDA drain every 3 days and he flushed it with 5cc normal saline every day. according to DR jackie Collins to instruct patient and continue what he was doing - skin care-- safety measures reemphasized

## 2022-11-25 NOTE — PROGRESS NOTE ADULT - PROBLEM SELECTOR PLAN 3
2/2 ETOH/PARNELL Cirrhosis listed for OLT    - MELD-NA 11/23 18  - c/w home lasix 40mg bid and aldactone 100mg bid  - daily cmp, cbc, coags  - appreciate transplant liver team reccs  - will transfer pt to transplant hepatology team when bed becomes available  - diagnostic/therapeutic para is able 2/2 ETOH/PARNELL Cirrhosis listed for OLT    - MELD-NA 11/23 18  - c/w home lasix 40mg bid and aldactone 100mg bid  - daily cmp, cbc, coags  - appreciate transplant liver team reccs

## 2022-11-25 NOTE — PROGRESS NOTE ADULT - ATTENDING COMMENTS
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 for hepatic encephalopathy. Started on lactulose TID with improvement in mental status, now A&Ox3. Hepatology recs aappreciated - obtain US liver dippler, start also rifaximin. Patient does not have insurance and we are discussing with pharmacy regarding cost. Obtain infectious workup per hepatology. Restart lasix 40 BID and aldactone 100 BID.     d/w Dr. Garcia
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 Started on lactulose TID with improvement in mental status, now A&Ox3. Restarted lasix 40 BID and aldactone 100 BID. Started lactulose and rifaximin. A&Ox3, encephalopathy has resolved. Unfortunately patient does not have insurance, and rifaximin will cost $2000 a month for the patient which the patient cannot afford (understandably). 31 minutes spent on safe discharge planning.    d/w Dr. Jose Christopher MD  [facility] Division of Hospital Medicine  Available via MS Teams  Pager: [phone]
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 Started on lactulose TID with improvement in mental status, now A&Ox3.   Seen and examined at bedside. AMS resolved.  Cont lactulose and rifaximin  Rest per resident documentation above    [facility] Division of Hospital Medicine  Tiffanie Heller MD  Pager (M-F, 8A-5P): [phone]  Other Times:  [phone]

## 2022-11-25 NOTE — DISCHARGE NOTE PROVIDER - PROVIDER TOKENS
PROVIDER:[TOKEN:[[id number]],FOLLOWUP:[2 weeks]] PROVIDER:[TOKEN:[[id number]],FOLLOWUP:[2 weeks]],PROVIDER:[TOKEN:[[id number]],FOLLOWUP:[1 week],ESTABLISHEDPATIENT:[T]]

## 2022-11-25 NOTE — PROGRESS NOTE ADULT - PROBLEM SELECTOR PLAN 2
s/p ERCP with stent placement (4/2022) with subsequent repeat ERCP with stent removal, sphincterotomy, and balloon sweep removal of sludge/stones (7/20/22)    - has drain in gallbladder lumen currently draining 100cc  - per patient he drains it himself every other day  - CTM

## 2022-11-25 NOTE — PROGRESS NOTE ADULT - SUBJECTIVE AND OBJECTIVE BOX
Chief Complaint:  Patient is a 67y old  Male who presents with a chief complaint of AMS (25 Nov 2022 12:39)         Interval Events:   Remains afebrile and HDS  No acute events overnight  BCx pending    Hospital Medications:  aspirin  chewable 81 milliGRAM(s) Oral daily  furosemide    Tablet 40 milliGRAM(s) Oral two times a day  lactulose Syrup 20 Gram(s) Oral three times a day  melatonin 3 milliGRAM(s) Oral at bedtime PRN  rifAXIMin 550 milliGRAM(s) Oral two times a day  spironolactone 100 milliGRAM(s) Oral two times a day      ROS:   Complete and normal except as mentioned above.    PHYSICAL EXAM:   Vital Signs:  Vital Signs Last 24 Hrs  T(C): 36.8 (25 Nov 2022 12:34), Max: 36.9 (24 Nov 2022 21:24)  T(F): 98.2 (25 Nov 2022 12:34), Max: 98.4 (24 Nov 2022 21:24)  HR: 74 (25 Nov 2022 12:34) (71 - 74)  BP: 104/64 (25 Nov 2022 12:34) (104/64 - 115/61)  BP(mean): --  RR: 18 (25 Nov 2022 12:34) (18 - 18)  SpO2: 100% (25 Nov 2022 12:34) (99% - 100%)    Parameters below as of 25 Nov 2022 12:34  Patient On (Oxygen Delivery Method): room air      Daily     Daily     GENERAL: no acute distress  NEURO: aox3, no asterixis  HEENT: anicteric sclera, no conjunctival pallor appreciated  CHEST: no respiratory distress, no accessory muscle use  CARDIAC: regular rate, rhythm  ABDOMEN: soft, non-tender, non-distended, no rebound or guarding, RUQ draining dark bilious fluid  EXTREMITIES: warm, well perfused, no edema  SKIN: no lesions noted    LABS: reviewed                        9.9    4.36  )-----------( 69       ( 25 Nov 2022 07:01 )             28.2     11-25    131<L>  |  100  |  21  ----------------------------<  98  3.7   |  23  |  0.92    Ca    8.8      25 Nov 2022 07:01  Phos  3.7     11-25  Mg     1.7     11-25          Interval Diagnostic Studies: see sunrise for full report

## 2022-11-25 NOTE — DISCHARGE NOTE PROVIDER - NSDCCPCAREPLAN_GEN_ALL_CORE_FT
PRINCIPAL DISCHARGE DIAGNOSIS  Diagnosis: Hepatic encephalopathy  Assessment and Plan of Treatment: Your confusion was in the setting of your underlying liver disease; for this you were given medications including rifaximin and lactulose with resolution of your symptoms. You underwent a workup and were found to have no signs of infection or blood clots near your liver; you additionally did not have any fluid in your abdomen to attempt a paracentesis.  At home,  1. take lactulose as needed to maintain 3 bowel movements daily  2. follow up with your hepatologist as an outpatient

## 2022-11-25 NOTE — DISCHARGE NOTE PROVIDER - HOSPITAL COURSE
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 Per patient starting two days ago he started feeling off where he was unable to perform his daily routines. Yesterday, the patient's wife found the patient to be confused with changes in speech and difficulty ambulating. He was later brought to the ED. During that time the wife stated the patient has no recent f/c, cough/congestion, sob, cp, abd pain, diarrhea/constipation.     At OSH patient had CTH noncon which demonstrated no bleed, CTA&P revealed no intraabdominal pathologies and confirmed stent placement. Labs were significant for thrombocytopenia to 83, elevated PT/INR to 13.5/1.3, Tbili 24, and Ammonia to 168. He was transferred to [facility] for further care (22 Nov 2022 23:26)    Hospital Course:  Pt transferred from outside hospital for hepatology evaluation in setting of ongoing confusion. Pt treated for hepatic encephalopathy with xiafaxan and lactulose with resolution of his symptoms. Pt underwent abd US without signs of thrombus or ascites; infectious workup without signs of infection; was evaluated by hepatology and recommended for discharge with outpatient follow up.

## 2022-11-25 NOTE — DISCHARGE NOTE NURSING/CASE MANAGEMENT/SOCIAL WORK - PATIENT PORTAL LINK FT
You can access the FollowMyHealth Patient Portal offered by [facility] by registering at the following website: [email or web address]. By joining [facility]’s FollowMyHealth portal, you will also be able to view your health information using other applications (apps) compatible with our system.

## 2022-11-25 NOTE — DISCHARGE NOTE PROVIDER - NSDCMRMEDTOKEN_GEN_ALL_CORE_FT
furosemide 40 mg oral tablet: 1 tab(s) orally 2 times a day  lactulose 10 g oral powder for reconstitution: 2 each orally 3 times a day     TEST SCRIPT  pantoprazole 40 mg oral delayed release tablet: 1 tab(s) orally once a day (before a meal)  spironolactone 25 mg oral tablet: 4 tab(s) orally 2 times a day  tamsulosin 0.4 mg oral capsule: 1 cap(s) orally once a day (at bedtime)

## 2022-11-25 NOTE — DISCHARGE NOTE NURSING/CASE MANAGEMENT/SOCIAL WORK - NSDCPEFALRISK_GEN_ALL_CORE
For information on Fall & Injury Prevention, visit: [email or web address] OR  [email or web address] OR  https://www.cdc.gov/steadi/patient.html

## 2022-11-25 NOTE — DISCHARGE NOTE NURSING/CASE MANAGEMENT/SOCIAL WORK - NSDCVIVACCINE_GEN_ALL_CORE_FT
HepB-CpG; 01-Sep-2022 18:40; Silva Hurley (RN); [employer] [id number] (Exp. Date: 30-Jan-2023); IntraMuscular; Deltoid Right.; 20 MICROGram(s); VIS (VIS Published: 15-Oct-2021, VIS Presented: 01-Sep-2022);   Pneumococcal conjugate vaccine 20-valent (PCV20), polysaccharide [id number] conjugate, adjuvant, preserv; 01-Sep-2022 20:46; Hilario Valero (RN); [employer]; [id number] (Exp. Date: 01-Jul-2023); IntraMuscular; Deltoid Left.; 0.5 milliLiter(s); VIS (VIS Published: 04-Feb-2022, VIS Presented: 01-Sep-2022);

## 2022-11-25 NOTE — PROGRESS NOTE ADULT - SUBJECTIVE AND OBJECTIVE BOX
DATE OF SERVICE: 22 @ 07:14    Patient is a 67y old  Male who presents with a chief complaint of AMS (2022 18:00)      SUBJECTIVE / OVERNIGHT EVENTS:  Overnight, no events.   Pt seen and examined at bedside.    ROS negative except as above.    MEDICATIONS  (STANDING):  aspirin  chewable 81 milliGRAM(s) Oral daily  furosemide    Tablet 40 milliGRAM(s) Oral two times a day  lactulose Syrup 20 Gram(s) Oral three times a day  rifAXIMin 550 milliGRAM(s) Oral two times a day  spironolactone 100 milliGRAM(s) Oral two times a day    MEDICATIONS  (PRN):  melatonin 3 milliGRAM(s) Oral at bedtime PRN Insomnia      Vital Signs Last 24 Hrs  T(C): 36.6 (2022 04:37), Max: 36.9 (2022 11:01)  T(F): 97.9 (2022 04:37), Max: 98.4 (2022 11:01)  HR: 74 (2022 04:37) (71 - 75)  BP: 115/61 (2022 04:37) (109/62 - 115/61)  BP(mean): --  RR: 18 (2022 04:37) (18 - 18)  SpO2: 99% (2022 04:37) (99% - 100%)    Parameters below as of 2022 04:37  Patient On (Oxygen Delivery Method): room air      CAPILLARY BLOOD GLUCOSE        I&O's Summary    2022 07:01  -  2022 07:00  --------------------------------------------------------  IN: 540 mL / OUT: 320 mL / NET: 220 mL        PHYSICAL EXAM:  GENERAL: NAD, well-developed  HEAD:  Atraumatic, Normocephalic  EYES: EOMI, PERRLA, conjunctiva and sclera clear  NECK: Supple, No JVD  CHEST/LUNG: Clear to auscultation bilaterally; No wheeze  HEART: Regular rate and rhythm; No murmurs, rubs, or gallops  ABDOMEN: Soft, Nontender, Nondistended; Bowel sounds present  EXTREMITIES:  2+ Peripheral Pulses, No clubbing, cyanosis, or edema  PSYCH: AAOx3  NEUROLOGY: non-focal  SKIN: No rashes or lesions    LABS:                        11.5   5.05  )-----------( 78       ( 2022 06:58 )             32.8     11-24    130<L>  |  102  |  15  ----------------------------<  79  4.2   |  20<L>  |  0.82    Ca    9.3      2022 06:58  Phos  3.3     11-24  Mg     1.7     -24    TPro  6.7  /  Alb  2.6<L>  /  TBili  1.7<H>  /  DBili  x   /  AST  31  /  ALT  15  /  AlkPhos  117  11-          Urinalysis Basic - ( 2022 05:37 )    Color: Yellow / Appearance: Clear / S.022 / pH: x  Gluc: x / Ketone: Negative  / Bili: Negative / Urobili: 2 mg/dL   Blood: x / Protein: Trace / Nitrite: Negative   Leuk Esterase: Negative / RBC: x / WBC x   Sq Epi: x / Non Sq Epi: x / Bacteria: x        MICRO:      RADIOLOGY & ADDITIONAL TESTS:      Consultant(s) Notes Reviewed:         DATE OF SERVICE: 22 @ 07:14    Patient is a 67y old  Male who presents with a chief complaint of AMS (2022 18:00)      SUBJECTIVE / OVERNIGHT EVENTS:  Overnight, no events.   Pt seen and examined at bedside. No acute complaints, no confusion.    ROS negative except as above.    MEDICATIONS  (STANDING):  aspirin  chewable 81 milliGRAM(s) Oral daily  furosemide    Tablet 40 milliGRAM(s) Oral two times a day  lactulose Syrup 20 Gram(s) Oral three times a day  rifAXIMin 550 milliGRAM(s) Oral two times a day  spironolactone 100 milliGRAM(s) Oral two times a day    MEDICATIONS  (PRN):  melatonin 3 milliGRAM(s) Oral at bedtime PRN Insomnia      Vital Signs Last 24 Hrs  T(C): 36.6 (2022 04:37), Max: 36.9 (2022 11:01)  T(F): 97.9 (2022 04:37), Max: 98.4 (2022 11:01)  HR: 74 (2022 04:37) (71 - 75)  BP: 115/61 (2022 04:37) (109/62 - 115/61)  BP(mean): --  RR: 18 (2022 04:37) (18 - 18)  SpO2: 99% (2022 04:37) (99% - 100%)    Parameters below as of 2022 04:37  Patient On (Oxygen Delivery Method): room air      CAPILLARY BLOOD GLUCOSE        I&O's Summary    2022 07:01  -  2022 07:00  --------------------------------------------------------  IN: 540 mL / OUT: 320 mL / NET: 220 mL        PHYSICAL EXAM:  GENERAL: NAD, well-developed  HEAD:  Atraumatic, Normocephalic  EYES: EOMI, PERRLA, conjunctiva and sclera clear  NECK: Supple, No JVD  CHEST/LUNG: Clear to auscultation bilaterally; No wheeze  HEART: Regular rate and rhythm; No murmurs, rubs, or gallops  ABDOMEN: Soft, Nontender, Nondistended; Bowel sounds present; perc rodney bag draining appropriately  EXTREMITIES:  2+ Peripheral Pulses, No clubbing, cyanosis, or edema  NEUROLOGY: non-focal, no asterixis, AOx3  SKIN: No rashes or lesions    LABS:                        11.5   5.05  )-----------( 78       ( 2022 06:58 )             32.8     11-24    130<L>  |  102  |  15  ----------------------------<  79  4.2   |  20<L>  |  0.82    Ca    9.3      2022 06:58  Phos  3.3     11-  Mg     1.7     -    TPro  6.7  /  Alb  2.6<L>  /  TBili  1.7<H>  /  DBili  x   /  AST  31  /  ALT  15  /  AlkPhos  117  11-          Urinalysis Basic - ( 2022 05:37 )    Color: Yellow / Appearance: Clear / S.022 / pH: x  Gluc: x / Ketone: Negative  / Bili: Negative / Urobili: 2 mg/dL   Blood: x / Protein: Trace / Nitrite: Negative   Leuk Esterase: Negative / RBC: x / WBC x   Sq Epi: x / Non Sq Epi: x / Bacteria: x        MICRO:      RADIOLOGY & ADDITIONAL TESTS:      Consultant(s) Notes Reviewed:

## 2022-11-25 NOTE — PROGRESS NOTE ADULT - ASSESSMENT
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 Transplant hepatology consulted for PSE, now resolved.  He is alert and oriented x 3; bruna po, ambulating  Agree with w/u of infection (pan cx); (US doppler liver vessels – neg for PVT); nutrition support  Pt needs to join RPP  Agree with lactulose tid with goal to achieve 2-3 soft bms qd; add xifaxan 550mg bid  d/c planning    I spent 30 minutes on this encounter.    Lety Monson DO  Transplant Hepatologist   Associate Professor of Medicine  
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 Transplant hepatology consulted for PSE, now resolved.    #PSE, resolved  #Decompensated ETOH/PARNELL Cirrhosis, listed for OLT  - HE: resolved  - EV: normal esophagus on ERCP from 7/2022  - Ascites: present, 11/25 trace ascites  - SBP: paracentesis 8/9/2022 reveals likely secondary bacterial peritonitis from suspected gallbladder pathology, +SBP on para 8/31  - HCC:  No lesions on CT 8/26      Plan  - Check INR today  - Needs Daily CBC, CMP, coags, mag/phos  - Needs to join RPP  - Lactulose q6h  - SBP PPx  - Follow up BCx, UA/UCx, CXR  - Continue home lasix 40 bid and aldactone 100 BID   - ASA 81 daily  - Physical therapy    -LT evaluation  MeldNa: on 11/23/22; ABO O+  [x] Psychosocial: cleared pending RPP  [x ] Infectious  [x] Cardiology:        Cardiac cath: n/a       Stress test: negative noninvasive stress test on 8/19/2022 for inducible ischemia  [ ] Colonoscopy: needed, f/u records from outside GI colonoscopy reports  [x] Prostate: Prostate Specific Antigen: negative at 2.21 ng/mL on 8/12/2022  [x] Dental  [x] PT/Frailty    Preliminary note until signed by Attending.    Thank you for involving us in this patient's care.      NON-URGENT CONSULTS:  Please email [email or web address]  AT NIGHT AND ON WEEKENDS:  Contact on-call GI fellow via answering service ([phone]) from 5pm-8am and on weekends/holidays  MONDAY-FRIDAY 8AM-5PM:  Pager# [phone] ([facility])  GI Phone# [phone] ([facility])
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

## 2022-11-25 NOTE — DISCHARGE NOTE PROVIDER - ATTENDING DISCHARGE PHYSICAL EXAMINATION:
GENERAL: NAD, well-developed  CHEST/LUNG: Clear to auscultation bilaterally; No wheeze  HEART: Regular rate and rhythm; No murmurs, rubs, or gallops  ABDOMEN: Soft, Nontender, Nondistended; Bowel sounds present; perc rodney bag draining appropriately  EXTREMITIES:  2+ Peripheral Pulses, No clubbing, cyanosis, or edema

## 2022-11-25 NOTE — DISCHARGE NOTE PROVIDER - CARE PROVIDERS DIRECT ADDRESSES
,[email or web address]_Unknown

## 2022-11-25 NOTE — DISCHARGE NOTE PROVIDER - NSDCFUSCHEDAPPT_GEN_ALL_CORE_FT
Darren Velasco  [facility]  HEPATOLOGY [address] [place] [address]   Scheduled Appointment: 12/07/2022

## 2022-11-25 NOTE — PROGRESS NOTE ADULT - PROBLEM SELECTOR PLAN 1
Initially presented with confusion for past 2 days which affected patient's ADLs. Patient was brought to ER by wife to OSH. Workup was notable for elevated ammonia, neg CTH noncon, and patient was transferred to [facility] for further management as patient seen by transplant team. Noted asterixis on exam and patient remarked it was new, however patient is currently A&Ox3.    - likely 2/2 hepatic encephalopathy  - patient was confused on presentation, now back to baseline mental status, AOx3  - c/w rifaximin 550mg po BID, Lactulose TID  - Aspiration precautions  - US liver doppler showing no PVT  - lasix 40 bid and spironolactone 100 bid  - BCx, UA/UCx, CXR  - Diagnostic/therapeutic para if able  - SBP PPx with ceftriaxone after para and pancx Initially presented with confusion for past 2 days which affected patient's ADLs. Patient was brought to ER by wife to OSH. Workup was notable for elevated ammonia, neg CTH noncon, and patient was transferred to [facility] for further management as patient seen by transplant team. Noted asterixis on exam and patient remarked it was new, however patient is currently A&Ox3.    - likely 2/2 hepatic encephalopathy  - patient was confused on presentation, now back to baseline mental status, AOx3  - c/w rifaximin 550mg po BID, Lactulose TID  - Aspiration precautions  - US liver doppler showing no PVT  - lasix 40 bid and spironolactone 100 bid  - Bcx NGTD  - on discharge, pt unable to afford rifaximin ($2000 a month), so will dc home today with just lactulose

## 2022-11-25 NOTE — DISCHARGE NOTE PROVIDER - CARE PROVIDER_API CALL
Darren Velasco (MBTRUDI; MS)  Gastroenterology; Internal Medicine; Transplant Hepatology  [address] [place] [address]  Phone: [phone]  Fax: [phone]  Follow Up Time: 2 weeks   Darren Velasco (MBBS; MS)  Gastroenterology; Internal Medicine; Transplant Hepatology  [address] [place] [id number]  Phone: [phone]  Fax: [phone]  Follow Up Time: 2 weeks    Catalina Ruano (NP; RN)  NP in [facility]  [address] [place] [address]  Phone: [phone]  Fax: [phone]  Established Patient  Follow Up Time: 1 week

## 2022-11-29 ENCOUNTER — NON-APPOINTMENT (OUTPATIENT)
Age: 67
End: 2022-11-29

## 2022-12-01 NOTE — H&P PST ADULT - DOES PATIENT HAVE ADVANCE DIRECTIVE
cup/straw/self fed/fed by clinician self fed/fed by clinician [facility] Sean Lobo (wife) [phone]

## 2022-12-07 ENCOUNTER — APPOINTMENT (OUTPATIENT)
Dept: HEPATOLOGY | Facility: CLINIC | Age: 67
End: 2022-12-07

## 2022-12-07 ENCOUNTER — NON-APPOINTMENT (OUTPATIENT)
Age: 67
End: 2022-12-07

## 2022-12-07 VITALS
SYSTOLIC BLOOD PRESSURE: 108 MMHG | HEART RATE: 70 BPM | WEIGHT: 139 LBS | HEIGHT: 64.5 IN | BODY MASS INDEX: 23.44 KG/M2 | OXYGEN SATURATION: 98 % | RESPIRATION RATE: 16 BRPM | DIASTOLIC BLOOD PRESSURE: 67 MMHG | TEMPERATURE: 97.5 F

## 2022-12-07 PROCEDURE — 99214 OFFICE O/P EST MOD 30 MIN: CPT

## 2022-12-07 RX ORDER — SPIRONOLACTONE 25 MG/1
25 TABLET ORAL
Qty: 240 | Refills: 0 | Status: DISCONTINUED | COMMUNITY
Start: 2022-10-18 | End: 2022-12-07

## 2022-12-07 RX ORDER — NAPROXEN 500 MG/1
500 TABLET ORAL
Qty: 28 | Refills: 0 | Status: DISCONTINUED | COMMUNITY
Start: 2022-08-03 | End: 2022-12-07

## 2022-12-07 RX ORDER — PANTOPRAZOLE 40 MG/1
40 TABLET, DELAYED RELEASE ORAL
Qty: 30 | Refills: 0 | Status: DISCONTINUED | COMMUNITY
Start: 2022-09-26 | End: 2022-12-07

## 2022-12-07 RX ORDER — TAMSULOSIN HYDROCHLORIDE 0.4 MG/1
0.4 CAPSULE ORAL
Qty: 30 | Refills: 0 | Status: DISCONTINUED | COMMUNITY
Start: 2022-09-26 | End: 2022-12-07

## 2022-12-08 LAB
AFP-TM SERPL-MCNC: 3.8 NG/ML
ALBUMIN SERPL ELPH-MCNC: 3.2 G/DL
ALP BLD-CCNC: 232 U/L
ALT SERPL-CCNC: 20 U/L
ANION GAP SERPL CALC-SCNC: 9 MMOL/L
AST SERPL-CCNC: 36 U/L
BASOPHILS # BLD AUTO: 0.03 K/UL
BASOPHILS NFR BLD AUTO: 0.7 %
BILIRUB SERPL-MCNC: 1.8 MG/DL
BUN SERPL-MCNC: 15 MG/DL
CALCIUM SERPL-MCNC: 9.6 MG/DL
CHLORIDE SERPL-SCNC: 102 MMOL/L
CO2 SERPL-SCNC: 22 MMOL/L
CREAT SERPL-MCNC: 0.86 MG/DL
EGFR: 96 ML/MIN/1.73M2
EOSINOPHIL # BLD AUTO: 0.11 K/UL
EOSINOPHIL NFR BLD AUTO: 2.7 %
GLUCOSE SERPL-MCNC: 105 MG/DL
HCT VFR BLD CALC: 33.7 %
HGB BLD-MCNC: 11.5 G/DL
IMM GRANULOCYTES NFR BLD AUTO: 0.5 %
INR PPP: 1.34 RATIO
LYMPHOCYTES # BLD AUTO: 1.14 K/UL
LYMPHOCYTES NFR BLD AUTO: 27.6 %
MAN DIFF?: NORMAL
MCHC RBC-ENTMCNC: 34.1 GM/DL
MCHC RBC-ENTMCNC: 35 PG
MCV RBC AUTO: 102.4 FL
MONOCYTES # BLD AUTO: 0.45 K/UL
MONOCYTES NFR BLD AUTO: 10.9 %
NEUTROPHILS # BLD AUTO: 2.38 K/UL
NEUTROPHILS NFR BLD AUTO: 57.6 %
PLATELET # BLD AUTO: 82 K/UL
POTASSIUM SERPL-SCNC: 4.4 MMOL/L
PROT SERPL-MCNC: 7.3 G/DL
PT BLD: 15.9 SEC
RBC # BLD: 3.29 M/UL
RBC # FLD: 14.9 %
SODIUM SERPL-SCNC: 133 MMOL/L
WBC # FLD AUTO: 4.13 K/UL

## 2022-12-11 NOTE — ASSESSMENT
[FreeTextEntry1] : Mr. 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 Course was complicated with abdominal collection, infected hematoma s/p IR drainage x 2 with C tube, repositioning, s/p Augmentin for resistant E coli, serratia bacteremia 9/11, repeat BCx 9/15 negative, currently wait-listed for OLT MELD-Na: 18.  C-tube remains in place.  \par \par PLAN\par # Cirrhosis\par I discussed the meaning of cirrhosis with the patient. I reviewed the natural history of the disease. I explained the risks for the development of esophageal varices with and without bleeding, hepatic encephalopathy, ascites, hepatocellular carcinoma, and liver failure. I have explained that disease can progress to the point of requiring an evaluation for liver transplantation.\par \par I have explained the need for imaging every 6 months to screen for liver cancer.  Obtain labs during today's visit. \par \par Continue attending virtual rehab program weekly. We will check a Peth level today.\par \par # Ascites\par Mr. STEINER was counseled to adhere to a low sodium (<2 grams of sodium per day) diet by: avoiding adding any salt to meals (removing the salt shaker from the table); eliminating salty foods from his diet; eating more home-cooked meals; choosing fresh or frozen, not canned, vegetables and fruits; and reading ingredient and food labels to choose low sodium foods.\par \par Continue with Lasix 40 mg bid, and Aldactone 100 mg bid, refills provided.  \par \par # Portal hypertension\par No varices on recent ERCP. \par \par # Hepatic Encephalopathy\par I have explained that he will need to take Lactulose 30 ml 2-3 times daily, titrating for 2-3 semi formed bowel movements.\par \par # Abdominal collection\par CT from Sep 21, 2022: Near complete resolution of collection in anterior pelvis status post percutaneous drainage. Small persistent collection right subphrenic space. Persistent ascites and pleural effusions. No HCC. He will follow up with Dr. Nicholas, and assess removal. 100-150 cc drainage per 24 hrs. He has completed Rx with Bactrim DS for treatment of his E. Coli, and serratia. He remains afebrile, and doing well.  Will continue with prophylactic Bactrim. Will discuss with IR regarding drain removal.\par \par # Hx of SBP\par SBP: paracentesis 8/9/2022 revealed likely secondary bacterial peritonitis from suspected gallbladder pathology, +SBP on para 8/31, s/p treatment. Continue Bactrim DS for prophylaxis. \par \par # Transplant candidacy\par Listed= 8/26/2022, ABO= O, HCV donor acceptance on unet= yes\par MELD Na UNET 18, and his next recertification date 2/27/23 \par \par RTC in 6 weeks.

## 2022-12-11 NOTE — PHYSICAL EXAM
[No Acute Distress] : no acute distress [Scleral Icterus] : scleral icterus [Normal Hearing] : normal hearing [Normal Oropharynx] : normal oropharynx [No Respiratory Distress] : no respiratory distress [No Accessory Muscle Use] : no accessory muscle use [Clear to Auscultation] : lungs were clear to auscultation bilaterally [Normal S1, S2] : normal S1 and S2 [No Murmurs] : no murmurs heard [Normal Rate/Rhythm] : normal rate/rhythm [Normal Bowel Sounds] : normal bowel sounds [Non Tender] : non-tender [Soft] : soft [No CVA Tenderness] : no ~M costovertebral angle tenderness [No Spinal Tenderness] : no spinal tenderness [Normal Gait] : normal gait [Normal Strength/Tone] : muscle strength and tone were normal [Normal Color/Pigmentation] : normal color/pigmentation [No Rash] : no rash [Asterixis] : asterixis [No Focal Deficits] : no focal deficits [Normal Reflexes] : normal reflexes [No Motor Deficits] : no motor deficits [Splenomegaly] : splenomegaly [Hepatojugular Reflux] : no hepatojugular reflux [Abdominal Bruit] : no abdominal bruit [Ascites Fluid Wave] : no ascites fluid wave [Spider Angioma] : no spider angioma [Jaundice] : no jaundice [Palmar Erythema] : no palmar erythema [de-identified] : Cholecystostomy drain in place with ?ascitic fluid

## 2022-12-11 NOTE — HISTORY OF PRESENT ILLNESS
[Alcoholic Liver Disease] : Alcoholic Liver Disease [Ascites] : Ascites [Spontaneous Bacterial Peritonitis] : Spontaneous Bacterial Peritonitis [50: Requires considerable assistance and frequent medical care.] : 50: Requires considerable assistance and frequent medical care. [History of HCC] : No history of hepatocellular carcinoma [Previous Transplant] : No history of previous transplant [Diabetes] : No history of diabetes [Hypertension] : No history of hypertension [Coronary Artery Disease] : No history of coronary artery disease [Dialysis] : No history of dialysis [TextBox_42] : Mr. TESHA STEINER a 67 year Black or  Nadja male  presents today for  a hepatology appointment. 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 Course was complicated with abdominal collection, infected hematoma s/p IR drainage x 2 with C tube, repositioning 9/11,  Augmentin resistant E coli, serratia bacteremia 9/11, repeat BCx 9/15 negative, currently wait-listed for OLT MELD-Na: 23 .\par Since discharge from hospital, he has been doing well, still has abdominal distension. Denies any interval HE, worsening abdominal distension. No varices on on ERCP from 7/2022.\par S/P rodney drain exchange by IR 9/30/22, numerous filling defects c/w stones, to f/u drain eval in 2 wks\par CT from Sep 21, 2022: Cirrhosis and portal hypertension. Near complete resolution of collection in anterior pelvis status post percutaneous drainage. Small persistent collection right subphrenic space. Persistent ascites and pleural effusions. No HCC. \par \par SBP: paracentesis 8/9/2022 revealed likely secondary bacterial peritonitis from suspected gallbladder pathology, +SBP on para 8/31\par \par Recent blood test result from September 26, 2022 was reviewed.  This revealed hemoglobin 8.3 g/dL, platelet count 72,000.  Serum sodium 132, potassium 3.8, creatinine 0.63 mg/dL.  Liver test revealed total bilirubin 2.4, AST 26, ALT 9, alkaline phosphatase 82.  Albumin level was 3 g/dL.  INR was 2.56.  Blood type ABO O+.  Most recent CT scan of the abdomen from September 21, 2022 that was done with IV contrast revealed cirrhosis and portal hypertension.  Near complete resolution of collection in the anterior pelvis status post percutaneous drainage.  Small persistent collection in the right subphrenic space.  Persistent ascites and pleural effusion.  He is scheduled for tube check/exchange on September 30, 2022 under fluoroscopic examination by IR.\par \par Interval history dated December 7th, 2022:\par \par Patient presents to the office today for follow up visit. He still has the biliary drain in place which is averaging approximately 100-150cc every 24 hrs.  He needs refills on Pantoprazole, Lasix, Aldactone and Tamsulosin.  He is taking Lactulose and is reporting 3 BMs per day.  No melena or hematochezia.  He has started a virtual rehab program which he attends every Tuesday.  He denies any EtOH use.  MELD from recent admission in Nov 23rd was 18. \par

## 2022-12-11 NOTE — REVIEW OF SYSTEMS
[Fatigue] : fatigue [Negative] : Heme/Lymph [Fever] : no fever [Chills] : no chills [Night Sweats] : no night sweats [Sclera anicteric] : sclera icteric [Abdominal Pain] : no abdominal pain [Nausea] : no nausea [Constipation] : no constipation [Diarrhea] : diarrhea [Vomiting] : no vomiting

## 2022-12-15 LAB — PHOSPHATIDYETHANOL (PETH), WHOLE BLOOD: NEGATIVE NG/ML

## 2022-12-29 ENCOUNTER — INPATIENT (INPATIENT)
Facility: HOSPITAL | Age: 67
LOS: 1 days | Discharge: HOME | DRG: 908 | End: 2022-12-31
Attending: STUDENT IN AN ORGANIZED HEALTH CARE EDUCATION/TRAINING PROGRAM | Admitting: STUDENT IN AN ORGANIZED HEALTH CARE EDUCATION/TRAINING PROGRAM
Payer: MEDICARE

## 2022-12-29 VITALS
SYSTOLIC BLOOD PRESSURE: 114 MMHG | OXYGEN SATURATION: 99 % | TEMPERATURE: 99 F | HEIGHT: 64 IN | RESPIRATION RATE: 17 BRPM | HEART RATE: 86 BPM | DIASTOLIC BLOOD PRESSURE: 63 MMHG | WEIGHT: 126.99 LBS

## 2022-12-29 DIAGNOSIS — Z98.890 OTHER SPECIFIED POSTPROCEDURAL STATES: Chronic | ICD-10-CM

## 2022-12-29 LAB
ALBUMIN SERPL ELPH-MCNC: 3.5 G/DL — SIGNIFICANT CHANGE UP (ref 3.3–5)
ALP SERPL-CCNC: 185 U/L — HIGH (ref 40–120)
ALT FLD-CCNC: 21 U/L — SIGNIFICANT CHANGE UP (ref 10–45)
ANION GAP SERPL CALC-SCNC: 9 MMOL/L — SIGNIFICANT CHANGE UP (ref 5–17)
APTT BLD: 32.2 SEC — SIGNIFICANT CHANGE UP (ref 27.5–35.5)
AST SERPL-CCNC: 45 U/L — HIGH (ref 10–40)
BILIRUB SERPL-MCNC: 2.6 MG/DL — HIGH (ref 0.2–1.2)
BLD GP AB SCN SERPL QL: NEGATIVE — SIGNIFICANT CHANGE UP
BUN SERPL-MCNC: 19 MG/DL — SIGNIFICANT CHANGE UP (ref 7–23)
CALCIUM SERPL-MCNC: 9.6 MG/DL — SIGNIFICANT CHANGE UP (ref 8.4–10.5)
CHLORIDE SERPL-SCNC: 97 MMOL/L — SIGNIFICANT CHANGE UP (ref 96–108)
CO2 SERPL-SCNC: 21 MMOL/L — LOW (ref 22–31)
CREAT SERPL-MCNC: 1.05 MG/DL — SIGNIFICANT CHANGE UP (ref 0.5–1.3)
EGFR: 79 ML/MIN/1.73M2 — SIGNIFICANT CHANGE UP
FLUAV AG NPH QL: SIGNIFICANT CHANGE UP
FLUBV AG NPH QL: SIGNIFICANT CHANGE UP
GLUCOSE SERPL-MCNC: 105 MG/DL — HIGH (ref 70–99)
HCT VFR BLD CALC: 32.3 % — LOW (ref 39–50)
HGB BLD-MCNC: 11.2 G/DL — LOW (ref 13–17)
INR BLD: 1.37 RATIO — HIGH (ref 0.88–1.16)
LIDOCAIN IGE QN: 51 U/L — SIGNIFICANT CHANGE UP (ref 7–60)
MCHC RBC-ENTMCNC: 34.6 PG — HIGH (ref 27–34)
MCHC RBC-ENTMCNC: 34.7 GM/DL — SIGNIFICANT CHANGE UP (ref 32–36)
MCV RBC AUTO: 99.7 FL — SIGNIFICANT CHANGE UP (ref 80–100)
PLATELET # BLD AUTO: 88 K/UL — LOW (ref 150–400)
POTASSIUM SERPL-MCNC: 4.4 MMOL/L — SIGNIFICANT CHANGE UP (ref 3.5–5.3)
POTASSIUM SERPL-SCNC: 4.4 MMOL/L — SIGNIFICANT CHANGE UP (ref 3.5–5.3)
PROT SERPL-MCNC: 8.1 G/DL — SIGNIFICANT CHANGE UP (ref 6–8.3)
PROTHROM AB SERPL-ACNC: 16 SEC — HIGH (ref 10.5–13.4)
RBC # BLD: 3.24 M/UL — LOW (ref 4.2–5.8)
RBC # FLD: 14.8 % — HIGH (ref 10.3–14.5)
RH IG SCN BLD-IMP: POSITIVE — SIGNIFICANT CHANGE UP
RSV RNA NPH QL NAA+NON-PROBE: SIGNIFICANT CHANGE UP
SARS-COV-2 RNA SPEC QL NAA+PROBE: SIGNIFICANT CHANGE UP
SODIUM SERPL-SCNC: 127 MMOL/L — LOW (ref 135–145)
WBC # BLD: 5.58 K/UL — SIGNIFICANT CHANGE UP (ref 3.8–10.5)
WBC # FLD AUTO: 5.58 K/UL — SIGNIFICANT CHANGE UP (ref 3.8–10.5)

## 2022-12-29 PROCEDURE — 76705 ECHO EXAM OF ABDOMEN: CPT | Mod: 26

## 2022-12-29 PROCEDURE — 99285 EMERGENCY DEPT VISIT HI MDM: CPT

## 2022-12-29 NOTE — ED ADULT TRIAGE NOTE - CHIEF COMPLAINT QUOTE
pt c/o "gallbladder drain fell out" and RUQ pain s/p gallbladder stone removal in "March or April;" pt reports "they never told me when the drain was supposed to come out;" pt denies fevers, erythema, pus, or drainage from incision site

## 2022-12-29 NOTE — ED PROVIDER NOTE - RAPID ASSESSMENT
68 y/o M PMHx liver failure, H/O acute cholangitis, presents to the ED c/o gallbladder stone drain fell out today. Pt is well appearing in the ED.    Elliot GREWAL) have documented this rapid assessment note under the dictation of Raina BANSAL) which has been reviewed and affirmed to be accurate. Patient was seen as a QDOC patient. The patient will be seen and further worked up in the main emergency department and their care will be completed by the main emergency department team along with a thorough physical exam. Receiving team will follow up on labs, analgesia, any clinical imaging, reassess and disposition as clinically indicated, all decisions regarding the progression of care will be made at their discretion. 66 y/o M PMHx ESLD/ liver failure, H/O acute cholangitis, presents to the ED c/o gallbladder drain fell out today. Pt is well appearing in the ED.    Elliot GREWAL) have documented this rapid assessment note under the dictation of Raina BANSAL) which has been reviewed and affirmed to be accurate. Patient was seen as a QDOC patient. The patient will be seen and further worked up in the main emergency department and their care will be completed by the main emergency department team along with a thorough physical exam. Receiving team will follow up on labs, analgesia, any clinical imaging, reassess and disposition as clinically indicated, all decisions regarding the progression of care will be made at their discretion. 66 y/o M PMHx ESLD/ liver failure, H/O acute cholangitis, presents to the ED c/o gallbladder drain fell out today. Has had drain in for the last few months, was still draining over 100cc/day. Told it had to stay in until draining less. No fevers. Pt is well appearing in the ED.    I, Elliot Mac (Scribe) have documented this rapid assessment note under the dictation of Raina BANSAL) which has been reviewed and affirmed to be accurate. Patient was seen as a QDOC patient. The patient will be seen and further worked up in the main emergency department and their care will be completed by the main emergency department team along with a thorough physical exam. Receiving team will follow up on labs, analgesia, any clinical imaging, reassess and disposition as clinically indicated, all decisions regarding the progression of care will be made at their discretion.    Negra Kellogg MD - Attending Physician: The scribe's documentation has been prepared under my direction and personally reviewed by me in its entirety. I confirm that the note above accurately reflects all work, treatment, procedures, and medical decision making performed by me.

## 2022-12-29 NOTE — ED PROVIDER NOTE - PROGRESS NOTE DETAILS
Daniel Multani, DO PGY-2: CT shows no acute findings, labs are nonactionable besides mild hyponatremia to 127 which will give a small bolus of fluid.  Will speak with the transplant service regarding the necessity of the percutaneous cholecystostomy tube. Daniel Multani, DO PGY-2: Spoke with transplant service and agree with plan to have patient admitted to medicine with hepatology, transplant and IR determine if patient needs to have the tube replaced.

## 2022-12-29 NOTE — ED PROVIDER NOTE - PHYSICAL EXAMINATION
GEN: Patient awake alert NAD.   HEENT: normocephalic, atraumatic, EOMI, no scleral icterus, moist MM  CARDIAC: RRR, S1, S2, no murmur.   PULM: CTA B/L no wheeze, rhonchi, rales.   ABD: soft, Hepatomegaly, NT, ND, no rebound no guarding, no CVA tenderness.   MSK: Moving all extremities, no edema.    NEURO: A&Ox3, no focal neurological deficits  SKIN: warm, dry, no rash. GEN: Patient awake alert NAD.   HEENT: normocephalic, atraumatic, EOMI, no scleral icterus, moist MM  CARDIAC: RRR, S1, S2, no murmur.   PULM: CTA B/L no wheeze, rhonchi, rales.   ABD: soft, Hepatomegaly, NT, ND, no rebound no guarding, no CVA tenderness. Perc rodney tube site without tube, erythema or drainage in R flank.   MSK: Moving all extremities, no edema.    NEURO: A&Ox3, no focal neurological deficits  SKIN: warm, dry, no rash.

## 2022-12-29 NOTE — ED PROVIDER NOTE - OBJECTIVE STATEMENT
67-year-old male with past medical history of Espinoza cirrhosis, status post perc rodney tube for perforated gallbladder and acute cholangitis presents ED complaining of percutaneous Rodney tube dislodgment.  Patient states that the tube just fell out on its own.  Denies drainage, skin changes, fever, abdominal pain, nausea, vomiting, chest pain, shortness of breath, diarrhea, dysuria.

## 2022-12-29 NOTE — ED PROVIDER NOTE - ATTENDING CONTRIBUTION TO CARE
Afebrile. Awake and Alert. Lungs CTA. Heart RRR. Abdomen soft NTND, perc drain site c/d no erythema no discharge. CN II-XII grossly intact. Moves all extremities without lateralization. No jaundice    r/o cholangitis/acute rodney  Pt will jay need perc drain replaced as he reports 100 cc q 12 hour output  IR/Hepatology consults

## 2022-12-29 NOTE — ED PROVIDER NOTE - NS ED ROS FT
GENERAL: No fever, chills  EYES: no vision changes, no discharge.   ENT: no difficulty swallowing or speaking   CARDIAC: no chest pain/pressure, SOB, lower extremity swelling  PULMONARY: no cough, SOB  GI: no abdominal pain, n/v/d  : no dysuria, no hematuria  SKIN: no rashes, no ecchymosis  NEURO: no headache, lightheadedness  MSK: No joint pain, myalgia, weakness.

## 2022-12-29 NOTE — ED PROVIDER NOTE - CLINICAL SUMMARY MEDICAL DECISION MAKING FREE TEXT BOX
Daniel Multani,  PGY-2: 67-year-old male with past medical history of Espinoza cirrhosis, status post perc rodney tube for perforated gallbladder and acute cholangitis presents ED complaining of percutaneous Rodney tube dislodgment.  Patient states that the tube just fell out on its own.  Denies drainage, skin changes, fever, abdominal pain, nausea, vomiting, chest pain, shortness of breath, diarrhea, dysuria. Patient very well-appearing with no abdominal tenderness, hepatomegaly.  Differential includes not limited to biliary abscess versus other intra-abdominal pathology.  Plan for labs, CT, ultrasound.  Reassess

## 2022-12-30 DIAGNOSIS — T85.528A DISPLACEMENT OF OTHER GASTROINTESTINAL PROSTHETIC DEVICES, IMPLANTS AND GRAFTS, INITIAL ENCOUNTER: ICD-10-CM

## 2022-12-30 DIAGNOSIS — E87.1 HYPO-OSMOLALITY AND HYPONATREMIA: ICD-10-CM

## 2022-12-30 DIAGNOSIS — D69.6 THROMBOCYTOPENIA, UNSPECIFIED: ICD-10-CM

## 2022-12-30 DIAGNOSIS — Z87.19 PERSONAL HISTORY OF OTHER DISEASES OF THE DIGESTIVE SYSTEM: ICD-10-CM

## 2022-12-30 LAB
ALBUMIN SERPL ELPH-MCNC: 2.9 G/DL — LOW (ref 3.3–5)
ALP SERPL-CCNC: 130 U/L — HIGH (ref 40–120)
ALT FLD-CCNC: 18 U/L — SIGNIFICANT CHANGE UP (ref 10–45)
ANION GAP SERPL CALC-SCNC: 8 MMOL/L — SIGNIFICANT CHANGE UP (ref 5–17)
AST SERPL-CCNC: 37 U/L — SIGNIFICANT CHANGE UP (ref 10–40)
BASOPHILS # BLD AUTO: 0.02 K/UL — SIGNIFICANT CHANGE UP (ref 0–0.2)
BASOPHILS # BLD AUTO: 0.04 K/UL — SIGNIFICANT CHANGE UP (ref 0–0.2)
BASOPHILS NFR BLD AUTO: 0.5 % — SIGNIFICANT CHANGE UP (ref 0–2)
BASOPHILS NFR BLD AUTO: 0.8 % — SIGNIFICANT CHANGE UP (ref 0–2)
BILIRUB SERPL-MCNC: 3 MG/DL — HIGH (ref 0.2–1.2)
BUN SERPL-MCNC: 16 MG/DL — SIGNIFICANT CHANGE UP (ref 7–23)
CALCIUM SERPL-MCNC: 9.1 MG/DL — SIGNIFICANT CHANGE UP (ref 8.4–10.5)
CHLORIDE SERPL-SCNC: 100 MMOL/L — SIGNIFICANT CHANGE UP (ref 96–108)
CO2 SERPL-SCNC: 21 MMOL/L — LOW (ref 22–31)
CREAT SERPL-MCNC: 0.88 MG/DL — SIGNIFICANT CHANGE UP (ref 0.5–1.3)
EGFR: 95 ML/MIN/1.73M2 — SIGNIFICANT CHANGE UP
EOSINOPHIL # BLD AUTO: 0.04 K/UL — SIGNIFICANT CHANGE UP (ref 0–0.5)
EOSINOPHIL # BLD AUTO: 0.11 K/UL — SIGNIFICANT CHANGE UP (ref 0–0.5)
EOSINOPHIL NFR BLD AUTO: 0.8 % — SIGNIFICANT CHANGE UP (ref 0–6)
EOSINOPHIL NFR BLD AUTO: 3 % — SIGNIFICANT CHANGE UP (ref 0–6)
GLUCOSE SERPL-MCNC: 89 MG/DL — SIGNIFICANT CHANGE UP (ref 70–99)
HCT VFR BLD CALC: 29.4 % — LOW (ref 39–50)
HGB BLD-MCNC: 10.3 G/DL — LOW (ref 13–17)
IMM GRANULOCYTES NFR BLD AUTO: 0.5 % — SIGNIFICANT CHANGE UP (ref 0–0.9)
LYMPHOCYTES # BLD AUTO: 1.01 K/UL — SIGNIFICANT CHANGE UP (ref 1–3.3)
LYMPHOCYTES # BLD AUTO: 1.18 K/UL — SIGNIFICANT CHANGE UP (ref 1–3.3)
LYMPHOCYTES # BLD AUTO: 21.1 % — SIGNIFICANT CHANGE UP (ref 13–44)
LYMPHOCYTES # BLD AUTO: 27.3 % — SIGNIFICANT CHANGE UP (ref 13–44)
MANUAL SMEAR VERIFICATION: SIGNIFICANT CHANGE UP
MCHC RBC-ENTMCNC: 35 GM/DL — SIGNIFICANT CHANGE UP (ref 32–36)
MCHC RBC-ENTMCNC: 35.2 PG — HIGH (ref 27–34)
MCV RBC AUTO: 100.3 FL — HIGH (ref 80–100)
MONOCYTES # BLD AUTO: 0.59 K/UL — SIGNIFICANT CHANGE UP (ref 0–0.9)
MONOCYTES # BLD AUTO: 0.65 K/UL — SIGNIFICANT CHANGE UP (ref 0–0.9)
MONOCYTES NFR BLD AUTO: 10.6 % — SIGNIFICANT CHANGE UP (ref 2–14)
MONOCYTES NFR BLD AUTO: 17.6 % — HIGH (ref 2–14)
NEUTROPHILS # BLD AUTO: 1.89 K/UL — SIGNIFICANT CHANGE UP (ref 1.8–7.4)
NEUTROPHILS # BLD AUTO: 3.68 K/UL — SIGNIFICANT CHANGE UP (ref 1.8–7.4)
NEUTROPHILS NFR BLD AUTO: 51.1 % — SIGNIFICANT CHANGE UP (ref 43–77)
NEUTROPHILS NFR BLD AUTO: 65.1 % — SIGNIFICANT CHANGE UP (ref 43–77)
NEUTS BAND # BLD: 0.8 % — SIGNIFICANT CHANGE UP (ref 0–8)
NRBC # BLD: 0 /100 WBCS — SIGNIFICANT CHANGE UP (ref 0–0)
OSMOLALITY UR: 524 MOS/KG — SIGNIFICANT CHANGE UP (ref 300–900)
PLAT MORPH BLD: NORMAL — SIGNIFICANT CHANGE UP
PLATELET # BLD AUTO: 65 K/UL — LOW (ref 150–400)
POTASSIUM SERPL-MCNC: 4.3 MMOL/L — SIGNIFICANT CHANGE UP (ref 3.5–5.3)
POTASSIUM SERPL-SCNC: 4.3 MMOL/L — SIGNIFICANT CHANGE UP (ref 3.5–5.3)
PROT SERPL-MCNC: 7 G/DL — SIGNIFICANT CHANGE UP (ref 6–8.3)
RBC # BLD: 2.93 M/UL — LOW (ref 4.2–5.8)
RBC # FLD: 14.7 % — HIGH (ref 10.3–14.5)
RBC BLD AUTO: NORMAL — SIGNIFICANT CHANGE UP
SODIUM SERPL-SCNC: 129 MMOL/L — LOW (ref 135–145)
SODIUM UR-SCNC: 74 MMOL/L — SIGNIFICANT CHANGE UP
VARIANT LYMPHS # BLD: 0.8 % — SIGNIFICANT CHANGE UP (ref 0–6)
WBC # BLD: 3.7 K/UL — LOW (ref 3.8–10.5)
WBC # FLD AUTO: 3.7 K/UL — LOW (ref 3.8–10.5)

## 2022-12-30 PROCEDURE — 47531 INJECTION FOR CHOLANGIOGRAM: CPT

## 2022-12-30 PROCEDURE — 74177 CT ABD & PELVIS W/CONTRAST: CPT | Mod: 26,MA

## 2022-12-30 PROCEDURE — 99222 1ST HOSP IP/OBS MODERATE 55: CPT | Mod: GC

## 2022-12-30 RX ORDER — TAMSULOSIN HYDROCHLORIDE 0.4 MG/1
0.4 CAPSULE ORAL AT BEDTIME
Refills: 0 | Status: DISCONTINUED | OUTPATIENT
Start: 2022-12-30 | End: 2022-12-31

## 2022-12-30 RX ORDER — LACTULOSE 10 G/15ML
10 SOLUTION ORAL THREE TIMES A DAY
Refills: 0 | Status: DISCONTINUED | OUTPATIENT
Start: 2022-12-30 | End: 2022-12-31

## 2022-12-30 RX ORDER — SODIUM CHLORIDE 9 MG/ML
500 INJECTION INTRAMUSCULAR; INTRAVENOUS; SUBCUTANEOUS ONCE
Refills: 0 | Status: COMPLETED | OUTPATIENT
Start: 2022-12-30 | End: 2022-12-30

## 2022-12-30 RX ORDER — LANOLIN ALCOHOL/MO/W.PET/CERES
3 CREAM (GRAM) TOPICAL AT BEDTIME
Refills: 0 | Status: DISCONTINUED | OUTPATIENT
Start: 2022-12-30 | End: 2022-12-31

## 2022-12-30 RX ORDER — ONDANSETRON 8 MG/1
4 TABLET, FILM COATED ORAL EVERY 8 HOURS
Refills: 0 | Status: DISCONTINUED | OUTPATIENT
Start: 2022-12-30 | End: 2022-12-31

## 2022-12-30 RX ORDER — ACETAMINOPHEN 500 MG
650 TABLET ORAL EVERY 6 HOURS
Refills: 0 | Status: DISCONTINUED | OUTPATIENT
Start: 2022-12-30 | End: 2022-12-31

## 2022-12-30 RX ORDER — FUROSEMIDE 40 MG
40 TABLET ORAL
Refills: 0 | Status: DISCONTINUED | OUTPATIENT
Start: 2022-12-30 | End: 2022-12-31

## 2022-12-30 RX ORDER — SPIRONOLACTONE 25 MG/1
100 TABLET, FILM COATED ORAL
Refills: 0 | Status: DISCONTINUED | OUTPATIENT
Start: 2022-12-30 | End: 2022-12-31

## 2022-12-30 RX ORDER — PANTOPRAZOLE SODIUM 20 MG/1
40 TABLET, DELAYED RELEASE ORAL
Refills: 0 | Status: DISCONTINUED | OUTPATIENT
Start: 2022-12-30 | End: 2022-12-31

## 2022-12-30 RX ADMIN — SODIUM CHLORIDE 500 MILLILITER(S): 9 INJECTION INTRAMUSCULAR; INTRAVENOUS; SUBCUTANEOUS at 04:30

## 2022-12-30 RX ADMIN — TAMSULOSIN HYDROCHLORIDE 0.4 MILLIGRAM(S): 0.4 CAPSULE ORAL at 22:19

## 2022-12-30 NOTE — CONSULT NOTE ADULT - REASON FOR ADMISSION
abdominal pain and RUQ, gallbladder stone drain fell out.
abdominal pain and RUQ, gallbladder stone drain fell out.

## 2022-12-30 NOTE — H&P ADULT - HISTORY OF PRESENT ILLNESS
66 YO patient cirrhosis 2/2 ALD/PARNELL f/up by liver transplant team, cholangitis s/p ERCP with stent placement (4/2022) with subsequent repeat ERCP with stent removal, sphincterotomy, and balloon sweep removal of sludge/stones (7/20/22) perforated gallbladder resulting in peritonitis, hemoperitoneum s/p cholecystostomy tube  which FELL out on 12/29 as per patient .      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  Pt states that he had feeling of something sticking him at the side of the cholecystostomy and decrease drainage and later noticed the tube fell out .  No fever, no chills , no palpitation, no purulence from the area.    In the ED, patient was hemodynamically stable .

## 2022-12-30 NOTE — H&P ADULT - REASON FOR ADMISSION
abdominal pain and RUQ, gallbladder stone drain fell out. abdominal pain and RUQ, gallbladder drain fell out.

## 2022-12-30 NOTE — H&P ADULT - NSHPADDITIONALINFOADULT_GEN_ALL_CORE
Eusebia Manzo   Hospitalist   [phone]   Available via TEAMS Case was discussed with the IR Shaneka LOPEZ J who recommended to monitor patient as IR was not successful at replacing the Cholecystostomy tube and if there is any change in hemodynamics, to order abd CT , f/up hep rec   Case was discussed with the ED ACP , JOHN Manzo   Hospitalist   [phone]   Available via TEAMS

## 2022-12-30 NOTE — ED ADULT NURSE NOTE - NSIMPLEMENTINTERV_GEN_ALL_ED
Implemented All Universal Safety Interventions:  [place] to call system. Call bell, personal items and telephone within reach. Instruct patient to call for assistance. Room bathroom lighting operational. Non-slip footwear when patient is off stretcher. Physically safe environment: no spills, clutter or unnecessary equipment. Stretcher in lowest position, wheels locked, appropriate side rails in place.

## 2022-12-30 NOTE — H&P ADULT - PROBLEM SELECTOR PLAN 2
S/P IVF in the ED   will f/up repeat CMP   f/up urine Lytes   MIght need Albumin, but will hold for now  monitor renal function

## 2022-12-30 NOTE — PATIENT PROFILE ADULT - FALL HARM RISK - HARM RISK INTERVENTIONS

## 2022-12-30 NOTE — H&P ADULT - ASSESSMENT
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  Pt states that he had feeling of something sticking him at the side of the cholecystostomy and decrease drainage and later noticed the tube fell out .  No fever, no chills , no palpitation, no purulence from the area.    In the ED, patient was hemodynamically stable .

## 2022-12-30 NOTE — ED ADULT NURSE NOTE - OBJECTIVE STATEMENT
67 year old male, A&Ox3, PMH of liver failure, gallstone removal with drain from April, presenting to ED complaining of drain falling out. Pt states he got two drains placed in April, one was removed in September but the other one fell out today. Patient states they never told him when the drain would come out. Patient denies pain at this time but was concerned that his drain came out which caused him to come here today. Patient denies chest pain, N/V/D, HA, fever, chills at this time. Patient covered drain site with bandage, no erythema, redness, swelling noted at drain site. Heart rate and rhythm regular. Respirations clear and equal bilaterally. Abdomen soft, nontender and nondistended. Peripheral pulses strong and equal bilaterally. Safety and comfort measures maintained.

## 2022-12-30 NOTE — CONSULT NOTE ADULT - ASSESSMENT
67 year old male with history of decompensated cirrhosis 2/2 ALD/PARNELL listed for OLT, cholangitis s/p ERCP with stent placement (4/2022) with subsequent repeat ERCP with stent removal, sphincterotomy, and balloon sweep removal of sludge/stones (7/20/22) perforated gallbladder resulting in peritonitis, hemoperitoneum s/p cholecystostomy tube, recently hospitalized in September 2022 for sepsis with serratia bacteremia found to have multiple large abdominal fluid collections s/p drain x 2 (9/11/22, RUQ drain removed), another recent hospitalization in November 2022 for HE, who presents after perc rodney tube fell out on 12/29/2022.    # Dislodged percutaneous cholecystostomy tube  # Decompensated ALD/PARNELL cirrhosis c/b ascites/HE       -MELD-Na = 22 on 12/30/2022       -Ascites: continue home regimen of Lasix 40mg BID/Aldactone 100mg BID       -SBP: secondary bacterial peritonitis in the setting of GB perforation; on chronic Bactrim ppx       -Varices: none seen on ERCP       -Hepatic encephalopathy: history of HE, controlled on prescribed lactulose at home       -HCC: AFP undetectable as outpatient     Recommendations:  -trend clinical symptoms, exam findings, vital signs, CBC, CMP, INR  -continue home medications for diuresis, secondary bacterial peritonitis, and HE PPx  -IR consulted for replacement of perc rodney tube; if not feasible, will need recommendations from transplant surgery    Note incomplete until finalized by attending signature/attestation.    Fadi Churchill  GI/Hepatology Fellow    MONDAY-FRIDAY 8AM-5PM:  Pager# [phone] ([facility]) or [phone] ([facility])    NON-URGENT CONSULTS:  Please email [email or web address] OR [email or web address]  AT NIGHT AND ON WEEKENDS:  Contact on-call GI fellow via answering service ([phone]) from 5pm-8am and on weekends/holidays              67 year old male with history of decompensated cirrhosis 2/2 ALD/PARNELL listed for OLT, cholangitis s/p ERCP with stent placement (4/2022) with subsequent repeat ERCP with stent removal, sphincterotomy, and balloon sweep removal of sludge/stones (7/20/22) perforated gallbladder resulting in peritonitis, hemoperitoneum s/p cholecystostomy tube, recently hospitalized in September 2022 for sepsis with serratia bacteremia found to have multiple large abdominal fluid collections s/p drain x 2 (9/11/22, RUQ drain removed), another recent hospitalization in November 2022 for HE, who presents after perc rodney tube fell out on 12/29/2022.    # Dislodged percutaneous cholecystostomy tube  # Decompensated ALD/PARNELL cirrhosis c/b ascites/HE -MELD-Na = 22 on 12/30/2022       -Ascites: continue home regimen of Lasix 40mg BID/Aldactone 100mg BID       -SBP: secondary bacterial peritonitis in the setting of GB perforation; on chronic Bactrim ppx       -Varices: none seen on ERCP       -Hepatic encephalopathy: history of HE, controlled on prescribed lactulose at home       -HCC: AFP undetectable as outpatient     Recommendations:  -trend clinical symptoms, exam findings, vital signs, CBC, CMP, INR  -continue home medications for diuresis, secondary bacterial peritonitis, and HE PPx  -IR consulted for replacement of perc rodney tube; if not feasible, will need recommendations from transplant surgery    OK to discharge from hepatology standpoint. We will sign off at this time. Please reconsult/page if questions.      Follow up in Hepatology Clinic with Dr. Velasco (has appt 1/18/23): [phone] ([facility] at [address] [place] [address])       Note incomplete until finalized by attending signature/attestation.    Fadi Churchill  GI/Hepatology Fellow    MONDAY-FRIDAY 8AM-5PM:  Pager# [phone] ([facility]) or [phone] ([facility])    NON-URGENT CONSULTS:  Please email [email or web address]  AT NIGHT AND ON WEEKENDS:  Contact on-call GI fellow via answering service ([phone]) from 5pm-8am and on weekends/holidays              Labs/Imaging Studies/Medications

## 2022-12-30 NOTE — H&P ADULT - PROBLEM SELECTOR PLAN 1
0
S/P Cholecystomy tube which fell as per patient   IR and hepatology and transplant teams are consulted   cont to closely monitor patient   cont home medications   NPO until seen by the specialist  F/up AM Blood CX as pt has h/o Bacteremia ( in 9/2022)

## 2022-12-30 NOTE — CONSULT NOTE ADULT - SUBJECTIVE AND OBJECTIVE BOX
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     Allergies:   Medications (Abx/Cardiac/Anticoagulation/Blood Products)      Data:  162.6  57.6  T(C): 36.9  HR: 69  BP: 96/64  RR: 18  SpO2: 100%    LABS:                          10.3   3.70  )-----------( 65       ( 30 Dec 2022 13:47 )             29.4     12-29    127<L>  |  97  |  19  ----------------------------<  105<H>  4.4   |  21<L>  |  1.05    Ca    9.6      29 Dec 2022 22:32    TPro  8.1  /  Alb  3.5  /  TBili  2.6<H>  /  DBili  x   /  AST  45<H>  /  ALT  21  /  AlkPhos  185<H>  12-29    PT/INR - ( 29 Dec 2022 22:32 )   PT: 16.0 sec;   INR: 1.37 ratio         PTT - ( 29 Dec 2022 22:32 )  PTT:32.2 sec    Radiology: reviewed    Assessment/Plan:  68 yo s/p perc rodney placed 8/9 now with inadvertent removal.     -- IR will plan to perform replacement today 12/30/22  -- please place IR procedure request order under Dr. Loco Carbajal MD   Interventional Radiology PGY 4  Available on [employer] TEAMS    For EMERGENT inquiries/questions:  IR Pager ([facility]): [phone]  IR Pager (J): [phone] ; [id number]    For non-emergent consults/questions:   Please place a sunrise order "Consult- Interventional Radiology" with an appropriate callback number    For questions about scheduling during appropriate work hours, call IR :  [facility]: [phone]  [facility]: [phone]    For outpatient IR booking:  [facility]: [phone]  [facility]: [phone]
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    Patient states he was and is in his usual state of health until 12/29/2022 when he was fixing/moving a dresser and his perc rodney tube fell out.  It had been draining appropriately up until that point.  He is A/Ox3, although has poor healthcare literacy regarding his medications [names, dosing, frequency, etc].  He had a recent outpatient hepatology appointment with Dr. Velasco on 12/9/2022.  He endorses alcohol abstinence and enrollment in [facility] that he has begun as outpatient.  Otherwise, patient denies fevers, chills, weight loss, dysphagia, odynophagia, early satiety, poor oral intake, abdominal pain, nausea, vomiting, diarrhea, melena, hematemesis, hematochezia.    ROS:   General:  No fevers, chills, night sweats, fatigue  Eyes:  Good vision, no reported pain  ENT:  No sore throat, pain, runny nose  CV:  No pain, palpitations  Pulm:  No dyspnea, cough  GI:  See HPI, otherwise negative  :  No  incontinence, nocturia  Muscle:  No pain, weakness  Neuro:  No memory problems  Psych:  No insomnia, mood problems, depression  Endocrine:  No polyuria, polydipsia, cold/heat intolerance  Heme:  No petechiae, ecchymosis, easy bruisability  Skin:  No rash    PMHX/PSHX:    No pertinent past medical history    H/O acute cholangitis    2019 novel coronavirus disease (COVID-19)    History of liver failure      Allergies:  No Known Allergies      Hospital Medications:  acetaminophen     Tablet .. 650 milliGRAM(s) Oral every 6 hours PRN  aluminum hydroxide/magnesium hydroxide/simethicone Suspension 30 milliLiter(s) Oral every 4 hours PRN  furosemide    Tablet 40 milliGRAM(s) Oral two times a day  lactulose Syrup 10 Gram(s) Oral three times a day  melatonin 3 milliGRAM(s) Oral at bedtime PRN  ondansetron Injectable 4 milliGRAM(s) IV Push every 8 hours PRN  pantoprazole    Tablet 40 milliGRAM(s) Oral before breakfast  spironolactone 100 milliGRAM(s) Oral two times a day  tamsulosin 0.4 milliGRAM(s) Oral at bedtime    MAR over past 24 hours:    sodium chloride 0.9% Bolus   500 mL/Hr IV Bolus (12-30-22 @ 04:30)      Home Medications:  Last Order Reconciliation Date: 12-30-22 @ 14:20 (Admission Reconciliation)  furosemide 40 mg oral tablet: 1 tab(s) orally 2 times a day  lactulose 10 g oral powder for reconstitution: 10 gram(s) orally 3 times a leti  pantoprazole 40 mg oral delayed release tablet: 1 tab(s) orally once a day (before a meal)  spironolactone 25 mg oral tablet: 4 tab(s) orally 2 times a day  tamsulosin 0.4 mg oral capsule: 1 cap(s) orally once a day (at bedtime)      Social History:   Tobacco: denies  Alcohol: denies  Recreational drugs: denies    Family history:    No pertinent family history in first degree relatives        PHYSICAL EXAM:   Vital Signs last 24 hours:  T(F): 98.4 (12-30-22 @ 10:50), Max: 99.4 (12-30-22 @ 02:17)  HR: 69 (12-30-22 @ 10:50) (63 - 86)  BP: 96/64 (12-30-22 @ 10:50) (96/64 - 121/74)  BP(mean): 75 (12-30-22 @ 10:50) (70 - 87)  ABP: --  ABP(mean): --  RR: 18 (12-30-22 @ 10:50) (16 - 18)  SpO2: 100% (12-30-22 @ 10:50) (97% - 100%)    I&Os:    BMI (kg/m2): 21.8 (12-29-22 @ 20:15)  GENERAL: no acute distress  NEURO: alert  HEENT: NCAT, no conjunctival pallor appreciated  CHEST: no respiratory distress, no accessory muscle use  CARDIAC: regular rate, +S1/S2  ABDOMEN: soft, nontender, no rebound or guarding  EXTREMITIES: warm, well perfused  SKIN: no lesions noted    DIAGNOSTICS:  WBC      Hg       PLT      Na       K        CO2     BUN      Cr       ALT      AST      TB       ALP  3.70     10.3     65       ------   ------   ------   ------   ------   ------   ------   ------   ------   12-30-22 @ 13:47  5.58     11.2     88       127      4.4      21       19       1.05     21       45       2.6      185      12-29-22 @ 22:32  4.36     9.9      69       131      3.7      23       21       0.92     ------   ------   ------   ------   11-25-22 @ 07:01  5.05     11.5     78       130      4.2      20       15       0.82     ------   ------   ------   ------   11-24-22 @ 06:58  ------   ------   ------   131      3.9      19       15       1.05     15       31       1.7      117      11-23-22 @ 07:23    PT             INR            MELDwith  MELDw/o  16.0           1.37           --             --             12-29-22 @ 22:32  --             --             29             18             11-23-22 @ 07:23  16.8           1.44           --             --             11-23-22 @ 07:09  30.0           2.56           --             --             09-26-22 @ 06:18  29.6           2.55           --             --             09-25-22 @ 06:45

## 2022-12-31 ENCOUNTER — TRANSCRIPTION ENCOUNTER (OUTPATIENT)
Age: 67
End: 2022-12-31

## 2022-12-31 VITALS
SYSTOLIC BLOOD PRESSURE: 114 MMHG | RESPIRATION RATE: 18 BRPM | TEMPERATURE: 98 F | OXYGEN SATURATION: 99 % | HEART RATE: 79 BPM | DIASTOLIC BLOOD PRESSURE: 71 MMHG

## 2022-12-31 LAB
ALBUMIN SERPL ELPH-MCNC: 2.6 G/DL — LOW (ref 3.3–5)
ALP SERPL-CCNC: 106 U/L — SIGNIFICANT CHANGE UP (ref 40–120)
ALT FLD-CCNC: 16 U/L — SIGNIFICANT CHANGE UP (ref 10–45)
ANION GAP SERPL CALC-SCNC: 8 MMOL/L — SIGNIFICANT CHANGE UP (ref 5–17)
AST SERPL-CCNC: 33 U/L — SIGNIFICANT CHANGE UP (ref 10–40)
BILIRUB SERPL-MCNC: 2.3 MG/DL — HIGH (ref 0.2–1.2)
BUN SERPL-MCNC: 17 MG/DL — SIGNIFICANT CHANGE UP (ref 7–23)
CALCIUM SERPL-MCNC: 9 MG/DL — SIGNIFICANT CHANGE UP (ref 8.4–10.5)
CHLORIDE SERPL-SCNC: 101 MMOL/L — SIGNIFICANT CHANGE UP (ref 96–108)
CO2 SERPL-SCNC: 20 MMOL/L — LOW (ref 22–31)
CREAT SERPL-MCNC: 0.89 MG/DL — SIGNIFICANT CHANGE UP (ref 0.5–1.3)
EGFR: 95 ML/MIN/1.73M2 — SIGNIFICANT CHANGE UP
GLUCOSE SERPL-MCNC: 80 MG/DL — SIGNIFICANT CHANGE UP (ref 70–99)
HCT VFR BLD CALC: 30 % — LOW (ref 39–50)
HGB BLD-MCNC: 10.3 G/DL — LOW (ref 13–17)
MCHC RBC-ENTMCNC: 34.3 GM/DL — SIGNIFICANT CHANGE UP (ref 32–36)
MCHC RBC-ENTMCNC: 34.6 PG — HIGH (ref 27–34)
MCV RBC AUTO: 100.7 FL — HIGH (ref 80–100)
NRBC # BLD: 0 /100 WBCS — SIGNIFICANT CHANGE UP (ref 0–0)
PLATELET # BLD AUTO: 70 K/UL — LOW (ref 150–400)
POTASSIUM SERPL-MCNC: 4.8 MMOL/L — SIGNIFICANT CHANGE UP (ref 3.5–5.3)
POTASSIUM SERPL-SCNC: 4.8 MMOL/L — SIGNIFICANT CHANGE UP (ref 3.5–5.3)
PROT SERPL-MCNC: 6.8 G/DL — SIGNIFICANT CHANGE UP (ref 6–8.3)
RBC # BLD: 2.98 M/UL — LOW (ref 4.2–5.8)
RBC # FLD: 14.5 % — SIGNIFICANT CHANGE UP (ref 10.3–14.5)
SODIUM SERPL-SCNC: 129 MMOL/L — LOW (ref 135–145)
WBC # BLD: 3.82 K/UL — SIGNIFICANT CHANGE UP (ref 3.8–10.5)
WBC # FLD AUTO: 3.82 K/UL — SIGNIFICANT CHANGE UP (ref 3.8–10.5)

## 2022-12-31 PROCEDURE — 87040 BLOOD CULTURE FOR BACTERIA: CPT

## 2022-12-31 PROCEDURE — 83935 ASSAY OF URINE OSMOLALITY: CPT

## 2022-12-31 PROCEDURE — C1769: CPT

## 2022-12-31 PROCEDURE — 86850 RBC ANTIBODY SCREEN: CPT

## 2022-12-31 PROCEDURE — 47531 INJECTION FOR CHOLANGIOGRAM: CPT

## 2022-12-31 PROCEDURE — 74177 CT ABD & PELVIS W/CONTRAST: CPT | Mod: MA

## 2022-12-31 PROCEDURE — 85025 COMPLETE CBC W/AUTO DIFF WBC: CPT

## 2022-12-31 PROCEDURE — 43870 CLOSURE GASTROSTOMY SURGICAL: CPT

## 2022-12-31 PROCEDURE — 86901 BLOOD TYPING SEROLOGIC RH(D): CPT

## 2022-12-31 PROCEDURE — C1887: CPT

## 2022-12-31 PROCEDURE — 99285 EMERGENCY DEPT VISIT HI MDM: CPT | Mod: 25

## 2022-12-31 PROCEDURE — 47490 INCISION OF GALLBLADDER: CPT

## 2022-12-31 PROCEDURE — 84300 ASSAY OF URINE SODIUM: CPT

## 2022-12-31 PROCEDURE — 80053 COMPREHEN METABOLIC PANEL: CPT

## 2022-12-31 PROCEDURE — 76705 ECHO EXAM OF ABDOMEN: CPT

## 2022-12-31 PROCEDURE — 36415 COLL VENOUS BLD VENIPUNCTURE: CPT

## 2022-12-31 PROCEDURE — 86900 BLOOD TYPING SEROLOGIC ABO: CPT

## 2022-12-31 PROCEDURE — 99239 HOSP IP/OBS DSCHRG MGMT >30: CPT

## 2022-12-31 RX ADMIN — SPIRONOLACTONE 100 MILLIGRAM(S): 25 TABLET, FILM COATED ORAL at 06:19

## 2022-12-31 RX ADMIN — Medication 40 MILLIGRAM(S): at 14:22

## 2022-12-31 RX ADMIN — LACTULOSE 10 GRAM(S): 10 SOLUTION ORAL at 14:23

## 2022-12-31 RX ADMIN — SPIRONOLACTONE 100 MILLIGRAM(S): 25 TABLET, FILM COATED ORAL at 17:47

## 2022-12-31 RX ADMIN — Medication 40 MILLIGRAM(S): at 06:18

## 2022-12-31 RX ADMIN — LACTULOSE 10 GRAM(S): 10 SOLUTION ORAL at 06:18

## 2022-12-31 RX ADMIN — PANTOPRAZOLE SODIUM 40 MILLIGRAM(S): 20 TABLET, DELAYED RELEASE ORAL at 06:18

## 2022-12-31 NOTE — DISCHARGE NOTE NURSING/CASE MANAGEMENT/SOCIAL WORK - NSDCVIVACCINE_GEN_ALL_CORE_FT
HepB-CpG; 01-Sep-2022 18:40; Silva Hurley (RN); [employer] [id number] (Exp. Date: 30-Jan-2023); IntraMuscular; Deltoid Right.; 20 MICROGram(s); VIS (VIS Published: 15-Oct-2021, VIS Presented: 01-Sep-2022);   Pneumococcal conjugate vaccine 20-valent (PCV20), polysaccharide [id number] conjugate, adjuvant, preserv; 01-Sep-2022 20:46; Hilario Valero (RN); [employer]; [id number] (Exp. Date: 01-Jul-2023); IntraMuscular; Deltoid Left.; 0.5 milliLiter(s); VIS (VIS Published: 04-Feb-2022, VIS Presented: 01-Sep-2022);

## 2022-12-31 NOTE — PROGRESS NOTE ADULT - PROBLEM SELECTOR PLAN 1
S/P Cholecystomy tube dislodged   Patient was seen by IR who unsuccessfully attempted but there was NO identifiable tract to the gallbladder---> will need to discussed with  hep team about the contacting the transplant surgery team.  cont to closely monitor patient   cont home medications   F/up AM Blood CX as pt has h/o Bacteremia ( in 9/2022)

## 2022-12-31 NOTE — DISCHARGE NOTE PROVIDER - PROVIDER TOKENS
PROVIDER:[TOKEN:[[id number]],FOLLOWUP:[1 week]],PROVIDER:[TOKEN:[[id number]],SCHEDULEDAPPT:[01/18/2023]]

## 2022-12-31 NOTE — DISCHARGE NOTE PROVIDER - CARE PROVIDER_API CALL
TANISHA HARDIN  Internal Medicine  [address] [place], [place] [address]  Phone: [phone]  Fax: [phone]  Follow Up Time: 1 week    Darren Velasco (BALBIR; MS)  Gastroenterology; Internal Medicine; Transplant Hepatology  [address] [place] [address]  Phone: [phone]  Fax: [phone]  Scheduled Appointment: 01/18/2023

## 2022-12-31 NOTE — PROGRESS NOTE ADULT - ASSESSMENT
Patient with no symptom but displaced cholecystostomy tube.   Ct scan showed a small size GB with thickened wall  IR attempted to re-insert the tube but tract was closed   No clinical sign of acute cholecystitis   Stable from hepatology point of view  He was instructed to go to ER with any significant symptoms   Follow up with Dr. Velasco

## 2022-12-31 NOTE — DISCHARGE NOTE PROVIDER - NSDCFUSCHEDAPPT_GEN_ALL_CORE_FT
Darren Velasco  [facility]  HEPATOLOGY [address] [place] [address]   Scheduled Appointment: 01/18/2023

## 2022-12-31 NOTE — PROGRESS NOTE ADULT - ASSESSMENT
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  Patient was seen by IR who unsuccessfully attempted but there was NO identifiable tract to the gallbladder.

## 2022-12-31 NOTE — DISCHARGE NOTE NURSING/CASE MANAGEMENT/SOCIAL WORK - PATIENT PORTAL LINK FT
You can access the FollowMyHealth Patient Portal offered by [facility] by registering at the following website: [email or web address]. By joining [facility]’s FollowMyHealth portal, you will also be able to view your health information using other applications (apps) compatible with our system.

## 2022-12-31 NOTE — PROGRESS NOTE ADULT - SUBJECTIVE AND OBJECTIVE BOX
Patient is seen at bedside  No abd pain  No NVD  No fever  No chest pain or SOB  VS stable  Soft abdomen, no tenderness  Clear lungs  No edema   No HE 
  Patient is a 67y old  Male who presents with a chief complaint of abdominal pain and RUQ, gallbladder stone drain fell out. (30 Dec 2022 14:23)      SUBJECTIVE / OVERNIGHT EVENTS:   NO overnight events    NO fever,  TM : 98F      ADDITIONAL REVIEW OF SYSTEMS: Negative except for above    MEDICATIONS  (STANDING):  furosemide    Tablet 40 milliGRAM(s) Oral two times a day  lactulose Syrup 10 Gram(s) Oral three times a day  pantoprazole    Tablet 40 milliGRAM(s) Oral before breakfast  spironolactone 100 milliGRAM(s) Oral two times a day  tamsulosin 0.4 milliGRAM(s) Oral at bedtime    MEDICATIONS  (PRN):  acetaminophen     Tablet .. 650 milliGRAM(s) Oral every 6 hours PRN Temp greater or equal to 38C (100.4F), Mild Pain (1 - 3)  aluminum hydroxide/magnesium hydroxide/simethicone Suspension 30 milliLiter(s) Oral every 4 hours PRN Dyspepsia  melatonin 3 milliGRAM(s) Oral at bedtime PRN Insomnia  ondansetron Injectable 4 milliGRAM(s) IV Push every 8 hours PRN Nausea and/or Vomiting      CAPILLARY BLOOD GLUCOSE        I&O's Summary    31 Dec 2022 07:01  -  31 Dec 2022 12:21  --------------------------------------------------------  IN: 240 mL / OUT: 0 mL / NET: 240 mL        PHYSICAL EXAM:  Vital Signs Last 24 Hrs  T(C): 36.7 (31 Dec 2022 09:39), Max: 37.1 (30 Dec 2022 17:46)  T(F): 98 (31 Dec 2022 09:39), Max: 98.7 (30 Dec 2022 17:46)  HR: 74 (31 Dec 2022 09:39) (62 - 74)  BP: 111/68 (31 Dec 2022 09:39) (102/55 - 113/67)  BP(mean): --  RR: 18 (31 Dec 2022 09:39) (16 - 18)  SpO2: 99% (31 Dec 2022 09:39) (99% - 100%)    Parameters below as of 31 Dec 2022 09:39  Patient On (Oxygen Delivery Method): room air        PHYSICAL EXAM:  GENERAL: NAD, well-developed  HEAD:  Atraumatic, Normocephalic  EYES:  conjunctiva and sclera clear  NECK: Supple, No JVD  CHEST/LUNG: Clear to auscultation bilaterally; No wheeze  HEART: Regular rate and rhythm; No murmurs, rubs, or gallops  ABDOMEN: Soft, Nontender, Nondistended; Bowel sounds present  EXTREMITIES:  2+ Peripheral Pulses, No clubbing, cyanosis, or edema  PSYCH: AAOx3  NEUROLOGY: non-focal        LABS:                        10.3   3.82  )-----------( 70       ( 31 Dec 2022 07:04 )             30.0     12-31    129<L>  |  101  |  17  ----------------------------<  80  4.8   |  20<L>  |  0.89    Ca    9.0      31 Dec 2022 07:03    TPro  6.8  /  Alb  2.6<L>  /  TBili  2.3<H>  /  DBili  x   /  AST  33  /  ALT  16  /  AlkPhos  106  12-31    PT/INR - ( 29 Dec 2022 22:32 )   PT: 16.0 sec;   INR: 1.37 ratio         PTT - ( 29 Dec 2022 22:32 )  PTT:32.2 sec            RADIOLOGY & ADDITIONAL TESTS:    Imaging Personally Reviewed:    Electrocardiogram Personally Reviewed:    COORDINATION OF CARE:  Care Discussed with Consultants/Other Providers [Y/N]:  Prior or Outpatient Records Reviewed [Y/N]:

## 2022-12-31 NOTE — DISCHARGE NOTE NURSING/CASE MANAGEMENT/SOCIAL WORK - NSDCFUADDAPPT_GEN_ALL_CORE_FT
APPTS ARE READY TO BE MADE: [X] YES    Best Family or Patient Contact (if needed):    Additional Information about above appointments (if needed):    1: Primary care provider  2: Hepatology     Other comments or requests:

## 2022-12-31 NOTE — DISCHARGE NOTE PROVIDER - HOSPITAL COURSE
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  Patient was seen by IR who unsuccessfully attempted but there was NO identifiable tract to the gallbladder.    H/O acute cholangitis.   S/P Cholecystomy tube dislodged   Patient was seen by IR who unsuccessfully attempted but there was NO identifiable tract to the gallbladder  Per hepatology: Patient with no symptom but displaced cholecystostomy tube. Ct scan showed a small size GB with thickened wall. IR attempted to re-insert the tube but tract was closed. No clinical sign of acute cholecystitis   Stable from hepatology point of view. Follow up with Dr. Velasco.    Hyponatremia.   S/P IVF in the ED     History of liver failure.   Liver transplant team consulted     Thrombocytopenia.   No current bleed  fall precautions     Medically cleared for discharge home. Discussed with medical attending.

## 2022-12-31 NOTE — PROGRESS NOTE ADULT - PROBLEM SELECTOR PLAN 2
S/P IVF in the ED   Monitor Na ( chronic Hyponatremia with stable mentation   MIght need Albumin, but will hold for now  monitor renal function

## 2022-12-31 NOTE — DISCHARGE NOTE PROVIDER - NSDCMRMEDTOKEN_GEN_ALL_CORE_FT
furosemide 40 mg oral tablet: 1 tab(s) orally once a day  lactulose 10 g/15 mL oral syrup: 15 milliliter(s) orally 3 times a day, As Needed  pantoprazole 40 mg oral delayed release tablet: 1 tab(s) orally once a day (before a meal)    Note:Last filled in sept for 1 month supply  spironolactone 100 mg oral tablet: 1 tab(s) orally once a day  tamsulosin 0.4 mg oral capsule: 1 cap(s) orally once a day (at bedtime)    Note:Last filled in sept for 1 month

## 2022-12-31 NOTE — DISCHARGE NOTE PROVIDER - NSDCCPCAREPLAN_GEN_ALL_CORE_FT
PRINCIPAL DISCHARGE DIAGNOSIS  Diagnosis: Migration of percutaneous cholecystostomy tube  Assessment and Plan of Treatment: Displaced cholecystostomy tube.   IR attempted to re-insert the tube but tract was closed   No clinical sign of acute cholecystitis   Stable from hepatology point of view  Follow up with Dr. Velasco within 1 week, as well as your primary care provider.  Continue your home Lasix, Spironolactone, Lactulose, Pantoprazole, and Tamsulosin for medication usage.

## 2022-12-31 NOTE — DISCHARGE NOTE PROVIDER - NSDCFUADDAPPT_GEN_ALL_CORE_FT
APPTS ARE READY TO BE MADE: [X] YES    Best Family or Patient Contact (if needed):    Additional Information about above appointments (if needed):    1: Primary care provider  2: Hepatology     Other comments or requests:    APPTS ARE READY TO BE MADE: [X] YES    Best Family or Patient Contact (if needed):    Additional Information about above appointments (if needed):    1: Primary care provider  2: Hepatology     Other comments or requests:   Patient was previously scheduled with Dr. Velasco on 1/18 at 1:30p.

## 2023-01-09 LAB
AFP-TM SERPL-MCNC: 3.7 NG/ML
ALBUMIN SERPL ELPH-MCNC: 3.5 G/DL
ALP BLD-CCNC: 158 U/L
ALT SERPL-CCNC: 20 U/L
ANION GAP SERPL CALC-SCNC: 9 MMOL/L
AST SERPL-CCNC: 42 U/L
BASOPHILS # BLD AUTO: 0.03 K/UL
BASOPHILS NFR BLD AUTO: 0.7 %
BILIRUB SERPL-MCNC: 2.3 MG/DL
BUN SERPL-MCNC: 16 MG/DL
CALCIUM SERPL-MCNC: 9.9 MG/DL
CHLORIDE SERPL-SCNC: 98 MMOL/L
CO2 SERPL-SCNC: 26 MMOL/L
CREAT SERPL-MCNC: 1.09 MG/DL
EGFR: 75 ML/MIN/1.73M2
EOSINOPHIL # BLD AUTO: 0.1 K/UL
EOSINOPHIL NFR BLD AUTO: 2.4 %
GLUCOSE SERPL-MCNC: 115 MG/DL
HCT VFR BLD CALC: 35.7 %
HGB BLD-MCNC: 11.9 G/DL
IMM GRANULOCYTES NFR BLD AUTO: 0.2 %
INR PPP: 1.33 RATIO
LYMPHOCYTES # BLD AUTO: 1.07 K/UL
LYMPHOCYTES NFR BLD AUTO: 25.7 %
MAN DIFF?: NORMAL
MCHC RBC-ENTMCNC: 33.3 GM/DL
MCHC RBC-ENTMCNC: 34.5 PG
MCV RBC AUTO: 103.5 FL
MONOCYTES # BLD AUTO: 0.43 K/UL
MONOCYTES NFR BLD AUTO: 10.3 %
NEUTROPHILS # BLD AUTO: 2.52 K/UL
NEUTROPHILS NFR BLD AUTO: 60.7 %
PLATELET # BLD AUTO: 109 K/UL
POTASSIUM SERPL-SCNC: 4.6 MMOL/L
PROT SERPL-MCNC: 7.5 G/DL
PT BLD: 15.5 SEC
RBC # BLD: 3.45 M/UL
RBC # FLD: 15.1 %
SODIUM SERPL-SCNC: 133 MMOL/L
WBC # FLD AUTO: 4.16 K/UL

## 2023-01-11 ENCOUNTER — APPOINTMENT (OUTPATIENT)
Dept: HEPATOLOGY | Facility: CLINIC | Age: 68
End: 2023-01-11
Payer: MEDICARE

## 2023-01-11 VITALS
HEART RATE: 64 BPM | WEIGHT: 143 LBS | DIASTOLIC BLOOD PRESSURE: 37 MMHG | SYSTOLIC BLOOD PRESSURE: 124 MMHG | TEMPERATURE: 98.4 F | BODY MASS INDEX: 24.12 KG/M2 | HEIGHT: 64.5 IN | OXYGEN SATURATION: 99 %

## 2023-01-11 PROCEDURE — 99214 OFFICE O/P EST MOD 30 MIN: CPT

## 2023-01-11 RX ORDER — SULFAMETHOXAZOLE AND TRIMETHOPRIM 800; 160 MG/1; MG/1
800-160 TABLET ORAL
Qty: 30 | Refills: 3 | Status: DISCONTINUED | COMMUNITY
Start: 2022-09-26 | End: 2023-01-11

## 2023-01-11 NOTE — PHYSICAL EXAM
[General Appearance - Alert] : alert [General Appearance - In No Acute Distress] : in no acute distress [Sclera] : the sclera and conjunctiva were normal [PERRL With Normal Accommodation] : pupils were equal in size, round, and reactive to light [Extraocular Movements] : extraocular movements were intact [Outer Ear] : the ears and nose were normal in appearance [Oropharynx] : the oropharynx was normal [Neck Appearance] : the appearance of the neck was normal [Neck Cervical Mass (___cm)] : no neck mass was observed [Jugular Venous Distention Increased] : there was no jugular-venous distention [Thyroid Diffuse Enlargement] : the thyroid was not enlarged [Thyroid Nodule] : there were no palpable thyroid nodules [Auscultation Breath Sounds / Voice Sounds] : lungs were clear to auscultation bilaterally [Heart Rate And Rhythm] : heart rate was normal and rhythm regular [Heart Sounds] : normal S1 and S2 [Heart Sounds Gallop] : no gallops [Murmurs] : no murmurs [Heart Sounds Pericardial Friction Rub] : no pericardial rub [Bowel Sounds] : normal bowel sounds [Abdomen Soft] : soft [Abdomen Tenderness] : non-tender [] : no hepato-splenomegaly [Abdomen Mass (___ Cm)] : no abdominal mass palpated [Deep Tendon Reflexes (DTR)] : deep tendon reflexes were 2+ and symmetric [Sensation] : the sensory exam was normal to light touch and pinprick [No Focal Deficits] : no focal deficits [Oriented To Time, Place, And Person] : oriented to person, place, and time [Impaired Insight] : insight and judgment were intact [Affect] : the affect was normal [FreeTextEntry1] : Mild hyperaesthesia in the right upper abdomen around the previosuly placed drainage cath. No fluid leak.

## 2023-01-11 NOTE — HISTORY OF PRESENT ILLNESS
[Alcoholic Liver Disease] : Alcoholic Liver Disease [Ascites] : Ascites [Spontaneous Bacterial Peritonitis] : Spontaneous Bacterial Peritonitis [50: Requires considerable assistance and frequent medical care.] : 50: Requires considerable assistance and frequent medical care. [FreeTextEntry1] : Mr. TESHA STEINER a 67 year Black or  Nadja male  presents today for  a hepatology appointment. 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 Course was complicated with abdominal collection, infected hematoma s/p IR drainage x 2 with C tube, repositioning 9/11,  Augmentin resistant E coli, serratia bacteremia 9/11, repeat BCx 9/15 negative, currently wait-listed for OLT MELD-Na: 23 .\par Since discharge from hospital, he has been doing well, still has abdominal distension. Denies any interval HE, worsening abdominal distension. No varices on on ERCP from 7/2022.\par S/P rodney drain exchange by IR 9/30/22, numerous filling defects c/w stones, to f/u drain eval in 2 wks\par CT from Sep 21, 2022: Cirrhosis and portal hypertension. Near complete resolution of collection in anterior pelvis status post percutaneous drainage. Small persistent collection right subphrenic space. Persistent ascites and pleural effusions. No HCC. \par \par SBP: paracentesis 8/9/2022 revealed likely secondary bacterial peritonitis from suspected gallbladder pathology, +SBP on para 8/31\par \par Recent blood test result from September 26, 2022 was reviewed.  This revealed hemoglobin 8.3 g/dL, platelet count 72,000.  Serum sodium 132, potassium 3.8, creatinine 0.63 mg/dL.  Liver test revealed total bilirubin 2.4, AST 26, ALT 9, alkaline phosphatase 82.  Albumin level was 3 g/dL.  INR was 2.56.  Blood type ABO O+.  Most recent CT scan of the abdomen from September 21, 2022 that was done with IV contrast revealed cirrhosis and portal hypertension.  Near complete resolution of collection in the anterior pelvis status post percutaneous drainage.  Small persistent collection in the right subphrenic space.  Persistent ascites and pleural effusion.  He is scheduled for tube check/exchange on September 30, 2022 under fluoroscopic examination by IR.\par \par Interval history dated December 7th, 2022:\par \par Patient presents to the office today for follow up visit. He still has the biliary drain in place which is averaging approximately 100-150cc every 24 hrs.  He needs refills on Pantoprazole, Lasix, Aldactone and Tamsulosin.  He is taking Lactulose and is reporting 3 BMs per day.  No melena or hematochezia.  He has started a virtual rehab program which he attends every Tuesday.  He denies any EtOH use.  MELD from recent admission in Nov 23rd was 18. \par \par \par Interval Hx dated Jan 11, 2023\par \par Patient presents to hepatology  clinic accompained by her spouse. He feesl well. Currently aldo-listed with MELD Na score of 17. Biliary drainage cath was inadvertently dislodged needing hospitalization.  He had CT scan that reveled Interval removal of a percutaneous cholecystostomy tube and left lower anterior abdominal wall drainage catheter since 9/21/2022. Cholelithiasis with gallbladder wall thickening/edema. Trace right pleural effusion. Cirrhosis with stigmata of portal hypertension. Attempt re-insertion was unsuccessful by IR. He however ahs been doing well, and denies any fever, chill, rigor, nausea or vomiting. Labs reviewed from Jan 05, 2023, and stable.\par  [History of HCC] : No history of hepatocellular carcinoma [Previous Transplant] : No history of previous transplant [Diabetes] : No history of diabetes [Hypertension] : No history of hypertension [Coronary Artery Disease] : No history of coronary artery disease [Dialysis] : No history of dialysis

## 2023-01-11 NOTE — ASSESSMENT
[FreeTextEntry1] : Mr. 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 Course was complicated with abdominal collection, infected hematoma s/p IR drainage x 2 with C tube, repositioning, s/p Augmentin for resistant E coli, serratia bacteremia 9/11, repeat BCx 9/15 negative, currently wait-listed for OLT MELD-Na: 18.  C-tube rgot dialoged, and IR could not reinsert it.  However it seems he is doing well without any symptoms since the C-tube has been out.\par \par PLAN\par # Cirrhosis\par I discussed the meaning of cirrhosis with the patient. I reviewed the natural history of the disease. I explained the risks for the development of esophageal varices with and without bleeding, hepatic encephalopathy, ascites, hepatocellular carcinoma, and liver failure. I have explained that disease can progress to the point of requiring an evaluation for liver transplantation.\par \par I have explained the need for imaging every 6 months to screen for liver cancer.  Obtain labs during today's visit. \par \par Continue attending virtual rehab program weekly. We will check a Peth level today.\par \par # Ascites\par Mr. STEINER was counseled to adhere to a low sodium (<2 grams of sodium per day) diet by: avoiding adding any salt to meals (removing the salt shaker from the table); eliminating salty foods from his diet; eating more home-cooked meals; choosing fresh or frozen, not canned, vegetables and fruits; and reading ingredient and food labels to choose low sodium foods.\par \par Continue with Lasix 40 mg bid, and Aldactone 100 mg bid, refills provided.  \par \par # Portal hypertension\par No varices on recent ERCP. \par \par # Hepatic Encephalopathy\par I have explained that he will need to take Lactulose 30 ml 2-3 times daily, titrating for 2-3 semi formed bowel movements.\par \par # Abdominal collection\par CT from Sep 21, 2022: Near complete resolution of collection in anterior pelvis status post percutaneous drainage. Small persistent collection right subphrenic space. Persistent ascites and pleural effusions. No HCC.  He has completed Rx with Bactrim DS for treatment of his E. Coli, and serratia. He remains afebrile, and doing well.  \par Biliary drainage cath was inadvertently dislodged needing hospitalization.  He had CT scan that reveled Interval removal of a percutaneous cholecystostomy tube and left lower anterior abdominal wall drainage catheter since 9/21/2022. Cholelithiasis with gallbladder wall thickening/edema. Trace right pleural effusion. Cirrhosis with stigmata of portal hypertension. \par \par Attempt re-insertion was unsuccessful by IR. He however has been doing well, and denies any fever, chill, rigor, nausea or vomiting. Labs reviewed from Jan 05, 2023, and stable.\par \par I will also have follow-up labs today that will include CBC, CMP, PT/INR, alpha-fetoprotein.  Repeat labs in about 2 weeks.\par \par \par # Hx of SBP\par SBP: paracentesis 8/9/2022 revealed likely secondary bacterial peritonitis from suspected gallbladder pathology, +SBP on para 8/31, s/p treatment.  I have recommended him to continue with the Bactrim DS for SBP prophylaxis.\par \par #Alcohol use disorder\par Patient currently participating in an alcohol relapse prevention program at [facility].  We will recheck a phosphatidyl ethanol level\par # Transplant candidacy\par Listed= 8/26/2022, ABO= O, HCV donor acceptance on unet= yes\par MELD Na UNET 18, and his next recertification date 2/27/23 \par \par RTC in 4 weeks.

## 2023-01-12 LAB — PHOSPHATIDYETHANOL (PETH), WHOLE BLOOD: NEGATIVE NG/ML

## 2023-01-17 ENCOUNTER — LABORATORY RESULT (OUTPATIENT)
Age: 68
End: 2023-01-17

## 2023-02-01 LAB
AFP-TM SERPL-MCNC: 3.5 NG/ML
ALBUMIN SERPL ELPH-MCNC: 3.2 G/DL
ALP BLD-CCNC: 166 U/L
ALT SERPL-CCNC: 13 U/L
ANION GAP SERPL CALC-SCNC: 10 MMOL/L
AST SERPL-CCNC: 32 U/L
BASOPHILS # BLD AUTO: 0.03 K/UL
BASOPHILS NFR BLD AUTO: 0.7 %
BILIRUB SERPL-MCNC: 1.6 MG/DL
BUN SERPL-MCNC: 11 MG/DL
CALCIUM SERPL-MCNC: 9.2 MG/DL
CHLORIDE SERPL-SCNC: 103 MMOL/L
CO2 SERPL-SCNC: 25 MMOL/L
CREAT SERPL-MCNC: 0.83 MG/DL
EGFR: 97 ML/MIN/1.73M2
EOSINOPHIL # BLD AUTO: 0.1 K/UL
EOSINOPHIL NFR BLD AUTO: 2.5 %
GLUCOSE SERPL-MCNC: 165 MG/DL
HCT VFR BLD CALC: 37.3 %
HGB BLD-MCNC: 12.2 G/DL
IMM GRANULOCYTES NFR BLD AUTO: 0.5 %
INR PPP: 1.41 RATIO
LYMPHOCYTES # BLD AUTO: 1.03 K/UL
LYMPHOCYTES NFR BLD AUTO: 25.7 %
MAN DIFF?: NORMAL
MCHC RBC-ENTMCNC: 32.7 GM/DL
MCHC RBC-ENTMCNC: 34.1 PG
MCV RBC AUTO: 104.2 FL
MONOCYTES # BLD AUTO: 0.3 K/UL
MONOCYTES NFR BLD AUTO: 7.5 %
NEUTROPHILS # BLD AUTO: 2.53 K/UL
NEUTROPHILS NFR BLD AUTO: 63.1 %
PLATELET # BLD AUTO: 96 K/UL
POTASSIUM SERPL-SCNC: 3.9 MMOL/L
PROT SERPL-MCNC: 6.9 G/DL
PT BLD: 16.4 SEC
RBC # BLD: 3.58 M/UL
RBC # FLD: 14.8 %
SODIUM SERPL-SCNC: 139 MMOL/L
WBC # FLD AUTO: 4.01 K/UL

## 2023-02-09 ENCOUNTER — APPOINTMENT (OUTPATIENT)
Dept: HEPATOLOGY | Facility: CLINIC | Age: 68
End: 2023-02-09
Payer: MEDICARE

## 2023-02-09 VITALS
HEART RATE: 71 BPM | OXYGEN SATURATION: 98 % | RESPIRATION RATE: 16 BRPM | TEMPERATURE: 98.1 F | HEIGHT: 64.5 IN | DIASTOLIC BLOOD PRESSURE: 77 MMHG | SYSTOLIC BLOOD PRESSURE: 135 MMHG | BODY MASS INDEX: 25.13 KG/M2 | WEIGHT: 149 LBS

## 2023-02-09 PROCEDURE — 99214 OFFICE O/P EST MOD 30 MIN: CPT

## 2023-02-15 LAB
ALBUMIN SERPL ELPH-MCNC: 3.4 G/DL
ALP BLD-CCNC: 166 U/L
ALT SERPL-CCNC: 18 U/L
ANION GAP SERPL CALC-SCNC: 11 MMOL/L
AST SERPL-CCNC: 34 U/L
BASOPHILS # BLD AUTO: 0.03 K/UL
BASOPHILS NFR BLD AUTO: 0.6 %
BILIRUB SERPL-MCNC: 1.9 MG/DL
BUN SERPL-MCNC: 11 MG/DL
CALCIUM SERPL-MCNC: 9.5 MG/DL
CHLORIDE SERPL-SCNC: 100 MMOL/L
CO2 SERPL-SCNC: 23 MMOL/L
CREAT SERPL-MCNC: 0.92 MG/DL
EGFR: 92 ML/MIN/1.73M2
EOSINOPHIL # BLD AUTO: 0.09 K/UL
EOSINOPHIL NFR BLD AUTO: 1.7 %
GLUCOSE SERPL-MCNC: 115 MG/DL
HCT VFR BLD CALC: 36.7 %
HGB BLD-MCNC: 12.7 G/DL
IMM GRANULOCYTES NFR BLD AUTO: 0.4 %
INR PPP: 1.33 RATIO
LYMPHOCYTES # BLD AUTO: 1.22 K/UL
LYMPHOCYTES NFR BLD AUTO: 22.8 %
MAN DIFF?: NORMAL
MCHC RBC-ENTMCNC: 34.6 GM/DL
MCHC RBC-ENTMCNC: 34.8 PG
MCV RBC AUTO: 100.5 FL
MONOCYTES # BLD AUTO: 0.55 K/UL
MONOCYTES NFR BLD AUTO: 10.3 %
NEUTROPHILS # BLD AUTO: 3.45 K/UL
NEUTROPHILS NFR BLD AUTO: 64.2 %
PHOSPHATIDYETHANOL (PETH), WHOLE BLOOD: NEGATIVE NG/ML
PLATELET # BLD AUTO: 100 K/UL
POTASSIUM SERPL-SCNC: 4.1 MMOL/L
PROT SERPL-MCNC: 7.3 G/DL
PT BLD: 15.6 SEC
RBC # BLD: 3.65 M/UL
RBC # FLD: 14.3 %
SODIUM SERPL-SCNC: 134 MMOL/L
WBC # FLD AUTO: 5.36 K/UL

## 2023-02-21 ENCOUNTER — NON-APPOINTMENT (OUTPATIENT)
Age: 68
End: 2023-02-21

## 2023-02-21 ENCOUNTER — RESULT REVIEW (OUTPATIENT)
Age: 68
End: 2023-02-21

## 2023-03-05 ENCOUNTER — NON-APPOINTMENT (OUTPATIENT)
Age: 68
End: 2023-03-05

## 2023-03-08 ENCOUNTER — NON-APPOINTMENT (OUTPATIENT)
Age: 68
End: 2023-03-08

## 2023-03-13 ENCOUNTER — LABORATORY RESULT (OUTPATIENT)
Age: 68
End: 2023-03-13

## 2023-03-14 ENCOUNTER — APPOINTMENT (OUTPATIENT)
Dept: HEPATOLOGY | Facility: CLINIC | Age: 68
End: 2023-03-14
Payer: MEDICARE

## 2023-03-14 VITALS
OXYGEN SATURATION: 99 % | TEMPERATURE: 97.8 F | RESPIRATION RATE: 16 BRPM | DIASTOLIC BLOOD PRESSURE: 71 MMHG | BODY MASS INDEX: 23.78 KG/M2 | WEIGHT: 148 LBS | SYSTOLIC BLOOD PRESSURE: 120 MMHG | HEIGHT: 66 IN | HEART RATE: 65 BPM

## 2023-03-14 LAB
ALBUMIN SERPL ELPH-MCNC: 3.6 G/DL
ALP BLD-CCNC: 197 U/L
ALT SERPL-CCNC: 19 U/L
ANION GAP SERPL CALC-SCNC: 9 MMOL/L
AST SERPL-CCNC: 36 U/L
BASOPHILS # BLD AUTO: 0.03 K/UL
BASOPHILS NFR BLD AUTO: 0.6 %
BILIRUB SERPL-MCNC: 1.3 MG/DL
BUN SERPL-MCNC: 10 MG/DL
CALCIUM SERPL-MCNC: 9.7 MG/DL
CHLORIDE SERPL-SCNC: 102 MMOL/L
CO2 SERPL-SCNC: 24 MMOL/L
CREAT SERPL-MCNC: 0.9 MG/DL
EGFR: 95 ML/MIN/1.73M2
EOSINOPHIL # BLD AUTO: 0.19 K/UL
EOSINOPHIL NFR BLD AUTO: 3.7 %
GLUCOSE SERPL-MCNC: 101 MG/DL
HCT VFR BLD CALC: 38.6 %
HGB BLD-MCNC: 12.6 G/DL
IMM GRANULOCYTES NFR BLD AUTO: 0.2 %
INR PPP: 1.33 RATIO
LYMPHOCYTES # BLD AUTO: 1.8 K/UL
LYMPHOCYTES NFR BLD AUTO: 35.5 %
MAN DIFF?: NORMAL
MCHC RBC-ENTMCNC: 32.6 GM/DL
MCHC RBC-ENTMCNC: 32.8 PG
MCV RBC AUTO: 100.5 FL
MONOCYTES # BLD AUTO: 0.45 K/UL
MONOCYTES NFR BLD AUTO: 8.9 %
NEUTROPHILS # BLD AUTO: 2.59 K/UL
NEUTROPHILS NFR BLD AUTO: 51.1 %
PLATELET # BLD AUTO: 89 K/UL
POTASSIUM SERPL-SCNC: 4.6 MMOL/L
PROT SERPL-MCNC: 7.5 G/DL
PT BLD: 15.5 SEC
RBC # BLD: 3.84 M/UL
RBC # FLD: 14 %
SODIUM SERPL-SCNC: 135 MMOL/L
WBC # FLD AUTO: 5.07 K/UL

## 2023-03-14 PROCEDURE — 99214 OFFICE O/P EST MOD 30 MIN: CPT

## 2023-03-15 ENCOUNTER — INPATIENT (INPATIENT)
Facility: HOSPITAL | Age: 68
LOS: 0 days | Discharge: HOME | DRG: 441 | End: 2023-03-16
Attending: STUDENT IN AN ORGANIZED HEALTH CARE EDUCATION/TRAINING PROGRAM | Admitting: STUDENT IN AN ORGANIZED HEALTH CARE EDUCATION/TRAINING PROGRAM
Payer: MEDICARE

## 2023-03-15 VITALS
WEIGHT: 154.1 LBS | DIASTOLIC BLOOD PRESSURE: 92 MMHG | TEMPERATURE: 99 F | OXYGEN SATURATION: 99 % | HEART RATE: 104 BPM | RESPIRATION RATE: 16 BRPM | HEIGHT: 66 IN | SYSTOLIC BLOOD PRESSURE: 154 MMHG

## 2023-03-15 DIAGNOSIS — Z98.890 OTHER SPECIFIED POSTPROCEDURAL STATES: Chronic | ICD-10-CM

## 2023-03-15 LAB — GAS PNL BLDV: SIGNIFICANT CHANGE UP

## 2023-03-15 PROCEDURE — 99285 EMERGENCY DEPT VISIT HI MDM: CPT

## 2023-03-15 PROCEDURE — 70450 CT HEAD/BRAIN W/O DYE: CPT | Mod: 26,MA

## 2023-03-15 PROCEDURE — 71045 X-RAY EXAM CHEST 1 VIEW: CPT | Mod: 26

## 2023-03-15 NOTE — ED PROVIDER NOTE - RAPID ASSESSMENT
67y M w/ pmhx of liver failure, cholangitis presents to the ED c/o fatigue, generalized weakness, confusion xfewdays. As per son in law last known normal was Friday. Had the same symptoms in December, was told his ammonia levels were high which was causing the symptoms. Denies recent falls. Pt is compliant with all medications. Pt is well appearing in triage.     Patricia GREWAL (Paoibmaddison) have documented this rapid assessment note under the dictation of Rosa Owusu) which has been reviewed and affirmed to be accurate. Patient was seen as a QDOC patient. The patient will be seen and further worked up in the main emergency department and their care will be completed by the main emergency department team along with a thorough physical exam. Receiving team will follow up on labs, analgesia, any clinical imaging, reassess and disposition as clinically indicated, all decisions regarding the progression of care will be made at their discretion.

## 2023-03-15 NOTE — ED PROVIDER NOTE - ATTENDING CONTRIBUTION TO CARE
MD Donis:  patient seen and evaluated with the resident.  I was present for key portions of the History & Physical, and I agree with the Impression & Plan.      ECG directly visualized by me and shows normal sinus rhythm, rate 70, , QRS 96, QTc 449, morphology incomplete right bundle branch block.  No ST elevation or depression MD Donis:  patient seen and evaluated with the resident.  I was present for key portions of the History & Physical, and I agree with the Impression & Plan.    67-year-old male, brought into the ED by his son-in-law for evaluation of change in mental status.  Duration 5 days.  Quality cloudy thoughts.  And similar in quality to the last time the patient's ammonia was elevated.    Context: Patient has a medical history of PARNELL    Associated symptoms: No fevers, no chills, no abdominal pain, no nausea no vomiting no diarrhea.  No cough, no shortness of breath, no chest pain.    Vital signs: Heart rate 104, blood pressure 154/92, respirations 16, temperature 98.6 O2 sat 99% on room  General: Adult male no acute distress, thin habitus,  Neck: Supple  Pulmonary: No respiratory distress  Cardiac: Regular rate and rhythm no rubs murmurs or gallops  Abdomen: Soft nondistended nontender to palpation  Extremities: No edema  Neuro: Adult male alert, oriented to person and place, but not time     Impression and plan: History and physical concerning for hepatic encephalopathy.  Ammonia today is 70, which is the highest documented ammonia for this patient in the last 7 months.  Patient will likely need to be admitted for lactulose therapy further evaluation by GI.  Patient has no signs of sepsis or significant ascites that would warrant concern for SBP.  Paracentesis not indicated at this time    ECG directly visualized by me and shows normal sinus rhythm, rate 70, , QRS 96, QTc 449, morphology incomplete right bundle branch block.  No ST elevation or depression

## 2023-03-15 NOTE — ED PROVIDER NOTE - CLINICAL SUMMARY MEDICAL DECISION MAKING FREE TEXT BOX
67y M with PMH cholangitis, liver failure, prior hx of hepatic encephalopathy, presenting with change in behavior for the last 5d.  VS WNL. PE shows pt is AOx2. No abdominal pain, no tremors, no asterixis. Neuro exam wnl other than AOx2. Concern for hepatic encephalopathy. Consider electrolyte disturbances. Consider ACS. Consider Intracerebral pathology. Consider delirium 2/2 infection. Plan: cbc, cmp, ammonia, vbg, ck, trop, ua/uc, cxr, cth, ekg

## 2023-03-15 NOTE — ED PROVIDER NOTE - OBJECTIVE STATEMENT
67y M with PMH cholangitis, liver failure, prior hx of hepatic encephalopathy, presenting with change in behavior for the last 5d. Per son in law, pt has been more forgetful, speaking more slowly. Pt has also been having intermittent tremors. No isolated weakness in the body, fevers, chills, abdominal pain, chest pain, sob.

## 2023-03-16 ENCOUNTER — TRANSCRIPTION ENCOUNTER (OUTPATIENT)
Age: 68
End: 2023-03-16

## 2023-03-16 VITALS
TEMPERATURE: 98 F | HEART RATE: 73 BPM | OXYGEN SATURATION: 100 % | DIASTOLIC BLOOD PRESSURE: 75 MMHG | RESPIRATION RATE: 16 BRPM | SYSTOLIC BLOOD PRESSURE: 142 MMHG

## 2023-03-16 DIAGNOSIS — Z29.9 ENCOUNTER FOR PROPHYLACTIC MEASURES, UNSPECIFIED: ICD-10-CM

## 2023-03-16 DIAGNOSIS — E72.20 DISORDER OF UREA CYCLE METABOLISM, UNSPECIFIED: ICD-10-CM

## 2023-03-16 DIAGNOSIS — K74.60 UNSPECIFIED CIRRHOSIS OF LIVER: ICD-10-CM

## 2023-03-16 DIAGNOSIS — G93.40 ENCEPHALOPATHY, UNSPECIFIED: ICD-10-CM

## 2023-03-16 LAB
ALBUMIN SERPL ELPH-MCNC: 3.7 G/DL — SIGNIFICANT CHANGE UP (ref 3.3–5)
ALP SERPL-CCNC: 159 U/L — HIGH (ref 40–120)
ALT FLD-CCNC: 18 U/L — SIGNIFICANT CHANGE UP (ref 10–45)
AMMONIA BLD-MCNC: 70 UMOL/L — HIGH (ref 11–55)
ANION GAP SERPL CALC-SCNC: 11 MMOL/L — SIGNIFICANT CHANGE UP (ref 5–17)
APPEARANCE UR: CLEAR — SIGNIFICANT CHANGE UP
APTT BLD: 32 SEC — SIGNIFICANT CHANGE UP (ref 27.5–35.5)
AST SERPL-CCNC: 38 U/L — SIGNIFICANT CHANGE UP (ref 10–40)
BACTERIA # UR AUTO: NEGATIVE — SIGNIFICANT CHANGE UP
BASE EXCESS BLDV CALC-SCNC: 0.5 MMOL/L — SIGNIFICANT CHANGE UP (ref -2–3)
BASOPHILS # BLD AUTO: 0.02 K/UL — SIGNIFICANT CHANGE UP (ref 0–0.2)
BASOPHILS NFR BLD AUTO: 0.3 % — SIGNIFICANT CHANGE UP (ref 0–2)
BILIRUB SERPL-MCNC: 1.7 MG/DL — HIGH (ref 0.2–1.2)
BILIRUB UR-MCNC: NEGATIVE — SIGNIFICANT CHANGE UP
BUN SERPL-MCNC: 14 MG/DL — SIGNIFICANT CHANGE UP (ref 7–23)
CA-I SERPL-SCNC: 1.32 MMOL/L — SIGNIFICANT CHANGE UP (ref 1.15–1.33)
CALCIUM SERPL-MCNC: 9.9 MG/DL — SIGNIFICANT CHANGE UP (ref 8.4–10.5)
CHLORIDE BLDV-SCNC: 100 MMOL/L — SIGNIFICANT CHANGE UP (ref 96–108)
CHLORIDE SERPL-SCNC: 100 MMOL/L — SIGNIFICANT CHANGE UP (ref 96–108)
CK SERPL-CCNC: 110 U/L — SIGNIFICANT CHANGE UP (ref 30–200)
CO2 BLDV-SCNC: 27 MMOL/L — HIGH (ref 22–26)
CO2 SERPL-SCNC: 22 MMOL/L — SIGNIFICANT CHANGE UP (ref 22–31)
COLOR SPEC: YELLOW — SIGNIFICANT CHANGE UP
CREAT SERPL-MCNC: 0.93 MG/DL — SIGNIFICANT CHANGE UP (ref 0.5–1.3)
DIFF PNL FLD: ABNORMAL
EGFR: 91 ML/MIN/1.73M2 — SIGNIFICANT CHANGE UP
EOSINOPHIL # BLD AUTO: 0.03 K/UL — SIGNIFICANT CHANGE UP (ref 0–0.5)
EOSINOPHIL NFR BLD AUTO: 0.5 % — SIGNIFICANT CHANGE UP (ref 0–6)
EPI CELLS # UR: 1 /HPF — SIGNIFICANT CHANGE UP
GAS PNL BLDV: 132 MMOL/L — LOW (ref 136–145)
GAS PNL BLDV: SIGNIFICANT CHANGE UP
GLUCOSE BLDV-MCNC: 127 MG/DL — HIGH (ref 70–99)
GLUCOSE SERPL-MCNC: 125 MG/DL — HIGH (ref 70–99)
GLUCOSE UR QL: NEGATIVE — SIGNIFICANT CHANGE UP
HCO3 BLDV-SCNC: 26 MMOL/L — SIGNIFICANT CHANGE UP (ref 22–29)
HCT VFR BLD CALC: 39.7 % — SIGNIFICANT CHANGE UP (ref 39–50)
HCT VFR BLDA CALC: 43 % — SIGNIFICANT CHANGE UP (ref 39–51)
HGB BLD CALC-MCNC: 14.4 G/DL — SIGNIFICANT CHANGE UP (ref 12.6–17.4)
HGB BLD-MCNC: 13.8 G/DL — SIGNIFICANT CHANGE UP (ref 13–17)
HYALINE CASTS # UR AUTO: 1 /LPF — SIGNIFICANT CHANGE UP (ref 0–2)
IMM GRANULOCYTES NFR BLD AUTO: 0.3 % — SIGNIFICANT CHANGE UP (ref 0–0.9)
INR BLD: 1.37 RATIO — HIGH (ref 0.88–1.16)
KETONES UR-MCNC: NEGATIVE — SIGNIFICANT CHANGE UP
LACTATE BLDV-MCNC: 2 MMOL/L — SIGNIFICANT CHANGE UP (ref 0.5–2)
LEUKOCYTE ESTERASE UR-ACNC: NEGATIVE — SIGNIFICANT CHANGE UP
LYMPHOCYTES # BLD AUTO: 1.06 K/UL — SIGNIFICANT CHANGE UP (ref 1–3.3)
LYMPHOCYTES # BLD AUTO: 17.6 % — SIGNIFICANT CHANGE UP (ref 13–44)
MCHC RBC-ENTMCNC: 34 PG — SIGNIFICANT CHANGE UP (ref 27–34)
MCHC RBC-ENTMCNC: 34.8 GM/DL — SIGNIFICANT CHANGE UP (ref 32–36)
MCV RBC AUTO: 97.8 FL — SIGNIFICANT CHANGE UP (ref 80–100)
MONOCYTES # BLD AUTO: 0.4 K/UL — SIGNIFICANT CHANGE UP (ref 0–0.9)
MONOCYTES NFR BLD AUTO: 6.6 % — SIGNIFICANT CHANGE UP (ref 2–14)
NEUTROPHILS # BLD AUTO: 4.5 K/UL — SIGNIFICANT CHANGE UP (ref 1.8–7.4)
NEUTROPHILS NFR BLD AUTO: 74.7 % — SIGNIFICANT CHANGE UP (ref 43–77)
NITRITE UR-MCNC: NEGATIVE — SIGNIFICANT CHANGE UP
NRBC # BLD: 0 /100 WBCS — SIGNIFICANT CHANGE UP (ref 0–0)
PCO2 BLDV: 44 MMHG — SIGNIFICANT CHANGE UP (ref 42–55)
PH BLDV: 7.38 — SIGNIFICANT CHANGE UP (ref 7.32–7.43)
PH UR: 6 — SIGNIFICANT CHANGE UP (ref 5–8)
PLATELET # BLD AUTO: 95 K/UL — LOW (ref 150–400)
PO2 BLDV: 26 MMHG — SIGNIFICANT CHANGE UP (ref 25–45)
POTASSIUM BLDV-SCNC: 4.4 MMOL/L — SIGNIFICANT CHANGE UP (ref 3.5–5.1)
POTASSIUM SERPL-MCNC: 4.5 MMOL/L — SIGNIFICANT CHANGE UP (ref 3.5–5.3)
POTASSIUM SERPL-SCNC: 4.5 MMOL/L — SIGNIFICANT CHANGE UP (ref 3.5–5.3)
PROT SERPL-MCNC: 8.5 G/DL — HIGH (ref 6–8.3)
PROT UR-MCNC: ABNORMAL
PROTHROM AB SERPL-ACNC: 15.9 SEC — HIGH (ref 10.5–13.4)
RAPID RVP RESULT: SIGNIFICANT CHANGE UP
RBC # BLD: 4.06 M/UL — LOW (ref 4.2–5.8)
RBC # FLD: 13.6 % — SIGNIFICANT CHANGE UP (ref 10.3–14.5)
RBC CASTS # UR COMP ASSIST: 6 /HPF — HIGH (ref 0–4)
SAO2 % BLDV: 38.1 % — LOW (ref 67–88)
SARS-COV-2 RNA SPEC QL NAA+PROBE: SIGNIFICANT CHANGE UP
SODIUM SERPL-SCNC: 133 MMOL/L — LOW (ref 135–145)
SP GR SPEC: 1.03 — HIGH (ref 1.01–1.02)
TROPONIN T, HIGH SENSITIVITY RESULT: 16 NG/L — SIGNIFICANT CHANGE UP (ref 0–51)
TROPONIN T, HIGH SENSITIVITY RESULT: 17 NG/L — SIGNIFICANT CHANGE UP (ref 0–51)
UROBILINOGEN FLD QL: ABNORMAL
WBC # BLD: 6.03 K/UL — SIGNIFICANT CHANGE UP (ref 3.8–10.5)
WBC # FLD AUTO: 6.03 K/UL — SIGNIFICANT CHANGE UP (ref 3.8–10.5)
WBC UR QL: 1 /HPF — SIGNIFICANT CHANGE UP (ref 0–5)

## 2023-03-16 PROCEDURE — 82947 ASSAY GLUCOSE BLOOD QUANT: CPT

## 2023-03-16 PROCEDURE — 84132 ASSAY OF SERUM POTASSIUM: CPT

## 2023-03-16 PROCEDURE — 85730 THROMBOPLASTIN TIME PARTIAL: CPT

## 2023-03-16 PROCEDURE — 81001 URINALYSIS AUTO W/SCOPE: CPT

## 2023-03-16 PROCEDURE — 85014 HEMATOCRIT: CPT

## 2023-03-16 PROCEDURE — 82565 ASSAY OF CREATININE: CPT

## 2023-03-16 PROCEDURE — 82435 ASSAY OF BLOOD CHLORIDE: CPT

## 2023-03-16 PROCEDURE — 70450 CT HEAD/BRAIN W/O DYE: CPT | Mod: MA

## 2023-03-16 PROCEDURE — 85018 HEMOGLOBIN: CPT

## 2023-03-16 PROCEDURE — 82140 ASSAY OF AMMONIA: CPT

## 2023-03-16 PROCEDURE — 84295 ASSAY OF SERUM SODIUM: CPT

## 2023-03-16 PROCEDURE — 36415 COLL VENOUS BLD VENIPUNCTURE: CPT

## 2023-03-16 PROCEDURE — 82330 ASSAY OF CALCIUM: CPT

## 2023-03-16 PROCEDURE — 83605 ASSAY OF LACTIC ACID: CPT

## 2023-03-16 PROCEDURE — 85610 PROTHROMBIN TIME: CPT

## 2023-03-16 PROCEDURE — 82803 BLOOD GASES ANY COMBINATION: CPT

## 2023-03-16 PROCEDURE — 99285 EMERGENCY DEPT VISIT HI MDM: CPT | Mod: 25

## 2023-03-16 PROCEDURE — 0225U NFCT DS DNA&RNA 21 SARSCOV2: CPT

## 2023-03-16 PROCEDURE — 99236 HOSP IP/OBS SAME DATE HI 85: CPT

## 2023-03-16 PROCEDURE — 85025 COMPLETE CBC W/AUTO DIFF WBC: CPT

## 2023-03-16 PROCEDURE — 84484 ASSAY OF TROPONIN QUANT: CPT

## 2023-03-16 PROCEDURE — 80053 COMPREHEN METABOLIC PANEL: CPT

## 2023-03-16 PROCEDURE — 99221 1ST HOSP IP/OBS SF/LOW 40: CPT

## 2023-03-16 PROCEDURE — 87086 URINE CULTURE/COLONY COUNT: CPT

## 2023-03-16 PROCEDURE — 82550 ASSAY OF CK (CPK): CPT

## 2023-03-16 PROCEDURE — 71045 X-RAY EXAM CHEST 1 VIEW: CPT

## 2023-03-16 RX ORDER — FUROSEMIDE 40 MG
40 TABLET ORAL DAILY
Refills: 0 | Status: DISCONTINUED | OUTPATIENT
Start: 2023-03-16 | End: 2023-03-16

## 2023-03-16 RX ORDER — ENOXAPARIN SODIUM 100 MG/ML
40 INJECTION SUBCUTANEOUS EVERY 24 HOURS
Refills: 0 | Status: DISCONTINUED | OUTPATIENT
Start: 2023-03-16 | End: 2023-03-16

## 2023-03-16 RX ORDER — LACTULOSE 10 G/15ML
10 SOLUTION ORAL ONCE
Refills: 0 | Status: COMPLETED | OUTPATIENT
Start: 2023-03-16 | End: 2023-03-16

## 2023-03-16 RX ORDER — SPIRONOLACTONE 25 MG/1
100 TABLET, FILM COATED ORAL DAILY
Refills: 0 | Status: DISCONTINUED | OUTPATIENT
Start: 2023-03-16 | End: 2023-03-16

## 2023-03-16 RX ORDER — SPIRONOLACTONE 25 MG/1
1 TABLET, FILM COATED ORAL
Qty: 0 | Refills: 0 | DISCHARGE

## 2023-03-16 RX ORDER — LACTULOSE 10 G/15ML
30 SOLUTION ORAL
Refills: 0 | Status: DISCONTINUED | OUTPATIENT
Start: 2023-03-16 | End: 2023-03-16

## 2023-03-16 RX ORDER — LACTULOSE 10 G/15ML
15 SOLUTION ORAL
Qty: 0 | Refills: 0 | DISCHARGE

## 2023-03-16 RX ORDER — PANTOPRAZOLE SODIUM 20 MG/1
40 TABLET, DELAYED RELEASE ORAL
Refills: 0 | Status: DISCONTINUED | OUTPATIENT
Start: 2023-03-16 | End: 2023-03-16

## 2023-03-16 RX ADMIN — Medication 40 MILLIGRAM(S): at 15:12

## 2023-03-16 RX ADMIN — LACTULOSE 10 GRAM(S): 10 SOLUTION ORAL at 01:24

## 2023-03-16 RX ADMIN — SPIRONOLACTONE 100 MILLIGRAM(S): 25 TABLET, FILM COATED ORAL at 15:13

## 2023-03-16 RX ADMIN — LACTULOSE 30 GRAM(S): 10 SOLUTION ORAL at 15:12

## 2023-03-16 NOTE — H&P ADULT - ASSESSMENT
Patient is a 67 year old male with past medical history of liver cirrhosis, who was brought to the ED due to change in mental status. Patient is admitted for further evaluation and management of hepatic encephalopathy.

## 2023-03-16 NOTE — DISCHARGE NOTE PROVIDER - HOSPITAL COURSE
Patient is a 67 year old male with past medical history of liver cirrhosis, who was brought to the ED due to change in mental status. Patient is admitted for further evaluation and management of hepatic encephalopathy.    > Encephalopathy.   ·  Plan: In the ED reportedly AxOx2, forgetful. Patient states he was somnolent through most of the day yesterday.   Patient was given one dose of Lactulose 10gm overnight and has not had a BM since yesterday morning.   - Ammonia 70, tbili 1.7, Alk phos 159, CK wnl. Trop neg x2  - Uptitrated Lactulose dose 30gm QID for goal 3- 4 BMs/24hrs > readjust dose once patient clinically improves   - Continue home medication Lasix, Aldactone     > Liver cirrhosis.   ·  Plan: - Patient follows Dr. Aguilera outpatient  - GI/hepatology consult - cleared for discharge  Cleared by Dr Da Silva for discharge     Patient is a 67 year old male with past medical history of liver cirrhosis, who was brought to the ED due to change in mental status. Patient is admitted for further evaluation and management of hepatic encephalopathy.    > Encephalopathy.   ·  Plan: In the ED reportedly AxOx2, forgetful. Patient states he was somnolent through most of the day yesterday.   Patient was given one dose of Lactulose 10gm overnight and has not had a BM since yesterday morning.   - Ammonia 70, tbili 1.7, Alk phos 159, CK wnl. Trop neg x2  - Uptitrated Lactulose dose 30gm QID for goal 3- 4 BMs/24hrs while inpatient > readjust dose once patient clinically improves   - Continue home medication Lasix, Aldactone     > Liver cirrhosis.   ·  Plan: - Patient follows Dr. Aguilera outpatient  - GI/hepatology consulted - cleared for discharge by GI team. Patient to follow up outpatient with Dr. Aguilera.  Cleared by Dr Da Silva for discharge

## 2023-03-16 NOTE — DISCHARGE NOTE NURSING/CASE MANAGEMENT/SOCIAL WORK - NSDCVIVACCINE_GEN_ALL_CORE_FT
HepB-CpG; 01-Sep-2022 18:40; Silva Hurley (RN); [employer] [id number] (Exp. Date: 30-Jan-2023); IntraMuscular; Deltoid Right.; 20 MICROGram(s); VIS (VIS Published: 15-Oct-2021, VIS Presented: 01-Sep-2022);   Pneumococcal conjugate vaccine 20-valent (PCV20), polysaccharide [id number] conjugate, adjuvant, preserv; 01-Sep-2022 20:46; Hilario Valero (RN); [employer]; [id number] (Exp. Date: 01-Jul-2023); IntraMuscular; Deltoid Left.; 0.5 milliLiter(s); VIS (VIS Published: 04-Feb-2022, VIS Presented: 01-Sep-2022);

## 2023-03-16 NOTE — DISCHARGE NOTE PROVIDER - NSDCMRMEDTOKEN_GEN_ALL_CORE_FT
furosemide 40 mg oral tablet: 1 tab(s) orally once a day  lactulose 10 g/15 mL oral syrup: 15 milliliter(s) orally 3 times a day  pantoprazole 40 mg oral delayed release tablet: 1 tab(s) orally once a day (before a meal)    spironolactone 100 mg oral tablet: 1 tab(s) orally once a day

## 2023-03-16 NOTE — DISCHARGE NOTE PROVIDER - NSDCCPCAREPLAN_GEN_ALL_CORE_FT
PRINCIPAL DISCHARGE DIAGNOSIS  Diagnosis: Hyperammonemia  Assessment and Plan of Treatment: Resolved      SECONDARY DISCHARGE DIAGNOSES  Diagnosis: Liver cirrhosis  Assessment and Plan of Treatment: Continue lactulose    Diagnosis: Encephalopathy  Assessment and Plan of Treatment: improved

## 2023-03-16 NOTE — H&P ADULT - PROBLEM SELECTOR PLAN 1
In the ED reportedly AxOx2, forgetful. Patient states he was somnolent through most of the day yesterday.   Patient was given one dose of Lactulose 10gm overnight and has not had a BM since yesterday morning.     - Ammonia 70, tbili 1.7, Alk phos 159, CK wnl. Trop neg x2  - Uptitrated Lactulose dose 30gm QID for goal 3- 4 BMs/24hrs > readjust dose once patient clinically improves   - Continue home medication Lasix, Aldactone   - Monitor CMP PT INR  - Neuro Checks

## 2023-03-16 NOTE — H&P ADULT - HISTORY OF PRESENT ILLNESS
Patient is a 67 year old male with past medical history of liver cirrhosis, who was brought to the ED due to change in mental status. Per patient his last bowel movement was yesterday morning and since breakfast he had been feeling tired and somnolent. He was also forgetful. Per ED note, patient was brought in by his son-in-law with complaint of fatigue, generalized weakness and confusion for few days ~5days. He was also noted to have intermittent tremors. sharonda Brown states he feels better and his memory seems to be improving. Patient states he saw his hepatologist this past week. He had been skipping some doses of lactulose and watching his bowel movements (with goal of 3 BMs/day). He is compliant with other meds.  Patient is a 67 year old male with past medical history of liver cirrhosis, who was brought to the ED due to change in mental status. Per patient his last bowel movement was yesterday morning and since breakfast he had been feeling tired and somnolent. He was also forgetful. Per ED note, patient was brought in by his son-in-law with complaint of fatigue, generalized weakness and confusion for few days ~5days. He was also noted to have intermittent tremors. sharonda Brown states he feels better and his memory seems to be improving. Patient states he saw his hepatologist this past week. He had been skipping some doses of lactulose and watching his bowel movements (with goal of 3 BMs/day). He is compliant with other meds. He denies any headache, dizziness, fever, chills, vision change, dysphagia, cough, dyspnea, chest pain, palpitations, abdominal pain, weakness, numbness, or difficulty ambulating.

## 2023-03-16 NOTE — H&P ADULT - PROBLEM SELECTOR PLAN 3
DVT PPX: Lovenox subcu  Fall precautions. Aspiration precautions.   Regular diet.     #Dispo: likely discharge home tomorrow once patient having BMs, clinically improves DVT PPX: Lovenox subcu  Fall precautions. Aspiration precautions.   Regular diet.     #Meds recs done per list from pharmacist.     #Dispo: likely discharge home tomorrow once patient having BMs, clinically improves

## 2023-03-16 NOTE — DISCHARGE NOTE PROVIDER - NSDCFUSCHEDAPPT_GEN_ALL_CORE_FT
[facility]  [facility]  [address]  Scheduled Appointment: 03/21/2023     [facility]  CATSCAN  [address]  Scheduled Appointment: 03/21/2023    Darren Velasco  [facility]  HEPATOLOGY [address] [place] [address]   Scheduled Appointment: 04/18/2023

## 2023-03-16 NOTE — DISCHARGE NOTE NURSING/CASE MANAGEMENT/SOCIAL WORK - NSDCPEFALRISK_GEN_ALL_CORE
For information on Fall & Injury Prevention, visit: [email or web address] OR  [email or web address] OR  https://www.cdc.gov/steadi/patient.html

## 2023-03-16 NOTE — H&P ADULT - NEGATIVE OPHTHALMOLOGIC SYMPTOMS
no diplopia/no photophobia/no blurred vision L/no blurred vision R/no loss of vision L/no loss of vision R/no scleral injection L/no scleral injection R

## 2023-03-16 NOTE — CONSULT NOTE ADULT - ASSESSMENT
**THIS NOTE IS NOT FINALIZED UNTIL SIGNED BY THE ATTENDING**    Zandra Gibbs MD  GI Fellow, PGY-5  Available via Microsoft Teams    NON-URGENT CONSULTS:  Please email [email or web address]  AT NIGHT AND ON WEEKENDS:  Contact on-call GI fellow via answering service ([phone]) from 5pm-8am and on weekends/holidays  MONDAY-FRIDAY 8AM-5PM:  Pager# [phone] ([facility] or [phone] ([facility])  GI Phone# [phone] ([facility])   TESHA STEINER is a 67 year old male with history of decompensated cirrhosis 2/2 ALD/PARNELL listed for OLT, cholangitis s/p ERCP with stent placement (4/2022) with subsequent repeat ERCP with stent removal, sphincterotomy, and balloon sweep removal of sludge/stones (7/20/22) perforated gallbladder resulting in peritonitis, hemoperitoneum s/p cholecystostomy tube, recently hospitalized in September 2022 for sepsis with serratia bacteremia found to have multiple large abdominal fluid collections s/p drain x 2 (9/11/22, RUQ drain removed), another hospitalization in November 2022 for HE, who presents with fatigue and "brain fog" x 1 day. Currently patient states he feels better and back to his baseline. Was seen by Dr. Velasco in hepatology office 3/14 and at that visit was told to self-titrate lactulose to have 2-3 BM/day. He had been skipping some doses of lactulose and watching his bowel movements (with goal of 3 BMs/day). He is compliant with other meds (lasix 40, aldactone 100, PPI 40 mg daily). He denies any fever, chills, abd pain, N/V, changes in appetite. Transplant hepatology consulted given h/o cirrhosis and pt is listed for OLT.    # Decompensated ALD/PARNELL cirrhosis c/b ascites/HE -UNOS MELD-Na = 16 (3/16): ABO: O       -Ascites: continue home regimen of Lasix 40mg BID/Aldactone 100mg BID       -SBP: secondary bacterial peritonitis in the setting of GB perforation; on chronic Bactrim ppx       -Varices: none seen on EGD 07/2022       -Hepatic encephalopathy: history of HE, controlled on prescribed lactulose at home       -HCC: AFP undetectable as outpatient; CT A/P with IV cont 12/2022 w/o liver lesions     Recommendations:  -trend clinical symptoms, exam findings, vital signs, CBC, CMP, INR  -continue home medications for diuresis, secondary bacterial peritonitis, and HE PPx    OK to discharge from hepatology standpoint. We will sign off at this time. Please reconsult/page if questions.    Follow up in Hepatology Clinic with Dr. Velasco next week: [phone] ([facility] at [address] [place] [address])     Above plan d/w pt's son-in-law and primary team    **THIS NOTE IS NOT FINALIZED UNTIL SIGNED BY THE ATTENDING**    Zandra Gibbs MD  GI Fellow, PGY-5  Available via Microsoft Teams    NON-URGENT CONSULTS:  Please email [email or web address]  AT NIGHT AND ON WEEKENDS:  Contact on-call GI fellow via answering service ([phone]) from 5pm-8am and on weekends/holidays  MONDAY-FRIDAY 8AM-5PM:  Pager# [phone] ([facility]) or [phone] ([facility])  GI Phone# [phone] ([facility])   TESHA STEINER is a 67 year old male with history of decompensated cirrhosis 2/2 ALD/PARNELL listed for OLT, cholangitis s/p ERCP with stent placement (4/2022) with subsequent repeat ERCP with stent removal, sphincterotomy, and balloon sweep removal of sludge/stones (7/20/22) perforated gallbladder resulting in peritonitis, hemoperitoneum s/p cholecystostomy tube, recently hospitalized in September 2022 for sepsis with serratia bacteremia found to have multiple large abdominal fluid collections s/p drain x 2 (9/11/22, RUQ drain removed), another hospitalization in November 2022 for HE, who presents with fatigue and "brain fog" x 1 day. Currently patient states he feels better and back to his baseline. Was seen by Dr. Velasco in hepatology office 3/14 and at that visit was told to self-titrate lactulose to have 2-3 BM/day. He had been skipping some doses of lactulose and watching his bowel movements (with goal of 3 BMs/day). He is compliant with other meds (lasix 40, aldactone 100, PPI 40 mg daily). He denies any fever, chills, abd pain, N/V, changes in appetite. Transplant hepatology consulted given h/o cirrhosis and pt is listed for OLT.    # Decompensated ALD/PARNELL cirrhosis c/b ascites/HE -UNOS MELD-Na = 16 (3/16): ABO: O       -Ascites: continue home regimen of Lasix 40mg BID/Aldactone 100mg BID       -SBP: secondary bacterial peritonitis in the setting of GB perforation; on chronic Bactrim ppx       -Varices: none seen on EGD 07/2022       -Hepatic encephalopathy: history of HE, controlled on prescribed lactulose at home       -HCC: AFP undetectable as outpatient; CT A/P with IV cont 12/2022 w/o liver lesions     Recommendations:  -trend clinical symptoms, exam findings, vital signs, CBC, CMP, INR  -continue home medications for diuresis, secondary bacterial peritonitis, and HE PPx    OK to discharge from hepatology standpoint. We will sign off at this time. Please reconsult/page if questions.    Follow up in Hepatology Clinic with Dr. Velasco next week: [phone] ([facility] at [address] [place] [address])     Above plan d/w pt's son-in-law, Dr. Velasco, and primary team.    **THIS NOTE IS NOT FINALIZED UNTIL SIGNED BY THE ATTENDING**    Zandra Gibbs MD  GI Fellow, PGY-5  Available via Microsoft Teams    NON-URGENT CONSULTS:  Please email [email or web address] OR  [email or web address]  AT NIGHT AND ON WEEKENDS:  Contact on-call GI fellow via answering service ([phone]) from 5pm-8am and on weekends/holidays  MONDAY-FRIDAY 8AM-5PM:  Pager# [phone] ([facility]) or [phone] ([facility])  GI Phone# [phone] ([facility])

## 2023-03-16 NOTE — ED ADULT NURSE NOTE - OBJECTIVE STATEMENT
66 y/o AxOx4 M, arrived from home complaining of fatigue and weakness since Friday. PMH liver failure. As per family, pt had similar episode in December, and was told symptoms caused by high ammonia levels. Pt family at bedside. Respirations spontaneous and unlabored. Denies SOB, dyspnea, cough, chest pain, palpitations. No abdominal pain. No n/v/d. Denies urinary symptoms. Denies fever/chills. No sick contacts. Skin is warm/dry and normal for race. Ambulates at baseline. 66 y/o AxOx4 M (oriented to time, however stated he 'has difficulty with date/time' sometimes), arrived from home complaining of fatigue and weakness since Friday. PMH liver failure. As per family, pt had similar episode in December, and was told symptoms caused by high ammonia levels. Pt family at bedside. Respirations spontaneous and unlabored. Denies SOB, dyspnea, cough, chest pain, palpitations. No abdominal pain. No n/v/d. Denies urinary symptoms. Denies fever/chills. No sick contacts. Skin is warm/dry and normal for race. Ambulates at baseline.

## 2023-03-16 NOTE — DISCHARGE NOTE NURSING/CASE MANAGEMENT/SOCIAL WORK - PATIENT PORTAL LINK FT
You can access the FollowMyHealth Patient Portal offered by [facility] by registering at the following website: [email or web address]. By joining [employer]’s FollowMyHealth portal, you will also be able to view your health information using other applications (apps) compatible with our system.

## 2023-03-16 NOTE — H&P ADULT - NSHPPHYSICALEXAM_GEN_ALL_CORE
Vital Signs Last 24 Hrs  T(C): 36.6 (16 Mar 2023 11:09), Max: 37.2 (15 Mar 2023 22:32)  T(F): 97.9 (16 Mar 2023 11:09), Max: 98.9 (15 Mar 2023 22:32)  HR: 62 (16 Mar 2023 11:09) (62 - 104)  BP: 135/75 (16 Mar 2023 11:09) (132/82 - 154/92)  BP(mean): 98 (16 Mar 2023 04:14) (98 - 98)  RR: 16 (16 Mar 2023 11:09) (16 - 19)  SpO2: 100% (16 Mar 2023 11:09) (97% - 100%)    Parameters below as of 16 Mar 2023 11:09  Patient On (Oxygen Delivery Method): room air      CONSTITUTIONAL: Well groomed, no apparent distress,   EYES: PERRLA and symmetric, EOMI, No conjunctival or scleral injection, non-icteric  ENMT: Oral mucosa with moist membranes. No external nasal lesions; normal dentition  NECK: Supple, symmetric  RESPIRATORY: No respiratory distress, no use of accessory muscles; CTA b/l, no wheezes, rales or rhonchi  CARDIOVASCULAR: RRR, +S1S2, no murmur, no rubs, no gallops; no peripheral edema  Vascular:  radial pulses palpable, dorsalis pedis pulses palpable  GASTROINTESTINAL: Soft, non tender, non distended, no rebound, no guarding  MUSCULOSKELETAL: no digital clubbing or cyanosis; normal range of motion without pain  SKIN: No rashes or ulcers noted on exposed surfaces  NEUROLOGIC: no gross sensory or motor deficits  PSYCHIATRIC: Appropriate insight/judgment; A+O x 3, mood and affect appropriate, recent/remote memory intact. Vital Signs Last 24 Hrs  T(C): 36.6 (16 Mar 2023 11:09), Max: 37.2 (15 Mar 2023 22:32)  T(F): 97.9 (16 Mar 2023 11:09), Max: 98.9 (15 Mar 2023 22:32)  HR: 62 (16 Mar 2023 11:09) (62 - 104)  BP: 135/75 (16 Mar 2023 11:09) (132/82 - 154/92)  BP(mean): 98 (16 Mar 2023 04:14) (98 - 98)  RR: 16 (16 Mar 2023 11:09) (16 - 19)  SpO2: 100% (16 Mar 2023 11:09) (97% - 100%)    Parameters below as of 16 Mar 2023 11:09  Patient On (Oxygen Delivery Method): room air      CONSTITUTIONAL: Well groomed, no apparent distress,   EYES: PERRLA and symmetric, EOMI, No conjunctival or scleral injection, non-icteric  ENMT: Oral mucosa with moist membranes. No external nasal lesions; normal dentition  NECK: Supple, symmetric  RESPIRATORY: No respiratory distress, no use of accessory muscles; CTA b/l, no wheezes, rales or rhonchi  CARDIOVASCULAR: RRR, +S1S2, no murmur, no rubs, no gallops; no peripheral edema  Vascular:  radial pulses palpable, dorsalis pedis pulses palpable  GASTROINTESTINAL: Soft, non tender, non distended, no rebound, no guarding  MUSCULOSKELETAL: no digital clubbing or cyanosis; normal range of motion without pain  SKIN: No rashes or ulcers noted on exposed surfaces  NEUROLOGIC: no gross sensory or motor deficits  PSYCHIATRIC: A+O x 2-3, mood and affect appropriate

## 2023-03-16 NOTE — H&P ADULT - NEGATIVE ENMT SYMPTOMS
no hearing difficulty/no tinnitus/no vertigo/no sinus symptoms/no nasal congestion/no throat pain/no dysphagia

## 2023-03-16 NOTE — H&P ADULT - NSHPSOCIALHISTORY_GEN_ALL_CORE
Lives at home with his daughter. Retired.   States he is fully independent at baseline.   Quit alcohol in May 2022. Denies smoking/any other illicit substance use.

## 2023-03-16 NOTE — CONSULT NOTE ADULT - SUBJECTIVE AND OBJECTIVE BOX
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    Per patient his last bowel movement was yesterday morning and since breakfast he had been feeling tired and somnolent. He was also forgetful. Per ED note, patient was brought in by his son-in-law with complaint of fatigue, generalized weakness and confusion for few days ~5days. He was also noted to have intermittent tremors. sharonda Brown states he feels better and his memory seems to be improving. Patient states he saw his hepatologist this past week. He had been skipping some doses of lactulose and watching his bowel movements (with goal of 3 BMs/day). He is compliant with other meds. He denies any headache, dizziness, fever, chills, vision change, dysphagia, cough, dyspnea, chest pain, palpitations, abdominal pain, weakness, numbness, or difficulty ambulating.         Otherwise, patient denies fevers, chills, weight loss, dysphagia, odynophagia, early satiety, poor oral intake, abdominal pain, nausea, vomiting, diarrhea, melena, hematemesis, hematochezia, change in stool caliber, or family history of GI-related cancers.    Allergies:  No Known Allergies      Home Medications:    Hospital Medications:  enoxaparin Injectable 40 milliGRAM(s) SubCutaneous every 24 hours  furosemide    Tablet 40 milliGRAM(s) Oral daily  lactulose Syrup 30 Gram(s) Oral four times a day  pantoprazole    Tablet 40 milliGRAM(s) Oral before breakfast  spironolactone 100 milliGRAM(s) Oral daily      PMHX/PSHX:  No pertinent past medical history    H/O acute cholangitis    2019 novel coronavirus disease (COVID-19)    History of liver failure    No significant past surgical history    S/P ERCP    H/O colonoscopy        Family history:  No pertinent family history in first degree relatives     Denies any family history of GI-related disease or cancers.    Social History:   ETOH: denies  Tobacco: denies  Illicit drug use: denies    ROS: 14 point ROS negative unless otherwise stated in HPI      Vital Signs:  Vital Signs Last 24 Hrs  T(C): 36.6 (16 Mar 2023 15:10), Max: 37.2 (15 Mar 2023 22:32)  T(F): 97.9 (16 Mar 2023 15:10), Max: 98.9 (15 Mar 2023 22:32)  HR: 61 (16 Mar 2023 15:10) (61 - 104)  BP: 125/69 (16 Mar 2023 15:10) (125/69 - 154/92)  BP(mean): 98 (16 Mar 2023 04:14) (98 - 98)  RR: 15 (16 Mar 2023 15:10) (15 - 19)  SpO2: 100% (16 Mar 2023 15:10) (97% - 100%)    Parameters below as of 16 Mar 2023 15:10  Patient On (Oxygen Delivery Method): room air      Daily Height in cm: 167.64 (15 Mar 2023 22:32)    Daily     PHYSICAL EXAM:     GENERAL:  Appears stated age, well-groomed, well-nourished, no distress  HEENT:  NC/AT,  conjunctivae clear and pink  CHEST:  Full & symmetric excursion, no increased effort, breath sounds clear  HEART:  Regular rhythm, S1, S2, no murmur/rub/S3/S4  ABDOMEN:  Soft, non-tender, non-distended, normoactive bowel sounds,    EXTREMITIES:  no cyanosis,clubbing or edema  SKIN:  No rash/erythema/ecchymoses/petechiae/wounds/abscess/warm/dry  NEURO:  Alert, oriented      LABS:                        13.8   6.03  )-----------( 95       ( 15 Mar 2023 23:47 )             39.7     03-15    133<L>  |  100  |  14  ----------------------------<  125<H>  4.5   |  22  |  0.93    Ca    9.9      15 Mar 2023 23:47    TPro  8.5<H>  /  Alb  3.7  /  TBili  1.7<H>  /  DBili  x   /  AST  38  /  ALT  18  /  AlkPhos  159<H>  03-15    LIVER FUNCTIONS - ( 15 Mar 2023 23:47 )  Alb: 3.7 g/dL / Pro: 8.5 g/dL / ALK PHOS: 159 U/L / ALT: 18 U/L / AST: 38 U/L / GGT: x           PT/INR - ( 15 Mar 2023 23:47 )   PT: 15.9 sec;   INR: 1.37 ratio         PTT - ( 15 Mar 2023 23:47 )  PTT:32.0 sec  Urinalysis Basic - ( 15 Mar 2023 23:47 )    Color: Yellow / Appearance: Clear / S.026 / pH: x  Gluc: x / Ketone: Negative  / Bili: Negative / Urobili: 2 mg/dL   Blood: x / Protein: 30 mg/dL / Nitrite: Negative   Leuk Esterase: Negative / RBC: 6 /hpf / WBC 1 /HPF   Sq Epi: x / Non Sq Epi: 1 /hpf / Bacteria: Negative      Amylase Serum--      Lipase serum--       [id number]      Imaging:            Chief Complaint:  Patient is a 67y old  Male who presents with a chief complaint of confusion (16 Mar 2023 13:28)      HPI: TESHA STEINER is a 67 year old male with 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 Currently patient states he feels better and back to his baseline. Was seen by Dr. Velasco in hepatology office 3/14 and at that visit was told to self-titrate lactulose to have 2-3 BM/day. He had been skipping some doses of lactulose and watching his bowel movements (with goal of 3 BMs/day). He is compliant with other meds (lasix 40, aldactone 100, PPI 40 mg daily). He denies any fever, chills, abd pain, N/V, changes in appetite. Transplant hepatology consulted given h/o cirrhosis and pt is listed for OLT.    Allergies:  No Known Allergies      Home Medications:    Hospital Medications:  enoxaparin Injectable 40 milliGRAM(s) SubCutaneous every 24 hours  furosemide    Tablet 40 milliGRAM(s) Oral daily  lactulose Syrup 30 Gram(s) Oral four times a day  pantoprazole    Tablet 40 milliGRAM(s) Oral before breakfast  spironolactone 100 milliGRAM(s) Oral daily      PMHX/PSHX:  No pertinent past medical history    H/O acute cholangitis    2019 novel coronavirus disease (COVID-19)    History of liver failure    No significant past surgical history    S/P ERCP    H/O colonoscopy        Family history:  No pertinent family history in first degree relatives     Denies any family history of GI-related disease or cancers.    Social History:   ETOH: +prior use  Tobacco: denies  Illicit drug use: denies    ROS: 14 point ROS negative unless otherwise stated in HPI      Vital Signs:  Vital Signs Last 24 Hrs  T(C): 36.6 (16 Mar 2023 15:10), Max: 37.2 (15 Mar 2023 22:32)  T(F): 97.9 (16 Mar 2023 15:10), Max: 98.9 (15 Mar 2023 22:32)  HR: 61 (16 Mar 2023 15:10) (61 - 104)  BP: 125/69 (16 Mar 2023 15:10) (125/69 - 154/92)  BP(mean): 98 (16 Mar 2023 04:14) (98 - 98)  RR: 15 (16 Mar 2023 15:10) (15 - 19)  SpO2: 100% (16 Mar 2023 15:10) (97% - 100%)    Parameters below as of 16 Mar 2023 15:10  Patient On (Oxygen Delivery Method): room air      Daily Height in cm: 167.64 (15 Mar 2023 22:32)    Daily     PHYSICAL EXAM:     GENERAL:  Appears stated age, well-groomed, well-nourished, no distress  HEENT:  NC/AT,  conjunctivae clear and pink  CHEST:  Full & symmetric excursion, no increased effort, breath sounds clear  HEART:  Regular rhythm, S1, S2, no murmur/rub/S3/S4  ABDOMEN:  Soft, non-tender, non-distended, normoactive bowel sounds,    EXTREMITIES:  no cyanosis,clubbing or edema  SKIN:  No rash/erythema/ecchymoses/petechiae/wounds/abscess/warm/dry  NEURO:  Alert, orientedx3, no asterixis      LABS:                        13.8   6.03  )-----------( 95       ( 15 Mar 2023 23:47 )             39.7     03-15    133<L>  |  100  |  14  ----------------------------<  125<H>  4.5   |  22  |  0.93    Ca    9.9      15 Mar 2023 23:47    TPro  8.5<H>  /  Alb  3.7  /  TBili  1.7<H>  /  DBili  x   /  AST  38  /  ALT  18  /  AlkPhos  159<H>  03-15    LIVER FUNCTIONS - ( 15 Mar 2023 23:47 )  Alb: 3.7 g/dL / Pro: 8.5 g/dL / ALK PHOS: 159 U/L / ALT: 18 U/L / AST: 38 U/L / GGT: x           PT/INR - ( 15 Mar 2023 23:47 )   PT: 15.9 sec;   INR: 1.37 ratio         PTT - ( 15 Mar 2023 23:47 )  PTT:32.0 sec  Urinalysis Basic - ( 15 Mar 2023 23:47 )    Color: Yellow / Appearance: Clear / S.026 / pH: x  Gluc: x / Ketone: Negative  / Bili: Negative / Urobili: 2 mg/dL   Blood: x / Protein: 30 mg/dL / Nitrite: Negative   Leuk Esterase: Negative / RBC: 6 /hpf / WBC 1 /HPF   Sq Epi: x / Non Sq Epi: 1 /hpf / Bacteria: Negative      Amylase Serum--      Lipase serum--       [id number]      Imaging:

## 2023-03-17 LAB
ALPHA-1-FETOPROTEIN-L3: 7.4 %
ALPHA-1-FETOPROTEIN: 4.5 NG/ML
CULTURE RESULTS: SIGNIFICANT CHANGE UP
SPECIMEN SOURCE: SIGNIFICANT CHANGE UP

## 2023-03-21 ENCOUNTER — APPOINTMENT (OUTPATIENT)
Dept: CT IMAGING | Facility: CLINIC | Age: 68
End: 2023-03-21
Payer: COMMERCIAL

## 2023-03-21 ENCOUNTER — OUTPATIENT (OUTPATIENT)
Dept: OUTPATIENT SERVICES | Facility: HOSPITAL | Age: 68
LOS: 1 days | End: 2023-03-21

## 2023-03-21 DIAGNOSIS — K70.30 ALCOHOLIC CIRRHOSIS OF LIVER WITHOUT ASCITES: ICD-10-CM

## 2023-03-21 DIAGNOSIS — Z98.890 OTHER SPECIFIED POSTPROCEDURAL STATES: Chronic | ICD-10-CM

## 2023-03-21 PROCEDURE — 74170 CT ABD WO CNTRST FLWD CNTRST: CPT | Mod: 26

## 2023-03-22 NOTE — H&P ADULT - NSHPPOADEEPVENOUSTHROMB_GEN_A_CORE
Keep incision clean and dry. Avoid heavy lifting for 2 weeks. Plan for 2 week follow up. Please call office with questions or concerns. Pain medication sent to pharmacy if needed. no

## 2023-04-11 ENCOUNTER — FORM ENCOUNTER (OUTPATIENT)
Age: 68
End: 2023-04-11

## 2023-04-12 ENCOUNTER — APPOINTMENT (OUTPATIENT)
Dept: HEPATOLOGY | Facility: CLINIC | Age: 68
End: 2023-04-12
Payer: MEDICARE

## 2023-04-12 ENCOUNTER — NON-APPOINTMENT (OUTPATIENT)
Age: 68
End: 2023-04-12

## 2023-04-12 VITALS
DIASTOLIC BLOOD PRESSURE: 79 MMHG | SYSTOLIC BLOOD PRESSURE: 128 MMHG | HEART RATE: 76 BPM | OXYGEN SATURATION: 99 % | BODY MASS INDEX: 23.3 KG/M2 | HEIGHT: 66 IN | RESPIRATION RATE: 14 BRPM | WEIGHT: 145 LBS

## 2023-04-12 PROCEDURE — 99214 OFFICE O/P EST MOD 30 MIN: CPT

## 2023-04-17 NOTE — HISTORY OF PRESENT ILLNESS
[FreeTextEntry1] : Mr. TESHA STEINER a 67 year Black or  Nadja male  presents today for  a hepatology appointment. He has been a patient  [facility] and he returns for a post-hospitalization follow up .\par \par He has a significant past medical history of decompensated cirrhosis, possibly secondary to ALD/PARNELL, with his last reported alcohol intake in April 2022. He also has a history of cholangitis, s/p ERCP with stent placement in April 2022, and subsequent repeat ERCP with stent removal, sphincterotomy, and balloon sweep removal of sludge/stones in July 2022. His course was complicated with an abdominal collection, infected hematoma s/p IR drainage x 2 with a C-tube, repositioning 9/11, Augmentin-resistant E.coli, and serratia bacteremia on 9/11. Repeat blood cultures on 9/15 were negative, and he is currently wait-listed for OLT with MELD-Na: 17.\par \par Since his discharge from the hospital, he has been doing well, although had abdominal distension initially. No varices were seen on the ERCP from July 2022. He had an S/P rodney drain exchange by IR on 9/30/22, and numerous filling defects c/w stones. A CT scan from September 21, 2022, showed cirrhosis and portal hypertension with near complete resolution of the collection in the anterior pelvis status post percutaneous drainage. A small persistent collection in the right subphrenic space and persistent ascites and pleural effusions were noted. No HCC was seen.\par \par He had a paracentesis on 8/9/2022, which revealed likely secondary bacterial peritonitis from suspected gallbladder pathology, and a +SBP on para 8/31.\par \par On his interval history on April 12, 2023, the patient reports feeling well and denies any episodes of HE, abdominal distention, LE edema, abdominal pain, N/V/C/D.

## 2023-04-17 NOTE — PHYSICAL EXAM
[General Appearance - Alert] : alert [General Appearance - In No Acute Distress] : in no acute distress [Sclera] : the sclera and conjunctiva were normal [PERRL With Normal Accommodation] : pupils were equal in size, round, and reactive to light [Extraocular Movements] : extraocular movements were intact [Outer Ear] : the ears and nose were normal in appearance [Oropharynx] : the oropharynx was normal [Neck Appearance] : the appearance of the neck was normal [Neck Cervical Mass (___cm)] : no neck mass was observed [Jugular Venous Distention Increased] : there was no jugular-venous distention [Thyroid Diffuse Enlargement] : the thyroid was not enlarged [Thyroid Nodule] : there were no palpable thyroid nodules [Auscultation Breath Sounds / Voice Sounds] : lungs were clear to auscultation bilaterally [Heart Rate And Rhythm] : heart rate was normal and rhythm regular [Heart Sounds] : normal S1 and S2 [Heart Sounds Gallop] : no gallops [Murmurs] : no murmurs [Heart Sounds Pericardial Friction Rub] : no pericardial rub [Bowel Sounds] : normal bowel sounds [Abdomen Soft] : soft [Abdomen Tenderness] : non-tender [] : no hepato-splenomegaly [Abdomen Mass (___ Cm)] : no abdominal mass palpated [Deep Tendon Reflexes (DTR)] : deep tendon reflexes were 2+ and symmetric [Sensation] : the sensory exam was normal to light touch and pinprick [No Focal Deficits] : no focal deficits [Oriented To Time, Place, And Person] : oriented to person, place, and time [Impaired Insight] : insight and judgment were intact [Affect] : the affect was normal [Abdominal  Ascites] : no ascites [Jaundice] : No jaundice

## 2023-04-17 NOTE — END OF VISIT
[FreeTextEntry3] : I saw and evaluated the patient with NP Mandy.  I discussed the care of this patient with the NP providing the service, and was directly responsible for the patient's management. My discussion with the NP included the patient's history, physical exam, laboratory findings, and medical decision-making. I agree with the documented findings and plan of care of the NP's note. Spent > 30 minutes collectively in reviewing records, performing patient history and physical examination, and formulating recommendations/plan of care.\par

## 2023-04-17 NOTE — ASSESSMENT
[FreeTextEntry1] : Mr. 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 Course was complicated with abdominal collection, infected hematoma s/p IR drainage x 2 with C tube, repositioning, s/p Augmentin for resistant E coli, serratia bacteremia 9/11, repeat BCx 9/15 negative, currently wait-listed for OLT MELD-Na: 17. \par \par I. Cirrhosis\par \par -Discussed natural history of disease and risks for complications\par -Patient is currently wait-listed for liver transplant on OS wait-list\par -Reviewed need for imaging every 6 months to screen for liver cancer\par -Up to date on imaging, most recent CT Abd from 3/21/23 shows no evidence of HCC\par \par II. Ascites\par \par -Counseled patient on low sodium diet\par -Continue Lasix 40 mg daily and Aldactone 100 mg daily\par \par III. Portal Hypertension\par \par -No varices on recent ERCP\par \par IV. Hepatic Encephalopathy\par \par -Lactulose 30 ml 2-3 times daily, titrating for 2-3 semi formed bowel movements\par -Attempt PA for Xifaxan if insurance will cover\par \par V. Abdominal Collection\par Recent CT scan from March 21, 2023 revealed cirrhosis with portal hypertension, no evidence of HCC.  No upper abdominal ascites.  Small right pleural effusion was noted.\par \par VI. History of SBP\par \par -Paracentesis on 8/9/2022 revealed likely secondary bacterial peritonitis from suspected gallbladder pathology\par SBP+ on para 8/31, s/p treatment.  Although ascites has resolved I will continue on Bactrim DS considering history of SBP and potential risk of redeveloping ascites.  We will assess on follow-up if persistent resolution of ascites then we can discontinue Bactrim DS.\par \par \par VII. Alcohol Use Disorder\par \par -Patient currently participating in alcohol relapse prevention program at [facility] [place]\par -Will recheck phosphatidyl ethanol level\par \par VIII. Transplant Candidacy\par \par Listed on 8/26/2022, ABO= O, HCV donor acceptance on unet= yes\par MELD Na UNET 16, next recertification date Janie 15, 2023\par \par Repeat labs in 3 months.

## 2023-04-20 ENCOUNTER — NON-APPOINTMENT (OUTPATIENT)
Age: 68
End: 2023-04-20

## 2023-04-20 LAB
ACARBOXYPROTHROMBIN SERPL-MCNC: 3.6 NG/ML
ALBUMIN SERPL ELPH-MCNC: 3.8 G/DL
ALP BLD-CCNC: 179 U/L
ALPHA-1-FETOPROTEIN-L3: 8 %
ALPHA-1-FETOPROTEIN: 5.1 NG/ML
ALT SERPL-CCNC: 17 U/L
ANION GAP SERPL CALC-SCNC: 11 MMOL/L
AST SERPL-CCNC: 36 U/L
BASOPHILS # BLD AUTO: 0.03 K/UL
BASOPHILS NFR BLD AUTO: 0.7 %
BILIRUB SERPL-MCNC: 1.5 MG/DL
BUN SERPL-MCNC: 13 MG/DL
CALCIUM SERPL-MCNC: 10.3 MG/DL
CHLORIDE SERPL-SCNC: 101 MMOL/L
CO2 SERPL-SCNC: 22 MMOL/L
CREAT SERPL-MCNC: 0.92 MG/DL
EGFR: 92 ML/MIN/1.73M2
EOSINOPHIL # BLD AUTO: 0.08 K/UL
EOSINOPHIL NFR BLD AUTO: 1.7 %
GLUCOSE SERPL-MCNC: 103 MG/DL
HCT VFR BLD CALC: 39.2 %
HGB BLD-MCNC: 13.3 G/DL
IMM GRANULOCYTES NFR BLD AUTO: 0.4 %
INR PPP: 1.24 RATIO
LYMPHOCYTES # BLD AUTO: 1.12 K/UL
LYMPHOCYTES NFR BLD AUTO: 24.3 %
MAN DIFF?: NORMAL
MCHC RBC-ENTMCNC: 33 PG
MCHC RBC-ENTMCNC: 33.9 GM/DL
MCV RBC AUTO: 97.3 FL
MONOCYTES # BLD AUTO: 0.44 K/UL
MONOCYTES NFR BLD AUTO: 9.5 %
NEUTROPHILS # BLD AUTO: 2.92 K/UL
NEUTROPHILS NFR BLD AUTO: 63.4 %
PLATELET # BLD AUTO: 99 K/UL
POTASSIUM SERPL-SCNC: 4.8 MMOL/L
PROT SERPL-MCNC: 8 G/DL
PT BLD: 14.5 SEC
RBC # BLD: 4.03 M/UL
RBC # FLD: 14 %
SODIUM SERPL-SCNC: 134 MMOL/L
WBC # FLD AUTO: 4.61 K/UL

## 2023-05-01 NOTE — PROGRESS NOTE ADULT - ASSESSMENT
66 year old male with decompensated cirrhosis and cholangitis s/p ERCP with stent placement (4/2022) s/p stent removal on 7/20 (along sphincterotomy and stone extraction) presenting for one week of RUQ abdominal pain associated with new abdominal distension and decreased appetite.     #Septic shock 2/2 acute cholecystitis vs ascending cholangitis  #ROBBIE, worsening: initial urine sodium 9, likely indicating prerenal ROBBIE vs less likely HRS given initial presentation as above  #History of cholangitis s/p biliary stent placement April 2022 and removal 7/20/2022, now s/p perc rodney tube  #Decompensated ALD/PARNELL cirrhosis c/b ascites  EV: normal esophagus on ERCP from 4/2022  Ascites: increase in ascites from mild to moderate on current imaging  SBP: paracentesis 8/9/2022 c/w peritonitis given 99K PMNs however elevated LDH more indicative of secondary bacterial peritonitis  HE: none currently       -MELD-Na = 35 on 8/11/2022       -Ascites: yes       -SBP: paracentesis 8/9/2022 reveals likely secondary bacterial peritonitis from suspected gallbladder pathology       -Varices: no mention of varices on EGD/ERCP on 7/20/2022       -Hepatic encephalopathy: Ammonia, Serum: 38 umol/L [11 - 55] (08-12-22 @ 00:21)       -HCC: unknown       -LT candidacy and evaluation:            [ ] Psychosocial            [ ] Infectious            [ ] Cardiology:                    Cardiac cath:                    Stress test:             [ ] Colonoscopy            [x] Prostate: Prostate Specific Antigen: negative at 2.21 ng/mL on 8/12/2022            [ ] Dental            [ ] PT/Frailty    Recommendations:  -trend clinical symptoms, exam findings, vital signs, CBC, CMP, INR  -weaned off pressors  -s/p percutaneous cholecystostomy as above  -likely secondary bacterial peritonitis based on paracentesis and clinical findings, CT A/P negative for perforation however performed without contrast 2/2 renal function  -would favor ATN as likely etiology, however given worsening ROBBIE continue midodrine/albumin/octreotide for empiric treatment for HRS-ROBBIE  -initiated hemodialysis 8/11/2022  -repeat paracentesis planned for today  -liver transplantation evaluation opened  -PPI daily for stress prophylaxis  -hopeful for transfer to floor    Note incomplete until finalized by attending signature/attestation.    Fadi Churchill  GI/Hepatology Fellow    MONDAY-FRIDAY 8AM-5PM:  Pager# [phone] ([facility]) or [phone] ([facility])    NON-URGENT CONSULTS:  Please email [email or web address]  AT NIGHT AND ON WEEKENDS:  Contact on-call GI fellow via answering service ([phone]) from 5pm-8am and on weekends/holidays         66 year old male with decompensated cirrhosis and cholangitis s/p ERCP with stent placement (4/2022) s/p stent removal on 7/20 (along sphincterotomy and stone extraction) presenting for one week of RUQ abdominal pain associated with new abdominal distension and decreased appetite.     #Septic shock 2/2 acute cholecystitis vs ascending cholangitis  #ROBBIE, worsening: initial urine sodium 9, likely indicating prerenal ROBBIE vs less likely HRS given initial presentation as above  #History of cholangitis s/p biliary stent placement April 2022 and removal 7/20/2022, now s/p perc rodney tube  #Decompensated ALD/PARNELL cirrhosis c/b ascites  EV: normal esophagus on ERCP from 4/2022  Ascites: increase in ascites from mild to moderate on current imaging  SBP: paracentesis 8/9/2022 c/w peritonitis given 99K PMNs however elevated LDH more indicative of secondary bacterial peritonitis  HE: none currently       -MELD-Na = 35 on 8/11/2022       -Ascites: yes       -SBP: paracentesis 8/9/2022 reveals likely secondary bacterial peritonitis from suspected gallbladder pathology       -Varices: no mention of varices on EGD/ERCP on 7/20/2022       -Hepatic encephalopathy: Ammonia, Serum: 38 umol/L [11 - 55] (08-12-22 @ 00:21)       -HCC: unknown       -LT candidacy and evaluation:            [ ] Psychosocial            [ ] Infectious            [ ] Cardiology:                    Cardiac cath:                    Stress test:             [ ] Colonoscopy            [x] Prostate: Prostate Specific Antigen: negative at 2.21 ng/mL on 8/12/2022            [ ] Dental            [ ] PT/Frailty    Recommendations:  -trend clinical symptoms, exam findings, vital signs, CBC, CMP, INR  -weaned off pressors  -s/p percutaneous cholecystostomy as above  -likely secondary bacterial peritonitis based on paracentesis and clinical findings, CT A/P negative for perforation however performed without contrast 2/2 renal function  -would favor ATN as likely etiology, however given worsening ROBBIE continue midodrine/octreotide for empiric treatment for HRS-ROBBIE, hold IV albumin given pulmonary congestion  -initiated hemodialysis 8/11/2022  -repeat paracentesis planned for today  -liver transplantation evaluation opened  -PPI daily for stress prophylaxis  -potential transfer to floor if stable    Note incomplete until finalized by attending signature/attestation.    Fadi Churchill  GI/Hepatology Fellow    MONDAY-FRIDAY 8AM-5PM:  Pager# [phone] ([facility]) or [phone] ([facility])    NON-URGENT CONSULTS:  Please email [email or web address]  AT NIGHT AND ON WEEKENDS:  Contact on-call GI fellow via answering service ([phone]) from 5pm-8am and on weekends/holidays         Low Dose Naltrexone Counseling- I discussed with the patient the potential risks and side effects of low dose naltrexone including but not limited to: more vivid dreams, headaches, nausea, vomiting, abdominal pain, fatigue, dizziness, and anxiety.

## 2023-05-15 LAB — PHOSPHATIDYETHANOL (PETH), WHOLE BLOOD: NEGATIVE

## 2023-07-11 ENCOUNTER — APPOINTMENT (OUTPATIENT)
Dept: HEPATOLOGY | Facility: CLINIC | Age: 68
End: 2023-07-11
Payer: MEDICARE

## 2023-07-11 ENCOUNTER — NON-APPOINTMENT (OUTPATIENT)
Age: 68
End: 2023-07-11

## 2023-07-11 VITALS
WEIGHT: 160 LBS | HEART RATE: 69 BPM | SYSTOLIC BLOOD PRESSURE: 123 MMHG | DIASTOLIC BLOOD PRESSURE: 72 MMHG | BODY MASS INDEX: 25.71 KG/M2 | HEIGHT: 66 IN | RESPIRATION RATE: 14 BRPM | TEMPERATURE: 97.4 F | OXYGEN SATURATION: 97 %

## 2023-07-11 PROCEDURE — 99214 OFFICE O/P EST MOD 30 MIN: CPT

## 2023-07-11 RX ORDER — SPIRONOLACTONE 100 MG/1
100 TABLET ORAL DAILY
Qty: 30 | Refills: 5 | Status: DISCONTINUED | COMMUNITY
Start: 2022-09-02 | End: 2023-07-11

## 2023-07-12 LAB
ALBUMIN SERPL ELPH-MCNC: 3.6 G/DL
ALP BLD-CCNC: 204 U/L
ALT SERPL-CCNC: 17 U/L
ANION GAP SERPL CALC-SCNC: 11 MMOL/L
AST SERPL-CCNC: 39 U/L
BILIRUB SERPL-MCNC: 1 MG/DL
BUN SERPL-MCNC: 14 MG/DL
CALCIUM SERPL-MCNC: 9.9 MG/DL
CHLORIDE SERPL-SCNC: 103 MMOL/L
CO2 SERPL-SCNC: 22 MMOL/L
CREAT SERPL-MCNC: 1 MG/DL
EGFR: 84 ML/MIN/1.73M2
GLUCOSE SERPL-MCNC: 149 MG/DL
INR PPP: 1.28 RATIO
MAGNESIUM SERPL-MCNC: 1.9 MG/DL
PHOSPHATE SERPL-MCNC: 3.2 MG/DL
POTASSIUM SERPL-SCNC: 4.2 MMOL/L
PROT SERPL-MCNC: 7.4 G/DL
PT BLD: 14.9 SEC
SODIUM SERPL-SCNC: 135 MMOL/L

## 2023-07-14 ENCOUNTER — NON-APPOINTMENT (OUTPATIENT)
Age: 68
End: 2023-07-14

## 2023-07-14 LAB
ALPHA-1-FETOPROTEIN-L3: 7.5 %
ALPHA-1-FETOPROTEIN: 4.3 NG/ML

## 2023-07-19 NOTE — PROGRESS NOTE ADULT - PROBLEM/PLAN-1
INR not at goal. Medications, chart, and patient findings reviewed. See calendar for adjustments to dose and follow up plan.    
DISPLAY PLAN FREE TEXT

## 2023-09-25 ENCOUNTER — OUTPATIENT (OUTPATIENT)
Dept: OUTPATIENT SERVICES | Facility: HOSPITAL | Age: 68
LOS: 1 days | End: 2023-09-25

## 2023-09-25 ENCOUNTER — APPOINTMENT (OUTPATIENT)
Dept: ULTRASOUND IMAGING | Facility: CLINIC | Age: 68
End: 2023-09-25
Payer: MEDICARE

## 2023-09-25 DIAGNOSIS — Z98.890 OTHER SPECIFIED POSTPROCEDURAL STATES: Chronic | ICD-10-CM

## 2023-09-25 DIAGNOSIS — K70.30 ALCOHOLIC CIRRHOSIS OF LIVER WITHOUT ASCITES: ICD-10-CM

## 2023-09-25 PROCEDURE — 76700 US EXAM ABDOM COMPLETE: CPT | Mod: 26

## 2023-10-19 ENCOUNTER — APPOINTMENT (OUTPATIENT)
Dept: MRI IMAGING | Facility: CLINIC | Age: 68
End: 2023-10-19
Payer: COMMERCIAL

## 2023-10-19 ENCOUNTER — OUTPATIENT (OUTPATIENT)
Dept: OUTPATIENT SERVICES | Facility: HOSPITAL | Age: 68
LOS: 1 days | End: 2023-10-19

## 2023-10-19 DIAGNOSIS — K70.30 ALCOHOLIC CIRRHOSIS OF LIVER WITHOUT ASCITES: ICD-10-CM

## 2023-10-19 DIAGNOSIS — Z98.890 OTHER SPECIFIED POSTPROCEDURAL STATES: Chronic | ICD-10-CM

## 2023-10-19 PROCEDURE — 74183 MRI ABD W/O CNTR FLWD CNTR: CPT | Mod: 26

## 2023-10-24 ENCOUNTER — APPOINTMENT (OUTPATIENT)
Dept: HEPATOLOGY | Facility: CLINIC | Age: 68
End: 2023-10-24
Payer: MEDICARE

## 2023-10-24 VITALS
BODY MASS INDEX: 30.05 KG/M2 | HEIGHT: 64 IN | DIASTOLIC BLOOD PRESSURE: 83 MMHG | TEMPERATURE: 98.8 F | OXYGEN SATURATION: 98 % | SYSTOLIC BLOOD PRESSURE: 156 MMHG | WEIGHT: 176 LBS | HEART RATE: 65 BPM

## 2023-10-24 PROCEDURE — 99214 OFFICE O/P EST MOD 30 MIN: CPT

## 2023-10-24 RX ORDER — SULFAMETHOXAZOLE AND TRIMETHOPRIM 400; 80 MG/1; MG/1
400-80 TABLET ORAL DAILY
Qty: 30 | Refills: 5 | Status: DISCONTINUED | COMMUNITY
Start: 2023-01-11 | End: 2023-10-24

## 2023-10-24 RX ORDER — RIFAXIMIN 550 MG/1
550 TABLET ORAL
Qty: 60 | Refills: 5 | Status: DISCONTINUED | COMMUNITY
Start: 2023-04-12 | End: 2023-10-24

## 2023-10-25 LAB
ALBUMIN SERPL ELPH-MCNC: 3.6 G/DL
ALP BLD-CCNC: 205 U/L
ALT SERPL-CCNC: 17 U/L
ANION GAP SERPL CALC-SCNC: 9 MMOL/L
AST SERPL-CCNC: 33 U/L
BASOPHILS # BLD AUTO: 0.05 K/UL
BASOPHILS NFR BLD AUTO: 1.1 %
BILIRUB SERPL-MCNC: 1.2 MG/DL
BUN SERPL-MCNC: 10 MG/DL
CALCIUM SERPL-MCNC: 9.2 MG/DL
CHLORIDE SERPL-SCNC: 106 MMOL/L
CO2 SERPL-SCNC: 23 MMOL/L
CREAT SERPL-MCNC: 0.81 MG/DL
EGFR: 97 ML/MIN/1.73M2
EOSINOPHIL # BLD AUTO: 0.13 K/UL
EOSINOPHIL NFR BLD AUTO: 2.8 %
GLUCOSE SERPL-MCNC: 127 MG/DL
HCT VFR BLD CALC: 43 %
HGB BLD-MCNC: 14.1 G/DL
IMM GRANULOCYTES NFR BLD AUTO: 0 %
INR PPP: 1.25 RATIO
LYMPHOCYTES # BLD AUTO: 1.21 K/UL
LYMPHOCYTES NFR BLD AUTO: 26.5 %
MAN DIFF?: NORMAL
MCHC RBC-ENTMCNC: 31.8 PG
MCHC RBC-ENTMCNC: 32.8 GM/DL
MCV RBC AUTO: 97.1 FL
MONOCYTES # BLD AUTO: 0.38 K/UL
MONOCYTES NFR BLD AUTO: 8.3 %
NEUTROPHILS # BLD AUTO: 2.8 K/UL
NEUTROPHILS NFR BLD AUTO: 61.3 %
PLATELET # BLD AUTO: 104 K/UL
POTASSIUM SERPL-SCNC: 3.8 MMOL/L
PROT SERPL-MCNC: 7.2 G/DL
PT BLD: 14 SEC
RBC # BLD: 4.43 M/UL
RBC # FLD: 14.6 %
SODIUM SERPL-SCNC: 138 MMOL/L
WBC # FLD AUTO: 4.57 K/UL

## 2023-10-26 LAB — ACARBOXYPROTHROMBIN SERPL-MCNC: 2.7 NG/ML

## 2023-10-27 LAB
ALPHA-1-FETOPROTEIN-L3: 7.3 %
ALPHA-1-FETOPROTEIN: 4.6 NG/ML

## 2023-11-03 LAB
PETH 16:0/18:1: NEGATIVE NG/ML
PETH 16:0/18:2: 26 NG/ML
PETH COMMENTS: NORMAL

## 2023-12-04 NOTE — PROGRESS NOTE ADULT - PROBLEM SELECTOR PLAN 1
.Weekly Wound Education Note    Teaching Provided To: Patient  Training Topics: Discharge instructions;Dressing;Edema control; Compression  Training Method: Explain/Verbal;Written  Training Response: Patient responds and understands; Reinforcement needed            Mechlele Ag, hydrofera transfer, kerramax to wounds (abd pad to ankle wound). Calamine unna boot 30-40mmHg. Provided handouts for vascular surgeon and wrap care. Letter for work restrictions provided as requested as well. -Patient s/p HD on 8/11, 8/13 due to uremic symptoms  -Patient got a dose of 120mg lasix on 8/16; responded well  -Renal US with b/l echogenic kidneys  -creatinine downtrending  -consider c/w lasix 60 BID IV for diuresis   -monitor BMP frequently; avoid nephrotoxic agents    #hypomagnesium  -replete magnesium -Patient s/p HD on 8/11, 8/13 due to uremic symptoms. Catheter and since been removed.   -Patient got a dose of 120mg lasix on 8/16; responded well  -Renal US with b/l echogenic kidneys  -creatinine downtrending  -consider c/w lasix 60 BID IV for diuresis   -monitor BMP frequently; avoid nephrotoxic agents  -Optimize hemodynamics, transfusion as per primary team.     #hypomagnesium  -replete magnesium

## 2023-12-12 ENCOUNTER — APPOINTMENT (OUTPATIENT)
Dept: TRANSPLANT | Facility: CLINIC | Age: 68
End: 2023-12-12

## 2023-12-14 ENCOUNTER — NON-APPOINTMENT (OUTPATIENT)
Age: 68
End: 2023-12-14

## 2023-12-14 LAB
ALBUMIN SERPL ELPH-MCNC: 3.5 G/DL
ALP BLD-CCNC: 199 U/L
ALT SERPL-CCNC: 14 U/L
ANION GAP SERPL CALC-SCNC: 9 MMOL/L
AST SERPL-CCNC: 29 U/L
BASOPHILS # BLD AUTO: 0.04 K/UL
BASOPHILS NFR BLD AUTO: 0.8 %
BILIRUB SERPL-MCNC: 1.3 MG/DL
BUN SERPL-MCNC: 10 MG/DL
CALCIUM SERPL-MCNC: 9.3 MG/DL
CHLORIDE SERPL-SCNC: 105 MMOL/L
CO2 SERPL-SCNC: 26 MMOL/L
CREAT SERPL-MCNC: 0.85 MG/DL
EGFR: 96 ML/MIN/1.73M2
EOSINOPHIL # BLD AUTO: 0.17 K/UL
EOSINOPHIL NFR BLD AUTO: 3.5 %
ETHANOL UR, QUAN: NEGATIVE %
GLUCOSE SERPL-MCNC: 108 MG/DL
HCT VFR BLD CALC: 42.7 %
HGB BLD-MCNC: 14 G/DL
IMM GRANULOCYTES NFR BLD AUTO: 0.2 %
INR PPP: 1.19 RATIO
LYMPHOCYTES # BLD AUTO: 1.55 K/UL
LYMPHOCYTES NFR BLD AUTO: 32 %
MAN DIFF?: NORMAL
MCHC RBC-ENTMCNC: 31.7 PG
MCHC RBC-ENTMCNC: 32.8 GM/DL
MCV RBC AUTO: 96.8 FL
MONOCYTES # BLD AUTO: 0.59 K/UL
MONOCYTES NFR BLD AUTO: 12.2 %
NEUTROPHILS # BLD AUTO: 2.49 K/UL
NEUTROPHILS NFR BLD AUTO: 51.3 %
PLATELET # BLD AUTO: 105 K/UL
POTASSIUM SERPL-SCNC: 3.9 MMOL/L
PROT SERPL-MCNC: 6.9 G/DL
PT BLD: 13.4 SEC
RBC # BLD: 4.41 M/UL
RBC # FLD: 14.6 %
SODIUM SERPL-SCNC: 140 MMOL/L
WBC # FLD AUTO: 4.85 K/UL

## 2023-12-15 LAB — ETHYL GLUCURONIDE UR QL SCN: NEGATIVE

## 2023-12-26 LAB
PETH 16:0/18:1: NEGATIVE NG/ML
PETH 16:0/18:2: 20 NG/ML
PETH COMMENTS: NORMAL

## 2024-01-10 NOTE — ED PROVIDER NOTE - PHYSICAL EXAMINATION
yes... LOS:     VITALS:   T(C): 36.9 (03-16-23 @ 04:14), Max: 37.2 (03-15-23 @ 22:32)  HR: 74 (03-16-23 @ 04:14) (69 - 104)  BP: 132/82 (03-16-23 @ 04:14) (132/82 - 154/92)  RR: 16 (03-16-23 @ 04:14) (16 - 19)  SpO2: 98% (03-16-23 @ 04:14) (97% - 99%)    GENERAL: NAD, lying in bed comfortably  HEAD:  Atraumatic, Normocephalic  EYES: EOMI, PERRLA, conjunctiva and sclera clear  ENT: Moist mucous membranes  NECK: Supple  CHEST/LUNG: Clear to auscultation bilaterally; No rales, rhonchi, wheezing, or rubs. Unlabored respirations  HEART: Regular rate and rhythm; No murmurs, rubs, or gallops  ABDOMEN: Soft, nontender, nondistended  EXTREMITIES: No clubbing, cyanosis, or edema. No tremors. No asterixis  NERVOUS SYSTEM:  A&Ox2 to self and place, no focal deficits. No pronator drift. No facial droop. Tongue midline. EOMI. Sensation intact on face.   SKIN: No rashes or lesions

## 2024-01-17 NOTE — DISCHARGE NOTE PROVIDER - DID THE PATIENT PRESENT WITH OR WAS TREATED FOR MALNUTRITION DURING THIS ADMISSION
Cough is gone altogether. She is taking care of her . She is not doing her usual exericse like she is usually doing.    She is fully recovered from the cough at this point.  I did go over the CT scan results and likely would benefit from a repeat CT scan in 6 months.  She will notify us of any recurrences of symptoms.   No

## 2024-01-24 ENCOUNTER — APPOINTMENT (OUTPATIENT)
Dept: HEPATOLOGY | Facility: CLINIC | Age: 69
End: 2024-01-24
Payer: MEDICARE

## 2024-01-24 ENCOUNTER — RESULT REVIEW (OUTPATIENT)
Age: 69
End: 2024-01-24

## 2024-01-24 VITALS
HEIGHT: 64 IN | RESPIRATION RATE: 14 BRPM | OXYGEN SATURATION: 99 % | DIASTOLIC BLOOD PRESSURE: 85 MMHG | SYSTOLIC BLOOD PRESSURE: 153 MMHG | WEIGHT: 176 LBS | TEMPERATURE: 96.3 F | HEART RATE: 68 BPM | BODY MASS INDEX: 30.05 KG/M2

## 2024-01-24 PROCEDURE — 99214 OFFICE O/P EST MOD 30 MIN: CPT

## 2024-01-25 ENCOUNTER — NON-APPOINTMENT (OUTPATIENT)
Age: 69
End: 2024-01-25

## 2024-01-25 LAB
AFP-TM SERPL-MCNC: 3.4 NG/ML
ALBUMIN SERPL ELPH-MCNC: 3.5 G/DL
ALP BLD-CCNC: 171 U/L
ALT SERPL-CCNC: 15 U/L
ANION GAP SERPL CALC-SCNC: 14 MMOL/L
AST SERPL-CCNC: 32 U/L
BILIRUB SERPL-MCNC: 1.2 MG/DL
BUN SERPL-MCNC: 11 MG/DL
CALCIUM SERPL-MCNC: 8.9 MG/DL
CHLORIDE SERPL-SCNC: 106 MMOL/L
CO2 SERPL-SCNC: 20 MMOL/L
CREAT SERPL-MCNC: 0.82 MG/DL
EGFR: 97 ML/MIN/1.73M2
GLUCOSE SERPL-MCNC: 150 MG/DL
HCT VFR BLD CALC: 41.1 %
HGB BLD-MCNC: 13.7 G/DL
INR PPP: 1.24 RATIO
MCHC RBC-ENTMCNC: 31.4 PG
MCHC RBC-ENTMCNC: 33.3 GM/DL
MCV RBC AUTO: 94.3 FL
PLATELET # BLD AUTO: 106 K/UL
POTASSIUM SERPL-SCNC: 3.5 MMOL/L
PROT SERPL-MCNC: 6.9 G/DL
PT BLD: 13.9 SEC
RBC # BLD: 4.36 M/UL
RBC # FLD: 14.4 %
SODIUM SERPL-SCNC: 140 MMOL/L
WBC # FLD AUTO: 4.32 K/UL

## 2024-01-26 ENCOUNTER — NON-APPOINTMENT (OUTPATIENT)
Age: 69
End: 2024-01-26

## 2024-02-04 LAB
PETH 16:0/18:1: 28 NG/ML
PETH 16:0/18:2: 30 NG/ML
PETH COMMENTS: NORMAL

## 2024-02-11 LAB — ETHYL GLUCURONIDE UR QL SCN: NEGATIVE

## 2024-04-10 RX ORDER — EPLERENONE 50 MG/1
50 TABLET, COATED ORAL
Qty: 30 | Refills: 5 | Status: ACTIVE | COMMUNITY
Start: 2023-07-11 | End: 1900-01-01

## 2024-04-10 NOTE — DIETITIAN INITIAL EVALUATION ADULT - MALNUTRITION
Detail Level: Simple
Additional Notes: Pt referred from PCP for a lesion that has been said to be a cyst. Patient states there was an ultrasound done but we were not sent these records. Dr Mooney would to see imaging and these records will be requested today. \\n\\nImaging was done at PCP’s office, but pt did not recall what the results were. \\n\\nMedical release filled out today and will be faxed over in order for copy of result to be sent to office. Pts and mother informed that once results have been received we will contract her to discuss next steps.\\n\\nOn exam patient states the lesion is right between the breasts deep over the area of the xiphoid process. After several attempts to palpate any abnormalities I cannot identify any lesions on exam. We will obtain imaging results and assess. Discussed with patient that if the imaging in fact does see a cyst under the skin in this area that I would refer her to pediatric surgery for removal should she wish to have it removed as I do not remove lesions that I cannot palpate or that only imaging can see deep under the surface. She voiced understanding but does not want this removed. If the lesion grows or changes or starts to bother her I discussed that I would recommend referral for removal and her and mom voiced understanding.
Render Risk Assessment In Note?: no
Severe; acute on chronic

## 2024-04-11 NOTE — ASU PREOP CHECKLIST - PATIENT'S PERSONAL PROPERTY GIVEN TO
What Type Of Note Output Would You Prefer (Optional)?: Bullet Format What Is The Reason For Today's Visit?: Full Body Skin Examination What Is The Reason For Today's Visit? (Being Monitored For X): concerning skin lesions on an annual basis security/safe

## 2024-04-12 ENCOUNTER — APPOINTMENT (OUTPATIENT)
Dept: ULTRASOUND IMAGING | Facility: CLINIC | Age: 69
End: 2024-04-12
Payer: MEDICARE

## 2024-04-12 ENCOUNTER — OUTPATIENT (OUTPATIENT)
Dept: OUTPATIENT SERVICES | Facility: HOSPITAL | Age: 69
LOS: 1 days | End: 2024-04-12

## 2024-04-12 DIAGNOSIS — R18.8 OTHER ASCITES: ICD-10-CM

## 2024-04-12 DIAGNOSIS — Z98.890 OTHER SPECIFIED POSTPROCEDURAL STATES: Chronic | ICD-10-CM

## 2024-04-12 PROCEDURE — 93975 VASCULAR STUDY: CPT | Mod: 26

## 2024-04-12 PROCEDURE — 76705 ECHO EXAM OF ABDOMEN: CPT | Mod: 26,59

## 2024-04-18 ENCOUNTER — NON-APPOINTMENT (OUTPATIENT)
Age: 69
End: 2024-04-18

## 2024-04-20 NOTE — PATIENT PROFILE ADULT - DO YOU LACK THE NECESSARY SUPPORT TO HELP YOU COPE WITH LIFE CHALLENGES?
Const:  Alert and interactive, no acute distress  HEENT: Normocephalic, atraumatic; TMs WNL; Moist mucosa; Oropharynx clear; Neck supple with full ROM  Lymph: No significant lymphadenopathy  CV: Heart regular rate and rhythm, normal S1/2, no murmurs; Extremities WWPx4  Pulm: No tachypnea, no retractions. Lungs clear to auscultation bilaterally  GI: Abdomen non-distended; No organomegaly, no tenderness, no masses  MSK: No pain in legs currently. Full ROM of bilateral legs, no tenderness to palpation of legs or joints, no erythema or swelling of extremities, sensation intact.  Skin: No rash noted  Neuro: Alert; Normal tone; coordination appropriate for age. CN II-XII grossly intact. Normal gait, walking around room.
No
no

## 2024-04-24 ENCOUNTER — APPOINTMENT (OUTPATIENT)
Dept: HEPATOLOGY | Facility: CLINIC | Age: 69
End: 2024-04-24
Payer: MEDICARE

## 2024-04-24 VITALS
OXYGEN SATURATION: 99 % | TEMPERATURE: 97.4 F | WEIGHT: 173 LBS | SYSTOLIC BLOOD PRESSURE: 146 MMHG | DIASTOLIC BLOOD PRESSURE: 80 MMHG | HEART RATE: 61 BPM | HEIGHT: 64 IN | BODY MASS INDEX: 29.53 KG/M2

## 2024-04-24 DIAGNOSIS — K76.82 HEPATIC ENCEPHALOPATHY: ICD-10-CM

## 2024-04-24 DIAGNOSIS — K70.30 ALCOHOLIC CIRRHOSIS OF LIVER W/OUT ASCITES: ICD-10-CM

## 2024-04-24 DIAGNOSIS — R18.8 OTHER ASCITES: ICD-10-CM

## 2024-04-24 PROCEDURE — 99214 OFFICE O/P EST MOD 30 MIN: CPT

## 2024-04-24 RX ORDER — FUROSEMIDE 20 MG/1
20 TABLET ORAL
Qty: 30 | Refills: 5 | Status: DISCONTINUED | COMMUNITY
Start: 2022-09-26 | End: 2024-04-24

## 2024-04-24 RX ORDER — LACTULOSE SOLUTION USP, 10 G/15 ML 10 G/15ML
10 SOLUTION ORAL; RECTAL 3 TIMES DAILY
Qty: 1350 | Refills: 3 | Status: DISCONTINUED | COMMUNITY
Start: 2022-12-07 | End: 2024-04-24

## 2024-04-25 PROBLEM — K70.30 ALCOHOLIC CIRRHOSIS: Status: ACTIVE | Noted: 2022-10-25

## 2024-04-28 PROBLEM — K76.82 HEPATIC ENCEPHALOPATHY: Status: ACTIVE | Noted: 2022-12-07

## 2024-04-28 PROBLEM — R18.8 ASCITES: Status: ACTIVE | Noted: 2022-12-07

## 2024-05-02 ENCOUNTER — NON-APPOINTMENT (OUTPATIENT)
Age: 69
End: 2024-05-02

## 2024-05-03 LAB
ALBUMIN SERPL ELPH-MCNC: 3.7 G/DL
ALP BLD-CCNC: 189 U/L
ALT SERPL-CCNC: 17 U/L
ANION GAP SERPL CALC-SCNC: 9 MMOL/L
AST SERPL-CCNC: 30 U/L
BASOPHILS # BLD AUTO: 0.03 K/UL
BASOPHILS NFR BLD AUTO: 0.7 %
BILIRUB SERPL-MCNC: 1.4 MG/DL
BUN SERPL-MCNC: 6 MG/DL
CALCIUM SERPL-MCNC: 9.5 MG/DL
CHLORIDE SERPL-SCNC: 104 MMOL/L
CO2 SERPL-SCNC: 23 MMOL/L
CREAT SERPL-MCNC: 0.73 MG/DL
EGFR: 100 ML/MIN/1.73M2
EOSINOPHIL # BLD AUTO: 0.12 K/UL
EOSINOPHIL NFR BLD AUTO: 2.6 %
GLUCOSE SERPL-MCNC: 154 MG/DL
HCT VFR BLD CALC: 43.9 %
HGB BLD-MCNC: 14.9 G/DL
IMM GRANULOCYTES NFR BLD AUTO: 0.2 %
INR PPP: 1.27 RATIO
LYMPHOCYTES # BLD AUTO: 1.41 K/UL
LYMPHOCYTES NFR BLD AUTO: 31.1 %
MAN DIFF?: NORMAL
MCHC RBC-ENTMCNC: 31.5 PG
MCHC RBC-ENTMCNC: 33.9 GM/DL
MCV RBC AUTO: 92.8 FL
MONOCYTES # BLD AUTO: 0.33 K/UL
MONOCYTES NFR BLD AUTO: 7.3 %
NEUTROPHILS # BLD AUTO: 2.64 K/UL
NEUTROPHILS NFR BLD AUTO: 58.1 %
PLATELET # BLD AUTO: 113 K/UL
POTASSIUM SERPL-SCNC: 4 MMOL/L
PROT SERPL-MCNC: 7.5 G/DL
PT BLD: 14.2 SEC
RBC # BLD: 4.73 M/UL
RBC # FLD: 14.8 %
SODIUM SERPL-SCNC: 137 MMOL/L
WBC # FLD AUTO: 4.54 K/UL

## 2024-05-07 LAB
ALPHA-1-FETOPROTEIN-L3: 8.1 %
ALPHA-1-FETOPROTEIN: 4 NG/ML

## 2024-05-24 NOTE — ASU PREOP CHECKLIST - TAMPON REMOVED
n/a Vaccinations/[facility]  Screening Program/  Immunization Record/Breastfeeding Log/Bottle Feeding Log/Breastfeeding Mother’s Support Group Information/Guide to Postpartum Care/[facility] Hearing Screen Program/Back To Sleep Handout/Shaken Baby Prevention Handout/Breastfeeding Guide and Packet/Discharge Medication Information for Patients and Families Pocket Guide

## 2024-06-01 NOTE — PROGRESS NOTE ADULT - ASSESSMENT
Septic 2/2 PNA + UTI.  asymptomatic  - UA: cloudy, +LE, 15 WBC, few bacteria   - Cont Ceftriaxone  - f/u Urine cx  - I/Os, trend renal function  - encourage PO intake ROBBIE likely ATN from severe sepsis (from cholecystitis & UTI) causing hypotension vs HRS. Contrast administration likely contributed     Baseline SCr is 0.7 on 7/26.   SCr on arrival is 1.9 on 8/8 peaked at 5.15 and most recently 8/13 1.86 following dialysis hoever   Pt remained anuric despite IV albumin & trial of HTS with diuretics. Had uremic symptoms so was started on HD on 8/11. Pt remains on 2% HTS & salt tabs 2 gm tid this AM. Started Octreotide 100 q8 hr &  Midodrine 10 mg qid. s/p paracentesis & perc rodney. Cultures growing E.coli &  Streptococcus anginosus. UA with high sp gravity (from IV contrast), 30 mg /dl of proteinuria, pyuria, +LE. spot urine TP/CR 0.8 g/g non nephrotic. Urine electrolytes suggesting pre renal etiology such as seen in volume depletion vs HRS.      CT A/P: Prominent/normal size spleen- wandering spleen,  enlarged from prior exams. Clinical correlation with regard to LUQ pain recommended  - Likely will req outpatient f/u  - Trend hgb and abdominal closely  - ordered EBV serology with weakness and enlarged spleen  - EBV positive for reactive disease

## 2024-06-07 PROCEDURE — 90832 PSYTX W PT 30 MINUTES: CPT | Mod: 95

## 2024-07-06 NOTE — ASU PREOP CHECKLIST - SPO2 (%)
96
Subacute rehab/impairments found/functional limitations in following categories/risk reduction/prevention/rehab potential/therapy frequency/predicted duration of therapy intervention/anticipated discharge recommendation

## 2024-07-17 DIAGNOSIS — K70.30 ALCOHOLIC CIRRHOSIS OF LIVER W/OUT ASCITES: ICD-10-CM

## 2024-10-07 NOTE — PHYSICAL THERAPY INITIAL EVALUATION ADULT - WEIGHT-BEARING RESTRICTIONS: SIT/STAND, REHAB EVAL
FYI-Patient stated that he took his blood pressure this morning before his medications and it was 207/67.     He then rechecked it about an hour later (after medications) and it was 160 over something. He did not have his numbers on him because he was at his daughter's house.    Patient confirms an increase in his medication last week.    
Noted. Currently following patient's BP under 9/18/24 telephone encounter with Cass. Will close this encounter.   
weight-bearing as tolerated

## 2024-10-23 ENCOUNTER — APPOINTMENT (OUTPATIENT)
Dept: HEPATOLOGY | Facility: CLINIC | Age: 69
End: 2024-10-23

## 2025-01-03 NOTE — PROGRESS NOTE ADULT - TIME-BASED
Psychiatric Non-Scheduled (Anti-Anxiety) [id number]/03/2025 06:22 AM   Protocol Details In person appointment or virtual visit in the past 6 mos or appointment in next 3 mos    Depression Screening completed within the past 12 months     
35

## 2025-01-15 ENCOUNTER — LABORATORY RESULT (OUTPATIENT)
Age: 70
End: 2025-01-15

## 2025-01-15 ENCOUNTER — APPOINTMENT (OUTPATIENT)
Dept: HEPATOLOGY | Facility: CLINIC | Age: 70
End: 2025-01-15
Payer: MEDICARE

## 2025-01-15 VITALS
HEIGHT: 64 IN | RESPIRATION RATE: 14 BRPM | BODY MASS INDEX: 30.39 KG/M2 | HEART RATE: 78 BPM | OXYGEN SATURATION: 97 % | WEIGHT: 178 LBS | TEMPERATURE: 98.8 F | DIASTOLIC BLOOD PRESSURE: 90 MMHG | SYSTOLIC BLOOD PRESSURE: 158 MMHG

## 2025-01-15 DIAGNOSIS — R18.8 OTHER ASCITES: ICD-10-CM

## 2025-01-15 DIAGNOSIS — K76.82 HEPATIC ENCEPHALOPATHY: ICD-10-CM

## 2025-01-15 PROCEDURE — 99214 OFFICE O/P EST MOD 30 MIN: CPT

## 2025-01-24 ENCOUNTER — NON-APPOINTMENT (OUTPATIENT)
Age: 70
End: 2025-01-24

## 2025-01-24 DIAGNOSIS — K70.30 ALCOHOLIC CIRRHOSIS OF LIVER W/OUT ASCITES: ICD-10-CM

## 2025-01-24 LAB
ALBUMIN SERPL ELPH-MCNC: 3.9 G/DL
ALP BLD-CCNC: 144 U/L
ALPHA-1-FETOPROTEIN-L3: NORMAL %
ALPHA-1-FETOPROTEIN: 3.5 NG/ML
ALT SERPL-CCNC: 35 U/L
ANION GAP SERPL CALC-SCNC: 10 MMOL/L
AST SERPL-CCNC: 83 U/L
BASOPHILS # BLD AUTO: 0.04 K/UL
BASOPHILS NFR BLD AUTO: 1.1 %
BILIRUB SERPL-MCNC: 4.1 MG/DL
BUN SERPL-MCNC: 9 MG/DL
CALCIUM SERPL-MCNC: 9.6 MG/DL
CHLORIDE SERPL-SCNC: 103 MMOL/L
CO2 SERPL-SCNC: 22 MMOL/L
CREAT SERPL-MCNC: 0.82 MG/DL
EGFR: 96 ML/MIN/1.73M2
EOSINOPHIL # BLD AUTO: 0.04 K/UL
EOSINOPHIL NFR BLD AUTO: 1.1 %
GLUCOSE SERPL-MCNC: 113 MG/DL
HCT VFR BLD CALC: 47.9 %
HGB BLD-MCNC: 16.8 G/DL
IMM GRANULOCYTES NFR BLD AUTO: 0.3 %
INR PPP: 1.17 RATIO
LYMPHOCYTES # BLD AUTO: 0.53 K/UL
LYMPHOCYTES NFR BLD AUTO: 15.2 %
MAN DIFF?: NORMAL
MCHC RBC-ENTMCNC: 32.7 PG
MCHC RBC-ENTMCNC: 35.1 G/DL
MCV RBC AUTO: 93.4 FL
MONOCYTES # BLD AUTO: 0.49 K/UL
MONOCYTES NFR BLD AUTO: 14 %
NEUTROPHILS # BLD AUTO: 2.38 K/UL
NEUTROPHILS NFR BLD AUTO: 68.3 %
PETH 16:0/18:1: >400 NG/ML
PETH 16:0/18:2: >400 NG/ML
PETH COMMENTS: NORMAL
PLATELET # BLD AUTO: 77 K/UL
POTASSIUM SERPL-SCNC: 4.3 MMOL/L
PROT SERPL-MCNC: 7.8 G/DL
PT BLD: 13.9 SEC
RBC # BLD: 5.13 M/UL
RBC # FLD: 13.1 %
SODIUM SERPL-SCNC: 134 MMOL/L
WBC # FLD AUTO: 3.49 K/UL

## 2025-04-21 NOTE — OCCUPATIONAL THERAPY INITIAL EVALUATION ADULT - AMBULATORY DEVICES NEEDED
"Salvatore 903 am    " no

## 2025-07-14 NOTE — ASU DISCHARGE PLAN (ADULT/PEDIATRIC) - WILL THE PATIENT ACCEPT THE PFIZER COVID-19 VACCINE IF ELIGIBLE AND IT IS AVAILABLE?
What Type Of Note Output Would You Prefer (Optional)?: Standard Output Hpi Title: Evaluation of Skin Lesions Additional History: Spot of concern on hands Not applicable

## 2025-07-15 ENCOUNTER — APPOINTMENT (OUTPATIENT)
Dept: ULTRASOUND IMAGING | Facility: CLINIC | Age: 70
End: 2025-07-15
Payer: MEDICARE

## 2025-07-15 ENCOUNTER — OUTPATIENT (OUTPATIENT)
Dept: OUTPATIENT SERVICES | Facility: HOSPITAL | Age: 70
LOS: 1 days | End: 2025-07-15

## 2025-07-15 DIAGNOSIS — Z98.890 OTHER SPECIFIED POSTPROCEDURAL STATES: Chronic | ICD-10-CM

## 2025-07-15 DIAGNOSIS — K70.30 ALCOHOLIC CIRRHOSIS OF LIVER WITHOUT ASCITES: ICD-10-CM

## 2025-07-15 PROCEDURE — 76700 US EXAM ABDOM COMPLETE: CPT | Mod: 26

## 2025-08-13 ENCOUNTER — APPOINTMENT (OUTPATIENT)
Dept: HEPATOLOGY | Facility: CLINIC | Age: 70
End: 2025-08-13

## (undated) DEVICE — PACK IV START WITH CHG

## (undated) DEVICE — SENSOR O2 FINGER ADULT

## (undated) DEVICE — TUBING IV SET GRAVITY 3Y 100" MACRO

## (undated) DEVICE — BIOPSY FORCEP RADIAL JAW 4 STANDARD WITH NEEDLE

## (undated) DEVICE — POSITIONER FOAM HEAD CRADLE (PINK)

## (undated) DEVICE — PAPIL BILRTH II 6-5FRX0.035IN

## (undated) DEVICE — BITE BLOCK ADULT 20 X 27MM (GREEN)

## (undated) DEVICE — SOL INJ NS 0.9% 500ML 2 PORT

## (undated) DEVICE — SPHINCTEROTOME CLEVERCUT WIRE 25MM  2.8MM X 170CM

## (undated) DEVICE — BRUSH CYTO RAP EXCHG 3MM

## (undated) DEVICE — ELCTR GROUNDING PAD ADULT COVIDIEN

## (undated) DEVICE — TUBING SUCTION 20FT

## (undated) DEVICE — CATH IV SAFE BC 22G X 1" (BLUE)

## (undated) DEVICE — FOLEY HOLDER STATLOCK 2 WAY ADULT

## (undated) DEVICE — INJ SYS RAP REFIL

## (undated) DEVICE — SNARE POLYP SENS SM 13MM 240CM

## (undated) DEVICE — CATH IV SAFE BC 20G X 1.16" (PINK)

## (undated) DEVICE — LITHOTRIPY BASKET TRAPEZOID 2.5CM

## (undated) DEVICE — BRUSH COLONOSCOPY CYTOLOGY

## (undated) DEVICE — SUCTION YANKAUER NO CONTROL VENT

## (undated) DEVICE — SNARE OVAL LOOP MICOR

## (undated) DEVICE — SNARE POLYP MINI ACCUSNR 1.5 X 3CM

## (undated) DEVICE — WARMING BLANKET FULL ADULT

## (undated) DEVICE — NDL INJ SCLERO INTERJECT 25G

## (undated) DEVICE — NDL INJ SCLERO INTERJECT 23G